# Patient Record
Sex: MALE | Race: WHITE | Employment: OTHER | URBAN - METROPOLITAN AREA
[De-identification: names, ages, dates, MRNs, and addresses within clinical notes are randomized per-mention and may not be internally consistent; named-entity substitution may affect disease eponyms.]

---

## 2022-01-24 ENCOUNTER — APPOINTMENT (OUTPATIENT)
Dept: CT IMAGING | Age: 72
DRG: 445 | End: 2022-01-24
Payer: MEDICARE

## 2022-01-24 ENCOUNTER — HOSPITAL ENCOUNTER (INPATIENT)
Age: 72
LOS: 3 days | Discharge: HOME | DRG: 445 | End: 2022-01-27
Attending: EMERGENCY MEDICINE | Admitting: INTERNAL MEDICINE
Payer: MEDICARE

## 2022-01-24 DIAGNOSIS — K72.00 ACUTE LIVER FAILURE WITHOUT HEPATIC COMA: Primary | ICD-10-CM

## 2022-01-24 DIAGNOSIS — E80.6 HYPERBILIRUBINEMIA: ICD-10-CM

## 2022-01-24 PROBLEM — E87.6 HYPOKALEMIA: Status: ACTIVE | Noted: 2022-01-24

## 2022-01-24 PROBLEM — K83.1 BILIARY OBSTRUCTION: Status: ACTIVE | Noted: 2022-01-24

## 2022-01-24 PROBLEM — K50.90 CROHN'S DISEASE (HCC): Status: ACTIVE | Noted: 2022-01-24

## 2022-01-24 PROBLEM — R74.01 TRANSAMINITIS: Status: ACTIVE | Noted: 2022-01-24

## 2022-01-24 PROBLEM — E87.1 HYPONATREMIA: Status: ACTIVE | Noted: 2022-01-24

## 2022-01-24 PROBLEM — R17 PAINLESS JAUNDICE: Status: ACTIVE | Noted: 2022-01-24

## 2022-01-24 LAB
A/G RATIO: 1.3 (ref 1.1–2.2)
ACETAMINOPHEN LEVEL: <5 UG/ML (ref 10–30)
ALBUMIN SERPL-MCNC: 4 G/DL (ref 3.4–5)
ALP BLD-CCNC: 501 U/L (ref 40–129)
ALT SERPL-CCNC: 586 U/L (ref 10–40)
AMMONIA: 48 UMOL/L (ref 16–60)
ANION GAP SERPL CALCULATED.3IONS-SCNC: 13 MMOL/L (ref 3–16)
AST SERPL-CCNC: 286 U/L (ref 15–37)
BASOPHILS ABSOLUTE: 0.1 K/UL (ref 0–0.2)
BASOPHILS RELATIVE PERCENT: 1.1 %
BILIRUB SERPL-MCNC: 8.9 MG/DL (ref 0–1)
BUN BLDV-MCNC: 11 MG/DL (ref 7–20)
CALCIUM SERPL-MCNC: 9.1 MG/DL (ref 8.3–10.6)
CHLORIDE BLD-SCNC: 86 MMOL/L (ref 99–110)
CO2: 25 MMOL/L (ref 21–32)
CREAT SERPL-MCNC: <0.5 MG/DL (ref 0.8–1.3)
EOSINOPHILS ABSOLUTE: 0.1 K/UL (ref 0–0.6)
EOSINOPHILS RELATIVE PERCENT: 2 %
GFR AFRICAN AMERICAN: >60
GFR NON-AFRICAN AMERICAN: >60
GLUCOSE BLD-MCNC: 116 MG/DL (ref 70–99)
GLUCOSE BLD-MCNC: 132 MG/DL (ref 70–99)
HCT VFR BLD CALC: 40.5 % (ref 40.5–52.5)
HEMOGLOBIN: 13.7 G/DL (ref 13.5–17.5)
INR BLD: 1.08 (ref 0.88–1.12)
LYMPHOCYTES ABSOLUTE: 1 K/UL (ref 1–5.1)
LYMPHOCYTES RELATIVE PERCENT: 13.8 %
MAGNESIUM: 2.1 MG/DL (ref 1.8–2.4)
MCH RBC QN AUTO: 34 PG (ref 26–34)
MCHC RBC AUTO-ENTMCNC: 33.8 G/DL (ref 31–36)
MCV RBC AUTO: 100.7 FL (ref 80–100)
MONOCYTES ABSOLUTE: 0.5 K/UL (ref 0–1.3)
MONOCYTES RELATIVE PERCENT: 6.5 %
NEUTROPHILS ABSOLUTE: 5.5 K/UL (ref 1.7–7.7)
NEUTROPHILS RELATIVE PERCENT: 76.6 %
PDW BLD-RTO: 13.8 % (ref 12.4–15.4)
PERFORMED ON: ABNORMAL
PLATELET # BLD: 354 K/UL (ref 135–450)
PMV BLD AUTO: 7.4 FL (ref 5–10.5)
POTASSIUM REFLEX MAGNESIUM: 3.3 MMOL/L (ref 3.5–5.1)
PROTHROMBIN TIME: 12.2 SEC (ref 9.9–12.7)
RBC # BLD: 4.02 M/UL (ref 4.2–5.9)
SARS-COV-2, NAAT: NOT DETECTED
SODIUM BLD-SCNC: 124 MMOL/L (ref 136–145)
TOTAL PROTEIN: 7.2 G/DL (ref 6.4–8.2)
WBC # BLD: 7.2 K/UL (ref 4–11)

## 2022-01-24 PROCEDURE — 74177 CT ABD & PELVIS W/CONTRAST: CPT

## 2022-01-24 PROCEDURE — 2580000003 HC RX 258: Performed by: INTERNAL MEDICINE

## 2022-01-24 PROCEDURE — 83735 ASSAY OF MAGNESIUM: CPT

## 2022-01-24 PROCEDURE — 80074 ACUTE HEPATITIS PANEL: CPT

## 2022-01-24 PROCEDURE — 99283 EMERGENCY DEPT VISIT LOW MDM: CPT

## 2022-01-24 PROCEDURE — 6360000004 HC RX CONTRAST MEDICATION: Performed by: EMERGENCY MEDICINE

## 2022-01-24 PROCEDURE — 99222 1ST HOSP IP/OBS MODERATE 55: CPT | Performed by: PHYSICIAN ASSISTANT

## 2022-01-24 PROCEDURE — 6370000000 HC RX 637 (ALT 250 FOR IP): Performed by: INTERNAL MEDICINE

## 2022-01-24 PROCEDURE — 1200000000 HC SEMI PRIVATE

## 2022-01-24 PROCEDURE — 80143 DRUG ASSAY ACETAMINOPHEN: CPT

## 2022-01-24 PROCEDURE — 82140 ASSAY OF AMMONIA: CPT

## 2022-01-24 PROCEDURE — 80053 COMPREHEN METABOLIC PANEL: CPT

## 2022-01-24 PROCEDURE — 85025 COMPLETE CBC W/AUTO DIFF WBC: CPT

## 2022-01-24 PROCEDURE — 83036 HEMOGLOBIN GLYCOSYLATED A1C: CPT

## 2022-01-24 PROCEDURE — 6370000000 HC RX 637 (ALT 250 FOR IP): Performed by: PHYSICIAN ASSISTANT

## 2022-01-24 PROCEDURE — 2580000003 HC RX 258: Performed by: EMERGENCY MEDICINE

## 2022-01-24 PROCEDURE — 6360000002 HC RX W HCPCS: Performed by: INTERNAL MEDICINE

## 2022-01-24 PROCEDURE — 85610 PROTHROMBIN TIME: CPT

## 2022-01-24 PROCEDURE — 87635 SARS-COV-2 COVID-19 AMP PRB: CPT

## 2022-01-24 RX ORDER — SODIUM CHLORIDE 9 MG/ML
INJECTION, SOLUTION INTRAVENOUS CONTINUOUS
Status: DISCONTINUED | OUTPATIENT
Start: 2022-01-24 | End: 2022-01-27 | Stop reason: HOSPADM

## 2022-01-24 RX ORDER — ALBUTEROL SULFATE 90 UG/1
2 AEROSOL, METERED RESPIRATORY (INHALATION) EVERY 6 HOURS PRN
Status: DISCONTINUED | OUTPATIENT
Start: 2022-01-24 | End: 2022-01-27 | Stop reason: HOSPADM

## 2022-01-24 RX ORDER — 0.9 % SODIUM CHLORIDE 0.9 %
1000 INTRAVENOUS SOLUTION INTRAVENOUS ONCE
Status: COMPLETED | OUTPATIENT
Start: 2022-01-24 | End: 2022-01-24

## 2022-01-24 RX ORDER — DEXTROSE MONOHYDRATE 25 G/50ML
12.5 INJECTION, SOLUTION INTRAVENOUS PRN
Status: DISCONTINUED | OUTPATIENT
Start: 2022-01-24 | End: 2022-01-24 | Stop reason: CLARIF

## 2022-01-24 RX ORDER — SODIUM CHLORIDE 0.9 % (FLUSH) 0.9 %
5-40 SYRINGE (ML) INJECTION EVERY 12 HOURS SCHEDULED
Status: DISCONTINUED | OUTPATIENT
Start: 2022-01-24 | End: 2022-01-27 | Stop reason: HOSPADM

## 2022-01-24 RX ORDER — POLYETHYLENE GLYCOL 3350 17 G/17G
17 POWDER, FOR SOLUTION ORAL DAILY PRN
Status: DISCONTINUED | OUTPATIENT
Start: 2022-01-24 | End: 2022-01-27 | Stop reason: HOSPADM

## 2022-01-24 RX ORDER — ONDANSETRON 2 MG/ML
4 INJECTION INTRAMUSCULAR; INTRAVENOUS EVERY 6 HOURS PRN
Status: DISCONTINUED | OUTPATIENT
Start: 2022-01-24 | End: 2022-01-27 | Stop reason: HOSPADM

## 2022-01-24 RX ORDER — SODIUM CHLORIDE 9 MG/ML
25 INJECTION, SOLUTION INTRAVENOUS PRN
Status: DISCONTINUED | OUTPATIENT
Start: 2022-01-24 | End: 2022-01-27 | Stop reason: HOSPADM

## 2022-01-24 RX ORDER — NICOTINE POLACRILEX 4 MG
15 LOZENGE BUCCAL PRN
Status: DISCONTINUED | OUTPATIENT
Start: 2022-01-24 | End: 2022-01-27 | Stop reason: HOSPADM

## 2022-01-24 RX ORDER — ATENOLOL 50 MG/1
50 TABLET ORAL DAILY
Status: DISCONTINUED | OUTPATIENT
Start: 2022-01-24 | End: 2022-01-24

## 2022-01-24 RX ORDER — TRIAMTERENE AND HYDROCHLOROTHIAZIDE 37.5; 25 MG/1; MG/1
1 TABLET ORAL 2 TIMES DAILY
Status: ON HOLD | COMMUNITY
End: 2022-01-26 | Stop reason: HOSPADM

## 2022-01-24 RX ORDER — POTASSIUM CHLORIDE 20 MEQ/1
40 TABLET, EXTENDED RELEASE ORAL ONCE
Status: COMPLETED | OUTPATIENT
Start: 2022-01-24 | End: 2022-01-24

## 2022-01-24 RX ORDER — DEXTROSE MONOHYDRATE 50 MG/ML
100 INJECTION, SOLUTION INTRAVENOUS PRN
Status: DISCONTINUED | OUTPATIENT
Start: 2022-01-24 | End: 2022-01-27 | Stop reason: HOSPADM

## 2022-01-24 RX ORDER — SODIUM CHLORIDE 0.9 % (FLUSH) 0.9 %
5-40 SYRINGE (ML) INJECTION PRN
Status: DISCONTINUED | OUTPATIENT
Start: 2022-01-24 | End: 2022-01-27 | Stop reason: HOSPADM

## 2022-01-24 RX ORDER — METFORMIN HYDROCHLORIDE 500 MG/1
500 TABLET, EXTENDED RELEASE ORAL 2 TIMES DAILY
COMMUNITY
End: 2022-07-14

## 2022-01-24 RX ORDER — FAMOTIDINE 20 MG/1
20 TABLET, FILM COATED ORAL 2 TIMES DAILY
Status: DISCONTINUED | OUTPATIENT
Start: 2022-01-24 | End: 2022-01-26

## 2022-01-24 RX ORDER — ALLOPURINOL 100 MG/1
300 TABLET ORAL DAILY
Status: DISCONTINUED | OUTPATIENT
Start: 2022-01-24 | End: 2022-01-27 | Stop reason: HOSPADM

## 2022-01-24 RX ORDER — ONDANSETRON 4 MG/1
4 TABLET, ORALLY DISINTEGRATING ORAL EVERY 8 HOURS PRN
Status: DISCONTINUED | OUTPATIENT
Start: 2022-01-24 | End: 2022-01-27 | Stop reason: HOSPADM

## 2022-01-24 RX ADMIN — IOPAMIDOL 75 ML: 755 INJECTION, SOLUTION INTRAVENOUS at 15:44

## 2022-01-24 RX ADMIN — POTASSIUM CHLORIDE 40 MEQ: 1500 TABLET, EXTENDED RELEASE ORAL at 22:34

## 2022-01-24 RX ADMIN — Medication 10 ML: at 22:37

## 2022-01-24 RX ADMIN — SODIUM CHLORIDE 1000 ML: 9 INJECTION, SOLUTION INTRAVENOUS at 15:57

## 2022-01-24 RX ADMIN — ENOXAPARIN SODIUM 40 MG: 100 INJECTION SUBCUTANEOUS at 22:34

## 2022-01-24 RX ADMIN — FAMOTIDINE 20 MG: 20 TABLET ORAL at 22:34

## 2022-01-24 RX ADMIN — SODIUM CHLORIDE: 9 INJECTION, SOLUTION INTRAVENOUS at 22:47

## 2022-01-24 RX ADMIN — ALLOPURINOL 300 MG: 100 TABLET ORAL at 22:34

## 2022-01-24 ASSESSMENT — ENCOUNTER SYMPTOMS
PHOTOPHOBIA: 0
VOMITING: 0
TROUBLE SWALLOWING: 0
NAUSEA: 0
ABDOMINAL PAIN: 1
BACK PAIN: 0
BLOOD IN STOOL: 0
COLOR CHANGE: 1
FACIAL SWELLING: 0
WHEEZING: 0
VOICE CHANGE: 0
STRIDOR: 0
SHORTNESS OF BREATH: 0

## 2022-01-24 ASSESSMENT — PAIN DESCRIPTION - DESCRIPTORS: DESCRIPTORS: CRAMPING

## 2022-01-24 ASSESSMENT — PAIN DESCRIPTION - LOCATION: LOCATION: ABDOMEN

## 2022-01-24 ASSESSMENT — PAIN DESCRIPTION - PAIN TYPE: TYPE: ACUTE PAIN

## 2022-01-24 ASSESSMENT — PAIN SCALES - GENERAL: PAINLEVEL_OUTOF10: 3

## 2022-01-24 NOTE — ED NOTES
Patient resting in chair with eyes closed, respirations easy and even. Will continue to monitor.      Sharon Tadeo RN  01/24/22 6392

## 2022-01-24 NOTE — ED NOTES
Perfect serve message to Dr. Esther Merida @ [facility]  01/24/22 1703    Orders placed, no call back      [address] [facility]  01/24/22 6422

## 2022-01-24 NOTE — H&P
Hospital Medicine History & Physical      PCP: Deepti Cain MD    Date of Admission: 1/24/2022    Date of Service: Pt seen/examined on 1/24/2022      Chief Complaint:    Chief Complaint   Patient presents with    Fatigue     pt c/o generalized weakness and jaundice, dark urine, severe itching, was seen by provider last monday and started on some medications but does not seem to have improved, hx of crohns    Jaundice     History Of Present Illness: The patient is a 70 y.o. male with Crohn's disease, HTN, GERD, gout, DM2, HLD  who presented to [facility] ED with complaint of yellow skin and itching. Malaise, fatigue, poor appetite for about 1 month. About 2 weeks ago he noticed that his urine looked dark in color. About a week and a half ago he noticed some slight itching of the skin. He has had increased indigestion but denies any abdominal pain. Poor oral intake secondary to lack of appetite, but denies postprandial pain. Saw PCP and was started on ciprofloxacin for urinary tract infection and Medrol Dosepak for itching. Wife noticed that his eyes appeared yellow about 2 or 3 days ago and therefore patient came to the ER today for evaluation. He does not drink alcohol. He does not take Tylenol. He rarely takes NSAIDs. Denies a history of hepatitis. He was found to have significantly elevated liver enzymes , alk phos 501, , and a bilirubin of 8.9. CT scan of the abdomen and pelvis revealed moderate dilatation of the biliary tree with a transition point in the common duct, etiology uncertain, distended gallbladder without cholelithiasis appreciated. He will be admitted for further care and a GI consultation.     Past Medical History:        Diagnosis Date    Blood circulation, collateral     Carpal tunnel syndrome     emboli     pulmonary emboli in 1980    GERD (gastroesophageal reflux disease)     Chrons disease    Gout     Hypertension     Pneumonia     pneumonia,asthma    prostate     infection    Seasonal allergies        Past Surgical History:        Procedure Laterality Date    TONSILLECTOMY         Medications Prior to Admission:    Prior to Admission medications    Medication Sig Start Date End Date Taking? Authorizing Provider   gemfibrozil (LOPID) 600 MG tablet Take 600 mg by mouth 2 times daily. 2/15/11   Historical Provider, MD   dicyclomine (BENTYL) 10 MG capsule Take 10 mg by mouth nightly. 2/15/11   Historical Provider, MD   atenolol (TENORMIN) 50 MG tablet Take 50 mg by mouth daily. Historical Provider, MD   allopurinol (ZYLOPRIM) 300 MG tablet Take 300 mg by mouth daily. Historical Provider, MD   famotidine (PEPCID) 20 MG tablet Take 20 mg by mouth 2 times daily. Historical Provider, MD   albuterol (PROVENTIL HFA;VENTOLIN HFA) 108 (90 BASE) MCG/ACT inhaler Inhale 2 puffs into the lungs every 6 hours as needed. Historical Provider, MD   ibuprofen (ADVIL;MOTRIN) 200 MG tablet Take 200 mg by mouth every 6 hours as needed. Historical Provider, MD       Allergies:  Patient has no known allergies. Social History:  The patient currently lives at home with his wife. TOBACCO:   reports that he has quit smoking. He has never used smokeless tobacco.  ETOH:   reports no history of alcohol use. Family History:   Positive as follows:    History reviewed. No pertinent family history.     REVIEW OF SYSTEMS:       Constitutional: Negative for fever   + fatigue, poor appetite   HENT: Negative for sore throat   Eyes: Negative for redness   Respiratory: Negative  for dyspnea, cough   Cardiovascular: Negative for chest pain   Gastrointestinal: Negative for vomiting, diarrhea   Genitourinary: Negative for hematuria   Musculoskeletal: Negative for arthralgias   Skin: Negative for rash   + pruritis   + color change   Neurological: Negative for syncope   Hematological: Negative for adenopathy   Psychiatric/Behavorial: Negative for anxiety    PHYSICAL EXAM:    BP (!) 147/83   Pulse 78   Temp 98.3 °F (36.8 °C) (Oral)   Resp 18   Ht 5' 11\" (1.803 m)   Wt 171 lb (77.6 kg)   SpO2 97%   BMI 23.85 kg/m²     Gen: No distress. Alert. Eyes: PERRL. + sclera icterus. No conjunctival injection. ENT: No discharge. Pharynx clear. Neck: No JVD. Trachea midline. Resp: No accessory muscle use. No crackles. No wheezes. No rhonchi. CV: Regular rate. Regular rhythm. No murmur. No rub. No edema. GI: Non-tender. Non-distended. No masses. No organomegaly. Normal bowel sounds. No hernia. Skin: Warm and dry. No nodule on exposed extremities. No rash on exposed extremities. Jaundice   M/S: No cyanosis. No joint deformity. No clubbing. Neuro: Awake. Grossly nonfocal    Psych: Oriented x 3. No anxiety or agitation. CBC:   Recent Labs     01/24/22  1210   WBC 7.2   HGB 13.7   HCT 40.5   .7*        BMP:   Recent Labs     01/24/22  1210   *   K 3.3*   CL 86*   CO2 25   BUN 11   CREATININE <0.5*     LIVER PROFILE:   Recent Labs     01/24/22  1210   *   *   BILITOT 8.9*   ALKPHOS 501*     PT/INR:   Recent Labs     01/24/22  1620   PROTIME 12.2   INR 1.08       CULTURES  COVID 19, naat; not detected      EKG:  I have reviewed the EKG with the following interpretation:   None     RADIOLOGY  CT ABDOMEN PELVIS W IV CONTRAST Additional Contrast? None   Final Result   1. Moderate dilation of the biliary tree with transition at the mid to lower   common duct, uncertain etiology, benign versus malignant stricture. No stone   or mass is appreciated. Follow-up ERCP/MRCP is considered. 2. Distended gallbladder. No cholelithiasis is appreciated. 3. No findings of acute pancreatitis or dilation of the pancreatic duct. 4. Mild dilation of proximal and mid small bowel, with gradual transition. Ileus or enteritis is favored over obstruction.            ASSESSMENT/PLAN:    #Transaminitis  #Biliary obstruction   - patient presents with 1 month of fatigue and poor appetite followed by pruritis and jaundice  - LFT elevation as above with bili of 8.9  - CT as above with dilatation of the biliary tree with transition at the mid to lower common duct of uncertain etiology- no stones or masses noted on CT.   - C/s [facility] (previously f/b Dr. Mando Miller and has upcoming appt with Dr. Darya Coronado) pending- ERCP vs MRCP pending GI recs  - acute hep panel ordered  - mitochondrial ab's ordered  - IVF  - NPO after midnight    #Hyponatremia   - suspect 2/2 poor PO intake and continued use of thiazide diuretic  - no recent labs to review  - Na 124 on admit. Gentle IV NS. Hold Thiazide. Monitor labs    #Hypokalemia   - replacement ordered    #Crohn's disease  - denies any acute issues. Previously f/b Dr. Mando Miller, has an upcoming appt with Dr. Darya Coronado. Takes Bentyl QHS only at home    #HTN  - BP mildly elevated, hold Maxzide with hyponatremia     #GERD  - continue Pepcid    #Gout  - cont allopurinol    #DM2  - hold metformin, use SSI    DVT Prophylaxis: Lovenox   Diet: Diet NPO  ADULT DIET;  Regular; Low Fat (less than or equal to 50 gm/day)   Code Status: Full Code     Sanchez Calix PA-C  1/24/2022 6:49 PM

## 2022-01-24 NOTE — PLAN OF CARE
77yo man with Hx of Crohn's, presents with painless jaundice and pruritis. No prior Hx of liver disease. Initially treated with cipro and Medrol dose pack per PCP. Plan:    - NPO after midnight.    - gentle IVF with hyponatremia - renal q8   - LFT with total direct indirect bilirubin   - INR normal.    - GI consult. - check viral hep panel and Mitochondrial Ab.     - admit to med surg.    - MRCP vs ERCP pending GI recs.        Marialuisa Cano MD  1/24/2022  5:29 PM

## 2022-01-24 NOTE — ED TRIAGE NOTES
Chief Complaint   Patient presents with    Fatigue     pt c/o generalized weakness and jaundice, dark urine, severe itching, was seen by provider last monday and started on some medications but does not seem to have improved, hx of crohns    Jaundice

## 2022-01-24 NOTE — ED PROVIDER NOTES
[address] ED  eMERGENCY dEPARTMENT eNCOUnter      Pt Name: Susana Lemon  MRN: [id number]  [place] 1950  Date of evaluation: 1/24/2022  Provider: Jelani Gray MD    CHIEF COMPLAINT       Chief Complaint   Patient presents with    Fatigue     pt c/o generalized weakness and jaundice, dark urine, severe itching, was seen by provider last monday and started on some medications but does not seem to have improved, hx of crohns    Jaundice         HISTORY OF PRESENT ILLNESS   (Location/Symptom, Timing/Onset, Context/Setting, Quality, Duration, Modifying Factors, Severity)  Note limiting factors. Susana Lemon is a 70 y.o. male reports a history of Crohn's disease but denies any history of liver problems who reports 2 to 3 weeks of yellow discoloration of his skin, dark urine, and severe diffuse itching. Patient saw his primary care doctor 1 week ago and was placed on ciprofloxacin and a Medrol Dosepak which she reports mildly helped his symptoms at first however now they have worsened. The patient denies any history of liver problems. The patient denies taking any Tylenol. The patient denies drinking alcohol. The patient denies any known history of hepatitis. He reports his symptoms are moderate constant and worsening. He also reports diffuse fatigue. He denies any focal neurologic deficits, fever, confusion, altered mental status. He does report decreased appetite. HPI    Nursing Notes were reviewed. REVIEW OFSYSTEMS    (2-9 systems for level 4, 10 or more for level 5)     Review of Systems   Constitutional: Positive for appetite change and fatigue. Negative for fever and unexpected weight change. HENT: Negative for facial swelling, trouble swallowing and voice change. Eyes: Negative for photophobia and visual disturbance. Respiratory: Negative for shortness of breath, wheezing and stridor. Cardiovascular: Negative for chest pain and palpitations.    Gastrointestinal: Positive for abdominal pain. Negative for blood in stool, nausea and vomiting. Genitourinary: Negative for difficulty urinating and dysuria. Musculoskeletal: Negative for back pain, gait problem and neck pain. Skin: Positive for color change. Negative for wound. Neurological: Positive for weakness (diffuse). Negative for seizures, syncope and speech difficulty. Psychiatric/Behavioral: Negative for self-injury and suicidal ideas. Except as noted above the remainder of the review of systems was reviewed and negative. PAST MEDICAL HISTORY     Past Medical History:   Diagnosis Date    Blood circulation, collateral     Carpal tunnel syndrome     emboli     pulmonary emboli in 1980    GERD (gastroesophageal reflux disease)     Chrons disease    Gout     Hypertension     Pneumonia     pneumonia,asthma    prostate     infection    Seasonal allergies          SURGICAL HISTORY       Past Surgical History:   Procedure Laterality Date    TONSILLECTOMY           CURRENT MEDICATIONS       Previous Medications    ALBUTEROL (PROVENTIL HFA;VENTOLIN HFA) 108 (90 BASE) MCG/ACT INHALER    Inhale 2 puffs into the lungs every 6 hours as needed. ALLOPURINOL (ZYLOPRIM) 300 MG TABLET    Take 300 mg by mouth daily. ATENOLOL (TENORMIN) 50 MG TABLET    Take 50 mg by mouth daily. DICYCLOMINE (BENTYL) 10 MG CAPSULE    Take 10 mg by mouth nightly. FAMOTIDINE (PEPCID) 20 MG TABLET    Take 20 mg by mouth 2 times daily. GEMFIBROZIL (LOPID) 600 MG TABLET    Take 600 mg by mouth 2 times daily. IBUPROFEN (ADVIL;MOTRIN) 200 MG TABLET    Take 200 mg by mouth every 6 hours as needed. ALLERGIES     Patient has no known allergies. FAMILY HISTORY     History reviewed. No pertinent family history.        SOCIAL HISTORY       Social History     Socioeconomic History    Marital status:      Spouse name: None    Number of children: None    Years of education: None    Highest education level: None   Occupational History    None   Tobacco Use    Smoking status: Former Smoker    Smokeless tobacco: Never Used    Tobacco comment: pt quit smoking over 20 years ago   Substance and Sexual Activity    Alcohol use: No    Drug use: No    Sexual activity: Yes     Partners: Female   Other Topics Concern    None   Social History Narrative    None     Social Determinants of Health     Financial Resource Strain:     Difficulty of Paying Living Expenses: Not on file   Food Insecurity:     Worried About Running Out of Food in the Last Year: Not on file    Elroy of Food in the Last Year: Not on file   Transportation Needs:     Lack of Transportation (Medical): Not on file    Lack of Transportation (Non-Medical): Not on file   Physical Activity:     Days of Exercise per Week: Not on file    Minutes of Exercise per Session: Not on file   Stress:     Feeling of Stress : Not on file   Social Connections:     Frequency of Communication with Friends and Family: Not on file    Frequency of Social Gatherings with Friends and Family: Not on file    Attends Quaker Services: Not on file    Active Member of [address] [place] [address] or Organizations: Not on file    Attends Club or Organization Meetings: Not on file    Marital Status: Not on file   Intimate Partner Violence:     Fear of Current or Ex-Partner: Not on file    Emotionally Abused: Not on file    Physically Abused: Not on file    Sexually Abused: Not on file   Housing Stability:     Unable to Pay for Housing in the Last Year: Not on file    Number of [place] in the Last Year: Not on file    Unstable Housing in the Last Year: Not on file         PHYSICAL EXAM    (up to 7 for level 4, 8 or more for level 5)     ED Triage Vitals [01/24/22 1201]   BP Temp Temp Source Pulse Resp SpO2 Height Weight   (!) 147/83 98.3 °F (36.8 °C) Oral 78 18 97 % 5' 11\" (1.803 m) 171 lb (77.6 kg)       Physical Exam  Vitals and nursing note reviewed.    Constitutional: General: He is not in acute distress. Appearance: He is well-developed. HENT:      Head: Normocephalic and atraumatic. Right Ear: External ear normal.      Left Ear: External ear normal.   Eyes:      General: Scleral icterus present. Neck:      Vascular: No JVD. Trachea: No tracheal deviation. Cardiovascular:      Rate and Rhythm: Normal rate. Pulmonary:      Effort: Pulmonary effort is normal. No respiratory distress. Breath sounds: Normal breath sounds. No wheezing. Abdominal:      General: There is no distension. Palpations: Abdomen is soft. Tenderness: There is no abdominal tenderness. There is no guarding or rebound. Musculoskeletal:         General: No tenderness. Normal range of motion. Cervical back: Neck supple. Skin:     General: Skin is warm and dry. Coloration: Skin is jaundiced. Neurological:      Mental Status: He is alert. Cranial Nerves: No cranial nerve deficit. Comments: Patient alert and oriented to person place time and situation. Equal strength in all 4 extremities. Ambulatory in own power with normal gait. DIAGNOSTIC RESULTS       RADIOLOGY:     Interpretation per the Radiologist below, if available at the time of this note:    CT ABDOMEN PELVIS W IV CONTRAST Additional Contrast? None   Final Result   1. Moderate dilation of the biliary tree with transition at the mid to lower   common duct, uncertain etiology, benign versus malignant stricture. No stone   or mass is appreciated. Follow-up ERCP/MRCP is considered. 2. Distended gallbladder. No cholelithiasis is appreciated. 3. No findings of acute pancreatitis or dilation of the pancreatic duct. 4. Mild dilation of proximal and mid small bowel, with gradual transition. Ileus or enteritis is favored over obstruction.                ED BEDSIDE ULTRASOUND:   Performed by ED Physician - none    LABS:  Labs Reviewed   CBC WITH AUTO DIFFERENTIAL - Abnormal; Notable for the following components:       Result Value    RBC 4.02 (*)     .7 (*)     All other components within normal limits    Narrative:     Performed at:  [facility],  [employer]   Phone [phone]   COMPREHENSIVE METABOLIC PANEL W/ REFLEX TO MG FOR LOW K - Abnormal; Notable for the following components:    Sodium 124 (*)     Potassium reflex Magnesium 3.3 (*)     Chloride 86 (*)     Glucose 116 (*)     CREATININE <0.5 (*)     Total Bilirubin 8.9 (*)     Alkaline Phosphatase 501 (*)      (*)      (*)     All other components within normal limits    Narrative:     Performed at:  [place] [address],  [employer]   Phone [phone]   ACETAMINOPHEN LEVEL - Abnormal; Notable for the following components:    Acetaminophen Level <5 (*)     All other components within normal limits    Narrative:     Performed at:  [facility],  [employer]   Phone [phone], RAPID   AMMONIA    Narrative:     Performed at:  [place] [address],  [place] [employer]   Phone [phone]   MAGNESIUM    Narrative:     Performed at:  [place] [address],  [employer]   Phone [phone]   PROTIME-INR    Narrative:     Performed at:  [facility],  [employer]   Phone [phone]   CBC WITH AUTO DIFFERENTIAL   RENAL FUNCTION PANEL   HEPATITIS PANEL, ACUTE   MITOCHONDRIAL ANTIBODIES, M2   HEPATIC FUNCTION PANEL       All otherlabs were within normal range or not returned as of this dictation.     EMERGENCY DEPARTMENT COURSE and DIFFERENTIAL DIAGNOSIS/MDM:   Vitals:    Vitals:    01/24/22 1201   BP: (!) 147/83   Pulse: 78   Resp: 18   Temp: 98.3 °F (36.8 °C)   TempSrc: Oral   SpO2: 97%   Weight: 171 lb (77.6 kg)   Height: 5' 11\" (1.803 m)         MDM  Patient has jaundice and scleral icterus on exam and labs show acute liver failure with significantly elevated LFTs and hyperbilirubinemia. CT abdomen pelvis shows moderate dilation of the biliary tree with stricture too lower common duct of unknown nature. Given patient's acute endorgan damage I feel he is appropriate for admission for ERCP/MRCP and GI consultation. The patient expresses understanding and agreement with this plan is admitted for further care. CONSULTS:  IP CONSULT TO HOSPITALIST  IP CONSULT TO GI    PROCEDURES:  Unless otherwise noted below, none     Critical Care  Performed by: Berenice Andrews MD  Authorized by: Berenice Andrews MD     Critical care provider statement:     Critical care time (minutes):  32    Critical care time was exclusive of:  Separately billable procedures and treating other patients and teaching time    Critical care was necessary to treat or prevent imminent or life-threatening deterioration of the following conditions:  Hepatic failure and shock    Critical care was time spent personally by me on the following activities:  Ordering and performing treatments and interventions, development of treatment plan with patient or surrogate, ordering and review of laboratory studies, discussions with consultants, ordering and review of radiographic studies, re-evaluation of patient's condition, examination of patient and obtaining history from patient or surrogate        FINAL IMPRESSION      1. Acute liver failure without hepatic coma    2. Hyperbilirubinemia          DISPOSITION/PLAN   DISPOSITION Admitted 01/24/2022 05:26:36 PM      (Please note that portions of this note were completed with a voice recognition program.  Efforts were made to edit the dictations but occasionally words aremis-transcribed. )    Berenice Andrews MD (electronically signed)  Attending Emergency Physician           Nyla Rodriguez Diogenes Lima MD  01/24/22 5554

## 2022-01-24 NOTE — ED NOTES
Call placed to [address] @ [address]  01/24/22 Raj[address]inder 84    Call placed to [facility] @ [address]  01/24/22 1846

## 2022-01-24 NOTE — PROGRESS NOTES
GI Brief Note:     GI consult received. Full consult note tomorrow morning. Chart review indicate obstructive jaundice with abnormal LFTs and CT evidence of mid to distal CBD stricture with proximal dilation. Normal WBC and afebrile without evidence of cholangitis. Keep NPO with IV fluids.    Plan for ERCP with biliary stent placement and biopsy tomorrow 1/25  Continue supportive care

## 2022-01-25 ENCOUNTER — ANESTHESIA (OUTPATIENT)
Dept: MEDSURG UNIT | Age: 72
DRG: 445 | End: 2022-01-25
Payer: MEDICARE

## 2022-01-25 ENCOUNTER — APPOINTMENT (OUTPATIENT)
Dept: MRI IMAGING | Age: 72
DRG: 445 | End: 2022-01-25
Payer: MEDICARE

## 2022-01-25 ENCOUNTER — ANESTHESIA EVENT (OUTPATIENT)
Dept: MEDSURG UNIT | Age: 72
DRG: 445 | End: 2022-01-25
Payer: MEDICARE

## 2022-01-25 LAB
ALBUMIN SERPL-MCNC: 3.3 G/DL (ref 3.4–5)
ALBUMIN SERPL-MCNC: 3.5 G/DL (ref 3.4–5)
ALBUMIN SERPL-MCNC: 3.6 G/DL (ref 3.4–5)
ALP BLD-CCNC: 434 U/L (ref 40–129)
ALT SERPL-CCNC: 416 U/L (ref 10–40)
ANION GAP SERPL CALCULATED.3IONS-SCNC: 10 MMOL/L (ref 3–16)
ANION GAP SERPL CALCULATED.3IONS-SCNC: 5 MMOL/L (ref 3–16)
AST SERPL-CCNC: 177 U/L (ref 15–37)
BASOPHILS ABSOLUTE: 0 K/UL (ref 0–0.2)
BASOPHILS RELATIVE PERCENT: 0.7 %
BILIRUB SERPL-MCNC: 10.8 MG/DL (ref 0–1)
BILIRUBIN DIRECT: 7.5 MG/DL (ref 0–0.3)
BILIRUBIN, INDIRECT: 3.3 MG/DL (ref 0–1)
BUN BLDV-MCNC: 11 MG/DL (ref 7–20)
BUN BLDV-MCNC: 12 MG/DL (ref 7–20)
CALCIUM SERPL-MCNC: 8.9 MG/DL (ref 8.3–10.6)
CALCIUM SERPL-MCNC: 8.9 MG/DL (ref 8.3–10.6)
CHLORIDE BLD-SCNC: 94 MMOL/L (ref 99–110)
CHLORIDE BLD-SCNC: 96 MMOL/L (ref 99–110)
CO2: 25 MMOL/L (ref 21–32)
CO2: 30 MMOL/L (ref 21–32)
CREAT SERPL-MCNC: <0.5 MG/DL (ref 0.8–1.3)
CREAT SERPL-MCNC: <0.5 MG/DL (ref 0.8–1.3)
EOSINOPHILS ABSOLUTE: 0.1 K/UL (ref 0–0.6)
EOSINOPHILS RELATIVE PERCENT: 2.6 %
ESTIMATED AVERAGE GLUCOSE: 111.2 MG/DL
GFR AFRICAN AMERICAN: >60
GFR AFRICAN AMERICAN: >60
GFR NON-AFRICAN AMERICAN: >60
GFR NON-AFRICAN AMERICAN: >60
GLUCOSE BLD-MCNC: 113 MG/DL (ref 70–99)
GLUCOSE BLD-MCNC: 115 MG/DL (ref 70–99)
GLUCOSE BLD-MCNC: 119 MG/DL (ref 70–99)
GLUCOSE BLD-MCNC: 151 MG/DL (ref 70–99)
GLUCOSE BLD-MCNC: 194 MG/DL (ref 70–99)
GLUCOSE BLD-MCNC: 266 MG/DL (ref 70–99)
HAV IGM SER IA-ACNC: NORMAL
HBA1C MFR BLD: 5.5 %
HCT VFR BLD CALC: 36.1 % (ref 40.5–52.5)
HEMOGLOBIN: 12.4 G/DL (ref 13.5–17.5)
HEPATITIS B CORE IGM ANTIBODY: NORMAL
HEPATITIS B SURFACE ANTIGEN INTERPRETATION: NORMAL
HEPATITIS C ANTIBODY INTERPRETATION: NORMAL
LYMPHOCYTES ABSOLUTE: 0.9 K/UL (ref 1–5.1)
LYMPHOCYTES RELATIVE PERCENT: 19.7 %
MAGNESIUM: 2.3 MG/DL (ref 1.8–2.4)
MCH RBC QN AUTO: 34.3 PG (ref 26–34)
MCHC RBC AUTO-ENTMCNC: 34.5 G/DL (ref 31–36)
MCV RBC AUTO: 99.5 FL (ref 80–100)
MONOCYTES ABSOLUTE: 0.4 K/UL (ref 0–1.3)
MONOCYTES RELATIVE PERCENT: 8.3 %
NEUTROPHILS ABSOLUTE: 3.1 K/UL (ref 1.7–7.7)
NEUTROPHILS RELATIVE PERCENT: 68.7 %
PDW BLD-RTO: 13.8 % (ref 12.4–15.4)
PERFORMED ON: ABNORMAL
PHOSPHORUS: 2.5 MG/DL (ref 2.5–4.9)
PHOSPHORUS: 3.1 MG/DL (ref 2.5–4.9)
PLATELET # BLD: 364 K/UL (ref 135–450)
PMV BLD AUTO: 7 FL (ref 5–10.5)
POTASSIUM SERPL-SCNC: 3.3 MMOL/L (ref 3.5–5.1)
POTASSIUM SERPL-SCNC: 4.4 MMOL/L (ref 3.5–5.1)
RBC # BLD: 3.63 M/UL (ref 4.2–5.9)
SODIUM BLD-SCNC: 129 MMOL/L (ref 136–145)
SODIUM BLD-SCNC: 131 MMOL/L (ref 136–145)
TOTAL PROTEIN: 6.2 G/DL (ref 6.4–8.2)
WBC # BLD: 4.5 K/UL (ref 4–11)

## 2022-01-25 PROCEDURE — 74183 MRI ABD W/O CNTR FLWD CNTR: CPT

## 2022-01-25 PROCEDURE — 80069 RENAL FUNCTION PANEL: CPT

## 2022-01-25 PROCEDURE — 6370000000 HC RX 637 (ALT 250 FOR IP): Performed by: INTERNAL MEDICINE

## 2022-01-25 PROCEDURE — 2580000003 HC RX 258: Performed by: INTERNAL MEDICINE

## 2022-01-25 PROCEDURE — 1200000000 HC SEMI PRIVATE

## 2022-01-25 PROCEDURE — A9579 GAD-BASE MR CONTRAST NOS,1ML: HCPCS | Performed by: INTERNAL MEDICINE

## 2022-01-25 PROCEDURE — 85025 COMPLETE CBC W/AUTO DIFF WBC: CPT

## 2022-01-25 PROCEDURE — 99233 SBSQ HOSP IP/OBS HIGH 50: CPT | Performed by: INTERNAL MEDICINE

## 2022-01-25 PROCEDURE — 94640 AIRWAY INHALATION TREATMENT: CPT

## 2022-01-25 PROCEDURE — 83735 ASSAY OF MAGNESIUM: CPT

## 2022-01-25 PROCEDURE — 6360000004 HC RX CONTRAST MEDICATION: Performed by: INTERNAL MEDICINE

## 2022-01-25 PROCEDURE — 6370000000 HC RX 637 (ALT 250 FOR IP): Performed by: PHYSICIAN ASSISTANT

## 2022-01-25 PROCEDURE — 94761 N-INVAS EAR/PLS OXIMETRY MLT: CPT

## 2022-01-25 PROCEDURE — 99222 1ST HOSP IP/OBS MODERATE 55: CPT | Performed by: INTERNAL MEDICINE

## 2022-01-25 PROCEDURE — 80076 HEPATIC FUNCTION PANEL: CPT

## 2022-01-25 PROCEDURE — 36415 COLL VENOUS BLD VENIPUNCTURE: CPT

## 2022-01-25 RX ORDER — POTASSIUM CHLORIDE 20 MEQ/1
40 TABLET, EXTENDED RELEASE ORAL ONCE
Status: COMPLETED | OUTPATIENT
Start: 2022-01-25 | End: 2022-01-25

## 2022-01-25 RX ADMIN — SODIUM CHLORIDE: 9 INJECTION, SOLUTION INTRAVENOUS at 12:30

## 2022-01-25 RX ADMIN — FAMOTIDINE 20 MG: 20 TABLET ORAL at 22:03

## 2022-01-25 RX ADMIN — SODIUM CHLORIDE: 9 INJECTION, SOLUTION INTRAVENOUS at 15:15

## 2022-01-25 RX ADMIN — INSULIN LISPRO 1 UNITS: 100 INJECTION, SOLUTION INTRAVENOUS; SUBCUTANEOUS at 22:05

## 2022-01-25 RX ADMIN — GADOTERIDOL 15 ML: 279.3 INJECTION, SOLUTION INTRAVENOUS at 10:08

## 2022-01-25 RX ADMIN — POTASSIUM CHLORIDE 40 MEQ: 1500 TABLET, EXTENDED RELEASE ORAL at 09:25

## 2022-01-25 RX ADMIN — Medication 2 PUFF: at 09:33

## 2022-01-25 RX ADMIN — ALLOPURINOL 300 MG: 100 TABLET ORAL at 09:25

## 2022-01-25 RX ADMIN — Medication 10 ML: at 22:04

## 2022-01-25 RX ADMIN — Medication 2 PUFF: at 19:42

## 2022-01-25 RX ADMIN — FAMOTIDINE 20 MG: 20 TABLET ORAL at 09:25

## 2022-01-25 NOTE — CONSULTS
[address] ,  [address] [facility], [facility]  Phone: [phone],  [place] [address], [address] [facility]  Phone: [phone]    Gastroenterology H&P/Consult Note    Chief Complaint   Patient presents with    Fatigue     pt c/o generalized weakness and jaundice, dark urine, severe itching, was seen by provider last monday and started on some medications but does not seem to have improved, hx of crohns    Jaundice       HPI     Thank you No ref. provider found and Jared Stanton MD for asking me to see Mortimer Hand in consultation. He is a 70y.o. year old malewith medical history of Crohn's disease, hypertension, GERD, diabetes mellitus type 2, hyperlipidemia presents with complaints of jaundice of 1 week duration. Associated with orange-colored urine and pruritus of 2 weeks duration. Patient also reports decreased appetite and chronic solid food dysphagia with occasional regurgitation. Denies abdominal pain, nausea, vomiting, fever, chills, unintentional weight loss, constipation, diarrhea, hematochezia or melenic stools. Date of Admission:  1/24/2022  Date of Consultation:  1/25/2022    Work-up in the ED noted abnormal liver chemistries with , alkaline phosphatase 501, AST 86, total bilirubin 8.9. Abnormal BMP with low sodium 124, potassium 3.3. Unremarkable CBC. CT abdomen and pelvis on 1/24/2022 noted dilated intrahepatic biliary ducts and dilated common bile duct to 15 mm at the agustin hepatis with transition at the mid to lower common bile duct. Normal-appearing pancreas without an appreciable pancreatic mass. Normal pancreatic duct. Mild dilation of proximal and mid small bowel with gradual transition. GI has been consulted for obstructive jaundice. Last Encounter Reviewed: None  Pertinent PMH, FH, SH is reviewed below.   Last EGD: 1996  Last Colonoscopy: remote     Health Maintenance   Topic Date Due    Hepatitis C screen  Never done    COVID-19 Vaccine (1) Never done    Depression Screen  Never done    DTaP/Tdap/Td vaccine (1 - Tdap) Never done    Lipid screen  Never done    Colon cancer screen colonoscopy  Never done    Shingles Vaccine (1 of 2) Never done    Pneumococcal 65+ years Vaccine (1 of 1 - PPSV23) Never done    Flu vaccine (1) Never done   [employer] Visit (AWV)  Never done    Potassium monitoring  01/25/2023    Creatinine monitoring  01/25/2023    AAA screen  Completed    Hepatitis A vaccine  Aged Out    Hepatitis B vaccine  Aged Out    Hib vaccine  Aged Out    Meningococcal (ACWY) vaccine  Aged Out     PAST MEDICAL HISTORY     Past Medical History:   Diagnosis Date    Asthma     Blood circulation, collateral     Carpal tunnel syndrome     Crohn's colitis ([address]     Diabetes mellitus [facility]     emboli     pulmonary emboli in 1980    GERD (gastroesophageal reflux disease)     Chrons disease    Gout     Hx of blood clots     Hypertension     Pneumonia     pneumonia,asthma    prostate     infection    Seasonal allergies      FAMILY HISTORY   History reviewed. No pertinent family history.   SOCIAL HISTORY     Social History     Tobacco Use    Smoking status: Former Smoker    Smokeless tobacco: Never Used    Tobacco comment: pt quit smoking over 20 years ago   Substance Use Topics    Alcohol use: No    Drug use: No     SURGICAL HISTORY     Past Surgical History:   Procedure Laterality Date    TONSILLECTOMY       ALLERGIES   No Known Allergies  CURRENT MEDICATIONS      potassium chloride  40 mEq Oral Once    allopurinol  300 mg Oral Daily    famotidine  20 mg Oral BID    sodium chloride flush  5-40 mL IntraVENous 2 times per day    enoxaparin  40 mg SubCUTAneous Nightly    insulin lispro  0-6 Units SubCUTAneous TID WC    insulin lispro  0-3 Units SubCUTAneous Nightly      sodium chloride      sodium chloride 75 mL/hr at 01/24/22 2247    dextrose albuterol sulfate HFA, sodium chloride flush, sodium chloride, ondansetron **OR** ondansetron, polyethylene glycol, glucose, glucagon (rDNA), dextrose, dextrose bolus (hypoglycemia) **OR** dextrose bolus (hypoglycemia)  HOME MEDICATIONS  [unfilled]  IMMUNIZATIONS     There is no immunization history on file for this patient. REVIEW OF SYSTEMS   See HPI for further details and pertinent postiives. Negative for the following:  Constitutional: Negative for weight change. Negative for appetite change and fatigue. HENT: Negative for nosebleeds, sore throat, mouth sores, trouble swallowing and voice change. Respiratory: Negative for cough, choking and chest tightness. Cardiovascular: Negative for chest pain   Gastrointestinal: See HPI  Musculoskeletal: Negative for arthralgias. Skin: Negative for pallor. Neurological: Negative for weakness and light-headedness. Hematological: Negative for adenopathy. Does not bruise/bleed easily. Psychiatric/Behavioral: Negative for suicidal ideas. PHYSICAL EXAM   VITAL SIGNS: /75   Pulse 64   Temp 97.1 °F (36.2 °C) (Oral)   Resp 18   Ht 5' 11\" (1.803 m)   Wt 171 lb (77.6 kg)   SpO2 97%   BMI 23.85 kg/m²   With regards to weight, he reports stable / unchanged. Review of available records reveals: Wt Readings from Last 50 Encounters:   01/24/22 171 lb (77.6 kg)     Constitutional: Jaundiced, Well developed, Well nourished, No acute distress, Non-toxic appearance. HENT: Normocephalic, Atraumatic, Bilateral external ears normal, Oropharynx moist, No oral exudates, Nose normal.   Eyes: Conjunctiva normal, No discharge, Scleral icterus. Neck: Normal range of motion, No tenderness, Supple, No stridor. Cardiovascular: Normal heart rate, Normal rhythm, No murmurs, No rubs, No gallops. Thorax & Lungs: Normal breath sounds, No respiratory distress, No wheezing, No chest tenderness.    Abdomen: normal bowel sounds, soft, non tender, non distended, no hernias  Rectal:  Deferred. Skin: Warm, Dry, No erythema, No rash. No bruising. Lower Extremities: Intact distal pulses, No edema, No tenderness  Neurologic: Alert & oriented x 3, Normal motor function, Normal sensory function, No focal deficits noted. RADIOLOGY/PROCEDURES     Last CT abd pelvis w contrast: Results for orders placed during the hospital encounter of 01/24/22    CT ABDOMEN PELVIS W IV CONTRAST Additional Contrast? None    Narrative  EXAMINATION:  CT OF THE ABDOMEN AND PELVIS WITH CONTRAST 1/24/2022 3:44 pm    TECHNIQUE:  CT of the abdomen and pelvis was performed with the administration of  intravenous contrast. Multiplanar reformatted images are provided for review. Dose modulation, iterative reconstruction, and/or weight based adjustment of  the mA/kV was utilized to reduce the radiation dose to as low as reasonably  achievable. COMPARISON:  None. HISTORY:  ORDERING SYSTEM PROVIDED HISTORY: vincent hyperbilirubinemia  TECHNOLOGIST PROVIDED HISTORY:  Reason for exam:->juandice, hyperbilirubinemia  Additional Contrast?->None  Decision Support Exception - unselect if not a suspected or confirmed  emergency medical condition->Emergency Medical Condition (MA)  Reason for Exam: juandice, hyperbilirubinemia, patient has orange urine,  history of Crohns    FINDINGS:  Lower Chest: There is mild atelectasis in the right lower lobe. Organs: The intrahepatic biliary ducts are moderate to severely dilated. The  gallbladder is moderately distended. No stones are identified. The common  duct is moderately distended. It measures 15 mm at the agustin hepatis and  tapers at the intrapancreatic portion. Transition area is not well-defined. The pancreas enhances homogenously. No pancreatic mass is appreciated. There is no dilation of the pancreatic duct. The spleen, adrenal glands and kidneys are unremarkable. GI/Bowel: There is a small sliding hiatal hernia.   There are fluid-filled  loops of mildly distended small bowel with gradual transition by the level of  the ileum. The appendix is normal.  There is no focal mural thickening of  the colon. There is normal enhancement of the mesenteric vasculature. Pelvis: Urinary bladder and prostate gland are unremarkable. Peritoneum/Retroperitoneum: There is moderate aortoiliac calcification,  without aneurysm. No adenopathy. Bones/Soft Tissues: No acute osseous abnormalities. Multilevel spondylosis  of the lumbar spine. Impression  1. Moderate dilation of the biliary tree with transition at the mid to lower  common duct, uncertain etiology, benign versus malignant stricture. No stone  or mass is appreciated. Follow-up ERCP/MRCP is considered. 2. Distended gallbladder. No cholelithiasis is appreciated. 3. No findings of acute pancreatitis or dilation of the pancreatic duct. 4. Mild dilation of proximal and mid small bowel, with gradual transition. Ileus or enteritis is favored over obstruction.       COURSE & MEDICAL DECISION MAKING   (See epic chart for additional details including stool tests, total bilirubin, viral loads, procedures, and pathology)  Lab Results   Component Value Date    WBC 4.5 01/25/2022    HGB 12.4 (L) 01/25/2022    HCT 36.1 (L) 01/25/2022     01/25/2022     (H) 01/25/2022     (H) 01/25/2022     (L) 01/25/2022    K 3.3 (L) 01/25/2022    CL 94 (L) 01/25/2022    CREATININE <0.5 (L) 01/25/2022    BUN 12 01/25/2022    CO2 25 01/25/2022    INR 1.08 01/24/2022     No results found for: BILIDIR  Lab Results   Component Value Date    PHOS 3.1 01/25/2022     Lab Results   Component Value Date    LABALBU 3.6 01/25/2022    ALKPHOS 434 01/25/2022     01/25/2022     01/25/2022    BILITOT 10.8 01/25/2022     No results found for: LIPASE  No results found for: AMYLASE  Lab Results   Component Value Date    INR 1.08 01/24/2022    INR 1.00 02/15/2011    PROTIME 12.2 01/24/2022    PROTIME 10.9 02/15/2011     Lab Results   Component Value Date    AMMONIA 48 01/24/2022     No results found for: AFP, AFPTM  No results found for: CEA    . FINAL IMPRESSION/ASSESSMENT/PLAN       1. Obstructive jaundice -no evidence of cholangitis    Plan:  Keep n.p.o. with IV fluids  Monitor LFTs  Ordered for MRI abdomen with MRCP to further evaluate biliary tree. Plan for ERCP with biliary stent placement and brushing today. Endoscopic Retrograde Cholangio Pancreatography (ERCP) with alternatives, rationale, benefits and risks, not limited to bleeding, infection, perforation, pancreatitis, need of surgery, prolong hospital stay and anesthesia side effects were explained. Patient verbalized understanding and agrees to proceed with ERCP. 1.  The patient indicates understanding of these issues and agrees with the plan. 2.  I reviewed the patient's medical information and medical history. 3.  I have reviewed the past medical, family, and social history sections including the medications and allergies listed in the above medical record.         Kortney Méndez MD 1/25/22 8:09 AM EST    [facility]   Phone [phone]   Fax [phone]

## 2022-01-25 NOTE — FLOWSHEET NOTE
01/25/22 0735   Vital Signs   Temp 97.1 °F (36.2 °C)   Temp Source Oral   Pulse 64   Heart Rate Source Monitor   Resp 18   /75   BP Location Right upper arm   Patient Position Semi fowlers   Level of Consciousness Alert (0)   MEWS Score 1   Patient Currently in Pain Denies   Oxygen Therapy   SpO2 97 %   O2 Device None (Room air)     AM assessment completed and vital signs completed, see flowsheet. Vital signs are WNL. Patient is alert & oriented. No complaints voiced. Patient denies further needs. Side rails up X 2. Bed in the lowest position. Call light within reach.

## 2022-01-25 NOTE — ED NOTES
Report given to 2 [facility]. Transport scheduled at this time.       Monica Stanley, RN  01/24/22 1942

## 2022-01-25 NOTE — PROGRESS NOTES
Handoff report and transfer of care given at bedside to hospitals. Patient in stable condition, denies needs/concerns at this time. Call light within reach.

## 2022-01-25 NOTE — ACP (ADVANCE CARE PLANNING)
Advance Care Planning   Healthcare Decision Maker:    Primary Decision Maker: Minesh Lewis [facility] - [phone]    Click here to complete Healthcare Decision Makers including selection of the Healthcare Decision Maker Relationship (ie \"Primary\").

## 2022-01-25 NOTE — FLOWSHEET NOTE
Patient admitted to room 236 from ER. Patient oriented to room, call light, bed rails, phone, lights and bathroom. Patient instructed about the schedule of the day including: vital sign frequency, lab draws, possible tests, frequency of MD and staff rounds, daily weights, I &O's and prescribed diet. Bed alarm deferred patient low fall risk. Bed locked, in lowest position, side rails up 2/4, call light within reach. Recliner Assessment:     Patient is able to demonstrate the ability to move from a reclining position to an upright position within the recliner. 4 Eyes Skin Assessment     The patient is being assess for   Admission    I agree that 2 RN's have performed a thorough Head to Toe Skin Assessment on the patient. ALL assessment sites listed below have been assessed. Areas assessed for pressure by both nurses:   [x]   Head, Face, and Ears   [x]   Shoulders, Back, and Chest, Abdomen  [x]   Arms, Elbows, and Hands   [x]   Coccyx, Sacrum, and Ischium  [x]   Legs, Feet, and Heels      Scattered bruising and callous to right second toe. Skin Assessed Under all Medical Devices by both nurses:  N/A     All Mepilex Borders were peeled back and area peeked at by both nurses:  N/A  Please list where Mepilex Borders are located:  N/A             **SHARE this note so that the co-signing nurse is able to place an eSignature**    Co-signer eSignature: Electronically signed by Jose E Seaman RN on 1/25/22 at 3:02 AM EST    Does the Patient have Skin Breakdown related to pressure?   No          Mickey Prevention initiated:  No   Wound Care Orders initiated:  No      [facility] nurse consulted for Pressure Injury (Stage 3,4, Unstageable, DTI, NWPT, Complex wounds)and New or Established Ostomies:  NA      Primary Nurse eSignature: Electronically signed by Ganga Lim RN on 1/24/22 at 11:43 PM EST

## 2022-01-25 NOTE — PLAN OF CARE
Problem: Skin Integrity:  Goal: Will show no infection signs and symptoms  Description: Will show no infection signs and symptoms  Outcome: Ongoing  Goal: Absence of new skin breakdown  Description: Absence of new skin breakdown  Outcome: Ongoing     Problem: Infection:  Goal: Will remain free from infection  Description: Will remain free from infection  Outcome: Ongoing     Problem: Safety:  Goal: Free from accidental physical injury  Description: Free from accidental physical injury  Outcome: Ongoing  Goal: Free from intentional harm  Description: Free from intentional harm  Outcome: Ongoing     Problem: Daily Care:  Goal: Daily care needs are met  Description: Daily care needs are met  Outcome: Ongoing     Problem: Pain:  Goal: Patient's pain/discomfort is manageable  Description: Patient's pain/discomfort is manageable  Outcome: Ongoing     Problem: Skin Integrity:  Goal: Skin integrity will stabilize  Description: Skin integrity will stabilize  Outcome: Ongoing     Problem: Discharge Planning:  Goal: Patients continuum of care needs are met  Description: Patients continuum of care needs are met  Outcome: Ongoing O-Z Plasty Text: The defect edges were debeveled with a #15 scalpel blade.  Given the location of the defect, shape of the defect and the proximity to free margins an O-Z plasty (double transposition flap) was deemed most appropriate.  Using a sterile surgical marker, the appropriate transposition flaps were drawn incorporating the defect and placing the expected incisions within the relaxed skin tension lines where possible.    The area thus outlined was incised deep to adipose tissue with a #15 scalpel blade.  The skin margins were undermined to an appropriate distance in all directions utilizing iris scissors.  Hemostasis was achieved with electrocautery.  The flaps were then transposed into place, one clockwise and the other counterclockwise, and anchored with interrupted buried subcutaneous sutures.

## 2022-01-25 NOTE — PROGRESS NOTES
Discussed with CGI Dr. Sandoval Grace who indicates patient is planned to establish care with him next week and as result will take over patient's care from here. Patient is apparently agreeable to follow with him. I will sign off.

## 2022-01-25 NOTE — PROGRESS NOTES
Hospitalist Progress Note      PCP: Ellen Moralez MD    Date of Admission: 1/24/2022    Chief Complaint: jaundice, pruritis. Subjective:     Ongoing jaundice and pruritis. Reports tenderness to palpation in RUQ. No emesis. No diarrhea.   + dark urine. Medications:  Reviewed    Infusion Medications    sodium chloride      sodium chloride 75 mL/hr at 01/25/22 1230    dextrose       Scheduled Medications    allopurinol  300 mg Oral Daily    famotidine  20 mg Oral BID    sodium chloride flush  5-40 mL IntraVENous 2 times per day    enoxaparin  40 mg SubCUTAneous Nightly    insulin lispro  0-6 Units SubCUTAneous TID WC    insulin lispro  0-3 Units SubCUTAneous Nightly     PRN Meds: albuterol sulfate HFA, sodium chloride flush, sodium chloride, ondansetron **OR** ondansetron, polyethylene glycol, glucose, glucagon (rDNA), dextrose, dextrose bolus (hypoglycemia) **OR** dextrose bolus (hypoglycemia)      Intake/Output Summary (Last 24 hours) at 1/25/2022 1515  Last data filed at 1/24/2022 1731  Gross per 24 hour   Intake 1000 ml   Output --   Net 1000 ml       Physical Exam Performed:    /75   Pulse 64   Temp 97.1 °F (36.2 °C) (Oral)   Resp 16   Ht 5' 11\" (1.803 m)   Wt 171 lb (77.6 kg)   SpO2 97%   BMI 23.85 kg/m²     General appearance: No apparent distress, appears stated age and cooperative. HEENT: Pupils equal, round, and reactive to light. Conjunctivae/corneas clear. Neck: Supple, with full range of motion. No jugular venous distention. Trachea midline. Respiratory:  Normal respiratory effort. Clear to auscultation, bilaterally without Rales/Wheezes/Rhonchi. Cardiovascular: Regular rate and rhythm with normal S1/S2 without murmurs, rubs or gallops. Abdomen: Soft, non-tender, non-distended with normal bowel sounds. Musculoskeletal: No clubbing, cyanosis or edema bilaterally. Full range of motion without deformity.   Skin: Skin color, texture, turgor normal.  No rashes or lesions. Neurologic:  Neurovascularly intact without any focal sensory/motor deficits. Cranial nerves: II-XII intact, grossly non-focal.  Psychiatric: Alert and oriented, thought content appropriate, normal insight  Capillary Refill: Brisk,3 seconds, normal   Peripheral Pulses: +2 palpable, equal bilaterally       Labs:   Recent Labs     01/24/22  1210 01/25/22  0518   WBC 7.2 4.5   HGB 13.7 12.4*   HCT 40.5 36.1*    364     Recent Labs     01/24/22  1210 01/25/22  0131 01/25/22  0916   * 129* 131*   K 3.3* 3.3* 4.4   CL 86* 94* 96*   CO2 25 25 30   BUN 11 12 11   CREATININE <0.5* <0.5* <0.5*   CALCIUM 9.1 8.9 8.9   PHOS  --  3.1 2.5     Recent Labs     01/24/22  1210 01/25/22  0518   * 177*   * 416*   BILIDIR  --  7.5*   BILITOT 8.9* 10.8*   ALKPHOS 501* 434*     Recent Labs     01/24/22  1620   INR 1.08     No results for input(s): CKTOTAL, TROPONINI in the last 72 hours. Urinalysis:    No results found for: Lesvia Beltran, [address], BACTERIA, RBCUA, BLOODU, [address], [place] [address]    Radiology:  MRI ABDOMEN W WO CONTRAST MRCP   Final Result   Abnormal intra and extrahepatic biliary ductal dilatation secondary to   narrowing at the level the mid to distal common duct, suspicious for duct   stricture as no focal filling defect is seen in the common duct. Distended gallbladder with de pendant filling defects either stones or sludge      No pancreatic ductal dilatation. RECOMMENDATIONS:   Unavailable         CT ABDOMEN PELVIS W IV CONTRAST Additional Contrast? None   Final Result   1. Moderate dilation of the biliary tree with transition at the mid to lower   common duct, uncertain etiology, benign versus malignant stricture. No stone   or mass is appreciated. Follow-up ERCP/MRCP is considered. 2. Distended gallbladder. No cholelithiasis is appreciated. 3. No findings of acute pancreatitis or dilation of the pancreatic duct.    4. Mild dilation of proximal and mid small bowel, with gradual transition. Ileus or enteritis is favored over obstruction. Assessment/Plan:    Active Hospital Problems    Diagnosis     Painless jaundice [R17]     Transaminitis [R74.01]     Biliary obstruction [K83.1]     Hyponatremia [E87.1]     Hypokalemia [E87.6]     Crohn's disease (HCC) [K50.90]     Hypertension [I10]        Obstructive Jaundice. CBD obstruction - ?object - no signs of stone or mass on MRCP. ERCP pending - GI involved. Hyponatremia hypovolemic. Cont IVF while NPO. Stop q8 renal check - Na better at 131 - ok for daily labs. DVT Prophylaxis: lovenox  Diet: Diet NPO  ADULT DIET;  Regular; Low Fat (less than or equal to 50 gm/day)  Code Status: Full Code        Dispo - cc    Sydney Cano MD

## 2022-01-26 ENCOUNTER — ANESTHESIA EVENT (OUTPATIENT)
Dept: ENDOSCOPY | Age: 72
DRG: 445 | End: 2022-01-26
Payer: MEDICARE

## 2022-01-26 ENCOUNTER — APPOINTMENT (OUTPATIENT)
Dept: GENERAL RADIOLOGY | Age: 72
DRG: 445 | End: 2022-01-26
Payer: MEDICARE

## 2022-01-26 ENCOUNTER — ANESTHESIA (OUTPATIENT)
Dept: ENDOSCOPY | Age: 72
DRG: 445 | End: 2022-01-26
Payer: MEDICARE

## 2022-01-26 VITALS — OXYGEN SATURATION: 99 % | SYSTOLIC BLOOD PRESSURE: 114 MMHG | DIASTOLIC BLOOD PRESSURE: 69 MMHG

## 2022-01-26 LAB
ALBUMIN SERPL-MCNC: 3.3 G/DL (ref 3.4–5)
ALP BLD-CCNC: 382 U/L (ref 40–129)
ALT SERPL-CCNC: 324 U/L (ref 10–40)
ANION GAP SERPL CALCULATED.3IONS-SCNC: 7 MMOL/L (ref 3–16)
AST SERPL-CCNC: 122 U/L (ref 15–37)
BANDED NEUTROPHILS RELATIVE PERCENT: 1 % (ref 0–7)
BASOPHILS ABSOLUTE: 0 K/UL (ref 0–0.2)
BASOPHILS RELATIVE PERCENT: 1 %
BILIRUB SERPL-MCNC: 10.7 MG/DL (ref 0–1)
BILIRUBIN DIRECT: 7.7 MG/DL (ref 0–0.3)
BILIRUBIN, INDIRECT: 3 MG/DL (ref 0–1)
BUN BLDV-MCNC: 12 MG/DL (ref 7–20)
CALCIUM SERPL-MCNC: 8.6 MG/DL (ref 8.3–10.6)
CHLORIDE BLD-SCNC: 101 MMOL/L (ref 99–110)
CO2: 25 MMOL/L (ref 21–32)
CREAT SERPL-MCNC: <0.5 MG/DL (ref 0.8–1.3)
EOSINOPHILS ABSOLUTE: 0.2 K/UL (ref 0–0.6)
EOSINOPHILS RELATIVE PERCENT: 4 %
GFR AFRICAN AMERICAN: >60
GFR NON-AFRICAN AMERICAN: >60
GLUCOSE BLD-MCNC: 133 MG/DL (ref 70–99)
GLUCOSE BLD-MCNC: 161 MG/DL (ref 70–99)
GLUCOSE BLD-MCNC: 297 MG/DL (ref 70–99)
GLUCOSE BLD-MCNC: 358 MG/DL (ref 70–99)
HCT VFR BLD CALC: 33.3 % (ref 40.5–52.5)
HEMOGLOBIN: 11.5 G/DL (ref 13.5–17.5)
HYPOCHROMIA: ABNORMAL
LYMPHOCYTES ABSOLUTE: 0.5 K/UL (ref 1–5.1)
LYMPHOCYTES RELATIVE PERCENT: 11 %
MCH RBC QN AUTO: 34.6 PG (ref 26–34)
MCHC RBC AUTO-ENTMCNC: 34.5 G/DL (ref 31–36)
MCV RBC AUTO: 100.1 FL (ref 80–100)
MONOCYTES ABSOLUTE: 0.4 K/UL (ref 0–1.3)
MONOCYTES RELATIVE PERCENT: 9 %
NEUTROPHILS ABSOLUTE: 3.5 K/UL (ref 1.7–7.7)
NEUTROPHILS RELATIVE PERCENT: 74 %
PDW BLD-RTO: 14.2 % (ref 12.4–15.4)
PERFORMED ON: ABNORMAL
PHOSPHORUS: 2.4 MG/DL (ref 2.5–4.9)
PLATELET # BLD: 360 K/UL (ref 135–450)
PLATELET SLIDE REVIEW: ADEQUATE
PMV BLD AUTO: 7.2 FL (ref 5–10.5)
POLYCHROMASIA: ABNORMAL
POTASSIUM SERPL-SCNC: 3.9 MMOL/L (ref 3.5–5.1)
RBC # BLD: 3.32 M/UL (ref 4.2–5.9)
SLIDE REVIEW: ABNORMAL
SODIUM BLD-SCNC: 133 MMOL/L (ref 136–145)
TARGET CELLS: ABNORMAL
TOTAL PROTEIN: 5.8 G/DL (ref 6.4–8.2)
WBC # BLD: 4.7 K/UL (ref 4–11)

## 2022-01-26 PROCEDURE — 3609015100 HC ERCP STENT PLACEMENT BILIARY/PANCREATIC DUCT: Performed by: INTERNAL MEDICINE

## 2022-01-26 PROCEDURE — 88112 CYTOPATH CELL ENHANCE TECH: CPT

## 2022-01-26 PROCEDURE — 2709999900 HC NON-CHARGEABLE SUPPLY: Performed by: INTERNAL MEDICINE

## 2022-01-26 PROCEDURE — 6370000000 HC RX 637 (ALT 250 FOR IP): Performed by: INTERNAL MEDICINE

## 2022-01-26 PROCEDURE — 94640 AIRWAY INHALATION TREATMENT: CPT

## 2022-01-26 PROCEDURE — 74330 X-RAY BILE/PANC ENDOSCOPY: CPT

## 2022-01-26 PROCEDURE — 2720000010 HC SURG SUPPLY STERILE: Performed by: INTERNAL MEDICINE

## 2022-01-26 PROCEDURE — 2500000003 HC RX 250 WO HCPCS: Performed by: NURSE ANESTHETIST, CERTIFIED REGISTERED

## 2022-01-26 PROCEDURE — 99232 SBSQ HOSP IP/OBS MODERATE 35: CPT | Performed by: INTERNAL MEDICINE

## 2022-01-26 PROCEDURE — 88305 TISSUE EXAM BY PATHOLOGIST: CPT

## 2022-01-26 PROCEDURE — 85025 COMPLETE CBC W/AUTO DIFF WBC: CPT

## 2022-01-26 PROCEDURE — 3700000000 HC ANESTHESIA ATTENDED CARE: Performed by: INTERNAL MEDICINE

## 2022-01-26 PROCEDURE — 7100000000 HC PACU RECOVERY - FIRST 15 MIN: Performed by: INTERNAL MEDICINE

## 2022-01-26 PROCEDURE — 0F9C8ZZ DRAINAGE OF AMPULLA OF VATER, VIA NATURAL OR ARTIFICIAL OPENING ENDOSCOPIC: ICD-10-PCS | Performed by: INTERNAL MEDICINE

## 2022-01-26 PROCEDURE — 0F7C8DZ DILATION OF AMPULLA OF VATER WITH INTRALUMINAL DEVICE, VIA NATURAL OR ARTIFICIAL OPENING ENDOSCOPIC: ICD-10-PCS | Performed by: INTERNAL MEDICINE

## 2022-01-26 PROCEDURE — 80069 RENAL FUNCTION PANEL: CPT

## 2022-01-26 PROCEDURE — 6360000002 HC RX W HCPCS: Performed by: INTERNAL MEDICINE

## 2022-01-26 PROCEDURE — 3609014900 HC ERCP W/SPHINCTEROTOMY &/OR PAPILLOTOMY: Performed by: INTERNAL MEDICINE

## 2022-01-26 PROCEDURE — C2625 STENT, NON-COR, TEM W/DEL SY: HCPCS | Performed by: INTERNAL MEDICINE

## 2022-01-26 PROCEDURE — 2580000003 HC RX 258: Performed by: NURSE ANESTHETIST, CERTIFIED REGISTERED

## 2022-01-26 PROCEDURE — 7100000001 HC PACU RECOVERY - ADDTL 15 MIN: Performed by: INTERNAL MEDICINE

## 2022-01-26 PROCEDURE — 80076 HEPATIC FUNCTION PANEL: CPT

## 2022-01-26 PROCEDURE — 36415 COLL VENOUS BLD VENIPUNCTURE: CPT

## 2022-01-26 PROCEDURE — 1200000000 HC SEMI PRIVATE

## 2022-01-26 PROCEDURE — 3700000001 HC ADD 15 MINUTES (ANESTHESIA): Performed by: INTERNAL MEDICINE

## 2022-01-26 PROCEDURE — 6360000002 HC RX W HCPCS: Performed by: NURSE ANESTHETIST, CERTIFIED REGISTERED

## 2022-01-26 PROCEDURE — 94761 N-INVAS EAR/PLS OXIMETRY MLT: CPT

## 2022-01-26 PROCEDURE — 2580000003 HC RX 258: Performed by: INTERNAL MEDICINE

## 2022-01-26 PROCEDURE — 3609014200 HC ERCP W/BIOPSY SINGLE/MULTIPLE: Performed by: INTERNAL MEDICINE

## 2022-01-26 DEVICE — BILIARY STENT WITH NAVIFLEXTM RX DELIVERY SYSTEM
Type: IMPLANTABLE DEVICE | Site: BILE DUCT | Status: FUNCTIONAL
Brand: ADVANIX™ BILIARY

## 2022-01-26 RX ORDER — OXYCODONE HYDROCHLORIDE AND ACETAMINOPHEN 5; 325 MG/1; MG/1
2 TABLET ORAL PRN
Status: ACTIVE | OUTPATIENT
Start: 2022-01-26 | End: 2022-01-26

## 2022-01-26 RX ORDER — ONDANSETRON 2 MG/ML
4 INJECTION INTRAMUSCULAR; INTRAVENOUS
Status: ACTIVE | OUTPATIENT
Start: 2022-01-26 | End: 2022-01-26

## 2022-01-26 RX ORDER — ONDANSETRON 2 MG/ML
INJECTION INTRAMUSCULAR; INTRAVENOUS PRN
Status: DISCONTINUED | OUTPATIENT
Start: 2022-01-26 | End: 2022-01-26 | Stop reason: SDUPTHER

## 2022-01-26 RX ORDER — HYDRALAZINE HYDROCHLORIDE 20 MG/ML
5 INJECTION INTRAMUSCULAR; INTRAVENOUS EVERY 10 MIN PRN
Status: DISCONTINUED | OUTPATIENT
Start: 2022-01-26 | End: 2022-01-27 | Stop reason: HOSPADM

## 2022-01-26 RX ORDER — MORPHINE SULFATE 2 MG/ML
1 INJECTION, SOLUTION INTRAMUSCULAR; INTRAVENOUS EVERY 5 MIN PRN
Status: DISCONTINUED | OUTPATIENT
Start: 2022-01-26 | End: 2022-01-27 | Stop reason: HOSPADM

## 2022-01-26 RX ORDER — MEPERIDINE HYDROCHLORIDE 25 MG/ML
12.5 INJECTION INTRAMUSCULAR; INTRAVENOUS; SUBCUTANEOUS EVERY 5 MIN PRN
Status: DISCONTINUED | OUTPATIENT
Start: 2022-01-26 | End: 2022-01-27 | Stop reason: HOSPADM

## 2022-01-26 RX ORDER — DEXAMETHASONE SODIUM PHOSPHATE 4 MG/ML
INJECTION, SOLUTION INTRA-ARTICULAR; INTRALESIONAL; INTRAMUSCULAR; INTRAVENOUS; SOFT TISSUE PRN
Status: DISCONTINUED | OUTPATIENT
Start: 2022-01-26 | End: 2022-01-26 | Stop reason: SDUPTHER

## 2022-01-26 RX ORDER — ROCURONIUM BROMIDE 10 MG/ML
INJECTION, SOLUTION INTRAVENOUS PRN
Status: DISCONTINUED | OUTPATIENT
Start: 2022-01-26 | End: 2022-01-26 | Stop reason: SDUPTHER

## 2022-01-26 RX ORDER — PANTOPRAZOLE SODIUM 40 MG/1
40 TABLET, DELAYED RELEASE ORAL
Status: DISCONTINUED | OUTPATIENT
Start: 2022-01-26 | End: 2022-01-27 | Stop reason: HOSPADM

## 2022-01-26 RX ORDER — MORPHINE SULFATE 2 MG/ML
2 INJECTION, SOLUTION INTRAMUSCULAR; INTRAVENOUS EVERY 5 MIN PRN
Status: DISCONTINUED | OUTPATIENT
Start: 2022-01-26 | End: 2022-01-27 | Stop reason: HOSPADM

## 2022-01-26 RX ORDER — DIPHENHYDRAMINE HYDROCHLORIDE 50 MG/ML
12.5 INJECTION INTRAMUSCULAR; INTRAVENOUS
Status: ACTIVE | OUTPATIENT
Start: 2022-01-26 | End: 2022-01-26

## 2022-01-26 RX ORDER — PROMETHAZINE HYDROCHLORIDE 25 MG/ML
6.25 INJECTION, SOLUTION INTRAMUSCULAR; INTRAVENOUS
Status: ACTIVE | OUTPATIENT
Start: 2022-01-26 | End: 2022-01-26

## 2022-01-26 RX ORDER — PHENYLEPHRINE HCL IN 0.9% NACL 1 MG/10 ML
SYRINGE (ML) INTRAVENOUS PRN
Status: DISCONTINUED | OUTPATIENT
Start: 2022-01-26 | End: 2022-01-26 | Stop reason: SDUPTHER

## 2022-01-26 RX ORDER — PROPOFOL 10 MG/ML
INJECTION, EMULSION INTRAVENOUS PRN
Status: DISCONTINUED | OUTPATIENT
Start: 2022-01-26 | End: 2022-01-26 | Stop reason: SDUPTHER

## 2022-01-26 RX ORDER — LABETALOL HYDROCHLORIDE 5 MG/ML
5 INJECTION, SOLUTION INTRAVENOUS EVERY 10 MIN PRN
Status: DISCONTINUED | OUTPATIENT
Start: 2022-01-26 | End: 2022-01-27 | Stop reason: HOSPADM

## 2022-01-26 RX ORDER — OXYCODONE HYDROCHLORIDE AND ACETAMINOPHEN 5; 325 MG/1; MG/1
1 TABLET ORAL PRN
Status: ACTIVE | OUTPATIENT
Start: 2022-01-26 | End: 2022-01-26

## 2022-01-26 RX ORDER — LIDOCAINE HYDROCHLORIDE 20 MG/ML
INJECTION, SOLUTION INFILTRATION; PERINEURAL PRN
Status: DISCONTINUED | OUTPATIENT
Start: 2022-01-26 | End: 2022-01-26 | Stop reason: SDUPTHER

## 2022-01-26 RX ORDER — PANTOPRAZOLE SODIUM 40 MG/1
40 TABLET, DELAYED RELEASE ORAL
Qty: 28 TABLET | Refills: 0 | Status: SHIPPED | OUTPATIENT
Start: 2022-01-26 | End: 2022-02-09

## 2022-01-26 RX ORDER — SODIUM CHLORIDE, SODIUM LACTATE, POTASSIUM CHLORIDE, CALCIUM CHLORIDE 600; 310; 30; 20 MG/100ML; MG/100ML; MG/100ML; MG/100ML
INJECTION, SOLUTION INTRAVENOUS CONTINUOUS PRN
Status: DISCONTINUED | OUTPATIENT
Start: 2022-01-26 | End: 2022-01-26 | Stop reason: SDUPTHER

## 2022-01-26 RX ADMIN — ENOXAPARIN SODIUM 40 MG: 100 INJECTION SUBCUTANEOUS at 20:59

## 2022-01-26 RX ADMIN — LIDOCAINE HYDROCHLORIDE 60 MG: 20 INJECTION, SOLUTION INFILTRATION; PERINEURAL at 08:36

## 2022-01-26 RX ADMIN — Medication 50 MCG: at 08:36

## 2022-01-26 RX ADMIN — Medication 2 PUFF: at 12:37

## 2022-01-26 RX ADMIN — DEXAMETHASONE SODIUM PHOSPHATE 4 MG: 4 INJECTION, SOLUTION INTRAMUSCULAR; INTRAVENOUS at 09:05

## 2022-01-26 RX ADMIN — PANTOPRAZOLE SODIUM 40 MG: 40 TABLET, DELAYED RELEASE ORAL at 17:12

## 2022-01-26 RX ADMIN — SODIUM CHLORIDE: 9 INJECTION, SOLUTION INTRAVENOUS at 12:26

## 2022-01-26 RX ADMIN — ONDANSETRON HYDROCHLORIDE 4 MG: 2 INJECTION, SOLUTION INTRAMUSCULAR; INTRAVENOUS at 09:05

## 2022-01-26 RX ADMIN — INSULIN LISPRO 2 UNITS: 100 INJECTION, SOLUTION INTRAVENOUS; SUBCUTANEOUS at 21:00

## 2022-01-26 RX ADMIN — SODIUM CHLORIDE, POTASSIUM CHLORIDE, SODIUM LACTATE AND CALCIUM CHLORIDE: 600; 310; 30; 20 INJECTION, SOLUTION INTRAVENOUS at 08:32

## 2022-01-26 RX ADMIN — ROCURONIUM BROMIDE 50 MG: 10 INJECTION, SOLUTION INTRAVENOUS at 08:36

## 2022-01-26 RX ADMIN — ALLOPURINOL 300 MG: 100 TABLET ORAL at 12:07

## 2022-01-26 RX ADMIN — Medication 100 MCG: at 09:15

## 2022-01-26 RX ADMIN — Medication 2 PUFF: at 20:01

## 2022-01-26 RX ADMIN — PROPOFOL 150 MG: 10 INJECTION, EMULSION INTRAVENOUS at 08:36

## 2022-01-26 ASSESSMENT — PULMONARY FUNCTION TESTS
PIF_VALUE: 18
PIF_VALUE: 19
PIF_VALUE: 21
PIF_VALUE: 18
PIF_VALUE: 1
PIF_VALUE: 18
PIF_VALUE: 3
PIF_VALUE: 1
PIF_VALUE: 28
PIF_VALUE: 17
PIF_VALUE: 19
PIF_VALUE: 1
PIF_VALUE: 19
PIF_VALUE: 19
PIF_VALUE: 1
PIF_VALUE: 16
PIF_VALUE: 18
PIF_VALUE: 17
PIF_VALUE: 2
PIF_VALUE: 17
PIF_VALUE: 17
PIF_VALUE: 3
PIF_VALUE: 18
PIF_VALUE: 17
PIF_VALUE: 17
PIF_VALUE: 18
PIF_VALUE: 19
PIF_VALUE: 21
PIF_VALUE: 17
PIF_VALUE: 2
PIF_VALUE: 17
PIF_VALUE: 18
PIF_VALUE: 5
PIF_VALUE: 18
PIF_VALUE: 19
PIF_VALUE: 28
PIF_VALUE: 18
PIF_VALUE: 19
PIF_VALUE: 19
PIF_VALUE: 18
PIF_VALUE: 2
PIF_VALUE: 0
PIF_VALUE: 18
PIF_VALUE: 18
PIF_VALUE: 21
PIF_VALUE: 1
PIF_VALUE: 17
PIF_VALUE: 0
PIF_VALUE: 20
PIF_VALUE: 19
PIF_VALUE: 18
PIF_VALUE: 16

## 2022-01-26 ASSESSMENT — PAIN SCALES - GENERAL: PAINLEVEL_OUTOF10: 0

## 2022-01-26 NOTE — DISCHARGE SUMMARY
Hospital Medicine Discharge Summary    Patient ID: Laura Reyes      Patient's PCP: Laura Barbosa MD    Admit Date: 1/24/2022     Discharge Date:   1/26/2022  Admitting Provider: Anne Prieto MD     Discharge Provider: Anne Prieto MD     Discharge Diagnoses: Active Hospital Problems    Diagnosis     Acute liver failure without hepatic coma [K72.00]     Hyperbilirubinemia [E80.6]     Painless jaundice [R17]     Transaminitis [R74.01]     Biliary obstruction [K83.1]     Hyponatremia [E87.1]     Hypokalemia [E87.6]     Crohn's disease ([address] [K50.90]     Hypertension [I10]        The patient was seen and examined on day of discharge and this discharge summary is in conjunction with any daily progress note from day of discharge. Hospital Course: 77yo man presented with painless jaundice and pruritis. Obstructive Jaundice. CBD obstruction - ?object - no signs of stone or mass on MRCP. ERCP with distal CBD stricture - no clearly visible mass. Stent placed and scrape biopsies sent per GI.    - will need short term follow up for results of biopsy and biliary stent management with GI.         Hyponatremia hypovolemic. Resolved with IVF.                   Physical Exam Performed:     /69   Pulse 67   Temp 97.9 °F (36.6 °C) (Oral)   Resp 18   Ht 5' 11\" (1.803 m)   Wt 171 lb (77.6 kg)   SpO2 96%   BMI 23.85 kg/m²       General appearance:  No apparent distress, appears stated age and cooperative. HEENT:  Normal cephalic, atraumatic without obvious deformity. Pupils equal, round, and reactive to light. Extra ocular muscles intact. Conjunctivae/corneas clear. Neck: Supple, with full range of motion. No jugular venous distention. Trachea midline. Respiratory:  Normal respiratory effort. Clear to auscultation, bilaterally without Rales/Wheezes/Rhonchi. Cardiovascular:  Regular rate and rhythm with normal S1/S2 without murmurs, rubs or gallops.   Abdomen: Soft, non-tender, non-distended with normal bowel sounds. Musculoskeletal:  No clubbing, cyanosis or edema bilaterally. Full range of motion without deformity. Skin: Skin color, texture, turgor normal.  No rashes or lesions. Neurologic:  Neurovascularly intact without any focal sensory/motor deficits. Cranial nerves: II-XII intact, grossly non-focal.  Psychiatric:  Alert and oriented, thought content appropriate, normal insight  Capillary Refill: Brisk,< 3 seconds   Peripheral Pulses: +2 palpable, equal bilaterally       Labs: For convenience and continuity at follow-up the following most recent labs are provided:      CBC:    Lab Results   Component Value Date    WBC 4.7 01/26/2022    HGB 11.5 01/26/2022    HCT 33.3 01/26/2022     01/26/2022       Renal:    Lab Results   Component Value Date     01/26/2022    K 3.9 01/26/2022    K 3.3 01/24/2022     01/26/2022    CO2 25 01/26/2022    BUN 12 01/26/2022    CREATININE <0.5 01/26/2022    CALCIUM 8.6 01/26/2022    PHOS 2.4 01/26/2022         Significant Diagnostic Studies    Radiology:   FL ERCP BILIARY AND PANCREATIC S&I   Final Result   ERCP images as above. Please refer to the procedure report for further   details. MRI ABDOMEN W WO CONTRAST MRCP   Final Result   Abnormal intra and extrahepatic biliary ductal dilatation secondary to   narrowing at the level the mid to distal common duct, suspicious for duct   stricture as no focal filling defect is seen in the common duct. Distended gallbladder with de pendant filling defects either stones or sludge      No pancreatic ductal dilatation. RECOMMENDATIONS:   Unavailable         CT ABDOMEN PELVIS W IV CONTRAST Additional Contrast? None   Final Result   1. Moderate dilation of the biliary tree with transition at the mid to lower   common duct, uncertain etiology, benign versus malignant stricture. No stone   or mass is appreciated. Follow-up ERCP/MRCP is considered.    2. Distended gallbladder. No cholelithiasis is appreciated. 3. No findings of acute pancreatitis or dilation of the pancreatic duct. 4. Mild dilation of proximal and mid small bowel, with gradual transition. Ileus or enteritis is favored over obstruction. Consults:     IP CONSULT TO HOSPITALIST  IP CONSULT TO GI    Disposition:  Home if able to tolerate PO. Condition at Discharge: Stable    Discharge Instructions/Follow-up:  GI 1-2 weeks. Code Status:  Full Code     Activity: activity as tolerated    Diet: low fat, low cholesterol diet      Discharge Medications:     Current Discharge Medication List           Details   pantoprazole (PROTONIX) 40 MG tablet Take 1 tablet by mouth 2 times daily (before meals) for 14 days  Qty: 28 tablet, Refills: 0              Details   metFORMIN (GLUCOPHAGE-XR) 500 MG extended release tablet Take 500 mg by mouth 2 times daily      dicyclomine (BENTYL) 10 MG capsule Take 10 mg by mouth nightly. allopurinol (ZYLOPRIM) 300 MG tablet Take 300 mg by mouth daily. albuterol (PROVENTIL HFA;VENTOLIN HFA) 108 (90 BASE) MCG/ACT inhaler Inhale 2 puffs into the lungs every 6 hours as needed. Time Spent on discharge is more than 30 minutes in the examination, evaluation, counseling and review of medications and discharge plan. Signed:    Jamie Osuna MD   1/26/2022      Thank you Julio Blanco MD for the opportunity to be involved in this patient's care. If you have any questions or concerns please feel free to contact me at [phone].

## 2022-01-26 NOTE — H&P
Historical Provider, MD        Allergies: No Known Allergies    Nurses notes reviewed and agreed. Physical Exam:  Vital Signs: /77   Pulse 75   Temp 97.1 °F (36.2 °C) (Temporal)   Resp 16   Ht 5' 11\" (1.803 m)   Wt 171 lb (77.6 kg)   SpO2 99%   BMI 23.85 kg/m²    Airway: Mallampati: II (soft palate, uvula, fauces visible)  Pulmonary:Normal  Cardiac:Normal  Abdomen:Normal    Pre-Procedure Assessment / Plan:  ASA: Class 2 - A normal healthy patient with mild systemic disease  Level of Sedation Plan: Moderate sedation  Post Procedure plan: Return to same level of care    I assessed the patient and find that the patient is in satisfactory condition to proceed with the planned procedure and sedation plan. I have explained the risk, benefits, and alternatives to the procedure; the patient understands and agrees to proceed.        Gurwinder Irving MD  1/26/2022

## 2022-01-26 NOTE — PROGRESS NOTES
Patient awake and alert. Abdomen soft and good bowel sounds auscultated. No c/o pain at this time.   Waiting for transport to take patient to room

## 2022-01-26 NOTE — PLAN OF CARE
Problem: Skin Integrity:  Goal: Will show no infection signs and symptoms  Description: Will show no infection signs and symptoms  Outcome: Ongoing  Goal: Absence of new skin breakdown  Description: Absence of new skin breakdown  Outcome: Ongoing     Problem: Infection:  Goal: Will remain free from infection  Description: Will remain free from infection  Outcome: Ongoing     Problem: Safety:  Goal: Free from accidental physical injury  Description: Free from accidental physical injury  Outcome: Ongoing  Goal: Free from intentional harm  Description: Free from intentional harm  Outcome: Ongoing     Problem: Daily Care:  Goal: Daily care needs are met  Description: Daily care needs are met  Outcome: Ongoing     Problem: Pain:  Goal: Patient's pain/discomfort is manageable  Description: Patient's pain/discomfort is manageable  Outcome: Ongoing     Problem: Skin Integrity:  Goal: Skin integrity will stabilize  Description: Skin integrity will stabilize  Outcome: Ongoing     Problem: Discharge Planning:  Goal: Patients continuum of care needs are met  Description: Patients continuum of care needs are met  Outcome: Ongoing

## 2022-01-26 NOTE — OP NOTE
[address]                 [address] [address]                                OPERATIVE REPORT    PATIENT NAME: Jeremías Mendez                 :        1950  MED REC NO:   [id number]                          ROOM:       0236  ACCOUNT NO:   [de-identified]                           ADMIT DATE: 2022  PROVIDER:     Marlee Rosario MD    DATE OF PROCEDURE:  2022    PREPROCEDURE DIAGNOSES:  1.  Obstructive jaundice. 2.  History of Crohn's disease. POSTPROCEDURE DIAGNOSES:  1.  A 2 cm distal bile duct stricture. 2.  Moderate-to-severe intra-and extrahepatic ductal dilation. 3.  Successful sphincterotomy and brushing of the stricture. 4.  Successful placement of 10-Bahamian x 7 cm stent across the stricture. PROCEDURE:  ERCP with sphincterotomy stent placement and brushing. SURGEON:  Marlee Rosario MD    MEDICATIONS:  MAC per Anesthesia. PROCEDURE INDICATIONS:  A 70-year-old male with history of diabetes,  hypertension, hyperlipidemia, asthma, and remote diagnosis of Crohn's  disease not on any biologics or immunomodulators, admitted with  obstructive jaundice. He developed worsening nausea, loss of appetite,  and abdominal discomfort over the past few weeks. He was in fact  scheduled for on outpatient visit in my office. However, given the  progressive nature of the symptoms, he presented to the emergency room  yesterday. He underwent further workup with CT scan and labs, which  showed obstructive jaundice. MRCP confirmed possible biliary stricture  without evidence of choledocholithiasis or mass lesions. ERCP is being  performed for therapeutic purposes. He has remote history remote  diagnosis of Crohn's disease in . He denies any previous history of  liver disease, does not recall having a diagnosis of [facility]. He has not  been on any medications for Crohn's disease except for Bentyl.     DETAILS OF THE PROCEDURE:  Informed consent obtained after discussing  risks, benefits, and alternatives. Full history and physical was  performed. The patient was classified as ASA class III. Medications  were given by Anesthesia. Cardiopulmonary status was continuously  monitored throughout the procedure. The patient underwent endotracheal  intubation for airway protection. The patient was then placed in prone  position. Once adequately sedated, a standard therapeutic duodenoscope  was inserted in the mouth and advanced under direct visualization to  second portion of the duodenum. The entire mucosa of the esophagus,  stomach, and duodenum were examined carefully. The patient tolerated  the procedure well without any difficulties. FINDINGS:    ESOPHAGUS:  Examined esophagus appeared normal on limited views. STOMACH:  Limited view of the stomach appeared normal.    DUODENUM:  There was a single 1 cm clean-based ulcer in the duodenal  bulb. No active bleeding identified. Ampulla was identified. Deep  wire-guided cannulation was successfully achieved using TRUEtome  sphincterotome RX 44 loaded with 0.025 Revolution wire. Bile was  aspirated to confirm biliary cannulation. Contrast was injected. Images were interpreted by me. There was moderate to severe intra-and  extrahepatic biliary ductal dilation, maximum diameter of the  extrahepatic duct measured 11-12 mm. There was a 2 cm distal bile duct  stricture proximal to the ampulla. At this time, decision was made to  proceed with the sphincterotomy. A 1 cm sphincterotomy was performed  successfully. The sphincterotome was then exchanged in favor of a brush  catheter. Several brushings were performed of the 2 cm biliary  stricture. Subsequently this stent deployment system was used to place  a 10-[place]awk x 7 cm stent across the stricture successfully. Moderate  dark bile was noted to be draining out of the stent.   The patient  tolerated the procedure well without any difficulties. SUMMARY:  1.  A 2 cm distal CBD stricture. 2.  Moderate-to-severe intra-and extrahepatic biliary ductal dilation  proximal to the stricture. 3.  Successful sphincterotomy and placement of 10-Divehi x 7 cm. 4.  A 1 cm clean-based duodenal bulb ulcer. RECOMMENDATIONS:  1. Return the patient to floor for continuous medical care. 2.  PPI twice daily for two months. 3.  Monitor LFTs. 4.  Wait cytology results. 5.  Consider sending the cytology for FISH analysis. 6.  Advance diet. 7.  The patient may be ready for discharge if diet is tolerated. 8.  He may also need an outpatient EUS pending the cytology results.     EBL: <5mL    Leo Hurley MD    D: 01/26/2022 10:24:46       T: 01/26/2022 10:27:49     [id number]  Job#: [id number]     Doc#: [id number]    CC:  MD Connor Owens MD

## 2022-01-26 NOTE — ANESTHESIA PRE PROCEDURE
Department of Anesthesiology  Preprocedure Note       Name:  Claudy Lees   Age:  70 y.o.  :  1950                                          MRN:  [id number]         Date:  2022      Surgeon: Kristie Phillips):  Natalie Beverly MD    Procedure: Procedure(s):  ERCP WF W/ANES. (8:30)    Medications prior to admission:   Prior to Admission medications    Medication Sig Start Date End Date Taking? Authorizing Provider   triamterene-hydroCHLOROthiazide (MAXZIDE-25) 37.5-25 MG per tablet Take 1 tablet by mouth 2 times daily   Yes Historical Provider, MD   loratadine-pseudoephedrine (CLARITIN-D 12HR) 5-120 MG per extended release tablet Take 1 tablet by mouth 2 times daily   Yes Historical Provider, MD   metFORMIN (GLUCOPHAGE-XR) 500 MG extended release tablet Take 500 mg by mouth 2 times daily   Yes Historical Provider, MD   gemfibrozil (LOPID) 600 MG tablet Take 600 mg by mouth 2 times daily. 2/15/11  Yes Historical Provider, MD   dicyclomine (BENTYL) 10 MG capsule Take 10 mg by mouth nightly. 2/15/11  Yes Historical Provider, MD   allopurinol (ZYLOPRIM) 300 MG tablet Take 300 mg by mouth daily. Yes Historical Provider, MD   famotidine (PEPCID) 20 MG tablet Take 20 mg by mouth 2 times daily. Yes Historical Provider, MD   albuterol (PROVENTIL HFA;VENTOLIN HFA) 108 (90 BASE) MCG/ACT inhaler Inhale 2 puffs into the lungs every 6 hours as needed. Yes Historical Provider, MD   ibuprofen (ADVIL;MOTRIN) 200 MG tablet Take 200 mg by mouth every 6 hours as needed.     Historical Provider, MD       Current medications:    Current Facility-Administered Medications   Medication Dose Route Frequency Provider Last Rate Last Admin    albuterol sulfate  (90 Base) MCG/ACT inhaler 2 puff  2 puff Inhalation Q6H PRN Bayron Vallejo MD   2 puff at 22 194    allopurinol (ZYLOPRIM) tablet 300 mg  300 mg Oral Daily Bayron Vallejo MD   300 mg at 22 0925    famotidine (PEPCID) tablet 20 mg  20 mg Oral BID Millie Toussaint MD   20 mg at 01/25/22 2203    sodium chloride flush 0.9 % injection 5-40 mL  5-40 mL IntraVENous 2 times per day Millie Toussaint MD   10 mL at 01/25/22 2204    sodium chloride flush 0.9 % injection 5-40 mL  5-40 mL IntraVENous PRN Millie Toussaint MD        0.9 % sodium chloride infusion  25 mL IntraVENous PRN Millie Toussaint MD        enoxaparin (LOVENOX) injection 40 mg  40 mg SubCUTAneous Nightly Millie Toussaint MD   40 mg at 01/24/22 2234    ondansetron (ZOFRAN-ODT) disintegrating tablet 4 mg  4 mg Oral Q8H PRN Millie Toussaint MD        Or    ondansetron (ZOFRAN) injection 4 mg  4 mg IntraVENous Q6H PRN Millie Toussaint MD        polyethylene glycol (GLYCOLAX) packet 17 g  17 g Oral Daily PRN Millie Toussaint MD        0.9 % sodium chloride infusion   IntraVENous Continuous Millie Toussaint MD 75 mL/hr at 01/25/22 1515 New Bag at 01/25/22 1515    glucose (GLUTOSE) 40 % oral gel 15 g  15 g Oral PRN Kristen Fiddler, PA-C        glucagon (rDNA) injection 1 mg  1 mg IntraMUSCular PRN Kristen Fiddler, PA-C        dextrose 5 % solution  100 mL/hr IntraVENous PRN Kristen Fiddler, PA-C        insulin lispro (HUMALOG) injection vial 0-6 Units  0-6 Units SubCUTAneous TID WC Kristen Fiddler, PA-C   3 Units at 01/25/22 1724    insulin lispro (HUMALOG) injection vial 0-3 Units  0-3 Units SubCUTAneous Nightly Kristen Fiddler, PA-C   1 Units at 01/25/22 2205    dextrose bolus (hypoglycemia) 10% 125 mL  125 mL IntraVENous PRN Kristen Fiddler, PA-C        Or    dextrose bolus (hypoglycemia) 10% 250 mL  250 mL IntraVENous PRN Kristen Fiddler, PA-C           Allergies:  No Known Allergies    Problem List:    Patient Active Problem List   Diagnosis Code    GERD (gastroesophageal reflux disease) K21.9    Palpitations R00.2    Painless jaundice R17    Transaminitis R74.01    Biliary obstruction K83.1    Hyponatremia E87.1    Hypokalemia E87.6    Crohn's disease ([facility] K50.90    Hypertension I10    Acute liver failure without hepatic coma K72.00    Hyperbilirubinemia E80.6       Past Medical History:        Diagnosis Date    Asthma     Blood circulation, collateral     Carpal tunnel syndrome     Crohn's colitis ([facility]     [facility]     emboli     pulmonary emboli in 1980    GERD (gastroesophageal reflux disease)     Chrons disease    Gout     Hx of blood clots     Hypertension     Pneumonia     pneumonia,asthma    prostate     infection    Seasonal allergies        Past Surgical History:        Procedure Laterality Date    TONSILLECTOMY         Social History:    Social History     Tobacco Use    Smoking status: Former Smoker    Smokeless tobacco: Never Used    Tobacco comment: quit 40-50 years ago   Substance Use Topics    Alcohol use: No                                Counseling given: Not Answered  Comment: quit 40-50 years ago      Vital Signs (Current):   Vitals:    01/25/22 1942 01/25/22 2034 01/26/22 0300 01/26/22 0747   BP:  112/70 111/70 130/77   Pulse:  79 70 75   Resp: 16 16 16 16   Temp:  97.1 °F (36.2 °C) 97.6 °F (36.4 °C) 97.1 °F (36.2 °C)   TempSrc:  Oral Oral Temporal   SpO2: 98% 97% 97% 99%   Weight:       Height:                                                  BP Readings from Last 3 Encounters:   01/26/22 130/77       NPO Status: Time of last liquid consumption: 2359                        Time of last solid consumption: 2300                        Date of last liquid consumption: 01/25/22                        Date of last solid food consumption: 01/25/22    BMI:   Wt Readings from Last 3 Encounters:   01/24/22 171 lb (77.6 kg)     Body mass index is 23.85 kg/m².     CBC:   Lab Results   Component Value Date    WBC 4.7 01/26/2022    RBC 3.32 01/26/2022    HGB 11.5 01/26/2022    HCT 33.3 01/26/2022    .1 01/26/2022    RDW 14.2 01/26/2022     01/26/2022       CMP:   Lab Results   Component Value Date     01/26/2022    K 3.9 01/26/2022    K 3.3 01/24/2022     01/26/2022    CO2 25 01/26/2022    BUN 12 01/26/2022    CREATININE <0.5 01/26/2022    GFRAA >60 01/26/2022    GFRAA >60 02/15/2011    AGRATIO 1.3 01/24/2022    LABGLOM >60 01/26/2022    GLUCOSE 133 01/26/2022    PROT 5.8 01/26/2022    PROT 7.1 02/15/2011    CALCIUM 8.6 01/26/2022    BILITOT 10.7 01/26/2022    ALKPHOS 382 01/26/2022     01/26/2022     01/26/2022       POC Tests:   Recent Labs     01/25/22 2032   POCGLU 151*       Coags:   Lab Results   Component Value Date    PROTIME 12.2 01/24/2022    INR 1.08 01/24/2022    APTT 43.6 02/15/2011       HCG (If Applicable): No results found for: PREGTESTUR, PREGSERUM, HCG, HCGQUANT     ABGs: No results found for: PHART, PO2ART, JBH5ZAC, LJC1TQX, BEART, M0JNDGRH     Type & Screen (If Applicable):  No results found for: LABABO, LABRH    Drug/Infectious Status (If Applicable):  No results found for: HIV, HEPCAB    COVID-19 Screening (If Applicable):   Lab Results   Component Value Date    COVID19 Not Detected 01/24/2022           Anesthesia Evaluation   no history of anesthetic complications:   Airway: Mallampati: II  TM distance: >3 FB   Neck ROM: full  Mouth opening: > = 3 FB Dental: normal exam         Pulmonary:   (+) pneumonia: resolved,  asthma:                            Cardiovascular:    (+) hypertension:,                   Neuro/Psych:   (+) neuromuscular disease:,             GI/Hepatic/Renal:   (+) GERD:, liver disease:,          ROS comment: Biliary obstruction. Endo/Other:    (+) DiabetesType II DM, , .                 Abdominal:             Vascular:   + DVT, . Other Findings:             Anesthesia Plan      general     ASA 3     (Pt agrees to risks, benefits and alternatives of GETA. Questions answered. Willing to proceed with plan.)  Induction: intravenous.       Anesthetic plan and risks discussed with patient.                       Olga Marinelli MD   1/26/2022

## 2022-01-26 NOTE — FLOWSHEET NOTE
01/26/22 1147   Vital Signs   Temp 97.9 °F (36.6 °C)   Temp Source Oral   Pulse 67   Heart Rate Source Monitor   Resp 18   /69   BP Location Right upper arm   Patient Position Semi fowlers   Level of Consciousness Alert (0)   MEWS Score 1   Patient Currently in Pain Denies   Oxygen Therapy   SpO2 96 %   O2 Device None (Room air)     Assessment completed and vital signs completed, see flowsheet. Vital signs are WNL. Patient is alert & oriented. No complaints voiced. Patient denies further needs. Side rails up X 2. Bed in the lowest position. Call light within reach.

## 2022-01-26 NOTE — BRIEF OP NOTE
Brief Postoperative Note      Patient: Aga Briones  YOB: 1950  MRN: [id number]    Date of Procedure: 1/26/2022    Pre-Op Diagnosis: obstructive jaundice    Post-Op Diagnosis: 2cm biliary stricture s/p brushing and 10Fx 7cm stent, duodenal ulcer       Procedure(s):  ERCP WF W/ANES. (8:30)  ERCP SPHINCTER/PAPILLOTOMY  ERCP STENT INSERTION    Surgeon(s):  Iva Marcano MD    Assistant:  * No surgical staff found *    Anesthesia: Monitor Anesthesia Care    Estimated Blood Loss (mL): Minimal    Complications: None    Specimens:   ID Type Source Tests Collected by Time Destination   A :  Tissue Biopsy CYTOLOGY, NON-GYN Iva Marcano MD 1/26/2022 0565        Implants:  Implant Name Type Inv. Item Serial No.  Lot No. LRB No. Used Action   STENT BILI 10FR L7CM PLAS DUODENAL BEND RAP EXCHG PRELD  STENT BILI 10FR L7CM PLAS DUODENAL BEND RAP EXCHG PRELD  [employer]  N/A 1 Implanted         Drains: * No LDAs found *    Findings: 2cm biliary stricture s/p brushing and 10Fx 7cm stent, duodenal ulcer    Recommendation  1. Continue supportive care  2. PPI BID x 2m  3. Monitor LFTs  4. Await cytology  5. Advance diet  6.  May need outpatient EUS    Electronically signed by Iva Marcano MD on 1/26/2022 at 10:04 AM

## 2022-01-26 NOTE — ANESTHESIA POSTPROCEDURE EVALUATION
Department of Anesthesiology  Postprocedure Note    Patient: Eryn Cartagena  MRN: [id number]  YOB: 1950  Date of evaluation: 1/26/2022  Time:  9:57 AM     Procedure Summary     Date: 01/26/22 Room / Location: [place] [address] / [facility]    Anesthesia Start: 1692 Anesthesia Stop: 1367    Procedures:       ERCP WF W/ANES. (8:30) (N/A )      ERCP SPHINCTER/PAPILLOTOMY      ERCP STENT INSERTION Diagnosis: (?)    Surgeons: Genaro Carrion MD Responsible Provider: Courtney Horner MD    Anesthesia Type: general ASA Status: 3          Anesthesia Type: general    Elenita Phase I: Elenita Score: 8    Elenita Phase II:      Last vitals: Reviewed and per EMR flowsheets. Anesthesia Post Evaluation    Patient location during evaluation: PACU  Patient participation: complete - patient participated  Level of consciousness: awake and alert  Airway patency: patent  Nausea & Vomiting: no nausea and no vomiting  Complications: no  Cardiovascular status: blood pressure returned to baseline  Respiratory status: acceptable  Hydration status: euvolemic  Comments: VSS on transfer to phase 2 recovery. No anesthetic complications.

## 2022-01-26 NOTE — PROGRESS NOTES
Arrived from [facility] awakens easily. No discomfort noted. Abdomen soft and good bowel sounds auscultated. Color jaundiced. Report received from RAY Urrutia RN

## 2022-01-26 NOTE — PROGRESS NOTES
Handoff report and transfer of care given at bedside to [facility]. Patient in stable condition, denies needs/concerns at this time. Call light within reach.

## 2022-01-26 NOTE — PROGRESS NOTES
Updated MD. Pt does not feel strong enough to discharge today. He would like to stay for another day. MD aware and discharge cancelled. Pt already has home medications that were sent up from [facility] to [facility].

## 2022-01-27 VITALS
RESPIRATION RATE: 14 BRPM | WEIGHT: 171 LBS | SYSTOLIC BLOOD PRESSURE: 115 MMHG | DIASTOLIC BLOOD PRESSURE: 71 MMHG | HEIGHT: 71 IN | TEMPERATURE: 97 F | BODY MASS INDEX: 23.94 KG/M2 | HEART RATE: 60 BPM | OXYGEN SATURATION: 97 %

## 2022-01-27 LAB
ALBUMIN SERPL-MCNC: 3.1 G/DL (ref 3.4–5)
ALP BLD-CCNC: 319 U/L (ref 40–129)
ALT SERPL-CCNC: 248 U/L (ref 10–40)
ANION GAP SERPL CALCULATED.3IONS-SCNC: 6 MMOL/L (ref 3–16)
AST SERPL-CCNC: 101 U/L (ref 15–37)
BILIRUB SERPL-MCNC: 5.5 MG/DL (ref 0–1)
BILIRUBIN DIRECT: 3.4 MG/DL (ref 0–0.3)
BILIRUBIN, INDIRECT: 2.1 MG/DL (ref 0–1)
BUN BLDV-MCNC: 12 MG/DL (ref 7–20)
CALCIUM SERPL-MCNC: 8.3 MG/DL (ref 8.3–10.6)
CHLORIDE BLD-SCNC: 100 MMOL/L (ref 99–110)
CO2: 27 MMOL/L (ref 21–32)
CREAT SERPL-MCNC: <0.5 MG/DL (ref 0.8–1.3)
GFR AFRICAN AMERICAN: >60
GFR NON-AFRICAN AMERICAN: >60
GLUCOSE BLD-MCNC: 130 MG/DL (ref 70–99)
GLUCOSE BLD-MCNC: 154 MG/DL (ref 70–99)
GLUCOSE BLD-MCNC: 290 MG/DL (ref 70–99)
PERFORMED ON: ABNORMAL
PERFORMED ON: ABNORMAL
PHOSPHORUS: 2.1 MG/DL (ref 2.5–4.9)
POTASSIUM SERPL-SCNC: 3.6 MMOL/L (ref 3.5–5.1)
SODIUM BLD-SCNC: 133 MMOL/L (ref 136–145)
TOTAL PROTEIN: 5.4 G/DL (ref 6.4–8.2)

## 2022-01-27 PROCEDURE — 6370000000 HC RX 637 (ALT 250 FOR IP): Performed by: INTERNAL MEDICINE

## 2022-01-27 PROCEDURE — 94761 N-INVAS EAR/PLS OXIMETRY MLT: CPT

## 2022-01-27 PROCEDURE — 80076 HEPATIC FUNCTION PANEL: CPT

## 2022-01-27 PROCEDURE — 2580000003 HC RX 258: Performed by: INTERNAL MEDICINE

## 2022-01-27 PROCEDURE — 80069 RENAL FUNCTION PANEL: CPT

## 2022-01-27 PROCEDURE — 94640 AIRWAY INHALATION TREATMENT: CPT

## 2022-01-27 PROCEDURE — 99239 HOSP IP/OBS DSCHRG MGMT >30: CPT | Performed by: INTERNAL MEDICINE

## 2022-01-27 PROCEDURE — 36415 COLL VENOUS BLD VENIPUNCTURE: CPT

## 2022-01-27 RX ADMIN — Medication 10 ML: at 07:58

## 2022-01-27 RX ADMIN — Medication 2 PUFF: at 11:45

## 2022-01-27 RX ADMIN — ALLOPURINOL 300 MG: 100 TABLET ORAL at 07:57

## 2022-01-27 RX ADMIN — PANTOPRAZOLE SODIUM 40 MG: 40 TABLET, DELAYED RELEASE ORAL at 05:49

## 2022-01-27 RX ADMIN — SODIUM CHLORIDE: 9 INJECTION, SOLUTION INTRAVENOUS at 02:02

## 2022-01-27 ASSESSMENT — PAIN SCALES - GENERAL: PAINLEVEL_OUTOF10: 0

## 2022-01-27 NOTE — PROGRESS NOTES
D/c instructions given to pt with prescription with pt. Explanation of new medications and instructions. Verbalized understanding. D/c per wheelchair to private car in stable condition.

## 2022-01-27 NOTE — PROGRESS NOTES
Pt being d/c home today. Pt waiting for GI (Dr. Carolyn Lucero) to see pt before leaving. IV removed per policy. Pt tolerated well. Call light in reach. Wife at bedside.

## 2022-01-27 NOTE — CARE COORDINATION
DISCHARGE ORDER  Date/Time 2022 9:55 AM  Completed by: Miguelangel Balbuena RN, Case Management    Patient Name: Brenda Martines      : 1950  Admitting Diagnosis: Hyperbilirubinemia [E80.6]  Acute liver failure without hepatic coma [K72.00]  Painless jaundice [R17]      Admit order Date and Status: 22 inpt  (verify MD's last order for status of admission)      Noted discharge order. If applicable PT/OT recommendation at Discharge:N/A  DME recommendation by PT/OT:N/A  Confirmed discharge plan  : Yes  with whom patient  If pt confirmed DC plan does family need to be contacted by CM No   Discharge Plan: Order for dc noted. Spoke with pt at bedside and plan remains for home with wife. Discussed HHC and pt cont to decline. Chart reviewed and no other dc needs identified. Reviewed chart. Role of discharge planner explained and patient verbalized understanding. Discharge order is noted. Has Home O2 in place on admit:  No  Informed of need to bring portable home O2 tank on day of discharge for nursing to connect prior to leaving:   Not Indicated  Verbalized agreement/Understanding:   Not Indicated  Pt is being d/c'd to home today. Pt's O2 sats are 97% on RA    Discharge timeout done with nsg, CM and pt. All discharge needs and concerns addressed.
INTERDISCIPLINARY PLAN OF CARE CONFERENCE    Date/Time: 1/26/2022 4:53 PM  Completed by: Rafael Salazar RN, Case Management      Patient Name:  Sonny Morrow  YOB: 1950  Admitting Diagnosis: Hyperbilirubinemia [E80.6]  Acute liver failure without hepatic coma [K72.00]  Painless jaundice [R17]     Admit Date/Time:  1/24/2022  3:00 PM    Chart reviewed. Interdisciplinary team contacted or reviewed plan related to patient progress and discharge plans. Disciplines included Case Management, Nursing, and Dietitian. Current Status: Stable  PT/OT recommendation for discharge plan of care: N/A    Expected D/C Disposition:  Home  Confirmed plan with patient and/or family Yes confirmed with: (name) wife  Met with:pt at bedside  Discharge Plan Comments:  Reviewed chart and spoke with pt and wife at bedside. Plan remains for home with wife. Cont to deny needs. Will follow.     Home O2 in place on admit: No  Pt informed of need to bring portable home O2 tank on day of discharge for nursing to connect prior to leaving:  Not Indicated  Verbalized agreement/Understanding:  Not Indicated
No    · Agency:  · Location:  · Dialysis Schedule:  · Phone:   · Fax: Other Community Services: (ex:PT/OT,Mental Health,Wound Clinic, Cardio/Pul [address])  none    DISCHARGE PLAN: Explained Case Management role/services. Reviewed chart and spoke with pt at bedside. Role of CM explained. States lives home with wife and is IPT with all care. Anticipate no needs but will follow.

## 2022-01-27 NOTE — DISCHARGE SUMMARY
Hospital Medicine Discharge Summary    Patient ID: Charley Remak      Patient's PCP: Bruce Torres MD    Admit Date: 1/24/2022     Discharge Date: 1/27/2022 1/27/2022  Admitting Provider: Payal Caldera MD     Discharge Provider: Payal Caldera MD     Discharge Diagnoses: Active Hospital Problems    Diagnosis     Acute liver failure without hepatic coma [K72.00]     Hyperbilirubinemia [E80.6]     Painless jaundice [R17]     Transaminitis [R74.01]     Biliary obstruction [K83.1]     Hyponatremia [E87.1]     Hypokalemia [E87.6]     Crohn's disease ([address] [K50.90]     Hypertension [I10]        The patient was seen and examined on day of discharge and this discharge summary is in conjunction with any daily progress note from day of discharge. Hospital Course: 79yo man presented with painless jaundice and pruritis. Obstructive Jaundice. CBD obstruction - ?object - no signs of stone or mass on MRCP. ERCP with distal CBD stricture - no clearly visible mass. Stent placed and scrape biopsies sent per GI.    - will need short term follow up for results of biopsy and biliary stent management with GI.         Hyponatremia hypovolemic. Resolved with IVF. Discharge held yesterday per patients request due to ongoing generalized weakness. Feels better today. Bilirubin downtrending consistently. Pruritis much better. Remains stable for discharge.                   Physical Exam Performed:     /71   Pulse 60   Temp 97 °F (36.1 °C) (Oral)   Resp 14   Ht 5' 11\" (1.803 m)   Wt 171 lb (77.6 kg)   SpO2 97%   BMI 23.85 kg/m²       General appearance:  No apparent distress, appears stated age and cooperative. HEENT:  Normal cephalic, atraumatic without obvious deformity. Pupils equal, round, and reactive to light. Extra ocular muscles intact. Conjunctivae/corneas clear. Neck: Supple, with full range of motion. No jugular venous distention.  Trachea midline. Respiratory:  Normal respiratory effort. Clear to auscultation, bilaterally without Rales/Wheezes/Rhonchi. Cardiovascular:  Regular rate and rhythm with normal S1/S2 without murmurs, rubs or gallops. Abdomen: Soft, non-tender, non-distended with normal bowel sounds. Musculoskeletal:  No clubbing, cyanosis or edema bilaterally. Full range of motion without deformity. Skin: Skin color, texture, turgor normal.  No rashes or lesions. Neurologic:  Neurovascularly intact without any focal sensory/motor deficits. Cranial nerves: II-XII intact, grossly non-focal.  Psychiatric:  Alert and oriented, thought content appropriate, normal insight  Capillary Refill: Brisk,< 3 seconds   Peripheral Pulses: +2 palpable, equal bilaterally       Labs: For convenience and continuity at follow-up the following most recent labs are provided:      CBC:    Lab Results   Component Value Date    WBC 4.7 01/26/2022    HGB 11.5 01/26/2022    HCT 33.3 01/26/2022     01/26/2022       Renal:    Lab Results   Component Value Date     01/27/2022    K 3.6 01/27/2022    K 3.3 01/24/2022     01/27/2022    CO2 27 01/27/2022    BUN 12 01/27/2022    CREATININE <0.5 01/27/2022    CALCIUM 8.3 01/27/2022    PHOS 2.1 01/27/2022         Significant Diagnostic Studies    Radiology:   FL ERCP BILIARY AND PANCREATIC S&I   Final Result   ERCP images as above. Please refer to the procedure report for further   details. MRI ABDOMEN W WO CONTRAST MRCP   Final Result   Abnormal intra and extrahepatic biliary ductal dilatation secondary to   narrowing at the level the mid to distal common duct, suspicious for duct   stricture as no focal filling defect is seen in the common duct. Distended gallbladder with de pendant filling defects either stones or sludge      No pancreatic ductal dilatation. RECOMMENDATIONS:   Unavailable         CT ABDOMEN PELVIS W IV CONTRAST Additional Contrast? None   Final Result   1. Moderate dilation of the biliary tree with transition at the mid to lower   common duct, uncertain etiology, benign versus malignant stricture. No stone   or mass is appreciated. Follow-up ERCP/MRCP is considered. 2. Distended gallbladder. No cholelithiasis is appreciated. 3. No findings of acute pancreatitis or dilation of the pancreatic duct. 4. Mild dilation of proximal and mid small bowel, with gradual transition. Ileus or enteritis is favored over obstruction. Consults:     IP CONSULT TO HOSPITALIST  IP CONSULT TO GI    Disposition:  Home if able to tolerate PO. Condition at Discharge: Stable    Discharge Instructions/Follow-up:  GI 1-2 weeks. Code Status:  Full Code     Activity: activity as tolerated    Diet: low fat, low cholesterol diet      Discharge Medications:     Discharge Medication List as of 1/27/2022  8:41 AM           Details   pantoprazole (PROTONIX) 40 MG tablet Take 1 tablet by mouth 2 times daily (before meals) for 14 days, Disp-28 tablet, R-0Normal              Details   metFORMIN (GLUCOPHAGE-XR) 500 MG extended release tablet Take 500 mg by mouth 2 times dailyHistorical Med      dicyclomine (BENTYL) 10 MG capsule Take 10 mg by mouth nightly. Historical Med      allopurinol (ZYLOPRIM) 300 MG tablet Take 300 mg by mouth daily. Historical Med      albuterol (PROVENTIL HFA;VENTOLIN HFA) 108 (90 BASE) MCG/ACT inhaler Inhale 2 puffs into the lungs every 6 hours as needed. Historical Med             Time Spent on discharge is more than 30 minutes in the examination, evaluation, counseling and review of medications and discharge plan. Signed:    Jamie Osuna MD   1/27/2022      Thank you Julio Blanco MD for the opportunity to be involved in this patient's care. If you have any questions or concerns please feel free to contact me at [phone].

## 2022-01-27 NOTE — PROGRESS NOTES
Shift assessment complete. See doc flow. Nightly medications given see MAR. Patient A/Ox4. Patient has bowel sounds in all four quadrants. Patient skin is jaundice. IV fluids infusing at this time. Patient with no complaints at this time. Bed in lowest position and locked. Call light and bedside table within easy reach.

## 2022-01-27 NOTE — PROGRESS NOTES
Patient resting, eyes closed, respirations witnessed as e/e. No signs of distress. Bed in lowest position and locked. Gripper socks on. Call light and bedside table is easy reach.

## 2022-01-27 NOTE — PROGRESS NOTES
PROGRESS NOTE  S:71 yrs Patient  admitted on 1/24/2022 with Hyperbilirubinemia [E80.6]  Acute liver failure without hepatic coma [K72.00]  Painless jaundice [R17] . Today he complains of RLQ abdominal pain. He passed 1x BM this AM, and is tolerating diet. Exam:   Vitals:    01/27/22 1146   BP:    Pulse:    Resp:    Temp:    SpO2: 97%      General appearance: alert, appears stated age, cooperative, icteric, no distress and syndromic appearance - ill-appearing  HEENT: Neck supple with midline trachea, sclera icteric   Neck: no adenopathy and supple, symmetrical, trachea midline  Lungs: clear to auscultation bilaterally  Heart: regular rate and rhythm, S1, S2 normal, no murmur, click, rub or gallop  Abdomen: soft, non-tender; bowel sounds normal; no masses,  no organomegaly  Extremities: extremities normal, atraumatic, no cyanosis or edema     Medications: Reviewed    Labs:  CBC:   Recent Labs     01/25/22  0518 01/26/22  0535   WBC 4.5 4.7   HGB 12.4* 11.5*   HCT 36.1* 33.3*   MCV 99.5 100.1*    360     BMP:   Recent Labs     01/25/22  0916 01/26/22  0535 01/27/22  0514   * 133* 133*   K 4.4 3.9 3.6   CL 96* 101 100   CO2 30 25 27   PHOS 2.5 2.4* 2.1*   BUN 11 12 12   CREATININE <0.5* <0.5* <0.5*     LIVER PROFILE:   Recent Labs     01/25/22  0518 01/26/22  0535 01/27/22  0514   * 122* 101*   * 324* 248*   PROT 6.2* 5.8* 5.4*   BILIDIR 7.5* 7.7* 3.4*   BILITOT 10.8* 10.7* 5.5*   ALKPHOS 434* 382* 319*     PT/INR:   Recent Labs     01/24/22  1620   INR 1.08       DATE OF PROCEDURE:  01/26/2022  PRE-PROCEDURE DIAGNOSES:  1.  Obstructive jaundice. 2.  History of Crohn's disease. POST-PROCEDURE DIAGNOSES:  1.  A 2 cm distal bile duct stricture. 2.  Moderate-to-severe intra-and extrahepatic ductal dilation. 3.  Successful sphincterotomy and brushing of the stricture.   4.  Successful placement of 10-[place] x 7 cm stent across the stricture. PROCEDURE:  ERCP with sphincterotomy stent placement and brushing. Attending Supervising [de-identified] Attestation Statement  The patient is a 70 y.o. male. I have performed a history and physical examination of the patient. I discussed the case with my physician assistant Viola Munson PA-C    I reviewed the patient's Past Medical History, Past Surgical History, Medications, and Allergies. Physical Exam:  Vitals:    01/26/22 2005 01/27/22 0154 01/27/22 0715 01/27/22 1146   BP: 114/62 116/62 115/71    Pulse: 72 64 60    Resp: 18 18 14    Temp: 97.1 °F (36.2 °C) 98 °F (36.7 °C) 97 °F (36.1 °C)    TempSrc: Oral Oral Oral    SpO2: 97% 97% 98% 97%   Weight:       Height:           Physical Examination: General appearance - alert, well appearing, and in no distress  Mental status - alert, oriented to person, place, and time  Eyes - pupils equal and reactive, extraocular eye movements intact  Neck - supple, no significant adenopathy  Chest - clear to auscultation, no wheezes, rales or rhonchi, symmetric air entry  Heart - normal rate, regular rhythm, normal S1, S2, no murmurs, rubs, clicks or gallops  Abdomen - soft, nontender, nondistended, no masses or organomegaly  Extremities - no pedal edema noted          Impression: 70year old male with history of DM, HTN, HLD, asthma, remote Crohn's disease admitted with obstructive jaundice s/p ERCP with sphincterotomy, stent placement, and brushing. EGD showed distal bile duct stricture and duodenal ulcer. Recommendation:  1. Continue supportive care  2. Monitor LFTs - improving   3. Continue Pantoprazole 40 mg BID x 8 weeks  4. Await cytology results  5. Continue low fat diet as tolerated  6. Ok to d/c from GI standpoint  7. Repeat CMP in 2 weeks upon d/c   8.  May need OP EUS pending cytology results      Erna Bridges PA-C  12:15 PM 1/27/2022                      70year old male with history of DM, HTN, HLD, asthma, remote Crohn's disease admitted with obstructive jaundice secondary to biliary stricture s/p ERCP with stent    Continue supportive care. Monitor LFTs. Encourage nutrition. Repeat CMP in 2wks. Await cytology.  Will consider outpatient EUS    Dayna Case MD          (O) [phone]  Giuliana Pitt

## 2022-01-28 ENCOUNTER — CARE COORDINATION (OUTPATIENT)
Dept: CASE MANAGEMENT | Age: 72
End: 2022-01-28

## 2022-01-28 NOTE — CARE COORDINATION
Transitions of Care Call  Call within 2 business days of discharge: Yes    Patient: Vicente Moncada Patient : 1950   MRN: [id number]  Reason for Admission: obstructive jaundice, CBD obstruction, s/p ERCP with distal CBD stricture, sphincterotomy and stent placement, hyponatremia, weakness, prurutis. Discharge Date: 22 RARS: Readmission Risk Score: 10.5 ( )      Last Discharge [facility]       Complaint Diagnosis Description Type Department Provider    22 Fatigue; Jaundice Acute liver failure without hepatic coma . .. ED to Hosp-Admission (Discharged) (ADMITTED) [facility] 2 DOYLE Rm MD; Dillon Salazar. .. Was this an external facility discharge? No Discharge Facility: NA    Challenges to be reviewed by the provider   Additional needs identified to be addressed with provider: Yes  medications-needs rec for worsening pruritis since home - he called GI office already this morning. Encounter was not routed to provider for escalation because patient has already contacted the GI office prior to [place]. Method of communication with provider: none    Current Symptoms: itching    Reviewed New or Changed Meds: yes    Do you have what you need at home?  Durable Medical Equipment ordered at discharge: None   Home Health/Outpatient orders at discharge: none   Was patient discharged with a pulse oximeter? No     Patient education provided: Reviewed appropriate site of care based on symptoms and resources available to patient including: PCP and Specialist.     Follow up appointment scheduled within 7 days of discharge: no. If no appointment scheduled, scheduling offered: GI TBD  No future appointments. Interventions: Obtained and reviewed discharge summary and/or continuity of care documents  Reviewed discharge instructions, medical action plan and red flags with patient who verbalized understanding.     Plan for follow-up call in 3-5 days based on severity of symptoms and risk factors. Plan for next call: symptom management-pruritis    States he just contacted the GI office to report worsening pruritis overnight last night started around 130am and requesting relief/recommendation. Only thing that helped was shower. Had two BMs this morning - first normal, second diarrhea. States the itching is miserable and he is waiting for Dr Ventura Lipscomb office to call back with recommendation. He was told not to add any OTC medications and CTN reviewed STOPPED meds he voices understanding. He will wait for GI office call back. If he doesn't hear from them before early afternoon he was encouraged to call back to GI office and/or CTN for assistance. He voices understanding. Denies needs other than relief from pruritis. Provided contact information for future needs.     Gio Tucker RN

## 2022-02-01 ENCOUNTER — CARE COORDINATION (OUTPATIENT)
Dept: CASE MANAGEMENT | Age: 72
End: 2022-02-01

## 2022-02-01 NOTE — CARE COORDINATION
[facility] Transitions Follow Up Call    2022    Patient: Andrew Chamorro  Patient : 1950   MRN: [id number]  Reason for Admission: obstructive jaundice, CBD obstruction, s/p ERCP with distal CBD stricture, sphincterotomy and stent placement, hyponatremia, weakness, prurutis. Discharge Date: 22 RARS: Readmission Risk Score: 10.5 ( )      Challenges to be reviewed by the provider   Additional needs identified to be addressed with provider: Yes  unsure of plan for when bx results and lab order            Encounter was routed to provider for escalation. Method of communication with provider:  Spoke with GI office. Contacted the patient by telephone to follow up after hospital visit. Status: significantly improved  Interventions to address identified needs: [facility] follow up appointment(s): No future appointments. Non-[facility] follow up appointment(s): PCP Dr Sundeep Osorio Monday, 2022   Follow up appointment completed? No.    He did get a new prescription from GI doctor that helps with the pruritis. It is still worse overnight but tolerable. He is waiting for biopsy results to be called to him. States they are also mailing him lab orders to be done at [facility]. Hasn't checked his mail today. Plans to contact GI office tomorrow afternoon if no news yet. Provided him with GI office number ([phone]). He was unsure of plan. Outreach to GI office of Dr Talita Beltran. Biopsy resulted yesterday and they plan to contact him today. Lab order for repeat CMP mailed via [employer]PS on 2022. Has appt with Talita Beltran on 2022 for colonoscopy consultation. He will look for lab order in mail. CTN provided contact info for future needs. No further follow-up call indicated based on severity of symptoms and risk factors.       Trish Martinez RN

## 2022-02-02 ENCOUNTER — HOSPITAL ENCOUNTER (OUTPATIENT)
Age: 72
Discharge: HOME | End: 2022-02-02
Payer: MEDICARE

## 2022-02-02 LAB
A/G RATIO: 1.3 (ref 1.1–2.2)
ALBUMIN SERPL-MCNC: 3.8 G/DL (ref 3.4–5)
ALP BLD-CCNC: 234 U/L (ref 40–129)
ALT SERPL-CCNC: 98 U/L (ref 10–40)
ANION GAP SERPL CALCULATED.3IONS-SCNC: 13 MMOL/L (ref 3–16)
AST SERPL-CCNC: 41 U/L (ref 15–37)
BASOPHILS ABSOLUTE: 0.1 K/UL (ref 0–0.2)
BASOPHILS RELATIVE PERCENT: 1 %
BILIRUB SERPL-MCNC: 3.1 MG/DL (ref 0–1)
BUN BLDV-MCNC: 10 MG/DL (ref 7–20)
CALCIUM SERPL-MCNC: 9.2 MG/DL (ref 8.3–10.6)
CHLORIDE BLD-SCNC: 99 MMOL/L (ref 99–110)
CO2: 25 MMOL/L (ref 21–32)
CREAT SERPL-MCNC: <0.5 MG/DL (ref 0.8–1.3)
EOSINOPHILS ABSOLUTE: 0.1 K/UL (ref 0–0.6)
EOSINOPHILS RELATIVE PERCENT: 2.3 %
GFR AFRICAN AMERICAN: >60
GFR NON-AFRICAN AMERICAN: >60
GLUCOSE BLD-MCNC: 178 MG/DL (ref 70–99)
HCT VFR BLD CALC: 31.8 % (ref 40.5–52.5)
HEMOGLOBIN: 11 G/DL (ref 13.5–17.5)
INR BLD: 0.96 (ref 0.88–1.12)
LIPASE: 53 U/L (ref 13–60)
LYMPHOCYTES ABSOLUTE: 0.8 K/UL (ref 1–5.1)
LYMPHOCYTES RELATIVE PERCENT: 15.1 %
MCH RBC QN AUTO: 34.9 PG (ref 26–34)
MCHC RBC AUTO-ENTMCNC: 34.6 G/DL (ref 31–36)
MCV RBC AUTO: 101.1 FL (ref 80–100)
MONOCYTES ABSOLUTE: 0.4 K/UL (ref 0–1.3)
MONOCYTES RELATIVE PERCENT: 7.5 %
NEUTROPHILS ABSOLUTE: 4.1 K/UL (ref 1.7–7.7)
NEUTROPHILS RELATIVE PERCENT: 74.1 %
PDW BLD-RTO: 15.5 % (ref 12.4–15.4)
PLATELET # BLD: 289 K/UL (ref 135–450)
PMV BLD AUTO: 7.9 FL (ref 5–10.5)
POTASSIUM SERPL-SCNC: 3.8 MMOL/L (ref 3.5–5.1)
PROTHROMBIN TIME: 10.8 SEC (ref 9.9–12.7)
RBC # BLD: 3.14 M/UL (ref 4.2–5.9)
SODIUM BLD-SCNC: 137 MMOL/L (ref 136–145)
TOTAL PROTEIN: 6.7 G/DL (ref 6.4–8.2)
WBC # BLD: 5.5 K/UL (ref 4–11)

## 2022-02-02 PROCEDURE — 85025 COMPLETE CBC W/AUTO DIFF WBC: CPT

## 2022-02-02 PROCEDURE — 85610 PROTHROMBIN TIME: CPT

## 2022-02-02 PROCEDURE — 83690 ASSAY OF LIPASE: CPT

## 2022-02-02 PROCEDURE — 36415 COLL VENOUS BLD VENIPUNCTURE: CPT

## 2022-02-02 PROCEDURE — 80053 COMPREHEN METABOLIC PANEL: CPT

## 2022-02-17 ENCOUNTER — HOSPITAL ENCOUNTER (OUTPATIENT)
Age: 72
Discharge: HOME | End: 2022-02-17
Payer: MEDICARE

## 2022-02-17 LAB
A/G RATIO: 1.5 (ref 1.1–2.2)
ALBUMIN SERPL-MCNC: 4.3 G/DL (ref 3.4–5)
ALP BLD-CCNC: 190 U/L (ref 40–129)
ALT SERPL-CCNC: 57 U/L (ref 10–40)
ANION GAP SERPL CALCULATED.3IONS-SCNC: 9 MMOL/L (ref 3–16)
AST SERPL-CCNC: 42 U/L (ref 15–37)
BILIRUB SERPL-MCNC: 1.7 MG/DL (ref 0–1)
BUN BLDV-MCNC: 10 MG/DL (ref 7–20)
CALCIUM SERPL-MCNC: 9.8 MG/DL (ref 8.3–10.6)
CHLORIDE BLD-SCNC: 96 MMOL/L (ref 99–110)
CO2: 30 MMOL/L (ref 21–32)
CREAT SERPL-MCNC: <0.5 MG/DL (ref 0.8–1.3)
GFR AFRICAN AMERICAN: >60
GFR NON-AFRICAN AMERICAN: >60
GLUCOSE BLD-MCNC: 125 MG/DL (ref 70–99)
POTASSIUM SERPL-SCNC: 4 MMOL/L (ref 3.5–5.1)
SODIUM BLD-SCNC: 135 MMOL/L (ref 136–145)
TOTAL PROTEIN: 7.2 G/DL (ref 6.4–8.2)

## 2022-02-17 PROCEDURE — 36415 COLL VENOUS BLD VENIPUNCTURE: CPT

## 2022-02-17 PROCEDURE — 80053 COMPREHEN METABOLIC PANEL: CPT

## 2022-02-19 ENCOUNTER — HOSPITAL ENCOUNTER (EMERGENCY)
Age: 72
Discharge: HOME | End: 2022-02-19
Payer: MEDICARE

## 2022-02-19 ENCOUNTER — APPOINTMENT (OUTPATIENT)
Dept: GENERAL RADIOLOGY | Age: 72
End: 2022-02-19
Payer: MEDICARE

## 2022-02-19 VITALS
SYSTOLIC BLOOD PRESSURE: 136 MMHG | TEMPERATURE: 98.4 F | RESPIRATION RATE: 18 BRPM | HEART RATE: 86 BPM | DIASTOLIC BLOOD PRESSURE: 87 MMHG | WEIGHT: 171 LBS | OXYGEN SATURATION: 98 % | BODY MASS INDEX: 23.85 KG/M2

## 2022-02-19 DIAGNOSIS — N30.01 ACUTE CYSTITIS WITH HEMATURIA: Primary | ICD-10-CM

## 2022-02-19 LAB
A/G RATIO: 1.3 (ref 1.1–2.2)
ALBUMIN SERPL-MCNC: 4.3 G/DL (ref 3.4–5)
ALP BLD-CCNC: 257 U/L (ref 40–129)
ALT SERPL-CCNC: 124 U/L (ref 10–40)
ANION GAP SERPL CALCULATED.3IONS-SCNC: 12 MMOL/L (ref 3–16)
AST SERPL-CCNC: 82 U/L (ref 15–37)
BACTERIA: ABNORMAL /HPF
BASOPHILS ABSOLUTE: 0 K/UL (ref 0–0.2)
BASOPHILS RELATIVE PERCENT: 0.4 %
BILIRUB SERPL-MCNC: 1.8 MG/DL (ref 0–1)
BILIRUBIN URINE: NEGATIVE
BLOOD, URINE: ABNORMAL
BUN BLDV-MCNC: 9 MG/DL (ref 7–20)
CALCIUM SERPL-MCNC: 9.6 MG/DL (ref 8.3–10.6)
CHLORIDE BLD-SCNC: 96 MMOL/L (ref 99–110)
CLARITY: CLEAR
CO2: 24 MMOL/L (ref 21–32)
COLOR: YELLOW
CREAT SERPL-MCNC: <0.5 MG/DL (ref 0.8–1.3)
EOSINOPHILS ABSOLUTE: 0.1 K/UL (ref 0–0.6)
EOSINOPHILS RELATIVE PERCENT: 1.8 %
EPITHELIAL CELLS, UA: ABNORMAL /HPF (ref 0–5)
GFR AFRICAN AMERICAN: >60
GFR NON-AFRICAN AMERICAN: >60
GLUCOSE BLD-MCNC: 171 MG/DL (ref 70–99)
GLUCOSE URINE: NEGATIVE MG/DL
HCT VFR BLD CALC: 36.1 % (ref 40.5–52.5)
HEMOGLOBIN: 12.8 G/DL (ref 13.5–17.5)
INFLUENZA A: NOT DETECTED
INFLUENZA B: NOT DETECTED
KETONES, URINE: NEGATIVE MG/DL
LACTIC ACID: 1.9 MMOL/L (ref 0.4–2)
LEUKOCYTE ESTERASE, URINE: NEGATIVE
LYMPHOCYTES ABSOLUTE: 0.6 K/UL (ref 1–5.1)
LYMPHOCYTES RELATIVE PERCENT: 7.7 %
MCH RBC QN AUTO: 36.2 PG (ref 26–34)
MCHC RBC AUTO-ENTMCNC: 35.6 G/DL (ref 31–36)
MCV RBC AUTO: 101.7 FL (ref 80–100)
MICROSCOPIC EXAMINATION: YES
MONOCYTES ABSOLUTE: 0.4 K/UL (ref 0–1.3)
MONOCYTES RELATIVE PERCENT: 6 %
NEUTROPHILS ABSOLUTE: 6.2 K/UL (ref 1.7–7.7)
NEUTROPHILS RELATIVE PERCENT: 84.1 %
NITRITE, URINE: NEGATIVE
PDW BLD-RTO: 14.9 % (ref 12.4–15.4)
PH UA: 6 (ref 5–8)
PLATELET # BLD: 217 K/UL (ref 135–450)
PMV BLD AUTO: 6.4 FL (ref 5–10.5)
POTASSIUM REFLEX MAGNESIUM: 4 MMOL/L (ref 3.5–5.1)
PROTEIN UA: NEGATIVE MG/DL
RBC # BLD: 3.55 M/UL (ref 4.2–5.9)
RBC UA: ABNORMAL /HPF (ref 0–4)
SARS-COV-2 RNA, RT PCR: NOT DETECTED
SODIUM BLD-SCNC: 132 MMOL/L (ref 136–145)
SPECIFIC GRAVITY UA: 1.01 (ref 1–1.03)
TOTAL PROTEIN: 7.5 G/DL (ref 6.4–8.2)
URINE TYPE: ABNORMAL
UROBILINOGEN, URINE: 0.2 E.U./DL
WBC # BLD: 7.3 K/UL (ref 4–11)
WBC UA: ABNORMAL /HPF (ref 0–5)

## 2022-02-19 PROCEDURE — 6370000000 HC RX 637 (ALT 250 FOR IP)

## 2022-02-19 PROCEDURE — 85025 COMPLETE CBC W/AUTO DIFF WBC: CPT

## 2022-02-19 PROCEDURE — 81001 URINALYSIS AUTO W/SCOPE: CPT

## 2022-02-19 PROCEDURE — 36415 COLL VENOUS BLD VENIPUNCTURE: CPT

## 2022-02-19 PROCEDURE — 80053 COMPREHEN METABOLIC PANEL: CPT

## 2022-02-19 PROCEDURE — 87086 URINE CULTURE/COLONY COUNT: CPT

## 2022-02-19 PROCEDURE — 99283 EMERGENCY DEPT VISIT LOW MDM: CPT

## 2022-02-19 PROCEDURE — 87636 SARSCOV2 & INF A&B AMP PRB: CPT

## 2022-02-19 PROCEDURE — 71045 X-RAY EXAM CHEST 1 VIEW: CPT

## 2022-02-19 PROCEDURE — 83605 ASSAY OF LACTIC ACID: CPT

## 2022-02-19 RX ORDER — CEFUROXIME AXETIL 250 MG/1
TABLET ORAL
Status: COMPLETED
Start: 2022-02-19 | End: 2022-02-19

## 2022-02-19 RX ORDER — CEFUROXIME AXETIL 250 MG/1
250 TABLET ORAL 2 TIMES DAILY
Qty: 20 TABLET | Refills: 0 | Status: SHIPPED | OUTPATIENT
Start: 2022-02-19 | End: 2022-03-01

## 2022-02-19 RX ORDER — CEFUROXIME AXETIL 250 MG/1
500 TABLET ORAL EVERY 12 HOURS SCHEDULED
Status: DISCONTINUED | OUTPATIENT
Start: 2022-02-19 | End: 2022-02-19

## 2022-02-19 RX ORDER — CEFUROXIME AXETIL 250 MG/1
500 TABLET ORAL EVERY 12 HOURS SCHEDULED
Status: DISCONTINUED | OUTPATIENT
Start: 2022-02-19 | End: 2022-02-19 | Stop reason: HOSPADM

## 2022-02-19 RX ADMIN — CEFUROXIME AXETIL 500 MG: 250 TABLET ORAL at 18:53

## 2022-02-19 ASSESSMENT — ENCOUNTER SYMPTOMS
RHINORRHEA: 0
NAUSEA: 0
SORE THROAT: 0
SHORTNESS OF BREATH: 0
COUGH: 0
DIARRHEA: 0
ABDOMINAL PAIN: 0
BLOOD IN STOOL: 0
VOMITING: 0
EYE PAIN: 0
BACK PAIN: 0

## 2022-02-19 NOTE — ED PROVIDER NOTES
[address] ED  EMERGENCY DEPARTMENT ENCOUNTER        Pt Name: Brenda Martines  MRN: [id number]  [place] 1950  Date of evaluation: 2/19/2022  Provider: ADILENE Novak - LENORE  PCP: Melani Hawkins MD  Note Started: 5:56 PM EST      HUDSON. I have evaluated this patient. My supervising physician was available for consultation. Triage CHIEF COMPLAINT       Chief Complaint   Patient presents with    Dysuria     symp for a day. denies abd/flank pain     Fever         HISTORY OF PRESENT ILLNESS   (Location/Symptom, Timing/Onset, Context/Setting, Quality, Duration, Modifying Factors, Severity)  Note limiting factors. Chief Complaint: Burning with urination    Brenda Martines is a 70 y.o. male who presents to the emergency department with symptoms of burning with urination. Patient reported that last 24 hours he has had symptoms of burning with urination. He states that he has burning initially when starting to urinate. Reported that the last 4 hours his symptoms are tender to worsen. He also had some general fatigue today. He states that he really did not have any energy today. Denies chest pain or shortness of breath. No abdominal pain. States that he had a fever of 100.4 earlier today. Denies taking any Tylenol or Motrin. Upon arrival here in the emergency department temperature is 98.4. States that he has no symptoms of vomiting. No abdominal pain. No chest pain. Denies any back pain or flank pain. States his only complaints today were the general fatigue, fever, and dysuria. Nursing Notes were all reviewed and agreed with or any disagreements were addressed in the HPI. REVIEW OF SYSTEMS    (2-9 systems for level 4, 10 or more for level 5)     Review of Systems   Constitutional: Positive for fatigue and fever. Negative for chills and diaphoresis. HENT: Negative for congestion, ear pain, rhinorrhea and sore throat.     Eyes: Negative for pain and visual disturbance. Respiratory: Negative for cough and shortness of breath. Cardiovascular: Negative for chest pain and leg swelling. Gastrointestinal: Negative for abdominal pain, blood in stool, diarrhea, nausea and vomiting. Genitourinary: Positive for dysuria. Negative for difficulty urinating, flank pain and frequency. Musculoskeletal: Negative for back pain and neck pain. Skin: Negative for rash and wound. Neurological: Negative for dizziness and light-headedness. PAST MEDICAL HISTORY     Past Medical History:   Diagnosis Date    Asthma     Blood circulation, collateral     Carpal tunnel syndrome     [facility]     Diabetes mellitus ([facility]     emboli     pulmonary emboli in 1980    GERD (gastroesophageal reflux disease)     Chrons disease    Gout     Hx of blood clots     Hypertension     Pneumonia     pneumonia,asthma    prostate     infection    Seasonal allergies        SURGICAL HISTORY     Past Surgical History:   Procedure Laterality Date    ERCP N/A 1/26/2022    ERCP BIOPSY performed by Marla Humphrey MD at [address] [place] [address] ERCP  1/26/2022    ERCP SPHINCTER/PAPILLOTOMY performed by Marla Humphrey MD at [address] [place] [address] ERCP  1/26/2022    ERCP STENT INSERTION performed by Marla Humphrey MD at [place] [address]  01/26/2022    ERCP WF W/VENKAT. (8:30) (N/A )     TONSILLECTOMY         CURRENTMEDICATIONS       Discharge Medication List as of 2/19/2022  6:43 PM      CONTINUE these medications which have NOT CHANGED    Details   pantoprazole (PROTONIX) 40 MG tablet Take 1 tablet by mouth 2 times daily (before meals) for 14 days, Disp-28 tablet, R-0Normal      metFORMIN (GLUCOPHAGE-XR) 500 MG extended release tablet Take 500 mg by mouth 2 times dailyHistorical Med      dicyclomine (BENTYL) 10 MG capsule Take 10 mg by mouth nightly. Historical Med      allopurinol (ZYLOPRIM) 300 MG tablet Take 300 mg by mouth daily. Historical Med      albuterol (PROVENTIL HFA;VENTOLIN HFA) 108 (90 BASE) MCG/ACT inhaler Inhale 2 puffs into the lungs every 6 hours as needed. Historical Med             ALLERGIES     Codeine    FAMILYHISTORY     History reviewed. No pertinent family history. SOCIAL HISTORY       Social History     Socioeconomic History    Marital status:      Spouse name: None    Number of children: None    Years of education: None    Highest education level: None   Occupational History    None   Tobacco Use    Smoking status: Former Smoker    Smokeless tobacco: Never Used    Tobacco comment: quit 40-50 years ago   Substance and Sexual Activity    Alcohol use: No    Drug use: No    Sexual activity: Yes     Partners: Female   Other Topics Concern    None   Social History Narrative    None     Social Determinants of Health     Financial Resource Strain:     Difficulty of Paying Living Expenses: Not on file   Food Insecurity:     Worried About Running Out of Food in the Last Year: Not on file    Elroy of Food in the Last Year: Not on file   Transportation Needs:     Lack of Transportation (Medical): Not on file    Lack of Transportation (Non-Medical):  Not on file   Physical Activity:     Days of Exercise per Week: Not on file    Minutes of Exercise per Session: Not on file   Stress:     Feeling of Stress : Not on file   Social Connections:     Frequency of Communication with Friends and Family: Not on file    Frequency of Social Gatherings with Friends and Family: Not on file    Attends Holiness Services: Not on file    Active Member of Clubs or Organizations: Not on file    Attends Club or Organization Meetings: Not on file    Marital Status: Not on file   Intimate Partner Violence:     Fear of Current or Ex-Partner: Not on file    Emotionally Abused: Not on file    Physically Abused: Not on file    Sexually Abused: Not on file   Housing Stability:     Unable to Pay for Housing in the Last Year: Not on file    Number of Places Lived in the Last Year: Not on file    Unstable Housing in the Last Year: Not on file       SCREENINGS    [place] Coma Scale  Eye Opening: Spontaneous  Best Verbal Response: Oriented  Best Motor Response: Obeys commands  [place] Coma Scale Score: 15        PHYSICAL EXAM    (up to 7 for level 4, 8 or more for level 5)     ED Triage Vitals   BP Temp Temp Source Pulse Resp SpO2 Height Weight   02/19/22 1721 02/19/22 1724 02/19/22 1724 02/19/22 1721 02/19/22 1721 02/19/22 1721 -- 02/19/22 1721   130/81 98.4 °F (36.9 °C) Oral 91 18 98 %  171 lb (77.6 kg)       Physical Exam  Vitals and nursing note reviewed. Constitutional:       Appearance: Normal appearance. He is not toxic-appearing or diaphoretic. HENT:      Head: Normocephalic and atraumatic. Nose: Nose normal.   Eyes:      General:         Right eye: No discharge. Left eye: No discharge. Cardiovascular:      Rate and Rhythm: Normal rate and regular rhythm. Pulses: Normal pulses. Heart sounds: No murmur heard. Pulmonary:      Effort: Pulmonary effort is normal. No respiratory distress. Abdominal:      Palpations: Abdomen is soft. Tenderness: There is no abdominal tenderness. There is no guarding. Musculoskeletal:         General: Normal range of motion. Cervical back: Normal range of motion and neck supple. Skin:     General: Skin is warm and dry. Capillary Refill: Capillary refill takes less than 2 seconds. Neurological:      General: No focal deficit present. Mental Status: He is alert and oriented to person, place, and time.    Psychiatric:         Mood and Affect: Mood normal.         Behavior: Behavior normal.         DIAGNOSTIC RESULTS   LABS:    Labs Reviewed   URINALYSIS WITH MICROSCOPIC - Abnormal; Notable for the following components:       Result Value    Blood, Urine TRACE-INTACT (*)     RBC, UA 5-10 (*)     Bacteria, UA 2+ (*)     All other components within normal limits    Narrative:     Performed at:  [place] [address],  [place], [facility]   Phone [phone]   CBC WITH AUTO DIFFERENTIAL - Abnormal; Notable for the following components:    RBC 3.55 (*)     Hemoglobin 12.8 (*)     Hematocrit 36.1 (*)     .7 (*)     MCH 36.2 (*)     Lymphocytes Absolute 0.6 (*)     All other components within normal limits    Narrative:     Performed at:  [place] [address],  [place], [facility]   Phone [phone]   COMPREHENSIVE METABOLIC PANEL W/ REFLEX TO MG FOR LOW K - Abnormal; Notable for the following components:    Sodium 132 (*)     Chloride 96 (*)     Glucose 171 (*)     CREATININE <0.5 (*)     Total Bilirubin 1.8 (*)     Alkaline Phosphatase 257 (*)      (*)     AST 82 (*)     All other components within normal limits    Narrative:     Performed at:  [place] [address],  [place], [facility]   Phone [phone]    Narrative:     Performed at:  [place] [address],  [place], [facility]   Phone [phone]   CULTURE, URINE    Narrative:     ORDER#: [id number]                          ORDERED BY: Romaine Lama  SOURCE: Urine Clean Catch                  COLLECTED:  02/19/22 17:25  ANTIBIOTICS AT SONYA.:                      RECEIVED :  02/20/22 17:00  Performed at:  [place] [address]   Phone [phone]   LACTIC ACID    Narrative:     Performed at:  [facility] - [place] [address],  [place], [facility]   Phone [phone]       When ordered, only abnormal lab results are displayed. All other labs were within normal range or not returned as of this dictation. EKG:  When ordered, EKG's are interpreted by the Emergency Department Physician in the absence of a cardiologist.  Please see their note for interpretation of EKG. RADIOLOGY:   Non-plain film images such as CT, Ultrasound and MRI are read by the radiologist. Марина Valladares radiographic images are visualized andpreliminarily interpreted by the  ED Provider with the below findings:        Interpretation sherly Radiologist below, if available at the time of this note:    XR CHEST PORTABLE   Final Result   Stable chronic changes with no acute abnormality seen. No results found. PROCEDURES   Unless otherwise noted below, none     Procedures    CRITICAL CARE TIME   N/A    CONSULTS:  None      EMERGENCY DEPARTMENT COURSE and DIFFERENTIAL DIAGNOSIS/MDM:   Vitals:    Vitals:    02/19/22 1721 02/19/22 1724 02/19/22 1857   BP: 130/81  136/87   Pulse: 91  86   Resp: 18  18   Temp:  98.4 °F (36.9 °C)    TempSrc:  Oral    SpO2: 98%  98%   Weight: 171 lb (77.6 kg)         Patient was given thefollowing medications:  Medications - No data to display        Diagnosed with acute cystitis. We went ahead and treated him with cefuroxime. Patient's vital signs are unremarkable. Patient's laboratory work displays no acute findings. At this time I believe it is safe the patient be treated as an outpatient. Patient verbalized understanding. Patient advised to follow-up with family doctor next few days. Also advised return back to the emerge department for any acute concerns. FINAL IMPRESSION      1.  Acute cystitis with hematuria          DISPOSITION/PLAN   DISPOSITION Decision To Discharge 02/19/2022 06:41:21 PM      PATIENT [facility]FERREDTO:  [facility]  [phone]    Schedule an appointment as soon as possible for a visit         DISCHARGE MEDICATIONS:  Discharge Medication List as of 2/19/2022  6:43 PM      START taking these medications    Details   cefUROXime (CEFTIN) 250 MG tablet Take 1 tablet by mouth 2 times daily for 10 days, Disp-20 tablet, R-0Print             DISCONTINUED MEDICATIONS:  Discharge Medication List as of 2/19/2022  6:43 PM                 (Please note that portions ofthis note were completed with a voice recognition program.  Efforts were made to edit the dictations but occasionally words are mis-transcribed.)    ADILENE Marc CNP (electronically signed)            ADILENE Marc CNP  02/24/22 1051

## 2022-02-19 NOTE — ED NOTES
--Patient provided with discharge instructions and any prescriptions. --Instructions, dosing, and follow-up appointments reviewed with patient/family. No further questions or needs at this time. --Vital signs and patient stable upon discharge. --Patient ambulatory to [facility].        Liban Purcell RN  02/19/22 2122

## 2022-02-20 LAB — URINE CULTURE, ROUTINE: NORMAL

## 2022-03-10 ENCOUNTER — APPOINTMENT (OUTPATIENT)
Dept: CT IMAGING | Age: 72
DRG: 919 | End: 2022-03-10
Payer: MEDICARE

## 2022-03-10 ENCOUNTER — HOSPITAL ENCOUNTER (INPATIENT)
Age: 72
LOS: 2 days | Discharge: HOME | DRG: 919 | End: 2022-03-12
Attending: EMERGENCY MEDICINE | Admitting: INTERNAL MEDICINE
Payer: MEDICARE

## 2022-03-10 DIAGNOSIS — R74.01 TRANSAMINITIS: ICD-10-CM

## 2022-03-10 DIAGNOSIS — E87.1 HYPONATREMIA: ICD-10-CM

## 2022-03-10 DIAGNOSIS — K83.1 COMMON BILE DUCT (CBD) OBSTRUCTION: Primary | ICD-10-CM

## 2022-03-10 PROBLEM — K83.09 CHOLANGITIS: Status: ACTIVE | Noted: 2022-03-10

## 2022-03-10 LAB
A/G RATIO: 1.3 (ref 1.1–2.2)
ALBUMIN SERPL-MCNC: 4.6 G/DL (ref 3.4–5)
ALP BLD-CCNC: 673 U/L (ref 40–129)
ALT SERPL-CCNC: 493 U/L (ref 10–40)
ANION GAP SERPL CALCULATED.3IONS-SCNC: 14 MMOL/L (ref 3–16)
AST SERPL-CCNC: 184 U/L (ref 15–37)
BASOPHILS ABSOLUTE: 0.2 K/UL (ref 0–0.2)
BASOPHILS RELATIVE PERCENT: 3.5 %
BILIRUB SERPL-MCNC: 4.4 MG/DL (ref 0–1)
BILIRUBIN URINE: ABNORMAL
BLOOD, URINE: NEGATIVE
BUN BLDV-MCNC: 9 MG/DL (ref 7–20)
CALCIUM SERPL-MCNC: 10.1 MG/DL (ref 8.3–10.6)
CHLORIDE BLD-SCNC: 87 MMOL/L (ref 99–110)
CLARITY: CLEAR
CO2: 25 MMOL/L (ref 21–32)
COLOR: YELLOW
CREAT SERPL-MCNC: <0.5 MG/DL (ref 0.8–1.3)
EKG ATRIAL RATE: 66 BPM
EKG DIAGNOSIS: NORMAL
EKG P AXIS: 69 DEGREES
EKG P-R INTERVAL: 208 MS
EKG Q-T INTERVAL: 416 MS
EKG QRS DURATION: 90 MS
EKG QTC CALCULATION (BAZETT): 436 MS
EKG R AXIS: 66 DEGREES
EKG T AXIS: 66 DEGREES
EKG VENTRICULAR RATE: 66 BPM
EOSINOPHILS ABSOLUTE: 0.2 K/UL (ref 0–0.6)
EOSINOPHILS RELATIVE PERCENT: 4.4 %
GFR AFRICAN AMERICAN: >60
GFR NON-AFRICAN AMERICAN: >60
GLUCOSE BLD-MCNC: 139 MG/DL (ref 70–99)
GLUCOSE URINE: NEGATIVE MG/DL
HCT VFR BLD CALC: 41.8 % (ref 40.5–52.5)
HEMOGLOBIN: 14.9 G/DL (ref 13.5–17.5)
KETONES, URINE: NEGATIVE MG/DL
LEUKOCYTE ESTERASE, URINE: NEGATIVE
LIPASE: 48 U/L (ref 13–60)
LYMPHOCYTES ABSOLUTE: 0.6 K/UL (ref 1–5.1)
LYMPHOCYTES RELATIVE PERCENT: 11.7 %
MCH RBC QN AUTO: 36.1 PG (ref 26–34)
MCHC RBC AUTO-ENTMCNC: 35.7 G/DL (ref 31–36)
MCV RBC AUTO: 101.2 FL (ref 80–100)
MICROSCOPIC EXAMINATION: ABNORMAL
MONOCYTES ABSOLUTE: 0.3 K/UL (ref 0–1.3)
MONOCYTES RELATIVE PERCENT: 6 %
NEUTROPHILS ABSOLUTE: 3.5 K/UL (ref 1.7–7.7)
NEUTROPHILS RELATIVE PERCENT: 74.4 %
NITRITE, URINE: NEGATIVE
PDW BLD-RTO: 13.2 % (ref 12.4–15.4)
PH UA: 6.5 (ref 5–8)
PLATELET # BLD: 245 K/UL (ref 135–450)
PMV BLD AUTO: 7.1 FL (ref 5–10.5)
POTASSIUM SERPL-SCNC: 3.6 MMOL/L (ref 3.5–5.1)
PROTEIN UA: NEGATIVE MG/DL
RBC # BLD: 4.13 M/UL (ref 4.2–5.9)
SARS-COV-2, NAAT: NOT DETECTED
SODIUM BLD-SCNC: 126 MMOL/L (ref 136–145)
SPECIFIC GRAVITY UA: 1.02 (ref 1–1.03)
TOTAL PROTEIN: 8.1 G/DL (ref 6.4–8.2)
URINE REFLEX TO CULTURE: ABNORMAL
URINE TYPE: ABNORMAL
UROBILINOGEN, URINE: 2 E.U./DL
WBC # BLD: 4.7 K/UL (ref 4–11)

## 2022-03-10 PROCEDURE — 81003 URINALYSIS AUTO W/O SCOPE: CPT

## 2022-03-10 PROCEDURE — 99285 EMERGENCY DEPT VISIT HI MDM: CPT

## 2022-03-10 PROCEDURE — 6360000002 HC RX W HCPCS: Performed by: INTERNAL MEDICINE

## 2022-03-10 PROCEDURE — 36415 COLL VENOUS BLD VENIPUNCTURE: CPT

## 2022-03-10 PROCEDURE — 6370000000 HC RX 637 (ALT 250 FOR IP): Performed by: INTERNAL MEDICINE

## 2022-03-10 PROCEDURE — 87635 SARS-COV-2 COVID-19 AMP PRB: CPT

## 2022-03-10 PROCEDURE — 6360000004 HC RX CONTRAST MEDICATION: Performed by: EMERGENCY MEDICINE

## 2022-03-10 PROCEDURE — 74177 CT ABD & PELVIS W/CONTRAST: CPT

## 2022-03-10 PROCEDURE — 93005 ELECTROCARDIOGRAM TRACING: CPT | Performed by: EMERGENCY MEDICINE

## 2022-03-10 PROCEDURE — 83690 ASSAY OF LIPASE: CPT

## 2022-03-10 PROCEDURE — 2580000003 HC RX 258: Performed by: INTERNAL MEDICINE

## 2022-03-10 PROCEDURE — 96374 THER/PROPH/DIAG INJ IV PUSH: CPT

## 2022-03-10 PROCEDURE — 1200000000 HC SEMI PRIVATE

## 2022-03-10 PROCEDURE — 80053 COMPREHEN METABOLIC PANEL: CPT

## 2022-03-10 PROCEDURE — 6360000002 HC RX W HCPCS: Performed by: EMERGENCY MEDICINE

## 2022-03-10 PROCEDURE — 93010 ELECTROCARDIOGRAM REPORT: CPT | Performed by: INTERNAL MEDICINE

## 2022-03-10 PROCEDURE — 2580000003 HC RX 258: Performed by: EMERGENCY MEDICINE

## 2022-03-10 PROCEDURE — 85025 COMPLETE CBC W/AUTO DIFF WBC: CPT

## 2022-03-10 RX ORDER — ACETAMINOPHEN 650 MG/1
650 SUPPOSITORY RECTAL EVERY 6 HOURS PRN
Status: DISCONTINUED | OUTPATIENT
Start: 2022-03-10 | End: 2022-03-12 | Stop reason: HOSPADM

## 2022-03-10 RX ORDER — ACETAMINOPHEN 325 MG/1
650 TABLET ORAL EVERY 6 HOURS PRN
Status: DISCONTINUED | OUTPATIENT
Start: 2022-03-10 | End: 2022-03-12 | Stop reason: HOSPADM

## 2022-03-10 RX ORDER — SODIUM CHLORIDE 0.9 % (FLUSH) 0.9 %
5-40 SYRINGE (ML) INJECTION EVERY 12 HOURS SCHEDULED
Status: DISCONTINUED | OUTPATIENT
Start: 2022-03-10 | End: 2022-03-12 | Stop reason: HOSPADM

## 2022-03-10 RX ORDER — SODIUM CHLORIDE 9 MG/ML
25 INJECTION, SOLUTION INTRAVENOUS PRN
Status: DISCONTINUED | OUTPATIENT
Start: 2022-03-10 | End: 2022-03-12 | Stop reason: HOSPADM

## 2022-03-10 RX ORDER — ALLOPURINOL 300 MG/1
300 TABLET ORAL DAILY
Status: DISCONTINUED | OUTPATIENT
Start: 2022-03-10 | End: 2022-03-12 | Stop reason: HOSPADM

## 2022-03-10 RX ORDER — SODIUM CHLORIDE 0.9 % (FLUSH) 0.9 %
5-40 SYRINGE (ML) INJECTION PRN
Status: DISCONTINUED | OUTPATIENT
Start: 2022-03-10 | End: 2022-03-12 | Stop reason: HOSPADM

## 2022-03-10 RX ORDER — MORPHINE SULFATE 4 MG/ML
4 INJECTION, SOLUTION INTRAMUSCULAR; INTRAVENOUS EVERY 4 HOURS PRN
Status: DISCONTINUED | OUTPATIENT
Start: 2022-03-10 | End: 2022-03-12 | Stop reason: HOSPADM

## 2022-03-10 RX ORDER — ONDANSETRON 2 MG/ML
4 INJECTION INTRAMUSCULAR; INTRAVENOUS EVERY 6 HOURS PRN
Status: DISCONTINUED | OUTPATIENT
Start: 2022-03-10 | End: 2022-03-12 | Stop reason: HOSPADM

## 2022-03-10 RX ORDER — ONDANSETRON 2 MG/ML
4 INJECTION INTRAMUSCULAR; INTRAVENOUS ONCE
Status: COMPLETED | OUTPATIENT
Start: 2022-03-10 | End: 2022-03-10

## 2022-03-10 RX ORDER — 0.9 % SODIUM CHLORIDE 0.9 %
1000 INTRAVENOUS SOLUTION INTRAVENOUS ONCE
Status: COMPLETED | OUTPATIENT
Start: 2022-03-10 | End: 2022-03-10

## 2022-03-10 RX ORDER — ALBUTEROL SULFATE 90 UG/1
2 AEROSOL, METERED RESPIRATORY (INHALATION) EVERY 6 HOURS PRN
Status: DISCONTINUED | OUTPATIENT
Start: 2022-03-10 | End: 2022-03-12 | Stop reason: HOSPADM

## 2022-03-10 RX ORDER — POLYETHYLENE GLYCOL 3350 17 G/17G
17 POWDER, FOR SOLUTION ORAL DAILY PRN
Status: DISCONTINUED | OUTPATIENT
Start: 2022-03-10 | End: 2022-03-12 | Stop reason: HOSPADM

## 2022-03-10 RX ORDER — MORPHINE SULFATE 2 MG/ML
2 INJECTION, SOLUTION INTRAMUSCULAR; INTRAVENOUS EVERY 4 HOURS PRN
Status: DISCONTINUED | OUTPATIENT
Start: 2022-03-10 | End: 2022-03-12 | Stop reason: HOSPADM

## 2022-03-10 RX ORDER — PANTOPRAZOLE SODIUM 40 MG/1
40 TABLET, DELAYED RELEASE ORAL
Status: DISCONTINUED | OUTPATIENT
Start: 2022-03-11 | End: 2022-03-12 | Stop reason: HOSPADM

## 2022-03-10 RX ORDER — SODIUM CHLORIDE 9 MG/ML
INJECTION, SOLUTION INTRAVENOUS CONTINUOUS
Status: DISCONTINUED | OUTPATIENT
Start: 2022-03-10 | End: 2022-03-12 | Stop reason: HOSPADM

## 2022-03-10 RX ORDER — ONDANSETRON 4 MG/1
4 TABLET, ORALLY DISINTEGRATING ORAL EVERY 8 HOURS PRN
Status: DISCONTINUED | OUTPATIENT
Start: 2022-03-10 | End: 2022-03-12 | Stop reason: HOSPADM

## 2022-03-10 RX ADMIN — SODIUM CHLORIDE: 9 INJECTION, SOLUTION INTRAVENOUS at 21:03

## 2022-03-10 RX ADMIN — Medication 10 ML: at 21:02

## 2022-03-10 RX ADMIN — PIPERACILLIN AND TAZOBACTAM 3375 MG: 3; .375 INJECTION, POWDER, FOR SOLUTION INTRAVENOUS at 18:22

## 2022-03-10 RX ADMIN — ONDANSETRON HYDROCHLORIDE 4 MG: 2 INJECTION, SOLUTION INTRAMUSCULAR; INTRAVENOUS at 17:27

## 2022-03-10 RX ADMIN — PIPERACILLIN AND TAZOBACTAM 3375 MG: 3; .375 INJECTION, POWDER, FOR SOLUTION INTRAVENOUS at 18:11

## 2022-03-10 RX ADMIN — IOPAMIDOL 85 ML: 755 INJECTION, SOLUTION INTRAVENOUS at 16:41

## 2022-03-10 RX ADMIN — SODIUM CHLORIDE 1000 ML: 9 INJECTION, SOLUTION INTRAVENOUS at 17:26

## 2022-03-10 RX ADMIN — SODIUM CHLORIDE: 9 INJECTION, SOLUTION INTRAVENOUS at 18:32

## 2022-03-10 ASSESSMENT — PAIN SCALES - GENERAL: PAINLEVEL_OUTOF10: 2

## 2022-03-10 NOTE — ED NOTES
PerfectServe sent to Dr. Ndaira Aguilar at [facility] 1  03/10/22 1736    PerfectServe completed with orders being placed by Dr. Nadira Aguilar at [facility] 1  03/10/22 [phone]

## 2022-03-11 ENCOUNTER — APPOINTMENT (OUTPATIENT)
Dept: GENERAL RADIOLOGY | Age: 72
DRG: 919 | End: 2022-03-11
Payer: MEDICARE

## 2022-03-11 ENCOUNTER — ANESTHESIA (OUTPATIENT)
Dept: ENDOSCOPY | Age: 72
DRG: 919 | End: 2022-03-11
Payer: MEDICARE

## 2022-03-11 ENCOUNTER — ANESTHESIA EVENT (OUTPATIENT)
Dept: ENDOSCOPY | Age: 72
DRG: 919 | End: 2022-03-11
Payer: MEDICARE

## 2022-03-11 VITALS — OXYGEN SATURATION: 100 % | DIASTOLIC BLOOD PRESSURE: 117 MMHG | SYSTOLIC BLOOD PRESSURE: 204 MMHG

## 2022-03-11 PROBLEM — E44.0 MODERATE PROTEIN-CALORIE MALNUTRITION (HCC): Status: ACTIVE | Noted: 2022-03-11

## 2022-03-11 LAB
ALBUMIN SERPL-MCNC: 4.1 G/DL (ref 3.4–5)
ALP BLD-CCNC: 550 U/L (ref 40–129)
ALT SERPL-CCNC: 352 U/L (ref 10–40)
ANION GAP SERPL CALCULATED.3IONS-SCNC: 11 MMOL/L (ref 3–16)
AST SERPL-CCNC: 117 U/L (ref 15–37)
BASOPHILS ABSOLUTE: 0 K/UL (ref 0–0.2)
BASOPHILS RELATIVE PERCENT: 0.6 %
BILIRUB SERPL-MCNC: 3.3 MG/DL (ref 0–1)
BILIRUBIN DIRECT: 2 MG/DL (ref 0–0.3)
BILIRUBIN, INDIRECT: 1.3 MG/DL (ref 0–1)
BUN BLDV-MCNC: 10 MG/DL (ref 7–20)
CALCIUM SERPL-MCNC: 9.4 MG/DL (ref 8.3–10.6)
CHLORIDE BLD-SCNC: 95 MMOL/L (ref 99–110)
CO2: 27 MMOL/L (ref 21–32)
CREAT SERPL-MCNC: 0.7 MG/DL (ref 0.8–1.3)
EOSINOPHILS ABSOLUTE: 0.2 K/UL (ref 0–0.6)
EOSINOPHILS RELATIVE PERCENT: 5.6 %
GFR AFRICAN AMERICAN: >60
GFR NON-AFRICAN AMERICAN: >60
GLUCOSE BLD-MCNC: 114 MG/DL (ref 70–99)
GLUCOSE BLD-MCNC: 119 MG/DL (ref 70–99)
GLUCOSE BLD-MCNC: 121 MG/DL (ref 70–99)
GLUCOSE BLD-MCNC: 208 MG/DL (ref 70–99)
HCT VFR BLD CALC: 37.8 % (ref 40.5–52.5)
HEMOGLOBIN: 13.4 G/DL (ref 13.5–17.5)
LYMPHOCYTES ABSOLUTE: 0.7 K/UL (ref 1–5.1)
LYMPHOCYTES RELATIVE PERCENT: 18.7 %
MCH RBC QN AUTO: 36.3 PG (ref 26–34)
MCHC RBC AUTO-ENTMCNC: 35.5 G/DL (ref 31–36)
MCV RBC AUTO: 102.3 FL (ref 80–100)
MONOCYTES ABSOLUTE: 0.3 K/UL (ref 0–1.3)
MONOCYTES RELATIVE PERCENT: 8.6 %
NEUTROPHILS ABSOLUTE: 2.6 K/UL (ref 1.7–7.7)
NEUTROPHILS RELATIVE PERCENT: 66.5 %
PDW BLD-RTO: 13 % (ref 12.4–15.4)
PERFORMED ON: ABNORMAL
PHOSPHORUS: 4.1 MG/DL (ref 2.5–4.9)
PLATELET # BLD: 195 K/UL (ref 135–450)
PMV BLD AUTO: 7.2 FL (ref 5–10.5)
POTASSIUM SERPL-SCNC: 4.2 MMOL/L (ref 3.5–5.1)
RBC # BLD: 3.7 M/UL (ref 4.2–5.9)
SODIUM BLD-SCNC: 133 MMOL/L (ref 136–145)
TOTAL PROTEIN: 6.5 G/DL (ref 6.4–8.2)
WBC # BLD: 3.9 K/UL (ref 4–11)

## 2022-03-11 PROCEDURE — 6370000000 HC RX 637 (ALT 250 FOR IP): Performed by: HOSPITALIST

## 2022-03-11 PROCEDURE — 0FPB8DZ REMOVAL OF INTRALUMINAL DEVICE FROM HEPATOBILIARY DUCT, VIA NATURAL OR ARTIFICIAL OPENING ENDOSCOPIC: ICD-10-PCS | Performed by: INTERNAL MEDICINE

## 2022-03-11 PROCEDURE — 88305 TISSUE EXAM BY PATHOLOGIST: CPT

## 2022-03-11 PROCEDURE — 7100000000 HC PACU RECOVERY - FIRST 15 MIN: Performed by: INTERNAL MEDICINE

## 2022-03-11 PROCEDURE — 2720000010 HC SURG SUPPLY STERILE: Performed by: INTERNAL MEDICINE

## 2022-03-11 PROCEDURE — 80076 HEPATIC FUNCTION PANEL: CPT

## 2022-03-11 PROCEDURE — 6370000000 HC RX 637 (ALT 250 FOR IP): Performed by: INTERNAL MEDICINE

## 2022-03-11 PROCEDURE — 80069 RENAL FUNCTION PANEL: CPT

## 2022-03-11 PROCEDURE — 3609015000 HC ERCP REMOVE FOREIGN BODY/STENT BILIARY/PANC DUCT: Performed by: INTERNAL MEDICINE

## 2022-03-11 PROCEDURE — 85025 COMPLETE CBC W/AUTO DIFF WBC: CPT

## 2022-03-11 PROCEDURE — 74330 X-RAY BILE/PANC ENDOSCOPY: CPT

## 2022-03-11 PROCEDURE — 0F798DZ DILATION OF COMMON BILE DUCT WITH INTRALUMINAL DEVICE, VIA NATURAL OR ARTIFICIAL OPENING ENDOSCOPIC: ICD-10-PCS | Performed by: INTERNAL MEDICINE

## 2022-03-11 PROCEDURE — 2709999900 HC NON-CHARGEABLE SUPPLY: Performed by: INTERNAL MEDICINE

## 2022-03-11 PROCEDURE — 83036 HEMOGLOBIN GLYCOSYLATED A1C: CPT

## 2022-03-11 PROCEDURE — 94640 AIRWAY INHALATION TREATMENT: CPT

## 2022-03-11 PROCEDURE — 1200000000 HC SEMI PRIVATE

## 2022-03-11 PROCEDURE — 3609015100 HC ERCP STENT PLACEMENT BILIARY/PANCREATIC DUCT: Performed by: INTERNAL MEDICINE

## 2022-03-11 PROCEDURE — 2500000003 HC RX 250 WO HCPCS

## 2022-03-11 PROCEDURE — 3609018500 HC EGD US SCOPE W/ADJACENT STRUCTURES: Performed by: INTERNAL MEDICINE

## 2022-03-11 PROCEDURE — 6360000002 HC RX W HCPCS

## 2022-03-11 PROCEDURE — 94761 N-INVAS EAR/PLS OXIMETRY MLT: CPT

## 2022-03-11 PROCEDURE — 3700000000 HC ANESTHESIA ATTENDED CARE: Performed by: INTERNAL MEDICINE

## 2022-03-11 PROCEDURE — 2580000003 HC RX 258: Performed by: INTERNAL MEDICINE

## 2022-03-11 PROCEDURE — 99233 SBSQ HOSP IP/OBS HIGH 50: CPT | Performed by: INTERNAL MEDICINE

## 2022-03-11 PROCEDURE — 88112 CYTOPATH CELL ENHANCE TECH: CPT

## 2022-03-11 PROCEDURE — 6360000002 HC RX W HCPCS: Performed by: INTERNAL MEDICINE

## 2022-03-11 PROCEDURE — C1874 STENT, COATED/COV W/DEL SYS: HCPCS | Performed by: INTERNAL MEDICINE

## 2022-03-11 PROCEDURE — 7100000001 HC PACU RECOVERY - ADDTL 15 MIN: Performed by: INTERNAL MEDICINE

## 2022-03-11 PROCEDURE — 3700000001 HC ADD 15 MINUTES (ANESTHESIA): Performed by: INTERNAL MEDICINE

## 2022-03-11 PROCEDURE — 36415 COLL VENOUS BLD VENIPUNCTURE: CPT

## 2022-03-11 PROCEDURE — 3609014200 HC ERCP W/BIOPSY SINGLE/MULTIPLE: Performed by: INTERNAL MEDICINE

## 2022-03-11 DEVICE — STENT SYSTEM RMV
Type: IMPLANTABLE DEVICE | Status: FUNCTIONAL
Brand: WALLFLEX BILIARY

## 2022-03-11 RX ORDER — SODIUM CHLORIDE, SODIUM LACTATE, POTASSIUM CHLORIDE, CALCIUM CHLORIDE 600; 310; 30; 20 MG/100ML; MG/100ML; MG/100ML; MG/100ML
INJECTION, SOLUTION INTRAVENOUS CONTINUOUS
Status: CANCELLED | OUTPATIENT
Start: 2022-03-11

## 2022-03-11 RX ORDER — SODIUM CHLORIDE 0.9 % (FLUSH) 0.9 %
10 SYRINGE (ML) INJECTION EVERY 12 HOURS SCHEDULED
Status: CANCELLED | OUTPATIENT
Start: 2022-03-11

## 2022-03-11 RX ORDER — LIDOCAINE HYDROCHLORIDE 20 MG/ML
INJECTION, SOLUTION INFILTRATION; PERINEURAL PRN
Status: DISCONTINUED | OUTPATIENT
Start: 2022-03-11 | End: 2022-03-11 | Stop reason: SDUPTHER

## 2022-03-11 RX ORDER — PROPOFOL 10 MG/ML
INJECTION, EMULSION INTRAVENOUS PRN
Status: DISCONTINUED | OUTPATIENT
Start: 2022-03-11 | End: 2022-03-11 | Stop reason: SDUPTHER

## 2022-03-11 RX ORDER — NICOTINE POLACRILEX 4 MG
15 LOZENGE BUCCAL PRN
Status: DISCONTINUED | OUTPATIENT
Start: 2022-03-11 | End: 2022-03-12 | Stop reason: HOSPADM

## 2022-03-11 RX ORDER — DEXAMETHASONE SODIUM PHOSPHATE 4 MG/ML
INJECTION, SOLUTION INTRA-ARTICULAR; INTRALESIONAL; INTRAMUSCULAR; INTRAVENOUS; SOFT TISSUE PRN
Status: DISCONTINUED | OUTPATIENT
Start: 2022-03-11 | End: 2022-03-11 | Stop reason: SDUPTHER

## 2022-03-11 RX ORDER — SODIUM CHLORIDE 9 MG/ML
25 INJECTION, SOLUTION INTRAVENOUS PRN
Status: CANCELLED | OUTPATIENT
Start: 2022-03-11

## 2022-03-11 RX ORDER — ROCURONIUM BROMIDE 10 MG/ML
INJECTION, SOLUTION INTRAVENOUS PRN
Status: DISCONTINUED | OUTPATIENT
Start: 2022-03-11 | End: 2022-03-11 | Stop reason: SDUPTHER

## 2022-03-11 RX ORDER — ONDANSETRON 2 MG/ML
INJECTION INTRAMUSCULAR; INTRAVENOUS PRN
Status: DISCONTINUED | OUTPATIENT
Start: 2022-03-11 | End: 2022-03-11 | Stop reason: SDUPTHER

## 2022-03-11 RX ORDER — ONDANSETRON 2 MG/ML
4 INJECTION INTRAMUSCULAR; INTRAVENOUS
Status: ACTIVE | OUTPATIENT
Start: 2022-03-11 | End: 2022-03-11

## 2022-03-11 RX ORDER — SODIUM CHLORIDE 0.9 % (FLUSH) 0.9 %
5-40 SYRINGE (ML) INJECTION EVERY 12 HOURS SCHEDULED
Status: DISCONTINUED | OUTPATIENT
Start: 2022-03-11 | End: 2022-03-12 | Stop reason: HOSPADM

## 2022-03-11 RX ORDER — SODIUM CHLORIDE 9 MG/ML
25 INJECTION, SOLUTION INTRAVENOUS PRN
Status: DISCONTINUED | OUTPATIENT
Start: 2022-03-11 | End: 2022-03-12 | Stop reason: HOSPADM

## 2022-03-11 RX ORDER — SODIUM CHLORIDE 0.9 % (FLUSH) 0.9 %
5-40 SYRINGE (ML) INJECTION PRN
Status: DISCONTINUED | OUTPATIENT
Start: 2022-03-11 | End: 2022-03-12 | Stop reason: HOSPADM

## 2022-03-11 RX ORDER — SODIUM CHLORIDE 0.9 % (FLUSH) 0.9 %
10 SYRINGE (ML) INJECTION PRN
Status: CANCELLED | OUTPATIENT
Start: 2022-03-11

## 2022-03-11 RX ORDER — DEXTROSE MONOHYDRATE 25 G/50ML
12.5 INJECTION, SOLUTION INTRAVENOUS PRN
Status: DISCONTINUED | OUTPATIENT
Start: 2022-03-11 | End: 2022-03-11 | Stop reason: SDUPTHER

## 2022-03-11 RX ORDER — MEPERIDINE HYDROCHLORIDE 25 MG/ML
12.5 INJECTION INTRAMUSCULAR; INTRAVENOUS; SUBCUTANEOUS EVERY 5 MIN PRN
Status: DISCONTINUED | OUTPATIENT
Start: 2022-03-11 | End: 2022-03-12 | Stop reason: HOSPADM

## 2022-03-11 RX ORDER — DEXTROSE MONOHYDRATE 50 MG/ML
100 INJECTION, SOLUTION INTRAVENOUS PRN
Status: DISCONTINUED | OUTPATIENT
Start: 2022-03-11 | End: 2022-03-12 | Stop reason: HOSPADM

## 2022-03-11 RX ADMIN — ONDANSETRON HYDROCHLORIDE 4 MG: 2 INJECTION, SOLUTION INTRAMUSCULAR; INTRAVENOUS at 15:20

## 2022-03-11 RX ADMIN — PROPOFOL 150 MG: 10 INJECTION, EMULSION INTRAVENOUS at 15:20

## 2022-03-11 RX ADMIN — PIPERACILLIN AND TAZOBACTAM 3375 MG: 3; .375 INJECTION, POWDER, FOR SOLUTION INTRAVENOUS at 02:51

## 2022-03-11 RX ADMIN — PROPOFOL 50 MG: 10 INJECTION, EMULSION INTRAVENOUS at 15:23

## 2022-03-11 RX ADMIN — PIPERACILLIN AND TAZOBACTAM 3375 MG: 3; .375 INJECTION, POWDER, FOR SOLUTION INTRAVENOUS at 18:08

## 2022-03-11 RX ADMIN — DEXAMETHASONE SODIUM PHOSPHATE 8 MG: 4 INJECTION, SOLUTION INTRAMUSCULAR; INTRAVENOUS at 15:42

## 2022-03-11 RX ADMIN — MORPHINE SULFATE 2 MG: 2 INJECTION, SOLUTION INTRAMUSCULAR; INTRAVENOUS at 18:05

## 2022-03-11 RX ADMIN — SUGAMMADEX 200 MG: 100 INJECTION, SOLUTION INTRAVENOUS at 16:12

## 2022-03-11 RX ADMIN — PIPERACILLIN AND TAZOBACTAM 3375 MG: 3; .375 INJECTION, POWDER, FOR SOLUTION INTRAVENOUS at 10:21

## 2022-03-11 RX ADMIN — ALLOPURINOL 300 MG: 300 TABLET ORAL at 10:20

## 2022-03-11 RX ADMIN — ROCURONIUM BROMIDE 50 MG: 10 INJECTION, SOLUTION INTRAVENOUS at 15:20

## 2022-03-11 RX ADMIN — Medication 2 PUFF: at 03:14

## 2022-03-11 RX ADMIN — Medication 2 PUFF: at 22:59

## 2022-03-11 RX ADMIN — ENOXAPARIN SODIUM 40 MG: 100 INJECTION SUBCUTANEOUS at 10:20

## 2022-03-11 RX ADMIN — SODIUM CHLORIDE: 9 INJECTION, SOLUTION INTRAVENOUS at 21:45

## 2022-03-11 RX ADMIN — LIDOCAINE HYDROCHLORIDE 80 MG: 20 INJECTION, SOLUTION INFILTRATION; PERINEURAL at 15:20

## 2022-03-11 RX ADMIN — INSULIN LISPRO 1 UNITS: 100 INJECTION, SOLUTION INTRAVENOUS; SUBCUTANEOUS at 21:46

## 2022-03-11 ASSESSMENT — PULMONARY FUNCTION TESTS
PIF_VALUE: 19
PIF_VALUE: 20
PIF_VALUE: 19
PIF_VALUE: 21
PIF_VALUE: 31
PIF_VALUE: 6
PIF_VALUE: 19
PIF_VALUE: 19
PIF_VALUE: 21
PIF_VALUE: 19
PIF_VALUE: 21
PIF_VALUE: 1
PIF_VALUE: 13
PIF_VALUE: 2
PIF_VALUE: 19
PIF_VALUE: 17
PIF_VALUE: 20
PIF_VALUE: 19
PIF_VALUE: 19
PIF_VALUE: 24
PIF_VALUE: 20
PIF_VALUE: 1
PIF_VALUE: 2
PIF_VALUE: 18
PIF_VALUE: 3
PIF_VALUE: 18
PIF_VALUE: 1
PIF_VALUE: 20
PIF_VALUE: 1
PIF_VALUE: 18
PIF_VALUE: 4
PIF_VALUE: 18
PIF_VALUE: 1
PIF_VALUE: 25
PIF_VALUE: 3
PIF_VALUE: 3
PIF_VALUE: 2
PIF_VALUE: 1
PIF_VALUE: 20
PIF_VALUE: 18
PIF_VALUE: 5
PIF_VALUE: 17
PIF_VALUE: 20
PIF_VALUE: 19
PIF_VALUE: 17
PIF_VALUE: 21
PIF_VALUE: 18
PIF_VALUE: 20
PIF_VALUE: 21
PIF_VALUE: 19
PIF_VALUE: 1
PIF_VALUE: 19
PIF_VALUE: 19
PIF_VALUE: 20
PIF_VALUE: 19
PIF_VALUE: 23
PIF_VALUE: 19
PIF_VALUE: 19
PIF_VALUE: 6
PIF_VALUE: 21
PIF_VALUE: 1
PIF_VALUE: 2
PIF_VALUE: 6
PIF_VALUE: 21
PIF_VALUE: 19
PIF_VALUE: 18
PIF_VALUE: 20
PIF_VALUE: 3
PIF_VALUE: 1

## 2022-03-11 ASSESSMENT — PAIN SCALES - GENERAL
PAINLEVEL_OUTOF10: 3
PAINLEVEL_OUTOF10: 5
PAINLEVEL_OUTOF10: 0

## 2022-03-11 ASSESSMENT — ENCOUNTER SYMPTOMS: SHORTNESS OF BREATH: 0

## 2022-03-11 ASSESSMENT — PAIN - FUNCTIONAL ASSESSMENT: PAIN_FUNCTIONAL_ASSESSMENT: 0-10

## 2022-03-11 ASSESSMENT — LIFESTYLE VARIABLES: SMOKING_STATUS: 0

## 2022-03-11 NOTE — BRIEF OP NOTE
Brief Postoperative Note      Patient: Janeen Noble  YOB: 1950  MRN: [id number]    Date of Procedure: 3/11/2022    Pre-Op Diagnosis: BILIARY OBSTRUCTION    Post-Op Diagnosis: biliary stricture s/p FNA, occluded stent retrived and replaced with [id number] covered metal stent       Procedure(s):  UPPER EUS W/ANES. ERCP STENT REMOVAL    Surgeon(s):  Hannah Shetty MD    Assistant:  * No surgical staff found *    Anesthesia: Monitor Anesthesia Care    Estimated Blood Loss (mL): Minimal    Complications: None    Specimens:   ID Type Source Tests Collected by Time Destination   A :  Tissue Biopsy CYTOLOGY, NON-GYN Hannah Shetty MD 3/11/2022 1618        Implants:  * No implants in log *      Drains: * No LDAs found *    Findings: biliary stricture s/p FNA, occluded stent retrived and replaced with [id number] covered metal stent    Recommendation  1. Continue supportive care  2. Monitor LFTs  3. Check ca 19-9  4. Repeat ERCP in 4wks  5. Consider MRI abd with and without contrast  6.  Resume low fat diet    Electronically signed by Hannah Shetty MD on 3/11/2022 at 4:24 PM

## 2022-03-11 NOTE — PROGRESS NOTES
RT Inhaler-Nebulizer Bronchodilator Protocol Note    There is a bronchodilator order in the chart from a provider indicating to follow the RT Bronchodilator Protocol and there is an Initiate RT Inhaler-Nebulizer Bronchodilator Protocol order as well (see protocol at bottom of note). CXR Findings:  No results found. The findings from the last RT Protocol Assessment were as follows:   History Pulmonary Disease: Chronic pulmonary disease  Respiratory Pattern: Regular pattern and RR 12-20 bpm  Breath Sounds: Clear breath sounds  Cough: Strong, spontaneous, non-productive  Indication for Bronchodilator Therapy:    Bronchodilator Assessment Score: 2    Aerosolized bronchodilator medication orders have been revised according to the RT Inhaler-Nebulizer Bronchodilator Protocol below. Respiratory Therapist to perform RT Therapy Protocol Assessment initially then follow the protocol. Repeat RT Therapy Protocol Assessment PRN for score 0-3 or on second treatment, BID, and PRN for scores above 3. No Indications - adjust the frequency to every 6 hours PRN wheezing or bronchospasm, if no treatments needed after 48 hours then discontinue using Per Protocol order mode. If indication present, adjust the RT bronchodilator orders based on the Bronchodilator Assessment Score as indicated below. Use Inhaler orders unless patient has one or more of the following: on home nebulizer, not able to hold breath for 10 seconds, is not alert and oriented, cannot activate and use MDI correctly, or respiratory rate 25 breaths per minute or more, then use the equivalent nebulizer order(s) with same Frequency and PRN reasons based on the score. If a patient is on this medication at home then do not decrease Frequency below that used at home.     0-3 - enter or revise RT bronchodilator order(s) to equivalent RT Bronchodilator order with Frequency of every 4 hours PRN for wheezing or increased work of breathing using Per Protocol

## 2022-03-11 NOTE — PLAN OF CARE
Problem: SAFETY  Goal: Free from accidental physical injury  Outcome: Ongoing  Goal: Free from intentional harm  Outcome: Ongoing     Problem: SKIN INTEGRITY  Goal: Skin integrity is maintained or improved  Outcome: Ongoing     Problem: PAIN  Goal: Patient's pain/discomfort is manageable  Outcome: Ongoing

## 2022-03-11 NOTE — PROGRESS NOTES
Pt transported from Ed to 205 in w/c. Pt alert and oriented, all personal belonings including cell phone with pt. Ugo, RN already received report from ED RN. Call light  use reviewed with verbal understanding received and call light in reach.   Abida Desouza Clinical

## 2022-03-11 NOTE — ED NOTES
Report given to Kate Dunn RN. Pt transported to  room 205 via wheelchair in stable condition.       Sherri Durant RN  03/10/22 2041

## 2022-03-11 NOTE — PROGRESS NOTES
Comprehensive Nutrition Assessment    Type and Reason for Visit:  Initial,Positive Nutrition Screen (MST2)    Nutrition Recommendations:  Continue NPO  Add nutritional supps when advanced to PO diet   Nutrition Assessment:    Pt. moderately malnourished AEB admitted with significant weight loss x 30 days, inadequate PO intakes for > 7 days and moderate loss of muscle and body fat d/t cholangitis. At risk for further nutrition compromise r/t currently NPO awaiting EGD and possibke replaement of stent . Will continue NPO. Malnutrition Assessment:  Malnutrition Status:   Moderate malnutrition    Context:  Acute Illness     Findings of the 6 clinical characteristics of malnutrition:  Energy Intake:  7 - 50% or less of estimated energy requirements for 5 or more days  Weight Loss:  1 - 5% over 1 month     Body Fat Loss:  7 - Moderate body fat loss Orbital,Triceps   Muscle Mass Loss:  7 - Moderate muscle mass loss Temples (temporalis),Clavicles (pectoralis & deltoids),Hand (interosseous),Scapula (trapezius)  Fluid Accumulation:  No significant fluid accumulation     Strength:  Not Performed    Estimated Daily Nutrient Needs:  Energy (kcal):  6774-5763 based ~ 25-30 kcal/kg cbw; Weight Used for Energy Requirements:  Current     Protein (g):   based ~ 1.2-1.4 gr/kg cbw; Weight Used for Protein Requirements:  Current        Fluid (ml/day):  8100-3612; Method Used for Fluid Requirements:  1 ml/kcal      Nutrition Related Findings:  Pt sitting up in A & O with wife at bedside; reports minimal intakes since 3/4 d/t N/V;      Wounds:  None       Current Nutrition Therapies:    Diet NPO    Anthropometric Measures:  · Height: 5' 11\" (180.3 cm)  · Current Body Weight: 158 lb 7 oz (71.9 kg)   · Admission Body Weight: 171 lb (77.6 kg) (stated)    · Usual Body Weight: 173 lb (78.5 kg) (stated)     · Ideal Body Weight: 172 lbs; % Ideal Body Weight 92.1 %   · BMI: 22.1  · BMI Categories: Normal Weight (BMI 22.0 to 24.9) age over 72       Nutrition Diagnosis:   · Moderate malnutrition related to altered GI function,altered GI structure,inadequate protein-energy intake as evidenced by NPO or clear liquid status due to medical condition,poor intake prior to admission,weight loss greater than or equal to 5% in 1 month,moderate muscle loss,moderate loss of subcutaneous fat,GI abnormality,nausea,vomiting      Nutrition Interventions:   Food and/or Nutrient Delivery:  Continue NPO  Nutrition Education/Counseling:  No recommendation at this time   Coordination of Nutrition Care:  Continue to monitor while inpatient    Goals:  pt will adhere to NPO and advance to PO diet when medically stable and consume > 50% of meals       Nutrition Monitoring and Evaluation:   Behavioral-Environmental Outcomes:  None Identified   Food/Nutrient Intake Outcomes:  Diet Advancement/Tolerance,Food and Nutrient Intake  Physical Signs/Symptoms Outcomes:  Biochemical Data,Weight,Nutrition Focused Physical Findings,GI Status,Nausea or Vomiting     Discharge Planning:     Too soon to determine     Electronically signed by Baldemar Lombardi RD, LD on 3/11/22 at 2:37 PM EST    Contact: [phone]

## 2022-03-11 NOTE — PLAN OF CARE
Nutrition Problem #1: Moderate malnutrition  Intervention: Food and/or Nutrient Delivery: Continue NPO  Nutritional Goals: pt will adhere to NPO and advance to PO diet when medically stable and consume > 50% of meals

## 2022-03-11 NOTE — H&P
History and Physical / Pre-Sedation Assessment    Patient:  Kingston Ort   :   1950     Intended Procedure:  EUS+ERCP    HPI: 70year old male with history of DM, HTN, HLD, asthma, gout, remote Crohn's disease, biliary stricture s/p ERCP with stent admitted with recurrent obstructive jaundice due to stent occlusion    Past Medical History:   Diagnosis Date    Asthma     Blood circulation, collateral     Carpal tunnel syndrome     [facility]     Diabetes mellitus ([facility]     emboli     pulmonary emboli in     GERD (gastroesophageal reflux disease)     Chrons disease    Gout     Hx of blood clots     Hypertension     Pneumonia     pneumonia,asthma    prostate     infection    Seasonal allergies      Past Surgical History:   Procedure Laterality Date    ERCP N/A 2022    ERCP [facility]SY [facility] by Peter Ross MD [facility] ERCP  2022    ERCP SPHINCTER/PAPILLOTOMY performed by Peter [facility] ERCP  2022    ERCP STENT INSERTION performed by Peter Ross MD at [place] [address]  2022    ERCP WF W/ANES. (8:30) (N/A )     TONSILLECTOMY         Medications reviewed  Prior to Admission medications    Medication Sig Start Date End Date Taking? Authorizing Provider   metFORMIN (GLUCOPHAGE-XR) 500 MG extended release tablet Take 500 mg by mouth 2 times daily   Yes Historical Provider, MD   dicyclomine (BENTYL) 10 MG capsule Take 10 mg by mouth nightly. 2/15/11  Yes Historical Provider, MD   allopurinol (ZYLOPRIM) 300 MG tablet Take 300 mg by mouth daily. Yes Historical Provider, MD   albuterol (PROVENTIL HFA;VENTOLIN HFA) 108 (90 BASE) MCG/ACT inhaler Inhale 2 puffs into the lungs every 6 hours as needed.    Yes Historical Provider, MD   pantoprazole (PROTONIX) 40 MG tablet Take 1 tablet by mouth 2 times daily (before meals) for 14 days 22 Edda Zheng MD        Allergies: Allergies   Allergen Reactions    Codeine      Pt states he took medication without food and had a reaction       Nurses notes reviewed and agreed. Physical Exam:  Vital Signs: /72   Pulse 59   Temp 97 °F (36.1 °C) (Temporal)   Resp 16   Ht 5' 11\" (1.803 m)   Wt 173 lb (78.5 kg)   SpO2 94%   BMI 24.13 kg/m²    Airway: Mallampati: II (soft palate, uvula, fauces visible)  Pulmonary:Normal  Cardiac:Normal  Abdomen:Normal    Pre-Procedure Assessment / Plan:  ASA: Class 3 - A patient with severe systemic disease that limits activity but is not incapacitating  Level of Sedation Plan: Moderate sedation  Post Procedure plan: Return to same level of care    I assessed the patient and find that the patient is in satisfactory condition to proceed with the planned procedure and sedation plan. I have explained the risk, benefits, and alternatives to the procedure; the patient understands and agrees to proceed.        Marisol Bran MD  3/11/2022

## 2022-03-11 NOTE — ANESTHESIA POSTPROCEDURE EVALUATION
Department of Anesthesiology  Postprocedure Note    Patient: Sourav Gaxiola  MRN: [id number]  YOB: 1950  Date of evaluation: 3/11/2022  Time:  5:02 PM     Procedure Summary     Date: 03/11/22 Room / Location: [place] [address] / [facility]    Anesthesia Start: 1198 Anesthesia Stop: 1630    Procedures:       UPPER EUS W/ANES. (N/A )      ERCP STENT REMOVAL (N/A )      ERCP STENT INSERTION Diagnosis: (BILIARY OBSTRUCTION)    Surgeons: Estrellita Garcia MD Responsible Provider: Ronel Herrera MD    Anesthesia Type: general ASA Status: 3          Anesthesia Type: general    Elenita Phase I: Elenita Score: 10    Elenita Phase II:      Last vitals: Reviewed and per EMR flowsheets.      Vitals:    03/11/22 1635 03/11/22 1645 03/11/22 1650 03/11/22 1655   BP: (!) 161/88 (!) 162/89 (!) 142/92 (!) 151/93   Pulse: 60 59 56 60   Resp: (!) 7 8 15 16   Temp:       TempSrc:       SpO2: 98% 96% 96% 97%   Weight:       Height:         Anesthesia Post Evaluation    Patient location during evaluation: bedside  Patient participation: complete - patient participated  Level of consciousness: awake and alert  Airway patency: patent  Nausea & Vomiting: no nausea  Complications: no  Cardiovascular status: hemodynamically stable  Respiratory status: acceptable  Hydration status: euvolemic

## 2022-03-11 NOTE — ACP (ADVANCE CARE PLANNING)
Advance Care Planning     General Advance Care Planning (ACP) Conversation    Date of Conversation: 3/10/2022  Conducted with: Patient with Decision Making Capacity    Healthcare Decision Maker:    Primary Decision Maker: Danielle Pastrana - [facility] - [phone]  Click here to complete Healthcare Decision Makers including selection of the Healthcare Decision Maker Relationship (ie \"Primary\"). Today we documented Decision Maker(s) consistent with Legal Next of Kin hierarchy.     Content/Action Overview:    Reviewed DNR/DNI and patient elects Full Code (Attempt Resuscitation)      Length of Voluntary ACP Conversation in minutes:  <16 minutes (Non-Billable)    Gus Babb RN

## 2022-03-11 NOTE — ED PROVIDER NOTES
[address] MEDICAL-SURGICAL      CHIEF COMPLAINT  Abdominal Pain (nausea/vomiting)       HISTORY OF PRESENT ILLNESS  Magy Armstrong is a 70 y.o. male  who presents to the ED for evaluation of jaundice. The patient was at his gastroenterologist's office, Dr. Amanda Martinez, and was noted to be jaundiced and very weak and dehydrated and was sent here. I received a call from Dr. Amanda Martinez and he stated that the patient was recently admitted for ERCP due to a common bile duct obstruction, brushings were performed (non malignant), and a stent was placed resolving the obstruction. The patient had recurrence of symptoms of vomiting and some right upper quadrant abdominal pain over the past 2 days, so he was being seen at Dr. Beryl Reyes office and was noted to be weak, dehydrated and jaundiced and was sent here for further evaluation. The patient does state that his symptoms started 2 days ago. He states he is only been able to eat Jell-O today, and barely able to drink any liquids. He describes nausea and episodes of nonbloody emesis. He did have a normal bowel movement this morning. He denies fever or chills. Also denies any chest pain or shortness of breath. No other complaints, modifying factors or associated symptoms. I have reviewed the following from the nursing documentation.     Past Medical History:   Diagnosis Date    Asthma     Blood circulation, collateral     Carpal tunnel syndrome     Crohn's colitis ([address]     Diabetes mellitus ([facility] [facility]     emboli     pulmonary emboli in 1980    GERD (gastroesophageal reflux disease)     Chrons disease    Gout     Hx of blood clots     Hypertension     Pneumonia     pneumonia,asthma    prostate     infection    Seasonal allergies      Past Surgical History:   Procedure Laterality Date    ERCP N/A 1/26/2022    ERCP BIOPSY performed by Marisol Bran MD at [facility] ERCP  1/26/2022    ERCP SPHINCTER/PAPILLOTOMY performed by Don Vinson MD at [address] ERCP  1/26/2022    ERCP STENT INSERTION performed by Don Vinson MD at [facility]  01/26/2022    ERCP WF W/VENKAT. (8:30) (N/A )     TONSILLECTOMY       History reviewed. No pertinent family history. Social History     Socioeconomic History    Marital status:      Spouse name: Not on file    Number of children: Not on file    Years of education: Not on file    Highest education level: Not on file   Occupational History    Not on file   Tobacco Use    Smoking status: Former Smoker    Smokeless tobacco: Never Used    Tobacco comment: quit 40-50 years ago   Substance and Sexual Activity    Alcohol use: No    Drug use: No    Sexual activity: Yes     Partners: Female   Other Topics Concern    Not on file   Social History Narrative    Not on file     Social Determinants of Health     Financial Resource Strain:     Difficulty of Paying Living Expenses: Not on file   Food Insecurity:     Worried About [facility] in the Last Year: Not on file    Elroy of Food in the Last Year: Not on file   Transportation Needs:     Lack of Transportation (Medical): Not on file    Lack of Transportation (Non-Medical):  Not on file   Physical Activity:     Days of Exercise per Week: Not on file    Minutes of Exercise per Session: Not on file   Stress:     Feeling of Stress : Not on file   Social Connections:     Frequency of Communication with Friends and Family: Not on file    Frequency of Social Gatherings with Friends and Family: Not on file    Attends Sikhism Services: Not on file    Active Member of Clubs or Organizations: Not on file    Attends Club or Organization Meetings: Not on file    Marital Status: Not on file   Intimate Partner Violence:     Fear of Current or Ex-Partner: Not on file    Emotionally Abused: Not on file    Physically Abused: Not on file   Caroline Pro Sexually Abused: Not on file   Housing Stability:     Unable to Pay for Housing in the Last Year: Not on file    Number of Fish in the Last Year: Not on file    Unstable Housing in the Last Year: Not on file     Current Facility-Administered Medications   Medication Dose Route Frequency Provider Last Rate Last Admin    piperacillin-tazobactam (ZOSYN) 3,375 mg in sodium chloride 0.9 % 100 mL IVPB extended infusion (mini-bag)  3,375 mg IntraVENous Q8H Pam Croft MD 25 mL/hr at 03/11/22 0251 3,375 mg at 03/11/22 0251    albuterol sulfate  (90 Base) MCG/ACT inhaler 2 puff  2 puff Inhalation Q6H PRN Pam Croft MD   2 puff at 03/11/22 0314    allopurinol (ZYLOPRIM) tablet 300 mg  300 mg Oral Daily Pam Croft MD        pantoprazole (PROTONIX) tablet 40 mg  40 mg Oral QAM AC Pam Croft MD        0.9 % sodium chloride infusion   IntraVENous Continuous Pam Croft MD 75 mL/hr at 03/10/22 2103 New Bag at 03/10/22 2103    sodium chloride flush 0.9 % injection 5-40 mL  5-40 mL IntraVENous 2 times per day Pam Croft MD   10 mL at 03/10/22 2102    sodium chloride flush 0.9 % injection 5-40 mL  5-40 mL IntraVENous PRN Pam Croft MD        0.9 % sodium chloride infusion  25 mL IntraVENous PRN Pam Croft MD        enoxaparin (LOVENOX) injection 40 mg  40 mg SubCUTAneous Daily Pam Croft MD        ondansetron (ZOFRAN-ODT) disintegrating tablet 4 mg  4 mg Oral Q8H PRN Pam Croft MD        Or    ondansetron (ZOFRAN) injection 4 mg  4 mg IntraVENous Q6H PRN Pam Croft MD        polyethylene glycol (GLYCOLAX) packet 17 g  17 g Oral Daily PRN Pam Croft MD        acetaminophen (TYLENOL) tablet 650 mg  650 mg Oral Q6H PRN Pam Croft MD        Or    acetaminophen (TYLENOL) suppository 650 mg  650 mg Rectal Q6H PRN Pam Croft MD        morphine (PF) injection 2 mg  2 mg IntraVENous Q4H PRN Angel Quiroga MD        Or    morphine sulfate (PF) injection 4 mg  4 mg IntraVENous Q4H PRN Angel Quiroga MD         Allergies   Allergen Reactions    Codeine      Pt states he took medication without food and had a reaction       REVIEW OF SYSTEMS  10 systems reviewed, pertinent positives per HPI otherwise noted to be negative. PHYSICAL EXAM  /70   Pulse 61   Temp 97.9 °F (36.6 °C) (Oral)   Resp 18   Ht 5' 11\" (1.803 m)   Wt 173 lb (78.5 kg)   SpO2 98%   BMI 24.13 kg/m²    Physical exam:  General appearance: awake and cooperative. no distress. Ill appearing. Skin: jaundiced. Warm and dry. No rashes or lesions. HENT: Normocephalic. Atraumatic. Mucus membranes are dry. Neck: supple  Eyes: scleral icterus. ANNIE. EOM intact. Heart: RRR. No murmurs. Lungs: Respirations unlabored. CTAB. No wheezes, rales, or rhonchi. Good air exchange  Abdomen: No appreciable tenderness to palpation. Soft. Non distended. No peritoneal signs. Musculoskeletal: No extremity edema. Compartments soft. No deformity. No tenderness in the extremities. All extremities neurovascularly intact. Radial, Dp, and PT pulses +2/4 bilaterally  Neurological: Alert and oriented. No focal deficits. No aphasia or dysarthria. Psychiatric: Normal mood and affect. LABS  I have reviewed all labs for this visit.    Results for orders placed or performed during the hospital encounter of 03/10/22   COVID-19, Rapid    Specimen: Nasopharyngeal Swab   Result Value Ref Range    SARS-CoV-2, NAAT Not Detected Not Detected   CBC with Auto Differential   Result Value Ref Range    WBC 4.7 4.0 - 11.0 K/uL    RBC 4.13 (L) 4.20 - 5.90 M/uL    Hemoglobin 14.9 13.5 - 17.5 g/dL    Hematocrit 41.8 40.5 - 52.5 %    .2 (H) 80.0 - 100.0 fL    MCH 36.1 (H) 26.0 - 34.0 pg    MCHC 35.7 31.0 - 36.0 g/dL    RDW 13.2 12.4 - 15.4 %    Platelets 415 873 - 023 K/uL    MPV 7.1 5.0 - 10.5 fL    Neutrophils % 74.4 %    Lymphocytes % 11.7 %    Monocytes % 6.0 %    Eosinophils % 4.4 %    Basophils % 3.5 %    Neutrophils Absolute 3.5 1.7 - 7.7 K/uL    Lymphocytes Absolute 0.6 (L) 1.0 - 5.1 K/uL    Monocytes Absolute 0.3 0.0 - 1.3 K/uL    Eosinophils Absolute 0.2 0.0 - 0.6 K/uL    Basophils Absolute 0.2 0.0 - 0.2 K/uL   Comprehensive Metabolic Panel   Result Value Ref Range    Sodium 126 (L) 136 - 145 mmol/L    Potassium 3.6 3.5 - 5.1 mmol/L    Chloride 87 (L) 99 - 110 mmol/L    CO2 25 21 - 32 mmol/L    Anion Gap 14 3 - 16    Glucose 139 (H) 70 - 99 mg/dL    BUN 9 7 - 20 mg/dL    CREATININE <0.5 (L) 0.8 - 1.3 mg/dL    GFR Non-African American >60 >60    GFR African American >60 >60    Calcium 10.1 8.3 - 10.6 mg/dL    Total Protein 8.1 6.4 - 8.2 g/dL    Albumin 4.6 3.4 - 5.0 g/dL    Albumin/Globulin Ratio 1.3 1.1 - 2.2    Total Bilirubin 4.4 (H) 0.0 - 1.0 mg/dL    Alkaline Phosphatase 673 (H) 40 - 129 U/L     (H) 10 - 40 U/L     (H) 15 - 37 U/L   Lipase   Result Value Ref Range    Lipase 48.0 13.0 - 60.0 U/L   Urinalysis with Reflex to Culture    Specimen: Urine, clean catch   Result Value Ref Range    Color, UA Yellow Straw/Yellow    Clarity, UA Clear Clear    Glucose, Ur Negative Negative mg/dL    Bilirubin Urine MODERATE (A) Negative    Ketones, Urine Negative Negative mg/dL    Specific Gravity, UA 1.020 1.005 - 1.030    Blood, Urine Negative Negative    pH, UA 6.5 5.0 - 8.0    Protein, UA Negative Negative mg/dL    Urobilinogen, Urine 2.0 (A) <2.0 E.U./dL    Nitrite, Urine Negative Negative    Leukocyte Esterase, Urine Negative Negative    Microscopic Examination Not Indicated     Urine Type NotGiven     Urine Reflex to Culture Not Indicated    EKG 12 Lead   Result Value Ref Range    Ventricular Rate 66 BPM    Atrial Rate 66 BPM    P-R Interval 208 ms    QRS Duration 90 ms    Q-T Interval 416 ms    QTc Calculation (Bazett) 436 ms    P Axis 69 degrees    R Axis 66 degrees    T Axis 66 degrees    Diagnosis Normal sinus rhythmPossible Left atrial enlargementLeft ventricular hypertrophyAbnormal ECGWhen compared with ECG of 16-FEB-2011 05:14,T wave amplitude has decreased in Lateral leadsConfirmed by Conectta Larkin MD, [address] (1986) on 3/10/2022 4:57:06 PM       ECG      RADIOLOGY  CT ABDOMEN PELVIS W IV CONTRAST Additional Contrast? None    Result Date: 3/10/2022  EXAMINATION: CT OF THE ABDOMEN AND PELVIS WITH CONTRAST 3/10/2022 4:42 pm TECHNIQUE: CT of the abdomen and pelvis was performed with the administration of intravenous contrast. Multiplanar reformatted images are provided for review. Dose modulation, iterative reconstruction, and/or weight based adjustment of the mA/kV was utilized to reduce the radiation dose to as low as reasonably achievable. COMPARISON: None. HISTORY: ORDERING SYSTEM PROVIDED HISTORY: rt upper abd pain; vomiting recent biliary stent TECHNOLOGIST PROVIDED HISTORY: Additional Contrast?->None Reason for exam:->rt upper abd pain; vomiting recent biliary stent Decision Support Exception - unselect if not a suspected or confirmed emergency medical condition->Emergency Medical Condition (MA) Reason for Exam: right upper abd pian with vomiting, biliary stent plaement put in in January FINDINGS: Lung bases demonstrate 2 mm micronodule in the left lower lobe anteriorly. Osseous structures demonstrate no suspicious areas The soft tissues are within normal limits Small hiatal hernia There is severe dilatation of the intrahepatic biliary tree. The main portal vein is patent. The liver contours are normal.  No liver lesions are noted. The gallbladder is distended. There is a CBD stent identified. The proximal CBD is dilated measuring 1.4 cm with hyperemia Adrenal glands normal Kidneys normal Spleen normal Pancreas unremarkable No retroperitoneal lymphadenopathy No aneurysm is identified Bladder unremarkable The prostate is enlarged No free fluid or free air No colitis or diverticulitis.   Appendix at the upper limits of normal however there is no inflammation around the appendix. Mesentery normal.  No dilated small bowel loops. Dilatation of the biliary tree, the CBD stent appears to be in the appropriate location but may be blocked. There is hyperemia noted of the biliary tree, underlying cholangitis is not excluded. RECOMMENDATIONS: Unavailable     XR CHEST PORTABLE    Result Date: 2/19/2022  EXAMINATION: ONE XRAY VIEW OF THE CHEST 2/19/2022 5:48 pm COMPARISON: 02/15/2011 HISTORY: ORDERING SYSTEM PROVIDED HISTORY: cough TECHNOLOGIST PROVIDED HISTORY: Reason for exam:->cough Reason for Exam: cough FINDINGS: The heart is normal.  The pulmonary vessels are normal.  The lungs are mildly hyperinflated. No consolidation or effusion is seen. The bones are intact. Stable chronic changes with no acute abnormality seen. ED COURSE/MDM  Patient seen and evaluated. Old records reviewed. Labs and imaging reviewed and results discussed with patient. This is a 63-year-old male presenting for evaluation of vomiting and jaundice. His vital signs are within normal limits he is afebrile. On exam he is ill-appearing, no acute distress. His abdominal exam actually does not exhibit any tenderness, he is denying pain to states he has no pain he is given Zofran. He does appear dehydrated, hyponatremic to 126 likely from dehydration and he is given a liter of fluids. He does have elevated bilirubin at 4.4 today as well as a transaminitis, worsened from prior. CT shows evidence of common bile duct obstruction, although the stent is in place and there was questionable cholangitis. I have lower suspicion for cholangitis as the patient is afebrile, no leukocytosis, and no significant tenderness on exam.  However I did cover him with Zosyn. He will be admitted for further treatment and work-up.     During the patient's ED course, the patient was given:  Medications   piperacillin-tazobactam (ZOSYN) 3,375 mg in sodium chloride 0.9 % 100 mL IVPB extended infusion (mini-bag) (3,375 mg IntraVENous New Bag 3/11/22 0251)   albuterol sulfate  (90 Base) MCG/ACT inhaler 2 puff (2 puffs Inhalation Given 3/11/22 0314)   allopurinol (ZYLOPRIM) tablet 300 mg (300 mg Oral Not Given 3/10/22 1825)   pantoprazole (PROTONIX) tablet 40 mg (has no administration in time range)   0.9 % sodium chloride infusion ( IntraVENous New Bag 3/10/22 2103)   sodium chloride flush 0.9 % injection 5-40 mL (10 mLs IntraVENous Given 3/10/22 2102)   sodium chloride flush 0.9 % injection 5-40 mL (has no administration in time range)   0.9 % sodium chloride infusion (has no administration in time range)   enoxaparin (LOVENOX) injection 40 mg (has no administration in time range)   ondansetron (ZOFRAN-ODT) disintegrating tablet 4 mg (has no administration in time range)     Or   ondansetron (ZOFRAN) injection 4 mg (has no administration in time range)   polyethylene glycol (GLYCOLAX) packet 17 g (has no administration in time range)   acetaminophen (TYLENOL) tablet 650 mg (has no administration in time range)     Or   acetaminophen (TYLENOL) suppository 650 mg (has no administration in time range)   morphine (PF) injection 2 mg (has no administration in time range)     Or   morphine sulfate (PF) injection 4 mg (has no administration in time range)   0.9 % sodium chloride bolus (0 mLs IntraVENous Stopped 3/10/22 1816)   ondansetron (ZOFRAN) injection 4 mg (4 mg IntraVENous Given 3/10/22 1727)   iopamidol (ISOVUE-370) 76 % injection 85 mL (85 mLs IntraVENous Given 3/10/22 1641)        CLINICAL IMPRESSION  1. Common bile duct (CBD) obstruction    2. Hyponatremia    3. Transaminitis        Blood pressure 119/70, pulse 61, temperature 97.9 °F (36.6 °C), temperature source Oral, resp. rate 18, height 5' 11\" (1.803 m), weight 173 lb (78.5 kg), SpO2 98 %. Patient was given scripts for the following medications. I counseled patient how to take these medications. Current Discharge Medication List          Follow-up with:  No follow-up provider specified. DISCLAIMER: This chart was created using Dragon dictation software. Efforts were made by me to ensure accuracy, however some errors may be present due to limitations of this technology and occasionally words are not transcribed correctly.        Alicia Kent  03/11/22 5418

## 2022-03-11 NOTE — PROGRESS NOTES
Hospitalist Progress Note      PCP: Saud Rose MD    Date of Admission: 3/10/2022    Chief Complaint: sent from GI office with worsening LFTs. Became Ill Wednesday with nausea and emesis. Had CBD stent placed in Jan for obstructive jaundice. Biopsies negative at the time. Subjective: better today. NPO. Medications:  Reviewed    Infusion Medications    sodium chloride 75 mL/hr at 03/10/22 2103    sodium chloride       Scheduled Medications    piperacillin-tazobactam  3,375 mg IntraVENous Q8H    allopurinol  300 mg Oral Daily    pantoprazole  40 mg Oral QAM AC    sodium chloride flush  5-40 mL IntraVENous 2 times per day    enoxaparin  40 mg SubCUTAneous Daily     PRN Meds: albuterol sulfate HFA, sodium chloride flush, sodium chloride, ondansetron **OR** ondansetron, polyethylene glycol, acetaminophen **OR** acetaminophen, morphine **OR** morphine    No intake or output data in the 24 hours ending 03/11/22 1135    Physical Exam Performed:    /72   Pulse 59   Temp 97.9 °F (36.6 °C) (Oral)   Resp 16   Ht 5' 11\" (1.803 m)   Wt 173 lb (78.5 kg)   SpO2 94%   BMI 24.13 kg/m²     General appearance: No apparent distress, appears stated age and cooperative. HEENT: Pupils equal, round, and reactive to light. Conjunctivae/corneas clear. Neck: Supple, with full range of motion. No jugular venous distention. Trachea midline. Respiratory:  Normal respiratory effort. Clear to auscultation, bilaterally without Rales/Wheezes/Rhonchi. Cardiovascular: Regular rate and rhythm with normal S1/S2 without murmurs, rubs or gallops. Abdomen: Soft, non-tender, non-distended with normal bowel sounds. Musculoskeletal: No clubbing, cyanosis or edema bilaterally. Full range of motion without deformity. Skin: Skin color, texture, turgor normal.  No rashes or lesions. Neurologic:  Neurovascularly intact without any focal sensory/motor deficits.  Cranial nerves: II-XII intact, grossly non-focal.  Psychiatric: Alert and oriented, thought content appropriate, normal insight  Capillary Refill: Brisk,3 seconds, normal   Peripheral Pulses: +2 palpable, equal bilaterally       Labs:   Recent Labs     03/10/22  1555 03/11/22  0617   WBC 4.7 3.9*   HGB 14.9 13.4*   HCT 41.8 37.8*    195     Recent Labs     03/10/22  1555 03/11/22  0617   * 133*   K 3.6 4.2   CL 87* 95*   CO2 25 27   BUN 9 10   CREATININE <0.5* 0.7*   CALCIUM 10.1 9.4   PHOS  --  4.1     Recent Labs     03/10/22  1555 03/11/22  0617   * 117*   * 352*   BILIDIR  --  2.0*   BILITOT 4.4* 3.3*   ALKPHOS 673* 550*     No results for input(s): INR in the last 72 hours. No results for input(s): Roger Killings in the last 72 hours. Urinalysis:      Lab Results   Component Value Date    NITRU Negative 03/10/2022    WBCUA 0-2 02/19/2022    BACTERIA 2+ 02/19/2022    RBCUA 5-10 02/19/2022    BLOODU Negative 03/10/2022    SPECGRAV 1.020 03/10/2022    GLUCOSEU Negative 03/10/2022       Radiology:  CT ABDOMEN PELVIS W IV CONTRAST Additional Contrast? None   Final Result   Dilatation of the biliary tree, the CBD stent appears to be in the   appropriate location but may be blocked. There is hyperemia noted of the   biliary tree, underlying cholangitis is not excluded. RECOMMENDATIONS:   Unavailable         [place] ERCP BILIARY AND PANCREATIC S&I    (Results Pending)           Assessment/Plan:    Active Hospital Problems    Diagnosis     Cholangitis [K83.09]          Possible Cholangitis. CBD Stent Obstruction. Cholestatic Jaundice. Plan:    - zosyn.    - NPO.    - GI consult. - serial LFT. - IVF support. Hypovolemic Hyponatremia - resolved with IVF.        DVT Prophylaxis: lovenox  Diet: Diet NPO  Code Status: Full Code      Dispo - cc    Eda Turner MD

## 2022-03-11 NOTE — H&P
Hospital Medicine History & Physical      PCP: Ellen Moralez MD    Date of Admission: 3/10/2022    Date of Service: Pt seen/examined on 3/10/22 and Admitted to Inpatient with expected LOS greater than two midnights due to medical therapy. Chief Complaint:  n/v      History Of Present Illness:       70 y.o. male with history of obstructive jaundice s/p biliary stent 1/22, presents with on bilious/non bloody emesis x 6 days. He has had some early satiety since stent placement, but overall was improving until 6 days ago when he developed nausea with vomiting. Denies similar sick contacts, unusual dietary intake, abdominal pain, fever chills, cough, sob. Past Medical History:          Diagnosis Date    Asthma     Blood circulation, collateral     Carpal tunnel syndrome     Crohn's colitis ([address]     Diabetes mellitus [facility]     emboli     pulmonary emboli in 1980    GERD (gastroesophageal reflux disease)     Chrons disease    Gout     Hx of blood clots     Hypertension     Pneumonia     pneumonia,asthma    prostate     infection    Seasonal allergies        Past Surgical History:          Procedure Laterality Date    ERCP N/A 1/26/2022    ERCP BIOPSY performed by Juana Sood MD at [address] [place] [address] ERCP  1/26/2022    ERCP SPHINCTER/PAPILLOTOMY performed by Juana Sood MD at [address] [place] [address] ERCP  1/26/2022    ERCP STENT INSERTION performed by Juana Sood MD at [place] [address]  01/26/2022    ERCP WF W/VENKAT. (8:30) (N/A )     TONSILLECTOMY         Medications Prior to Admission:      Prior to Admission medications    Medication Sig Start Date End Date Taking? Authorizing Provider   metFORMIN (GLUCOPHAGE-XR) 500 MG extended release tablet Take 500 mg by mouth 2 times daily   Yes Historical Provider, MD   dicyclomine (BENTYL) 10 MG capsule Take 10 mg by mouth nightly.  2/15/11  Yes Historical Provider, MD allopurinol (ZYLOPRIM) 300 MG tablet Take 300 mg by mouth daily. Yes Historical Provider, MD   albuterol (PROVENTIL HFA;VENTOLIN HFA) 108 (90 BASE) MCG/ACT inhaler Inhale 2 puffs into the lungs every 6 hours as needed. Yes Historical Provider, MD   pantoprazole (PROTONIX) 40 MG tablet Take 1 tablet by mouth 2 times daily (before meals) for 14 days 1/26/22 2/9/22  Toney Moran MD       Allergies:  Codeine    Social History:           TOBACCO:   reports that he has quit smoking. He has never used smokeless tobacco.  ETOH:   reports no history of alcohol use. Family History:      Denies premature CAD       REVIEW OF SYSTEMS:   Pertinent positives as noted in the HPI. All other systems reviewed and negative. PHYSICAL EXAM PERFORMED:    BP (!) 141/89   Pulse 67   Temp 98.4 °F (36.9 °C) (Oral)   Resp 16   Ht 5' 11\" (1.803 m)   Wt 173 lb (78.5 kg)   SpO2 99%   BMI 24.13 kg/m²     General appearance:  No apparent distress, appears stated age and cooperative. HEENT:  Normal cephalic, atraumatic without obvious deformity. Pupils equal, round, and reactive to light. Extra ocular muscles intact. Conjunctivae/corneas clear. Neck: Supple, with full range of motion. No jugular venous distention. Trachea midline. Respiratory:  Normal respiratory effort. No use of accessory muscles, no intercostal retractions   Cardiovascular:  Regular rate and rhythm   Abdomen: Soft, (+) RUQ tenderness, non-distended , no guarding  Musculoskeletal:  No clubbing, cyanosis or edema bilaterally.     Skin: Skin color, texture, turgor normal.    Neurologic:   grossly non-focal.  Psychiatric:  Alert and oriented, thought content appropriate, normal insight         Labs:     Recent Labs     03/10/22  1555   WBC 4.7   HGB 14.9   HCT 41.8        Recent Labs     03/10/22  1555   *   K 3.6   CL 87*   CO2 25   BUN 9   CREATININE <0.5*   CALCIUM 10.1     Recent Labs     03/10/22  1555   *   *   BILITOT 4.4*   ALKPHOS 673*     No results for input(s): INR in the last 72 hours. No results for input(s): Krystian Shady in the last 72 hours. Urinalysis:      Lab Results   Component Value Date    NITRU Negative 03/10/2022    WBCUA 0-2 02/19/2022    BACTERIA 2+ 02/19/2022    RBCUA 5-10 02/19/2022    BLOODU Negative 03/10/2022    SPECGRAV 1.020 03/10/2022    GLUCOSEU Negative 03/10/2022       Radiology:          CT ABDOMEN PELVIS W IV CONTRAST Additional Contrast? None   Final Result   Dilatation of the biliary tree, the CBD stent appears to be in the   appropriate location but may be blocked. There is hyperemia noted of the   biliary tree, underlying cholangitis is not excluded. RECOMMENDATIONS:   Unavailable             ASSESSMENT:    Active Hospital Problems    Diagnosis Date Noted    Cholangitis [K83.09] 03/10/2022         PLAN:    Cholangitis  - Zosyn, IV anti emetics, GI consult    Hyponatremia  - no ams, hypovolemic, IVFs           Dispo - Juan Burrows MD    Thank you Laura Barbosa MD for the opportunity to be involved in this patient's care. If you have any questions or concerns please feel free to contact me at [phone].

## 2022-03-11 NOTE — PROGRESS NOTES
Consent for procedure has been explained/obtained; currently patient denies any questions. Consent has been placed in patients soft chart.

## 2022-03-11 NOTE — ANESTHESIA PRE PROCEDURE
Department of Anesthesiology  Preprocedure Note       Name:  Kamron Harris   Age:  70 y.o.  :  1950                                          MRN:  [id number]         Date:  3/11/2022      Surgeon: Vladimir Burch):  Wing Odalys MD    Procedure: Procedure(s):  UPPER EUS W/ANES. ERCP WF W/ANES. Medications prior to admission:   Prior to Admission medications    Medication Sig Start Date End Date Taking? Authorizing Provider   metFORMIN (GLUCOPHAGE-XR) 500 MG extended release tablet Take 500 mg by mouth 2 times daily   Yes Historical Provider, MD   dicyclomine (BENTYL) 10 MG capsule Take 10 mg by mouth nightly. 2/15/11  Yes Historical Provider, MD   allopurinol (ZYLOPRIM) 300 MG tablet Take 300 mg by mouth daily. Yes Historical Provider, MD   albuterol (PROVENTIL HFA;VENTOLIN HFA) 108 (90 BASE) MCG/ACT inhaler Inhale 2 puffs into the lungs every 6 hours as needed.    Yes Historical Provider, MD   pantoprazole (PROTONIX) 40 MG tablet Take 1 tablet by mouth 2 times daily (before meals) for 14 days 22  Wilian Serrano MD       Current medications:    Current Facility-Administered Medications   Medication Dose Route Frequency Provider Last Rate Last Admin    piperacillin-tazobactam (ZOSYN) 3,375 mg in sodium chloride 0.9 % 100 mL IVPB extended infusion (mini-bag)  3,375 mg IntraVENous Q8H Wilian Serrano MD 25 mL/hr at 22 1021 3,375 mg at 22 1021    albuterol sulfate  (90 Base) MCG/ACT inhaler 2 puff  2 puff Inhalation Q6H PRN Wilian Serrano MD   2 puff at 22 0314    allopurinol (ZYLOPRIM) tablet 300 mg  300 mg Oral Daily Wilian Serrano MD   300 mg at 22 1020    pantoprazole (PROTONIX) tablet 40 mg  40 mg Oral QAM AC Wilian Serrano MD        0.9 % sodium chloride infusion   IntraVENous Continuous Wilian Serrano MD 75 mL/hr at 03/10/22 2103 New Bag at 03/10/22 2103    sodium chloride flush 0.9 % injection 5-40 mL  5-40 mL IntraVENous 2 times per day Sydney Cano MD   10 mL at 03/10/22 2102    sodium chloride flush 0.9 % injection 5-40 mL  5-40 mL IntraVENous PRN Sydney Cano MD        0.9 % sodium chloride infusion  25 mL IntraVENous PRN Sydney Cano MD        enoxaparin (LOVENOX) injection 40 mg  40 mg SubCUTAneous Daily Sydney Cano MD   40 mg at 03/11/22 1020    ondansetron (ZOFRAN-ODT) disintegrating tablet 4 mg  4 mg Oral Q8H PRN Sydney Cano MD        Or    ondansetron (ZOFRAN) injection 4 mg  4 mg IntraVENous Q6H PRN Sydney Cano MD        polyethylene glycol (GLYCOLAX) packet 17 g  17 g Oral Daily PRN Sydney Cano MD        acetaminophen (TYLENOL) tablet 650 mg  650 mg Oral Q6H PRN Sydney Cano MD        Or    acetaminophen (TYLENOL) suppository 650 mg  650 mg Rectal Q6H PRN Sydney Cano MD        morphine (PF) injection 2 mg  2 mg IntraVENous Q4H PRN Grover Pina MD        Or    morphine sulfate (PF) injection 4 mg  4 mg IntraVENous Q4H PRN Sydney Cano MD           Allergies:     Allergies   Allergen Reactions    Codeine      Pt states he took medication without food and had a reaction       Problem List:    Patient Active Problem List   Diagnosis Code    GERD (gastroesophageal reflux disease) K21.9    Palpitations R00.2    Painless jaundice R17    Transaminitis R74.01    Common bile duct (CBD) obstruction K83.1    Hyponatremia E87.1    Hypokalemia E87.6    Crohn's disease ([facility] [address] K50.90    Hypertension I10    Acute liver failure without hepatic coma K72.00    Hyperbilirubinemia E80.6    Cholangitis K83.09       Past Medical History:        Diagnosis Date    Asthma     Blood circulation, collateral     Carpal tunnel syndrome     Cro[facility]'s colitis ([facility]     Diabetes mellitus ([facility] [address]     emboli     pulmonary emboli in 1980    GERD (gastroesophageal reflux disease)     Chrons disease    Gout  Hx of blood clots     Hypertension     Pneumonia     pneumonia,asthma    prostate     infection    Seasonal allergies        Past Surgical History:        Procedure Laterality Date    ERCP N/A 1/26/2022    ERCP BIOPSY performed by Aggie Mireles MD at [address] [facility] ERCP  1/26/2022    ERCP SPHINCTER/PAPILLOTOMY performed by Aggie Mireles MD at [address] [facility] ERCP  1/26/2022    ERCP STENT INSERTION performed by Aggie Mireles MD at [place] [address]  01/26/2022    ERCP WF W/ANES. (8:30) (N/A )     TONSILLECTOMY         Social History:    Social History     Tobacco Use    Smoking status: Former Smoker    Smokeless tobacco: Never Used    Tobacco comment: quit 40-50 years ago   Substance Use Topics    Alcohol use: No                                Counseling given: Not Answered  Comment: quit 40-50 years ago      Vital Signs (Current):   Vitals:    03/11/22 0217 03/11/22 0316 03/11/22 0802 03/11/22 1408   BP: 119/70  115/72    Pulse: 61  59    Resp: 18 16 16 16   Temp: 97.9 °F (36.6 °C)  97.9 °F (36.6 °C) 97 °F (36.1 °C)   TempSrc: Oral  Oral Temporal   SpO2: 98% 98% 94%    Weight:       Height:                                                  BP Readings from Last 3 Encounters:   03/11/22 115/72   02/19/22 136/87   01/27/22 115/71       NPO Status: Time of last liquid consumption: 1020 (sip with meds)                        Time of last solid consumption: 0900                        Date of last liquid consumption: 03/11/22                        Date of last solid food consumption: 03/10/22    BMI:   Wt Readings from Last 3 Encounters:   03/10/22 173 lb (78.5 kg)   02/19/22 171 lb (77.6 kg)   01/24/22 171 lb (77.6 kg)     Body mass index is 24.13 kg/m².     CBC:   Lab Results   Component Value Date    WBC 3.9 03/11/2022    RBC 3.70 03/11/2022    HGB 13.4 03/11/2022    HCT 37.8 03/11/2022    .3 03/11/2022    RDW 13.0 03/11/2022     03/11/2022       CMP:   Lab Results   Component Value Date     03/11/2022    K 4.2 03/11/2022    K 4.0 02/19/2022    CL 95 03/11/2022    CO2 27 03/11/2022    BUN 10 03/11/2022    CREATININE 0.7 03/11/2022    GFRAA >60 03/11/2022    GFRAA >60 02/15/2011    AGRATIO 1.3 03/10/2022    LABGLOM >60 03/11/2022    GLUCOSE 114 03/11/2022    PROT 6.5 03/11/2022    PROT 7.1 02/15/2011    CALCIUM 9.4 03/11/2022    BILITOT 3.3 03/11/2022    ALKPHOS 550 03/11/2022     03/11/2022     03/11/2022       POC Tests:   Recent Labs     03/11/22  1111   POCGLU 121*       Coags:   Lab Results   Component Value Date    PROTIME 10.8 02/02/2022    INR 0.96 02/02/2022    APTT 43.6 02/15/2011       HCG (If Applicable): No results found for: PREGTESTUR, PREGSERUM, HCG, HCGQUANT     ABGs: No results found for: PHART, PO2ART, GZA3SEK, RAD4CQG, BEART, R6MRJWUU     Type & Screen (If Applicable):  No results found for: LABABO, LABRH    Drug/Infectious Status (If Applicable):  No results found for: HIV, HEPCAB    COVID-19 Screening (If Applicable):   Lab Results   Component Value Date    COVID19 Not Detected 03/10/2022    COVID19 NOT DETECTED 02/19/2022           Anesthesia Evaluation  Patient summary reviewed and Nursing notes reviewed no history of anesthetic complications:   Airway: Mallampati: II  TM distance: >3 FB   Neck ROM: full  Mouth opening: > = 3 FB Dental:          Pulmonary: breath sounds clear to auscultation  (+) asthma (NO O2 REQ., PRN INHALERS):     (-) COPD, shortness of breath, recent URI, sleep apnea and not a current smoker          Patient did not smoke on day of surgery.                  Cardiovascular:    (+) hypertension:,     (-) pacemaker, past MI, CAD, CABG/stent, dysrhythmias,  angina and  CHF    ECG reviewed  Rhythm: regular  Rate: normal    Stress test reviewed       Beta Blocker:  Not on Beta Blocker         Neuro/Psych:   (+) neuromuscular disease (CTS):,    (-) seizures, TIA and CVA           GI/Hepatic/Renal:   (+) GERD: well controlled, liver disease (TRANSAM, STENT):,      (-) no renal disease, bowel prep and no morbid obesity      ROS comment: CROHNS. Endo/Other:    (+) DiabetesType II DM, , : arthritis (AND GOUT):., no malignancy/cancer. (-) hypothyroidism, hyperthyroidism, blood dyscrasia, no malignancy/cancer               Abdominal:       Abdomen: soft. Vascular:   + DVT, PE (1980S, NO ISSUSES). Other Findings: JAUNDICE            Anesthesia Plan      general     ASA 3       Induction: intravenous. MIPS: Postoperative opioids intended and Prophylactic antiemetics administered. Anesthetic plan and risks discussed with patient. Plan discussed with CRNA. This pre-anesthesia assessment may be used as a history and physical.    DOS STAFF ADDENDUM:    Pt seen and examined, chart reviewed (including anesthesia, drug and allergy history). No interval changes to history and physical examination. Anesthetic plan, risks, benefits, alternatives, and personnel involved discussed with patient. Patient verbalized an understanding and agrees to proceed.       Ronel Herrera MD  March 11, 2022  2:22 PM      Ronel Herrera MD   3/11/2022

## 2022-03-11 NOTE — CARE COORDINATION
Case Management Assessment  Initial Evaluation      Patient Name: Rui Spangler  YOB: 1950  Diagnosis: Cholangitis [K83.09]  Date / Time: 3/10/2022  3:43 PM    Admission status/Date:  03/10/2022  Chart Reviewed: Yes      Patient Interviewed: Yes   Family Interviewed:  No      Hospitalization in the last 30 days:  No      Health Care Decision Maker :   Primary Decision Maker: Adan Zacarias - Spouse - [phone]    (CM - must 1st enter selection under Navigator - emergency contact- [place] Relationship and pick relationship)   Who do you trust or have selected to make healthcare decisions for you      Current PCP: Fabiola Escobedo MD    Financial  Medicare  Precert required for SNF : NA     3 night stay required - WAIVED    ADLS  Support Systems/Care Needs: Family Members  Transportation: self    Meal Preparation: self    Housing  Living Arrangements: IPTA, Lives w/sp  Steps: few  Intent for return to present living arrangements: Yes  Identified Issues: NA    Home Care Information  Active with [address] : No Agency:(Services)     Passport/Waiver : No  :                      Phone Number:    Passport/Waiver Services: NA          Durable Medical Equiptment   DME Provider: AVELINA  Equipment: NA  Walker___Cane___RTS___ BSC___Shower Chair___Hospital Bed___W/C____Other________  02 at ____Liter(s)---wears(frequency)_______ HHN ___ CPAP___ BiPap___   N/A____      Home O2 Use :  No        Community Service Affiliation  Dialysis:  No    · Agency:  · Location:  · Dialysis Schedule:  · Phone:   · Fax: Other Community Services: (ex:PT/OT,Mental Health,Wound Clinic, Cardio/Pul [address])    DISCHARGE PLAN: Explained Case Management role/services. Chart reviewed. Met with pt at bedside and explained the role of the CM. Plans to return home. Denies any new HHC or DME needs. +CM following.

## 2022-03-12 VITALS
TEMPERATURE: 98.8 F | HEART RATE: 60 BPM | HEIGHT: 71 IN | DIASTOLIC BLOOD PRESSURE: 73 MMHG | OXYGEN SATURATION: 99 % | RESPIRATION RATE: 17 BRPM | WEIGHT: 173 LBS | BODY MASS INDEX: 24.22 KG/M2 | SYSTOLIC BLOOD PRESSURE: 138 MMHG

## 2022-03-12 LAB
ALBUMIN SERPL-MCNC: 4.1 G/DL (ref 3.4–5)
ALP BLD-CCNC: 525 U/L (ref 40–129)
ALT SERPL-CCNC: 287 U/L (ref 10–40)
ANION GAP SERPL CALCULATED.3IONS-SCNC: 10 MMOL/L (ref 3–16)
AST SERPL-CCNC: 81 U/L (ref 15–37)
BASOPHILS ABSOLUTE: 0 K/UL (ref 0–0.2)
BASOPHILS RELATIVE PERCENT: 0.2 %
BILIRUB SERPL-MCNC: 2.4 MG/DL (ref 0–1)
BILIRUBIN DIRECT: 1.2 MG/DL (ref 0–0.3)
BILIRUBIN, INDIRECT: 1.2 MG/DL (ref 0–1)
BUN BLDV-MCNC: 11 MG/DL (ref 7–20)
CALCIUM SERPL-MCNC: 9.2 MG/DL (ref 8.3–10.6)
CHLORIDE BLD-SCNC: 99 MMOL/L (ref 99–110)
CO2: 26 MMOL/L (ref 21–32)
CREAT SERPL-MCNC: 0.6 MG/DL (ref 0.8–1.3)
EOSINOPHILS ABSOLUTE: 0 K/UL (ref 0–0.6)
EOSINOPHILS RELATIVE PERCENT: 0.1 %
ESTIMATED AVERAGE GLUCOSE: 111.2 MG/DL
GFR AFRICAN AMERICAN: >60
GFR NON-AFRICAN AMERICAN: >60
GLUCOSE BLD-MCNC: 118 MG/DL (ref 70–99)
GLUCOSE BLD-MCNC: 129 MG/DL (ref 70–99)
GLUCOSE BLD-MCNC: 170 MG/DL (ref 70–99)
GLUCOSE BLD-MCNC: 288 MG/DL (ref 70–99)
HBA1C MFR BLD: 5.5 %
HCT VFR BLD CALC: 38.4 % (ref 40.5–52.5)
HEMOGLOBIN: 13.6 G/DL (ref 13.5–17.5)
LYMPHOCYTES ABSOLUTE: 0.6 K/UL (ref 1–5.1)
LYMPHOCYTES RELATIVE PERCENT: 9.9 %
MCH RBC QN AUTO: 36.3 PG (ref 26–34)
MCHC RBC AUTO-ENTMCNC: 35.5 G/DL (ref 31–36)
MCV RBC AUTO: 102.2 FL (ref 80–100)
MONOCYTES ABSOLUTE: 0.3 K/UL (ref 0–1.3)
MONOCYTES RELATIVE PERCENT: 4.8 %
NEUTROPHILS ABSOLUTE: 5 K/UL (ref 1.7–7.7)
NEUTROPHILS RELATIVE PERCENT: 85 %
PDW BLD-RTO: 13 % (ref 12.4–15.4)
PERFORMED ON: ABNORMAL
PHOSPHORUS: 3.3 MG/DL (ref 2.5–4.9)
PLATELET # BLD: 229 K/UL (ref 135–450)
PMV BLD AUTO: 7.5 FL (ref 5–10.5)
POTASSIUM SERPL-SCNC: 4.1 MMOL/L (ref 3.5–5.1)
RBC # BLD: 3.76 M/UL (ref 4.2–5.9)
SODIUM BLD-SCNC: 135 MMOL/L (ref 136–145)
TOTAL PROTEIN: 6.5 G/DL (ref 6.4–8.2)
WBC # BLD: 5.9 K/UL (ref 4–11)

## 2022-03-12 PROCEDURE — 6370000000 HC RX 637 (ALT 250 FOR IP): Performed by: INTERNAL MEDICINE

## 2022-03-12 PROCEDURE — 36415 COLL VENOUS BLD VENIPUNCTURE: CPT

## 2022-03-12 PROCEDURE — 85025 COMPLETE CBC W/AUTO DIFF WBC: CPT

## 2022-03-12 PROCEDURE — 99239 HOSP IP/OBS DSCHRG MGMT >30: CPT | Performed by: INTERNAL MEDICINE

## 2022-03-12 PROCEDURE — 80076 HEPATIC FUNCTION PANEL: CPT

## 2022-03-12 PROCEDURE — 6360000002 HC RX W HCPCS: Performed by: INTERNAL MEDICINE

## 2022-03-12 PROCEDURE — 94761 N-INVAS EAR/PLS OXIMETRY MLT: CPT

## 2022-03-12 PROCEDURE — 86301 IMMUNOASSAY TUMOR CA 19-9: CPT

## 2022-03-12 PROCEDURE — 2580000003 HC RX 258: Performed by: INTERNAL MEDICINE

## 2022-03-12 PROCEDURE — 80069 RENAL FUNCTION PANEL: CPT

## 2022-03-12 RX ORDER — URSODIOL 300 MG/1
300 CAPSULE ORAL 2 TIMES DAILY
Status: DISCONTINUED | OUTPATIENT
Start: 2022-03-12 | End: 2022-03-12 | Stop reason: HOSPADM

## 2022-03-12 RX ORDER — URSODIOL 300 MG/1
300 CAPSULE ORAL 2 TIMES DAILY
Qty: 60 CAPSULE | Refills: 0 | Status: SHIPPED | OUTPATIENT
Start: 2022-03-12

## 2022-03-12 RX ADMIN — PIPERACILLIN AND TAZOBACTAM 3375 MG: 3; .375 INJECTION, POWDER, FOR SOLUTION INTRAVENOUS at 10:27

## 2022-03-12 RX ADMIN — ALLOPURINOL 300 MG: 300 TABLET ORAL at 10:15

## 2022-03-12 RX ADMIN — SODIUM CHLORIDE: 9 INJECTION, SOLUTION INTRAVENOUS at 10:24

## 2022-03-12 RX ADMIN — PANTOPRAZOLE SODIUM 40 MG: 40 TABLET, DELAYED RELEASE ORAL at 06:36

## 2022-03-12 RX ADMIN — ENOXAPARIN SODIUM 40 MG: 100 INJECTION SUBCUTANEOUS at 10:15

## 2022-03-12 RX ADMIN — SODIUM CHLORIDE: 9 INJECTION, SOLUTION INTRAVENOUS at 03:08

## 2022-03-12 RX ADMIN — URSODIOL 300 MG: 300 CAPSULE ORAL at 10:15

## 2022-03-12 RX ADMIN — PIPERACILLIN AND TAZOBACTAM 3375 MG: 3; .375 INJECTION, POWDER, FOR SOLUTION INTRAVENOUS at 03:09

## 2022-03-12 ASSESSMENT — PAIN DESCRIPTION - DESCRIPTORS: DESCRIPTORS: DULL;DISCOMFORT

## 2022-03-12 ASSESSMENT — PAIN DESCRIPTION - PROGRESSION: CLINICAL_PROGRESSION: GRADUALLY WORSENING

## 2022-03-12 ASSESSMENT — PAIN DESCRIPTION - LOCATION: LOCATION: ABDOMEN

## 2022-03-12 ASSESSMENT — PAIN DESCRIPTION - PAIN TYPE: TYPE: ACUTE PAIN

## 2022-03-12 ASSESSMENT — PAIN DESCRIPTION - ORIENTATION: ORIENTATION: RIGHT;UPPER

## 2022-03-12 ASSESSMENT — PAIN DESCRIPTION - FREQUENCY: FREQUENCY: CONTINUOUS

## 2022-03-12 ASSESSMENT — PAIN SCALES - GENERAL: PAINLEVEL_OUTOF10: 2

## 2022-03-12 ASSESSMENT — PAIN - FUNCTIONAL ASSESSMENT: PAIN_FUNCTIONAL_ASSESSMENT: ACTIVITIES ARE NOT PREVENTED

## 2022-03-12 ASSESSMENT — PAIN DESCRIPTION - ONSET: ONSET: GRADUAL

## 2022-03-12 NOTE — CARE COORDINATION
DISCHARGE ORDER  Date/Time 3/12/2022 2:45 PM  Completed by: Tomasz Tobin RN, Case Management    Patient Name: Yelena Mills      : 1950  Admitting Diagnosis: Cholangitis [K83.09]      Admit order Date and Status:inpt  (verify MD's last order for status of admission)      Noted discharge order. If applicable PT/OT recommendation at Discharge: na  DME recommendation by PT/OT:na  Confirmed discharge plan  (pt):     Discharge Plan: Reviewed chart. Writer spoke with the pt via TC. Pt declines hhc at this time. No other d/c needs voiced or identified. Reviewed chart. Role of discharge planner explained and patient verbalized understanding. Discharge order is noted. Has Home O2 in place on admit:  No  Informed of need to bring portable home O2 tank on day of discharge for nursing to connect prior to leaving:   Not Indicated  Verbalized agreement/Understanding:   Not Indicated  Pt is being d/c'd to home today. Pt's O2 sats are 99% on RA. Discharge timeout done with PRINCE Hu. All discharge needs and concerns addressed.

## 2022-03-12 NOTE — PLAN OF CARE
Problem: SAFETY  Goal: Free from accidental physical injury  3/12/2022 1246 by Neli Franks RN  Outcome: Ongoing  3/12/2022 0159 by Brigette Ashton RN  Outcome: Ongoing  Goal: Free from intentional harm  3/12/2022 1246 by Neli Franks RN  Outcome: Ongoing  3/12/2022 0159 by Brigette Ashton RN  Outcome: Ongoing     Problem: DAILY CARE  Goal: Daily care needs are met  3/12/2022 1246 by Neli Franks RN  Outcome: Ongoing  3/12/2022 0159 by Brigette Ashton RN  Outcome: Ongoing     Problem: PAIN  Goal: Patient's pain/discomfort is manageable  3/12/2022 1246 by Neli Franks RN  Outcome: Ongoing  3/12/2022 0159 by Brigette Ashton RN  Outcome: Ongoing     Problem: SKIN INTEGRITY  Goal: Skin integrity is maintained or improved  3/12/2022 1246 by Neli Franks RN  Outcome: Ongoing  3/12/2022 0159 by Brigette Ashton RN  Outcome: Ongoing     Problem: KNOWLEDGE DEFICIT  Goal: Patient/S.O. demonstrates understanding of disease process, treatment plan, medications, and discharge instructions.   3/12/2022 1246 by Neli Franks RN  Outcome: Ongoing  3/12/2022 0159 by Brigette Ashton RN  Outcome: Ongoing     Problem: DISCHARGE BARRIERS  Goal: Patient's continuum of care needs are met  3/12/2022 1246 by Neli Franks RN  Outcome: Ongoing  3/12/2022 0159 by Brigette Ashton RN  Outcome: Ongoing     Problem: Nutrition  Goal: Optimal nutrition therapy  3/12/2022 1246 by Neli Franks RN  Outcome: Ongoing  3/12/2022 0159 by Brigette Ashton RN  Outcome: Ongoing     Problem: Pain:  Goal: Pain level will decrease  Description: Pain level will decrease  3/12/2022 1246 by Neli Franks RN  Outcome: Ongoing  3/12/2022 0159 by Brigette Ashton RN  Outcome: Ongoing  Goal: Control of acute pain  Description: Control of acute pain  Outcome: Ongoing  Goal: Control of chronic pain  Description: Control of chronic pain  Outcome: Ongoing

## 2022-03-12 NOTE — PLAN OF CARE
Problem: SAFETY  Goal: Free from accidental physical injury  3/12/2022 0159 by Alf Linares RN  Outcome: Ongoing  0/21/6875 2701 by Romina Peterson RN  Outcome: Ongoing  Goal: Free from intentional harm  3/12/2022 0159 by Alf Linares RN  Outcome: Ongoing  5/33/5930 5263 by Romina Peterson RN  Outcome: Ongoing     Problem: DAILY CARE  Goal: Daily care needs are met  3/12/2022 0159 by Alf Linares RN  Outcome: Ongoing  3/30/1595 7108 by Romina Peterson RN  Outcome: Ongoing     Problem: PAIN  Goal: Patient's pain/discomfort is manageable  3/12/2022 0159 by Alf Linares RN  Outcome: Ongoing  5/81/2141 3480 by Romina Peterson RN  Outcome: Ongoing     Problem: SKIN INTEGRITY  Goal: Skin integrity is maintained or improved  3/12/2022 0159 by Alf Linares RN  Outcome: Ongoing  8/89/4334 5180 by Romina Peterson RN  Outcome: Ongoing     Problem: KNOWLEDGE DEFICIT  Goal: Patient/S.O. demonstrates understanding of disease process, treatment plan, medications, and discharge instructions.   3/12/2022 0159 by Alf Linares RN  Outcome: Ongoing  6/61/1768 5246 by Romina Peterson RN  Outcome: Ongoing     Problem: DISCHARGE BARRIERS  Goal: Patient's continuum of care needs are met  3/12/2022 0159 by Alf Linares RN  Outcome: Ongoing  5/68/3434 9590 by Romina Peterson RN  Outcome: Ongoing     Problem: Nutrition  Goal: Optimal nutrition therapy  3/12/2022 0159 by Alf Linares RN  Outcome: Ongoing  3/11/2022 1441 by Topher Shukla RD, LD  Outcome: Ongoing     Problem: Pain:  Goal: Pain level will decrease  Description: Pain level will decrease  Outcome: Ongoing

## 2022-03-12 NOTE — PROGRESS NOTES
Pt given discharge instructions, and verbalized understanding. Pt IV D/C per protocol. Pt denies any needs at discharge. Pt transported out via wheelchair.  Gautam Lemos RN

## 2022-03-12 NOTE — OP NOTE
[address]                 [address] [address]                                OPERATIVE REPORT    PATIENT NAME: Rosemary Burch                 :        1950  MED REC NO:   [id number]                          ROOM:       0205  ACCOUNT NO:   [de-identified]                           ADMIT DATE: 03/10/2022  PROVIDER:     Natalie Lowry MD    DATE OF PROCEDURE:  2022    PRE-PROCEDURE DIAGNOSIS:  Biliary stricture. POST-PROCEDURE DIAGNOSES:  1. A 1-cm biliary stricture. 2.  Severe dilation in the proximal duct with sludge. 3.  Stent occlusion. 4.  Moderate intrahepatic ductal dilation. 5.  Normal-appearing pancreas. PROCEDURES:  1.  EUS  2. ERCP with stent placement. PROCEDURE INDICATIONS:  A 54-year-old male with history of diabetes,  hypertension, hyperlipidemia, asthma, gout, remote chronic disease,  Crohn's disease, biliary stricture, recently diagnosed biliary stricture  status post ERCP with stent placement a month ago, admitted with  recurrent obstructive jaundice due to stent occlusion. EUS and ERCP are  being performed for diagnostic and therapeutic purposes. MEDICATIONS:  MAC per Anesthesia. ANESTHESIA:  Endotracheal intubation. PROCEDURE IN DETAIL:  An informed consent was obtained after discussing  risks, benefits, and alternatives. Full history and physical was  performed. The patient is classified as ASA class III. Medications  were given by Anesthesia. Cardiopulmonary status was continuously  monitored throughout the procedure. The patient underwent endotracheal  intubation for airway protection. Once adequately sedated, a standard  linear echoendoscope was inserted in the mouth and advanced under direct  visualization to second portion of the duodenum. The entire mucosa of  the esophagus, stomach and duodenum was examined carefully.   Ultrasound  images of mediastinum, AP window, subcarinal space were performed. The  scope was then advanced to the stomach where ultrasound images of the  left lobe of the liver, aorta, celiac axis, pancreas body, tail,  pancreatic duct, splenic artery, splenic vein, left adrenal and left  kidney and spleen were visualized. Scope was then advanced to the  duodenum where ultrasound images of the pancreatic head, uncinate  process, portal vein, and SMV were all visualized. The scope was then  exchanged in favor of therapeutic duodenoscope for ERCP purposes. FINDINGS:  ESOPHAGUS:  Exam of the esophagus appeared normal.  Ultrasound images of  mediastinum in AP window and subcarinal space were negative for  lymphadenopathy. STOMACH:  Examined portion of the stomach appeared normal.  Ultrasound  image of the left lobe of the liver showed moderate intrahepatic ductal  dilation, but no liver lesions. Aorta and celiac axis negative for  lymphadenopathy. Pancreas body and tail were examined carefully. Pancreas duct was nondilated at 1.9 mm as it coursed normally towards  the tail. Pancreas parenchyma appeared normal as well. Splenic artery  and splenic vein appeared normal.  The left adrenal gland, left kidney  and spleen were also briefly visualized to be normal.    DUODENUM:  Examined portion of the duodenum appeared normal.  There is a  stent emanating from the papilla. This is consistent with previous ERCP  with stent placement. Pancreas parenchyma appeared to be normal but  part of the area pancreas was obscured due to stent artifact. Pancreas  duct was measured to be 4.1 mm near the ampulla. Common bile duct was  5.4 mm distally. However proximal to the area of stricture, the common  bile duct was more severely dilated measuring 17 mm. There was a lot of  debris and sludge identified in the proximal duct on ultrasound images.    Uncinate process also appeared normal.  Portal vein and SMV appeared  normal.  The gallbladder visualized to have a large amount of sludge. At this time, this linear echoendoscope was exchanged in favor of  therapeutic duodenoscope. The stent emanating from the papilla was  easily retrieved using a snare pull method. Then deep wire-guided  cannulation was successfully achieved using TRUEtome sphincterotome  loaded with 0.025 Revolution wire. Bile was aspirated to confirm  biliary aspiration. The contrast was injected. Images were interpreted  by me. There was a 1-cm distal duct stricture identified. At this  time, brush catheter was used to sample the stricture again. Samples  were sent for cytology. Subsequently, [employer] 10 x 60 mm  fully covered metal stent was deployed across the stricture under  fluoroscopic guidance. This appeared to be in good position under  fluoroscopic images. At this time, decision was made to terminate the  procedure. Scope was withdrawn after aspiration of air and liquid comes  from the upper GI tract. SUMMARY:  1.  Moderate-to-severe intra and extrahepatic biliary ductal dilation. 2.  A 1-cm biliary stricture. 3.  Occluded stent successfully retrieved. 4.  Brushing of the biliary stricture performed. 5.  Normal-appearing pancreas. 6.  Pancreas parenchyma was not well visualized near the stent due to  the stent artifact. 7.  Successful placement of 10 x 60 mm [employer] metal stent  across the stricture. RECOMMENDATIONS:  1. Return the patient to floor for continuous medical care. 2.  Monitor LFTs. 3.  Await cytology results. 4.  Consider MRI with and without contrast to further evaluate the  pancreas parenchyma. 5.  We will repeat ERCP in 4 weeks for stent removal and possibly  SpyGlass cholangioscopy.   6.  Check  level    EBL: <5mL    Jose Chun MD    D: 03/11/2022 16:24:23       T: 03/11/2022 16:29:31     [email or web address]  Job#: [id number]     Doc#: [id number]    CC:  Tylor Cope MD

## 2022-03-12 NOTE — FLOWSHEET NOTE
03/11/22 0802   Vital Signs   Temp 97.9 °F (36.6 °C)   Temp Source Oral   Pulse 59   Resp 16   /72   BP Location Left upper arm   Patient Position Semi fowlers   Level of Consciousness Alert (0)   MEWS Score 1   Patient Currently in Pain Denies   Oxygen Therapy   SpO2 94 %   O2 Device None (Room air)     Shift assessment complete. See flow sheet. Scheduled meds given. See MAR. Patients head-toe complete, VS are logged, and active bowel sound noted in all four quadrants. Patient awake in bed, currently no CO pain/nausea/vomiting. Okay by MD to give AM medications. No further needs  noted at this time. Call light and bedside table are within reach. The bed is locked and is in the lowest position. Nell Gonzalez RN
03/11/22 2130   Treatment Team Notification   Reason for Communication Patient/Family request;Evaluate  (hx of DM. blood glucose 208. no orders for HUMALOG SSI.)   Team Member Name Neal Langley   Treatment Team Role Attending Provider   Method of Communication Secure Message   Response Waiting for response   Notification Time 2129     See new orders. Waiting for pharmacy to verify Humalog.
03/12/22 0756   Vital Signs   Temp 97.8 °F (36.6 °C)   Temp Source Oral   Pulse 55   Resp 18   BP (!) 140/80   BP Location Left upper arm   Patient Position Semi fowlers   Oxygen Therapy   SpO2 99 %   O2 Device None (Room air)     Pt assessment completed, vss, see flow sheet. Pt given am medications. Pt denies any pain at this time.   Malcom Akhtar RN
03/12/22 [address] [facility] Given To [employer] Re[facility] From [facility] Communication Face to Face; At bedside   Time Handoff Given 3457   End of Shift Check Performed Yes
Patient admitted to room 205 from ER. Patient oriented to room, call light, bed rails, phone, lights and bathroom. Patient instructed about the schedule of the day including: vital sign frequency, lab draws, possible tests, frequency of MD and staff rounds, daily weights, I &O's and prescribed diet. Bed alarm deferred patient low fall risk. Bed locked, in lowest position, side rails up 2/4, call light within reach. Recliner Assessment:      Patient is able to demonstrate the ability to move from a reclining position to an upright position within the recliner. 4 Eyes Skin Assessment     The patient is being assess for   Admission    I agree that 2 RN's have performed a thorough Head to Toe Skin Assessment on the patient. ALL assessment sites listed below have been assessed. Areas assessed for pressure by both nurses:   [x]   Head, Face, and Ears   [x]   Shoulders, Back, and Chest, Abdomen  [x]   Arms, Elbows, and Hands   [x]   Coccyx, Sacrum, and Ischium  [x]   Legs, Feet, and Heels        Skin Assessed Under all Medical Devices by both nurses:  N/A              All Mepilex Borders were peeled back and area peeked at by both nurses:  No: N/A  Please list where Mepilex Borders are located:  N/A             **SHARE this note so that the co-signing nurse is able to place an eSignature**    Co-signer eSignature: Electronically signed by Raman Miles RN on 3/11/22 at 6:06 AM EST    Does the Patient have Skin Breakdown related to pressure?   No          Mickey Prevention initiated:  NA   Wound Care Orders initiated:  NA      [facility] nurse consulted for Pressure Injury (Stage 3,4, Unstageable, DTI, NWPT, Complex wounds)and New or Established Ostomies:  NA      Primary Nurse eSignature: Electronically signed by Ganga Lim RN on 3/10/22 at 11:22 PM EST
Pt A/Ox4. VSS. Denies pain. Pt unlabored; respirations even, regular, effortless. No distress noted. RA. Shift assessment complete. See flowsheet. PM meds given. See MAR. Provided pt with turkey sandwich & water. Denies further needs at this time. Side rails 2/4. Bed in low position.  Call light within reach.        03/11/22 2133   Vital Signs   Temp 97.8 °F (36.6 °C)   Temp Source Oral   Pulse 61   Heart Rate Source Monitor   Resp 18   /77   BP Location Left upper arm   Patient Position High fowlers   Level of Consciousness Alert (0)   MEWS Score 1   Patient Currently in Pain Denies   Pain Assessment   Pain Assessment 0-10   Pain Level 0   Oxygen Therapy   SpO2 96 %   Pulse Oximeter Device Mode Intermittent   Pulse Oximeter Device Location Right;Finger   O2 Device None (Room air)
(4) no impairment

## 2022-03-12 NOTE — DISCHARGE SUMMARY
Hospital Medicine Discharge Summary    Patient ID: Sonny Morrow      Patient's PCP: Burgess Maykel MD    Admit Date: 3/10/2022     Discharge Date:   3/12/2022    Admitting Provider: Negro Reid MD     Discharge Provider: Negro Reid MD     Discharge Diagnoses: Active Hospital Problems    Diagnosis     Moderate protein-calorie malnutrition ([facility] [address] [E44.0]     Cholangitis [K83.09]     Common bile duct (CBD) obstruction [K83.1]        The patient was seen and examined on day of discharge and this discharge summary is in conjunction with any daily progress note from day of discharge. Hospital Course: 79yo man sent from GI office with worsening LFTs. Became Ill Wednesday with nausea and emesis.      Had CBD stent placed in Jan for obstructive jaundice. Biopsies negative at the time. Possible Cholangitis - ruled out  Obstructive Jaundice secondary to CBD stent blockage. Plan:               - zosyn - ok to d/c Abx.               - NPO - to low fat diet. .               - GI consult.     - had repeat ERCP 3/11 with dr Jacob Mcnair    - stent replaced, sludge noted. - will start actigall on discharge to aid with bile flow. - will follow GI OP.     - noted GI recs MRI - he just recently had MRI w contrast on 1/25 - no pancreatic pathology reported              - serial LFT - improved post stent replacement this admission.         Hypovolemic Hyponatremia - resolved with IVF. Physical Exam Performed:     BP (!) 140/80   Pulse 55   Temp 97.8 °F (36.6 °C) (Oral)   Resp 18   Ht 5' 11\" (1.803 m)   Wt 173 lb (78.5 kg)   SpO2 99%   BMI 24.13 kg/m²       General appearance: No apparent distress, appears stated age and cooperative. HEENT: Pupils equal, round, and reactive to light. Conjunctivae/corneas clear. Neck: Supple, with full range of motion. No jugular venous distention. Trachea midline. Respiratory:  Normal respiratory effort.  Clear to auscultation, bilaterally without Rales/Wheezes/Rhonchi. Cardiovascular: Regular rate and rhythm with normal S1/S2 without murmurs, rubs or gallops. Abdomen: Soft, non-tender, non-distended with normal bowel sounds. Musculoskeletal: No clubbing, cyanosis or edema bilaterally. Full range of motion without deformity. Skin: Skin color, texture, turgor normal.  No rashes or lesions. Neurologic:  Neurovascularly intact without any focal sensory/motor deficits. Cranial nerves: II-XII intact, grossly non-focal.  Psychiatric: Alert and oriented, thought content appropriate, normal insight  Capillary Refill: Brisk,3 seconds, normal   Peripheral Pulses: +2 palpable, equal bilaterally       Labs: For convenience and continuity at follow-up the following most recent labs are provided:      CBC:    Lab Results   Component Value Date    WBC 5.9 03/12/2022    HGB 13.6 03/12/2022    HCT 38.4 03/12/2022     03/12/2022       Renal:    Lab Results   Component Value Date     03/12/2022    K 4.1 03/12/2022    K 4.0 02/19/2022    CL 99 03/12/2022    CO2 26 03/12/2022    BUN 11 03/12/2022    CREATININE 0.6 03/12/2022    CALCIUM 9.2 03/12/2022    PHOS 3.3 03/12/2022         Significant Diagnostic Studies    Radiology:   FL ERCP BILIARY AND PANCREATIC S&I   Final Result   Biliary stent placement. Please refer to the procedure report for further   details. CT ABDOMEN PELVIS W IV CONTRAST Additional Contrast? None   Final Result   Dilatation of the biliary tree, the CBD stent appears to be in the   appropriate location but may be blocked. There is hyperemia noted of the   biliary tree, underlying cholangitis is not excluded. RECOMMENDATIONS:   Unavailable                Consults:     IP CONSULT TO HOSPITALIST  IP CONSULT TO GI    Disposition:  home     Condition at Discharge: Stable    Discharge Instructions/Follow-up:  PCP 1 week.      Code Status:  Full Code     Activity: activity as tolerated    Diet: low fat, low cholesterol diet      Discharge Medications:     Current Discharge Medication List           Details   ursodiol (ACTIGALL) 300 MG capsule Take 1 capsule by mouth 2 times daily  Qty: 60 capsule, Refills: 0              Details   metFORMIN (GLUCOPHAGE-XR) 500 MG extended release tablet Take 500 mg by mouth 2 times daily      dicyclomine (BENTYL) 10 MG capsule Take 10 mg by mouth nightly. allopurinol (ZYLOPRIM) 300 MG tablet Take 300 mg by mouth daily. albuterol (PROVENTIL HFA;VENTOLIN HFA) 108 (90 BASE) MCG/ACT inhaler Inhale 2 puffs into the lungs every 6 hours as needed. pantoprazole (PROTONIX) 40 MG tablet Take 1 tablet by mouth 2 times daily (before meals) for 14 days  Qty: 28 tablet, Refills: 0             Time Spent on discharge is more than 30 minutes in the examination, evaluation, counseling and review of medications and discharge plan. Signed:    Mikala Dawson MD   3/12/2022      Thank you Amena Bedoya MD for the opportunity to be involved in this patient's care. If you have any questions or concerns please feel free to contact me at [phone].

## 2022-03-16 LAB — CA 19-9: 103 U/ML (ref 0–35)

## 2022-03-24 ENCOUNTER — HOSPITAL ENCOUNTER (INPATIENT)
Age: 72
LOS: 3 days | Discharge: HOME | DRG: 445 | End: 2022-03-27
Attending: INTERNAL MEDICINE | Admitting: INTERNAL MEDICINE
Payer: MEDICARE

## 2022-03-24 PROBLEM — K81.9 CHOLECYSTITIS: Status: ACTIVE | Noted: 2022-03-24

## 2022-03-24 PROCEDURE — 1200000000 HC SEMI PRIVATE

## 2022-03-25 LAB
A/G RATIO: 2 (ref 1.1–2.2)
ALBUMIN SERPL-MCNC: 3.6 G/DL (ref 3.4–5)
ALP BLD-CCNC: 126 U/L (ref 40–129)
ALT SERPL-CCNC: 38 U/L (ref 10–40)
ANION GAP SERPL CALCULATED.3IONS-SCNC: 8 MMOL/L (ref 3–16)
AST SERPL-CCNC: 26 U/L (ref 15–37)
BASOPHILS ABSOLUTE: 0 K/UL (ref 0–0.2)
BASOPHILS RELATIVE PERCENT: 0.2 %
BILIRUB SERPL-MCNC: 0.8 MG/DL (ref 0–1)
BUN BLDV-MCNC: 10 MG/DL (ref 7–20)
CALCIUM SERPL-MCNC: 8.6 MG/DL (ref 8.3–10.6)
CHLORIDE BLD-SCNC: 101 MMOL/L (ref 99–110)
CO2: 28 MMOL/L (ref 21–32)
CREAT SERPL-MCNC: <0.5 MG/DL (ref 0.8–1.3)
EOSINOPHILS ABSOLUTE: 0.4 K/UL (ref 0–0.6)
EOSINOPHILS RELATIVE PERCENT: 4.7 %
GFR AFRICAN AMERICAN: >60
GFR NON-AFRICAN AMERICAN: >60
GLUCOSE BLD-MCNC: 117 MG/DL (ref 70–99)
GLUCOSE BLD-MCNC: 122 MG/DL (ref 70–99)
GLUCOSE BLD-MCNC: 124 MG/DL (ref 70–99)
GLUCOSE BLD-MCNC: 125 MG/DL (ref 70–99)
GLUCOSE BLD-MCNC: 134 MG/DL (ref 70–99)
GLUCOSE BLD-MCNC: 144 MG/DL (ref 70–99)
GLUCOSE BLD-MCNC: 161 MG/DL (ref 70–99)
HCT VFR BLD CALC: 32.1 % (ref 40.5–52.5)
HEMOGLOBIN: 11.5 G/DL (ref 13.5–17.5)
LYMPHOCYTES ABSOLUTE: 0.6 K/UL (ref 1–5.1)
LYMPHOCYTES RELATIVE PERCENT: 7.4 %
MCH RBC QN AUTO: 35.6 PG (ref 26–34)
MCHC RBC AUTO-ENTMCNC: 35.9 G/DL (ref 31–36)
MCV RBC AUTO: 99.3 FL (ref 80–100)
MONOCYTES ABSOLUTE: 0.3 K/UL (ref 0–1.3)
MONOCYTES RELATIVE PERCENT: 4.1 %
NEUTROPHILS ABSOLUTE: 6.8 K/UL (ref 1.7–7.7)
NEUTROPHILS RELATIVE PERCENT: 83.6 %
PDW BLD-RTO: 12.5 % (ref 12.4–15.4)
PERFORMED ON: ABNORMAL
PLATELET # BLD: 164 K/UL (ref 135–450)
PMV BLD AUTO: 7 FL (ref 5–10.5)
POTASSIUM REFLEX MAGNESIUM: 3.6 MMOL/L (ref 3.5–5.1)
RBC # BLD: 3.24 M/UL (ref 4.2–5.9)
SODIUM BLD-SCNC: 137 MMOL/L (ref 136–145)
TOTAL PROTEIN: 5.4 G/DL (ref 6.4–8.2)
WBC # BLD: 8.2 K/UL (ref 4–11)

## 2022-03-25 PROCEDURE — 94761 N-INVAS EAR/PLS OXIMETRY MLT: CPT

## 2022-03-25 PROCEDURE — 80053 COMPREHEN METABOLIC PANEL: CPT

## 2022-03-25 PROCEDURE — 2580000003 HC RX 258: Performed by: INTERNAL MEDICINE

## 2022-03-25 PROCEDURE — 85025 COMPLETE CBC W/AUTO DIFF WBC: CPT

## 2022-03-25 PROCEDURE — 6370000000 HC RX 637 (ALT 250 FOR IP): Performed by: INTERNAL MEDICINE

## 2022-03-25 PROCEDURE — 1200000000 HC SEMI PRIVATE

## 2022-03-25 PROCEDURE — 94640 AIRWAY INHALATION TREATMENT: CPT

## 2022-03-25 PROCEDURE — 6360000002 HC RX W HCPCS: Performed by: INTERNAL MEDICINE

## 2022-03-25 PROCEDURE — 99232 SBSQ HOSP IP/OBS MODERATE 35: CPT | Performed by: INTERNAL MEDICINE

## 2022-03-25 PROCEDURE — 36415 COLL VENOUS BLD VENIPUNCTURE: CPT

## 2022-03-25 RX ORDER — ALBUTEROL SULFATE 90 UG/1
2 AEROSOL, METERED RESPIRATORY (INHALATION) EVERY 6 HOURS PRN
Status: DISCONTINUED | OUTPATIENT
Start: 2022-03-25 | End: 2022-03-25

## 2022-03-25 RX ORDER — ACETAMINOPHEN 650 MG/1
650 SUPPOSITORY RECTAL EVERY 6 HOURS PRN
Status: DISCONTINUED | OUTPATIENT
Start: 2022-03-25 | End: 2022-03-27 | Stop reason: HOSPADM

## 2022-03-25 RX ORDER — URSODIOL 300 MG/1
300 CAPSULE ORAL 2 TIMES DAILY
Status: DISCONTINUED | OUTPATIENT
Start: 2022-03-25 | End: 2022-03-27 | Stop reason: HOSPADM

## 2022-03-25 RX ORDER — ONDANSETRON 2 MG/ML
4 INJECTION INTRAMUSCULAR; INTRAVENOUS EVERY 6 HOURS PRN
Status: DISCONTINUED | OUTPATIENT
Start: 2022-03-25 | End: 2022-03-27 | Stop reason: HOSPADM

## 2022-03-25 RX ORDER — SODIUM CHLORIDE 9 MG/ML
INJECTION, SOLUTION INTRAVENOUS CONTINUOUS
Status: DISCONTINUED | OUTPATIENT
Start: 2022-03-25 | End: 2022-03-27 | Stop reason: HOSPADM

## 2022-03-25 RX ORDER — GEMFIBROZIL 600 MG/1
600 TABLET, FILM COATED ORAL
COMMUNITY

## 2022-03-25 RX ORDER — MECOBALAMIN 5000 MCG
10 TABLET,DISINTEGRATING ORAL NIGHTLY PRN
Status: DISCONTINUED | OUTPATIENT
Start: 2022-03-25 | End: 2022-03-27 | Stop reason: HOSPADM

## 2022-03-25 RX ORDER — SODIUM CHLORIDE 0.9 % (FLUSH) 0.9 %
5-40 SYRINGE (ML) INJECTION EVERY 12 HOURS SCHEDULED
Status: DISCONTINUED | OUTPATIENT
Start: 2022-03-25 | End: 2022-03-27 | Stop reason: HOSPADM

## 2022-03-25 RX ORDER — DEXTROSE MONOHYDRATE 50 MG/ML
100 INJECTION, SOLUTION INTRAVENOUS PRN
Status: DISCONTINUED | OUTPATIENT
Start: 2022-03-25 | End: 2022-03-27 | Stop reason: HOSPADM

## 2022-03-25 RX ORDER — POTASSIUM CHLORIDE 20 MEQ/1
40 TABLET, EXTENDED RELEASE ORAL PRN
Status: DISCONTINUED | OUTPATIENT
Start: 2022-03-25 | End: 2022-03-27 | Stop reason: HOSPADM

## 2022-03-25 RX ORDER — ALBUTEROL SULFATE 90 UG/1
2 AEROSOL, METERED RESPIRATORY (INHALATION) 2 TIMES DAILY
Status: DISCONTINUED | OUTPATIENT
Start: 2022-03-26 | End: 2022-03-27 | Stop reason: HOSPADM

## 2022-03-25 RX ORDER — ONDANSETRON 4 MG/1
4 TABLET, ORALLY DISINTEGRATING ORAL EVERY 8 HOURS PRN
Status: DISCONTINUED | OUTPATIENT
Start: 2022-03-25 | End: 2022-03-27 | Stop reason: HOSPADM

## 2022-03-25 RX ORDER — ALBUTEROL SULFATE 90 UG/1
2 AEROSOL, METERED RESPIRATORY (INHALATION) EVERY 4 HOURS PRN
Status: DISCONTINUED | OUTPATIENT
Start: 2022-03-25 | End: 2022-03-27 | Stop reason: HOSPADM

## 2022-03-25 RX ORDER — DICYCLOMINE HYDROCHLORIDE 10 MG/1
10 CAPSULE ORAL NIGHTLY
Status: DISCONTINUED | OUTPATIENT
Start: 2022-03-25 | End: 2022-03-27 | Stop reason: HOSPADM

## 2022-03-25 RX ORDER — SODIUM CHLORIDE 0.9 % (FLUSH) 0.9 %
5-40 SYRINGE (ML) INJECTION PRN
Status: DISCONTINUED | OUTPATIENT
Start: 2022-03-25 | End: 2022-03-27 | Stop reason: HOSPADM

## 2022-03-25 RX ORDER — POLYETHYLENE GLYCOL 3350 17 G/17G
17 POWDER, FOR SOLUTION ORAL DAILY PRN
Status: DISCONTINUED | OUTPATIENT
Start: 2022-03-25 | End: 2022-03-27 | Stop reason: HOSPADM

## 2022-03-25 RX ORDER — POTASSIUM CHLORIDE 7.45 MG/ML
10 INJECTION INTRAVENOUS PRN
Status: DISCONTINUED | OUTPATIENT
Start: 2022-03-25 | End: 2022-03-27 | Stop reason: HOSPADM

## 2022-03-25 RX ORDER — ACETAMINOPHEN 325 MG/1
650 TABLET ORAL EVERY 6 HOURS PRN
Status: DISCONTINUED | OUTPATIENT
Start: 2022-03-25 | End: 2022-03-27 | Stop reason: HOSPADM

## 2022-03-25 RX ORDER — SODIUM CHLORIDE 9 MG/ML
25 INJECTION, SOLUTION INTRAVENOUS PRN
Status: DISCONTINUED | OUTPATIENT
Start: 2022-03-25 | End: 2022-03-27 | Stop reason: HOSPADM

## 2022-03-25 RX ORDER — DEXTROSE MONOHYDRATE 25 G/50ML
12.5 INJECTION, SOLUTION INTRAVENOUS PRN
Status: DISCONTINUED | OUTPATIENT
Start: 2022-03-25 | End: 2022-03-25 | Stop reason: CLARIF

## 2022-03-25 RX ORDER — TRIAMTERENE AND HYDROCHLOROTHIAZIDE 37.5; 25 MG/1; MG/1
1 TABLET ORAL EVERY 12 HOURS
COMMUNITY
End: 2022-07-14

## 2022-03-25 RX ORDER — NICOTINE POLACRILEX 4 MG
15 LOZENGE BUCCAL PRN
Status: DISCONTINUED | OUTPATIENT
Start: 2022-03-25 | End: 2022-03-27 | Stop reason: HOSPADM

## 2022-03-25 RX ORDER — MELATONIN 10 MG
10 CAPSULE ORAL NIGHTLY PRN
Status: DISCONTINUED | OUTPATIENT
Start: 2022-03-25 | End: 2022-03-25 | Stop reason: CLARIF

## 2022-03-25 RX ADMIN — ENOXAPARIN SODIUM 40 MG: 100 INJECTION SUBCUTANEOUS at 14:18

## 2022-03-25 RX ADMIN — URSODIOL 300 MG: 300 CAPSULE ORAL at 01:20

## 2022-03-25 RX ADMIN — PIPERACILLIN AND TAZOBACTAM 3375 MG: 3; .375 INJECTION, POWDER, FOR SOLUTION INTRAVENOUS at 01:26

## 2022-03-25 RX ADMIN — SODIUM CHLORIDE: 9 INJECTION, SOLUTION INTRAVENOUS at 12:26

## 2022-03-25 RX ADMIN — PIPERACILLIN AND TAZOBACTAM 3375 MG: 3; .375 INJECTION, POWDER, FOR SOLUTION INTRAVENOUS at 09:20

## 2022-03-25 RX ADMIN — PIPERACILLIN AND TAZOBACTAM 3375 MG: 3; .375 INJECTION, POWDER, FOR SOLUTION INTRAVENOUS at 17:42

## 2022-03-25 RX ADMIN — Medication 2 PUFF: at 19:27

## 2022-03-25 RX ADMIN — Medication 10 MG: at 20:47

## 2022-03-25 RX ADMIN — DICYCLOMINE HYDROCHLORIDE 10 MG: 10 CAPSULE ORAL at 20:46

## 2022-03-25 RX ADMIN — Medication 10 ML: at 20:48

## 2022-03-25 RX ADMIN — INSULIN LISPRO 1 UNITS: 100 INJECTION, SOLUTION INTRAVENOUS; SUBCUTANEOUS at 20:47

## 2022-03-25 RX ADMIN — URSODIOL 300 MG: 300 CAPSULE ORAL at 14:18

## 2022-03-25 RX ADMIN — Medication 2 PUFF: at 23:01

## 2022-03-25 RX ADMIN — URSODIOL 300 MG: 300 CAPSULE ORAL at 21:08

## 2022-03-25 RX ADMIN — Medication 10 MG: at 01:19

## 2022-03-25 RX ADMIN — SODIUM CHLORIDE: 9 INJECTION, SOLUTION INTRAVENOUS at 01:22

## 2022-03-25 RX ADMIN — DICYCLOMINE HYDROCHLORIDE 10 MG: 10 CAPSULE ORAL at 01:19

## 2022-03-25 ASSESSMENT — PAIN SCALES - GENERAL: PAINLEVEL_OUTOF10: 0

## 2022-03-25 NOTE — PROGRESS NOTES
Comprehensive Nutrition Assessment    Type and Reason for Visit:  Initial,Positive Nutrition Screen (+N/V)    Nutrition Recommendations/Plan:   1. Continue ADULT DIET; Clear Liquid; Low Fat diet order and monitor diet advancement  2. Monitor appetite + meal intake  3. Monitor GI function, weight trends, bowel function, nutrition related labs and POC. Nutrition Assessment:  Patient is moderately malnourished AEB mild fat loss/mild muscle wasting present, poor appetite/po intake + reported weight loss starting in January 2022 PTA r/t GI dysfunction d/t recent common bile duct obstruction + stent placement. At risk for further compromise d/t ongoing GI dysfunction d/t admitted with cholecystitis and current Clear Liquid status; Will continue ADULT DIET; CLear Liquid; Low Fat and monitor diet advancement    Malnutrition Assessment:  Malnutrition Status: Moderate malnutrition    Context:  Acute Illness     Findings of the 6 clinical characteristics of malnutrition:  Energy Intake:  Mild decrease in energy intake (Comment) (poor po intake x ~ 1 day PTA + currently Clear Liquid)  Weight Loss:  Unable to assess (limited recent acutal weight hx)     Body Fat Loss:  1 - Mild body fat loss Orbital   Muscle Mass Loss:  1 - Mild muscle mass loss Temples (temporalis),Clavicles (pectoralis & deltoids)  Fluid Accumulation:  No significant fluid accumulation     Strength:  Not Performed    Estimated Daily Nutrient Needs:  Energy (kcal):  1278-8628 kcals based on 25-28 kcals/kg/CBW; Weight Used for Energy Requirements:  Current     Protein (g):  84-99 g protein based on 1.1-1.3 g/kg/CBW;  Weight Used for Protein Requirements:  Current        Fluid (ml/day):  0435-9099 ml; Method Used for Fluid Requirements:  1 ml/kcal      Nutrition Related Findings:  met with pt + wife at bedside; pt reports that he has a recent hx of common bile duct obstruction in Jan 2022 + reported N/V + that he was only tolerating jello prior to having a stent place on Jan 24th; reports he was doing well + eating good s/p stent placement, however he had another obstruction + was eating poorly again; reports another stent placed on march 11th; Pt reports since then he has been eatign 3 meals per day + snack in between up until 1 day PTA when he started feeling unwell again; reports that he consumed 2 scarmbled eggs, 2 saugages + 2 whole wheat pancakes on morning PTA; pt was admitted with cholecystitis; denies recent N/V prior to this admission; reports that he feels hungry now and could eat a hamburger; currently on Clear Liquid diet- declines Ensure Clear d/t diabetes; Pt + wife had questions about Carb controlled/Low fat diet- provided pt with diet education handouts + verbally reviewed handouts with pt; denies difficulty chew/swallowing, teeth intact noted; Abd WDL, Soft, BS are active +BM 3/24; no edema; Skin WDL; pt requesting [place] ice; Cr and H/h are low; BG is elevated; Wounds:  None       Current Nutrition Therapies:    ADULT DIET; Clear Liquid; Low Fat (less than or equal to 50 gm/day)    Anthropometric Measures:  · Height: 5' 11\" (180.3 cm)  · Current Body Weight: 168 lb 4.8 oz (76.3 kg) (obtained 3/25/22; actual weight via standing scale)   · Admission Body Weight: 168 lb 4.8 oz (76.3 kg) (obtained 3/25/22; actual weight via standing scale)    · Usual Body Weight: 171 lb (77.6 kg) (limited recent weight hx; per chart review, stated weight ~ 171# since 1/24/22)     · Ideal Body Weight: 172 lbs; % Ideal Body Weight 97.8 %   · BMI: 23.5  · BMI Categories: Normal Weight (BMI 18.5-24. 9)       Nutrition Diagnosis:   · Moderate malnutrition related to inadequate protein-energy intake,altered GI function as evidenced by NPO or clear liquid status due to medical condition,poor intake prior to admission,weight loss,mild loss of subcutaneous fat,mild muscle loss,GI abnormality,other (comment) (recent common bile duct obstruction, admitted with cholecystitis)    Nutrition Interventions:   Food and/or Nutrient Delivery:  Continue Current Diet  Nutrition Education/Counseling:  No recommendation at this time   Coordination of Nutrition Care:  Continue to monitor while inpatient    Goals:  patient will consume 75% or greater of meals on ADULT DIET; Clear Liquid; Low fat diet order x 3       Nutrition Monitoring and Evaluation:   Behavioral-Environmental Outcomes:  None Identified   Food/Nutrient Intake Outcomes:  Diet Advancement/Tolerance,Food and Nutrient Intake  Physical Signs/Symptoms Outcomes:  Biochemical Data,GI Status,Nausea or Vomiting,Nutrition Focused Physical Findings,Weight     Discharge Planning:     Too soon to determine     Electronically signed by Chacho Cherry RD, LD on 3/25/22 at 6:51 PM EDT    Contact: [phone]

## 2022-03-25 NOTE — PROGRESS NOTES
Pt admitted to 223 as a direct admit form [facility]. Pt alert and oriented x4, pt oriented to room and call light, medications given, admission assessment completed, vitals taken- vss, no c/o pain or nausea at this time, CHG bath given, started NS IV fluids per order, call light within reach told pt to call for any needs.  Esther Tovar RN

## 2022-03-25 NOTE — PLAN OF CARE
Nutrition Problem #1: Moderate malnutrition  Intervention: Food and/or Nutrient Delivery: Continue Current Diet  Nutritional Goals: patient will consume 75% or greater of meals on ADULT DIET; Clear Liquid;  Low fat diet order x 3

## 2022-03-25 NOTE — CARE COORDINATION
Case Management Assessment  Initial Evaluation      Patient Name: Sima Mina  YOB: 1950  Diagnosis: Cholecystitis [K81.9]  Date / Time: 3/24/2022 11:42 PM    Admission status/Date: 3/24/22 inpt  Chart Reviewed: Yes      Patient Interviewed: Yes   Family Interviewed:  No      Hospitalization in the last 30 days:  Yes      Health Care Decision Maker :   Primary Decision Maker: Lottie Urias - Spouse - [phone]    (CM - must 1st enter selection under Navigator - emergency contact- [place] Relationship and pick relationship)   Who do you trust or have selected to make healthcare decisions for you      Met with:  patient  Interview conducted  (bedside/phone):    Current PCP:  [address] [place] [address] required for SNF :  N          3 night stay required - Y    ADLS  Support Systems/Care Needs: Spouse/Significant Other,Children  Transportation: self    Meal Preparation: self/wife    Housing  Living Arrangements:  Lives 1 story  Steps: 4  Intent for return to present living arrangements: Yes  Identified Issues:     [address] with [address] : No Agency:(Services)  Type of Home Care Services: None  Passport/Waiver : No  :                      Phone Number:    Passport/Waiver Services: N/A          Durable Medical Equiptment   DME Provider: N/A  Equipment:   Walker___Cane___RTS___ BSC___Shower Chair___Hospital Bed___W/C____Other________  02 at ____Liter(s)---wears(frequency)_______ HHN ___ CPAP___ BiPap___   N/A__x__      Home O2 Use :  No    If No for home O2---if presently on O2 during hospitalization:  No  if yes CM to follow for potential DC O2 need  Informed of need for care provider to bring portable home O2 tank on day of discharge for nursing to connect prior to leaving:   Not Indicated  Verbalized agreement/Understanding:   Not Indicated    Community Service Affiliation  Dialysis:  No · Agency:  · Location:  · Dialysis Schedule:  · Phone:   · Fax: Other Community Services: (ex:PT/OT,Mental Health,Wound Clinic, Cardio/Pul [address]) none    DISCHARGE PLAN: Explained Case Management role/services. Reviewed chart and met with pt at bedside. Role of CM explained. States lives home with wife and is IPTA. 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 Anticipate no needs but will follow.

## 2022-03-25 NOTE — ACP (ADVANCE CARE PLANNING)
Advance Care Planning     General Advance Care Planning (ACP) Conversation    Date of Conversation: 3/24/2022  Conducted with: Patient with Decision Making Capacity    Healthcare Decision Maker:    Primary Decision Maker: Chau Hobson - Spouse - [phone]  Click here to complete Healthcare Decision Makers including selection of the Healthcare Decision Maker Relationship (ie \"Primary\"). Today we documented Decision Maker(s) consistent with Legal Next of Kin hierarchy.     Content/Action Overview:  DECLINED ACP Conversation - will revisit periodically  Reviewed DNR/DNI and patient elects Full Code (Attempt Resuscitation)        Length of Voluntary ACP Conversation in minutes:  <16 minutes (Non-Billable)    Lupe Musa RN

## 2022-03-25 NOTE — H&P
Hospital Medicine History & Physical      PCP: Bertha Perez MD    Date of Admission: 3/24/2022    Date of Service: Pt seen/examined on 3/24/2022    Chief Complaint: Fevers, abdominal pain and rigors      History Of Present Illness:    70 y.o. male who presented to the hospital with a chief complaint of fevers and chills. Patient was transferred from [facility] for GI evaluation. Patient has a history of biliary stricture status post stent placement and stent replacement by GI for obstructive jaundice and cholangitis. He has been doing well till today when he developed fevers and chills at home. He describes uncontrollable shaking and teeth clenching. He had a temperature of up to 102 °F at [facility] emergency department. His CT scan was unremarkable and his stent appears to be patent and in place. There was however concern for pneumobilia which can be seen with recent stent placement but there is also distention and thickening of the gallbladder that was concerning for age-indeterminate cholecystitis. His LFTs were also within normal limits except for an elevated alk phos. He was transferred for further GI and possibly surgery evaluation.     Past Medical History:          Diagnosis Date    Asthma     Blood circulation, collateral     Carpal tunnel syndrome     CLL (chronic lymphocytic leukemia) (HCC)     [facility]'s colitis ([facility])     Diabetes mellitus ([facility])     emboli     pulmonary emboli in 1980    GERD (gastroesophageal reflux disease)     Chrons disease    Gout     Hx of blood clots     Hypertension     Pneumonia     pneumonia,asthma    prostate     infection    Seasonal allergies        Past Surgical History:          Procedure Laterality Date    ERCP N/A 1/26/2022    ERCP BIOPSY performed by Vijay Treviño MD at [address] [place] [address] ERCP  1/26/2022    ERCP SPHINCTER/PAPILLOTOMY performed by Vijay Treviño MD at [address] [place] [address] ERCP 1/26/2022    ERCP STENT INSERTION performed by Zeyad Amaya MD at [facility] ERCP N/A 3/11/2022    ERCP STENT REMOVAL performed by Zeyad Amaya MD at [facility] ERCP  3/11/2022    ERCP STENT INSERTION performed by Zeyad Amaya MD at [facility] ERCP  3/11/2022    ERCP BIOPSY performed by Zeyad Amaya MD at [place] [address]  01/26/2022    ERCP WF W/ANES. (8:30) (N/A )     TONSILLECTOMY      UPPER GASTROINTESTINAL ENDOSCOPY N/A 3/11/2022    UPPER EUS W/ANES. performed by Zeyad Amaya MD at [address]       Medications Prior to Admission:      Prior to Admission medications    Medication Sig Start Date End Date Taking? Authorizing Provider   ursodiol (ACTIGALL) 300 MG capsule Take 1 capsule by mouth 2 times daily 3/12/22   Sherly Brooks MD   pantoprazole (PROTONIX) 40 MG tablet Take 1 tablet by mouth 2 times daily (before meals) for 14 days 1/26/22 2/9/22  Sherly Brooks MD   metFORMIN (GLUCOPHAGE-XR) 500 MG extended release tablet Take 500 mg by mouth 2 times daily    Historical Provider, MD   dicyclomine (BENTYL) 10 MG capsule Take 10 mg by mouth nightly. 2/15/11   Historical Provider, MD   allopurinol (ZYLOPRIM) 300 MG tablet Take 300 mg by mouth daily. Historical Provider, MD   albuterol (PROVENTIL HFA;VENTOLIN HFA) 108 (90 BASE) MCG/ACT inhaler Inhale 2 puffs into the lungs every 6 hours as needed. Historical Provider, MD       Allergies:  Codeine    Social History:      TOBACCO:   reports that he has quit smoking. He has never used smokeless tobacco.  ETOH:   reports no history of alcohol use. Family History:    No family history on file. REVIEW OF SYSTEMS:   Constitutional:   Positive for fevers and chills  ENT:  Negative for rhinorrhea, epistaxis, hoarseness, sore throat.   Respiratory: Negative for SOB or wheezing   Cardiovascular: 03/10/2022       Radiology:   Reviewed CT scan from [address]:  Fevers, possible cholangitis versus cholecystitis  Biliary stricture status post stent placement  Diabetes mellitus type 2  Hypertension  Hyperlipidemia    PLAN:  Empiric antibiotic with Zosyn  GI consultation  N.p.o. sliding scale insulin  Pain medications if necessary  IV fluids, follow cultures from [facility]      DVT Prophylaxis: Lovenox  Diet: Diet NPO Exceptions are: Sips of Water with Meds  Code Status: Full Code    Dispo -admit to [facility]      Thank you for the opportunity to be involved in this patient's care.       (Please note that portions of this note were completed with a voice recognition program. Efforts were made to edit the dictations but occasionally words are mis-transcribed.)

## 2022-03-25 NOTE — FLOWSHEET NOTE
03/25/22 0915   Vital Signs   Temp 98.1 °F (36.7 °C)   Temp Source Oral   Pulse 65   Heart Rate Source Monitor   Resp 16   BP (!) 90/53   BP Location Left upper arm   Level of Consciousness Alert (0)   MEWS Score 2   Patient Currently in Pain Denies   Oxygen Therapy   SpO2 97 %   O2 Device None (Room air)     Pt a/o. Am assessment completed. See flowsheet. Pt denies pain./ needs at this time. Call light within reach. Rose Stark RN\

## 2022-03-25 NOTE — PROGRESS NOTES
Progress Note    Admit Date:  3/24/2022    69 y/o male with pmhx of hx of biliary stent placement 3/11/22, CLL, Crohns, DM, VTE hx, HTN, and GERD presented to [facility] on 3/24/22 with complaint of fever and chills. Tmax of 102. CT showed patent stent in place, concern for pneumobilia and distension and thickening of gallbladder. Afebrile, no leukocytosis. Elevated alk phos. Started on IV zosyn, NPO, GI following. Subjective:  Mr. Breonna Murillo feels better today. No abdominal pain. No fevers. Would like to have something to drink. GI consult pending. .      Objective:        Vitals:    03/25/22 0147 03/25/22 0256 03/25/22 0915 03/25/22 1328   BP:  (!) 100/59 (!) 90/53 100/61   Pulse:  66 65 55   Resp:  16 16 16   Temp:  98.9 °F (37.2 °C) 98.1 °F (36.7 °C) 97.3 °F (36.3 °C)   TempSrc:  Oral Oral Oral   SpO2:  95% 97% 97%   Height: 5' 11\" (1.803 m)               Intake/Output Summary (Last 24 hours) at 3/25/2022 1328  Last data filed at 3/25/2022 0746  Gross per 24 hour   Intake --   Output 675 ml   Net -675 ml       Physical Exam:  Gen: No distress. Alert. Eyes: PERRL. No sclera icterus. No conjunctival injection. ENT: No discharge. Pharynx clear. Neck: No JVD. Trachea midline. Resp: No accessory muscle use. No crackles. No wheezes. No rhonchi. CV: Regular rate. Regular rhythm. No murmur. No rub. No edema. Capillary Refill: Brisk,< 3 seconds   Peripheral Pulses: +2 palpable, equal bilaterally   GI: Non-tender. Non-distended. Normal bowel sounds. Skin: Warm and dry. No nodule on exposed extremities. No rash on exposed extremities. M/S: No cyanosis. No joint deformity. No clubbing. Neuro: Awake. Grossly nonfocal    Psych: Oriented x 3. No anxiety or agitation.          Current Facility-Administered Medications:     dicyclomine (BENTYL) capsule 10 mg, 10 mg, Oral, Nightly, Jennifer Oleary MD, 10 mg at 03/25/22 0119    ursodiol (ACTIGALL) capsule 300 mg, 300 mg, Oral, BID, Obiora Roque Mart MD, 300 mg at 03/25/22 0120    glucose (GLUTOSE) 40 % oral gel 15 g, 15 g, Oral, PRN, Letty Guzmán MD    glucagon (rDNA) injection 1 mg, 1 mg, IntraMUSCular, PRN, Letty Guzmán MD    dextrose 5 % solution, 100 mL/hr, IntraVENous, PRN, Letty Guzmán MD    sodium chloride flush 0.9 % injection 5-40 mL, 5-40 mL, IntraVENous, 2 times per day, Letty Guzmán MD    sodium chloride flush 0.9 % injection 5-40 mL, 5-40 mL, IntraVENous, PRN, Letty Guzmán MD    0.9 % sodium chloride infusion, 25 mL, IntraVENous, PRN, Letty Guzmán MD    enoxaparin (LOVENOX) injection 40 mg, 40 mg, SubCUTAneous, Daily, Letty Guzmán MD    ondansetron (ZOFRAN-ODT) disintegrating tablet 4 mg, 4 mg, Oral, Q8H PRN **OR** ondansetron (ZOFRAN) injection 4 mg, 4 mg, IntraVENous, Q6H PRN, Letty Guzmán MD    polyethylene glycol (GLYCOLAX) packet 17 g, 17 g, Oral, Daily PRN, Letty Guzmán MD    acetaminophen (TYLENOL) tablet 650 mg, 650 mg, Oral, Q6H PRN **OR** acetaminophen (TYLENOL) suppository 650 mg, 650 mg, Rectal, Q6H PRN, Letty Guzmán MD    0.9 % sodium chloride infusion, , IntraVENous, Continuous, Letty Guzmán MD, Last Rate: 125 mL/hr at 03/25/22 1226, New Bag at 03/25/22 1226    potassium chloride (KLOR-CON M) extended release tablet 40 mEq, 40 mEq, Oral, PRN **OR** potassium bicarb-citric acid (EFFER-K) effervescent tablet 40 mEq, 40 mEq, Oral, PRN **OR** potassium chloride 10 mEq/100 mL IVPB (Peripheral Line), 10 mEq, IntraVENous, PRN, Letty Guzmán MD    insulin lispro (HUMALOG) injection vial 0-6 Units, 0-6 Units, SubCUTAneous, Q4H, Letty Guzmán MD    piperacillin-tazobactam (ZOSYN) 3,375 mg in sodium chloride 0.9 % 100 mL IVPB extended infusion (mini-bag), 3,375 mg, IntraVENous, Q8H, Letty Guzmán MD, Stopped at 03/25/22 0950    HYDROmorphone (DILAUDID) injection 0.25 mg, 0.25 mg, IntraVENous, Q4H PRN, Letty Guzmán MD    dextrose bolus (hypoglycemia) 10% 125 mL, 125 mL, IntraVENous, PRN **OR** dextrose bolus (hypoglycemia) 10% 250 mL, 250 mL, IntraVENous, PRN, Maximo Gonzalez MD    melatonin disintegrating tablet 10 mg, 10 mg, Oral, Nightly PRN, Maximo Gonzalez MD, 10 mg at 03/25/22 0119      Data:  CBC:   Recent Labs     03/25/22 0612   WBC 8.2   HGB 11.5*   HCT 32.1*   MCV 99.3        BMP:   Recent Labs     03/25/22 0612      K 3.6      CO2 28   BUN 10   CREATININE <0.5*     LIVER PROFILE:   Recent Labs     03/25/22 0612   AST 26   ALT 38   BILITOT 0.8   ALKPHOS 126       CULTURES  Blood cultures pending from [facility]   COVID/Flu negative      RADIOLOGY    No orders to display         Assessment/Plan:    #Fever  #Possible cholecystitis vs cholangitis   #Biliary stricture s/p stent placement 3/11/22  -Afebrile here, no leukocytosis, LFTs WNL    -NPO  -Continue IV zosyn   -pain meds if necessary   -continue ursodiol   -IVF   -blood cultures pending   -GI consulted, pending recommendations     #DM   -holding home meds for now   -SSI   -continue to monitor BG     #HTN   -hypotensive here   -holding home meds  -will continue to monitor BP     DVT Prophylaxis: lovenox   Diet: Diet NPO Exceptions are: Sips of Water with Meds  Code Status: Full Code    MD Anastasia Yeager MD

## 2022-03-25 NOTE — CONSULTS
Gastroenterology Consult Note    Patient:   Giovani Guaman   :    1950   Facility:   [facility]  Referring/PCP: Ciro Pallas, MD  Date:     3/25/2022  Consultant:   Kristen Rocha PA-C      No chief complaint on file. History of Present illness     70year old male with a history of DM, HTN, HLD, asthma, gout, remote Crohn's disease, biliary stricture s/p ERCP with stent c/b obstructive jaundice due to stent occlusion s/p EUS + ERCP with stent placement presented to the ED with chills. Yesterday morning after eating breakfast he developed chills, shaking, and felt clammy. He admits to persistent RLQ abdominal pain characterized as dull and chest pain. He passed 3x BMs yesterday. Wife reports patient appeared jaundiced. He has lost 20-30 lbs over the last 2 months. He denies nausea, vomiting, dysphagia, heartburn, cramping, bloating, early satiety, rectal bleeding, melena, and change in bowel habits. He started Ursodiol 2 weeks ago. His last ERCP with stent placement was 2022. His last EUS and ERCP with stent placement was 3/2022. GI was consulted for evaluation of fever and biliary stent in place. CT abd/pelvis showed pneumobilia vs cholangitis, cholecystitis, and constipation. He is scheduled for ERCP with stent removal on 2022.        Past Medical History:   Diagnosis Date    Asthma     Blood circulation, collateral     Carpal tunnel syndrome     CLL (chronic lymphocytic leukemia) (HCC)     [facility]     Diabetes mellitus [facility]     emboli     pulmonary emboli in     GERD (gastroesophageal reflux disease)     Chrons disease    Gout     Hx of blood clots     Hypertension     Pneumonia     pneumonia,asthma    prostate     infection    Seasonal allergies      Past Surgical History:   Procedure Laterality Date    ERCP N/A 2022    ERCP BIOPSY performed by Madonna Cruz MD at [address] [facility] ERCP  2022    ERCP SPHINCTER/PAPILLOTOMY performed by Jeannette Palencia MD at [address] [place] [address] ERCP  1/26/2022    ERCP STENT INSERTION performed by Jeannette Palencia MD at [address] [place] [address] ERCP N/A 3/11/2022    ERCP STENT REMOVAL performed by Jeannette Palencia MD at [address] [place] [address] ERCP  3/11/2022    ERCP STENT INSERTION performed by Jeannette Palencia MD at [address] [place] [address] ERCP  3/11/2022    ERCP BIOPSY performed by Jeannette [facility]encia MD at [facility]  01/26/2022    ERCP WF W/ANES. (8:30) (N/A )     TONSILLECTOMY      UPPER GASTROINTESTINAL ENDOSCOPY N/A 3/11/2022    UPPER EUS W/ANES. performed by Jeannette Palencia MD at [facility]. [place] [address]:   Social History     Tobacco Use    Smoking status: Former Smoker    Smokeless tobacco: Never Used    Tobacco comment: quit 40-50 years ago   Substance Use Topics    Alcohol use: No     Family: No family history on file. No current facility-administered medications on file prior to encounter. Current Outpatient Medications on File Prior to Encounter   Medication Sig Dispense Refill    triamterene-hydroCHLOROthiazide (MAXZIDE-25) 37.5-25 MG per tablet Take 1 tablet by mouth daily      gemfibrozil (LOPID) 600 MG tablet Take 600 mg by mouth 2 times daily (before meals)      ursodiol (ACTIGALL) 300 MG capsule Take 1 capsule by mouth 2 times daily 60 capsule 0    pantoprazole (PROTONIX) 40 MG tablet Take 1 tablet by mouth 2 times daily (before meals) for 14 days 28 tablet 0    metFORMIN (GLUCOPHAGE-XR) 500 MG extended release tablet Take 500 mg by mouth 2 times daily      dicyclomine (BENTYL) 10 MG capsule Take 10 mg by mouth nightly.  allopurinol (ZYLOPRIM) 300 MG tablet Take 300 mg by mouth daily.  albuterol (PROVENTIL HFA;VENTOLIN HFA) 108 (90 BASE) MCG/ACT inhaler Inhale 2 puffs into the lungs every 6 hours as needed.         Infusions:    dextrose  sodium chloride      sodium chloride 125 mL/hr at 03/25/22 1416     PRN Medications: glucose, glucagon (rDNA), dextrose, sodium chloride flush, sodium chloride, ondansetron **OR** ondansetron, polyethylene glycol, acetaminophen **OR** acetaminophen, potassium chloride **OR** potassium alternative oral replacement **OR** potassium chloride, HYDROmorphone, dextrose bolus (hypoglycemia) **OR** dextrose bolus (hypoglycemia), melatonin  Allergies: Allergies   Allergen Reactions    Codeine      Pt states he took medication without food and had a reaction       ROS:   Constitutional: Positive for chills, fevers and sweats. Eyes: negative for cataracts, icterus and redness  Ears, nose, mouth, throat, and face: negative for epistaxis, hearing loss and sore throat  Respiratory: negative for cough, hemoptysis and sputum  Cardiovascular: negative for chest pain, dyspnea and lower extremity edema  Gastrointestinal: as per HPI  Genitourinary:negative for dysuria, frequency and hematuria  Neurological: negative for coordination problems, dizziness and gait problems  Behavioral/Psych: negative for anxiety, depression and mood swings    Physical Exam   /61   Pulse 55   Temp 97.3 °F (36.3 °C) (Oral)   Resp 16   Ht 5' 11\" (1.803 m)   SpO2 97%   BMI 24.13 kg/m²       General appearance: alert, appears stated age, cooperative and no distress  Head: Normocephalic, without obvious abnormality, atraumatic  Eyes: conjunctivae/corneas clear. PERRL, EOM's intact. Fundi benign.   Neck: no adenopathy and supple, symmetrical, trachea midline  Lungs: clear to auscultation bilaterally  Heart: regular rate and rhythm, S1, S2 normal, no murmur, click, rub or gallop  Abdomen: soft, non-tender; bowel sounds normal; no masses,  no organomegaly  Extremities: extremities normal, atraumatic, no cyanosis or edema    Lab and Imaging Review   Labs:  CBC:   Recent Labs     03/25/22  0612   WBC 8.2   HGB 11.5*   HCT 32.1*   MCV 99.3      BMP:   Recent Labs     03/25/22  0612      K 3.6      CO2 28   BUN 10   CREATININE <0.5*     LIVER PROFILE:   Recent Labs     03/25/22 0612   AST 26   ALT 38   PROT 5.4*   BILITOT 0.8   ALKPHOS 126     PT/INR: No results for input(s): INR in the last 72 hours. Invalid input(s): PT      IMAGING:  CT ABD/PELVIS W/O CONTRAST - 3/24/2022  Impression:  1. Common bile duct stent in place with extensive pneumobilia noted. There is thickening of the gallbladder wall. The pneumobilia is not in an uncommon finding in the setting of biliary stent placement but can also be seen with cholangitis for which clinical correlation is suggested given the history. Gallbladder thickened could be due to age indeterminate cholecystitis. 2.  Suggestion of fecal stasis. Assessment:     70year old male with a history of DM, HTN, HLD, asthma, gout, remote Crohn's disease, biliary stricture s/p ERCP with stent c/b obstructive jaundice due to stent occlusion s/p EUS + ERCP with stent placement admitted with acute cholangitis. Plan:   1. Continue supportive care  2. Monitor LFTs  3. Monitor and document output  4. Continue broad spectrum antibiotics  5. Continue Ursodiol as prescribed   6. Trial clear liquid diet  7. Slowly advance to low fiber diet as tolerated   8. No plans for endoscopy this admission  9. Will follow  10.  Proceed with outpatient ERCP as scheduled      Kristen Rocha PA-C  3:33 PM 3/25/2022            Day Severino MD       (O) [phone]

## 2022-03-25 NOTE — PLAN OF CARE
Problem: SAFETY  Goal: Free from accidental physical injury  Outcome: Ongoing  Goal: Free from intentional harm  Outcome: Ongoing     Problem: DAILY CARE  Goal: Daily care needs are met  Outcome: Ongoing     Problem: PAIN  Goal: Patient's pain/discomfort is manageable  Outcome: Ongoing     Problem: SKIN INTEGRITY  Goal: Skin integrity is maintained or improved  Outcome: Ongoing     Problem: KNOWLEDGE DEFICIT  Goal: Patient/S.O. demonstrates understanding of disease process, treatment plan, medications, and discharge instructions.   Outcome: Ongoing     Problem: DISCHARGE BARRIERS  Goal: Patient's continuum of care needs are met  Outcome: Ongoing     Problem: Nutrition  Goal: Optimal nutrition therapy  3/25/2022 1902 by Shruthi Rivera RN  Outcome: Ongoing  3/25/2022 1850 by Tomasz Madrid RD, LD  Outcome: Ongoing  Goal: Understanding of nutritional guidelines  3/25/2022 1902 by Shruthi Rivera RN  Outcome: Ongoing  3/25/2022 1850 by Tomasz Madrid RD, LD  Outcome: Ongoing

## 2022-03-26 LAB
A/G RATIO: 2.1 (ref 1.1–2.2)
ALBUMIN SERPL-MCNC: 3.5 G/DL (ref 3.4–5)
ALP BLD-CCNC: 115 U/L (ref 40–129)
ALT SERPL-CCNC: 31 U/L (ref 10–40)
ANION GAP SERPL CALCULATED.3IONS-SCNC: 7 MMOL/L (ref 3–16)
AST SERPL-CCNC: 19 U/L (ref 15–37)
BASOPHILS ABSOLUTE: 0 K/UL (ref 0–0.2)
BASOPHILS RELATIVE PERCENT: 0.7 %
BILIRUB SERPL-MCNC: 0.6 MG/DL (ref 0–1)
BILIRUBIN DIRECT: 0.4 MG/DL (ref 0–0.3)
BILIRUBIN, INDIRECT: 0.2 MG/DL (ref 0–1)
BUN BLDV-MCNC: 6 MG/DL (ref 7–20)
CALCIUM SERPL-MCNC: 8.4 MG/DL (ref 8.3–10.6)
CHLORIDE BLD-SCNC: 106 MMOL/L (ref 99–110)
CO2: 28 MMOL/L (ref 21–32)
CREAT SERPL-MCNC: <0.5 MG/DL (ref 0.8–1.3)
EOSINOPHILS ABSOLUTE: 0.4 K/UL (ref 0–0.6)
EOSINOPHILS RELATIVE PERCENT: 7.8 %
GFR AFRICAN AMERICAN: >60
GFR NON-AFRICAN AMERICAN: >60
GLUCOSE BLD-MCNC: 116 MG/DL (ref 70–99)
GLUCOSE BLD-MCNC: 130 MG/DL (ref 70–99)
GLUCOSE BLD-MCNC: 135 MG/DL (ref 70–99)
GLUCOSE BLD-MCNC: 137 MG/DL (ref 70–99)
GLUCOSE BLD-MCNC: 160 MG/DL (ref 70–99)
GLUCOSE BLD-MCNC: 92 MG/DL (ref 70–99)
HCT VFR BLD CALC: 31.6 % (ref 40.5–52.5)
HEMOGLOBIN: 11.4 G/DL (ref 13.5–17.5)
LYMPHOCYTES ABSOLUTE: 0.7 K/UL (ref 1–5.1)
LYMPHOCYTES RELATIVE PERCENT: 14.2 %
MCH RBC QN AUTO: 35.9 PG (ref 26–34)
MCHC RBC AUTO-ENTMCNC: 36 G/DL (ref 31–36)
MCV RBC AUTO: 99.7 FL (ref 80–100)
MONOCYTES ABSOLUTE: 0.2 K/UL (ref 0–1.3)
MONOCYTES RELATIVE PERCENT: 5 %
NEUTROPHILS ABSOLUTE: 3.3 K/UL (ref 1.7–7.7)
NEUTROPHILS RELATIVE PERCENT: 72.3 %
PDW BLD-RTO: 12.5 % (ref 12.4–15.4)
PERFORMED ON: ABNORMAL
PERFORMED ON: NORMAL
PLATELET # BLD: 165 K/UL (ref 135–450)
PMV BLD AUTO: 7.2 FL (ref 5–10.5)
POTASSIUM REFLEX MAGNESIUM: 3.9 MMOL/L (ref 3.5–5.1)
RBC # BLD: 3.17 M/UL (ref 4.2–5.9)
SODIUM BLD-SCNC: 141 MMOL/L (ref 136–145)
TOTAL PROTEIN: 5.2 G/DL (ref 6.4–8.2)
WBC # BLD: 4.6 K/UL (ref 4–11)

## 2022-03-26 PROCEDURE — 36415 COLL VENOUS BLD VENIPUNCTURE: CPT

## 2022-03-26 PROCEDURE — 85025 COMPLETE CBC W/AUTO DIFF WBC: CPT

## 2022-03-26 PROCEDURE — 99232 SBSQ HOSP IP/OBS MODERATE 35: CPT | Performed by: INTERNAL MEDICINE

## 2022-03-26 PROCEDURE — 6370000000 HC RX 637 (ALT 250 FOR IP): Performed by: INTERNAL MEDICINE

## 2022-03-26 PROCEDURE — 2580000003 HC RX 258: Performed by: INTERNAL MEDICINE

## 2022-03-26 PROCEDURE — 6360000002 HC RX W HCPCS: Performed by: INTERNAL MEDICINE

## 2022-03-26 PROCEDURE — 80053 COMPREHEN METABOLIC PANEL: CPT

## 2022-03-26 PROCEDURE — 94640 AIRWAY INHALATION TREATMENT: CPT

## 2022-03-26 PROCEDURE — 1200000000 HC SEMI PRIVATE

## 2022-03-26 PROCEDURE — 94761 N-INVAS EAR/PLS OXIMETRY MLT: CPT

## 2022-03-26 RX ADMIN — ENOXAPARIN SODIUM 40 MG: 100 INJECTION SUBCUTANEOUS at 09:30

## 2022-03-26 RX ADMIN — PIPERACILLIN AND TAZOBACTAM 3375 MG: 3; .375 INJECTION, POWDER, FOR SOLUTION INTRAVENOUS at 09:33

## 2022-03-26 RX ADMIN — PIPERACILLIN AND TAZOBACTAM 3375 MG: 3; .375 INJECTION, POWDER, FOR SOLUTION INTRAVENOUS at 16:43

## 2022-03-26 RX ADMIN — Medication 2 PUFF: at 07:39

## 2022-03-26 RX ADMIN — INSULIN LISPRO 1 UNITS: 100 INJECTION, SOLUTION INTRAVENOUS; SUBCUTANEOUS at 16:40

## 2022-03-26 RX ADMIN — SODIUM CHLORIDE: 9 INJECTION, SOLUTION INTRAVENOUS at 01:03

## 2022-03-26 RX ADMIN — URSODIOL 300 MG: 300 CAPSULE ORAL at 09:30

## 2022-03-26 RX ADMIN — PIPERACILLIN AND TAZOBACTAM 3375 MG: 3; .375 INJECTION, POWDER, FOR SOLUTION INTRAVENOUS at 01:06

## 2022-03-26 RX ADMIN — Medication 2 PUFF: at 23:10

## 2022-03-26 RX ADMIN — DICYCLOMINE HYDROCHLORIDE 10 MG: 10 CAPSULE ORAL at 20:55

## 2022-03-26 RX ADMIN — Medication 2 PUFF: at 19:43

## 2022-03-26 RX ADMIN — SODIUM CHLORIDE: 9 INJECTION, SOLUTION INTRAVENOUS at 09:28

## 2022-03-26 RX ADMIN — URSODIOL 300 MG: 300 CAPSULE ORAL at 20:56

## 2022-03-26 ASSESSMENT — PAIN SCALES - GENERAL: PAINLEVEL_OUTOF10: 0

## 2022-03-26 NOTE — FLOWSHEET NOTE
03/26/22 0746   Vital Signs   Temp 96.8 °F (36 °C)   Temp Source Oral   Pulse 58   Heart Rate Source Monitor   Resp 18   /70   BP Location Left upper arm   Level of Consciousness Alert (0)   MEWS Score 1   Patient Currently in Pain Denies   Pain Assessment   Pain Assessment 0-10   Pain Level 0   Oxygen Therapy   SpO2 95 %   O2 Device None (Room air)     Pt a/o. Am assessment completed. See flowsheet. Pt denies needs at this time. Call light within reach. Elke Albarran RN\

## 2022-03-26 NOTE — PROGRESS NOTES
Shift report received from PRINCE Mon. Urine is now light yellow. Ambulating in room, call light in reach.

## 2022-03-26 NOTE — PROGRESS NOTES
Handoff report and transfer care given to Lorraine Fernandez [facility]. Pt resting in bed; denies any further needs at this time. Bed locked and in lowest position for pt safety.

## 2022-03-26 NOTE — PROGRESS NOTES
Writer placed call to [facility] to obtain blood culture results, no growth so far, writer left fax number so results could be faxed.  stated that results wouldn't be considered negative until 3/29. Valeria Mccurdy RN

## 2022-03-26 NOTE — FLOWSHEET NOTE
/72   Pulse 59   Temp 96.5 °F (35.8 °C) (Oral)   Resp 18   Ht 5' 11\" (1.803 m)   Wt 168 lb 4.8 oz (76.3 kg)   SpO2 95%   BMI 23.47 kg/m²     Shift assessment complete; see flowsheets. PM medications administered; see MAR. Pt AOx4 resting in bed. Respirations even and unlabored. Pt denies any further needs or pain at this time. Call light in reach, bed locked in lowest position.

## 2022-03-26 NOTE — PROGRESS NOTES
RT Inhaler-Nebulizer Bronchodilator Protocol Note    There is a bronchodilator order in the chart from a provider indicating to follow the RT Bronchodilator Protocol and there is an Initiate RT Inhaler-Nebulizer Bronchodilator Protocol order as well (see protocol at bottom of note). CXR Findings:  No results found. The findings from the last RT Protocol Assessment were as follows:   History Pulmonary Disease: Chronic pulmonary disease  Respiratory Pattern: Dyspnea on exertion or RR 21-25 bpm  Breath Sounds: Clear breath sounds  Cough: Strong, spontaneous, non-productive  Indication for Bronchodilator Therapy:    Bronchodilator Assessment Score: 4    Aerosolized bronchodilator medication orders have been revised according to the RT Inhaler-Nebulizer Bronchodilator Protocol below. Respiratory Therapist to perform RT Therapy Protocol Assessment initially then follow the protocol. Repeat RT Therapy Protocol Assessment PRN for score 0-3 or on second treatment, BID, and PRN for scores above 3. No Indications - adjust the frequency to every 6 hours PRN wheezing or bronchospasm, if no treatments needed after 48 hours then discontinue using Per Protocol order mode. If indication present, adjust the RT bronchodilator orders based on the Bronchodilator Assessment Score as indicated below. Use Inhaler orders unless patient has one or more of the following: on home nebulizer, not able to hold breath for 10 seconds, is not alert and oriented, cannot activate and use MDI correctly, or respiratory rate 25 breaths per minute or more, then use the equivalent nebulizer order(s) with same Frequency and PRN reasons based on the score. If a patient is on this medication at home then do not decrease Frequency below that used at home.     0-3 - enter or revise RT bronchodilator order(s) to equivalent RT Bronchodilator order with Frequency of every 4 hours PRN for wheezing or increased work of breathing using Per Protocol order mode. 4-6 - enter or revise RT Bronchodilator order(s) to two equivalent RT bronchodilator orders with one order with BID Frequency and one order with Frequency of every 4 hours PRN wheezing or increased work of breathing using Per Protocol order mode. 7-10 - enter or revise RT Bronchodilator order(s) to two equivalent RT bronchodilator orders with one order with TID Frequency and one order with Frequency of every 4 hours PRN wheezing or increased work of breathing using Per Protocol order mode. 11-13 - enter or revise RT Bronchodilator order(s) to one equivalent RT bronchodilator order with QID Frequency and an Albuterol order with Frequency of every 4 hours PRN wheezing or increased work of breathing using Per Protocol order mode. Greater than 13 - enter or revise RT Bronchodilator order(s) to one equivalent RT bronchodilator order with every 4 hours Frequency and an Albuterol order with Frequency of every 2 hours PRN wheezing or increased work of breathing using Per Protocol order mode.          Electronically signed by Yolanda Varela RCP on 3/25/2022 at 8:42 PM

## 2022-03-26 NOTE — PROGRESS NOTES

## 2022-03-26 NOTE — PROGRESS NOTES
Shift report received from Waldemar Morales RN. Watching TV; denies problems/concerns at this time. Call light in reach.

## 2022-03-26 NOTE — PLAN OF CARE
Problem: SAFETY  Goal: Free from accidental physical injury  3/26/2022 0742 by Dena Cheatham RN  Outcome: Ongoing  3/25/2022 2341 by Elisha Tobias RN  Outcome: Ongoing  3/25/2022 1902 by Dena Cheatham RN  Outcome: Ongoing  Goal: Free from intentional harm  3/26/2022 0742 by Dena Cheatham RN  Outcome: Ongoing  3/25/2022 1902 by Dena Cheatham RN  Outcome: Ongoing     Problem: DAILY CARE  Goal: Daily care needs are met  3/26/2022 0742 by Dena Cheatham RN  Outcome: Ongoing  3/25/2022 2342 by Elisha Tobias RN  Outcome: Ongoing  3/25/2022 1902 by Dena Cheatham RN  Outcome: Ongoing     Problem: PAIN  Goal: Patient's pain/discomfort is manageable  3/26/2022 0742 by Dena Cheatham RN  Outcome: Ongoing  3/25/2022 1902 by Dena Cheatham RN  Outcome: Ongoing     Problem: SKIN INTEGRITY  Goal: Skin integrity is maintained or improved  3/26/2022 0742 by Dena Cheatham RN  Outcome: Ongoing  3/25/2022 1902 by Dena Cheatham RN  Outcome: Ongoing     Problem: KNOWLEDGE DEFICIT  Goal: Patient/S.O. demonstrates understanding of disease process, treatment plan, medications, and discharge instructions.   3/26/2022 0742 by Dena Cheatham RN  Outcome: Ongoing  3/25/2022 1902 by Dena Cheatham RN  Outcome: Ongoing     Problem: DISCHARGE BARRIERS  Goal: Patient's continuum of care needs are met  3/26/2022 0742 by Dena Cheatham RN  Outcome: Ongoing  3/25/2022 1902 by Dena Cheatham RN  Outcome: Ongoing     Problem: Nutrition  Goal: Optimal nutrition therapy  3/26/2022 0742 by Dena Cheatham RN  Outcome: Ongoing  3/25/2022 1902 by Dena Cheatham RN  Outcome: Ongoing  3/25/2022 1850 by Carlitos Ramirez RD, DANTE  Outcome: Ongoing  Goal: Understanding of nutritional guidelines  3/26/2022 0742 by Dena Cheatham RN  Outcome: Ongoing  3/25/2022 1902 by Dena Cheatham RN  Outcome: Ongoing  3/25/2022 1850 by Carlitos Ramirez RD, DANTE  Outcome: Ongoing

## 2022-03-26 NOTE — PROGRESS NOTES
RT Inhaler-Nebulizer Bronchodilator Protocol Note    There is a bronchodilator order in the chart from a provider indicating to follow the RT Bronchodilator Protocol and there is an Initiate RT Inhaler-Nebulizer Bronchodilator Protocol order as well (see protocol at bottom of note). CXR Findings:  No results found. The findings from the last RT Protocol Assessment were as follows:   History Pulmonary Disease: (P) Chronic pulmonary disease  Respiratory Pattern: (P) Dyspnea on exertion or RR 21-25 bpm  Breath Sounds: (P) Slightly diminished and/or crackles  Cough: (P) Strong, spontaneous, non-productive  Indication for Bronchodilator Therapy:    Bronchodilator Assessment Score: (P) 6    Aerosolized bronchodilator medication orders have been revised according to the RT Inhaler-Nebulizer Bronchodilator Protocol below. Respiratory Therapist to perform RT Therapy Protocol Assessment initially then follow the protocol. Repeat RT Therapy Protocol Assessment PRN for score 0-3 or on second treatment, BID, and PRN for scores above 3. No Indications - adjust the frequency to every 6 hours PRN wheezing or bronchospasm, if no treatments needed after 48 hours then discontinue using Per Protocol order mode. If indication present, adjust the RT bronchodilator orders based on the Bronchodilator Assessment Score as indicated below. Use Inhaler orders unless patient has one or more of the following: on home nebulizer, not able to hold breath for 10 seconds, is not alert and oriented, cannot activate and use MDI correctly, or respiratory rate 25 breaths per minute or more, then use the equivalent nebulizer order(s) with same Frequency and PRN reasons based on the score. If a patient is on this medication at home then do not decrease Frequency below that used at home.     0-3 - enter or revise RT bronchodilator order(s) to equivalent RT Bronchodilator order with Frequency of every 4 hours PRN for wheezing or increased work of breathing using Per Protocol order mode. 4-6 - enter or revise RT Bronchodilator order(s) to two equivalent RT bronchodilator orders with one order with BID Frequency and one order with Frequency of every 4 hours PRN wheezing or increased work of breathing using Per Protocol order mode. 7-10 - enter or revise RT Bronchodilator order(s) to two equivalent RT bronchodilator orders with one order with TID Frequency and one order with Frequency of every 4 hours PRN wheezing or increased work of breathing using Per Protocol order mode. 11-13 - enter or revise RT Bronchodilator order(s) to one equivalent RT bronchodilator order with QID Frequency and an Albuterol order with Frequency of every 4 hours PRN wheezing or increased work of breathing using Per Protocol order mode. Greater than 13 - enter or revise RT Bronchodilator order(s) to one equivalent RT bronchodilator order with every 4 hours Frequency and an Albuterol order with Frequency of every 2 hours PRN wheezing or increased work of breathing using Per Protocol order mode.          Electronically signed by Azalea Salazar RCP on 3/26/2022 at 8:15 AM

## 2022-03-26 NOTE — PROGRESS NOTES
Progress Note  Date:3/26/2022       Room:[facility]022-  Patient Name:Sharath Ceja     YOB: 1950     Age:71 y.o. Subjective    Subjective:  Symptoms:  Stable. Pain:  He reports no pain. Review of Systems  Objective         Vitals Last 24 Hours:  TEMPERATURE:  Temp  Av.9 °F (36.1 °C)  Min: 96.5 °F (35.8 °C)  Max: 97.3 °F (36.3 °C)  RESPIRATIONS RANGE: Resp  Av.7  Min: 16  Max: 18  PULSE OXIMETRY RANGE: SpO2  Av.7 %  Min: 95 %  Max: 98 %  PULSE RANGE: Pulse  Av  Min: 55  Max: 59  BLOOD PRESSURE RANGE: Systolic (95CQY), PNR:467 , Min:100 , VDA:231   ; Diastolic (47JBH), EB, Min:56, Max:72    I/O (24Hr): Intake/Output Summary (Last 24 hours) at 3/26/2022 0922  Last data filed at 3/26/2022 0752  Gross per 24 hour   Intake 3325.74 ml   Output 1700 ml   Net 1625.74 ml     Objective:  General Appearance: In no acute distress and not in pain. Vital signs: (most recent): Blood pressure 115/70, pulse 58, temperature 96.8 °F (36 °C), temperature source Oral, resp. rate 18, height 5' 11\" (1.803 m), weight 168 lb 4.8 oz (76.3 kg), SpO2 95 %. Heart: Normal rate. Regular rhythm. S1 normal and S2 normal.    Abdomen: Abdomen is soft. Bowel sounds are normal.   There is no abdominal tenderness. Labs/Imaging/Diagnostics    Labs:  CBC:  Recent Labs     22  0612 22  0611   WBC 8.2 4.6   RBC 3.24* 3.17*   HGB 11.5* 11.4*   HCT 32.1* 31.6*   MCV 99.3 99.7   RDW 12.5 12.5    165     CHEMISTRIES:  Recent Labs     22  0612 22  0611    141   K 3.6 3.9    106   CO2 28 28   BUN 10 6*   CREATININE <0.5* <0.5*   GLUCOSE 122* 116*     PT/INR:No results for input(s): PROTIME, INR in the last 72 hours. APTT:No results for input(s): APTT in the last 72 hours.   LIVER PROFILE:  Recent Labs     22  0612 22  0611   AST 26 19   ALT 38 31   BILIDIR  --  0.4*   BILITOT 0.8 0.6   ALKPHOS 126 115       Imaging Last 24 Hours:  No results found. Assessment//Plan           Hospital Problems           Last Modified POA    * (Principal) Cholecystitis 3/24/2022 Yes    Type 2 diabetes mellitus without complication, without long-term current use of insulin ([facility] [address] 3/26/2022 Yes        Assessment & Plan  70year old male with a history of DM, HTN, HLD, asthma, gout, remote Crohn's disease, biliary stricture s/p ERCP with stent c/b obstructive jaundice due to stent occlusion s/p EUS + ERCP with stent placement on 3/11/22 admitted with acute cholangitis. Plan:   1. Continue supportive care  2. Monitor LFTs  3. Continue broad spectrum antibiotics  4. Continue Ursodiol as prescribed   5. Trial clear liquid diet  6. Slowly advance to low fiber diet as tolerated   7. No plans for endoscopy this admission  8.  Outpatient ERCP in 3-4 weeks for stent removal               Vicente Lima MD       (O) [phone]    Electronically signed by Vicente Lima MD on 3/26/22 at 9:22 AM EDT

## 2022-03-26 NOTE — PROGRESS NOTES
Progress Note    Admit Date:  3/24/2022    71 y/o male with pmhx of hx of biliary stent placement 3/11/22, CLL, Crohns, DM, VTE hx, HTN, and GERD presented to [facility] on 3/24/22 with complaint of fever and chills. Tmax of 102. CT showed patent stent in place, concern for pneumobilia and distension and thickening of gallbladder. Afebrile, no leukocytosis. Elevated alk phos. Started on IV zosyn, NPO, GI following. Subjective:  Mr. Suzan Bradford feels better today. No abdominal pain. No fevers. Tolerating diet well   No emesis     GI consulted and no plans for repeat ERCP      Objective:        Vitals:    03/25/22 2303 03/26/22 0403 03/26/22 0743 03/26/22 0746   BP:  (!) 104/56  115/70   Pulse:  56  58   Resp: 18 18  18   Temp:  96.8 °F (36 °C)  96.8 °F (36 °C)   TempSrc:  Oral  Oral   SpO2: 98% 98% 98% 95%   Weight:       Height:                Intake/Output Summary (Last 24 hours) at 3/26/2022 1225  Last data filed at 3/26/2022 1115  Gross per 24 hour   Intake 3325.74 ml   Output 2100 ml   Net 1225.74 ml         Exam    General: elderly male  Awake, alert and oriented. Appears to be not in any distress  Mucous Membranes:  Pink , anicteric  Neck: No JVD, no carotid bruit, no thyromegaly  Chest:  Clear to auscultation bilaterally, no added sounds  Cardiovascular:  RRR S1S2 heard, no murmurs or gallops  Abdomen:  Soft, undistended, non tender, no organomegaly, BS present  Extremities: No edema or cyanosis.  Distal pulses well felt  Neurological : grossly normal          Current Facility-Administered Medications:     dicyclomine (BENTYL) capsule 10 mg, 10 mg, Oral, Nightly, Siddharth Sneed MD, 10 mg at 03/25/22 2046    ursodiol (ACTIGALL) capsule 300 mg, 300 mg, Oral, BID, Siddharth Sneed MD, 300 mg at 03/26/22 0930    glucose (GLUTOSE) 40 % oral gel 15 g, 15 g, Oral, PRN, Siddharth Sneed MD    glucagon (rDNA) injection 1 mg, 1 mg, IntraMUSCular, PRSiddharth SILVA MD    dextrose 5 % solution, 100 mL/hr, IntraVENous, PRN, Siddharth Sneed MD    sodium chloride flush 0.9 % injection 5-40 mL, 5-40 mL, IntraVENous, 2 times per day, Siddharth Sneed MD, 10 mL at 03/25/22 2048    sodium chloride flush 0.9 % injection 5-40 mL, 5-40 mL, IntraVENous, PRN, Siddharth Sneed MD    0.9 % sodium chloride infusion, 25 mL, IntraVENous, PRN, Siddharth Sneed MD    enoxaparin (LOVENOX) injection 40 mg, 40 mg, SubCUTAneous, Daily, Siddharth Sneed MD, 40 mg at 03/26/22 0930    ondansetron (ZOFRAN-ODT) disintegrating tablet 4 mg, 4 mg, Oral, Q8H PRN **OR** ondansetron (ZOFRAN) injection 4 mg, 4 mg, IntraVENous, Q6H PRN, Siddharth Sneed MD    polyethylene glycol (GLYCOLAX) packet 17 g, 17 g, Oral, Daily PRN, Siddharth Sneed MD    acetaminophen (TYLENOL) tablet 650 mg, 650 mg, Oral, Q6H PRN **OR** acetaminophen (TYLENOL) suppository 650 mg, 650 mg, Rectal, Q6H PRN, Siddharth Sneed MD    0.9 % sodium chloride infusion, , IntraVENous, Continuous, Siddharth Sneed MD, Last Rate: 125 mL/hr at 03/26/22 0928, New Bag at 03/26/22 0928    potassium chloride (KLOR-CON M) extended release tablet 40 mEq, 40 mEq, Oral, PRN **OR** potassium bicarb-citric acid (EFFER-K) effervescent tablet 40 mEq, 40 mEq, Oral, PRN **OR** potassium chloride 10 mEq/100 mL IVPB (Peripheral Line), 10 mEq, IntraVENous, PRN, Siddharth Sneed MD    piperacillin-tazobactam (ZOSYN) 3,375 mg in sodium chloride 0.9 % 100 mL IVPB extended infusion (mini-bag), 3,375 mg, IntraVENous, Q8H, Siddharth Sneed MD, Stopped at 03/26/22 1004    HYDROmorphone (DILAUDID) injection 0.25 mg, 0.25 mg, IntraVENous, Q4H PRN, Siddharth Sneed MD    dextrose bolus (hypoglycemia) 10% 125 mL, 125 mL, IntraVENous, PRN **OR** dextrose bolus (hypoglycemia) 10% 250 mL, 250 mL, IntraVENous, PRNSiddharth MD    melatonin disintegrating tablet 10 mg, 10 mg, Oral, Nightly PRN, Siddharth Sneed MD, 10 mg at 03/25/22 2047    insulin lispro (HUMALOG) injection vial 0-6 Units, 0-6 Units, SubCUTAneous, 4x Daily WC, Howie Briones MD, 1 Units at 03/25/22 2047    albuterol sulfate  (90 Base) MCG/ACT inhaler 2 puff, 2 puff, Inhalation, Q4H PRN, Vu Rawls MD, 2 puff at 03/25/22 2301    albuterol sulfate  (90 Base) MCG/ACT inhaler 2 puff, 2 puff, Inhalation, BID, Vu Rawls MD, 2 puff at 03/26/22 0739      Data:  CBC:   Recent Labs     03/25/22  0612 03/26/22  0611   WBC 8.2 4.6   HGB 11.5* 11.4*   HCT 32.1* 31.6*   MCV 99.3 99.7    165     BMP:   Recent Labs     03/25/22  0612 03/26/22  0611    141   K 3.6 3.9    106   CO2 28 28   BUN 10 6*   CREATININE <0.5* <0.5*     LIVER PROFILE:   Recent Labs     03/25/22  0612 03/26/22  0611   AST 26 19   ALT 38 31   BILIDIR  --  0.4*   BILITOT 0.8 0.6   ALKPHOS 126 115       CULTURES  Blood cultures pending from [facility]   COVID/Flu negative      RADIOLOGY    No orders to display         Assessment/Plan:      #Possible cholecystitis vs cholangitis     Recent Biliary stricture s/p stent placement 3/11/22- fevers noted at outside hospital  -Afebrile here, no leukocytosis, LFTs WNL    -treated with  IV zosyn , wbc wnl  -continue ursodiol   -IVF   -blood cultures pending   -GI consulted, no plans for ERCP now    #DM 2  -holding home meds for now   -SSI   -continue to monitor BG     #HTN   -mildly hypotensive here   -holding home meds  -will continue to monitor BP     DVT Prophylaxis: lovenox   Diet: ADULT DIET; Full Liquid;  Low Fat (less than or equal to 50 gm/day)  Code Status: Full Code    Dc planning if cx from [facility] karthikeyan Longoria MD, 3/26/2022 12:27 PM

## 2022-03-27 VITALS
RESPIRATION RATE: 18 BRPM | WEIGHT: 168.3 LBS | BODY MASS INDEX: 23.56 KG/M2 | HEART RATE: 62 BPM | HEIGHT: 71 IN | SYSTOLIC BLOOD PRESSURE: 131 MMHG | DIASTOLIC BLOOD PRESSURE: 80 MMHG | OXYGEN SATURATION: 97 % | TEMPERATURE: 97.6 F

## 2022-03-27 LAB
A/G RATIO: 2.1 (ref 1.1–2.2)
ALBUMIN SERPL-MCNC: 3.7 G/DL (ref 3.4–5)
ALP BLD-CCNC: 109 U/L (ref 40–129)
ALT SERPL-CCNC: 25 U/L (ref 10–40)
ANION GAP SERPL CALCULATED.3IONS-SCNC: 10 MMOL/L (ref 3–16)
AST SERPL-CCNC: 15 U/L (ref 15–37)
BILIRUB SERPL-MCNC: 0.6 MG/DL (ref 0–1)
BILIRUBIN DIRECT: 0.3 MG/DL (ref 0–0.3)
BILIRUBIN, INDIRECT: 0.3 MG/DL (ref 0–1)
BUN BLDV-MCNC: 6 MG/DL (ref 7–20)
CALCIUM SERPL-MCNC: 8.8 MG/DL (ref 8.3–10.6)
CHLORIDE BLD-SCNC: 104 MMOL/L (ref 99–110)
CO2: 26 MMOL/L (ref 21–32)
CREAT SERPL-MCNC: <0.5 MG/DL (ref 0.8–1.3)
GFR AFRICAN AMERICAN: >60
GFR NON-AFRICAN AMERICAN: >60
GLUCOSE BLD-MCNC: 108 MG/DL (ref 70–99)
GLUCOSE BLD-MCNC: 109 MG/DL (ref 70–99)
PERFORMED ON: ABNORMAL
POTASSIUM REFLEX MAGNESIUM: 3.8 MMOL/L (ref 3.5–5.1)
SODIUM BLD-SCNC: 140 MMOL/L (ref 136–145)
TOTAL PROTEIN: 5.5 G/DL (ref 6.4–8.2)

## 2022-03-27 PROCEDURE — 6360000002 HC RX W HCPCS: Performed by: INTERNAL MEDICINE

## 2022-03-27 PROCEDURE — 94640 AIRWAY INHALATION TREATMENT: CPT

## 2022-03-27 PROCEDURE — 2580000003 HC RX 258: Performed by: INTERNAL MEDICINE

## 2022-03-27 PROCEDURE — 80053 COMPREHEN METABOLIC PANEL: CPT

## 2022-03-27 PROCEDURE — 94761 N-INVAS EAR/PLS OXIMETRY MLT: CPT

## 2022-03-27 PROCEDURE — 6370000000 HC RX 637 (ALT 250 FOR IP): Performed by: INTERNAL MEDICINE

## 2022-03-27 PROCEDURE — 99238 HOSP IP/OBS DSCHRG MGMT 30/<: CPT | Performed by: INTERNAL MEDICINE

## 2022-03-27 PROCEDURE — 36415 COLL VENOUS BLD VENIPUNCTURE: CPT

## 2022-03-27 RX ORDER — LEVOFLOXACIN 500 MG/1
500 TABLET, FILM COATED ORAL DAILY
Qty: 5 TABLET | Refills: 0 | Status: SHIPPED | OUTPATIENT
Start: 2022-03-27 | End: 2022-04-01

## 2022-03-27 RX ADMIN — SODIUM CHLORIDE: 9 INJECTION, SOLUTION INTRAVENOUS at 00:32

## 2022-03-27 RX ADMIN — PIPERACILLIN AND TAZOBACTAM 3375 MG: 3; .375 INJECTION, POWDER, FOR SOLUTION INTRAVENOUS at 00:33

## 2022-03-27 RX ADMIN — PIPERACILLIN AND TAZOBACTAM 3375 MG: 3; .375 INJECTION, POWDER, FOR SOLUTION INTRAVENOUS at 09:24

## 2022-03-27 RX ADMIN — Medication 2 PUFF: at 07:48

## 2022-03-27 RX ADMIN — URSODIOL 300 MG: 300 CAPSULE ORAL at 09:25

## 2022-03-27 RX ADMIN — ENOXAPARIN SODIUM 40 MG: 100 INJECTION SUBCUTANEOUS at 09:26

## 2022-03-27 ASSESSMENT — PAIN SCALES - GENERAL: PAINLEVEL_OUTOF10: 0

## 2022-03-27 NOTE — PROGRESS NOTES
Progress Note    Admit Date:  3/24/2022    69 y/o male with pmhx of hx of biliary stent placement 3/11/22, CLL, Crohns, DM, VTE hx, HTN, and GERD presented to [facility] on 3/24/22 with complaint of fever and chills. Tmax of 102. CT showed patent stent in place, concern for pneumobilia and distension and thickening of gallbladder. Afebrile, no leukocytosis. Elevated alk phos. Started on IV zosyn, NPO, GI following. Subjective:  Mr. Lydia Rosas feels fine today  . Had 2 episodes of loose BM this am  No fevers    No abdominal pain. Tolerating diet well   No emesis     GI consulted and no plans for repeat ERCP      Objective:        Vitals:    03/26/22 1946 03/26/22 2014 03/26/22 2313 03/27/22 0307   BP:  123/76  132/77   Pulse:  63  58   Resp: 18 18 18 18   Temp:  98.1 °F (36.7 °C)  97 °F (36.1 °C)   TempSrc:  Oral  Oral   SpO2: 97% 98% 98% 98%   Weight:       Height:                Intake/Output Summary (Last 24 hours) at 3/27/2022 0731  Last data filed at 3/26/2022 2100  Gross per 24 hour   Intake 3906.53 ml   Output 2875 ml   Net 1031.53 ml         Exam    General: elderly male  Awake, alert and oriented. Appears to be not in any distress  Mucous Membranes:  Pink , anicteric  Neck: No JVD, no carotid bruit, no thyromegaly  Chest:  Clear to auscultation bilaterally, no added sounds  Cardiovascular:  RRR S1S2 heard, no murmurs or gallops  Abdomen:  Soft, undistended, non tender, no organomegaly, BS present  Extremities: No edema or cyanosis.  Distal pulses well felt  Neurological : grossly normal          Current Facility-Administered Medications:     dicyclomine (BENTYL) capsule 10 mg, 10 mg, Oral, Nightly, Belkys Damian MD, 10 mg at 03/26/22 2055    ursodiol (ACTIGALL) capsule 300 mg, 300 mg, Oral, BID, Belkys Damian MD, 300 mg at 03/26/22 2056    glucose (GLUTOSE) 40 % oral gel 15 g, 15 g, Oral, PRN, Belkys Damian MD    glucagon (rDNA) injection 1 mg, 1 mg, IntraMUSCular, PRN, Obiorkevin E MD Lisa    dextrose 5 % solution, 100 mL/hr, IntraVENous, PRN, Sukhdev Zepeda MD    sodium chloride flush 0.9 % injection 5-40 mL, 5-40 mL, IntraVENous, 2 times per day, Sukhdev Zepeda MD, 10 mL at 03/25/22 2048    sodium chloride flush 0.9 % injection 5-40 mL, 5-40 mL, IntraVENous, PRN, Sukhdev Zepeda MD    0.9 % sodium chloride infusion, 25 mL, IntraVENous, PRN, Sukhdev Zepeda MD    enoxaparin (LOVENOX) injection 40 mg, 40 mg, SubCUTAneous, Daily, Sukhdev Zepeda MD, 40 mg at 03/26/22 0930    ondansetron (ZOFRAN-ODT) disintegrating tablet 4 mg, 4 mg, Oral, Q8H PRN **OR** ondansetron (ZOFRAN) injection 4 mg, 4 mg, IntraVENous, Q6H PRN, Sukhdev Zepeda MD    polyethylene glycol (GLYCOLAX) packet 17 g, 17 g, Oral, Daily PRN, Sukhdev Zepeda MD    acetaminophen (TYLENOL) tablet 650 mg, 650 mg, Oral, Q6H PRN **OR** acetaminophen (TYLENOL) suppository 650 mg, 650 mg, Rectal, Q6H PRN, Sukhdev Zepeda MD    0.9 % sodium chloride infusion, , IntraVENous, Continuous, Peter Coelho MD, Last Rate: 75 mL/hr at 03/27/22 0032, New Bag at 03/27/22 0032    potassium chloride (KLOR-CON M) extended release tablet 40 mEq, 40 mEq, Oral, PRN **OR** potassium bicarb-citric acid (EFFER-K) effervescent tablet 40 mEq, 40 mEq, Oral, PRN **OR** potassium chloride 10 mEq/100 mL IVPB (Peripheral Line), 10 mEq, IntraVENous, PRN, Sukhdev Zepeda MD    piperacillin-tazobactam (ZOSYN) 3,375 mg in sodium chloride 0.9 % 100 mL IVPB extended infusion (mini-bag), 3,375 mg, IntraVENous, Q8H, Sukhdev Zepeda MD, Stopped at 03/27/22 0645    HYDROmorphone (DILAUDID) injection 0.25 mg, 0.25 mg, IntraVENous, Q4H PRN, Sukhdev Zepeda MD    dextrose bolus (hypoglycemia) 10% 125 mL, 125 mL, IntraVENous, PRN **OR** dextrose bolus (hypoglycemia) 10% 250 mL, 250 mL, IntraVENous, PRN, Sukhdev Zepeda MD    melatonin disintegrating tablet 10 mg, 10 mg, Oral, Nightly PRN, Sukhdev Zepeda MD, 10 mg at 03/25/22 2047    insulin lispro (HUMALOG) injection vial 0-6 Units, 0-6 Units, SubCUTAneous, 4x Daily WC, Jordan Melchor MD, 1 Units at 03/26/22 1640    albuterol sulfate  (90 Base) MCG/ACT inhaler 2 puff, 2 puff, Inhalation, Q4H PRN, Jia Guillermo MD, 2 puff at 03/26/22 2310    albuterol sulfate  (90 Base) MCG/ACT inhaler 2 puff, 2 puff, Inhalation, BID, Jia Guillermo MD, 2 puff at 03/26/22 1943      Data:  CBC:   Recent Labs     03/25/22  0612 03/26/22  0611   WBC 8.2 4.6   HGB 11.5* 11.4*   HCT 32.1* 31.6*   MCV 99.3 99.7    165     BMP:   Recent Labs     03/25/22  0612 03/26/22  0611    141   K 3.6 3.9    106   CO2 28 28   BUN 10 6*   CREATININE <0.5* <0.5*     LIVER PROFILE:   Recent Labs     03/25/22  0612 03/26/22  0611   AST 26 19   ALT 38 31   BILIDIR  --  0.4*   BILITOT 0.8 0.6   ALKPHOS 126 115       CULTURES  Blood cultures pending from [facility]   COVID/Flu negative       Assessment/Plan:      #Possible cholecystitis vs cholangitis     Recent Biliary stricture s/p stent placement 3/11/22- fevers noted at outside hospital  -Afebrile here, no leukocytosis, LFTs WNL    -treated with  IV zosyn, wbc wnl  -continue ursodiol   -IVF   -blood cultures remain neg at [facility]   -GI consulted, no plans for ERCP now    #DM 2  -holding home meds for now   -SSI   -continue to monitor BG     #HTN   -mildly hypotensive here   -holding home meds  - can resume today     DVT Prophylaxis: lovenox   Diet: ADULT DIET; Regular; 4 carb choices (60 gm/meal);  Low Fat (less than or equal to 50 gm/day)  Code Status: Full Code    Dc home today with oral levaquin   Planned for stent exchange in 2 weeks    Marianne Perez MD, 3/27/2022 7:31 AM

## 2022-03-27 NOTE — PROGRESS NOTES
Progress Note  Date:3/27/2022       Room:[facility]0223-  Patient Name:Sharath Ceja     YOB: 1950     Age:71 y.o. Subjective    Subjective:  Symptoms:  Stable. Pain:  He reports no pain. Review of Systems  Objective         Vitals Last 24 Hours:  TEMPERATURE:  Temp  Av.5 °F (36.4 °C)  Min: 97 °F (36.1 °C)  Max: 98.1 °F (36.7 °C)  RESPIRATIONS RANGE: Resp  Av  Min: 18  Max: 18  PULSE OXIMETRY RANGE: SpO2  Av.6 %  Min: 97 %  Max: 98 %  PULSE RANGE: Pulse  Av.8  Min: 58  Max: 63  BLOOD PRESSURE RANGE: Systolic (52UIW), LGP:580 , Min:123 , ADRIÁN:445   ; Diastolic (22WEN), DDS:94, Min:73, Max:80    I/O (24Hr): Intake/Output Summary (Last 24 hours) at 3/27/2022 0933  Last data filed at 3/26/2022 2100  Gross per 24 hour   Intake 2036.84 ml   Output 1875 ml   Net 161.84 ml     Objective:  General Appearance: In no acute distress and not in pain. Vital signs: (most recent): Blood pressure 131/80, pulse 62, temperature 97.6 °F (36.4 °C), temperature source Oral, resp. rate 18, height 5' 11\" (1.803 m), weight 168 lb 4.8 oz (76.3 kg), SpO2 97 %. Heart: Normal rate. Regular rhythm. S1 normal and S2 normal.    Abdomen: Abdomen is soft. Bowel sounds are normal.   There is no abdominal tenderness. Labs/Imaging/Diagnostics    Labs:  CBC:  Recent Labs     22  0612 22  0611   WBC 8.2 4.6   RBC 3.24* 3.17*   HGB 11.5* 11.4*   HCT 32.1* 31.6*   MCV 99.3 99.7   RDW 12.5 12.5    165     CHEMISTRIES:  Recent Labs     22  0612 22  0611 22  0629    141 140   K 3.6 3.9 3.8    106 104   CO2 28 28 26   BUN 10 6* 6*   CREATININE <0.5* <0.5* <0.5*   GLUCOSE 122* 116* 109*     PT/INR:No results for input(s): PROTIME, INR in the last 72 hours. APTT:No results for input(s): APTT in the last 72 hours.   LIVER PROFILE:  Recent Labs     22  0612 22  0611 22  0629   AST 26 19 15   ALT 38 31 25   BILIDIR  --  0.4* 0.3 BILITOT 0.8 0.6 0.6   ALKPHOS 126 115 109       Imaging Last 24 Hours:  No results found. Assessment//Plan           Hospital Problems           Last Modified POA    * (Principal) Cholecystitis 3/24/2022 Yes    Common bile duct (CBD) stricture 3/26/2022 Yes    Type 2 diabetes mellitus without complication, without long-term current use of insulin ([facility] [address] 3/26/2022 Yes        Assessment & Plan  70year old male with a history of DM, HTN, HLD, asthma, gout, remote Crohn's disease, biliary stricture s/p ERCP with stent c/b obstructive jaundice due to stent occlusion s/p EUS + ERCP with stent placement on 3/11/22 admitted with acute cholangitis, clinically resolving. Plan:   1. Continue supportive care  2. Monitor LFTs  3. Continue broad spectrum antibiotics  4. Continue Ursodiol as prescribed   5. OK to D/C on another week of PO Abx's  6. No plans for endoscopy this admission  7.  Outpatient ERCP in 3-4 weeks for stent removal and to clear the bile duct                Hiro Rodriguez MD       (O) [phone]    Electronically signed by Hiro Rodriguez MD on 3/26/22 at 9:22 AM EDT

## 2022-03-27 NOTE — PLAN OF CARE
B/s 84 gave orange juice ,pt is alert drinking juice no c/o voiced Problem: SAFETY  Goal: Free from accidental physical injury  3/27/2022 1039 by Mick Brenner RN  Outcome: Completed  3/27/2022 0736 by Mick Brenner RN  Outcome: Ongoing  Goal: Free from intentional harm  3/27/2022 1039 by Mick Brenner RN  Outcome: Completed  3/27/2022 0736 by Mick Brenner RN  Outcome: Ongoing     Problem: DAILY CARE  Goal: Daily care needs are met  3/27/2022 1039 by Mick Brenner RN  Outcome: Completed  3/27/2022 0736 by Mick Brenner RN  Outcome: Ongoing     Problem: PAIN  Goal: Patient's pain/discomfort is manageable  3/27/2022 1039 by Mick Brenner RN  Outcome: Completed  3/27/2022 0736 by Mick Brenner RN  Outcome: Ongoing     Problem: SKIN INTEGRITY  Goal: Skin integrity is maintained or improved  3/27/2022 1039 by Mick Brenner RN  Outcome: Completed  3/27/2022 0736 by Mick Brenner RN  Outcome: Ongoing     Problem: KNOWLEDGE DEFICIT  Goal: Patient/S.O. demonstrates understanding of disease process, treatment plan, medications, and discharge instructions.   3/27/2022 1039 by Mick Brenner RN  Outcome: Completed  3/27/2022 0736 by Mick Brenner RN  Outcome: Ongoing     Problem: DISCHARGE BARRIERS  Goal: Patient's continuum of care needs are met  3/27/2022 1039 by Mick Brenner RN  Outcome: Completed  3/27/2022 0736 by Mick Brenner RN  Outcome: Ongoing     Problem: Nutrition  Goal: Optimal nutrition therapy  3/27/2022 1039 by Mick Brenner RN  Outcome: Completed  3/27/2022 0736 by Mick Brenner RN  Outcome: Ongoing  Goal: Understanding of nutritional guidelines  3/27/2022 1039 by Mick Brenner RN  Outcome: Completed  3/27/2022 0736 by Mick Brenner RN  Outcome: Ongoing

## 2022-03-27 NOTE — PROGRESS NOTES
RT Inhaler-Nebulizer Bronchodilator Protocol Note    There is a bronchodilator order in the chart from a provider indicating to follow the RT Bronchodilator Protocol and there is an Initiate RT Inhaler-Nebulizer Bronchodilator Protocol order as well (see protocol at bottom of note). CXR Findings:  No results found. The findings from the last RT Protocol Assessment were as follows:   History Pulmonary Disease: Chronic pulmonary disease  Respiratory Pattern: Dyspnea on exertion or RR 21-25 bpm  Breath Sounds: Slightly diminished and/or crackles  Cough: Strong, spontaneous, non-productive  Indication for Bronchodilator Therapy: Decreased or absent breath sounds  Bronchodilator Assessment Score: 6    Aerosolized bronchodilator medication orders have been revised according to the RT Inhaler-Nebulizer Bronchodilator Protocol below. Respiratory Therapist to perform RT Therapy Protocol Assessment initially then follow the protocol. Repeat RT Therapy Protocol Assessment PRN for score 0-3 or on second treatment, BID, and PRN for scores above 3. No Indications - adjust the frequency to every 6 hours PRN wheezing or bronchospasm, if no treatments needed after 48 hours then discontinue using Per Protocol order mode. If indication present, adjust the RT bronchodilator orders based on the Bronchodilator Assessment Score as indicated below. Use Inhaler orders unless patient has one or more of the following: on home nebulizer, not able to hold breath for 10 seconds, is not alert and oriented, cannot activate and use MDI correctly, or respiratory rate 25 breaths per minute or more, then use the equivalent nebulizer order(s) with same Frequency and PRN reasons based on the score. If a patient is on this medication at home then do not decrease Frequency below that used at home.     0-3 - enter or revise RT bronchodilator order(s) to equivalent RT Bronchodilator order with Frequency of every 4 hours PRN for wheezing or increased work of breathing using Per Protocol order mode. 4-6 - enter or revise RT Bronchodilator order(s) to two equivalent RT bronchodilator orders with one order with BID Frequency and one order with Frequency of every 4 hours PRN wheezing or increased work of breathing using Per Protocol order mode. 7-10 - enter or revise RT Bronchodilator order(s) to two equivalent RT bronchodilator orders with one order with TID Frequency and one order with Frequency of every 4 hours PRN wheezing or increased work of breathing using Per Protocol order mode. 11-13 - enter or revise RT Bronchodilator order(s) to one equivalent RT bronchodilator order with QID Frequency and an Albuterol order with Frequency of every 4 hours PRN wheezing or increased work of breathing using Per Protocol order mode. Greater than 13 - enter or revise RT Bronchodilator order(s) to one equivalent RT bronchodilator order with every 4 hours Frequency and an Albuterol order with Frequency of every 2 hours PRN wheezing or increased work of breathing using Per Protocol order mode.        Electronically signed by Hyun Galvan RCP on 3/27/2022 at 7:55 AM

## 2022-03-27 NOTE — PLAN OF CARE
Problem: SAFETY  Goal: Free from accidental physical injury  Outcome: Ongoing  Goal: Free from intentional harm  Outcome: Ongoing     Problem: DAILY CARE  Goal: Daily care needs are met  Outcome: Ongoing     Problem: PAIN  Goal: Patient's pain/discomfort is manageable  Outcome: Ongoing     Problem: SKIN INTEGRITY  Goal: Skin integrity is maintained or improved  Outcome: Ongoing     Problem: KNOWLEDGE DEFICIT  Goal: Patient/S.O. demonstrates understanding of disease process, treatment plan, medications, and discharge instructions.   Outcome: Ongoing     Problem: DISCHARGE BARRIERS  Goal: Patient's continuum of care needs are met  Outcome: Ongoing     Problem: Nutrition  Goal: Optimal nutrition therapy  Outcome: Ongoing  Goal: Understanding of nutritional guidelines  Outcome: Ongoing

## 2022-03-27 NOTE — PROGRESS NOTES
See PM shift assessment. Talkative and pleasant. Sitting in chair; IV infusing w/o diff, call light in reach.

## 2022-03-27 NOTE — FLOWSHEET NOTE
03/27/22 0750   Vital Signs   Temp 97.6 °F (36.4 °C)   Temp Source Oral   Pulse 62   Heart Rate Source Monitor   Resp 18   /80   BP Location Left upper arm   Patient Position Sitting   Oxygen Therapy   SpO2 97 %   O2 Device None (Room air)     Pt a/o. Am assessment completed. See flowsheet. Pt denies any pain/ nausea at this time. Call light within reach. Roxana Mac RN  \

## 2022-03-28 NOTE — DISCHARGE SUMMARY
Name:  Ashley Lott  Room:  2139/1972-91  MRN:    [id number]    Discharge Summary      This discharge summary is in conjunction with a complete physical exam done on the day of discharge. Discharging Physician: Dr. Vj Hong: 3/24/2022  Discharge:  3/27/2022    HPI:    70 y.o. male who presented to the hospital with a chief complaint of fevers and chills. Patient was transferred from [facility] for GI evaluation. Patient has a history of biliary stricture status post stent placement and stent replacement by GI for obstructive jaundice and cholangitis. He has been doing well till today when he developed fevers and chills at home. He describes uncontrollable shaking and teeth clenching. He had a temperature of up to 102 °F at [facility] emergency department. His CT scan was unremarkable and his stent appears to be patent and in place. There was however concern for pneumobilia which can be seen with recent stent placement but there is also distention and thickening of the gallbladder that was concerning for age-indeterminate cholecystitis. His LFTs were also within normal limits except for an elevated alk phos. He was transferred for further GI and possibly surgery evaluation.     Diagnoses this Admission and Hospital Course   #Possible cholecystitis vs cholangitis      Recent Biliary stricture s/p stent placement 3/11/22- fevers noted at outside hospital  -Afebrile here, no leukocytosis, LFTs WNL    -treated with  IV zosyn, wbc wnl  -continued ursodiol   -IVF   -blood cultures remain neg at [facility]   -GI consulted, no plans for ERCP now     #DM 2  -held home meds for now   -SSI   -continued to monitor BG      #HTN   -mildly hypotensive here   -held home meds  - can resume today      DVT Prophylaxis: lovenox      Dc home today with oral levaquin   Planned for stent exchange in 2 weeks    Procedures (Please Review Full Report for Details)  N/A    Consults    GI      Physical Exam at Discharge:    /80   Pulse 62   Temp 97.6 °F (36.4 °C) (Oral)   Resp 18   Ht 5' 11\" (1.803 m)   Wt 168 lb 4.8 oz (76.3 kg)   SpO2 97%   BMI 23.47 kg/m²     See today's progress note    CBC: Recent Labs     03/26/22 0611   WBC 4.6   HGB 11.4*   HCT 31.6*   MCV 99.7        BMP:   Recent Labs     03/26/22  0611 03/27/22  0629    140   K 3.9 3.8    104   CO2 28 26   BUN 6* 6*   CREATININE <0.5* <0.5*     LIVER PROFILE:   Recent Labs     03/26/22  0611 03/27/22  0629   AST 19 15   ALT 31 25   BILIDIR 0.4* 0.3   BILITOT 0.6 0.6   ALKPHOS 115 109       U/A:    Lab Results   Component Value Date    COLORU Yellow 03/10/2022    WBCUA 0-2 02/19/2022    RBCUA 5-10 02/19/2022    BACTERIA 2+ 02/19/2022    CLARITYU Clear 03/10/2022    SPECGRAV 1.020 03/10/2022    LEUKOCYTESUR Negative 03/10/2022    BLOODU Negative 03/10/2022    GLUCOSEU Negative 03/10/2022         CULTURES  Blood cultures [facility]D from [facility]   COVID/Flu negative     RADIOLOGY  No orders to display         Discharge Medications     Medication List      START taking these medications    levoFLOXacin 500 MG tablet  Commonly known as: Levaquin  Take 1 tablet by mouth daily for 5 days  Notes to patient: Levaquin  Use: Antibiotic  Side effects: Nausea/Vomiting, Headache,   Dizziness, Tendoniitis, Tendon rupture        CONTINUE taking these medications    albuterol sulfate  (90 Base) MCG/ACT inhaler     allopurinol 300 MG tablet  Commonly known as: ZYLOPRIM     dicyclomine 10 MG capsule  Commonly known as: BENTYL     gemfibrozil 600 MG tablet  Commonly known as: LOPID     metFORMIN 500 MG extended release tablet  Commonly known as: GLUCOPHAGE-XR     pantoprazole 40 MG tablet  Commonly known as: PROTONIX  Take 1 tablet by mouth 2 times daily (before meals) for 14 days     triamterene-hydroCHLOROthiazide 37.5-25 MG per tablet  Commonly known as: MAXZIDE-25     ursodiol 300 MG capsule  Commonly known as: ACTIGALL  Take 1 capsule by mouth 2 times daily           Where to Get Your Medications      You can get these medications from any pharmacy    Bring a paper prescription for each of these medications  · levoFLOXacin 500 MG tablet           Discharged in stable condition to home. Follow Up: Follow up with PCP.     Deon March MD, 5/4/2022 4:58 PM

## 2022-04-07 NOTE — PROGRESS NOTES

## 2022-04-12 ENCOUNTER — ANESTHESIA EVENT (OUTPATIENT)
Dept: ENDOSCOPY | Age: 72
End: 2022-04-12
Payer: MEDICARE

## 2022-04-12 NOTE — ANESTHESIA PRE PROCEDURE
Department of Anesthesiology  Preprocedure Note       Name:  Vida Galicia   Age:  70 y.o.  :  1950                                          MRN:  [id number]         Date:  2022      Surgeon: Kylie Orozco):  Júnior Santos MD    Procedure: Procedure(s):  ERCP WF W/ANES. (11:00) BILIARY STENT REMOVAL    Medications prior to admission:   Prior to Admission medications    Medication Sig Start Date End Date Taking? Authorizing Provider   triamterene-hydroCHLOROthiazide (MAXZIDE-25) 37.5-25 MG per tablet Take 1 tablet by mouth daily    Historical Provider, MD   gemfibrozil (LOPID) 600 MG tablet Take 600 mg by mouth 2 times daily (before meals)    Historical Provider, MD   ursodiol (ACTIGALL) 300 MG capsule Take 1 capsule by mouth 2 times daily 3/12/22   Junior Garcia MD   pantoprazole (PROTONIX) 40 MG tablet Take 1 tablet by mouth 2 times daily (before meals) for 14 days 22  Junior Garcia MD   metFORMIN (GLUCOPHAGE-XR) 500 MG extended release tablet Take 500 mg by mouth 2 times daily    Historical Provider, MD   dicyclomine (BENTYL) 10 MG capsule Take 10 mg by mouth nightly. 2/15/11   Historical Provider, MD   allopurinol (ZYLOPRIM) 300 MG tablet Take 300 mg by mouth daily. Historical Provider, MD   albuterol (PROVENTIL HFA;VENTOLIN HFA) 108 (90 BASE) MCG/ACT inhaler Inhale 2 puffs into the lungs every 6 hours as needed. Historical Provider, MD       Current medications:    No current facility-administered medications for this encounter.      Current Outpatient Medications   Medication Sig Dispense Refill    triamterene-hydroCHLOROthiazide (MAXZIDE-25) 37.5-25 MG per tablet Take 1 tablet by mouth daily      gemfibrozil (LOPID) 600 MG tablet Take 600 mg by mouth 2 times daily (before meals)      ursodiol (ACTIGALL) 300 MG capsule Take 1 capsule by mouth 2 times daily 60 capsule 0    pantoprazole (PROTONIX) 40 MG tablet Take 1 tablet by mouth 2 times daily (before meals) for 14 days 28 tablet 0    metFORMIN (GLUCOPHAGE-XR) 500 MG extended release tablet Take 500 mg by mouth 2 times daily      dicyclomine (BENTYL) 10 MG capsule Take 10 mg by mouth nightly.  allopurinol (ZYLOPRIM) 300 MG tablet Take 300 mg by mouth daily.  albuterol (PROVENTIL HFA;VENTOLIN HFA) 108 (90 BASE) MCG/ACT inhaler Inhale 2 puffs into the lungs every 6 hours as needed. Allergies:     Allergies   Allergen Reactions    Codeine      Pt states he took medication without food and had a reaction       Problem List:    Patient Active Problem List   Diagnosis Code    GERD (gastroesophageal reflux disease) K21.9    Palpitations R00.2    Painless jaundice R17    Transaminitis R74.01    Common bile duct (CBD) stricture K83.1    Hyponatremia E87.1    Hypokalemia E87.6    Crohn's disease ([facility] K50.90    Hypertension I10    Acute liver failure without hepatic coma K72.00    Hyperbilirubinemia E80.6    Cholangitis K83.09    Moderate protein-calorie malnutrition (HCC) E44.0    Cholecystitis K81.9    Type 2 diabetes mellitus without complication, without long-term current use of insulin (HCC) E11.9       Past Medical History:        Diagnosis Date    Asthma     Blood circulation, collateral     Carpal tunnel syndrome     CLL (chronic lymphocytic leukemia) ([facility]     Crohn's colitis ([facility]     Diabetes mellitus ([facility]     emboli     pulmonary emboli in 1980    GERD (gastroesophageal reflux disease)     Chrons disease    Gout     Hx of blood clots     Hypertension     Pneumonia     pneumonia,asthma    prostate     infection    Seasonal allergies        Past Surgical History:        Procedure Laterality Date    ERCP N/A 1/26/2022    ERCP BIOPSY performed by Leanne Avelar MD at [address] [place] [address] ERCP  1/26/2022    ERCP SPHINCTER/PAPILLOTOMY performed by Leanne Avelar MD at [address] [place] [address] ERCP  1/26/2022    ERCP STENT INSERTION performed by Jeet White MD at [facility] ERCP N/A 3/11/2022    ERCP STENT REMOVAL performed by Jeet White MD at [address] [place] [address] ERCP  3/11/2022    ERCP STENT INSERTION performed by Jeet White MD at [address] [place] [address] ERCP  3/11/2022    ERCP BIOPSY performed by Jeet White MD at [facility]  01/26/2022    ERCP WF W/ANES. (8:30) (N/A )     TONSILLECTOMY      UPPER GASTROINTESTINAL ENDOSCOPY N/A 3/11/2022    UPPER EUS W/ANES. performed by Jeet White MD at [facility] [place] [address] History:    Social History     Tobacco Use    Smoking status: Former Smoker    Smokeless tobacco: Never Used    Tobacco comment: quit 40-50 years ago   Substance Use Topics    Alcohol use: No                                Counseling given: Not Answered  Comment: quit 40-50 years ago      Vital Signs (Current):   Vitals:    04/07/22 0928   Weight: 160 lb (72.6 kg)   Height: 5' 11\" (1.803 m)                                              BP Readings from Last 3 Encounters:   03/27/22 131/80   03/12/22 138/73   03/11/22 (!) 204/117       NPO Status:                                                                                 BMI:   Wt Readings from Last 3 Encounters:   03/25/22 168 lb 4.8 oz (76.3 kg)   03/10/22 173 lb (78.5 kg)   02/19/22 171 lb (77.6 kg)     Body mass index is 22.32 kg/m².     CBC:   Lab Results   Component Value Date    WBC 4.6 03/26/2022    RBC 3.17 03/26/2022    HGB 11.4 03/26/2022    HCT 31.6 03/26/2022    MCV 99.7 03/26/2022    RDW 12.5 03/26/2022     03/26/2022       CMP:   Lab Results   Component Value Date     03/27/2022    K 3.8 03/27/2022     03/27/2022    CO2 26 03/27/2022    BUN 6 03/27/2022    CREATININE <0.5 03/27/2022    GFRAA >60 03/27/2022    GFRAA >60 02/15/2011    AGRATIO 2.1 03/27/2022    LABGLOM >60 03/27/2022    GLUCOSE 109 03/27/2022 PROT 5.5 03/27/2022    PROT 7.1 02/15/2011    CALCIUM 8.8 03/27/2022    BILITOT 0.6 03/27/2022    ALKPHOS 109 03/27/2022    AST 15 03/27/2022    ALT 25 03/27/2022       POC Tests: No results for input(s): POCGLU, POCNA, POCK, POCCL, POCBUN, POCHEMO, POCHCT in the last 72 hours. Coags:   Lab Results   Component Value Date    PROTIME 10.8 02/02/2022    INR 0.96 02/02/2022    APTT 43.6 02/15/2011       HCG (If Applicable): No results found for: PREGTESTUR, PREGSERUM, HCG, HCGQUANT     ABGs: No results found for: PHART, PO2ART, JVZ8MTN, MZW9IZW, BEART, J8HRNBXN     Type & Screen (If Applicable):  No results found for: LABABO, LABRH    Drug/Infectious Status (If Applicable):  No results found for: HIV, HEPCAB    COVID-19 Screening (If Applicable):   Lab Results   Component Value Date    COVID19 Not Detected 03/10/2022    COVID19 NOT DETECTED 02/19/2022           Anesthesia Evaluation  Patient summary reviewed and Nursing notes reviewed no history of anesthetic complications:   Airway: Mallampati: II  TM distance: >3 FB   Neck ROM: full  Mouth opening: > = 3 FB Dental: normal exam         Pulmonary:   (+) pneumonia:  asthma:                            Cardiovascular:    (+) hypertension:,                   Neuro/Psych:   (+) neuromuscular disease:,             GI/Hepatic/Renal:   (+) GERD: well controlled, liver disease:,      (-) no renal disease       Endo/Other:    (+) DiabetesType II DM, , malignancy/cancer (CLL). Abdominal:             Vascular:   + PE. Other Findings:           Anesthesia Plan      general     ASA 3     (I discussed with the patient the risks and benefits of PIV, general anesthesia, IV Narcotics, PACU. All questions were answered the patient agrees with the plan)  Induction: intravenous. MIPS: Prophylactic antiemetics administered. Anesthetic plan and risks discussed with patient and spouse. Plan discussed with CRNA.                 Jason Roberts MD 4/12/2022

## 2022-04-13 ENCOUNTER — ANESTHESIA (OUTPATIENT)
Dept: ENDOSCOPY | Age: 72
End: 2022-04-13
Payer: MEDICARE

## 2022-04-13 ENCOUNTER — HOSPITAL ENCOUNTER (OUTPATIENT)
Age: 72
Setting detail: OUTPATIENT SURGERY
Discharge: HOME | End: 2022-04-13
Attending: INTERNAL MEDICINE | Admitting: INTERNAL MEDICINE
Payer: MEDICARE

## 2022-04-13 ENCOUNTER — APPOINTMENT (OUTPATIENT)
Dept: GENERAL RADIOLOGY | Age: 72
End: 2022-04-13
Attending: INTERNAL MEDICINE
Payer: MEDICARE

## 2022-04-13 VITALS
OXYGEN SATURATION: 100 % | RESPIRATION RATE: 16 BRPM | DIASTOLIC BLOOD PRESSURE: 85 MMHG | SYSTOLIC BLOOD PRESSURE: 151 MMHG

## 2022-04-13 VITALS
HEART RATE: 77 BPM | TEMPERATURE: 97.3 F | BODY MASS INDEX: 22.4 KG/M2 | WEIGHT: 160 LBS | HEIGHT: 71 IN | SYSTOLIC BLOOD PRESSURE: 131 MMHG | RESPIRATION RATE: 19 BRPM | DIASTOLIC BLOOD PRESSURE: 71 MMHG | OXYGEN SATURATION: 94 %

## 2022-04-13 LAB
GLUCOSE BLD-MCNC: 98 MG/DL (ref 70–99)
PERFORMED ON: NORMAL

## 2022-04-13 PROCEDURE — 74330 X-RAY BILE/PANC ENDOSCOPY: CPT

## 2022-04-13 PROCEDURE — 7100000011 HC PHASE II RECOVERY - ADDTL 15 MIN: Performed by: INTERNAL MEDICINE

## 2022-04-13 PROCEDURE — 3700000000 HC ANESTHESIA ATTENDED CARE: Performed by: INTERNAL MEDICINE

## 2022-04-13 PROCEDURE — 7100000000 HC PACU RECOVERY - FIRST 15 MIN: Performed by: INTERNAL MEDICINE

## 2022-04-13 PROCEDURE — 2709999900 HC NON-CHARGEABLE SUPPLY: Performed by: INTERNAL MEDICINE

## 2022-04-13 PROCEDURE — 3609018900 HC ERCP: Performed by: INTERNAL MEDICINE

## 2022-04-13 PROCEDURE — 3609015100 HC ERCP STENT PLACEMENT BILIARY/PANCREATIC DUCT: Performed by: INTERNAL MEDICINE

## 2022-04-13 PROCEDURE — 6360000002 HC RX W HCPCS: Performed by: NURSE ANESTHETIST, CERTIFIED REGISTERED

## 2022-04-13 PROCEDURE — 6360000002 HC RX W HCPCS: Performed by: ANESTHESIOLOGY

## 2022-04-13 PROCEDURE — 2720000010 HC SURG SUPPLY STERILE: Performed by: INTERNAL MEDICINE

## 2022-04-13 PROCEDURE — 3700000001 HC ADD 15 MINUTES (ANESTHESIA): Performed by: INTERNAL MEDICINE

## 2022-04-13 PROCEDURE — 2580000003 HC RX 258: Performed by: NURSE ANESTHETIST, CERTIFIED REGISTERED

## 2022-04-13 PROCEDURE — 7100000010 HC PHASE II RECOVERY - FIRST 15 MIN: Performed by: INTERNAL MEDICINE

## 2022-04-13 PROCEDURE — 88305 TISSUE EXAM BY PATHOLOGIST: CPT

## 2022-04-13 PROCEDURE — 7100000001 HC PACU RECOVERY - ADDTL 15 MIN: Performed by: INTERNAL MEDICINE

## 2022-04-13 PROCEDURE — 2500000003 HC RX 250 WO HCPCS: Performed by: NURSE ANESTHETIST, CERTIFIED REGISTERED

## 2022-04-13 RX ORDER — IPRATROPIUM BROMIDE AND ALBUTEROL SULFATE 2.5; .5 MG/3ML; MG/3ML
1 SOLUTION RESPIRATORY (INHALATION)
Status: DISCONTINUED | OUTPATIENT
Start: 2022-04-13 | End: 2022-04-13 | Stop reason: HOSPADM

## 2022-04-13 RX ORDER — SODIUM CHLORIDE 0.9 % (FLUSH) 0.9 %
5-40 SYRINGE (ML) INJECTION EVERY 12 HOURS SCHEDULED
Status: DISCONTINUED | OUTPATIENT
Start: 2022-04-13 | End: 2022-04-13 | Stop reason: HOSPADM

## 2022-04-13 RX ORDER — MIDAZOLAM HYDROCHLORIDE 1 MG/ML
1 INJECTION INTRAMUSCULAR; INTRAVENOUS EVERY 5 MIN PRN
Status: DISCONTINUED | OUTPATIENT
Start: 2022-04-13 | End: 2022-04-13 | Stop reason: HOSPADM

## 2022-04-13 RX ORDER — ONDANSETRON 2 MG/ML
INJECTION INTRAMUSCULAR; INTRAVENOUS PRN
Status: DISCONTINUED | OUTPATIENT
Start: 2022-04-13 | End: 2022-04-13 | Stop reason: SDUPTHER

## 2022-04-13 RX ORDER — SODIUM CHLORIDE, SODIUM LACTATE, POTASSIUM CHLORIDE, CALCIUM CHLORIDE 600; 310; 30; 20 MG/100ML; MG/100ML; MG/100ML; MG/100ML
INJECTION, SOLUTION INTRAVENOUS CONTINUOUS PRN
Status: DISCONTINUED | OUTPATIENT
Start: 2022-04-13 | End: 2022-04-13 | Stop reason: SDUPTHER

## 2022-04-13 RX ORDER — LIDOCAINE HYDROCHLORIDE 20 MG/ML
INJECTION, SOLUTION INFILTRATION; PERINEURAL PRN
Status: DISCONTINUED | OUTPATIENT
Start: 2022-04-13 | End: 2022-04-13 | Stop reason: SDUPTHER

## 2022-04-13 RX ORDER — ONDANSETRON 2 MG/ML
4 INJECTION INTRAMUSCULAR; INTRAVENOUS EVERY 10 MIN PRN
Status: DISCONTINUED | OUTPATIENT
Start: 2022-04-13 | End: 2022-04-13 | Stop reason: HOSPADM

## 2022-04-13 RX ORDER — SODIUM CHLORIDE 0.9 % (FLUSH) 0.9 %
5-40 SYRINGE (ML) INJECTION PRN
Status: DISCONTINUED | OUTPATIENT
Start: 2022-04-13 | End: 2022-04-13 | Stop reason: HOSPADM

## 2022-04-13 RX ORDER — MEPERIDINE HYDROCHLORIDE 25 MG/ML
12.5 INJECTION INTRAMUSCULAR; INTRAVENOUS; SUBCUTANEOUS EVERY 5 MIN PRN
Status: DISCONTINUED | OUTPATIENT
Start: 2022-04-13 | End: 2022-04-13 | Stop reason: HOSPADM

## 2022-04-13 RX ORDER — IPRATROPIUM BROMIDE AND ALBUTEROL SULFATE 2.5; .5 MG/3ML; MG/3ML
SOLUTION RESPIRATORY (INHALATION)
Status: DISPENSED
Start: 2022-04-13 | End: 2022-04-14

## 2022-04-13 RX ORDER — SODIUM CHLORIDE, SODIUM LACTATE, POTASSIUM CHLORIDE, CALCIUM CHLORIDE 600; 310; 30; 20 MG/100ML; MG/100ML; MG/100ML; MG/100ML
INJECTION, SOLUTION INTRAVENOUS CONTINUOUS
Status: DISCONTINUED | OUTPATIENT
Start: 2022-04-13 | End: 2022-04-13 | Stop reason: HOSPADM

## 2022-04-13 RX ORDER — ROCURONIUM BROMIDE 10 MG/ML
INJECTION, SOLUTION INTRAVENOUS PRN
Status: DISCONTINUED | OUTPATIENT
Start: 2022-04-13 | End: 2022-04-13 | Stop reason: SDUPTHER

## 2022-04-13 RX ORDER — OXYCODONE HYDROCHLORIDE 5 MG/1
10 TABLET ORAL PRN
Status: DISCONTINUED | OUTPATIENT
Start: 2022-04-13 | End: 2022-04-13 | Stop reason: HOSPADM

## 2022-04-13 RX ORDER — HYDRALAZINE HYDROCHLORIDE 20 MG/ML
5 INJECTION INTRAMUSCULAR; INTRAVENOUS
Status: DISCONTINUED | OUTPATIENT
Start: 2022-04-13 | End: 2022-04-13 | Stop reason: HOSPADM

## 2022-04-13 RX ORDER — OXYCODONE HYDROCHLORIDE 5 MG/1
5 TABLET ORAL PRN
Status: DISCONTINUED | OUTPATIENT
Start: 2022-04-13 | End: 2022-04-13 | Stop reason: HOSPADM

## 2022-04-13 RX ORDER — SODIUM CHLORIDE 9 MG/ML
25 INJECTION, SOLUTION INTRAVENOUS PRN
Status: DISCONTINUED | OUTPATIENT
Start: 2022-04-13 | End: 2022-04-13 | Stop reason: HOSPADM

## 2022-04-13 RX ORDER — PROPOFOL 10 MG/ML
INJECTION, EMULSION INTRAVENOUS PRN
Status: DISCONTINUED | OUTPATIENT
Start: 2022-04-13 | End: 2022-04-13 | Stop reason: SDUPTHER

## 2022-04-13 RX ORDER — DIPHENHYDRAMINE HYDROCHLORIDE 50 MG/ML
12.5 INJECTION INTRAMUSCULAR; INTRAVENOUS
Status: DISCONTINUED | OUTPATIENT
Start: 2022-04-13 | End: 2022-04-13 | Stop reason: HOSPADM

## 2022-04-13 RX ADMIN — ONDANSETRON HYDROCHLORIDE 4 MG: 2 INJECTION, SOLUTION INTRAMUSCULAR; INTRAVENOUS at 14:30

## 2022-04-13 RX ADMIN — SUGAMMADEX 200 MG: 100 INJECTION, SOLUTION INTRAVENOUS at 12:56

## 2022-04-13 RX ADMIN — SODIUM CHLORIDE, POTASSIUM CHLORIDE, SODIUM LACTATE AND CALCIUM CHLORIDE: 600; 310; 30; 20 INJECTION, SOLUTION INTRAVENOUS at 11:51

## 2022-04-13 RX ADMIN — LIDOCAINE HYDROCHLORIDE 100 MG: 20 INJECTION, SOLUTION INFILTRATION; PERINEURAL at 11:57

## 2022-04-13 RX ADMIN — PROPOFOL 200 MG: 10 INJECTION, EMULSION INTRAVENOUS at 11:57

## 2022-04-13 RX ADMIN — ONDANSETRON HYDROCHLORIDE 4 MG: 2 INJECTION, SOLUTION INTRAMUSCULAR; INTRAVENOUS at 11:57

## 2022-04-13 RX ADMIN — ROCURONIUM BROMIDE 50 MG: 10 INJECTION, SOLUTION INTRAVENOUS at 11:57

## 2022-04-13 ASSESSMENT — PULMONARY FUNCTION TESTS
PIF_VALUE: 19
PIF_VALUE: 20
PIF_VALUE: 21
PIF_VALUE: 18
PIF_VALUE: 4
PIF_VALUE: 18
PIF_VALUE: 14
PIF_VALUE: 20
PIF_VALUE: 19
PIF_VALUE: 0
PIF_VALUE: 20
PIF_VALUE: 3
PIF_VALUE: 21
PIF_VALUE: 22
PIF_VALUE: 21
PIF_VALUE: 5
PIF_VALUE: 8
PIF_VALUE: 20
PIF_VALUE: 20
PIF_VALUE: 0
PIF_VALUE: 20
PIF_VALUE: 21
PIF_VALUE: 3
PIF_VALUE: 20
PIF_VALUE: 0
PIF_VALUE: 28
PIF_VALUE: 20
PIF_VALUE: 20
PIF_VALUE: 0
PIF_VALUE: 20
PIF_VALUE: 19
PIF_VALUE: 21
PIF_VALUE: 21
PIF_VALUE: 19
PIF_VALUE: 20
PIF_VALUE: 20
PIF_VALUE: 28
PIF_VALUE: 14
PIF_VALUE: 15
PIF_VALUE: 16
PIF_VALUE: 20
PIF_VALUE: 5
PIF_VALUE: 20
PIF_VALUE: 19
PIF_VALUE: 21
PIF_VALUE: 19
PIF_VALUE: 20
PIF_VALUE: 18
PIF_VALUE: 0
PIF_VALUE: 0
PIF_VALUE: 19
PIF_VALUE: 20
PIF_VALUE: 19
PIF_VALUE: 20
PIF_VALUE: 19
PIF_VALUE: 20
PIF_VALUE: 0
PIF_VALUE: 19
PIF_VALUE: 16
PIF_VALUE: 21
PIF_VALUE: 20
PIF_VALUE: 1
PIF_VALUE: 20
PIF_VALUE: 19

## 2022-04-13 ASSESSMENT — PAIN SCALES - GENERAL
PAINLEVEL_OUTOF10: 0
PAINLEVEL_OUTOF10: 0

## 2022-04-13 ASSESSMENT — PAIN - FUNCTIONAL ASSESSMENT: PAIN_FUNCTIONAL_ASSESSMENT: 0-10

## 2022-04-13 NOTE — PROGRESS NOTES
Patient admitted from OR to PACU. Bedside report received. Patient immediately hooked up to heart monitor. Omar Resendez RN

## 2022-04-13 NOTE — PROGRESS NOTES
Discharge instructions given to patient and patients wife. Both deny any questions at this time. Mert Rodriguez RN

## 2022-04-13 NOTE — PROGRESS NOTES
Patient waiting to speak with doctor. Patient in stable condition. Ready for discharge. Omar Resendez RN

## 2022-04-13 NOTE — BRIEF OP NOTE
Brief Postoperative Note      Patient: Karrie Torres  YOB: 1950  MRN: [id number]    Date of Procedure: 4/13/2022    Pre-Op Diagnosis: REMOVAL BILIARY STENT    Post-Op Diagnosis: biliary stricture s/p stent removal, cholangioscopy and biopsy       Procedure(s):  ERCP WF W/ANES.  (11:00) BILIARY STENT REMOVAL  ERCP ENDOSCOPIC RETROGRADE CHOLANGIOPANCREATOGRAPHY DIRECT VISUALIZATION    Surgeon(s):  Henrietta Kyle MD    Assistant:  * No surgical staff found *    Anesthesia: Monitor Anesthesia Care    Estimated Blood Loss (mL): Minimal    Complications: None    Specimens:   ID Type Source Tests Collected by Time Destination   A :  Tissue Biopsy SURGICAL PATHOLOGY Henrietta Kyle MD 4/13/2022 1247        Implants:  * No implants in log *      Drains: * No LDAs found *    Findings: biliary stricture s/p stent removal, cholangioscopy and biopsy        Electronically signed by Henrietta Kyle MD on 4/13/2022 at 1:25 PM

## 2022-04-13 NOTE — PROGRESS NOTES
Patient discharge via wheelchair in stable condition with all belongings to private car. Ibeth Lara RN

## 2022-04-13 NOTE — H&P
History and Physical / Pre-Sedation Assessment    Patient:  [place]   :   1950     Intended Procedure:  ERCP    HPI: 80-year-old male with history of diabetes, hypertension, hyperlipidemia, asthma, and remote diagnosis of Crohn's disease, biliary stricture status post stent placement presents for stent removal    Past Medical History:   Diagnosis Date    Asthma     Blood circulation, collateral     Carpal tunnel syndrome     [facility]) ([facility]     Crohn's colitis ([facility]     Diabetes mellitus ([facility]     emboli     pulmonary emboli in     GERD (gastroesophageal reflux disease)     Chrons disease    Gout     Hx of blood clots     Hypertension     Pneumonia     pneumonia,asthma    prostate     infection    Seasonal allergies      Past Surgical History:   Procedure Laterality Date    CYST REMOVAL      ERCP N/A 2022    ERCP BIOPSY performed by Gerald Garcia MD at [address] [place] [address] ERCP  2022    ERCP SPHINCTER/PAPILLOTOMY performed by Gerald Garcia MD at [address] [place] [address] ERCP  2022    ERCP STENT INSERTION performed by Gerald Garcia MD at [address] [place] [address] ERCP N/A 3/11/2022    ERCP STENT REMOVAL performed by Gerald Garcia MD at [address] [place] [address] ERCP  3/11/2022    ERCP STENT INSERTION performed by Gerald Garcia MD at [address] [place] [address] ERCP  3/11/2022    ERCP BIOPSY performed by Gerald Gracia MD at [place] [address]  2022    ERCP WF W/ANES. (8:30) (N/A )     TONSILLECTOMY      UPPER GASTROINTESTINAL ENDOSCOPY N/A 3/11/2022    UPPER EUS W/ANES. performed by Gerald Garcia MD at [address] [facility]       Medications reviewed  Prior to Admission medications    Medication Sig Start Date End Date Taking?  Authorizing Provider   triamterene-hydroCHLOROthiazide (MAXZIDE-25) 37.5-25 MG per tablet Take 1 tablet by mouth daily    Historical Provider, MD   gemfibrozil (LOPID) 600 MG tablet Take 600 mg by mouth 2 times daily (before meals)    Historical Provider, MD   ursodiol (ACTIGALL) 300 MG capsule Take 1 capsule by mouth 2 times daily 3/12/22   Destiny Rodriguez MD   pantoprazole (PROTONIX) 40 MG tablet Take 1 tablet by mouth 2 times daily (before meals) for 14 days 1/26/22 2/9/22  Destiny Rodriguez MD   metFORMIN (GLUCOPHAGE-XR) 500 MG extended release tablet Take 500 mg by mouth 2 times daily    Historical Provider, MD   dicyclomine (BENTYL) 10 MG capsule Take 10 mg by mouth nightly. 2/15/11   Historical Provider, MD   allopurinol (ZYLOPRIM) 300 MG tablet Take 300 mg by mouth daily. Historical Provider, MD   albuterol (PROVENTIL HFA;VENTOLIN HFA) 108 (90 BASE) MCG/ACT inhaler Inhale 2 puffs into the lungs every 6 hours as needed. Historical Provider, MD        Allergies: Allergies   Allergen Reactions    Codeine      Pt states he took medication without food and had a reaction       Nurses notes reviewed and agreed. Physical Exam:  Vital Signs: /87   Pulse 75   Temp 97.7 °F (36.5 °C) (Temporal)   Resp 16   Ht 5' 11\" (1.803 m)   Wt 160 lb (72.6 kg)   SpO2 98%   BMI 22.32 kg/m²    Airway: Mallampati: II (soft palate, uvula, fauces visible)  Pulmonary:Normal  Cardiac:Normal  Abdomen:Normal    Pre-Procedure Assessment / Plan:  ASA: Class 3 - A patient with severe systemic disease that limits activity but is not incapacitating  Level of Sedation Plan: Moderate sedation  Post Procedure plan: Return to same level of care    I assessed the patient and find that the patient is in satisfactory condition to proceed with the planned procedure and sedation plan. I have explained the risk, benefits, and alternatives to the procedure; the patient understands and agrees to proceed.        Ridge Lima MD  4/13/2022

## 2022-04-14 NOTE — OP NOTE
[address]                 [address] [address]                                OPERATIVE REPORT    PATIENT NAME: Last Moore                 :        1950  MED REC NO:   [id number]                          ROOM:  ACCOUNT NO:   [de-identified]                           ADMIT DATE: 2022  PROVIDER:     Cielo Price MD    DATE OF PROCEDURE:  2022    PREOPERATIVE DIAGNOSES:  1. Biliary stricture. 2.  Biliary stent in place. POSTPROCEDURE DIAGNOSES:  1. Normal caliber common bile duct. 2.  Minimal debris in the distal common duct. 3.  Mild regularity of the distal common duct. 4.  Successful Spyglass cholangioscope with biopsy of the distal biliary  stricture. OPERATION PERFORMED:  1. ERCP with stent removal.  2.  Spyglass cholangioscopy with SpyBite biopsy. SURGEON:  Cielo Price MD    MEDICATIONS:  MAC per anesthesia. PROCEDURE INDICATIONS:  A 77-year-old male with history of diabetes,  hypertension, hyperlipidemia, asthma, and remote diagnosis of Crohn's  disease, biliary stricture status post stent placement, presents for a  stent removal, where he was originally hospitalized with obstructive  jaundice in January. He underwent ERCP, which showed 2-cm distal bile  duct stricture. A 10-Urdu x 7 cm stent was placed with successful  decompression. He subsequently was readmitted in 2022, with  recurrent biliary obstructive jaundice thought to be stent occlusion. At this time, he underwent EUS and ERCP, which sowed a 1 cm biliary  stricture with normal appearing pancreas. The brushings of the biliary  stricture were negative x2. A 10 x 60 metal stent fully covered  self-expanding, the metal stent was deployed across the biliary  stricture. He presents today after four weeks for reevaluation, stent  removal, and possible cholangioscopy.     PROCEDURE IN DETAIL:  Informed consent obtained after discussing risks,  benefits, and alternatives. Full history and physical was performed. The patient was classified as ASA class III. Medications were given by  anesthesia. Cardiopulmonary status was continuously monitored  throughout the procedure. The patient underwent endotracheal intubation  for airway protection, and the patient was then placed in prone  position. Once adequately positioned, a standard therapeutic  duodenoscope was inserted in the mouth and advanced under direct  visualization to the second portion of the duodenum. The entire mucosa  of the esophagus, stomach, and duodenum were examined carefully. The  patient tolerated the procedure well without any difficulties. FINDINGS:  ESOPHAGUS:  Examined esophagus appeared normal on limited views. STOMACH:  Limited view of the stomach appeared normal.    DUODENUM:  A fully covered metal stent appeared emanating from the  papilla. This was successfully retrieved using oral forceps removal  method. Then, deep wire-guided cannulation was successfully achieved  using TRUEtome sphincterotome loaded with 0.025 Revolution wire. The  bile was aspirated to confirm biliary cannulation. Contrast was  injected and images were interpreted by me. The entire common duct  appeared to be nondilated measuring 8 to 9 mm where numerous filling  defects were thought to be air bubbles were visualized. There was no  evidence of intrahepatic ductal dilation; however, at the distal end of  the duct, there was irregularity presumably resolving benign biliary  stricture. Several balloon sweeps were performed using 9 to 12 mm  balloon catheter. Then, balloon catheter was exchanged in favor of  Spyglass cholangioscopy. The entire duct was visualized to be normal.   There was evidence of abnormal mucosa of the biliary duct intraluminally  with congestion and edema. Biopsies were taken to rule malignancy using  SpyBite forceps.   Procedure was terminated for aspiration of air and  liquid contrast the upper GI tract. SUMMARY:  1. Successful extraction of the self-expanding metal stent. 2.  Improved biliary stricture. 3.  Normal caliber common duct. 4.  Abnormal edematous biliary stricture visualized by the Spyglass  cholangioscopy. 5.  Successful biopsy taken with SpyBite forceps. RECOMMENDATIONS:  1. Return the patient to home when standard parameters are met. 2.  Await final pathology results. 3.  Repeat CMP in one to two weeks. 4.  Low-fat diet advance as tolerated. 5.  We will follow the patient in clinic.     EBL: <5mL    Donya Blanco MD    D: 04/13/2022 19:01:29       T: 04/14/2022 0:28:09     [id number]  Job#: [id number]     Doc#: [id number]    CC:  MD Bridger Benavidez MD

## 2022-04-28 ENCOUNTER — HOSPITAL ENCOUNTER (OUTPATIENT)
Age: 72
Discharge: HOME | End: 2022-04-28
Payer: MEDICARE

## 2022-04-28 LAB
A/G RATIO: 2.5 (ref 1.1–2.2)
ALBUMIN SERPL-MCNC: 5 G/DL (ref 3.4–5)
ALP BLD-CCNC: 61 U/L (ref 40–129)
ALT SERPL-CCNC: 16 U/L (ref 10–40)
ANION GAP SERPL CALCULATED.3IONS-SCNC: 14 MMOL/L (ref 3–16)
AST SERPL-CCNC: 20 U/L (ref 15–37)
BILIRUB SERPL-MCNC: 0.6 MG/DL (ref 0–1)
BUN BLDV-MCNC: 10 MG/DL (ref 7–20)
CALCIUM SERPL-MCNC: 9.8 MG/DL (ref 8.3–10.6)
CHLORIDE BLD-SCNC: 98 MMOL/L (ref 99–110)
CO2: 24 MMOL/L (ref 21–32)
CREAT SERPL-MCNC: <0.5 MG/DL (ref 0.8–1.3)
GFR AFRICAN AMERICAN: >60
GFR NON-AFRICAN AMERICAN: >60
GLUCOSE BLD-MCNC: 103 MG/DL (ref 70–99)
POTASSIUM SERPL-SCNC: 3.9 MMOL/L (ref 3.5–5.1)
SODIUM BLD-SCNC: 136 MMOL/L (ref 136–145)
TOTAL PROTEIN: 7 G/DL (ref 6.4–8.2)

## 2022-04-28 PROCEDURE — 80053 COMPREHEN METABOLIC PANEL: CPT

## 2022-05-26 LAB
ALBUMIN SERPL-MCNC: 4.6 G/DL (ref 3.5–5)
ALP BLD-CCNC: 58 U/L (ref 38–126)
ALT SERPL-CCNC: 24 U/L (ref 0–49)
AST SERPL-CCNC: 32 U/L (ref 17–59)
BILIRUB SERPL-MCNC: 0.6 MG/DL (ref 0.2–1.3)
BILIRUBIN DIRECT: 0 MG/DL (ref 0–0.3)
BILIRUBIN, INDIRECT: 0.5 MG/DL (ref 0–1.1)
INTERNAL QC: NORMAL
SARS-COV-2, NAA: NEGATIVE
TOTAL PROTEIN: 7.6 G/DL (ref 6.3–8.2)

## 2022-05-30 ENCOUNTER — HOSPITAL ENCOUNTER (INPATIENT)
Age: 72
LOS: 4 days | Discharge: HOME | DRG: 863 | End: 2022-06-03
Attending: SURGERY | Admitting: SURGERY
Payer: MEDICARE

## 2022-05-30 DIAGNOSIS — R10.11 RIGHT UPPER QUADRANT PAIN: Primary | ICD-10-CM

## 2022-05-30 LAB
ALBUMIN SERPL-MCNC: 3.7 G/DL (ref 3.4–5)
ALP BLD-CCNC: 107 U/L (ref 40–129)
ALT SERPL-CCNC: 34 U/L (ref 10–40)
ANION GAP SERPL CALCULATED.3IONS-SCNC: 11 MMOL/L (ref 3–16)
AST SERPL-CCNC: 13 U/L (ref 15–37)
BASOPHILS ABSOLUTE: 0 K/UL (ref 0–0.2)
BASOPHILS RELATIVE PERCENT: 0.1 %
BILIRUB SERPL-MCNC: 0.9 MG/DL (ref 0–1)
BILIRUBIN DIRECT: 0.3 MG/DL (ref 0–0.3)
BILIRUBIN, INDIRECT: 0.6 MG/DL (ref 0–1)
BUN BLDV-MCNC: 8 MG/DL (ref 7–20)
CALCIUM SERPL-MCNC: 8.7 MG/DL (ref 8.3–10.6)
CHLORIDE BLD-SCNC: 96 MMOL/L (ref 99–110)
CO2: 28 MMOL/L (ref 21–32)
CREAT SERPL-MCNC: 0.6 MG/DL (ref 0.8–1.3)
EOSINOPHILS ABSOLUTE: 0 K/UL (ref 0–0.6)
EOSINOPHILS RELATIVE PERCENT: 0.1 %
GFR AFRICAN AMERICAN: >60
GFR NON-AFRICAN AMERICAN: >60
GLUCOSE BLD-MCNC: 126 MG/DL (ref 70–99)
GLUCOSE BLD-MCNC: 147 MG/DL (ref 70–99)
HCT VFR BLD CALC: 34 % (ref 40.5–52.5)
HEMOGLOBIN: 11.8 G/DL (ref 13.5–17.5)
LIPASE: 17 U/L (ref 13–60)
LYMPHOCYTES ABSOLUTE: 0.4 K/UL (ref 1–5.1)
LYMPHOCYTES RELATIVE PERCENT: 6 %
MAGNESIUM: 1.9 MG/DL (ref 1.8–2.4)
MCH RBC QN AUTO: 33.7 PG (ref 26–34)
MCHC RBC AUTO-ENTMCNC: 34.7 G/DL (ref 31–36)
MCV RBC AUTO: 97.3 FL (ref 80–100)
MONOCYTES ABSOLUTE: 0.3 K/UL (ref 0–1.3)
MONOCYTES RELATIVE PERCENT: 5.1 %
NEUTROPHILS ABSOLUTE: 5.9 K/UL (ref 1.7–7.7)
NEUTROPHILS RELATIVE PERCENT: 88.7 %
PDW BLD-RTO: 13 % (ref 12.4–15.4)
PERFORMED ON: ABNORMAL
PHOSPHORUS: 3 MG/DL (ref 2.5–4.9)
PLATELET # BLD: 169 K/UL (ref 135–450)
PMV BLD AUTO: 6.9 FL (ref 5–10.5)
POTASSIUM SERPL-SCNC: 3.6 MMOL/L (ref 3.5–5.1)
RBC # BLD: 3.5 M/UL (ref 4.2–5.9)
SODIUM BLD-SCNC: 135 MMOL/L (ref 136–145)
TOTAL PROTEIN: 7.1 G/DL (ref 6.4–8.2)
WBC # BLD: 6.7 K/UL (ref 4–11)

## 2022-05-30 PROCEDURE — A4216 STERILE WATER/SALINE, 10 ML: HCPCS | Performed by: SURGERY

## 2022-05-30 PROCEDURE — 36415 COLL VENOUS BLD VENIPUNCTURE: CPT

## 2022-05-30 PROCEDURE — 84100 ASSAY OF PHOSPHORUS: CPT

## 2022-05-30 PROCEDURE — 6360000002 HC RX W HCPCS: Performed by: SURGERY

## 2022-05-30 PROCEDURE — 99222 1ST HOSP IP/OBS MODERATE 55: CPT | Performed by: SURGERY

## 2022-05-30 PROCEDURE — 83735 ASSAY OF MAGNESIUM: CPT

## 2022-05-30 PROCEDURE — 83690 ASSAY OF LIPASE: CPT

## 2022-05-30 PROCEDURE — 2580000003 HC RX 258: Performed by: SURGERY

## 2022-05-30 PROCEDURE — 80076 HEPATIC FUNCTION PANEL: CPT

## 2022-05-30 PROCEDURE — 80048 BASIC METABOLIC PNL TOTAL CA: CPT

## 2022-05-30 PROCEDURE — 85025 COMPLETE CBC W/AUTO DIFF WBC: CPT

## 2022-05-30 PROCEDURE — 6370000000 HC RX 637 (ALT 250 FOR IP): Performed by: SURGERY

## 2022-05-30 PROCEDURE — 2500000003 HC RX 250 WO HCPCS: Performed by: SURGERY

## 2022-05-30 PROCEDURE — 1200000000 HC SEMI PRIVATE

## 2022-05-30 RX ORDER — ONDANSETRON 2 MG/ML
4 INJECTION INTRAMUSCULAR; INTRAVENOUS EVERY 6 HOURS PRN
Status: DISCONTINUED | OUTPATIENT
Start: 2022-05-30 | End: 2022-06-03 | Stop reason: HOSPADM

## 2022-05-30 RX ORDER — ALBUTEROL SULFATE 90 UG/1
2 AEROSOL, METERED RESPIRATORY (INHALATION) EVERY 6 HOURS PRN
Status: DISCONTINUED | OUTPATIENT
Start: 2022-05-30 | End: 2022-06-03 | Stop reason: HOSPADM

## 2022-05-30 RX ORDER — TRIAMTERENE AND HYDROCHLOROTHIAZIDE 37.5; 25 MG/1; MG/1
1 TABLET ORAL DAILY
Status: DISCONTINUED | OUTPATIENT
Start: 2022-05-30 | End: 2022-06-03 | Stop reason: HOSPADM

## 2022-05-30 RX ORDER — ACETAMINOPHEN 325 MG/1
650 TABLET ORAL EVERY 6 HOURS PRN
Status: DISCONTINUED | OUTPATIENT
Start: 2022-05-30 | End: 2022-06-03 | Stop reason: HOSPADM

## 2022-05-30 RX ORDER — SODIUM CHLORIDE 9 MG/ML
INJECTION, SOLUTION INTRAVENOUS CONTINUOUS
Status: DISCONTINUED | OUTPATIENT
Start: 2022-05-30 | End: 2022-06-03 | Stop reason: HOSPADM

## 2022-05-30 RX ORDER — ENOXAPARIN SODIUM 100 MG/ML
40 INJECTION SUBCUTANEOUS EVERY 24 HOURS
Status: DISCONTINUED | OUTPATIENT
Start: 2022-05-30 | End: 2022-06-03 | Stop reason: HOSPADM

## 2022-05-30 RX ADMIN — MEROPENEM 1000 MG: 1 INJECTION, POWDER, FOR SOLUTION INTRAVENOUS at 20:55

## 2022-05-30 RX ADMIN — ENOXAPARIN SODIUM 40 MG: 100 INJECTION SUBCUTANEOUS at 11:38

## 2022-05-30 RX ADMIN — HYDROMORPHONE HYDROCHLORIDE 0.5 MG: 1 INJECTION, SOLUTION INTRAMUSCULAR; INTRAVENOUS; SUBCUTANEOUS at 12:48

## 2022-05-30 RX ADMIN — SODIUM CHLORIDE: 9 INJECTION, SOLUTION INTRAVENOUS at 11:33

## 2022-05-30 RX ADMIN — MEROPENEM 1000 MG: 1 INJECTION, POWDER, FOR SOLUTION INTRAVENOUS at 12:52

## 2022-05-30 RX ADMIN — ACETAMINOPHEN 650 MG: 325 TABLET ORAL at 20:53

## 2022-05-30 RX ADMIN — FAMOTIDINE 20 MG: 10 INJECTION INTRAVENOUS at 11:38

## 2022-05-30 RX ADMIN — FAMOTIDINE 20 MG: 10 INJECTION INTRAVENOUS at 20:52

## 2022-05-30 RX ADMIN — HYDROMORPHONE HYDROCHLORIDE 0.5 MG: 1 INJECTION, SOLUTION INTRAMUSCULAR; INTRAVENOUS; SUBCUTANEOUS at 15:50

## 2022-05-30 ASSESSMENT — PAIN SCALES - GENERAL
PAINLEVEL_OUTOF10: 0
PAINLEVEL_OUTOF10: 5

## 2022-05-30 ASSESSMENT — PAIN DESCRIPTION - LOCATION: LOCATION: ABDOMEN

## 2022-05-30 NOTE — PLAN OF CARE
Problem: Discharge Planning  Goal: Discharge to home or other facility with appropriate resources  Outcome: Progressing  Flowsheets (Taken 5/30/2022 1228)  Discharge to home or other facility with appropriate resources: Identify barriers to discharge with patient and caregiver     Problem: Skin/Tissue Integrity  Goal: Absence of new skin breakdown  Description: 1. Monitor for areas of redness and/or skin breakdown  2. Assess vascular access sites hourly  3. Every 4-6 hours minimum:  Change oxygen saturation probe site  4. Every 4-6 hours:  If on nasal continuous positive airway pressure, respiratory therapy assess nares and determine need for appliance change or resting period.   Outcome: Progressing     Problem: Safety - Adult  Goal: Free from fall injury  Outcome: Progressing     Problem: Pain  Goal: Verbalizes/displays adequate comfort level or baseline comfort level  Outcome: Progressing

## 2022-05-30 NOTE — PROGRESS NOTES
Patient admitted to room 209 as a direct admit. Patient oriented to room, call light, bed rails, phone, lights and bathroom. Patient instructed about the schedule of the day including: vital sign frequency, lab draws, possible tests, frequency of MD and staff rounds, daily weights, I &O's and prescribed diet. Bed alarm deferred patient low fall risk and refuses alarm. Bed locked, in lowest position, side rails up 2/4, call light within reach. Recliner Assessment:     Patient is not able to demonstrated the ability to move from a reclining position to an upright position within the recliner. however patient is alert, oriented and able to provide informed consent       4 Eyes Skin Assessment     The patient is being assess for   Admission    I agree that 2 RN's have performed a thorough Head to Toe Skin Assessment on the patient. ALL assessment sites listed below have been assessed. Areas assessed for pressure by both nurses:   [x]   Head, Face, and Ears   [x]   Shoulders, Back, and Chest, Abdomen  [x]   Arms, Elbows, and Hands   [x]   Coccyx, Sacrum, and Ischium  [x]   Legs, Feet, and Heels        Skin Assessed Under all Medical Devices by both nurses:  none              All Mepilex Borders were peeled back and area peeked at by both nurses:  No: na  Please list where Mepilex Borders are located:  none             **SHARE this note so that the co-signing nurse is able to place an eSignature**    Co-signer eSignature: {Esignature:[id number]}    Does the Patient have Skin Breakdown related to pressure?   No     (Insert Photo here: NA)         Mickey Prevention initiated:  No   Wound Care Orders initiated:  No      [facility] nurse consulted for Pressure Injury (Stage 3,4, Unstageable, DTI, NWPT, Complex wounds)and New or Established Ostomies:  No      Primary Nurse eSignature: Electronically signed by Ramon Burch RN on 5/30/22 at 12:22 PM EDT

## 2022-05-30 NOTE — PROGRESS NOTES
RT Inhaler-Nebulizer Bronchodilator Protocol Note    There is a bronchodilator order in the chart from a provider indicating to follow the RT Bronchodilator Protocol and there is an Initiate RT Inhaler-Nebulizer Bronchodilator Protocol order as well (see protocol at bottom of note). CXR Findings:  No results found. The findings from the last RT Protocol Assessment were as follows:   History Pulmonary Disease: (P) Smoker 15 pack years or more  Respiratory Pattern: (P) Regular pattern and RR 12-20 bpm  Breath Sounds: (P) Clear breath sounds  Cough: (P) Strong, spontaneous, non-productive  Indication for Bronchodilator Therapy: (P) None  Bronchodilator Assessment Score: (P) 1    Aerosolized bronchodilator medication orders have been revised according to the RT Inhaler-Nebulizer Bronchodilator Protocol below. Respiratory Therapist to perform RT Therapy Protocol Assessment initially then follow the protocol. Repeat RT Therapy Protocol Assessment PRN for score 0-3 or on second treatment, BID, and PRN for scores above 3. No Indications - adjust the frequency to every 6 hours PRN wheezing or bronchospasm, if no treatments needed after 48 hours then discontinue using Per Protocol order mode. If indication present, adjust the RT bronchodilator orders based on the Bronchodilator Assessment Score as indicated below. Use Inhaler orders unless patient has one or more of the following: on home nebulizer, not able to hold breath for 10 seconds, is not alert and oriented, cannot activate and use MDI correctly, or respiratory rate 25 breaths per minute or more, then use the equivalent nebulizer order(s) with same Frequency and PRN reasons based on the score. If a patient is on this medication at home then do not decrease Frequency below that used at home.     0-3 - enter or revise RT bronchodilator order(s) to equivalent RT Bronchodilator order with Frequency of every 4 hours PRN for wheezing or increased work of breathing using Per Protocol order mode. 4-6 - enter or revise RT Bronchodilator order(s) to two equivalent RT bronchodilator orders with one order with BID Frequency and one order with Frequency of every 4 hours PRN wheezing or increased work of breathing using Per Protocol order mode. 7-10 - enter or revise RT Bronchodilator order(s) to two equivalent RT bronchodilator orders with one order with TID Frequency and one order with Frequency of every 4 hours PRN wheezing or increased work of breathing using Per Protocol order mode. 11-13 - enter or revise RT Bronchodilator order(s) to one equivalent RT bronchodilator order with QID Frequency and an Albuterol order with Frequency of every 4 hours PRN wheezing or increased work of breathing using Per Protocol order mode. Greater than 13 - enter or revise RT Bronchodilator order(s) to one equivalent RT bronchodilator order with every 4 hours Frequency and an Albuterol order with Frequency of every 2 hours PRN wheezing or increased work of breathing using Per Protocol order mode. RT to enter RT Home Evaluation for COPD & MDI Assessment order using Per Protocol order mode.     Electronically signed by Elenita Suarez RCP on 5/30/2022 at 6:28 PM

## 2022-05-30 NOTE — H&P
Department of General Surgery - Adult   History and Physical      PATIENT NAME: Aggie Garrido OF BIRTH: 1950    ADMISSION DATE: 5/30/2022 11:09 AM      TODAY'S DATE: 5/30/2022    CHIEF COMPLAINT: Abdominal pain      HISTORY OF PRESENT ILLNESS:  The patient is a 70 y.o. male  who presents with abdominal pain status post laparoscopic cholecystectomy  4 days ago. He states he did well the first couple days and had severe abdominal pain. This is in the right upper quadrant. He has some nausea but no vomiting. He denies fever or chills. The pain hurts worse when he coughs.   [facility] emergency room and was transferred here    Past Medical History:        Diagnosis Date    Asthma     Blood circulation, collateral     Carpal tunnel syndrome     [facility]     [facility]'s colitis [facility]     Diabetes mellitus ([facility]     emboli     pulmonary emboli in 1980    GERD (gastroesophageal reflux disease)     Chrons disease    Gout     Hx of blood clots     Hypertension     Pneumonia     pneumonia,asthma    prostate     infection    Seasonal allergies        Past Surgical History:        Procedure Laterality Date    CYST REMOVAL      ERCP N/A 1/26/2022    ERCP BIOPSY performed by Tony Shaw MD at [address] [place] [address] ERCP  1/26/2022    ERCP SPHINCTER/PAPILLOTOMY performed by Tony Shaw MD at [address] [place] [address] ERCP  1/26/2022    ERCP STENT INSERTION performed by Tony Shaw MD at [address] [place] [address] ERCP N/A 3/11/2022    ERCP STENT REMOVAL performed by Tony Shaw MD at [address] [place] [address] ERCP  3/11/2022    ERCP STENT INSERTION performed by Tony Shaw MD at [address] [place] [address] ERCP  3/11/2022    ERCP BIOPSY performed by Tony Shaw MD at [address] [place] [address] ERCP N/A 4/13/2022    ERCP STENT INSERTION performed by Tony Shaw MD at [facility] ERCP  4/13/2022    ERCP ENDOSCOPIC RETROGRADE CHOLANGIOPANCREATOGRAPHY DIRECT VISUALIZATION performed by Vikki Christopher MD at [facility]  01/26/2022    ERCP WF W/ANES. (8:30) (N/A )     TONSILLECTOMY      UPPER GASTROINTESTINAL ENDOSCOPY N/A 3/11/2022    UPPER EUS W/ANES. performed by Vikki Christopher MD at [address] [facility]       Medications Prior to Admission:   Prior to Admission medications    Medication Sig Start Date End Date Taking? Authorizing Provider   triamterene-hydroCHLOROthiazide (MAXZIDE-25) 37.5-25 MG per tablet Take 1 tablet by mouth every 12 hours     Historical Provider, MD   gemfibrozil (LOPID) 600 MG tablet Take 600 mg by mouth 2 times daily (before meals)    Historical Provider, MD   ursodiol (ACTIGALL) 300 MG capsule Take 1 capsule by mouth 2 times daily 3/12/22   Marisol Chong MD   pantoprazole (PROTONIX) 40 MG tablet Take 1 tablet by mouth 2 times daily (before meals) for 14 days 1/26/22 2/9/22  Marisol Chong MD   metFORMIN (GLUCOPHAGE-XR) 500 MG extended release tablet Take 500 mg by mouth 2 times daily    Historical Provider, MD   dicyclomine (BENTYL) 10 MG capsule Take 10 mg by mouth nightly. 2/15/11   Historical Provider, MD   allopurinol (ZYLOPRIM) 300 MG tablet Take 300 mg by mouth daily. Historical Provider, MD   albuterol (PROVENTIL HFA;VENTOLIN HFA) 108 (90 BASE) MCG/ACT inhaler Inhale 2 puffs into the lungs every 6 hours as needed. Historical Provider, MD       Allergies:  Codeine    Social History:   TOBACCO:   reports that he has quit smoking. He has never used smokeless tobacco.  ETOH:   reports no history of alcohol use. DRUGS:   reports no history of drug use. Family History:   No family history on file.     REVIEW OF SYSTEMS:    CONSTITUTIONAL:  negative  HEENT:  Negative  RESPIRATORY:  negative  CARDIOVASCULAR:  negative  GASTROINTESTINAL:  positive for abdominal pain  GENITOURINARY:  negative  HEMATOLOGIC/LYMPHATIC:  negative  ENDOCRINE:  Negative  NEUROLOGICAL:  Negative  * All other ROS reviewed and negative. PHYSICAL EXAM:    VITALS:  /70   Pulse 81   Temp 99.9 °F (37.7 °C) (Oral)   Resp 16   Ht 5' 11\" (1.803 m)   Wt 173 lb (78.5 kg)   SpO2 96%   BMI 24.13 kg/m²   INTAKE/OUTPUT:   No intake/output data recorded. No intake/output data recorded. CONSTITUTIONAL:  alert, no apparent distress and normal weight  EYES:  PERRL, sclera clear  ENT:  Normocephalic,atraumatic, without obvious abnormality  NECK:  supple, symmetrical, trachea midline  LUNGS: Resp effort easy and unlabored, clear to auscultation  CARDIOVASCULAR:  NO JVD, regular rate and rhythm and no murmur noted  ABDOMEN: Incisions are healing well. normal bowel sounds, soft, non-distended, tenderness noted in the right upper quadrant, voluntary guarding absent, no masses palpated and hernia absent  MUSCULOSKELETAL: No clubbing or cyanosis, 1+ pitting edema lower extremities  NEUROLOGIC:  Mental Status Exam:  Level of Alertness:   awake  PSYCHIATRIC:   person, place, time  SKIN: Warm and dry      DATA:  CBC:   Recent Labs     05/30/22  1132   WBC 6.7   HGB 11.8*   HCT 34.0*        BMP:    Recent Labs     05/30/22  1132   *   K 3.6   CL 96*   CO2 28   BUN 8   CREATININE 0.6*   GLUCOSE 147*     Hepatic:   Recent Labs     05/30/22  1132   AST 13*   ALT 34   BILITOT 0.9   ALKPHOS 107     Mag:      Recent Labs     05/30/22  1132   MG 1.90      Phos:     Recent Labs     05/30/22  1132   PHOS 3.0      INR: No results for input(s): INR in the last 72 hours. Radiology Review: Images personally reviewed by me. CT images reviewed and show a gas and fluid collection in the gallbladder fossa consistent with abscess      ASSESSMENT AND PLAN:  Postoperative abscess status post laparoscopic cholecystectomy. Admit for IV fluid hydration and antibiotics and supportive care.   Plan for percutaneous drainage by IR tomorrow.             Electronically signed by Willow Gupta MD     [address] [facility]  [id number]

## 2022-05-30 NOTE — PROGRESS NOTES
Bedside report given and pt care transferred to [facility]. Pt denies needs at this time. Call light within reach.

## 2022-05-31 ENCOUNTER — HOSPITAL ENCOUNTER (OUTPATIENT)
Dept: CT IMAGING | Age: 72
Discharge: HOME | DRG: 863 | End: 2022-05-31
Attending: SURGERY
Payer: MEDICARE

## 2022-05-31 VITALS
OXYGEN SATURATION: 97 % | SYSTOLIC BLOOD PRESSURE: 109 MMHG | HEART RATE: 73 BPM | DIASTOLIC BLOOD PRESSURE: 60 MMHG | RESPIRATION RATE: 14 BRPM

## 2022-05-31 LAB
GLUCOSE BLD-MCNC: 188 MG/DL (ref 70–99)
INR BLD: 1.14 (ref 0.87–1.14)
PERFORMED ON: ABNORMAL
PROTHROMBIN TIME: 14.4 SEC (ref 11.7–14.5)

## 2022-05-31 PROCEDURE — 87186 SC STD MICRODIL/AGAR DIL: CPT

## 2022-05-31 PROCEDURE — 87076 CULTURE ANAEROBE IDENT EACH: CPT

## 2022-05-31 PROCEDURE — 0W9G30Z DRAINAGE OF PERITONEAL CAVITY WITH DRAINAGE DEVICE, PERCUTANEOUS APPROACH: ICD-10-PCS | Performed by: RADIOLOGY

## 2022-05-31 PROCEDURE — 6360000002 HC RX W HCPCS: Performed by: SURGERY

## 2022-05-31 PROCEDURE — 6370000000 HC RX 637 (ALT 250 FOR IP): Performed by: SURGERY

## 2022-05-31 PROCEDURE — A4216 STERILE WATER/SALINE, 10 ML: HCPCS | Performed by: SURGERY

## 2022-05-31 PROCEDURE — 2580000003 HC RX 258: Performed by: SURGERY

## 2022-05-31 PROCEDURE — 87070 CULTURE OTHR SPECIMN AEROBIC: CPT

## 2022-05-31 PROCEDURE — 87077 CULTURE AEROBIC IDENTIFY: CPT

## 2022-05-31 PROCEDURE — 36415 COLL VENOUS BLD VENIPUNCTURE: CPT

## 2022-05-31 PROCEDURE — 2500000003 HC RX 250 WO HCPCS: Performed by: SURGERY

## 2022-05-31 PROCEDURE — 1200000000 HC SEMI PRIVATE

## 2022-05-31 PROCEDURE — 87075 CULTR BACTERIA EXCEPT BLOOD: CPT

## 2022-05-31 PROCEDURE — 77012 CT SCAN FOR NEEDLE BIOPSY: CPT

## 2022-05-31 PROCEDURE — C1729 CATH, DRAINAGE: HCPCS

## 2022-05-31 PROCEDURE — 85610 PROTHROMBIN TIME: CPT

## 2022-05-31 PROCEDURE — 99024 POSTOP FOLLOW-UP VISIT: CPT | Performed by: SURGERY

## 2022-05-31 PROCEDURE — 6360000002 HC RX W HCPCS: Performed by: RADIOLOGY

## 2022-05-31 PROCEDURE — 87205 SMEAR GRAM STAIN: CPT

## 2022-05-31 RX ORDER — OXYCODONE HYDROCHLORIDE 5 MG/1
5 TABLET ORAL EVERY 4 HOURS PRN
Status: DISCONTINUED | OUTPATIENT
Start: 2022-05-31 | End: 2022-06-03 | Stop reason: HOSPADM

## 2022-05-31 RX ORDER — MIDAZOLAM HYDROCHLORIDE 5 MG/ML
INJECTION, SOLUTION INTRAMUSCULAR; INTRAVENOUS
Status: COMPLETED | OUTPATIENT
Start: 2022-05-31 | End: 2022-05-31

## 2022-05-31 RX ORDER — FENTANYL CITRATE 50 UG/ML
INJECTION, SOLUTION INTRAMUSCULAR; INTRAVENOUS
Status: COMPLETED | OUTPATIENT
Start: 2022-05-31 | End: 2022-05-31

## 2022-05-31 RX ORDER — OXYCODONE HYDROCHLORIDE 5 MG/1
10 TABLET ORAL EVERY 4 HOURS PRN
Status: DISCONTINUED | OUTPATIENT
Start: 2022-05-31 | End: 2022-06-03 | Stop reason: HOSPADM

## 2022-05-31 RX ADMIN — OXYCODONE 10 MG: 5 TABLET ORAL at 21:04

## 2022-05-31 RX ADMIN — FAMOTIDINE 20 MG: 10 INJECTION INTRAVENOUS at 15:04

## 2022-05-31 RX ADMIN — SODIUM CHLORIDE: 9 INJECTION, SOLUTION INTRAVENOUS at 02:26

## 2022-05-31 RX ADMIN — HYDROMORPHONE HYDROCHLORIDE 0.5 MG: 1 INJECTION, SOLUTION INTRAMUSCULAR; INTRAVENOUS; SUBCUTANEOUS at 08:50

## 2022-05-31 RX ADMIN — MEROPENEM 1000 MG: 1 INJECTION, POWDER, FOR SOLUTION INTRAVENOUS at 20:49

## 2022-05-31 RX ADMIN — TRIAMTERENE AND HYDROCHLOROTHIAZIDE 1 TABLET: 37.5; 25 TABLET ORAL at 15:05

## 2022-05-31 RX ADMIN — FENTANYL CITRATE 50 MCG: 50 INJECTION INTRAMUSCULAR; INTRAVENOUS at 13:24

## 2022-05-31 RX ADMIN — SODIUM CHLORIDE: 9 INJECTION, SOLUTION INTRAVENOUS at 20:49

## 2022-05-31 RX ADMIN — MIDAZOLAM HYDROCHLORIDE 1 MG: 5 INJECTION, SOLUTION INTRAMUSCULAR; INTRAVENOUS at 13:24

## 2022-05-31 RX ADMIN — MEROPENEM 1000 MG: 1 INJECTION, POWDER, FOR SOLUTION INTRAVENOUS at 12:23

## 2022-05-31 RX ADMIN — FAMOTIDINE 20 MG: 10 INJECTION INTRAVENOUS at 20:47

## 2022-05-31 RX ADMIN — MEROPENEM 1000 MG: 1 INJECTION, POWDER, FOR SOLUTION INTRAVENOUS at 05:47

## 2022-05-31 RX ADMIN — ENOXAPARIN SODIUM 40 MG: 100 INJECTION SUBCUTANEOUS at 15:04

## 2022-05-31 ASSESSMENT — PAIN SCALES - GENERAL
PAINLEVEL_OUTOF10: 0
PAINLEVEL_OUTOF10: 5
PAINLEVEL_OUTOF10: 8
PAINLEVEL_OUTOF10: 6
PAINLEVEL_OUTOF10: 3

## 2022-05-31 ASSESSMENT — PAIN DESCRIPTION - LOCATION
LOCATION: ABDOMEN

## 2022-05-31 ASSESSMENT — PAIN DESCRIPTION - ORIENTATION: ORIENTATION: RIGHT;UPPER

## 2022-05-31 NOTE — PROGRESS NOTES
Image guided abscess drain insertion right completed. Dr. Cj Cruz placed 8 Syriac 25 cm Argon Skater drain LOT # [id number] EXP 2/2/2027 in the right. Drain/tube secured at the 13 cm line . 20 milliliters of purulent, bloody colored withdrawn immediately. Specimen sent to lab for culture. Drain/tube dressing clean, dry, and intact. Pt tolerated procedure without any signs or symptoms of distress. Vital signs stable. Report called to Ya Segura RN on 2W. Pt transported back to  in stable condition via bed by transport.      Vital Signs  Vitals:    05/31/22 1336   BP: 109/60   Pulse: 73   Resp: 14   SpO2: 97%    (vital signs in table format)    Post Elenita  2 - Able to move 4 extremities voluntarily on command  2 - BP+/- 20mmHg of normal  2 - Fully awake  2 - Able to maintain oxygen saturation >92% on room air  2 - Able to breathe deeply and cough freely

## 2022-05-31 NOTE — BRIEF OP NOTE
Brief Postoperative Note      Patient: Evita Salmeron  YOB: 1950  MRN: [id number]    Date of Procedure: 5/31/2022    Pre-Op Diagnosis: fluid collection GB fossa    Post-Op Diagnosis: Same       CT guided drainage    Assistant:      Anesthesia: moderate sedation    Estimated Blood Loss (mL): less than 50     Complications: None    Specimens:   Fluid sent  Implants:  * No implants in log *      Drains: 8F locking pigtail drain    Findings: purulent fluid     Electronically signed by Violeta Collazo MD on 5/31/2022 at 1:36 PM

## 2022-05-31 NOTE — PRE SEDATION
Sedation Pre-Procedure Note    Patient Name: Giovani Guaman   YOB: 1950  Room/Bed: 0208/0208-01  Medical Record Number: [id number]  Date: 5/31/2022   Time: 1:04 PM       Indication:  Fluid aspiration/ drainage    Consent: I have discussed with the patient and/or the patient representative the indication, alternatives, and the possible risks and/or complications of the planned procedure and the anesthesia methods. The patient and/or patient representative appear to understand and agree to proceed. Vital Signs:   Vitals:    05/31/22 0915   BP: (!) 145/82   Pulse: 65   Resp: 17   Temp: 99.1 °F (37.3 °C)   SpO2: 98%       Past Medical History:   has a past medical history of Asthma, Blood circulation, collateral, Carpal tunnel syndrome, CLL (chronic lymphocytic [facility]) ([facility] [address] [address] Crohn's colitis ([facility] [address] [facility] Diabetes mellitus ([facility] [address] [facility] emboli, GERD (gastroesophageal reflux disease), Gout, Hx of blood clots, Hypertension, Pneumonia, prostate, and Seasonal allergies. Past Surgical History:   has a past surgical history that includes Tonsillectomy; other surgical history (01/26/2022); ERCP (N/A, 1/26/2022); ERCP (1/26/2022); ERCP (1/26/2022); Upper gastrointestinal endoscopy (N/A, 3/11/2022); ERCP (N/A, 3/11/2022); ERCP (3/11/2022); ERCP (3/11/2022); cyst removal; ERCP (N/A, 4/13/2022); and ERCP (4/13/2022). Medications:   Scheduled Meds:    triamterene-hydroCHLOROthiazide  1 tablet Oral Daily    famotidine (PEPCID) injection  20 mg IntraVENous BID    enoxaparin  40 mg SubCUTAneous Q24H    meropenem  1,000 mg IntraVENous Q8H     Continuous Infusions:    sodium chloride 75 mL/hr at 05/31/22 0226     PRN Meds: albuterol sulfate HFA, ondansetron, acetaminophen, HYDROmorphone  Home Meds:   Prior to Admission medications    Medication Sig Start Date End Date Taking?  Authorizing Provider   triamterene-hydroCHLOROthiazide (MAXZIDE-25) 37.5-25 MG per tablet Take 1 tablet by mouth every 12 hours     Historical Provider, MD   gemfibrozil (LOPID) 600 MG tablet Take 600 mg by mouth 2 times daily (before meals)    Historical Provider, MD   ursodiol (ACTIGALL) 300 MG capsule Take 1 capsule by mouth 2 times daily 3/12/22   Renetta Ellis MD   pantoprazole (PROTONIX) 40 MG tablet Take 1 tablet by mouth 2 times daily (before meals) for 14 days 1/26/22 2/9/22  Renetta Ellis MD   metFORMIN (GLUCOPHAGE-XR) 500 MG extended release tablet Take 500 mg by mouth 2 times daily    Historical Provider, MD   dicyclomine (BENTYL) 10 MG capsule Take 10 mg by mouth nightly. 2/15/11   Historical Provider, MD   allopurinol (ZYLOPRIM) 300 MG tablet Take 300 mg by mouth daily. Historical Provider, MD   albuterol (PROVENTIL HFA;VENTOLIN HFA) 108 (90 BASE) MCG/ACT inhaler Inhale 2 puffs into the lungs every 6 hours as needed. Historical Provider, MD     Coumadin Use Last 7 Days:  no  Antiplatelet drug therapy use last 7 days: no  Other anticoagulant use last 7 days: no  Additional Medication Information:        Pre-Sedation Documentation and Exam:   I have reviewed the patient's history and review of systems.     Mallampati Airway Assessment:  normal neck range of motion    Prior History of Anesthesia Complications:   none    ASA Classification:  Class 2 - A normal healthy patient with mild systemic disease    Sedation/ Anesthesia Plan:   intravenous sedation    Medications Planned:   midazolam (Versed) intravenously and fentanyl intravenously    Patient is an appropriate candidate for plan of sedation: yes    Electronically signed by Chanel Ibarra MD on 5/31/2022 at 1:04 PM

## 2022-05-31 NOTE — CARE COORDINATION
Case Management Assessment  Initial Evaluation      Patient Name: Gricelda Flores  YOB: 1950  Diagnosis: RUQ abdominal pain [R10.11]  Right upper quadrant pain [R10.11]  Date / Time: 5/30/2022 11:09 AM    Admission status/Date: 05/30/2022 Inpatient   Chart Reviewed: Yes      Patient Interviewed: Yes   Family Interviewed:  Yes - pt's wife Carmelina Mcclain      Hospitalization in the last 30 days:  No    Health Care Decision Maker :   Primary Decision Maker: Jose Sanchez - Spouse - [phone]    (CM - must 1st enter selection under Navigator - emergency contact- Health Care Decision Maker Relationship and pick relationship)   Who do you trust or have selected to make healthcare decisions for you    Met with: pt and his wife at bedside    Current PCP: Oralia Wei MD    Financial  Medicare and [address] required for SNF : N          3 night stay required -  N    ADLS  Support Systems/Care Needs: None  Transportation: self    Meal Preparation: self    Housing  Living Arrangements: pt lives at home with his wife and 16year old grandson  Steps: 3 to the patio, 3 to the front and 1-2 in the back   Intent for return to present living arrangements: Yes  Identified Issues: [id number] [address] [facility] with [address] : No Agency:(Services)  Type of Home Care Services: None  Passport/Waiver : No  :                      Phone Number:    Passport/Waiver Services: n/a          Durable Medical Equiptment   DME Provider: n/a  Equipment:   Walker___Cane___RTS___ BSC___Shower Chair___Hospital Bed___W/C____Other__inhaler _lift chair_____  02 at ____Liter(s)---wears(frequency)_______ HHN ___ CPAP___ BiPap___   N/A____    Home O2 Use :  No    If No for home O2---if presently on O2 during hospitalization:  No  if yes CM to follow for potential DC O2 need    Community Service Affiliation  Dialysis:  No    · Agency:  · Location:  · Dialysis Schedule:  · Phone:   · Fax:     Other Community Services: n/a    DISCHARGE PLAN: Explained Case Management role/services. Chart review completed. Met with pt and his wife at bedside. Pt states he is independent at home and plans on returning home when discharged. He states that he is unsure if he will need skilled [address] on discharge. He states that he may need a raised toilet on discharge as his one bathroom is being renovated. Explained PT/OT may work with pt when appropriate to assist with DME recommendations and he states understanding. He denied needs for CM at this time. PT/OT may be beneficial when appropriate to assist with DME recommendations. CM will follow. Please notify CM if needs or concerns arise.      Guanakito Camarena MSW, JOSEPH

## 2022-05-31 NOTE — PLAN OF CARE
Problem: Discharge Planning  Goal: Discharge to home or other facility with appropriate resources  5/31/2022 0028 by Claudeen Bonds, RN  Outcome: Progressing  5/30/2022 1231 by Mary Rucker RN  Outcome: Progressing  Flowsheets (Taken 5/30/2022 1228)  Discharge to home or other facility with appropriate resources: Identify barriers to discharge with patient and caregiver     Problem: Skin/Tissue Integrity  Goal: Absence of new skin breakdown  Description: 1. Monitor for areas of redness and/or skin breakdown  2. Assess vascular access sites hourly  3. Every 4-6 hours minimum:  Change oxygen saturation probe site  4. Every 4-6 hours:  If on nasal continuous positive airway pressure, respiratory therapy assess nares and determine need for appliance change or resting period.   5/31/2022 0028 by Claudeen Bonds, RN  Outcome: Progressing  5/30/2022 1231 by Mary Rucker RN  Outcome: Progressing     Problem: Safety - Adult  Goal: Free from fall injury  5/31/2022 0028 by Claudeen Bonds, RN  Outcome: Progressing  5/30/2022 1231 by Mary Rucker RN  Outcome: Progressing     Problem: Pain  Goal: Verbalizes/displays adequate comfort level or baseline comfort level  5/31/2022 0028 by Claudeen Bonds, RN  Outcome: Progressing  5/30/2022 1231 by Mary Rucker RN  Outcome: Progressing

## 2022-05-31 NOTE — FLOWSHEET NOTE
05/30/22 1910   Vital Signs   Temp (!) 100.7 °F (38.2 °C)   Temp Source Oral   Heart Rate 75   Heart Rate Source Monitor   Resp 18   /69   BP Location Left lower arm   BP Method Automatic   MAP (Calculated) 85.67   Patient Position Semi fowlers   Level of Consciousness Alert (0)   MEWS Score 1   Pain Assessment   Pain Assessment None - Denies Pain   Pain Level 0   Oxygen Therapy   SpO2 95 %   O2 Device None (Room air)   HS assessment completed, see flow sheet. Pt is alert and oriented. Slight elevated temp noted, will recheck and will give prn tylenol . Respirations are even & easy. No complaints voiced Pt denies needs at this time. SR up x 2, and bed in low position. Call light is within reach. Bedside Mobility Assessment Tool (BMAT):     Assessment Level 1- Sit and Shake    1. From a semi-reclined position, ask patient to sit up and rotate to a seated position at the side of the bed. Can use the bedrail. 2. Ask patient to reach out and grab your hand and shake making sure patient reaches across his/her midline. Pass- Patient is able to come to a seated position, maintain core strength. Maintains seated balance while reaching across midline. Move on to Assessment Level 2. Assessment Level 2- Stretch and Point   1. With patient in seated position at the side of the bed, have patient place both feet on the floor (or stool) with knees no higher than hips. 2. Ask patient to stretch one leg and straighten the knee, then bend the ankle/flex and point the toes. If appropriate, repeat with the other leg. Pass- Patient is able to demonstrate appropriate quad strength on intended weight bearing limb(s). Move onto Assessment Level 3. Assessment Level 3- Stand   1. Ask patient to elevate off the bed or chair (seated to standing) using an assistive device (cane, bedrail). 2. Patient should be able to raise buttocks off be and hold for a count of five. May repeat once.    Pass- Patient maintains standing stability for at least 5 seconds, proceed to assessment level 4. Assessment Level 4- Walk   1. Ask patient to march in place at bedside. 2. Then ask patient to advance step and return each foot. Some medical conditions may render a patient from stepping backwards, use your best clinical judgement. Pass- Patient demonstrates balance while shifting weight and ability to step, takes independent steps, does not use assistive device patient is MOBILITY LEVEL 4. Mobility Level- 1    Patient is able to demonstrate the ability to move from a reclining position to an upright position within the recliner.

## 2022-05-31 NOTE — PROGRESS NOTES
Pt arrived for image guided abscess drain insertion right . Procedure explained including the risk and benefits of the procedure. All questions answered. Pt verbalizes understanding of the procedure and states no more questions. Consent confirmed. Vital signs stable. Labs, allergies, medications, and code status reviewed. No contraindications noted.      Vital Signs  Vitals:    05/31/22 1336   BP: 109/60   Pulse: 73   Resp: 14   SpO2: 97%    (vital signs in table format)    Pre Elenita Score  2 - Able to move 4 extremities voluntarily on command  2 - BP+/- 20mmHg of normal  2 - Fully awake  2 - Able to maintain oxygen saturation >92% on room air  2 - Able to breathe deeply and cough freely    Allergies  Codeine (allergies)    Labs  Lab Results   Component Value Date    INR 1.14 05/31/2022    PROTIME 14.4 05/31/2022     Lab Results   Component Value Date    CREATININE 0.6 (L) 05/30/2022    BUN 8 05/30/2022     (L) 05/30/2022    GFR >60 02/15/2011    K 3.6 05/30/2022    CL 96 (L) 05/30/2022    CO2 28 05/30/2022     Lab Results   Component Value Date    WBC 6.7 05/30/2022    HGB 11.8 (L) 05/30/2022    HCT 34.0 (L) 05/30/2022    MCV 97.3 05/30/2022     05/30/2022

## 2022-05-31 NOTE — PROGRESS NOTES
[facility] GENERAL SURGERY DAILY PROGRESS NOTE    SUBJECTIVE: Awake, alert. Complains of RUQ pain. OBJECTIVE: CURRENT VITALS:  /76   Pulse 66   Temp 97.5 °F (36.4 °C) (Oral)   Resp 18   Ht 5' 11\" (1.803 m)   Wt 173 lb (78.5 kg)   SpO2 95%   BMI 24.13 kg/m²          ABD: Soft. Mild distention. Tender RUQ. LABS:    CBC: Recent Labs     05/30/22  1132   WBC 6.7   RBC 3.50*   HGB 11.8*   HCT 34.0*   MCV 97.3   RDW 13.0        BMP:   Recent Labs     05/30/22  1132   *   K 3.6   CL 96*   CO2 28   PHOS 3.0   BUN 8   CREATININE 0.6*     Recent Labs     05/30/22  1132   MG 1.90       XRAYS: CT at [facility] with RUQ air/fluid collection gallbladder fossa      ASSESSMENT:   POD 5 lap esther      PLAN:   Antibiotics  IVF  IR to place drain in RUQ collection today.          Olimpia Lucero MD

## 2022-06-01 LAB
ANION GAP SERPL CALCULATED.3IONS-SCNC: 9 MMOL/L (ref 3–16)
BASOPHILS ABSOLUTE: 0 K/UL (ref 0–0.2)
BASOPHILS RELATIVE PERCENT: 0.2 %
BUN BLDV-MCNC: 13 MG/DL (ref 7–20)
CALCIUM SERPL-MCNC: 8.4 MG/DL (ref 8.3–10.6)
CHLORIDE BLD-SCNC: 100 MMOL/L (ref 99–110)
CO2: 26 MMOL/L (ref 21–32)
CREAT SERPL-MCNC: <0.5 MG/DL (ref 0.8–1.3)
EOSINOPHILS ABSOLUTE: 0 K/UL (ref 0–0.6)
EOSINOPHILS RELATIVE PERCENT: 0.5 %
GFR AFRICAN AMERICAN: >60
GFR NON-AFRICAN AMERICAN: >60
GLUCOSE BLD-MCNC: 152 MG/DL (ref 70–99)
GLUCOSE BLD-MCNC: 172 MG/DL (ref 70–99)
HCT VFR BLD CALC: 30.6 % (ref 40.5–52.5)
HEMOGLOBIN: 10.5 G/DL (ref 13.5–17.5)
LYMPHOCYTES ABSOLUTE: 0.6 K/UL (ref 1–5.1)
LYMPHOCYTES RELATIVE PERCENT: 11 %
MAGNESIUM: 2.3 MG/DL (ref 1.8–2.4)
MCH RBC QN AUTO: 33.3 PG (ref 26–34)
MCHC RBC AUTO-ENTMCNC: 34.2 G/DL (ref 31–36)
MCV RBC AUTO: 97.4 FL (ref 80–100)
MONOCYTES ABSOLUTE: 0.3 K/UL (ref 0–1.3)
MONOCYTES RELATIVE PERCENT: 6 %
NEUTROPHILS ABSOLUTE: 4.8 K/UL (ref 1.7–7.7)
NEUTROPHILS RELATIVE PERCENT: 82.3 %
PDW BLD-RTO: 12.8 % (ref 12.4–15.4)
PERFORMED ON: ABNORMAL
PHOSPHORUS: 3 MG/DL (ref 2.5–4.9)
PLATELET # BLD: 172 K/UL (ref 135–450)
PMV BLD AUTO: 7.3 FL (ref 5–10.5)
POTASSIUM SERPL-SCNC: 3.6 MMOL/L (ref 3.5–5.1)
RBC # BLD: 3.14 M/UL (ref 4.2–5.9)
SODIUM BLD-SCNC: 135 MMOL/L (ref 136–145)
WBC # BLD: 5.8 K/UL (ref 4–11)

## 2022-06-01 PROCEDURE — 6360000002 HC RX W HCPCS: Performed by: SURGERY

## 2022-06-01 PROCEDURE — 99024 POSTOP FOLLOW-UP VISIT: CPT | Performed by: NURSE PRACTITIONER

## 2022-06-01 PROCEDURE — A4216 STERILE WATER/SALINE, 10 ML: HCPCS | Performed by: SURGERY

## 2022-06-01 PROCEDURE — 6370000000 HC RX 637 (ALT 250 FOR IP): Performed by: SURGERY

## 2022-06-01 PROCEDURE — 83735 ASSAY OF MAGNESIUM: CPT

## 2022-06-01 PROCEDURE — 2580000003 HC RX 258: Performed by: SURGERY

## 2022-06-01 PROCEDURE — 80048 BASIC METABOLIC PNL TOTAL CA: CPT

## 2022-06-01 PROCEDURE — 85025 COMPLETE CBC W/AUTO DIFF WBC: CPT

## 2022-06-01 PROCEDURE — 84100 ASSAY OF PHOSPHORUS: CPT

## 2022-06-01 PROCEDURE — 1200000000 HC SEMI PRIVATE

## 2022-06-01 PROCEDURE — 36415 COLL VENOUS BLD VENIPUNCTURE: CPT

## 2022-06-01 PROCEDURE — 2500000003 HC RX 250 WO HCPCS: Performed by: SURGERY

## 2022-06-01 RX ADMIN — FAMOTIDINE 20 MG: 10 INJECTION INTRAVENOUS at 09:16

## 2022-06-01 RX ADMIN — MEROPENEM 1000 MG: 1 INJECTION, POWDER, FOR SOLUTION INTRAVENOUS at 05:33

## 2022-06-01 RX ADMIN — TRIAMTERENE AND HYDROCHLOROTHIAZIDE 1 TABLET: 37.5; 25 TABLET ORAL at 09:15

## 2022-06-01 RX ADMIN — ENOXAPARIN SODIUM 40 MG: 100 INJECTION SUBCUTANEOUS at 11:41

## 2022-06-01 RX ADMIN — SODIUM CHLORIDE: 9 INJECTION, SOLUTION INTRAVENOUS at 19:03

## 2022-06-01 RX ADMIN — OXYCODONE 5 MG: 5 TABLET ORAL at 20:53

## 2022-06-01 RX ADMIN — OXYCODONE 5 MG: 5 TABLET ORAL at 16:55

## 2022-06-01 RX ADMIN — FAMOTIDINE 20 MG: 10 INJECTION INTRAVENOUS at 20:46

## 2022-06-01 RX ADMIN — MEROPENEM 1000 MG: 1 INJECTION, POWDER, FOR SOLUTION INTRAVENOUS at 12:53

## 2022-06-01 RX ADMIN — MEROPENEM 1000 MG: 1 INJECTION, POWDER, FOR SOLUTION INTRAVENOUS at 20:53

## 2022-06-01 ASSESSMENT — PAIN DESCRIPTION - ORIENTATION
ORIENTATION: RIGHT;UPPER
ORIENTATION: RIGHT

## 2022-06-01 ASSESSMENT — PAIN DESCRIPTION - FREQUENCY
FREQUENCY: INTERMITTENT
FREQUENCY: INTERMITTENT

## 2022-06-01 ASSESSMENT — PAIN DESCRIPTION - PAIN TYPE
TYPE: ACUTE PAIN
TYPE: ACUTE PAIN

## 2022-06-01 ASSESSMENT — PAIN DESCRIPTION - DESCRIPTORS
DESCRIPTORS: DULL;STABBING
DESCRIPTORS: ACHING;DISCOMFORT

## 2022-06-01 ASSESSMENT — PAIN - FUNCTIONAL ASSESSMENT
PAIN_FUNCTIONAL_ASSESSMENT: ACTIVITIES ARE NOT PREVENTED
PAIN_FUNCTIONAL_ASSESSMENT: ACTIVITIES ARE NOT PREVENTED

## 2022-06-01 ASSESSMENT — PAIN DESCRIPTION - LOCATION
LOCATION: ABDOMEN
LOCATION: ABDOMEN

## 2022-06-01 ASSESSMENT — PAIN SCALES - GENERAL
PAINLEVEL_OUTOF10: 6
PAINLEVEL_OUTOF10: 4
PAINLEVEL_OUTOF10: 5

## 2022-06-01 ASSESSMENT — PAIN DESCRIPTION - ONSET
ONSET: GRADUAL
ONSET: GRADUAL

## 2022-06-01 NOTE — PROGRESS NOTES
General Surgery - Luisa Navarro, APRN - CNP, CNP  Daily Progress Note    Pt Name: Vida Galicia  Medical Record Number: [id number]  Date of Birth 1950   Today's Date: 6/1/2022    ASSESSMENT  1. S/P perc drain in RUQ fluid collection in IR on 5/31/22  2. POD #6 lap esther with Dr. Tone Acuna  3. ABD: + distention, + tympany, + few flatus, + 1 small BM, perc drain in place, no N/V  4. SAMI 130: old bloody drainage with micropurulence  5. Pt states \"I feel a lot better but, I am a little bloated. \"      PLAN  1. Back down to full liquids  2. Ambulate the halls (discussed with RN)  3. Merrem  4. IVF   5. Pain and nausea control   6. PT looks improved s/p perc drain placement in the RUQ: Awaiting culture and better bowel function. Homefully home in 1-2 days with his perc drain in place with plans to f/u with Dr. Tone Acuna in 1 week in the office. Ventura Cortes has slightly improved from yesterday. Pain is well controlled. He has no nausea and no vomiting. He has passed flatus and has had a bowel movement. He is somewhat tolerating regular diet. Current activity is up with assistance    OBJECTIVE  VITALS:  height is 5' 11\" (1.803 m) and weight is 173 lb (78.5 kg). His oral temperature is 97.7 °F (36.5 °C). His blood pressure is 111/66 and his pulse is 59. His respiration is 16 and oxygen saturation is 96%. VITALS:  /66   Pulse 59   Temp 97.7 °F (36.5 °C) (Oral)   Resp 16   Ht 5' 11\" (1.803 m)   Wt 173 lb (78.5 kg)   SpO2 96%   BMI 24.13 kg/m²   INTAKE/OUTPUT:    Intake/Output Summary (Last 24 hours) at 6/1/2022 1414  Last data filed at 6/1/2022 1114  Gross per 24 hour   Intake 2830.87 ml   Output 130 ml   Net 2700.87 ml     GENERAL: alert, cooperative, no distress    I/O last 3 completed shifts: In: 3130.9 [P.O.:300;  I.V.:2469.5; IV Piggyback:361.4]  Out: 132 [Urine:2; Drains:130]  I/O this shift:  In: -   Out: 20 [Drains:20]    LABS  Recent Labs     05/30/22  1132 05/30/22  1132 05/31/22  0555 06/01/22 0602   WBC 6.7   < >  --  5.8   HGB 11.8*   < >  --  10.5*   HCT 34.0*   < >  --  30.6*      < >  --  172   *   < >  --  135*   K 3.6   < >  --  3.6   CL 96*   < >  --  100   CO2 28   < >  --  26   BUN 8   < >  --  13   CREATININE 0.6*   < >  --  <0.5*   MG 1.90   < >  --  2.30   PHOS 3.0   < >  --  3.0   CALCIUM 8.7   < >  --  8.4   INR  --   --  1.14  --    AST 13*  --   --   --    ALT 34  --   --   --    BILITOT 0.9  --   --   --    BILIDIR 0.3  --   --   --     < > = values in this interval not displayed.      CBC with Differential:    Lab Results   Component Value Date    WBC 5.8 06/01/2022    RBC 3.14 06/01/2022    HGB 10.5 06/01/2022    HCT 30.6 06/01/2022     06/01/2022    MCV 97.4 06/01/2022    MCH 33.3 06/01/2022    MCHC 34.2 06/01/2022    RDW 12.8 06/01/2022    SEGSPCT 58.0 02/16/2011    BANDSPCT 1 01/26/2022    LYMPHOPCT 11.0 06/01/2022    MONOPCT 6.0 06/01/2022    EOSPCT 5.8 02/16/2011    BASOPCT 0.2 06/01/2022    MONOSABS 0.3 06/01/2022    LYMPHSABS 0.6 06/01/2022    EOSABS 0.0 06/01/2022    BASOSABS 0.0 06/01/2022    DIFFTYPE Auto 02/16/2011     CMP:    Lab Results   Component Value Date     06/01/2022    K 3.6 06/01/2022    K 3.8 03/27/2022     06/01/2022    CO2 26 06/01/2022    BUN 13 06/01/2022    CREATININE <0.5 06/01/2022    GFRAA >60 06/01/2022    GFRAA >60 02/15/2011    AGRATIO 2.5 04/28/2022    LABGLOM >60 06/01/2022    GLUCOSE 152 06/01/2022    PROT 7.1 05/30/2022    PROT 7.1 02/15/2011    LABALBU 3.7 05/30/2022    CALCIUM 8.4 06/01/2022    BILITOT 0.9 05/30/2022    ALKPHOS 107 05/30/2022    AST 13 05/30/2022    ALT 34 05/30/2022         ADILENE Jarvis - CNP  Electronically signed 6/1/2022 at 2:08 PM

## 2022-06-01 NOTE — PROGRESS NOTES
Patient alert and oriented. Needs are met at this time. Discomfort noted; no PRN needed at this time. Will continue to monitor. In bed, lowest position, call light within reach.

## 2022-06-01 NOTE — PROGRESS NOTES
PM Shift report given to [facility] RN at Bedside. Patient is stable. All end of shift needs have been met. No further assistance needed at this time.

## 2022-06-01 NOTE — FLOWSHEET NOTE
05/31/22 2045   Vital Signs   Temp (!) 100.5 °F (38.1 °C)   Temp Source Oral   Heart Rate 73   Heart Rate Source Monitor   Resp 16   BP (!) 112/57   BP Location Left upper arm   MAP (Calculated) 75.33   Patient Position Semi fowlers   Level of Consciousness Alert (0)   MEWS Score 1   Pain Assessment   Pain Assessment 0-10   Pain Level 5   Pain Location Abdomen   Oxygen Therapy   SpO2 97 %   O2 Device None (Room air)   HS assessment completed, see flow sheet. Pt is alert and oriented. PT has a slight elevation in temp. Respirations are even & easy. compplaints of pain voiced ands prn pain medication given. Pt denies needs at this time. SR up x 2, and bed in low position. Call light is within reach. Bedside Mobility Assessment Tool (BMAT):     Assessment Level 1- Sit and Shake    1. From a semi-reclined position, ask patient to sit up and rotate to a seated position at the side of the bed. Can use the bedrail. 2. Ask patient to reach out and grab your hand and shake making sure patient reaches across his/her midline. Pass- Patient is able to come to a seated position, maintain core strength. Maintains seated balance while reaching across midline. Move on to Assessment Level 2. Assessment Level 2- Stretch and Point   1. With patient in seated position at the side of the bed, have patient place both feet on the floor (or stool) with knees no higher than hips. 2. Ask patient to stretch one leg and straighten the knee, then bend the ankle/flex and point the toes. If appropriate, repeat with the other leg. Pass- Patient is able to demonstrate appropriate quad strength on intended weight bearing limb(s). Move onto Assessment Level 3. Assessment Level 3- Stand   1. Ask patient to elevate off the bed or chair (seated to standing) using an assistive device (cane, bedrail). 2. Patient should be able to raise buttocks off be and hold for a count of five. May repeat once.    Pass- Patient maintains Patient: Yadira Hoang    Procedure(s):  Right heel reconstruction with regional flap, biologic dressing and SPY    Diagnosis: Melanoma of skin (H) [C43.9]  Diagnosis Additional Information: No value filed.    Anesthesia Type:   General     Note:  Airway :Nasal Cannula  Patient transferred to:PACU  Comments: VSS. Report to RN. Patient arousable. Denies pain.Handoff Report: Identifed the Patient, Identified the Reponsible Provider, Reviewed the pertinent medical history, Discussed the surgical course, Reviewed Intra-OP anesthesia mangement and issues during anesthesia, Set expectations for post-procedure period and Allowed opportunity for questions and acknowledgement of understanding      Vitals: (Last set prior to Anesthesia Care Transfer)    CRNA VITALS  3/4/2020 1519 - 3/4/2020 1555      3/4/2020             NIBP:  136/82    Pulse:  100    SpO2:  99 %                Electronically Signed By: YVONNE Romeo CRNA  March 4, 2020  3:55 PM   standing stability for at least 5 seconds, proceed to assessment level 4. Assessment Level 4- Walk   1. Ask patient to march in place at bedside. 2. Then ask patient to advance step and return each foot. Some medical conditions may render a patient from stepping backwards, use your best clinical judgement. Pass- Patient demonstrates balance while shifting weight and ability to step, takes independent steps, does not use assistive device patient is MOBILITY LEVEL 4. Mobility Level- 4    Patient is able to demonstrate the ability to move from a reclining position to an upright position within the recliner.

## 2022-06-01 NOTE — CARE COORDINATION
INTERDISCIPLINARY PLAN OF CARE CONFERENCE    Date/Time: 6/1/2022 3:39 PM  Completed by: Corrine Hernadez RN, Case Management      Patient Name:  Domonique Breaux  YOB: 1950  Admitting Diagnosis: RUQ abdominal pain [R10.11]  Right upper quadrant pain [R10.11]     Admit Date/Time:  5/30/2022 11:09 AM    Chart reviewed. Interdisciplinary team contacted or reviewed plan related to patient progress and discharge plans. Disciplines included Case Management, Nursing, and Dietitian. Current Status: Stable  PT/OT recommendation for discharge plan of care: N/A    Expected D/C Disposition:  Home  Confirmed plan with patient  Yes   Met with: patient  Discharge Plan Comments:  Reviewed chart and met with pt at bedside. Rol eo f CM explained. Russ lives home with wife and plan return. Noted has new drain placed. Discussed HHC and pt declines. Will follow for dc needs.     Home O2 in place on admit: No  Pt informed of need to bring portable home O2 tank on day of discharge for nursing to connect prior to leaving:  Not Indicated  Verbalized agreement/Understanding:  Not Indicated

## 2022-06-01 NOTE — PROGRESS NOTES
Patient alert and oriented. Patient states hes having discomfort in Right upper abdominal area; PRN oxycodone 5 mg given. Will continue to monitor. Up with wife at this time.

## 2022-06-01 NOTE — PLAN OF CARE
Problem: Discharge Planning  Goal: Discharge to home or other facility with appropriate resources  6/1/2022 1001 by Pam Frank RN  Outcome: Progressing  6/1/2022 0621 by Ricky Rincon RN  Outcome: Progressing     Problem: Skin/Tissue Integrity  Goal: Absence of new skin breakdown  Description: 1. Monitor for areas of redness and/or skin breakdown  2. Assess vascular access sites hourly  3. Every 4-6 hours minimum:  Change oxygen saturation probe site  4. Every 4-6 hours:  If on nasal continuous positive airway pressure, respiratory therapy assess nares and determine need for appliance change or resting period.   6/1/2022 1001 by Pam Frank RN  Outcome: Progressing  6/1/2022 0621 by Ricky Rincon RN  Outcome: Progressing     Problem: Safety - Adult  Goal: Free from fall injury  6/1/2022 1001 by Pam Frank RN  Outcome: Progressing  6/1/2022 0621 by Ricky Rincon RN  Outcome: Progressing  Flowsheets (Taken 6/1/2022 0621)  Free From Fall Injury: Instruct family/caregiver on patient safety     Problem: Pain  Goal: Verbalizes/displays adequate comfort level or baseline comfort level  6/1/2022 1001 by Pam Frank RN  Outcome: Progressing  6/1/2022 0621 by iRcky Rincon RN  Outcome: Progressing

## 2022-06-02 LAB
GLUCOSE BLD-MCNC: 110 MG/DL (ref 70–99)
GLUCOSE BLD-MCNC: 111 MG/DL (ref 70–99)
GLUCOSE BLD-MCNC: 128 MG/DL (ref 70–99)
GLUCOSE BLD-MCNC: 129 MG/DL (ref 70–99)
GLUCOSE BLD-MCNC: 139 MG/DL (ref 70–99)
PERFORMED ON: ABNORMAL

## 2022-06-02 PROCEDURE — 6370000000 HC RX 637 (ALT 250 FOR IP): Performed by: SURGERY

## 2022-06-02 PROCEDURE — 1200000000 HC SEMI PRIVATE

## 2022-06-02 PROCEDURE — 6360000002 HC RX W HCPCS: Performed by: SURGERY

## 2022-06-02 PROCEDURE — 2580000003 HC RX 258: Performed by: SURGERY

## 2022-06-02 PROCEDURE — 99232 SBSQ HOSP IP/OBS MODERATE 35: CPT | Performed by: SURGERY

## 2022-06-02 RX ORDER — FAMOTIDINE 20 MG/1
20 TABLET, FILM COATED ORAL 2 TIMES DAILY
Status: DISCONTINUED | OUTPATIENT
Start: 2022-06-02 | End: 2022-06-03 | Stop reason: HOSPADM

## 2022-06-02 RX ADMIN — OXYCODONE 5 MG: 5 TABLET ORAL at 22:13

## 2022-06-02 RX ADMIN — MEROPENEM 1000 MG: 1 INJECTION, POWDER, FOR SOLUTION INTRAVENOUS at 22:16

## 2022-06-02 RX ADMIN — OXYCODONE 5 MG: 5 TABLET ORAL at 14:24

## 2022-06-02 RX ADMIN — OXYCODONE 5 MG: 5 TABLET ORAL at 04:18

## 2022-06-02 RX ADMIN — TRIAMTERENE AND HYDROCHLOROTHIAZIDE 1 TABLET: 37.5; 25 TABLET ORAL at 08:59

## 2022-06-02 RX ADMIN — OXYCODONE 5 MG: 5 TABLET ORAL at 09:07

## 2022-06-02 RX ADMIN — MEROPENEM 1000 MG: 1 INJECTION, POWDER, FOR SOLUTION INTRAVENOUS at 04:12

## 2022-06-02 RX ADMIN — ENOXAPARIN SODIUM 40 MG: 100 INJECTION SUBCUTANEOUS at 11:21

## 2022-06-02 RX ADMIN — SODIUM CHLORIDE: 9 INJECTION, SOLUTION INTRAVENOUS at 04:13

## 2022-06-02 RX ADMIN — SODIUM CHLORIDE: 9 INJECTION, SOLUTION INTRAVENOUS at 16:56

## 2022-06-02 RX ADMIN — ONDANSETRON HYDROCHLORIDE 4 MG: 2 INJECTION, SOLUTION INTRAMUSCULAR; INTRAVENOUS at 22:14

## 2022-06-02 RX ADMIN — MEROPENEM 1000 MG: 1 INJECTION, POWDER, FOR SOLUTION INTRAVENOUS at 12:24

## 2022-06-02 RX ADMIN — FAMOTIDINE 20 MG: 20 TABLET ORAL at 08:59

## 2022-06-02 RX ADMIN — FAMOTIDINE 20 MG: 20 TABLET ORAL at 22:13

## 2022-06-02 ASSESSMENT — PAIN DESCRIPTION - FREQUENCY: FREQUENCY: INTERMITTENT

## 2022-06-02 ASSESSMENT — PAIN DESCRIPTION - LOCATION
LOCATION: ABDOMEN

## 2022-06-02 ASSESSMENT — PAIN DESCRIPTION - DESCRIPTORS
DESCRIPTORS: ACHING;DISCOMFORT
DESCRIPTORS: ACHING;DISCOMFORT
DESCRIPTORS: DULL

## 2022-06-02 ASSESSMENT — PAIN SCALES - GENERAL
PAINLEVEL_OUTOF10: 0
PAINLEVEL_OUTOF10: 6
PAINLEVEL_OUTOF10: 5
PAINLEVEL_OUTOF10: 4
PAINLEVEL_OUTOF10: 6
PAINLEVEL_OUTOF10: 6

## 2022-06-02 ASSESSMENT — PAIN DESCRIPTION - ONSET: ONSET: GRADUAL

## 2022-06-02 ASSESSMENT — PAIN - FUNCTIONAL ASSESSMENT: PAIN_FUNCTIONAL_ASSESSMENT: ACTIVITIES ARE NOT PREVENTED

## 2022-06-02 ASSESSMENT — PAIN DESCRIPTION - PAIN TYPE: TYPE: ACUTE PAIN

## 2022-06-02 ASSESSMENT — PAIN DESCRIPTION - ORIENTATION: ORIENTATION: RIGHT

## 2022-06-02 NOTE — PROGRESS NOTES
[facility]    PATIENT NAME: Domonique Breaux     TODAY'S DATE: 6/2/2022    CHIEF COMPLAINT: Bloating    INTERVAL HISTORY/HPI:    Pt complains of bloating and discomfort. His bowels are working. He is not taking much orally though. REVIEW OF SYSTEMS:  Pertinent positives and negatives as per interval history section    OBJECTIVE:  VITALS:  /70   Pulse 64   Temp 97.3 °F (36.3 °C) (Oral)   Resp 14   Ht 5' 11\" (1.803 m)   Wt 173 lb (78.5 kg)   SpO2 96%   BMI 24.13 kg/m²     INTAKE/OUTPUT:    I/O last 3 completed shifts: In: 2268.3 [P.O.:240; I.V.:1538.6; IV Piggyback:489.7]  Out: 130 [Drains:130]  No intake/output data recorded. CONSTITUTIONAL:  awake and alert  LUNGS:  Respirations easy and unlabored, clear to auscultation  CARD:  regular rate and rhythm  ABDOMEN:  hypoactive bowel sounds, soft, distended     Data:  CBC: Recent Labs     05/30/22 1132 06/01/22  0602   WBC 6.7 5.8   HGB 11.8* 10.5*   HCT 34.0* 30.6*    172     BMP:    Recent Labs     05/30/22 1132 06/01/22  0602   * 135*   K 3.6 3.6   CL 96* 100   CO2 28 26   BUN 8 13   CREATININE 0.6* <0.5*   GLUCOSE 147* 152*     Hepatic:   Recent Labs     05/30/22  1132   AST 13*   ALT 34   BILITOT 0.9   ALKPHOS 107     Mag:      Recent Labs     05/30/22  1132 06/01/22  0602   MG 1.90 2.30      Phos:     Recent Labs     05/30/22  1132 06/01/22  0602   PHOS 3.0 3.0      INR:   Recent Labs     05/31/22  0555   INR 1.14       Radiology Review:  *Imaging personally reviewed by me. N/A      ASSESSMENT AND PLAN:  Patient with gallbladder fossa abscess status post cholecystectomy. Percutaneous drain in place. Continue antibiotics and supportive care.   Await return of better GI function and resolution of distention for discharge     Electronically signed by Carlitos Ag MD     [id number]

## 2022-06-02 NOTE — PROGRESS NOTES
Resting in bed awake. NO complaints or needs at present time. Am assessment complete. Alert and oriented. Call light in reach. Patient is able to demonstrate the ability to move from a reclining position to an upright position within the recliner. Bedside Mobility Assessment Tool (BMAT):     Assessment Level 1- Sit and Shake    1. From a semi-reclined position, ask patient to sit up and rotate to a seated position at the side of the bed. Can use the bedrail. 2. Ask patient to reach out and grab your hand and shake making sure patient reaches across his/her midline. Pass- Patient is able to come to a seated position, maintain core strength. Maintains seated balance while reaching across midline. Move on to Assessment Level 2. Assessment Level 2- Stretch and Point   1. With patient in seated position at the side of the bed, have patient place both feet on the floor (or stool) with knees no higher than hips. 2. Ask patient to stretch one leg and straighten the knee, then bend the ankle/flex and point the toes. If appropriate, repeat with the other leg. Pass- Patient is able to demonstrate appropriate quad strength on intended weight bearing limb(s). Move onto Assessment Level 3. Assessment Level 3- Stand   1. Ask patient to elevate off the bed or chair (seated to standing) using an assistive device (cane, bedrail). 2. Patient should be able to raise buttocks off be and hold for a count of five. May repeat once. Pass- Patient maintains standing stability for at least 5 seconds, proceed to assessment level 4. Assessment Level 4- Walk   1. Ask patient to march in place at bedside. 2. Then ask patient to advance step and return each foot. Some medical conditions may render a patient from stepping backwards, use your best clinical judgement.    Pass- Patient demonstrates balance while shifting weight and ability to step, takes independent steps, does not use assistive device patient is MOBILITY LEVEL 4.       Mobility Level- 4

## 2022-06-02 NOTE — PROGRESS NOTES
C/o abdominal pain rate as 6/10. Requesting pain medication. Oxycodone 1 tablet po given See mar. Call light in reach.

## 2022-06-02 NOTE — PROGRESS NOTES
C/o abdominal pain rate as 6/10. Requesting pain medication. Oxycodone 1 tablet po given. See MAR. Call light in reach.

## 2022-06-02 NOTE — PROGRESS NOTES
Pt resting. Resp e/e. Shift assessment completed and charted. No needs. Will monitor.  Peri Calderon RN

## 2022-06-02 NOTE — FLOWSHEET NOTE
Pt wants to talk about the song he shared with another spiritual care personnel yesterday. Song spiritually uplifts pt.  played song from Let for pt, \"I ride for the brand of man with the nail. Marcellus Zamarripa \" by [employer]. Pt talked about his kimo in relation to song.

## 2022-06-03 VITALS
WEIGHT: 173 LBS | HEART RATE: 58 BPM | OXYGEN SATURATION: 96 % | TEMPERATURE: 97.1 F | BODY MASS INDEX: 24.22 KG/M2 | DIASTOLIC BLOOD PRESSURE: 64 MMHG | HEIGHT: 71 IN | RESPIRATION RATE: 16 BRPM | SYSTOLIC BLOOD PRESSURE: 131 MMHG

## 2022-06-03 LAB
ANAEROBIC CULTURE: ABNORMAL
ANAEROBIC CULTURE: ABNORMAL
GLUCOSE BLD-MCNC: 114 MG/DL (ref 70–99)
GLUCOSE BLD-MCNC: 143 MG/DL (ref 70–99)
GRAM STAIN RESULT: ABNORMAL
ORGANISM: ABNORMAL
PERFORMED ON: ABNORMAL
PERFORMED ON: ABNORMAL
WOUND/ABSCESS: ABNORMAL
WOUND/ABSCESS: ABNORMAL

## 2022-06-03 PROCEDURE — 6360000002 HC RX W HCPCS: Performed by: SURGERY

## 2022-06-03 PROCEDURE — 99238 HOSP IP/OBS DSCHRG MGMT 30/<: CPT | Performed by: SURGERY

## 2022-06-03 PROCEDURE — 6370000000 HC RX 637 (ALT 250 FOR IP): Performed by: SURGERY

## 2022-06-03 PROCEDURE — 2580000003 HC RX 258: Performed by: SURGERY

## 2022-06-03 RX ORDER — OXYCODONE HYDROCHLORIDE AND ACETAMINOPHEN 5; 325 MG/1; MG/1
1 TABLET ORAL EVERY 6 HOURS PRN
Qty: 28 TABLET | Refills: 0 | Status: SHIPPED | OUTPATIENT
Start: 2022-06-03 | End: 2022-06-10

## 2022-06-03 RX ORDER — AMOXICILLIN AND CLAVULANATE POTASSIUM 875; 125 MG/1; MG/1
1 TABLET, FILM COATED ORAL 2 TIMES DAILY
Qty: 20 TABLET | Refills: 0 | Status: SHIPPED | OUTPATIENT
Start: 2022-06-03 | End: 2022-06-13

## 2022-06-03 RX ORDER — ONDANSETRON 4 MG/1
4 TABLET, FILM COATED ORAL 3 TIMES DAILY PRN
Qty: 15 TABLET | Refills: 0 | Status: SHIPPED | OUTPATIENT
Start: 2022-06-03

## 2022-06-03 RX ADMIN — FAMOTIDINE 20 MG: 20 TABLET ORAL at 09:04

## 2022-06-03 RX ADMIN — SODIUM CHLORIDE: 9 INJECTION, SOLUTION INTRAVENOUS at 06:05

## 2022-06-03 RX ADMIN — MEROPENEM 1000 MG: 1 INJECTION, POWDER, FOR SOLUTION INTRAVENOUS at 06:04

## 2022-06-03 RX ADMIN — OXYCODONE 5 MG: 5 TABLET ORAL at 06:03

## 2022-06-03 RX ADMIN — TRIAMTERENE AND HYDROCHLOROTHIAZIDE 1 TABLET: 37.5; 25 TABLET ORAL at 09:04

## 2022-06-03 NOTE — PROGRESS NOTES
Dressing changed at this time around J-p drain. Pt and wife educated on how to empty and recap the drain. Pt and wife stated understanding.

## 2022-06-03 NOTE — DISCHARGE SUMMARY
Surgery Discharge Summary    Patient Identification  Gricelda Flores is a 70 y.o. male. :  1950  Admit Date:  2022    Discharge date:   No discharge date for patient encounter. Disposition: home    Discharge Diagnoses:   Principal Problem:    Right upper quadrant pain  Active Problems:    RUQ abdominal pain  Resolved Problems:    * No resolved hospital problems. *      Discharge condition: good    Discharge Medications:     Current Discharge Medication List      CONTINUE these medications which have NOT CHANGED    Details   triamterene-hydroCHLOROthiazide (MAXZIDE-25) 37.5-25 MG per tablet Take 1 tablet by mouth every 12 hours       gemfibrozil (LOPID) 600 MG tablet Take 600 mg by mouth 2 times daily (before meals)      ursodiol (ACTIGALL) 300 MG capsule Take 1 capsule by mouth 2 times daily  Qty: 60 capsule, Refills: 0      pantoprazole (PROTONIX) 40 MG tablet Take 1 tablet by mouth 2 times daily (before meals) for 14 days  Qty: 28 tablet, Refills: 0      metFORMIN (GLUCOPHAGE-XR) 500 MG extended release tablet Take 500 mg by mouth 2 times daily      dicyclomine (BENTYL) 10 MG capsule Take 10 mg by mouth nightly. allopurinol (ZYLOPRIM) 300 MG tablet Take 300 mg by mouth daily. albuterol (PROVENTIL HFA;VENTOLIN HFA) 108 (90 BASE) MCG/ACT inhaler Inhale 2 puffs into the lungs every 6 hours as needed. Current Discharge Medication List      START taking these medications    Details   oxyCODONE-acetaminophen (ENDOCET) 5-325 MG per tablet Take 1 tablet by mouth every 6 hours as needed for Pain for up to 7 days. Intended supply: 7 days.  Take lowest dose possible to manage pain  Qty: 28 tablet, Refills: 0    Comments: Reduce doses taken as pain becomes manageable  Associated Diagnoses: Right upper quadrant pain      amoxicillin-clavulanate (AUGMENTIN) 875-125 MG per tablet Take 1 tablet by mouth 2 times daily for 10 days  Qty: 20 tablet, Refills: 0      ondansetron (ZOFRAN) 4 MG tablet Take 1 tablet by mouth 3 times daily as needed for Nausea or Vomiting  Qty: 15 tablet, Refills: 0               Most Recent Labs:    CBC:   Recent Labs     06/01/22  0602   WBC 5.8   HGB 10.5*   HCT 30.6*        BMP:    Recent Labs     06/01/22  0602   *   K 3.6      CO2 26   BUN 13   CREATININE <0.5*   GLUCOSE 152*     Hepatic: No results for input(s): AST, ALT, ALB, BILITOT, ALKPHOS in the last 72 hours. PT/INR:  No results for input(s): INR in the last 72 hours. Consults: IR    Surgery: Perc drain    Patient Instructions: Activity: no heavy lifting, pushing, pulling for 2 weeks, no driving for 1 weeks or while on analgesics  Diet: As tolerated  Follow-up with Dr. Al Krause in 1 week. The patient and/or family/patient representatives, were provided education regarding discharge instructions, ongoing treatment and follow-up. Details of information given to the patient may be found in the discharge instructions located in the EMR. HPI and Hospital Course:   Admitted for GB fossa abscess after lap esther. Had perc drain.  Home today with drain and antibiotics      ADRIANNA Benavides MD    [facility]

## 2022-06-03 NOTE — PROGRESS NOTES
Shift assessment complete; see flow sheet. Scheduled medications administered; See MAR. IV infusing without difficulty. Pt c/o abdominal pain 6/10 PRN Oxycodone given at this time. Pt denies any needs at this time.  Pt educated on use of call light and to call out with needs, verbalized understanding, bed in low locked position for pt safety

## 2022-06-03 NOTE — PROGRESS NOTES
Shift assessment complete - See flow sheet. Medications given - See [facility]          Surgical site/ Dressing - steri strips c,d,i. SAMI drain dressing has old drainage with some new drainage noted. New drainage marked. Patient with no complaints of pain at this time. Patient denies any further needs.    Bed alarm is set for patient safety/Deferred for low/med risk, A/O with steady gait   Call light in reach

## 2022-06-03 NOTE — PROGRESS NOTES
Pt A/O in bed and quiet. Denies any needs at this time. SR up x2, bed in lowest position,   wheels locked,  bed alarm deferred pt low/med risk with steady gait   Call light and bedside table in easy reach.    Carl Oro RN

## 2022-06-03 NOTE — PLAN OF CARE
Problem: Discharge Planning  Goal: Discharge to home or other facility with appropriate resources  6/3/2022 0959 by Faisal Reese RN  Outcome: Progressing  6/3/2022 0341 by Julius Nolasco RN  Outcome: Progressing     Problem: Skin/Tissue Integrity  Goal: Absence of new skin breakdown  Description: 1. Monitor for areas of redness and/or skin breakdown  2. Assess vascular access sites hourly  3. Every 4-6 hours minimum:  Change oxygen saturation probe site  4. Every 4-6 hours:  If on nasal continuous positive airway pressure, respiratory therapy assess nares and determine need for appliance change or resting period.   6/3/2022 0959 by Faisal Reese RN  Outcome: Progressing  6/3/2022 0341 by Julius Nolasco RN  Outcome: Progressing     Problem: Safety - Adult  Goal: Free from fall injury  6/3/2022 0959 by Faisal Reese RN  Outcome: Progressing  6/3/2022 0341 by Julius Nolasco RN  Outcome: Progressing     Problem: Pain  Goal: Verbalizes/displays adequate comfort level or baseline comfort level  6/3/2022 0959 by Faisal Reese RN  Outcome: Progressing  6/3/2022 0341 by Julius Nolasco RN  Outcome: Progressing

## 2022-06-03 NOTE — CARE COORDINATION
DISCHARGE ORDER  Date/Time 6/3/2022 12:20 PM  Completed by: Rebecca Perez RN, Case Management    Patient Name: Zulema Shaver      : 1950  Admitting Diagnosis: RUQ abdominal pain [R10.11]  Right upper quadrant pain [R10.11]      Admit order Date and Status: 22 inpt  (verify MD's last order for status of admission)      Noted discharge order. If applicable PT/OT recommendation at Discharge: N/A  DME recommendation by PT/OT: N/A  Confirmed discharge plan  : Yes  with whom patient and wife  If pt confirmed DC plan does family need to be contacted by CM Yes if yes who wife  Discharge Plan:  Order for dc noted. Spoke with pt who cont plan to return home with wife. Discussed HHC as pt going home with SAMI drain and pt and wife decline. Noted pt is IPTA with all ADL's. Chart reviewed and no other dc needs identified. Reviewed chart. Role of discharge planner explained and patient verbalized understanding. Discharge order is noted. Has Home O2 in place on admit:  No  Informed of need to bring portable home O2 tank on day of discharge for nursing to connect prior to leaving:   Not Indicated  Verbalized agreement/Understanding:   Not Indicated  Pt is being d/c'd to  home today. Pt's O2 sats are 96% on AM    Discharge timeout done with  Nsg, CM and pt. All discharge needs and concerns addressed.

## 2022-06-10 NOTE — PROGRESS NOTES
.1.  Do not eat or drink anything after 12 midnight prior to surgery. This includes no water, chewing gum or mints, except for bowel prep complete per MD.  2.  Take the following pills with a small sip of water on the morning of surgery protonix. 3.  Aspirin, Ibuprofen, Advil, Naproxen, Vitamin E and other Anti-inflammatory products should be stopped for 5 days before surgery or as directed by your physician. 4.  Check with your doctor regarding stopping Plavix, Coumadin, Lovenox, Fragmin or other blood thinners. 5.  Do not smoke and do not drink alcoholic beverages 24 hours prior to surgery. This includes NA Beer. 6.  You may brush your teeth and gargle the morning of surgery. DO NOT SWALLOW WATER.  7.  You MUST make arrangements for a responsible adult to take you home after your surgery. You will not be allowed to leave alone or drive yourself home. It is strongly suggested someone stay with you the first 24 hours. Your surgery will be cancelled if you do not have a ride home. 8.  A parent/legal guardian must accompany a child scheduled for surgery and plan to stay at the hospital until the child is discharged. Please do not bring other children with you. 9.  Please wear simple, loose fitting clothing to the hospital.  Margaret Tate not bring valuables ( money, credit cards, checkbooks, etc.)  Do not wear any makeup (including no eye makeup) or nail polish on your fingers or toes. 10.  Do not wear any jewelry or piercing on the day of surgery. All body piercing jewelry must be removed. 11.  If you have dentures, they will be removed before going to the OR; we will provide you a container. If you wear contact lenses or glasses, they will be removed; please bring a case for them.   15.  Notify your Surgeon if you develop any illness between now and surgery time; cough, cold, fever, sore throat, nausea, vomiting, etc.  Please notify your surgeon if you experience dizziness, shortness of breath or blurred vision between now and the time of your surgery. To provide excellent care, visitors will be limited to two in a room at any given time. Please no children under the age of 15 in the surgical department.

## 2022-06-13 ENCOUNTER — ANESTHESIA EVENT (OUTPATIENT)
Dept: ENDOSCOPY | Age: 72
End: 2022-06-13
Payer: MEDICARE

## 2022-06-13 ENCOUNTER — HOSPITAL ENCOUNTER (OUTPATIENT)
Age: 72
Setting detail: OUTPATIENT SURGERY
Discharge: HOME | End: 2022-06-13
Attending: INTERNAL MEDICINE | Admitting: INTERNAL MEDICINE
Payer: MEDICARE

## 2022-06-13 ENCOUNTER — APPOINTMENT (OUTPATIENT)
Dept: GENERAL RADIOLOGY | Age: 72
End: 2022-06-13
Attending: INTERNAL MEDICINE
Payer: MEDICARE

## 2022-06-13 ENCOUNTER — ANESTHESIA (OUTPATIENT)
Dept: ENDOSCOPY | Age: 72
End: 2022-06-13
Payer: MEDICARE

## 2022-06-13 VITALS
WEIGHT: 177 LBS | BODY MASS INDEX: 24.78 KG/M2 | DIASTOLIC BLOOD PRESSURE: 75 MMHG | TEMPERATURE: 98 F | OXYGEN SATURATION: 97 % | HEIGHT: 71 IN | RESPIRATION RATE: 13 BRPM | SYSTOLIC BLOOD PRESSURE: 147 MMHG | HEART RATE: 72 BPM

## 2022-06-13 LAB
GLUCOSE BLD-MCNC: 121 MG/DL (ref 70–99)
PERFORMED ON: ABNORMAL

## 2022-06-13 PROCEDURE — 88112 CYTOPATH CELL ENHANCE TECH: CPT

## 2022-06-13 PROCEDURE — 6360000002 HC RX W HCPCS: Performed by: NURSE ANESTHETIST, CERTIFIED REGISTERED

## 2022-06-13 PROCEDURE — 74330 X-RAY BILE/PANC ENDOSCOPY: CPT

## 2022-06-13 PROCEDURE — 88305 TISSUE EXAM BY PATHOLOGIST: CPT

## 2022-06-13 PROCEDURE — C1874 STENT, COATED/COV W/DEL SYS: HCPCS | Performed by: INTERNAL MEDICINE

## 2022-06-13 PROCEDURE — 3609015100 HC ERCP STENT PLACEMENT BILIARY/PANCREATIC DUCT: Performed by: INTERNAL MEDICINE

## 2022-06-13 PROCEDURE — 6370000000 HC RX 637 (ALT 250 FOR IP): Performed by: ANESTHESIOLOGY

## 2022-06-13 PROCEDURE — 3700000001 HC ADD 15 MINUTES (ANESTHESIA): Performed by: INTERNAL MEDICINE

## 2022-06-13 PROCEDURE — 7100000000 HC PACU RECOVERY - FIRST 15 MIN: Performed by: INTERNAL MEDICINE

## 2022-06-13 PROCEDURE — 7100000011 HC PHASE II RECOVERY - ADDTL 15 MIN: Performed by: INTERNAL MEDICINE

## 2022-06-13 PROCEDURE — 2580000003 HC RX 258: Performed by: NURSE ANESTHETIST, CERTIFIED REGISTERED

## 2022-06-13 PROCEDURE — 2500000003 HC RX 250 WO HCPCS: Performed by: NURSE ANESTHETIST, CERTIFIED REGISTERED

## 2022-06-13 PROCEDURE — 7100000010 HC PHASE II RECOVERY - FIRST 15 MIN: Performed by: INTERNAL MEDICINE

## 2022-06-13 PROCEDURE — 7100000001 HC PACU RECOVERY - ADDTL 15 MIN: Performed by: INTERNAL MEDICINE

## 2022-06-13 PROCEDURE — 2709999900 HC NON-CHARGEABLE SUPPLY: Performed by: INTERNAL MEDICINE

## 2022-06-13 PROCEDURE — 3609018800 HC ERCP DX COLLECTION SPECIMEN BRUSHING/WASHING: Performed by: INTERNAL MEDICINE

## 2022-06-13 PROCEDURE — 2720000010 HC SURG SUPPLY STERILE: Performed by: INTERNAL MEDICINE

## 2022-06-13 PROCEDURE — 3700000000 HC ANESTHESIA ATTENDED CARE: Performed by: INTERNAL MEDICINE

## 2022-06-13 DEVICE — STENT SYSTEM RMV
Type: IMPLANTABLE DEVICE | Status: FUNCTIONAL
Brand: WALLFLEX BILIARY

## 2022-06-13 RX ORDER — ROCURONIUM BROMIDE 10 MG/ML
INJECTION, SOLUTION INTRAVENOUS PRN
Status: DISCONTINUED | OUTPATIENT
Start: 2022-06-13 | End: 2022-06-13 | Stop reason: SDUPTHER

## 2022-06-13 RX ORDER — SODIUM CHLORIDE 9 MG/ML
25 INJECTION, SOLUTION INTRAVENOUS PRN
Status: DISCONTINUED | OUTPATIENT
Start: 2022-06-13 | End: 2022-06-13 | Stop reason: HOSPADM

## 2022-06-13 RX ORDER — SODIUM CHLORIDE 0.9 % (FLUSH) 0.9 %
5-40 SYRINGE (ML) INJECTION PRN
Status: DISCONTINUED | OUTPATIENT
Start: 2022-06-13 | End: 2022-06-13 | Stop reason: HOSPADM

## 2022-06-13 RX ORDER — OXYCODONE HYDROCHLORIDE 5 MG/1
5 TABLET ORAL PRN
Status: COMPLETED | OUTPATIENT
Start: 2022-06-13 | End: 2022-06-13

## 2022-06-13 RX ORDER — SODIUM CHLORIDE 0.9 % (FLUSH) 0.9 %
5-40 SYRINGE (ML) INJECTION EVERY 12 HOURS SCHEDULED
Status: DISCONTINUED | OUTPATIENT
Start: 2022-06-13 | End: 2022-06-13 | Stop reason: HOSPADM

## 2022-06-13 RX ORDER — SODIUM CHLORIDE, SODIUM LACTATE, POTASSIUM CHLORIDE, CALCIUM CHLORIDE 600; 310; 30; 20 MG/100ML; MG/100ML; MG/100ML; MG/100ML
INJECTION, SOLUTION INTRAVENOUS CONTINUOUS
Status: DISCONTINUED | OUTPATIENT
Start: 2022-06-13 | End: 2022-06-13 | Stop reason: HOSPADM

## 2022-06-13 RX ORDER — ONDANSETRON 2 MG/ML
4 INJECTION INTRAMUSCULAR; INTRAVENOUS
Status: DISCONTINUED | OUTPATIENT
Start: 2022-06-13 | End: 2022-06-13 | Stop reason: HOSPADM

## 2022-06-13 RX ORDER — ONDANSETRON 2 MG/ML
INJECTION INTRAMUSCULAR; INTRAVENOUS PRN
Status: DISCONTINUED | OUTPATIENT
Start: 2022-06-13 | End: 2022-06-13 | Stop reason: SDUPTHER

## 2022-06-13 RX ORDER — MEPERIDINE HYDROCHLORIDE 25 MG/ML
12.5 INJECTION INTRAMUSCULAR; INTRAVENOUS; SUBCUTANEOUS EVERY 5 MIN PRN
Status: DISCONTINUED | OUTPATIENT
Start: 2022-06-13 | End: 2022-06-13 | Stop reason: HOSPADM

## 2022-06-13 RX ORDER — PROPOFOL 10 MG/ML
INJECTION, EMULSION INTRAVENOUS PRN
Status: DISCONTINUED | OUTPATIENT
Start: 2022-06-13 | End: 2022-06-13 | Stop reason: SDUPTHER

## 2022-06-13 RX ORDER — SODIUM CHLORIDE, SODIUM LACTATE, POTASSIUM CHLORIDE, CALCIUM CHLORIDE 600; 310; 30; 20 MG/100ML; MG/100ML; MG/100ML; MG/100ML
INJECTION, SOLUTION INTRAVENOUS CONTINUOUS PRN
Status: DISCONTINUED | OUTPATIENT
Start: 2022-06-13 | End: 2022-06-13 | Stop reason: SDUPTHER

## 2022-06-13 RX ORDER — LIDOCAINE HYDROCHLORIDE 20 MG/ML
INJECTION, SOLUTION INFILTRATION; PERINEURAL PRN
Status: DISCONTINUED | OUTPATIENT
Start: 2022-06-13 | End: 2022-06-13 | Stop reason: SDUPTHER

## 2022-06-13 RX ORDER — OXYCODONE HYDROCHLORIDE 5 MG/1
10 TABLET ORAL PRN
Status: COMPLETED | OUTPATIENT
Start: 2022-06-13 | End: 2022-06-13

## 2022-06-13 RX ADMIN — ONDANSETRON HYDROCHLORIDE 4 MG: 2 INJECTION, SOLUTION INTRAMUSCULAR; INTRAVENOUS at 15:16

## 2022-06-13 RX ADMIN — PROPOFOL 120 MG: 10 INJECTION, EMULSION INTRAVENOUS at 15:04

## 2022-06-13 RX ADMIN — LIDOCAINE HYDROCHLORIDE 80 MG: 20 INJECTION, SOLUTION INFILTRATION; PERINEURAL at 15:04

## 2022-06-13 RX ADMIN — SODIUM CHLORIDE, POTASSIUM CHLORIDE, SODIUM LACTATE AND CALCIUM CHLORIDE: 600; 310; 30; 20 INJECTION, SOLUTION INTRAVENOUS at 15:01

## 2022-06-13 RX ADMIN — SUGAMMADEX 200 MG: 100 INJECTION, SOLUTION INTRAVENOUS at 15:32

## 2022-06-13 RX ADMIN — ROCURONIUM BROMIDE 45 MG: 10 INJECTION, SOLUTION INTRAVENOUS at 15:04

## 2022-06-13 RX ADMIN — OXYCODONE 5 MG: 5 TABLET ORAL at 16:38

## 2022-06-13 ASSESSMENT — PAIN - FUNCTIONAL ASSESSMENT: PAIN_FUNCTIONAL_ASSESSMENT: 0-10

## 2022-06-13 ASSESSMENT — PAIN DESCRIPTION - ORIENTATION: ORIENTATION: RIGHT;UPPER

## 2022-06-13 ASSESSMENT — PAIN DESCRIPTION - LOCATION: LOCATION: ABDOMEN

## 2022-06-13 ASSESSMENT — PAIN SCALES - GENERAL
PAINLEVEL_OUTOF10: 0
PAINLEVEL_OUTOF10: 4

## 2022-06-13 ASSESSMENT — LIFESTYLE VARIABLES: SMOKING_STATUS: 0

## 2022-06-13 ASSESSMENT — ENCOUNTER SYMPTOMS: SHORTNESS OF BREATH: 1

## 2022-06-13 ASSESSMENT — PAIN DESCRIPTION - DESCRIPTORS: DESCRIPTORS: DULL

## 2022-06-13 NOTE — BRIEF OP NOTE
Brief Postoperative Note      Patient: Kranthi Pate  YOB: 1950  MRN: [id number]    Date of Procedure: 6/13/2022    Pre-Op Diagnosis: BILE LEAK    Post-Op Diagnosis: biliary stricture s/p brushing and 10x40 fully covered metal stent       Procedure(s):  ERCP WF W/ANES.  (15:00)    Surgeon(s):  Alice Stone MD    Assistant:  * No surgical staff found *    Anesthesia: Monitor Anesthesia Care    Estimated Blood Loss (mL): Minimal    Complications: None    Specimens:   * No specimens in log *    Implants:  * No implants in log *      Drains:   [REMOVED] Closed/Suction Drain Right Abdomen Bulb (Removed)   Site Description Unable to view 06/03/22 0856   Dressing Status New drainage noted 06/03/22 0856   Drainage Appearance Bile;Brown 06/03/22 0856   Drain Status To bulb suction 06/03/22 0856   Output (ml) 20 ml 06/03/22 0359       Findings: biliary stricture s/p brushing and 10x40 fully covered metal stent    Electronically signed by Alice Stone MD on 6/13/2022 at 3:57 PM

## 2022-06-13 NOTE — ANESTHESIA PRE PROCEDURE
Department of Anesthesiology  Preprocedure Note       Name:  R[place] Dunne   Age:  70 y.o.  :  1950                                          MRN:  [id number]         Date:  2022      Surgeon: Annette Sarmiento):  Gerald Garcia MD    Procedure: Procedure(s):  ERCP WF W/ANES. (15:00)    Medications prior to admission:   Prior to Admission medications    Medication Sig Start Date End Date Taking? Authorizing Provider   amoxicillin-clavulanate (AUGMENTIN) 875-125 MG per tablet Take 1 tablet by mouth 2 times daily for 10 days 6/3/22 6/13/22  Hermila Mcdaniel MD   ondansetron (ZOFRAN) 4 MG tablet Take 1 tablet by mouth 3 times daily as needed for Nausea or Vomiting 6/3/22   Hermila Mcdaniel MD   triamterene-hydroCHLOROthiazide (MAXZIDE-25) 37.5-25 MG per tablet Take 1 tablet by mouth every 12 hours     Historical Provider, MD   gemfibrozil (LOPID) 600 MG tablet Take 600 mg by mouth 2 times daily (before meals)    Historical Provider, MD   ursodiol (ACTIGALL) 300 MG capsule Take 1 capsule by mouth 2 times daily 3/12/22   Cristela Stephenson MD   pantoprazole (PROTONIX) 40 MG tablet Take 1 tablet by mouth 2 times daily (before meals) for 14 days 22  Cristela Stephenson MD   metFORMIN (GLUCOPHAGE-XR) 500 MG extended release tablet Take 500 mg by mouth 2 times daily    Historical Provider, MD   dicyclomine (BENTYL) 10 MG capsule Take 10 mg by mouth nightly. 2/15/11   Historical Provider, MD   allopurinol (ZYLOPRIM) 300 MG tablet Take 300 mg by mouth daily. Historical Provider, MD   albuterol (PROVENTIL HFA;VENTOLIN HFA) 108 (90 BASE) MCG/ACT inhaler Inhale 2 puffs into the lungs every 6 hours as needed. Historical Provider, MD       Current medications:    No current facility-administered medications for this encounter. Allergies:     Allergies   Allergen Reactions    Codeine      Pt states he took medication without food and had a reaction, states this gave him chest pain       Problem List:    Patient Active Problem List   Diagnosis Code    GERD (gastroesophageal reflux disease) K21.9    Palpitations R00.2    Painless jaundice R17    Transaminitis R74.01    Common bile duct (CBD) stricture K83.1    Hyponatremia E87.1    Hypokalemia E87.6    Crohn's disease ([facility] K50.90    Hypertension I10    Acute liver failure without hepatic coma K72.00    Hyperbilirubinemia E80.6    Cholangitis K83.09    Moderate protein-calorie malnutrition (HCC) E44.0    Cholecystitis K81.9    Type 2 diabetes mellitus without complication, without long-term current use of insulin (HCC) E11.9    RUQ abdominal pain R10.11    Right upper quadrant pain R10.11       Past Medical History:        Diagnosis Date    Asthma     Blood circulation, collateral     Carpal tunnel syndrome     CLL (chronic lymphocytic leukemia) ([facility]     Crohn's colitis ([facility]     Diabetes mellitus ([facility] [facility]     emboli     pulmonary emboli in 1980    GERD (gastroesophageal reflux disease)     Chrons disease    Gout     Hx of blood clots     Hypertension     Pneumonia     pneumonia,asthma    prostate     infection    Seasonal allergies        Past Surgical History:        Procedure Laterality Date    CYST REMOVAL      ERCP N/A 1/26/2022    ERCP BIOPSY performed by Noni Cloud MD at [address] [place] [address] ERCP  1/26/2022    ERCP SPHINCTER/PAPILLOTOMY performed by Noni Cloud MD at [address] [place] [address] ERCP  1/26/2022    ERCP STENT INSERTION performed by Noni Cloud MD at [address] [place] [address] ERCP N/A 3/11/2022    ERCP STENT REMOVAL performed by Noni Cloud MD at [address] [place] [address] ERCP  3/11/2022    ERCP STENT INSERTION performed by Noni Cloud MD at [address] [place] [address] ERCP  3/11/2022    ERCP BIOPSY performed by Noni Cloud MD at [address] [place] [address] ERCP N/A 4/13/2022    ERCP STENT INSERTION performed by Jeannette Palencia MD at [address] [facility] ERCP  4/13/2022    ERCP ENDOSCOPIC RETROGRADE CHOLANGIOPANCREATOGRAPHY DIRECT VISUALIZATION performed by Jeannette Palencia MD at [facility]  01/26/2022    ERCP WF W/ANES. (8:30) (N/A )     TONSILLECTOMY      UPPER GASTROINTESTINAL ENDOSCOPY N/A 3/11/2022    UPPER EUS W/ANES. performed by Jeannette Palencia MD at [address] [place] [address] History:    Social History     Tobacco Use    Smoking status: Former Smoker    Smokeless tobacco: Never Used    Tobacco comment: quit 40-50 years ago   Substance Use Topics    Alcohol use: No                                Counseling given: Not Answered  Comment: quit 40-50 years ago      Vital Signs (Current):   Vitals:    06/10/22 1225 06/13/22 1354   BP:  127/72   Pulse:  85   Resp:  16   Temp:  97.6 °F (36.4 °C)   TempSrc:  Oral   Weight: 177 lb (80.3 kg)    Height: 5' 11\" (1.803 m)                                               BP Readings from Last 3 Encounters:   06/13/22 127/72   06/03/22 131/64   05/31/22 109/60       NPO Status: Time of last liquid consumption: 0700                        Time of last solid consumption: 2100                        Date of last liquid consumption: 06/13/22                        Date of last solid food consumption: 06/12/22    BMI:   Wt Readings from Last 3 Encounters:   06/10/22 177 lb (80.3 kg)   05/30/22 173 lb (78.5 kg)   04/07/22 160 lb (72.6 kg)     Body mass index is 24.69 kg/m².     CBC:   Lab Results   Component Value Date    WBC 5.8 06/01/2022    RBC 3.14 06/01/2022    HGB 10.5 06/01/2022    HCT 30.6 06/01/2022    MCV 97.4 06/01/2022    RDW 12.8 06/01/2022     06/01/2022       CMP:   Lab Results   Component Value Date     06/01/2022    K 3.6 06/01/2022    K 3.8 03/27/2022     06/01/2022    CO2 26 06/01/2022    BUN 13 06/01/2022    CREATININE <0.5 06/01/2022    GFRAA >60 06/01/2022    GFRAA >60 02/15/2011    AGRATIO 2.5 04/28/2022    LABGLOM >60 06/01/2022    GLUCOSE 152 06/01/2022    PROT 7.1 05/30/2022    PROT 7.1 02/15/2011    CALCIUM 8.4 06/01/2022    BILITOT 0.9 05/30/2022    ALKPHOS 107 05/30/2022    AST 13 05/30/2022    ALT 34 05/30/2022       POC Tests: No results for input(s): POCGLU, POCNA, POCK, POCCL, POCBUN, POCHEMO, POCHCT in the last 72 hours. Coags:   Lab Results   Component Value Date    PROTIME 14.4 05/31/2022    INR 1.14 05/31/2022    APTT 43.6 02/15/2011       HCG (If Applicable): No results found for: PREGTESTUR, PREGSERUM, HCG, HCGQUANT     ABGs: No results found for: PHART, PO2ART, MRG6EJM, ELV5BYS, BEART, I8LANCNT     Type & Screen (If Applicable):  No results found for: LABABO, LABRH    Drug/Infectious Status (If Applicable):  No results found for: HIV, HEPCAB    COVID-19 Screening (If Applicable):   Lab Results   Component Value Date    COVID19 NEGATIVE 05/26/2022           Anesthesia Evaluation  Patient summary reviewed no history of anesthetic complications:   Airway: Mallampati: II  TM distance: >3 FB   Neck ROM: full  Mouth opening: > = 3 FB   Dental:          Pulmonary: breath sounds clear to auscultation  (+) shortness of breath (EXERT):  sleep apnea (SNORES):  asthma (NO O2 REQ., PRN INHALERS):     (-) COPD and not a current smoker          Patient did not smoke on day of surgery. Cardiovascular:    (+) hypertension:, CARDONA:,     (-) pacemaker, valvular problems/murmurs, past MI, CAD, CABG/stent, dysrhythmias and  angina    ECG reviewed  Rhythm: regular  Rate: normal           Beta Blocker:  Not on Beta Blocker         Neuro/Psych:   (+) neuromuscular disease (CTS):,    (-) seizures, TIA and CVA           GI/Hepatic/Renal:   (+) GERD: well controlled, liver disease (HX ELEV TRANS, S/P ERCP(S), RICARDO, BILE LEAK):,      (-) no renal disease and bowel prep      ROS comment: CROHNS.    Endo/Other:    (+) DiabetesType II DM, , : arthritis (AND GOUT):., no malignancy/cancer. (-) hypothyroidism, hyperthyroidism, blood dyscrasia, no malignancy/cancer               Abdominal:       Abdomen: soft. Vascular:   + DVT (DISTANT, 1980S, NO THINNERS CURRENT), . Other Findings: R ABD BILE DRAIN          Anesthesia Plan      general     ASA 3       Induction: intravenous. MIPS: Postoperative opioids intended and Prophylactic antiemetics administered. Anesthetic plan and risks discussed with patient. Plan discussed with CRNA. This pre-anesthesia assessment may be used as a history and physical.    DOS STAFF ADDENDUM:    Pt seen and examined, chart reviewed (including anesthesia, drug and allergy history). No interval changes to history and physical examination. Anesthetic plan, risks, benefits, alternatives, and personnel involved discussed with patient. Patient verbalized an understanding and agrees to proceed.       Antoni Millan MD  June 13, 2022  2:22 PM    Antoni Millan MD   6/13/2022

## 2022-06-13 NOTE — PROGRESS NOTES
Arrived from [facility] awake and alert. No c/o pain at this time. Abdomen soft and good bowel sounds auscultated SAMI drain noted to abdomen. Dressing changed per RAY Urrutia RN. Report received from RAY Urrutia RN

## 2022-06-13 NOTE — PROGRESS NOTES
Verbal and written discharge instructions given to patient and wife. Verbalized understanding. States pain is easing.

## 2022-06-13 NOTE — H&P
History and Physical / Pre-Sedation Assessment    Patient:  Hiren Lopez   :   1950     Intended Procedure:  ERCP    HPI: 45-year-old male with history of diabetes, hypertension, hyperlipidemia, asthma, and remote diagnosis of Crohn's disease, biliary stricture status post stent placement and removal followed by cholecystectomy c/b bile leak presents for therapeutic ERCP    Past Medical History:   Diagnosis Date    Asthma     Blood circulation, collateral     Carpal tunnel syndrome     CLL (chronic lymphocytic leukemia) ([address]     Cro[facility]'s colitis ([facility]     Diabetes mellitus ([facility] [facility]     emboli     pulmonary emboli in     GERD (gastroesophageal reflux disease)     Chrons disease    Gout     Hx of blood clots     Hypertension     Pneumonia     pneumonia,asthma    prostate     infection    Seasonal allergies      Past Surgical History:   Procedure Laterality Date    CYST REMOVAL      ERCP N/A 2022    ERCP BIOPSY performed by Jeannette Palencia MD at [facility] ERCP  2022    ERCP SPHINCTER/PAPILLOTOMY performed by Jeannette Palencia MD at [facility] ERCP  2022    ERCP STENT INSERTION performed by Jeannette Palencia MD at [facility] ERCP N/A 3/11/2022    ERCP STENT REMOVAL performed by Jeannette Palencia MD at [facility] ERCP  3/11/2022    ERCP STENT INSERTION performed by Jeannette Palencia MD at [facility] ERCP  3/11/2022    ERCP BIOPSY performed by Jeannette Palencia MD at [facility] ERCP N/A 2022    ERCP STENT INSERTION performed by Jeannette Palencia MD at [facility] ERCP  2022    ERCP ENDOSCOPIC RETROGRADE CHOLANGIOPANCREATOGRAPHY DIRECT VISUALIZATION performed by Jeannette Palencia MD at [place] [address]  2022    ERCP WF W/ANES.  (8:30) (N/A )     TONSILLECTOMY      UPPER GASTROINTESTINAL ENDOSCOPY N/A 3/11/2022    UPPER EUS W/ANES. performed by Jacob Crawford MD at [address] [place] [address]       Medications reviewed  Prior to Admission medications    Medication Sig Start Date End Date Taking? Authorizing Provider   amoxicillin-clavulanate (AUGMENTIN) 875-125 MG per tablet Take 1 tablet by mouth 2 times daily for 10 days 6/3/22 6/13/22  Ophelia Rodriguez MD   ondansetron (ZOFRAN) 4 MG tablet Take 1 tablet by mouth 3 times daily as needed for Nausea or Vomiting 6/3/22   Ophelia Rodriguez MD   triamterene-hydroCHLOROthiazide (MAXZIDE-25) 37.5-25 MG per tablet Take 1 tablet by mouth every 12 hours     Historical Provider, MD   gemfibrozil (LOPID) 600 MG tablet Take 600 mg by mouth 2 times daily (before meals)    Historical Provider, MD   ursodiol (ACTIGALL) 300 MG capsule Take 1 capsule by mouth 2 times daily 3/12/22   Michaela Fuentes MD   pantoprazole (PROTONIX) 40 MG tablet Take 1 tablet by mouth 2 times daily (before meals) for 14 days 1/26/22 2/9/22  Michaela Fuentes MD   metFORMIN (GLUCOPHAGE-XR) 500 MG extended release tablet Take 500 mg by mouth 2 times daily    Historical Provider, MD   dicyclomine (BENTYL) 10 MG capsule Take 10 mg by mouth nightly. 2/15/11   Historical Provider, MD   allopurinol (ZYLOPRIM) 300 MG tablet Take 300 mg by mouth daily. Historical Provider, MD   albuterol (PROVENTIL HFA;VENTOLIN HFA) 108 (90 BASE) MCG/ACT inhaler Inhale 2 puffs into the lungs every 6 hours as needed. Historical Provider, MD        Allergies: Allergies   Allergen Reactions    Codeine      Pt states he took medication without food and had a reaction, states this gave him chest pain       Nurses notes reviewed and agreed.     Physical Exam:  Vital Signs: /72   Pulse 85   Temp 97.6 °F (36.4 °C) (Oral)   Resp 16   Ht 5' 11\" (1.803 m)   Wt 177 lb (80.3 kg)   SpO2 98%   BMI 24.69 kg/m²    Airway: Mallampati: II (soft palate, uvula, fauces visible)  Pulmonary:Normal  Cardiac:Normal  Abdomen:Normal    Pre-Procedure Assessment / Plan:  ASA: Class 3 - A patient with severe systemic disease that limits activity but is not incapacitating  Level of Sedation Plan: Moderate sedation  Post Procedure plan: Return to same level of care    I assessed the patient and find that the patient is in satisfactory condition to proceed with the planned procedure and sedation plan. I have explained the risk, benefits, and alternatives to the procedure; the patient understands and agrees to proceed.        Noni Cloud MD  6/13/2022

## 2022-06-13 NOTE — ANESTHESIA POSTPROCEDURE EVALUATION
Department of Anesthesiology  Postprocedure Note    Patient: Vivian Wren  MRN: [id number]  YOB: 1950  Date of evaluation: 6/13/2022  Time:  4:16 PM     Procedure Summary     Date: 06/13/22 Room / Location: [place] [address] / [facility]    Anesthesia Start: 1501 Anesthesia Stop: 1550    Procedures:       ERCP BILIARY BRUSHING (N/A )      ERCP STENT INSERTION Diagnosis:       Bile leak      (BILE LEAK)    Surgeons: Amie Simons MD Responsible Provider: Jesi Rodríguez MD    Anesthesia Type: general ASA Status: 3          Anesthesia Type: No value filed. Elenita Phase I: Elenita Score: 9    Elenita Phase II:      Last vitals: Reviewed and per EMR flowsheets.    Vitals:    06/13/22 1549 06/13/22 1554 06/13/22 1559 06/13/22 1604   BP: (!) 150/90 (!) 146/89 (!) 143/80 (!) 146/77   Pulse: 75 74 73 72   Resp: 16 15 16 12   Temp: 98 °F (36.7 °C)      TempSrc: Temporal      SpO2: 100% 97% 97% 97%   Weight:       Height:           Anesthesia Post Evaluation    Patient location during evaluation: bedside  Patient participation: complete - patient participated  Level of consciousness: awake and alert  Airway patency: patent  Nausea & Vomiting: no nausea  Complications: no  Cardiovascular status: hemodynamically stable  Respiratory status: acceptable  Hydration status: euvolemic

## 2022-06-14 NOTE — OP NOTE
and duodenum  were examined carefully. The patient tolerated the procedure well  without any difficulties. FINDINGS:  ESOPHAGUS - Examined esophagus appeared normal on limited view. STOMACH -  Limited view of the stomach appeared normal.    DUODENUM - The ampulla appeared normal except for appearance of the  pancreas suggestive of IPMN. Deep wire-guided cannulation was  successfully achieved into the common duct. Using TRUEtome  sphincterotome loaded with 0.025 revolution wire, bile was aspirated to  confirm biliary cannulation. Contrast was injected. Images were  interpreted by me. Entire common duct as well as primary, secondary,  tertiary intrahepatic branches opacified with contrast.  There was  evidence of mild-to-moderate intrahepatic ductal dilation and mild  biliary dilation measuring 10 mm diameter. There was a 2-cm distal duct  stricture. The sphincterotome was exchanged in favor of a brush  catheter. The stricture was brushed for sampling and sent for cytology. Then a stent deployment system was used to deploy a 10 x 40 mm [facility] WallFlex fully covered stent across the stricture  successfully. This was confirmed under fluoroscopy. The patient  tolerated the procedure well without difficulties. SUMMARY:  1.  A 2-cm distal common duct stricture. 2.  Upstream biliary ductal dilation secondary to 2-cm distal common  duct stricture. 3.  Successful brushing of the stricture. 4.  Successful placement of fully covered [facility] WallFlex 40  x 10 self-expanding metal stent. RECOMMENDATIONS:  1. Discharge the patient to home. 2.  Await final cytology results. 3.  Monitor LFTs. 4.  Resume low-fat diet. 5.  The patient may need a referral to surgery for chronic biliary  stricture, refractory to endoscopic management.     EBL: <5mL    Steve Giraldo MD    D: 06/13/2022 15:57:41       T: 06/13/2022 16:01:18     [email or web address]  Job#: [id number]     Doc#: [id number]    CC:  Canelo Garcia MD

## 2022-06-16 LAB
GDT REPLACEMENT: NORMAL
Lab: NORMAL
PATHOLOGY DIAGNOSIS: NORMAL
PATHOLOGY REPORT: NORMAL
SIGNATURE: NORMAL
SPECIMEN: NORMAL
SUPPLEMENTAL REPORT: NORMAL
SUPPLEMENTAL REPORT: NORMAL

## 2022-06-20 ENCOUNTER — OFFICE VISIT (OUTPATIENT)
Dept: SURGERY | Age: 72
End: 2022-06-20

## 2022-06-20 VITALS
SYSTOLIC BLOOD PRESSURE: 122 MMHG | WEIGHT: 164 LBS | HEIGHT: 71 IN | DIASTOLIC BLOOD PRESSURE: 70 MMHG | BODY MASS INDEX: 22.96 KG/M2

## 2022-06-20 DIAGNOSIS — Z09 SURGICAL FOLLOW-UP CARE: Primary | ICD-10-CM

## 2022-06-20 PROCEDURE — 99024 POSTOP FOLLOW-UP VISIT: CPT | Performed by: SURGERY

## 2022-07-13 NOTE — PROGRESS NOTES
[facility] SURGICAL ONCOLOGY  [address] [facility]  Dept: [phone]  Dept Fax: [phone]    Visit Date: 7/15/2022    HPI:     Paresh Maldonado is a 70 y.o. male who is here today as a referral from Dr. Maine Gonzalez for a chronic biliary stricture. He is accompanied by his wife and daughter. Patient was originally hospitalized with obstructive jaundice in January. He underwent ERCP,  which showed a 2-cm distal bile duct stricture. A stent was placed with successful decompression. He subsequently was readmitted in 03/2022, with recurrent biliary obstructive jaundice thought to be a stent occlusion. At this time, he underwent EUS and ERCP, which sowed a 1 cm biliary stricture with normal appearing pancreas. The brushings of the biliary stricture were negative x's 2. A 10 x 60 metal stent fully covered self-expanding,  the metal stent was deployed across the biliary stricture. Patient was admitted to the hospital at the end of May for complaint's of severe RUQ abdominal pain. Patient developed a postoperative abscess and had a Percutaneous drain placed. More recent patient underwent an ERCP with brushing and stent placement on 6/13/22 by Dr. Maine Gonzalez. Family reports he began to feel ill in November with nausea, diarrhea, and intermittent abdominal pain. This persisted from Thanksgiving into the new year when he developed Jaundice. Reports a significant weight loss of at least 20 lbs in the last year. States he is feeling better after stent placement. Patient's problem list, medications, past medical, surgical, family, and social histories were reviewed and updated in the chart as indicated today.     Past Medical History:   Diagnosis Date    Asthma     Blood circulation, collateral     Carpal tunnel syndrome     CLL (chronic lymphocytic leukemia) (HCC)     Crohn's colitis ([facility]     Diabetes mellitus ([facility]     emboli     pulmonary emboli in 1980    GERD (gastroesophageal reflux disease)     Chrons disease    Gout     Hx of blood clots     Hypertension     Pneumonia     pneumonia,asthma    prostate     infection    Seasonal allergies        Past Surgical History:   Procedure Laterality Date    CYST REMOVAL      ERCP N/A 1/26/2022    ERCP BIOPSY performed by Richardson Alexandre MD at [address]    ERCP  1/26/2022    ERCP SPHINCTER/PAPILLOTOMY performed by Richardson Alexandre MD at [address]    ERCP  1/26/2022    ERCP STENT INSERTION performed by Richardson Alexandre MD at [id number] [place], [place] [address] [place]    ERCP N/A 3/11/2022    ERCP STENT REMOVAL performed by Richardson Alexandre MD at [id number] [place], [place] [address]    ERCP  3/11/2022    ERCP STENT INSERTION performed by Richardson Alexandre MD at [id number] [place], [place] [address]    ERCP  3/11/2022    ERCP BIOPSY performed by Richardson Alexandre MD at [address]    ERCP N/A 4/13/2022    ERCP STENT INSERTION performed by Richardson Alexandre MD at [id number] [place], [place] [address]    ERCP  4/13/2022    ERCP ENDOSCOPIC RETROGRADE CHOLANGIOPANCREATOGRAPHY DIRECT VISUALIZATION performed by Richardson Alexandre MD at [address] [place], [place] [address]    ERCP N/A 6/13/2022    ERCP BILIARY BRUSHING performed by Richardson Alexandre MD at [address] [place], [place] [address]    ERCP  6/13/2022    ERCP STENT INSERTION performed by Richardson Alexandre MD at [address] [place] [address]  01/26/2022    ERCP WF W/ANES. (8:30) (N/A )     OTHER SURGICAL HISTORY  06/13/2022     ERCP WF W/ANES. (15:00) (N/A )    TONSILLECTOMY      UPPER GASTROINTESTINAL ENDOSCOPY N/A 3/11/2022    UPPER EUS W/ANES. performed by Richardson Alexandre MD at [address] [facility] [address]       Cancer-related family history is not on file.     Social History:   Social History     Tobacco Use    Smoking status: Former    Smokeless tobacco: Never    Tobacco comments:     quit 40-50 years ago   Substance Use Topics    Alcohol use: No      Tobacco cessation counseling provided as appropriate. REVIEW OF SYSTEMS:    Pertinent positives and negatives are mentioned in the HPI above. Otherwise, all other systems were reviewed and negative. Objective: Wt Readings from Last 3 Encounters:   07/15/22 170 lb 3.2 oz (77.2 kg)   06/20/22 164 lb (74.4 kg)   06/10/22 177 lb (80.3 kg)      Physical Exam   /78 (Site: Left Upper Arm)   Pulse 68   Temp 98.6 °F (37 °C)   Ht 5' 11\" (1.803 m)   Wt 170 lb 3.2 oz (77.2 kg)   BMI 23.74 kg/m²     General:  Alert, oriented x 3, cooperative. Skin: Skin color, texture, turgor normal.    Lymph nodes: Cervical, supraclavicular, and axillary nodes normal.   HENT:  Normocephalic, without obvious abnormality. Moist mucus membranes. No icterus. Lungs: No respiratory distress. Heart:  Regular rate and rhythm. No murmur. Abdomen: Soft, non-tender. No masses,  No organomegaly. Extremities: Extremities normal, atraumatic, no cyanosis or edema. Neurologic: Grossly intact sensory and motor exam.   Psychiatric: Appropriate mood and thought process     ERCP 6/13/22 SUMMARY:  1. A 2-cm distal common duct stricture. 2. Upstream biliary ductal dilation secondary to 2-cm distal common  duct stricture. 3. Successful brushing of the stricture. 4. Successful placement of fully covered [facility] WallFlex 40  x 10 self-expanding metal stent. Cytology 6/13/22 FINAL DIAGNOSIS:   Common bile duct, brushings and brush tip:   - No malignant cells identified. Assessment/Plan:      Diagnosis Orders   1. Biliary stricture        Reports history Crohn's disease but has not been on meds for this/no recent steroids. Discussed possible diagnoses including biliary stricture related to Crohn's, biliary cancer, or benign stricture. Will check labs including CA 19-9 and CEA today. Recommend repeating CT with pancreatic protocol. Discussed that the only way to rule out cancer is surgery.  Also discussed the option of observation. Reviewed Whipple procedure with pt and family. Discussed length of surgery and expected course of recovery both in-patient and at home. Discussed possible complications including infection, bleeding, stroke, MI, pneumonia, and blood clots. Reviewed pancreatic leak and management of that complication. Also reviewed utility of stenting rather than going for surgery. Discussed that if they would like to continue with conservative management that they would need to have the stent changed frequently (every 3-6 years). Recommend optimize physical activity and nutrition. Will order CT. RTC in 2 weeks.      Audi Adams MD  Surgery Attending

## 2022-07-14 RX ORDER — CEPHALEXIN 500 MG/1
CAPSULE ORAL
COMMUNITY
Start: 2022-04-28

## 2022-07-14 RX ORDER — TRIAMTERENE AND HYDROCHLOROTHIAZIDE 37.5; 25 MG/1; MG/1
1 CAPSULE ORAL EVERY MORNING
COMMUNITY
End: 2022-07-14

## 2022-07-14 RX ORDER — OXYCODONE HYDROCHLORIDE 5 MG/1
TABLET ORAL
COMMUNITY
Start: 2022-05-26

## 2022-07-14 RX ORDER — TRIAMTERENE AND HYDROCHLOROTHIAZIDE 37.5; 25 MG/1; MG/1
CAPSULE ORAL
COMMUNITY
Start: 2022-04-14

## 2022-07-14 RX ORDER — DICYCLOMINE HCL 20 MG
TABLET ORAL
COMMUNITY
Start: 2022-05-19

## 2022-07-15 ENCOUNTER — OFFICE VISIT (OUTPATIENT)
Dept: SURGERY | Age: 72
End: 2022-07-15
Payer: MEDICARE

## 2022-07-15 ENCOUNTER — HOSPITAL ENCOUNTER (OUTPATIENT)
Dept: CT IMAGING | Age: 72
Discharge: HOME | End: 2022-07-15
Payer: MEDICARE

## 2022-07-15 VITALS
BODY MASS INDEX: 23.83 KG/M2 | DIASTOLIC BLOOD PRESSURE: 78 MMHG | TEMPERATURE: 98.6 F | HEIGHT: 71 IN | SYSTOLIC BLOOD PRESSURE: 134 MMHG | HEART RATE: 68 BPM | WEIGHT: 170.2 LBS

## 2022-07-15 DIAGNOSIS — K83.1 BILIARY STRICTURE: Primary | ICD-10-CM

## 2022-07-15 DIAGNOSIS — K83.1 BILIARY STRICTURE: ICD-10-CM

## 2022-07-15 LAB
ALBUMIN SERPL-MCNC: 4.6 G/DL (ref 3.4–5)
ALP BLD-CCNC: 54 U/L (ref 40–129)
ALT SERPL-CCNC: 11 U/L (ref 10–40)
ANION GAP SERPL CALCULATED.3IONS-SCNC: 16 MMOL/L (ref 3–16)
AST SERPL-CCNC: 16 U/L (ref 15–37)
BASOPHILS ABSOLUTE: 0 K/UL (ref 0–0.2)
BASOPHILS RELATIVE PERCENT: 0.5 %
BILIRUB SERPL-MCNC: 0.3 MG/DL (ref 0–1)
BILIRUBIN DIRECT: <0.2 MG/DL (ref 0–0.3)
BILIRUBIN, INDIRECT: NORMAL MG/DL (ref 0–1)
BUN BLDV-MCNC: 14 MG/DL (ref 7–20)
CALCIUM SERPL-MCNC: 9.6 MG/DL (ref 8.3–10.6)
CEA: 1.3 NG/ML (ref 0–5)
CHLORIDE BLD-SCNC: 97 MMOL/L (ref 99–110)
CO2: 24 MMOL/L (ref 21–32)
CREAT SERPL-MCNC: 0.6 MG/DL (ref 0.8–1.3)
EOSINOPHILS ABSOLUTE: 0.1 K/UL (ref 0–0.6)
EOSINOPHILS RELATIVE PERCENT: 3.6 %
GFR AFRICAN AMERICAN: >60
GFR AFRICAN AMERICAN: >60
GFR NON-AFRICAN AMERICAN: >60
GFR NON-AFRICAN AMERICAN: >60
GLUCOSE BLD-MCNC: 113 MG/DL (ref 70–99)
HCT VFR BLD CALC: 38.7 % (ref 40.5–52.5)
HEMOGLOBIN: 13.3 G/DL (ref 13.5–17.5)
LYMPHOCYTES ABSOLUTE: 0.7 K/UL (ref 1–5.1)
LYMPHOCYTES RELATIVE PERCENT: 19.5 %
MCH RBC QN AUTO: 33.8 PG (ref 26–34)
MCHC RBC AUTO-ENTMCNC: 34.3 G/DL (ref 31–36)
MCV RBC AUTO: 98.7 FL (ref 80–100)
MONOCYTES ABSOLUTE: 0.3 K/UL (ref 0–1.3)
MONOCYTES RELATIVE PERCENT: 7.1 %
NEUTROPHILS ABSOLUTE: 2.6 K/UL (ref 1.7–7.7)
NEUTROPHILS RELATIVE PERCENT: 69.3 %
PDW BLD-RTO: 14.1 % (ref 12.4–15.4)
PERFORMED ON: ABNORMAL
PLATELET # BLD: 202 K/UL (ref 135–450)
PMV BLD AUTO: 7.4 FL (ref 5–10.5)
POC CREATININE: 0.6 MG/DL (ref 0.8–1.3)
POC SAMPLE TYPE: ABNORMAL
POTASSIUM SERPL-SCNC: 3.7 MMOL/L (ref 3.5–5.1)
RBC # BLD: 3.92 M/UL (ref 4.2–5.9)
SODIUM BLD-SCNC: 137 MMOL/L (ref 136–145)
TOTAL PROTEIN: 6.9 G/DL (ref 6.4–8.2)
WBC # BLD: 3.8 K/UL (ref 4–11)

## 2022-07-15 PROCEDURE — 74178 CT ABD&PLV WO CNTR FLWD CNTR: CPT

## 2022-07-15 PROCEDURE — 99205 OFFICE O/P NEW HI 60 MIN: CPT | Performed by: SURGERY

## 2022-07-15 PROCEDURE — 6360000004 HC RX CONTRAST MEDICATION: Performed by: SURGERY

## 2022-07-15 PROCEDURE — 82565 ASSAY OF CREATININE: CPT

## 2022-07-15 PROCEDURE — 1123F ACP DISCUSS/DSCN MKR DOCD: CPT | Performed by: SURGERY

## 2022-07-15 RX ADMIN — IOPAMIDOL 80 ML: 755 INJECTION, SOLUTION INTRAVENOUS at 14:04

## 2022-07-19 LAB — CA 19-9: 14 U/ML (ref 0–35)

## 2022-07-21 NOTE — PROGRESS NOTES
He feels well. Minimal drainage in SAMI bulb. It is non bilious. Drain removed. He will follow up with me as needed. He is going to get an opinion from Dr. Cirilo Cabral about options for distal biliary stricture.

## 2022-07-25 NOTE — PROGRESS NOTES
[facility] SURGICAL ONCOLOGY  [address] [facility]  Dept: [phone]  Dept Fax: [phone]    Visit Date: 8/2/2022    HPI:   Renetta Benito is a 70 y.o. male who is here today with his wife and family to discuss further management of a chronic Biliary Stricture and to review recent imaging studies and tumor markers. Patient had bile leak and gall bladder surgery. He had biliary stent and drain placed. Percutaneous drain removed by Dr. Shelby Anderson on 7/25/22. He had biliary stent exchange done. He slowly getting better. He also has Chron's disease. No fever, abdominal pain or jaundice currently.     Past Medical History:   Diagnosis Date    Asthma     Blood circulation, collateral     Carpal tunnel syndrome     CLL (chronic lymphocytic leukemia) (HCC)     Crohn's colitis ([address]     Diabetes mellitus ([address]     emboli     pulmonary emboli in 1980    GERD (gastroesophageal reflux disease)     Chrons disease    Gout     Hx of blood clots     Hypertension     Pneumonia     pneumonia,asthma    prostate     infection    Seasonal allergies      Past Surgical History:   Procedure Laterality Date    CYST REMOVAL      ERCP N/A 1/26/2022    ERCP BIOPSY performed by Cathi Mckenzie MD at [address]    ERCP  1/26/2022    ERCP SPHINCTER/PAPILLOTOMY performed by Cathi Mckenzie MD at [address]    ERCP  1/26/2022    ERCP STENT INSERTION performed by Cathi Mckenzie MD at [address]    ERCP N/A 3/11/2022    ERCP STENT REMOVAL performed by Cathi Mckenzie MD at [address]    ERCP  3/11/2022    ERCP STENT INSERTION performed by Cathi Mckenzie MD at [address]    ERCP  3/11/2022    ERCP BIOPSY performed by Cathi Mckenzie MD at [address]    ERCP N/A 4/13/2022    ERCP STENT INSERTION performed by Cathi Mckenzie MD at [address]    ERCP  4/13/2022    ERCP ENDOSCOPIC RETROGRADE CHOLANGIOPANCREATOGRAPHY DIRECT VISUALIZATION [facility] [address] [facility] [address]    ERCP N/A 6/13/2022    ERCP BILIARY BRUSHING performed by Juan Pablo Allen MD at [address] [facility] [address]    ERCP  6/13/2022    ERCP STENT INSERTION performed by Juan Pablo Allen MD at [address] [place] [address]  01/26/2022    ERCP WF W/ANES. (8:30) (N/A )     OTHER SURGICAL HISTORY  06/13/2022     ERCP WF W/ANES. (15:00) (N/A )    TONSILLECTOMY      UPPER GASTROINTESTINAL ENDOSCOPY N/A 3/11/2022    UPPER EUS W/ANES. performed by Juan Pablo Allen MD at [facility]       Current Outpatient Medications:     loratadine-pseudoephedrine (CLARITIN-D 12HR) 5-120 MG per extended release tablet, Take by mouth, Disp: , Rfl:     cephALEXin (KEFLEX) 500 MG capsule, TAKE 1 CAPSULE BY MOUTH TWICE DAILY, Disp: , Rfl:     dicyclomine (BENTYL) 20 MG tablet, TAKE 1 TABLET BY MOUTH ONCE DAILY, Disp: , Rfl:     oxyCODONE (ROXICODONE) 5 MG immediate release tablet, , Disp: , Rfl:     metFORMIN (GLUCOPHAGE) 500 MG tablet, Take 500 mg by mouth 2 times daily, Disp: , Rfl:     triamterene-hydroCHLOROthiazide (DYAZIDE) 37.5-25 MG per capsule, , Disp: , Rfl:     ondansetron (ZOFRAN) 4 MG tablet, Take 1 tablet by mouth 3 times daily as needed for Nausea or Vomiting, Disp: 15 tablet, Rfl: 0    gemfibrozil (LOPID) 600 MG tablet, Take 600 mg by mouth 2 times daily (before meals), Disp: , Rfl:     ursodiol (ACTIGALL) 300 MG capsule, Take 1 capsule by mouth 2 times daily, Disp: 60 capsule, Rfl: 0    allopurinol (ZYLOPRIM) 300 MG tablet, Take 300 mg by mouth daily. , Disp: , Rfl:     albuterol (PROVENTIL HFA;VENTOLIN HFA) 108 (90 BASE) MCG/ACT inhaler, Inhale 2 puffs into the lungs every 6 hours as needed. , Disp: , Rfl:     pantoprazole (PROTONIX) 40 MG tablet, Take 1 tablet by mouth 2 times daily (before meals) for 14 days, Disp: 28 tablet, Rfl: 0  Allergies   Allergen Reactions Codeine      Pt states he took medication without food and had a reaction, states this gave him chest pain     Past Surgical History:   Procedure Laterality Date    CYST REMOVAL      ERCP N/A 1/26/2022    ERCP BIOPSY performed by Honey Saunders MD at [address] [facility] [address]    ERCP  1/26/2022    ERCP SPHINCTER/PAPILLOTOMY performed by Honey Saunders MD at [address] [place] [address] [address]    ERCP  1/26/2022    ERCP STENT INSERTION performed by Honey Saunders MD at [address] [place] [address] [address]    ERCP N/A 3/11/2022    ERCP STENT REMOVAL performed by Honey Saunders MD at [address] [place] [address] [address]    ERCP  3/11/2022    ERCP STENT INSERTION performed by Honey Saunders MD at [address] [place] [address] [address]    ERCP  3/11/2022    ERCP BIOPSY performed by Honey Saunders MD at [address] [place] [address] [address]    ERCP N/A 4/13/2022    ERCP STENT INSERTION performed by Honey Saunders MD at [address] [place] [address] [address]    ERCP  4/13/2022    ERCP ENDOSCOPIC RETROGRADE CHOLANGIOPANCREATOGRAPHY DIRECT VISUALIZATION performed by Honey Saunders MD at [address] [place] [address] [address]    ERCP N/A 6/13/2022    ERCP BILIARY BRUSHING performed by Honey Saunders MD at [address] [place] [address] [address]    ERCP  6/13/2022    ERCP STENT INSERTION performed by Honey Saunders MD at [address] [place] [address]  01/26/2022    ERCP WF W/ANES. (8:30) (N/A )     OTHER SURGICAL HISTORY  06/13/2022     ERCP WF W/ANES. (15:00) (N/A )    TONSILLECTOMY      UPPER GASTROINTESTINAL ENDOSCOPY N/A 3/11/2022    UPPER EUS W/ANES. performed by Honey Saunders MD at [id number] [place] [address]     No family history on file. Social History:   Social History     Tobacco Use    Smoking status: Former    Smokeless tobacco: Never    Tobacco comments:     quit 40-50 years ago   Substance Use Topics    Alcohol use: No      Tobacco cessation counseling provided as appropriate.     Review of Systems: See HPI, all other systems reviewed and are negative. Objective:     Physical Exam   /80 (Site: Right Upper Arm)   Pulse 66   Temp 98.2 °F (36.8 °C) (Temporal)   Ht 5' 11\" (1.803 m)   Wt 173 lb 3.2 oz (78.6 kg)   BMI 24.16 kg/m²   General:  Alert, oriented x 3, cooperative, no distress, appears stated age. Skin: Skin color, texture, turgor normal.    Lymph nodes: Cervical, supraclavicular, and axillary nodes normal.   HENT:  Normocephalic, without obvious abnormality. Moist mucus membranes. No icterus. Lungs: No respiratory distress. Heart:  Regular rate and rhythm. No murmur. Abdomen:  Soft, non-tender. No masses,  No organomegaly. Extremities: Extremities normal, atraumatic, no cyanosis or edema. Neurologic: Grossly intact sensory and motor exam.   Psychiatric: Appropriate mood and thought process       CEA 7/15/22: 1.3  CA 19-9: 103    CT C/A/P 7/15/22: CHEST:     1. Indeterminate pulmonary nodules the largest in the right lower lobe. Follow-up chest CT in 6 months recommended. 2. No evidence of thoracic metastatic disease or acute abnormality otherwise. 3. Coronary artery calcification. ABDOMEN/PELVIS:     1. Improved biliary ductal dilation status post stent placement with extensive pneumobilia. 2. No definite pancreatic mass identified. No pancreatic ductal dilation. Correlate with endoscopic findings. 3. Mildly distended appendix without secondary inflammatory changes. Findings are presumably normal variation. Appendicitis cannot be entirely excluded. Clinical correlation recommended. 4. Mild prostate enlargement. 5. Resolution of the abscess collection in the gallbladder fossa region. Assessment/Plan:      Diagnosis Orders   1. Biliary stricture          I discussed with the Phuong Dia about the diagnosis and management options. Based on the available information patient appears to have a chronic biliary stricture.  I explained him about robotic / laparoscopic and open Pancreatic surgery (Whipple). Printed information was given at last visit and reviewed today. All the complications including infection, bleeding, stroke, leak, MI, pneumonia, and blood clots. were explained. Risks, benefits and alternatives of surgery were reviewed with the patient. All the questions of the patient were answered to his apparent satisfaction. Patient verbalized understanding of the management plan. Imaging studies and tumor markers reviewed with the patient. Reviewed the possible diagnoses including biliary stricture related to Crohn's, biliary cancer, or benign stricture. Reviewed the possibility of a pancreatic leak and management of that complication. He is at very high risk of complications due to medical issues and biliary leak / collection after gall bladder surgery. Reviewed optimizing physical activity and nutrition. Reviewed the option of observation vs going for surgery  Importance of establishing care goals discussed.     Alise Villatoro MD  Surgery Attending

## 2022-08-02 ENCOUNTER — OFFICE VISIT (OUTPATIENT)
Dept: SURGERY | Age: 72
End: 2022-08-02
Payer: MEDICARE

## 2022-08-02 VITALS
WEIGHT: 173.2 LBS | DIASTOLIC BLOOD PRESSURE: 80 MMHG | HEIGHT: 71 IN | BODY MASS INDEX: 24.25 KG/M2 | HEART RATE: 66 BPM | TEMPERATURE: 98.2 F | SYSTOLIC BLOOD PRESSURE: 133 MMHG

## 2022-08-02 DIAGNOSIS — K83.1 BILIARY STRICTURE: Primary | ICD-10-CM

## 2022-08-02 PROCEDURE — G8420 CALC BMI NORM PARAMETERS: HCPCS | Performed by: SURGERY

## 2022-08-02 PROCEDURE — 99215 OFFICE O/P EST HI 40 MIN: CPT | Performed by: SURGERY

## 2022-08-02 PROCEDURE — 3017F COLORECTAL CA SCREEN DOC REV: CPT | Performed by: SURGERY

## 2022-08-02 PROCEDURE — 1123F ACP DISCUSS/DSCN MKR DOCD: CPT | Performed by: SURGERY

## 2022-08-02 PROCEDURE — 1036F TOBACCO NON-USER: CPT | Performed by: SURGERY

## 2022-08-02 PROCEDURE — G8427 DOCREV CUR MEDS BY ELIG CLIN: HCPCS | Performed by: SURGERY

## 2022-10-20 RX ORDER — METFORMIN HYDROCHLORIDE 500 MG/1
TABLET, EXTENDED RELEASE ORAL
COMMUNITY
Start: 2022-07-26

## 2022-10-20 RX ORDER — LANCETS 30 GAUGE
EACH MISCELLANEOUS
COMMUNITY
Start: 2022-07-17

## 2022-10-20 RX ORDER — AMOXICILLIN 500 MG/1
CAPSULE ORAL
COMMUNITY
Start: 2022-07-27

## 2022-11-02 ENCOUNTER — TELEPHONE (OUTPATIENT)
Dept: SURGERY | Age: 72
End: 2022-11-02

## 2022-11-02 DIAGNOSIS — R97.0 ELEVATED CARCINOEMBRYONIC ANTIGEN (CEA): ICD-10-CM

## 2022-11-02 DIAGNOSIS — K83.1 BILIARY STRICTURE: Primary | ICD-10-CM

## 2022-11-02 DIAGNOSIS — R97.8 OTHER ABNORMAL TUMOR MARKERS: ICD-10-CM

## 2022-11-02 RX ORDER — BLOOD SUGAR DIAGNOSTIC
STRIP MISCELLANEOUS
COMMUNITY
Start: 2022-08-22

## 2022-11-02 NOTE — TELEPHONE ENCOUNTER
Patient called me and said someone called him from [facility] and said they had to cancel his appointment and now he is going to [address] [place] [address] Friday 11/4/22 with the same time as before Arrival is 1:00 PM with a scan at 2:00 Pm

## 2022-11-02 NOTE — TELEPHONE ENCOUNTER
MA called patient about his CT Scan and he actually forgot about is, was very pleasant and ask if he could do it today, so MA called [facility] and actually was able to get his himm today. Arrival is 12:45 Pm for a 2:00 Pm CT Scan     Patient also said he would get his labs drawn while he is there and MA said that would be perfect. MA told patient nothing to eat or drink the rest of the day until his scan is over with and he was fine with that.

## 2022-11-02 NOTE — PROGRESS NOTES
[facility] SURGICAL ONCOLOGY  [address] [facility]  Dept: [phone]  Dept Fax: [phone]    Visit Date: 12/7/2022    HPI:   Ame Hernandez is a 67 y.o. male is here today to discuss further management of his biliary stricture and to review recent labs and Imaging studies. Originally patient had bile leak and gall bladder surgery. He had biliary stent and drain placed. Percutaneous drain removed by Dr. Irlanda Tolentino on 7/25/22. He had biliary stent exchange done. Patient also has Chron's disease. No fever, abdominal pain or jaundice currently. Overall continues to get better and no new issues. Had colonoscopy by Dr. Gerald Blandon.     Past Medical History:   Diagnosis Date    Asthma     Blood circulation, collateral     Carpal tunnel syndrome     CLL (chronic lymphocytic leukemia) (HCC)     Crohn's colitis ([address]     Diabetes mellitus ([facility] [address]     emboli     pulmonary emboli in 1980    GERD (gastroesophageal reflux disease)     Chrons disease    Gout     Hx of blood clots     Hypertension     Pneumonia     pneumonia,asthma    prostate     infection    Seasonal allergies      Past Surgical History:   Procedure Laterality Date    CYST REMOVAL      ERCP N/A 1/26/2022    ERCP BIOPSY performed by Eduin Rhodes MD at [address]    ERCP  1/26/2022    ERCP SPHINCTER/PAPILLOTOMY performed by Eduin Rhodes MD at [address]    ERCP  1/26/2022    ERCP STENT INSERTION performed by Eduin Rhodes MD at [address]    ERCP N/A 3/11/2022    ERCP STENT REMOVAL performed by Eduin Rhodes MD at [address]    ERCP  3/11/2022    ERCP STENT INSERTION performed by Eduin Rhodes MD at [address]    ERCP  3/11/2022    ERCP BIOPSY performed by Eduin Rhodes MD at [address]    ERCP N/A 4/13/2022    ERCP STENT INSERTION performed by Eduin Rhodes MD at [address]    ERCP 4/13/2022    ERCP ENDOSCOPIC RETROGRADE CHOLANGIOPANCREATOGRAPHY DIRECT VISUALIZATION performed by Rory Garcia MD at [address]    ERCP N/A 6/13/2022    ERCP BILIARY BRUSHING performed by Rory Garcia MD at [address]    ERCP  6/13/2022    ERCP STENT INSERTION performed by Rory Garcia MD at [address] [place] [address]  01/26/2022    ERCP WF W/ANES. (8:30) (N/A )     OTHER SURGICAL HISTORY  06/13/2022     ERCP WF W/ANES. (15:00) (N/A )    TONSILLECTOMY      UPPER GASTROINTESTINAL ENDOSCOPY N/A 3/11/2022    UPPER EUS W/ANES.  performed by Rory Garcia MD at [facility]       Current Outpatient Medications:     ONETOUCH ULTRA strip, USE 1 STRIP TO CHECK GLUCOSE ONCE DAILY, Disp: , Rfl:     amoxicillin (AMOXIL) 500 MG capsule, TAKE 1 CAPSULE BY MOUTH EVERY 8 HOURS UNTIL GONE, Disp: , Rfl:     Lancets (ONETOUCH DELICA PLUS [id number]) MIS, USE 1 ONCE DAILY, Disp: , Rfl:     metFORMIN (GLUCOPHAGE-XR) 500 MG extended release tablet, TAKE 1 TABLET BY MOUTH TWICE DAILY, Disp: , Rfl:     loratadine-pseudoephedrine (CLARITIN-D 12HR) 5-120 MG per extended release tablet, Take by mouth, Disp: , Rfl:     cephALEXin (KEFLEX) 500 MG capsule, TAKE 1 CAPSULE BY MOUTH TWICE DAILY, Disp: , Rfl:     dicyclomine (BENTYL) 20 MG tablet, TAKE 1 TABLET BY MOUTH ONCE DAILY, Disp: , Rfl:     oxyCODONE (ROXICODONE) 5 MG immediate release tablet, , Disp: , Rfl:     metFORMIN (GLUCOPHAGE) 500 MG tablet, Take 500 mg by mouth 2 times daily, Disp: , Rfl:     triamterene-hydroCHLOROthiazide (DYAZIDE) 37.5-25 MG per capsule, , Disp: , Rfl:     ondansetron (ZOFRAN) 4 MG tablet, Take 1 tablet by mouth 3 times daily as needed for Nausea or Vomiting, Disp: 15 tablet, Rfl: 0    gemfibrozil (LOPID) 600 MG tablet, Take 600 mg by mouth 2 times daily (before meals), Disp: , Rfl:     ursodiol (ACTIGALL) 300 MG capsule, Take 1 capsule by mouth 2 times daily, Disp: 60 capsule, Rfl: 0    allopurinol (ZYLOPRIM) 300 MG tablet, Take 300 mg by mouth daily. , Disp: , Rfl:     albuterol (PROVENTIL HFA;VENTOLIN HFA) 108 (90 BASE) MCG/ACT inhaler, Inhale 2 puffs into the lungs every 6 hours as needed. , Disp: , Rfl:     pantoprazole (PROTONIX) 40 MG tablet, Take 1 tablet by mouth 2 times daily (before meals) for 14 days, Disp: 28 tablet, Rfl: 0  Allergies   Allergen Reactions    Codeine      Pt states he took medication without food and had a reaction, states this gave him chest pain     Past Surgical History:   Procedure Laterality Date    CYST REMOVAL      ERCP N/A 1/26/2022    ERCP BIOPSY performed by Shiloh Jones MD at [address] [facility] [address]    ERCP  1/26/2022    ERCP SPHINCTER/PAPILLOTOMY performed by Shiloh Jones MD at [id number] [place], [place] [address]    ERCP  1/26/2022    ERCP STENT INSERTION performed by Shiloh Jones MD at [id number] [place] [address] [address]    ERCP N/A 3/11/2022    ERCP STENT REMOVAL performed by Shiloh Jones MD at [address] [facility] [address]    ERCP  3/11/2022    ERCP STENT INSERTION performed by Shiloh Jones MD at [id number] [place] [address] [address]    ERCP  3/11/2022    ERCP BIOPSY performed by Shiloh Jones MD at [id number] [place], [place] [address]    ERCP N/A 4/13/2022    ERCP STENT INSERTION performed by Shiloh Jones MD at [id number] [place], [place] [address]    ERCP  4/13/2022    ERCP ENDOSCOPIC RETROGRADE CHOLANGIOPANCREATOGRAPHY DIRECT VISUALIZATION performed by Shiloh Jones MD at [id number] [place], [place] [address]    ERCP N/A 6/13/2022    ERCP BILIARY BRUSHING performed by Shiloh Jones MD at [address] [place], [place] [address]    ERCP  6/13/2022    ERCP STENT INSERTION performed by Shiloh Jones MD at [address] [place] [address]  01/26/2022    ERCP WF W/ANES. (8:30) (N/A )     OTHER SURGICAL HISTORY  06/13/2022     ERCP WF W/ANES.  (15:00) (N/A )    TONSILLECTOMY      UPPER GASTROINTESTINAL ENDOSCOPY N/A 3/11/2022    UPPER EUS W/ANES. performed by Rory Garcia MD at [address]     No family history on file. Social History:   Social History     Tobacco Use    Smoking status: Former    Smokeless tobacco: Never    Tobacco comments:     quit 40-50 years ago   Substance Use Topics    Alcohol use: No      Tobacco cessation counseling provided as appropriate. Review of Systems: See HPI, all other systems reviewed and are negative. Objective:     Physical Exam   BP (!) 145/82 (Site: Left Upper Arm)   Pulse 63   Temp 98.7 °F (37.1 °C) (Temporal)   Ht 5' 11\" (1.803 m)   Wt 179 lb 12.8 oz (81.6 kg)   BMI 25.08 kg/m²     Constitutional: Patient is oriented to person, place, and time. No distress. HENT:  Mucus membranes - moist. No scleral icterus. Neck:  Normal range of motion. No visible lymphadenopathy present. Pulmonary/Chest:  Effort normal. No respiratory distress. Bilateral symmetrical chest rise. No audible additional breath sounds. Abdomen:  Soft, non-tender. No masses, no organomegaly. Neurological:  Grossly intact motor and sensory systems on limited exam.   Skin: Skin is warm and dry. No rash noted. He is not diaphoretic. Psychiatric: Normal mood and affect. His behavior is normal. Judgment and thought content normal.        CEA 7/15/22: 1.3  CA 19-9: 103    CT C/A/P 11/4/22:  Pneumobilia with mild intrahepatic biliary ductal dilation is visualized. Common biliary duct stent is visualized and appears patent. No pancreatic mass is visualized. Ascending aorta ectasia, measuring 4.0 cm         Assessment/Plan:      Diagnosis Orders   1. Biliary stricture          I reviewed with Jarett Harris  about the diagnosis and management options. Based on the available information patient appears to have a chronic biliary stricture. CT scan is independently interpreted by me - no new worsening findings. I went over all the information that we discussed last time.  I reviewed with him about robotic / laparoscopic and open Pancreatic surgery (Whipple). Printed information was provided and explained at his last visit and reviewed today. All the complications including infection, bleeding, stroke, leak, MI, pneumonia, and blood clots. were reviewed today. Risks, benefits and alternatives of surgery were reviewed with the patient. All the questions of the patient were answered to his apparent satisfaction. Patient verbalized understanding of the management plan. Imaging studies and tumor markers reviewed with the patient. We reviewed the possible diagnoses including biliary stricture related to Crohn's, biliary cancer, or benign stricture. Reviewed the possibility of a pancreatic leak and management of that complication. He is at very high risk of complications due to medical issues and biliary leak / collection after gall bladder surgery. Reviewed optimizing physical activity and nutrition. Reviewed the importance of establishing care goals discussed. Plan is to continue with observation at the end of office visit.      Lyssa Diallo MD  Surgery Attending

## 2022-11-04 ENCOUNTER — HOSPITAL ENCOUNTER (OUTPATIENT)
Age: 72
Discharge: HOME | End: 2022-11-04
Payer: MEDICARE

## 2022-11-04 ENCOUNTER — HOSPITAL ENCOUNTER (OUTPATIENT)
Dept: CT IMAGING | Age: 72
Discharge: HOME | End: 2022-11-04
Payer: MEDICARE

## 2022-11-04 DIAGNOSIS — R97.8 OTHER ABNORMAL TUMOR MARKERS: ICD-10-CM

## 2022-11-04 DIAGNOSIS — K83.1 BILIARY STRICTURE: ICD-10-CM

## 2022-11-04 DIAGNOSIS — R97.0 ELEVATED CARCINOEMBRYONIC ANTIGEN (CEA): ICD-10-CM

## 2022-11-04 LAB
ALBUMIN SERPL-MCNC: 4.8 G/DL (ref 3.4–5)
ALP BLD-CCNC: 63 U/L (ref 40–129)
ALT SERPL-CCNC: 13 U/L (ref 10–40)
ANION GAP SERPL CALCULATED.3IONS-SCNC: 12 MMOL/L (ref 3–16)
AST SERPL-CCNC: 20 U/L (ref 15–37)
BASOPHILS ABSOLUTE: 0 K/UL (ref 0–0.2)
BASOPHILS RELATIVE PERCENT: 0.5 %
BILIRUB SERPL-MCNC: 0.7 MG/DL (ref 0–1)
BILIRUBIN DIRECT: <0.2 MG/DL (ref 0–0.3)
BILIRUBIN, INDIRECT: NORMAL MG/DL (ref 0–1)
BUN BLDV-MCNC: 14 MG/DL (ref 7–20)
CALCIUM SERPL-MCNC: 9.9 MG/DL (ref 8.3–10.6)
CHLORIDE BLD-SCNC: 97 MMOL/L (ref 99–110)
CO2: 27 MMOL/L (ref 21–32)
CREAT SERPL-MCNC: <0.5 MG/DL (ref 0.8–1.3)
EOSINOPHILS ABSOLUTE: 0.1 K/UL (ref 0–0.6)
EOSINOPHILS RELATIVE PERCENT: 2.2 %
GFR SERPL CREATININE-BSD FRML MDRD: >60 ML/MIN/{1.73_M2}
GLUCOSE BLD-MCNC: 102 MG/DL (ref 70–99)
HCT VFR BLD CALC: 39.2 % (ref 40.5–52.5)
HEMOGLOBIN: 13.9 G/DL (ref 13.5–17.5)
LYMPHOCYTES ABSOLUTE: 1 K/UL (ref 1–5.1)
LYMPHOCYTES RELATIVE PERCENT: 17.3 %
MCH RBC QN AUTO: 34.1 PG (ref 26–34)
MCHC RBC AUTO-ENTMCNC: 35.5 G/DL (ref 31–36)
MCV RBC AUTO: 96 FL (ref 80–100)
MONOCYTES ABSOLUTE: 0.4 K/UL (ref 0–1.3)
MONOCYTES RELATIVE PERCENT: 7.2 %
NEUTROPHILS ABSOLUTE: 4.1 K/UL (ref 1.7–7.7)
NEUTROPHILS RELATIVE PERCENT: 72.8 %
PDW BLD-RTO: 12.9 % (ref 12.4–15.4)
PLATELET # BLD: 222 K/UL (ref 135–450)
PMV BLD AUTO: 6.8 FL (ref 5–10.5)
POTASSIUM SERPL-SCNC: 3.8 MMOL/L (ref 3.5–5.1)
RBC # BLD: 4.09 M/UL (ref 4.2–5.9)
SODIUM BLD-SCNC: 136 MMOL/L (ref 136–145)
TOTAL PROTEIN: 7.5 G/DL (ref 6.4–8.2)
WBC # BLD: 5.6 K/UL (ref 4–11)

## 2022-11-04 PROCEDURE — 80076 HEPATIC FUNCTION PANEL: CPT

## 2022-11-04 PROCEDURE — 36415 COLL VENOUS BLD VENIPUNCTURE: CPT

## 2022-11-04 PROCEDURE — 86301 IMMUNOASSAY TUMOR CA 19-9: CPT

## 2022-11-04 PROCEDURE — 71260 CT THORAX DX C+: CPT

## 2022-11-04 PROCEDURE — 85025 COMPLETE CBC W/AUTO DIFF WBC: CPT

## 2022-11-04 PROCEDURE — 6360000004 HC RX CONTRAST MEDICATION: Performed by: NURSE PRACTITIONER

## 2022-11-04 PROCEDURE — 82378 CARCINOEMBRYONIC ANTIGEN: CPT

## 2022-11-04 PROCEDURE — 80048 BASIC METABOLIC PNL TOTAL CA: CPT

## 2022-11-04 RX ADMIN — IOPAMIDOL 75 ML: 755 INJECTION, SOLUTION INTRAVENOUS at 14:53

## 2022-11-04 RX ADMIN — DIATRIZOATE MEGLUMINE AND DIATRIZOATE SODIUM 12 ML: 660; 100 LIQUID ORAL; RECTAL at 14:53

## 2022-11-05 LAB — CEA: 0.9 NG/ML (ref 0–5)

## 2022-11-08 ENCOUNTER — OFFICE VISIT (OUTPATIENT)
Dept: SURGERY | Age: 72
End: 2022-11-08

## 2022-11-08 VITALS
DIASTOLIC BLOOD PRESSURE: 82 MMHG | BODY MASS INDEX: 25.17 KG/M2 | HEART RATE: 63 BPM | HEIGHT: 71 IN | WEIGHT: 179.8 LBS | SYSTOLIC BLOOD PRESSURE: 145 MMHG | TEMPERATURE: 98.7 F

## 2022-11-08 DIAGNOSIS — K83.1 BILIARY STRICTURE: Primary | ICD-10-CM

## 2022-11-09 LAB — CA 19-9: 15 U/ML (ref 0–35)

## 2022-11-22 ENCOUNTER — TELEPHONE (OUTPATIENT)
Dept: SURGERY | Age: 72
End: 2022-11-22

## 2022-11-22 NOTE — TELEPHONE ENCOUNTER
Patient called to request that someone review his lab results with him. Clinical staff was notified and they will call the patient.     Please contact at [phone]

## 2022-11-22 NOTE — TELEPHONE ENCOUNTER
After discussing with NP RN reviewed recent labs with patient who verbalized an understanding. RN will discuss next steps with Dr. Latanya Louise and NP Herb Smiling and inform pt.

## 2023-01-24 NOTE — PROGRESS NOTES
[facility] SURGICAL ONCOLOGY  [facility] [facility] [address]  Dept: [phone]  Dept Fax: [phone]    Visit Date: 2/21/2023    HPI:   Katharine Garrison is a 67 y.o. male who is here to follow up on his biliary stricture. Patient originally had had bile leak and gall bladder surgery. ERCP with brushing and stent placement by Dr. Brandon Grier on 6/13/22. He had biliary stent and drain placed. Drain removed and stent exchanged on 7/25/22. When last seen in November patient chose to go with observation vs Whipple surgery. Presented to the ER 2/9/23 with a one week history of fatigue, RUQ pain, generalized body aches. Tested positive for influenza B. CT done at that time was normal. Was discharged home with conservative management. Here today for follow up and to review imaging. States he is feeling much better today. Has intermittent gas pain. Here with wife today.     Past Medical History:   Diagnosis Date    Asthma     Blood circulation, collateral     Carpal tunnel syndrome     CLL (chronic lymphocytic leukemia) (HCC)     Crohn's colitis ([facility] [address]     Diabetes mellitus ([facility] [address]     emboli     pulmonary emboli in 1980    GERD (gastroesophageal reflux disease)     Chrons disease    Gout     Hx of blood clots     Hypertension     Pneumonia     pneumonia,asthma    prostate     infection    Seasonal allergies      Past Surgical History:   Procedure Laterality Date    CYST REMOVAL      ERCP N/A 1/26/2022    ERCP BIOPSY performed by Kaitlin Messina MD at [address]    ERCP  1/26/2022    ERCP SPHINCTER/PAPILLOTOMY performed by Kaitlin Messina MD at [address]    ERCP  1/26/2022    ERCP STENT INSERTION performed by Kaitlin Messina MD at [address]    ERCP N/A 3/11/2022    ERCP STENT REMOVAL performed by Kaitlin Messina MD at [address]    ERCP  3/11/2022    ERCP STENT INSERTION performed by Kaitlin Messina MD

## 2023-01-30 ENCOUNTER — HOSPITAL ENCOUNTER (OUTPATIENT)
Age: 73
Discharge: HOME | End: 2023-01-30
Payer: MEDICARE

## 2023-01-30 LAB
BILIRUBIN URINE: NEGATIVE
BLOOD, URINE: ABNORMAL
CLARITY: CLEAR
COLOR: YELLOW
GLUCOSE URINE: NEGATIVE MG/DL
KETONES, URINE: NEGATIVE MG/DL
LEUKOCYTE ESTERASE, URINE: NEGATIVE
MICROSCOPIC EXAMINATION: YES
NITRITE, URINE: NEGATIVE
PH UA: 7 (ref 5–8)
PROTEIN UA: NEGATIVE MG/DL
RBC UA: NORMAL /HPF (ref 0–4)
SPECIFIC GRAVITY UA: 1.01 (ref 1–1.03)
URINE TYPE: ABNORMAL
UROBILINOGEN, URINE: 0.2 E.U./DL
WBC UA: NORMAL /HPF (ref 0–5)

## 2023-01-30 PROCEDURE — 87086 URINE CULTURE/COLONY COUNT: CPT

## 2023-01-30 PROCEDURE — 81001 URINALYSIS AUTO W/SCOPE: CPT

## 2023-02-01 LAB — URINE CULTURE, ROUTINE: NORMAL

## 2023-02-02 ENCOUNTER — TELEPHONE (OUTPATIENT)
Dept: SURGERY | Age: 73
End: 2023-02-02

## 2023-02-02 DIAGNOSIS — R97.8 OTHER ABNORMAL TUMOR MARKERS: ICD-10-CM

## 2023-02-02 DIAGNOSIS — K83.1 BILIARY STRICTURE: Primary | ICD-10-CM

## 2023-02-02 NOTE — TELEPHONE ENCOUNTER
Patient called in wanting to know if he needs to have a CT scan done before his upcoming appointment     Please contact the patient at [phone]

## 2023-02-02 NOTE — TELEPHONE ENCOUNTER
MA called patient and also set up his CT Scan he said the week of the 2/13/23 worked for his schedule so MA scheduled his CT scan for 2/16/23 arrival time is   10:15 Am with an 11:00 Am scan.    MA also ask patient to have his lab work done while he is there and he said he would.    MA made his appointment with us for 2/21/23 at 11:15 Am patient was very satisfied with MA setting his scan up for him and said that 2/21/23 works great for his appointment.

## 2023-02-02 NOTE — TELEPHONE ENCOUNTER
Orders placed for pancreatic protocol CT Chest/Abd/Pelvis and for lab work (CBC, CMP, CA 19-9). Ok to push appointment to after CT and labs. Will notify patient.

## 2023-02-09 ENCOUNTER — APPOINTMENT (OUTPATIENT)
Dept: CT IMAGING | Age: 73
End: 2023-02-09
Payer: MEDICARE

## 2023-02-09 ENCOUNTER — HOSPITAL ENCOUNTER (EMERGENCY)
Age: 73
Discharge: HOME | End: 2023-02-09
Attending: EMERGENCY MEDICINE
Payer: MEDICARE

## 2023-02-09 ENCOUNTER — APPOINTMENT (OUTPATIENT)
Dept: GENERAL RADIOLOGY | Age: 73
End: 2023-02-09
Payer: MEDICARE

## 2023-02-09 VITALS
WEIGHT: 183 LBS | BODY MASS INDEX: 25.62 KG/M2 | SYSTOLIC BLOOD PRESSURE: 117 MMHG | TEMPERATURE: 98.9 F | RESPIRATION RATE: 17 BRPM | HEIGHT: 71 IN | DIASTOLIC BLOOD PRESSURE: 71 MMHG | HEART RATE: 75 BPM | OXYGEN SATURATION: 94 %

## 2023-02-09 DIAGNOSIS — K04.7 DENTAL INFECTION: ICD-10-CM

## 2023-02-09 DIAGNOSIS — J10.1 INFLUENZA B: Primary | ICD-10-CM

## 2023-02-09 DIAGNOSIS — M79.10 MYALGIA: ICD-10-CM

## 2023-02-09 DIAGNOSIS — R79.89 LFT ELEVATION: ICD-10-CM

## 2023-02-09 LAB
A/G RATIO: 1.6 (ref 1.1–2.2)
ALBUMIN SERPL-MCNC: 4.6 G/DL (ref 3.4–5)
ALP BLD-CCNC: 182 U/L (ref 40–129)
ALT SERPL-CCNC: 366 U/L (ref 10–40)
ANION GAP SERPL CALCULATED.3IONS-SCNC: 12 MMOL/L (ref 3–16)
AST SERPL-CCNC: 203 U/L (ref 15–37)
BASOPHILS ABSOLUTE: 0 K/UL (ref 0–0.2)
BASOPHILS RELATIVE PERCENT: 0.1 %
BILIRUB SERPL-MCNC: 0.8 MG/DL (ref 0–1)
BILIRUBIN URINE: NEGATIVE
BLOOD, URINE: NEGATIVE
BUN BLDV-MCNC: 11 MG/DL (ref 7–20)
CALCIUM SERPL-MCNC: 8.9 MG/DL (ref 8.3–10.6)
CHLORIDE BLD-SCNC: 94 MMOL/L (ref 99–110)
CLARITY: CLEAR
CO2: 25 MMOL/L (ref 21–32)
COLOR: YELLOW
CREAT SERPL-MCNC: <0.5 MG/DL (ref 0.8–1.3)
EKG ATRIAL RATE: 73 BPM
EKG DIAGNOSIS: NORMAL
EKG P AXIS: 45 DEGREES
EKG P-R INTERVAL: 188 MS
EKG Q-T INTERVAL: 378 MS
EKG QRS DURATION: 88 MS
EKG QTC CALCULATION (BAZETT): 416 MS
EKG R AXIS: 50 DEGREES
EKG T AXIS: 16 DEGREES
EKG VENTRICULAR RATE: 73 BPM
EOSINOPHILS ABSOLUTE: 0.1 K/UL (ref 0–0.6)
EOSINOPHILS RELATIVE PERCENT: 0.7 %
GFR SERPL CREATININE-BSD FRML MDRD: >60 ML/MIN/{1.73_M2}
GLUCOSE BLD-MCNC: 133 MG/DL (ref 70–99)
GLUCOSE URINE: NEGATIVE MG/DL
HCT VFR BLD CALC: 38.9 % (ref 40.5–52.5)
HEMOGLOBIN: 13.8 G/DL (ref 13.5–17.5)
INFLUENZA A: NOT DETECTED
INFLUENZA B: DETECTED
KETONES, URINE: NEGATIVE MG/DL
LEUKOCYTE ESTERASE, URINE: NEGATIVE
LYMPHOCYTES ABSOLUTE: 0.3 K/UL (ref 1–5.1)
LYMPHOCYTES RELATIVE PERCENT: 3.2 %
MCH RBC QN AUTO: 34 PG (ref 26–34)
MCHC RBC AUTO-ENTMCNC: 35.5 G/DL (ref 31–36)
MCV RBC AUTO: 95.7 FL (ref 80–100)
MICROSCOPIC EXAMINATION: NORMAL
MONOCYTES ABSOLUTE: 0.5 K/UL (ref 0–1.3)
MONOCYTES RELATIVE PERCENT: 5 %
NEUTROPHILS ABSOLUTE: 8.6 K/UL (ref 1.7–7.7)
NEUTROPHILS RELATIVE PERCENT: 91 %
NITRITE, URINE: NEGATIVE
PDW BLD-RTO: 13.2 % (ref 12.4–15.4)
PH UA: 7 (ref 5–8)
PLATELET # BLD: 170 K/UL (ref 135–450)
PMV BLD AUTO: 7 FL (ref 5–10.5)
POTASSIUM REFLEX MAGNESIUM: 4.2 MMOL/L (ref 3.5–5.1)
PROTEIN UA: NEGATIVE MG/DL
RBC # BLD: 4.06 M/UL (ref 4.2–5.9)
SARS-COV-2 RNA, RT PCR: NOT DETECTED
SODIUM BLD-SCNC: 131 MMOL/L (ref 136–145)
SPECIFIC GRAVITY UA: <=1.005 (ref 1–1.03)
TOTAL PROTEIN: 7.5 G/DL (ref 6.4–8.2)
TROPONIN: <0.01 NG/ML
URINE REFLEX TO CULTURE: NORMAL
URINE TYPE: NORMAL
UROBILINOGEN, URINE: 0.2 E.U./DL
WBC # BLD: 9.4 K/UL (ref 4–11)

## 2023-02-09 PROCEDURE — 96374 THER/PROPH/DIAG INJ IV PUSH: CPT

## 2023-02-09 PROCEDURE — 71046 X-RAY EXAM CHEST 2 VIEWS: CPT

## 2023-02-09 PROCEDURE — 74178 CT ABD&PLV WO CNTR FLWD CNTR: CPT

## 2023-02-09 PROCEDURE — 99285 EMERGENCY DEPT VISIT HI MDM: CPT

## 2023-02-09 PROCEDURE — 6360000004 HC RX CONTRAST MEDICATION: Performed by: EMERGENCY MEDICINE

## 2023-02-09 PROCEDURE — 81003 URINALYSIS AUTO W/O SCOPE: CPT

## 2023-02-09 PROCEDURE — 87636 SARSCOV2 & INF A&B AMP PRB: CPT

## 2023-02-09 PROCEDURE — 80053 COMPREHEN METABOLIC PANEL: CPT

## 2023-02-09 PROCEDURE — 2580000003 HC RX 258: Performed by: EMERGENCY MEDICINE

## 2023-02-09 PROCEDURE — 36415 COLL VENOUS BLD VENIPUNCTURE: CPT

## 2023-02-09 PROCEDURE — 84484 ASSAY OF TROPONIN QUANT: CPT

## 2023-02-09 PROCEDURE — 6360000002 HC RX W HCPCS: Performed by: EMERGENCY MEDICINE

## 2023-02-09 PROCEDURE — 93010 ELECTROCARDIOGRAM REPORT: CPT | Performed by: INTERNAL MEDICINE

## 2023-02-09 PROCEDURE — 96375 TX/PRO/DX INJ NEW DRUG ADDON: CPT

## 2023-02-09 PROCEDURE — 85025 COMPLETE CBC W/AUTO DIFF WBC: CPT

## 2023-02-09 PROCEDURE — 93005 ELECTROCARDIOGRAM TRACING: CPT | Performed by: EMERGENCY MEDICINE

## 2023-02-09 RX ORDER — 0.9 % SODIUM CHLORIDE 0.9 %
1000 INTRAVENOUS SOLUTION INTRAVENOUS ONCE
Status: COMPLETED | OUTPATIENT
Start: 2023-02-09 | End: 2023-02-09

## 2023-02-09 RX ORDER — ONDANSETRON 2 MG/ML
4 INJECTION INTRAMUSCULAR; INTRAVENOUS EVERY 6 HOURS PRN
Status: DISCONTINUED | OUTPATIENT
Start: 2023-02-09 | End: 2023-02-09 | Stop reason: HOSPADM

## 2023-02-09 RX ORDER — AMOXICILLIN AND CLAVULANATE POTASSIUM 875; 125 MG/1; MG/1
1 TABLET, FILM COATED ORAL 2 TIMES DAILY
Qty: 14 TABLET | Refills: 0 | Status: SHIPPED | OUTPATIENT
Start: 2023-02-09 | End: 2023-02-16

## 2023-02-09 RX ORDER — MORPHINE SULFATE 4 MG/ML
4 INJECTION, SOLUTION INTRAMUSCULAR; INTRAVENOUS ONCE
Status: COMPLETED | OUTPATIENT
Start: 2023-02-09 | End: 2023-02-09

## 2023-02-09 RX ADMIN — IOPAMIDOL 100 ML: 755 INJECTION, SOLUTION INTRAVENOUS at 11:38

## 2023-02-09 RX ADMIN — SODIUM CHLORIDE 1000 ML: 9 INJECTION, SOLUTION INTRAVENOUS at 11:10

## 2023-02-09 RX ADMIN — MORPHINE SULFATE 4 MG: 4 INJECTION, SOLUTION INTRAMUSCULAR; INTRAVENOUS at 11:14

## 2023-02-09 RX ADMIN — ONDANSETRON 4 MG: 2 INJECTION INTRAMUSCULAR; INTRAVENOUS at 11:11

## 2023-02-09 ASSESSMENT — ENCOUNTER SYMPTOMS
BACK PAIN: 0
CHEST TIGHTNESS: 0
VOMITING: 0
NAUSEA: 0
SHORTNESS OF BREATH: 0
COUGH: 0
ABDOMINAL PAIN: 1

## 2023-02-09 ASSESSMENT — PAIN SCALES - GENERAL: PAINLEVEL_OUTOF10: 7

## 2023-02-09 ASSESSMENT — PAIN - FUNCTIONAL ASSESSMENT: PAIN_FUNCTIONAL_ASSESSMENT: NONE - DENIES PAIN

## 2023-02-09 ASSESSMENT — PAIN DESCRIPTION - ORIENTATION: ORIENTATION: MID

## 2023-02-09 ASSESSMENT — LIFESTYLE VARIABLES: HOW OFTEN DO YOU HAVE A DRINK CONTAINING ALCOHOL: NEVER

## 2023-02-09 ASSESSMENT — PAIN DESCRIPTION - LOCATION: LOCATION: ABDOMEN

## 2023-02-09 ASSESSMENT — PAIN DESCRIPTION - DESCRIPTORS: DESCRIPTORS: CRAMPING;DISCOMFORT

## 2023-02-09 NOTE — ED NOTES
1325 call placed to Bernadene Nageotte Dr. Ardeth Citizen  02/09/23 132    1400 Consult completed with call back from Dr. Heydi Dominguez speaking with Dr. Noman Edmondson  02/09/23 [id number]

## 2023-02-09 NOTE — ED NOTES
.Reviewed discharge instructions with patient. Patient verbalized understanding. No distress noted. Ambulatory from department.      Reba Sosa RN  02/09/23 [id number]

## 2023-02-09 NOTE — ED NOTES
1113  12 lead ECG completed, given to Dr. Theodore Nelson for review     Kelly Wesley  02/09/23 1111

## 2023-02-09 NOTE — ED PROVIDER NOTES
Emergency Department Attending Physician Note  Location: [place] [address] ED  2/9/2023       Pt Name: Zeenat Rocha  MRN: [id number]  [place] 1950    Date of evaluation: 2/9/2023  Provider: Osiel Alvarez DO  PCP: Jennifer Dias MD    Note Started: 11:58 AM EST 2/9/23    CHIEF COMPLAINT  Chief Complaint   Patient presents with    Fatigue     Pt very fatigued, weak,aching all over X 1 week. HISTORY OF PRESENT ILLNESS:  History obtained by patient and spouse/SO. Limitations to history : None. Patient is a 67 y.o. male with a significant PMHx of stricture with multiple stents, presenting emergency department today with concerns of right upper quadrant abdominal pain, fatigue, and overall malaise, starting about a week ago, but worsening today, prompting him to come to the ER. No actual nausea or vomiting. Patient says about 1 week ago, he had a severe episode of right upper quadrant abdominal pain, but it subsided on its own, and he thought everything was okay. Now, he is just \"going downhill,\" ever since then. Mostly describes generalized symptomatology, abdominal pain is there, but not significant to him. Has some shoulder aches, hip ache, and feels achy all over. Sees Dr. Krystal Landa, and Dr. Yang Shoulder, surgery. On chart review, has upcoming imaging and labs pending from [facility], and recent labs, within the last month, showed reassuring bilirubins, LFTs. Otherwise, he denies any trauma, headaches, vision changes. No significant stool color changes, or stool consistency changes. No significant distention, though does note he was slightly distended yesterday. No other concerns today in the emergency department including fevers, chills. On complete ROS, patient does state that he feels like his tooth is still infected after he took some antibiotics about 2 weeks ago, because he still feels a pressure under his right upper molar.   No obvious drainage, but he says it still feels swollen, and asks me to evaluate it. Nursing Notes were all reviewed and agreed with or any disagreements were addressed in the HPI. MEDICAL HISTORY  Past Medical History:   Diagnosis Date    Asthma     Blood circulation, collateral     Carpal tunnel syndrome     CLL (chronic lymphocytic leukemia) (HCC)     Crohn's colitis ([facility] [address]     Diabetes mellitus ([facility] [address]     emboli     pulmonary emboli in 1980    GERD (gastroesophageal reflux disease)     Chrons disease    Gout     Hx of blood clots     Hypertension     Pneumonia     pneumonia,asthma    prostate     infection    Seasonal allergies         SURGICAL HISTORY  Past Surgical History:   Procedure Laterality Date    CYST REMOVAL      ERCP N/A 1/26/2022    ERCP BIOPSY performed by Anastasiya Blanchard MD at [address]    ERCP  1/26/2022    ERCP SPHINCTER/PAPILLOTOMY performed by Anastasiya Blanchard MD at [address]    ERCP  1/26/2022    ERCP STENT INSERTION performed by Anastasiya Blanchard MD at [address]    ERCP N/A 3/11/2022    ERCP STENT REMOVAL performed by Anastasiya Blanchard MD at [address]    ERCP  3/11/2022    ERCP STENT INSERTION performed by Anastasiya Blanchard MD at [address]    ERCP  3/11/2022    ERCP BIOPSY performed by Anastasiya Blanchard MD at [address]    ERCP N/A 4/13/2022    ERCP STENT INSERTION performed by Anastasiya Blanchard MD at [address]    ERCP  4/13/2022    ERCP ENDOSCOPIC RETROGRADE CHOLANGIOPANCREATOGRAPHY DIRECT VISUALIZATION performed by Anastasiya Blanchard MD at [address]    ERCP N/A 6/13/2022    ERCP BILIARY BRUSHING performed by Anastasiya Blanchard MD at [address]    ERCP  6/13/2022    ERCP STENT INSERTION performed by Anastasiya Blanchard MD at [address] [place] [address]  01/26/2022    ERCP WF W/ANES.  (8:30) (N/A )     OTHER SURGICAL HISTORY 06/13/2022     ERCP WF W/ANES. (15:00) (N/A )    TONSILLECTOMY      UPPER GASTROINTESTINAL ENDOSCOPY N/A 3/11/2022    UPPER EUS W/ANES. performed by Kaitlin Messina MD at [facility]  Discharge Medication List as of 2/9/2023  4:39 PM        CONTINUE these medications which have NOT CHANGED    Details   ONETOUCH ULTRA strip USE 1 STRIP TO CHECK GLUCOSE ONCE DAILY, DAWHistorical Med      Lancets (ONETOUCH DELICA PLUS [id number]) MISC USE 1 ONCE DAILYHistorical Med      metFORMIN (GLUCOPHAGE-XR) 500 MG extended release tablet TAKE 1 TABLET BY MOUTH TWICE DAILYHistorical Med      loratadine-pseudoephedrine (CLARITIN-D 12HR) 5-120 MG per extended release tablet Take by mouthHistorical Med      dicyclomine (BENTYL) 20 MG tablet TAKE 1 TABLET BY MOUTH ONCE DAILYHistorical Med      oxyCODONE (ROXICODONE) 5 MG immediate release tablet Historical Med      metFORMIN (GLUCOPHAGE) 500 MG tablet Take 500 mg by mouth 2 times dailyHistorical Med      triamterene-hydroCHLOROthiazide (DYAZIDE) 37.5-25 MG per capsule Historical Med      ondansetron (ZOFRAN) 4 MG tablet Take 1 tablet by mouth 3 times daily as needed for Nausea or Vomiting, Disp-15 tablet, R-0Normal      gemfibrozil (LOPID) 600 MG tablet Take 600 mg by mouth 2 times daily (before meals)Historical Med      ursodiol (ACTIGALL) 300 MG capsule Take 1 capsule by mouth 2 times daily, Disp-60 capsule, R-0Normal      pantoprazole (PROTONIX) 40 MG tablet Take 1 tablet by mouth 2 times daily (before meals) for 14 days, Disp-28 tablet, R-0Normal      allopurinol (ZYLOPRIM) 300 MG tablet Take 300 mg by mouth daily. Historical Med      albuterol (PROVENTIL HFA;VENTOLIN HFA) 108 (90 BASE) MCG/ACT inhaler Inhale 2 puffs into the lungs every 6 hours as needed. Historical Med             ALLERGIES  Codeine    FAMILYHISTORY  No family history on file.     SOCIAL HISTORY  Social History     Tobacco Use    Smoking status: Former    Smokeless tobacco: Never    Tobacco comments:     quit 40-50 years ago   Vaping Use    Vaping Use: Never used   Substance Use Topics    Alcohol use: No    Drug use: No       SCREENINGS    Alpine Coma Scale  Eye Opening: Spontaneous  Best Verbal Response: Oriented  Best Motor Response: Obeys commands  Alpine Coma Scale Score: 15       CIWA Assessment  BP: 117/71  Heart Rate: 75             REVIEW OF SYSTEMS:    Review of Systems   Constitutional:  Positive for fatigue. Negative for activity change, appetite change and fever. HENT:  Negative for congestion and postnasal drip. Respiratory:  Negative for cough, chest tightness and shortness of breath. Cardiovascular:  Negative for chest pain and leg swelling. Gastrointestinal:  Positive for abdominal pain. Negative for nausea and vomiting. Genitourinary:  Negative for flank pain and hematuria. Musculoskeletal:  Negative for back pain and neck pain. Skin:  Negative for rash and wound. Neurological:  Negative for dizziness, light-headedness and headaches. Positives and Pertinent negatives as per HPI. PHYSICAL EXAM:     Vitals:    02/09/23 1502 02/09/23 1533 02/09/23 1602 02/09/23 1631   BP: 122/70 116/69 116/71 117/71   Pulse: 72 71 75    Resp: 16 14 17    Temp:       TempSrc:       SpO2: 95% 94% 96% 94%   Weight:       Height:           Physical Exam  Constitutional:       Appearance: Normal appearance. He is normal weight. He is not ill-appearing or diaphoretic. Comments: Appears uncomfortable, however interactive, conversational, pleasant, and in no acute clinical cardiopulmonary distress. HENT:      Head: Normocephalic and atraumatic. Right Ear: External ear normal.      Left Ear: External ear normal.      Nose: Nose normal.      Mouth/Throat:      Mouth: Mucous membranes are moist.      Pharynx: Oropharynx is clear. Comments: There is some redness and erythema along the upper right molars, cap in place.   No signs of effusion, no obvious significant dental decay. It is somewhat tender to palpation to the medial aspect of the gingiva. Eyes:      General:         Right eye: No discharge. Left eye: No discharge. Conjunctiva/sclera: Conjunctivae normal.   Cardiovascular:      Rate and Rhythm: Normal rate and regular rhythm. Pulmonary:      Effort: Pulmonary effort is normal. No respiratory distress. Breath sounds: Normal breath sounds. No wheezing. Abdominal:      General: Abdomen is flat. There is distension. Palpations: Abdomen is soft. Tenderness: There is abdominal tenderness (RUQ, moderate). Musculoskeletal:         General: No swelling or tenderness. Cervical back: Normal range of motion and neck supple. Right lower leg: No edema. Left lower leg: No edema. Skin:     General: Skin is warm and dry. Findings: No rash. Neurological:      General: No focal deficit present. Mental Status: He is alert and oriented to person, place, and time. Psychiatric:         Mood and Affect: Mood normal.         Thought Content: Thought content normal.         DIAGNOSTIC RESULTS:  LABS:    Labs Reviewed   COVID-19 & INFLUENZA COMBO - Abnormal; Notable for the following components:       Result Value    INFLUENZA B DETECTED (*)     All other components within normal limits   CBC WITH AUTO DIFFERENTIAL - Abnormal; Notable for the following components:    RBC 4.06 (*)     Hematocrit 38.9 (*)     Neutrophils Absolute 8.6 (*)     Lymphocytes Absolute 0.3 (*)     All other components within normal limits   COMPREHENSIVE METABOLIC PANEL W/ REFLEX TO MG FOR LOW K - Abnormal; Notable for the following components:    Sodium 131 (*)     Chloride 94 (*)     Glucose 133 (*)     Creatinine <0.5 (*)     Alkaline Phosphatase 182 (*)      (*)      (*)     All other components within normal limits   TROPONIN   URINALYSIS WITH REFLEX TO CULTURE       When ordered, only abnormal lab results are displayed. All other labs were within normal range or not returned as of this dictation. Independent EKG Interpretation: EKG obtained today in the emergency department shows a normal sinus rhythm with a ventricular rate of 73 bpm.  QTc 416. No ST segment elevation, cystic depression, hyperacute T waves. Reassuring EKG      RADIOLOGY:      Non-plain film images such as CT, Ultrasound and MRI are read by the radiologist. Interpretation per the Radiologist below, if available at the time of this note:  CT ABDOMEN PELVIS W WO CONTRAST Additional Contrast? None   Final Result   1. Hepatomegaly with diffuse steatosis but no focal lesion. 2. Splenomegaly is also demonstrated, stable. 3. Stable biliary stent and pneumobilia. 4. Prostatomegaly. XR CHEST (2 VW)   Final Result   No acute cardiopulmonary findings               PROCEDURES:  Unless otherwise noted below, none. Procedures      MEDICATIONS:   Medications   0.9 % sodium chloride bolus (0 mLs IntraVENous Stopped 2/9/23 1227)   morphine sulfate (PF) injection 4 mg (4 mg IntraVENous Given 2/9/23 1114)   iopamidol (ISOVUE-370) 76 % injection 100 mL (100 mLs IntraVENous Given 2/9/23 1138)     _____________________________________    MEDICAL DECISION MAKING:     Patient is a 67 y.o. male with a significant PMHx of known CBD obstruction with biliary stents, sees general surgery and GI, presenting emergency department today with right upper quadrant abdominal pain that happened about a week ago, fatigue, and other constitutional symptoms. Arrives emergency department today somewhat uncomfortable appearing, however no acute distress, vital signs stable. Initial laboratory evaluation includes hepatobiliary investigation, and infectious work-up, and COVID-19 and influenza in the setting of just fatigue. Initial management includes morphine, fluids, and Zofran. Laboratory evaluation today reveals elevated alk phos to 182, ALT to 366, AST to 203.   This is new, and is just on chart review, just last week, was all normal.  Further, influenza a B is positive here in the emergency department. Imaging obtained in the emergency department, looks like he had ordered a CT chest abdomen pelvis with liver protocol, and rad tech does call me to change it. CT imaging with patent bile duct, pneumobilia, and no significant acute findings. .     I discussed the elevated liver enzymes, CT findings, with his GI physician, and they feel this is likely elevated in the setting of influenza, and patient is in no significant abdominal pain here, which he is not, and with the CT findings, they do not feel they have any indication for intervention at this time. Further, CBC without anemia, though there is a lymphopenia. CMP without significant electrolyte dyscrasia, reassuring renal function. Discussed admission versus discharge home for outpatient management of influenza B, and patient very much wants to go home today. He has not had any significant abdominal pain here in the emergency department, but does state that he is still fatigued. I discussed expected course of influenza, but because he has multiple days out from symptomatology, will not likely benefit from antiviral therapy. Supportive care discussed. Discharged home in stable condition follow-up with his GI doctor and surgeon as scheduled. Further, patient is asking for an antibiotic for his known dental infection that does not feel that it got much better with outpatient management on amoxicillin, will give Augmentin. Follow-up with dentist.  As well as see PCP for further management. Strict return precautions provided at length given his current conditions, but discharged home in stable condition    CONSULTS:   IP CONSULT TO GI      Is this patient to be included in the SEP-1 Core Measure due to severe sepsis or septic shock?    No   Exclusion criteria - the patient is NOT to be included for SEP-1 Core Measure due to:  Infection is not suspected      CLINICAL IMPRESSION:     ICD-10-CM    1. Influenza B  J10.1       2. LFT elevation  R79.89       3. Myalgia  M79.10       4. Dental infection  K04.7             DISPOSITION:  I discussed the results, including any incidental findings, with patient and through shared decision making. Disposition today from the ED will be: Discharge to home in fair condition. Questions answered. They are agreeable to plan and express understanding of plan. Social Determinants : None      DISCHARGE MEDICATIONS (if applicable):  Discharge Medication List as of 2/9/2023  4:39 PM        START taking these medications    Details   amoxicillin-clavulanate (AUGMENTIN) 875-125 MG per tablet Take 1 tablet by mouth 2 times daily for 7 days, Disp-14 tablet, R-0Normal             DISCONTINUED MEDICATIONS:  Discharge Medication List as of 2/9/2023  4:39 PM        STOP taking these medications       amoxicillin (AMOXIL) 500 MG capsule Comments:   Reason for Stopping:         cephALEXin (KEFLEX) 500 MG capsule Comments:   Reason for Stopping:                    DISPOSITION REFERRAL (if applicable):  [place] [facility]  [address] [facility]  [phone]    If symptoms worsen, As needed    [address] Patrick Trejo MD  [place] [place] [address] [address] [facility]  [phone]    Schedule an appointment as soon as possible for a visit       Gene [address]    Schedule an appointment as soon as possible for a visit   for repeat labs in 1 week    _____________________________________      Mandy RESTREPO DO, ([facility]) am the primary attending of record and contributed the majority of evaluation and treatment of emergent care for this encounter.      This chart was generated in part by using Dragon Dictation system and may contain errors related to that system including errors in grammar, punctuation, and spelling, as well as words and phrases that may be inappropriate. If there are any questions or concerns please feel free to contact the dictating provider for clarification.      EVELIN MADRID DO (electronically signed)   [address] [facility] [address], DO  02/10/23 6625

## 2023-02-16 ENCOUNTER — HOSPITAL ENCOUNTER (OUTPATIENT)
Age: 73
Discharge: HOME | End: 2023-02-16
Payer: MEDICARE

## 2023-02-16 DIAGNOSIS — R97.8 OTHER ABNORMAL TUMOR MARKERS: ICD-10-CM

## 2023-02-16 DIAGNOSIS — K83.1 BILIARY STRICTURE: ICD-10-CM

## 2023-02-16 LAB
A/G RATIO: 1.5 (ref 1.1–2.2)
ALBUMIN SERPL-MCNC: 4.7 G/DL (ref 3.4–5)
ALP BLD-CCNC: 96 U/L (ref 40–129)
ALT SERPL-CCNC: 59 U/L (ref 10–40)
ANION GAP SERPL CALCULATED.3IONS-SCNC: 17 MMOL/L (ref 3–16)
AST SERPL-CCNC: 31 U/L (ref 15–37)
BASOPHILS ABSOLUTE: 0 K/UL (ref 0–0.2)
BASOPHILS RELATIVE PERCENT: 0.3 %
BILIRUB SERPL-MCNC: 0.6 MG/DL (ref 0–1)
BUN BLDV-MCNC: 12 MG/DL (ref 7–20)
CALCIUM SERPL-MCNC: 10.3 MG/DL (ref 8.3–10.6)
CHLORIDE BLD-SCNC: 99 MMOL/L (ref 99–110)
CO2: 23 MMOL/L (ref 21–32)
CREAT SERPL-MCNC: <0.5 MG/DL (ref 0.8–1.3)
EOSINOPHILS ABSOLUTE: 0.2 K/UL (ref 0–0.6)
EOSINOPHILS RELATIVE PERCENT: 3.1 %
GFR SERPL CREATININE-BSD FRML MDRD: >60 ML/MIN/{1.73_M2}
GLUCOSE BLD-MCNC: 109 MG/DL (ref 70–99)
HCT VFR BLD CALC: 41.4 % (ref 40.5–52.5)
HEMOGLOBIN: 14.4 G/DL (ref 13.5–17.5)
LIPASE: 39 U/L (ref 13–60)
LYMPHOCYTES ABSOLUTE: 1 K/UL (ref 1–5.1)
LYMPHOCYTES RELATIVE PERCENT: 16.2 %
MCH RBC QN AUTO: 33 PG (ref 26–34)
MCHC RBC AUTO-ENTMCNC: 34.8 G/DL (ref 31–36)
MCV RBC AUTO: 95 FL (ref 80–100)
MONOCYTES ABSOLUTE: 0.4 K/UL (ref 0–1.3)
MONOCYTES RELATIVE PERCENT: 5.8 %
NEUTROPHILS ABSOLUTE: 4.6 K/UL (ref 1.7–7.7)
NEUTROPHILS RELATIVE PERCENT: 74.6 %
PDW BLD-RTO: 13.1 % (ref 12.4–15.4)
PLATELET # BLD: 232 K/UL (ref 135–450)
PMV BLD AUTO: 6.7 FL (ref 5–10.5)
POTASSIUM SERPL-SCNC: 4.1 MMOL/L (ref 3.5–5.1)
RBC # BLD: 4.36 M/UL (ref 4.2–5.9)
SODIUM BLD-SCNC: 139 MMOL/L (ref 136–145)
TOTAL PROTEIN: 7.8 G/DL (ref 6.4–8.2)
WBC # BLD: 6.2 K/UL (ref 4–11)

## 2023-02-16 PROCEDURE — 80053 COMPREHEN METABOLIC PANEL: CPT

## 2023-02-16 PROCEDURE — 36415 COLL VENOUS BLD VENIPUNCTURE: CPT

## 2023-02-16 PROCEDURE — 85025 COMPLETE CBC W/AUTO DIFF WBC: CPT

## 2023-02-16 PROCEDURE — 86301 IMMUNOASSAY TUMOR CA 19-9: CPT

## 2023-02-16 PROCEDURE — 83690 ASSAY OF LIPASE: CPT

## 2023-02-16 RX ORDER — COVID-19 MOLECULAR TEST ASSAY
KIT MISCELLANEOUS
Status: ON HOLD | COMMUNITY
Start: 2022-11-13 | End: 2023-03-26

## 2023-02-16 RX ORDER — CIPROFLOXACIN 500 MG/1
TABLET, FILM COATED ORAL
Status: ON HOLD | COMMUNITY
Start: 2023-01-09 | End: 2023-03-26

## 2023-02-21 ENCOUNTER — OFFICE VISIT (OUTPATIENT)
Dept: SURGERY | Age: 73
End: 2023-02-21
Payer: MEDICARE

## 2023-02-21 VITALS
HEIGHT: 71 IN | SYSTOLIC BLOOD PRESSURE: 130 MMHG | WEIGHT: 183 LBS | BODY MASS INDEX: 25.62 KG/M2 | TEMPERATURE: 98.7 F | DIASTOLIC BLOOD PRESSURE: 78 MMHG

## 2023-02-21 DIAGNOSIS — K83.1 BILIARY STRICTURE: Primary | ICD-10-CM

## 2023-02-21 PROCEDURE — 1123F ACP DISCUSS/DSCN MKR DOCD: CPT | Performed by: SURGERY

## 2023-02-21 PROCEDURE — G8427 DOCREV CUR MEDS BY ELIG CLIN: HCPCS | Performed by: SURGERY

## 2023-02-21 PROCEDURE — 1036F TOBACCO NON-USER: CPT | Performed by: SURGERY

## 2023-02-21 PROCEDURE — G8484 FLU IMMUNIZE NO ADMIN: HCPCS | Performed by: SURGERY

## 2023-02-21 PROCEDURE — 3078F DIAST BP <80 MM HG: CPT | Performed by: SURGERY

## 2023-02-21 PROCEDURE — G8417 CALC BMI ABV UP PARAM F/U: HCPCS | Performed by: SURGERY

## 2023-02-21 PROCEDURE — 3074F SYST BP LT 130 MM HG: CPT | Performed by: SURGERY

## 2023-02-21 PROCEDURE — 3017F COLORECTAL CA SCREEN DOC REV: CPT | Performed by: SURGERY

## 2023-02-21 PROCEDURE — 99213 OFFICE O/P EST LOW 20 MIN: CPT | Performed by: SURGERY

## 2023-03-26 ENCOUNTER — HOSPITAL ENCOUNTER (INPATIENT)
Age: 73
LOS: 3 days | Discharge: HOME | DRG: 921 | End: 2023-03-29
Attending: INTERNAL MEDICINE | Admitting: INTERNAL MEDICINE
Payer: MEDICARE

## 2023-03-26 PROBLEM — K75.89 CHOLESTATIC HEPATITIS: Status: ACTIVE | Noted: 2023-03-26

## 2023-03-26 PROBLEM — T85.590A BILIARY STENT OBSTRUCTION: Status: ACTIVE | Noted: 2023-03-26

## 2023-03-26 LAB
ALBUMIN SERPL-MCNC: 3.5 G/DL (ref 3.4–5)
ALBUMIN/GLOB SERPL: 1.3 {RATIO} (ref 1.1–2.2)
ALP SERPL-CCNC: 254 U/L (ref 40–129)
ALT SERPL-CCNC: 322 U/L (ref 10–40)
ANION GAP SERPL CALCULATED.3IONS-SCNC: 13 MMOL/L (ref 3–16)
APTT BLD: 38.2 SEC (ref 23–34.3)
AST SERPL-CCNC: 143 U/L (ref 15–37)
BASOPHILS # BLD: 0 K/UL (ref 0–0.2)
BASOPHILS NFR BLD: 0.3 %
BILIRUB SERPL-MCNC: 5.2 MG/DL (ref 0–1)
BUN SERPL-MCNC: 9 MG/DL (ref 7–20)
CALCIUM SERPL-MCNC: 8.4 MG/DL (ref 8.3–10.6)
CHLORIDE SERPL-SCNC: 100 MMOL/L (ref 99–110)
CO2 SERPL-SCNC: 22 MMOL/L (ref 21–32)
CREAT SERPL-MCNC: <0.5 MG/DL (ref 0.8–1.3)
DEPRECATED RDW RBC AUTO: 13.4 % (ref 12.4–15.4)
EOSINOPHIL # BLD: 0.1 K/UL (ref 0–0.6)
EOSINOPHIL NFR BLD: 1.1 %
GFR SERPLBLD CREATININE-BSD FMLA CKD-EPI: >60 ML/MIN/{1.73_M2}
GLUCOSE BLD-MCNC: 121 MG/DL (ref 70–99)
GLUCOSE BLD-MCNC: 126 MG/DL (ref 70–99)
GLUCOSE BLD-MCNC: 142 MG/DL (ref 70–99)
GLUCOSE BLD-MCNC: 169 MG/DL (ref 70–99)
GLUCOSE SERPL-MCNC: 124 MG/DL (ref 70–99)
HCT VFR BLD AUTO: 34.4 % (ref 40.5–52.5)
HGB BLD-MCNC: 12 G/DL (ref 13.5–17.5)
INR PPP: 1.19 (ref 0.87–1.14)
LIPASE SERPL-CCNC: 33 U/L (ref 13–60)
LYMPHOCYTES # BLD: 0.4 K/UL (ref 1–5.1)
LYMPHOCYTES NFR BLD: 6.6 %
MAGNESIUM SERPL-MCNC: 2.1 MG/DL (ref 1.8–2.4)
MCH RBC QN AUTO: 34.3 PG (ref 26–34)
MCHC RBC AUTO-ENTMCNC: 34.9 G/DL (ref 31–36)
MCV RBC AUTO: 98.2 FL (ref 80–100)
MONOCYTES # BLD: 0.4 K/UL (ref 0–1.3)
MONOCYTES NFR BLD: 7.1 %
NEUTROPHILS # BLD: 5.1 K/UL (ref 1.7–7.7)
NEUTROPHILS NFR BLD: 84.9 %
PERFORMED ON: ABNORMAL
PLATELET # BLD AUTO: 149 K/UL (ref 135–450)
PMV BLD AUTO: 7.1 FL (ref 5–10.5)
POTASSIUM SERPL-SCNC: 3.2 MMOL/L (ref 3.5–5.1)
PROT SERPL-MCNC: 6.2 G/DL (ref 6.4–8.2)
PROTHROMBIN TIME: 15 SEC (ref 11.7–14.5)
RBC # BLD AUTO: 3.51 M/UL (ref 4.2–5.9)
SODIUM SERPL-SCNC: 135 MMOL/L (ref 136–145)
WBC # BLD AUTO: 6 K/UL (ref 4–11)

## 2023-03-26 PROCEDURE — 2580000003 HC RX 258: Performed by: NURSE PRACTITIONER

## 2023-03-26 PROCEDURE — 85025 COMPLETE CBC W/AUTO DIFF WBC: CPT

## 2023-03-26 PROCEDURE — 94640 AIRWAY INHALATION TREATMENT: CPT

## 2023-03-26 PROCEDURE — 36415 COLL VENOUS BLD VENIPUNCTURE: CPT

## 2023-03-26 PROCEDURE — 6370000000 HC RX 637 (ALT 250 FOR IP): Performed by: NURSE PRACTITIONER

## 2023-03-26 PROCEDURE — 83690 ASSAY OF LIPASE: CPT

## 2023-03-26 PROCEDURE — 80053 COMPREHEN METABOLIC PANEL: CPT

## 2023-03-26 PROCEDURE — 85730 THROMBOPLASTIN TIME PARTIAL: CPT

## 2023-03-26 PROCEDURE — 1200000000 HC SEMI PRIVATE

## 2023-03-26 PROCEDURE — 83735 ASSAY OF MAGNESIUM: CPT

## 2023-03-26 PROCEDURE — 6370000000 HC RX 637 (ALT 250 FOR IP): Performed by: INTERNAL MEDICINE

## 2023-03-26 PROCEDURE — 6360000002 HC RX W HCPCS: Performed by: NURSE PRACTITIONER

## 2023-03-26 PROCEDURE — 85610 PROTHROMBIN TIME: CPT

## 2023-03-26 PROCEDURE — 83036 HEMOGLOBIN GLYCOSYLATED A1C: CPT

## 2023-03-26 RX ORDER — ENOXAPARIN SODIUM 100 MG/ML
40 INJECTION SUBCUTANEOUS DAILY
Status: DISCONTINUED | OUTPATIENT
Start: 2023-03-26 | End: 2023-03-29 | Stop reason: HOSPADM

## 2023-03-26 RX ORDER — BISACODYL 10 MG
10 SUPPOSITORY, RECTAL RECTAL DAILY PRN
Status: DISCONTINUED | OUTPATIENT
Start: 2023-03-26 | End: 2023-03-29 | Stop reason: HOSPADM

## 2023-03-26 RX ORDER — POTASSIUM CHLORIDE 20 MEQ/1
40 TABLET, EXTENDED RELEASE ORAL PRN
Status: DISCONTINUED | OUTPATIENT
Start: 2023-03-26 | End: 2023-03-27

## 2023-03-26 RX ORDER — ALBUTEROL SULFATE 90 UG/1
2 AEROSOL, METERED RESPIRATORY (INHALATION) EVERY 6 HOURS PRN
Status: DISCONTINUED | OUTPATIENT
Start: 2023-03-26 | End: 2023-03-29 | Stop reason: HOSPADM

## 2023-03-26 RX ORDER — INSULIN LISPRO 100 [IU]/ML
0-4 INJECTION, SOLUTION INTRAVENOUS; SUBCUTANEOUS NIGHTLY
Status: DISCONTINUED | OUTPATIENT
Start: 2023-03-26 | End: 2023-03-29 | Stop reason: HOSPADM

## 2023-03-26 RX ORDER — POLYETHYLENE GLYCOL 3350 17 G/17G
17 POWDER, FOR SOLUTION ORAL DAILY PRN
Status: DISCONTINUED | OUTPATIENT
Start: 2023-03-26 | End: 2023-03-29 | Stop reason: HOSPADM

## 2023-03-26 RX ORDER — SODIUM CHLORIDE 0.9 % (FLUSH) 0.9 %
5-40 SYRINGE (ML) INJECTION EVERY 12 HOURS SCHEDULED
Status: DISCONTINUED | OUTPATIENT
Start: 2023-03-26 | End: 2023-03-29 | Stop reason: HOSPADM

## 2023-03-26 RX ORDER — ACETAMINOPHEN 650 MG/1
650 SUPPOSITORY RECTAL EVERY 6 HOURS PRN
Status: DISCONTINUED | OUTPATIENT
Start: 2023-03-26 | End: 2023-03-29 | Stop reason: HOSPADM

## 2023-03-26 RX ORDER — PROCHLORPERAZINE EDISYLATE 5 MG/ML
10 INJECTION INTRAMUSCULAR; INTRAVENOUS EVERY 6 HOURS PRN
Status: DISCONTINUED | OUTPATIENT
Start: 2023-03-26 | End: 2023-03-29 | Stop reason: HOSPADM

## 2023-03-26 RX ORDER — ACETAMINOPHEN 325 MG/1
650 TABLET ORAL EVERY 6 HOURS PRN
Status: DISCONTINUED | OUTPATIENT
Start: 2023-03-26 | End: 2023-03-29 | Stop reason: HOSPADM

## 2023-03-26 RX ORDER — INSULIN LISPRO 100 [IU]/ML
0-4 INJECTION, SOLUTION INTRAVENOUS; SUBCUTANEOUS EVERY 4 HOURS
Status: DISCONTINUED | OUTPATIENT
Start: 2023-03-26 | End: 2023-03-26

## 2023-03-26 RX ORDER — MAGNESIUM SULFATE IN WATER 40 MG/ML
2000 INJECTION, SOLUTION INTRAVENOUS PRN
Status: DISCONTINUED | OUTPATIENT
Start: 2023-03-26 | End: 2023-03-27

## 2023-03-26 RX ORDER — SODIUM CHLORIDE 9 MG/ML
INJECTION, SOLUTION INTRAVENOUS PRN
Status: DISCONTINUED | OUTPATIENT
Start: 2023-03-26 | End: 2023-03-29 | Stop reason: HOSPADM

## 2023-03-26 RX ORDER — INSULIN LISPRO 100 [IU]/ML
0-4 INJECTION, SOLUTION INTRAVENOUS; SUBCUTANEOUS
Status: DISCONTINUED | OUTPATIENT
Start: 2023-03-26 | End: 2023-03-29 | Stop reason: HOSPADM

## 2023-03-26 RX ORDER — SODIUM CHLORIDE 9 MG/ML
INJECTION, SOLUTION INTRAVENOUS CONTINUOUS
Status: DISCONTINUED | OUTPATIENT
Start: 2023-03-26 | End: 2023-03-29 | Stop reason: HOSPADM

## 2023-03-26 RX ORDER — DEXTROSE MONOHYDRATE 100 MG/ML
INJECTION, SOLUTION INTRAVENOUS CONTINUOUS PRN
Status: DISCONTINUED | OUTPATIENT
Start: 2023-03-26 | End: 2023-03-29 | Stop reason: HOSPADM

## 2023-03-26 RX ORDER — SODIUM CHLORIDE 0.9 % (FLUSH) 0.9 %
5-40 SYRINGE (ML) INJECTION PRN
Status: DISCONTINUED | OUTPATIENT
Start: 2023-03-26 | End: 2023-03-29 | Stop reason: HOSPADM

## 2023-03-26 RX ORDER — SENNA AND DOCUSATE SODIUM 50; 8.6 MG/1; MG/1
2 TABLET, FILM COATED ORAL 2 TIMES DAILY PRN
Status: DISCONTINUED | OUTPATIENT
Start: 2023-03-26 | End: 2023-03-29 | Stop reason: HOSPADM

## 2023-03-26 RX ORDER — POTASSIUM CHLORIDE 7.45 MG/ML
10 INJECTION INTRAVENOUS PRN
Status: DISCONTINUED | OUTPATIENT
Start: 2023-03-26 | End: 2023-03-27

## 2023-03-26 RX ADMIN — Medication 2 PUFF: at 20:52

## 2023-03-26 RX ADMIN — ACETAMINOPHEN 650 MG: 325 TABLET ORAL at 21:16

## 2023-03-26 RX ADMIN — PIPERACILLIN AND TAZOBACTAM 3375 MG: 3; .375 INJECTION, POWDER, LYOPHILIZED, FOR SOLUTION INTRAVENOUS at 20:58

## 2023-03-26 RX ADMIN — ENOXAPARIN SODIUM 40 MG: 100 INJECTION SUBCUTANEOUS at 08:15

## 2023-03-26 RX ADMIN — PIPERACILLIN AND TAZOBACTAM 3375 MG: 3; .375 INJECTION, POWDER, LYOPHILIZED, FOR SOLUTION INTRAVENOUS at 05:08

## 2023-03-26 RX ADMIN — SODIUM CHLORIDE: 9 INJECTION, SOLUTION INTRAVENOUS at 05:05

## 2023-03-26 RX ADMIN — DOCUSATE SODIUM 50 MG AND SENNOSIDES 8.6 MG 2 TABLET: 8.6; 5 TABLET, FILM COATED ORAL at 10:26

## 2023-03-26 RX ADMIN — POTASSIUM CHLORIDE 40 MEQ: 1500 TABLET, EXTENDED RELEASE ORAL at 10:26

## 2023-03-26 RX ADMIN — PIPERACILLIN AND TAZOBACTAM 3375 MG: 3; .375 INJECTION, POWDER, LYOPHILIZED, FOR SOLUTION INTRAVENOUS at 12:40

## 2023-03-26 NOTE — RT PROTOCOL NOTE
breathing using Per Protocol order mode. 4-6 - enter or revise RT Bronchodilator order(s) to two equivalent RT bronchodilator orders with one order with BID Frequency and one order with Frequency of every 4 hours PRN wheezing or increased work of breathing using Per Protocol order mode. 7-10 - enter or revise RT Bronchodilator order(s) to two equivalent RT bronchodilator orders with one order with TID Frequency and one order with Frequency of every 4 hours PRN wheezing or increased work of breathing using Per Protocol order mode. 11-13 - enter or revise RT Bronchodilator order(s) to one equivalent RT bronchodilator order with QID Frequency and an Albuterol order with Frequency of every 4 hours PRN wheezing or increased work of breathing using Per Protocol order mode. Greater than 13 - enter or revise RT Bronchodilator order(s) to one equivalent RT bronchodilator order with every 4 hours Frequency and an Albuterol order with Frequency of every 2 hours PRN wheezing or increased work of breathing using Per Protocol order mode. RT to enter RT Home Evaluation for COPD & MDI Assessment order using Per Protocol order mode.     Electronically signed by Christiano Elaine RCP on 3/26/2023 at 4:25 AM

## 2023-03-27 LAB
ALBUMIN SERPL-MCNC: 3.2 G/DL (ref 3.4–5)
ALBUMIN/GLOB SERPL: 1.1 {RATIO} (ref 1.1–2.2)
ALP SERPL-CCNC: 215 U/L (ref 40–129)
ALT SERPL-CCNC: 207 U/L (ref 10–40)
ANION GAP SERPL CALCULATED.3IONS-SCNC: 10 MMOL/L (ref 3–16)
AST SERPL-CCNC: 54 U/L (ref 15–37)
BASOPHILS # BLD: 0 K/UL (ref 0–0.2)
BASOPHILS NFR BLD: 0.5 %
BILIRUB SERPL-MCNC: 1.9 MG/DL (ref 0–1)
BUN SERPL-MCNC: 9 MG/DL (ref 7–20)
CALCIUM SERPL-MCNC: 8.7 MG/DL (ref 8.3–10.6)
CHLORIDE SERPL-SCNC: 104 MMOL/L (ref 99–110)
CO2 SERPL-SCNC: 25 MMOL/L (ref 21–32)
CREAT SERPL-MCNC: <0.5 MG/DL (ref 0.8–1.3)
DEPRECATED RDW RBC AUTO: 13.3 % (ref 12.4–15.4)
EOSINOPHIL # BLD: 0.1 K/UL (ref 0–0.6)
EOSINOPHIL NFR BLD: 1.8 %
EST. AVERAGE GLUCOSE BLD GHB EST-MCNC: 134.1 MG/DL
GFR SERPLBLD CREATININE-BSD FMLA CKD-EPI: >60 ML/MIN/{1.73_M2}
GLUCOSE BLD-MCNC: 100 MG/DL (ref 70–99)
GLUCOSE BLD-MCNC: 146 MG/DL (ref 70–99)
GLUCOSE BLD-MCNC: 183 MG/DL (ref 70–99)
GLUCOSE BLD-MCNC: 207 MG/DL (ref 70–99)
GLUCOSE SERPL-MCNC: 129 MG/DL (ref 70–99)
HBA1C MFR BLD: 6.3 %
HCT VFR BLD AUTO: 33.3 % (ref 40.5–52.5)
HGB BLD-MCNC: 11.5 G/DL (ref 13.5–17.5)
INR PPP: 0.98 (ref 0.87–1.14)
LIPASE SERPL-CCNC: 33 U/L (ref 13–60)
LYMPHOCYTES # BLD: 0.4 K/UL (ref 1–5.1)
LYMPHOCYTES NFR BLD: 12.9 %
MCH RBC QN AUTO: 34.3 PG (ref 26–34)
MCHC RBC AUTO-ENTMCNC: 34.7 G/DL (ref 31–36)
MCV RBC AUTO: 99 FL (ref 80–100)
MONOCYTES # BLD: 0.2 K/UL (ref 0–1.3)
MONOCYTES NFR BLD: 5.7 %
NEUTROPHILS # BLD: 2.6 K/UL (ref 1.7–7.7)
NEUTROPHILS NFR BLD: 79.1 %
PERFORMED ON: ABNORMAL
PLATELET # BLD AUTO: 153 K/UL (ref 135–450)
PMV BLD AUTO: 7.3 FL (ref 5–10.5)
POTASSIUM SERPL-SCNC: 3.4 MMOL/L (ref 3.5–5.1)
PROT SERPL-MCNC: 6.1 G/DL (ref 6.4–8.2)
PROTHROMBIN TIME: 12.9 SEC (ref 11.7–14.5)
RBC # BLD AUTO: 3.36 M/UL (ref 4.2–5.9)
SODIUM SERPL-SCNC: 139 MMOL/L (ref 136–145)
WBC # BLD AUTO: 3.3 K/UL (ref 4–11)

## 2023-03-27 PROCEDURE — 83690 ASSAY OF LIPASE: CPT

## 2023-03-27 PROCEDURE — 6360000002 HC RX W HCPCS: Performed by: NURSE PRACTITIONER

## 2023-03-27 PROCEDURE — 36415 COLL VENOUS BLD VENIPUNCTURE: CPT

## 2023-03-27 PROCEDURE — 85610 PROTHROMBIN TIME: CPT

## 2023-03-27 PROCEDURE — 94640 AIRWAY INHALATION TREATMENT: CPT

## 2023-03-27 PROCEDURE — 2580000003 HC RX 258: Performed by: NURSE PRACTITIONER

## 2023-03-27 PROCEDURE — 80053 COMPREHEN METABOLIC PANEL: CPT

## 2023-03-27 PROCEDURE — 6370000000 HC RX 637 (ALT 250 FOR IP): Performed by: PHYSICIAN ASSISTANT

## 2023-03-27 PROCEDURE — 1200000000 HC SEMI PRIVATE

## 2023-03-27 PROCEDURE — 85025 COMPLETE CBC W/AUTO DIFF WBC: CPT

## 2023-03-27 PROCEDURE — 6370000000 HC RX 637 (ALT 250 FOR IP): Performed by: NURSE PRACTITIONER

## 2023-03-27 RX ORDER — TRIAMTERENE AND HYDROCHLOROTHIAZIDE 37.5; 25 MG/1; MG/1
1 TABLET ORAL
Status: DISCONTINUED | OUTPATIENT
Start: 2023-03-27 | End: 2023-03-27

## 2023-03-27 RX ORDER — ALLOPURINOL 300 MG/1
300 TABLET ORAL DAILY
Status: DISCONTINUED | OUTPATIENT
Start: 2023-03-27 | End: 2023-03-29 | Stop reason: HOSPADM

## 2023-03-27 RX ORDER — GEMFIBROZIL 600 MG/1
600 TABLET, FILM COATED ORAL
Status: DISCONTINUED | OUTPATIENT
Start: 2023-03-27 | End: 2023-03-27

## 2023-03-27 RX ORDER — GEMFIBROZIL 600 MG/1
600 TABLET, FILM COATED ORAL
Status: DISCONTINUED | OUTPATIENT
Start: 2023-03-27 | End: 2023-03-27 | Stop reason: SDUPTHER

## 2023-03-27 RX ORDER — URSODIOL 300 MG/1
600 CAPSULE ORAL 2 TIMES DAILY
Status: DISCONTINUED | OUTPATIENT
Start: 2023-03-27 | End: 2023-03-29 | Stop reason: HOSPADM

## 2023-03-27 RX ORDER — TRIAMTERENE AND HYDROCHLOROTHIAZIDE 37.5; 25 MG/1; MG/1
1 TABLET ORAL
Status: DISCONTINUED | OUTPATIENT
Start: 2023-03-27 | End: 2023-03-27 | Stop reason: SDUPTHER

## 2023-03-27 RX ORDER — POTASSIUM CHLORIDE 20 MEQ/1
40 TABLET, EXTENDED RELEASE ORAL ONCE
Status: COMPLETED | OUTPATIENT
Start: 2023-03-27 | End: 2023-03-27

## 2023-03-27 RX ORDER — DICYCLOMINE HCL 20 MG
20 TABLET ORAL NIGHTLY
Status: DISCONTINUED | OUTPATIENT
Start: 2023-03-27 | End: 2023-03-29 | Stop reason: HOSPADM

## 2023-03-27 RX ORDER — PANTOPRAZOLE SODIUM 40 MG/1
40 TABLET, DELAYED RELEASE ORAL
Status: DISCONTINUED | OUTPATIENT
Start: 2023-03-27 | End: 2023-03-29 | Stop reason: HOSPADM

## 2023-03-27 RX ADMIN — POTASSIUM CHLORIDE 40 MEQ: 1500 TABLET, EXTENDED RELEASE ORAL at 09:39

## 2023-03-27 RX ADMIN — PIPERACILLIN AND TAZOBACTAM 3375 MG: 3; .375 INJECTION, POWDER, LYOPHILIZED, FOR SOLUTION INTRAVENOUS at 12:01

## 2023-03-27 RX ADMIN — ENOXAPARIN SODIUM 40 MG: 100 INJECTION SUBCUTANEOUS at 09:39

## 2023-03-27 RX ADMIN — PIPERACILLIN AND TAZOBACTAM 3375 MG: 3; .375 INJECTION, POWDER, LYOPHILIZED, FOR SOLUTION INTRAVENOUS at 03:50

## 2023-03-27 RX ADMIN — URSODIOL 600 MG: 300 CAPSULE ORAL at 11:27

## 2023-03-27 RX ADMIN — Medication 2 PUFF: at 04:07

## 2023-03-27 RX ADMIN — ALLOPURINOL 300 MG: 300 TABLET ORAL at 09:44

## 2023-03-27 RX ADMIN — PIPERACILLIN AND TAZOBACTAM 3375 MG: 3; .375 INJECTION, POWDER, LYOPHILIZED, FOR SOLUTION INTRAVENOUS at 23:15

## 2023-03-27 RX ADMIN — PANTOPRAZOLE SODIUM 40 MG: 40 TABLET, DELAYED RELEASE ORAL at 15:56

## 2023-03-27 RX ADMIN — Medication 2 PUFF: at 11:38

## 2023-03-27 RX ADMIN — Medication 2 PUFF: at 19:18

## 2023-03-27 RX ADMIN — Medication 10 ML: at 09:39

## 2023-03-27 RX ADMIN — DICYCLOMINE HYDROCHLORIDE 20 MG: 20 TABLET ORAL at 23:10

## 2023-03-27 RX ADMIN — URSODIOL 600 MG: 300 CAPSULE ORAL at 23:10

## 2023-03-27 ASSESSMENT — PAIN DESCRIPTION - ORIENTATION
ORIENTATION: LOWER;RIGHT
ORIENTATION: LOWER;RIGHT

## 2023-03-27 ASSESSMENT — PAIN DESCRIPTION - DESCRIPTORS
DESCRIPTORS: ACHING;PRESSURE
DESCRIPTORS: DULL
DESCRIPTORS: ACHING;PRESSURE

## 2023-03-27 ASSESSMENT — PAIN DESCRIPTION - LOCATION
LOCATION: ABDOMEN
LOCATION: ABDOMEN;CHEST
LOCATION: ABDOMEN

## 2023-03-27 ASSESSMENT — PAIN SCALES - GENERAL
PAINLEVEL_OUTOF10: 0
PAINLEVEL_OUTOF10: 3
PAINLEVEL_OUTOF10: 2

## 2023-03-27 NOTE — CONSULTS
ERCP N/A 4/13/2022    ERCP STENT INSERTION performed by Jose E Alfonso MD at [address]    ERCP  4/13/2022    ERCP ENDOSCOPIC RETROGRADE CHOLANGIOPANCREATOGRAPHY DIRECT VISUALIZATION performed by Jose E Alfonso MD at [address]    ERCP N/A 6/13/2022    ERCP BILIARY BRUSHING performed by Jose E Alfonso MD at [address]    ERCP  6/13/2022    ERCP STENT INSERTION performed by Jose E Alfonso MD at [address] [place] [address]  01/26/2022    ERCP WF W/ANES. (8:30) (N/A )     OTHER SURGICAL HISTORY  06/13/2022     ERCP WF W/ANES. (15:00) (N/A )    TONSILLECTOMY      UPPER GASTROINTESTINAL ENDOSCOPY N/A 3/11/2022    UPPER EUS W/ANES. performed by Jose E Alfonso MD at [address]       Family  History:  History reviewed. No pertinent family history. Social History:  Social History     Tobacco Use    Smoking status: Former    Smokeless tobacco: Never    Tobacco comments:     quit 40-50 years ago   Vaping Use    Vaping Use: Never used   Substance Use Topics    Alcohol use: No    Drug use: No       ROS  Constitutional:  + fever,sweats, denies chills or weight loss  Eyes:  Denies change in visual acuity   HENT:  Denies hearing loss or dizziness  Respiratory:  Denies cough or shortness of breath   Cardiovascular:  Denies edema or chest pain  :  Denies dysuria, hematuria, urgency or frequency  Musculoskeletal:  Denies back pain or joint pain   Integument:  Denies rash   Neurologic:  Denies headache, previous stroke, TIA, confusion   Endocrine:  Denies polyuria or polydipsia   Lymphatic:  Denies swollen glands   Psychiatric:  Denies depression or anxiety   Hematologic: Denies previous anemia or easy bruising    All other review of systems negative, except for those noted.       PHYSICAL EXAM:   VITAL SIGNS: /76   Pulse 59   Temp 97.9 °F (36.6 °C) (Oral)   Resp 16   Ht 5' 11\" (1.803 m)   Wt 175 lb 14.8 oz (79.8

## 2023-03-28 ENCOUNTER — APPOINTMENT (OUTPATIENT)
Dept: MRI IMAGING | Age: 73
DRG: 921 | End: 2023-03-28
Attending: INTERNAL MEDICINE
Payer: MEDICARE

## 2023-03-28 ENCOUNTER — ANESTHESIA (OUTPATIENT)
Dept: ENDOSCOPY | Age: 73
DRG: 921 | End: 2023-03-28
Payer: MEDICARE

## 2023-03-28 ENCOUNTER — ANESTHESIA EVENT (OUTPATIENT)
Dept: ENDOSCOPY | Age: 73
DRG: 921 | End: 2023-03-28
Payer: MEDICARE

## 2023-03-28 ENCOUNTER — APPOINTMENT (OUTPATIENT)
Dept: GENERAL RADIOLOGY | Age: 73
DRG: 921 | End: 2023-03-28
Attending: INTERNAL MEDICINE
Payer: MEDICARE

## 2023-03-28 LAB
ALBUMIN SERPL-MCNC: 3.6 G/DL (ref 3.4–5)
ALBUMIN/GLOB SERPL: 1.3 {RATIO} (ref 1.1–2.2)
ALP SERPL-CCNC: 202 U/L (ref 40–129)
ALT SERPL-CCNC: 154 U/L (ref 10–40)
ANION GAP SERPL CALCULATED.3IONS-SCNC: 6 MMOL/L (ref 3–16)
AST SERPL-CCNC: 32 U/L (ref 15–37)
BASOPHILS # BLD: 0 K/UL (ref 0–0.2)
BASOPHILS NFR BLD: 0.1 %
BILIRUB SERPL-MCNC: 1.5 MG/DL (ref 0–1)
BUN SERPL-MCNC: 10 MG/DL (ref 7–20)
CALCIUM SERPL-MCNC: 9 MG/DL (ref 8.3–10.6)
CHLORIDE SERPL-SCNC: 105 MMOL/L (ref 99–110)
CO2 SERPL-SCNC: 28 MMOL/L (ref 21–32)
CREAT SERPL-MCNC: <0.5 MG/DL (ref 0.8–1.3)
DEPRECATED RDW RBC AUTO: 13.2 % (ref 12.4–15.4)
EOSINOPHIL # BLD: 0.1 K/UL (ref 0–0.6)
EOSINOPHIL NFR BLD: 2.3 %
GFR SERPLBLD CREATININE-BSD FMLA CKD-EPI: >60 ML/MIN/{1.73_M2}
GLUCOSE BLD-MCNC: 123 MG/DL (ref 70–99)
GLUCOSE BLD-MCNC: 147 MG/DL (ref 70–99)
GLUCOSE BLD-MCNC: 190 MG/DL (ref 70–99)
GLUCOSE BLD-MCNC: 271 MG/DL (ref 70–99)
GLUCOSE SERPL-MCNC: 124 MG/DL (ref 70–99)
HCT VFR BLD AUTO: 35.2 % (ref 40.5–52.5)
HGB BLD-MCNC: 12.3 G/DL (ref 13.5–17.5)
INR PPP: 0.95 (ref 0.87–1.14)
LIPASE SERPL-CCNC: 39 U/L (ref 13–60)
LYMPHOCYTES # BLD: 0.5 K/UL (ref 1–5.1)
LYMPHOCYTES NFR BLD: 15 %
MCH RBC QN AUTO: 34.7 PG (ref 26–34)
MCHC RBC AUTO-ENTMCNC: 34.8 G/DL (ref 31–36)
MCV RBC AUTO: 99.6 FL (ref 80–100)
MONOCYTES # BLD: 0.2 K/UL (ref 0–1.3)
MONOCYTES NFR BLD: 6.5 %
NEUTROPHILS # BLD: 2.7 K/UL (ref 1.7–7.7)
NEUTROPHILS NFR BLD: 76.1 %
PERFORMED ON: ABNORMAL
PLATELET # BLD AUTO: 160 K/UL (ref 135–450)
PMV BLD AUTO: 7.1 FL (ref 5–10.5)
POTASSIUM SERPL-SCNC: 4.2 MMOL/L (ref 3.5–5.1)
PROT SERPL-MCNC: 6.3 G/DL (ref 6.4–8.2)
PROTHROMBIN TIME: 12.6 SEC (ref 11.7–14.5)
RBC # BLD AUTO: 3.54 M/UL (ref 4.2–5.9)
SODIUM SERPL-SCNC: 139 MMOL/L (ref 136–145)
WBC # BLD AUTO: 3.6 K/UL (ref 4–11)

## 2023-03-28 PROCEDURE — 36415 COLL VENOUS BLD VENIPUNCTURE: CPT

## 2023-03-28 PROCEDURE — 3609018800 HC ERCP DX COLLECTION SPECIMEN BRUSHING/WASHING: Performed by: INTERNAL MEDICINE

## 2023-03-28 PROCEDURE — 7100000000 HC PACU RECOVERY - FIRST 15 MIN: Performed by: INTERNAL MEDICINE

## 2023-03-28 PROCEDURE — 6370000000 HC RX 637 (ALT 250 FOR IP): Performed by: PHYSICIAN ASSISTANT

## 2023-03-28 PROCEDURE — 80053 COMPREHEN METABOLIC PANEL: CPT

## 2023-03-28 PROCEDURE — 2580000003 HC RX 258: Performed by: NURSE PRACTITIONER

## 2023-03-28 PROCEDURE — 83690 ASSAY OF LIPASE: CPT

## 2023-03-28 PROCEDURE — 7100000001 HC PACU RECOVERY - ADDTL 15 MIN: Performed by: INTERNAL MEDICINE

## 2023-03-28 PROCEDURE — 94640 AIRWAY INHALATION TREATMENT: CPT

## 2023-03-28 PROCEDURE — 6360000002 HC RX W HCPCS: Performed by: NURSE ANESTHETIST, CERTIFIED REGISTERED

## 2023-03-28 PROCEDURE — 74329 X-RAY FOR PANCREAS ENDOSCOPY: CPT

## 2023-03-28 PROCEDURE — 3700000000 HC ANESTHESIA ATTENDED CARE: Performed by: INTERNAL MEDICINE

## 2023-03-28 PROCEDURE — 3700000001 HC ADD 15 MINUTES (ANESTHESIA): Performed by: INTERNAL MEDICINE

## 2023-03-28 PROCEDURE — 85610 PROTHROMBIN TIME: CPT

## 2023-03-28 PROCEDURE — 74183 MRI ABD W/O CNTR FLWD CNTR: CPT

## 2023-03-28 PROCEDURE — 1200000000 HC SEMI PRIVATE

## 2023-03-28 PROCEDURE — 2500000003 HC RX 250 WO HCPCS: Performed by: NURSE ANESTHETIST, CERTIFIED REGISTERED

## 2023-03-28 PROCEDURE — 85025 COMPLETE CBC W/AUTO DIFF WBC: CPT

## 2023-03-28 PROCEDURE — C1769 GUIDE WIRE: HCPCS | Performed by: INTERNAL MEDICINE

## 2023-03-28 PROCEDURE — 6360000002 HC RX W HCPCS: Performed by: NURSE PRACTITIONER

## 2023-03-28 PROCEDURE — A9579 GAD-BASE MR CONTRAST NOS,1ML: HCPCS | Performed by: NURSE PRACTITIONER

## 2023-03-28 PROCEDURE — 6360000004 HC RX CONTRAST MEDICATION: Performed by: NURSE PRACTITIONER

## 2023-03-28 PROCEDURE — 3209999900 FLUORO FOR SURGICAL PROCEDURES

## 2023-03-28 PROCEDURE — 88305 TISSUE EXAM BY PATHOLOGIST: CPT

## 2023-03-28 PROCEDURE — 3609015100 HC ERCP STENT PLACEMENT BILIARY/PANCREATIC DUCT: Performed by: INTERNAL MEDICINE

## 2023-03-28 PROCEDURE — 0F798DZ DILATION OF COMMON BILE DUCT WITH INTRALUMINAL DEVICE, VIA NATURAL OR ARTIFICIAL OPENING ENDOSCOPIC: ICD-10-PCS | Performed by: INTERNAL MEDICINE

## 2023-03-28 PROCEDURE — 2720000010 HC SURG SUPPLY STERILE: Performed by: INTERNAL MEDICINE

## 2023-03-28 PROCEDURE — 88112 CYTOPATH CELL ENHANCE TECH: CPT

## 2023-03-28 PROCEDURE — 2709999900 HC NON-CHARGEABLE SUPPLY: Performed by: INTERNAL MEDICINE

## 2023-03-28 PROCEDURE — 6370000000 HC RX 637 (ALT 250 FOR IP): Performed by: NURSE PRACTITIONER

## 2023-03-28 RX ORDER — LIDOCAINE HYDROCHLORIDE 20 MG/ML
INJECTION, SOLUTION EPIDURAL; INFILTRATION; INTRACAUDAL; PERINEURAL PRN
Status: DISCONTINUED | OUTPATIENT
Start: 2023-03-28 | End: 2023-03-28 | Stop reason: SDUPTHER

## 2023-03-28 RX ORDER — SODIUM CHLORIDE 0.9 % (FLUSH) 0.9 %
5-40 SYRINGE (ML) INJECTION EVERY 12 HOURS SCHEDULED
Status: DISCONTINUED | OUTPATIENT
Start: 2023-03-28 | End: 2023-03-29 | Stop reason: HOSPADM

## 2023-03-28 RX ORDER — ONDANSETRON 2 MG/ML
INJECTION INTRAMUSCULAR; INTRAVENOUS PRN
Status: DISCONTINUED | OUTPATIENT
Start: 2023-03-28 | End: 2023-03-28 | Stop reason: SDUPTHER

## 2023-03-28 RX ORDER — DEXAMETHASONE SODIUM PHOSPHATE 4 MG/ML
INJECTION, SOLUTION INTRA-ARTICULAR; INTRALESIONAL; INTRAMUSCULAR; INTRAVENOUS; SOFT TISSUE PRN
Status: DISCONTINUED | OUTPATIENT
Start: 2023-03-28 | End: 2023-03-28 | Stop reason: SDUPTHER

## 2023-03-28 RX ORDER — FENTANYL CITRATE 50 UG/ML
INJECTION, SOLUTION INTRAMUSCULAR; INTRAVENOUS PRN
Status: DISCONTINUED | OUTPATIENT
Start: 2023-03-28 | End: 2023-03-28 | Stop reason: SDUPTHER

## 2023-03-28 RX ORDER — MEPERIDINE HYDROCHLORIDE 50 MG/ML
12.5 INJECTION INTRAMUSCULAR; INTRAVENOUS; SUBCUTANEOUS EVERY 5 MIN PRN
Status: DISCONTINUED | OUTPATIENT
Start: 2023-03-28 | End: 2023-03-29 | Stop reason: HOSPADM

## 2023-03-28 RX ORDER — ONDANSETRON 2 MG/ML
4 INJECTION INTRAMUSCULAR; INTRAVENOUS EVERY 30 MIN PRN
Status: DISCONTINUED | OUTPATIENT
Start: 2023-03-28 | End: 2023-03-29 | Stop reason: HOSPADM

## 2023-03-28 RX ORDER — PROPOFOL 10 MG/ML
INJECTION, EMULSION INTRAVENOUS PRN
Status: DISCONTINUED | OUTPATIENT
Start: 2023-03-28 | End: 2023-03-28 | Stop reason: SDUPTHER

## 2023-03-28 RX ORDER — SODIUM CHLORIDE 9 MG/ML
INJECTION, SOLUTION INTRAVENOUS PRN
Status: DISCONTINUED | OUTPATIENT
Start: 2023-03-28 | End: 2023-03-29 | Stop reason: HOSPADM

## 2023-03-28 RX ORDER — ROCURONIUM BROMIDE 10 MG/ML
INJECTION, SOLUTION INTRAVENOUS PRN
Status: DISCONTINUED | OUTPATIENT
Start: 2023-03-28 | End: 2023-03-28 | Stop reason: SDUPTHER

## 2023-03-28 RX ORDER — SODIUM CHLORIDE 0.9 % (FLUSH) 0.9 %
5-40 SYRINGE (ML) INJECTION PRN
Status: DISCONTINUED | OUTPATIENT
Start: 2023-03-28 | End: 2023-03-29 | Stop reason: HOSPADM

## 2023-03-28 RX ADMIN — PROCHLORPERAZINE EDISYLATE 10 MG: 5 INJECTION INTRAMUSCULAR; INTRAVENOUS at 18:31

## 2023-03-28 RX ADMIN — PIPERACILLIN AND TAZOBACTAM 3375 MG: 3; .375 INJECTION, POWDER, LYOPHILIZED, FOR SOLUTION INTRAVENOUS at 15:15

## 2023-03-28 RX ADMIN — PROPOFOL 200 MG: 10 INJECTION, EMULSION INTRAVENOUS at 11:22

## 2023-03-28 RX ADMIN — DICYCLOMINE HYDROCHLORIDE 20 MG: 20 TABLET ORAL at 20:41

## 2023-03-28 RX ADMIN — SODIUM CHLORIDE: 9 INJECTION, SOLUTION INTRAVENOUS at 13:27

## 2023-03-28 RX ADMIN — PIPERACILLIN AND TAZOBACTAM 3375 MG: 3; .375 INJECTION, POWDER, LYOPHILIZED, FOR SOLUTION INTRAVENOUS at 08:32

## 2023-03-28 RX ADMIN — Medication 50 MCG: at 11:22

## 2023-03-28 RX ADMIN — PANTOPRAZOLE SODIUM 40 MG: 40 TABLET, DELAYED RELEASE ORAL at 15:15

## 2023-03-28 RX ADMIN — LIDOCAINE HYDROCHLORIDE 100 MG: 20 INJECTION, SOLUTION EPIDURAL; INFILTRATION; INTRACAUDAL at 11:22

## 2023-03-28 RX ADMIN — PIPERACILLIN AND TAZOBACTAM 3375 MG: 3; .375 INJECTION, POWDER, LYOPHILIZED, FOR SOLUTION INTRAVENOUS at 23:05

## 2023-03-28 RX ADMIN — DEXAMETHASONE SODIUM PHOSPHATE 4 MG: 4 INJECTION, SOLUTION INTRAMUSCULAR; INTRAVENOUS at 11:36

## 2023-03-28 RX ADMIN — SODIUM CHLORIDE: 9 INJECTION, SOLUTION INTRAVENOUS at 08:31

## 2023-03-28 RX ADMIN — GADOTERIDOL 15 ML: 279.3 INJECTION, SOLUTION INTRAVENOUS at 08:12

## 2023-03-28 RX ADMIN — ROCURONIUM BROMIDE 45 MG: 10 SOLUTION INTRAVENOUS at 11:23

## 2023-03-28 RX ADMIN — ONDANSETRON 4 MG: 2 INJECTION INTRAMUSCULAR; INTRAVENOUS at 11:48

## 2023-03-28 RX ADMIN — SODIUM CHLORIDE: 9 INJECTION, SOLUTION INTRAVENOUS at 23:04

## 2023-03-28 RX ADMIN — ROCURONIUM BROMIDE 5 MG: 10 SOLUTION INTRAVENOUS at 11:22

## 2023-03-28 RX ADMIN — URSODIOL 600 MG: 300 CAPSULE ORAL at 20:41

## 2023-03-28 RX ADMIN — Medication 10 ML: at 20:42

## 2023-03-28 RX ADMIN — Medication 2 PUFF: at 07:35

## 2023-03-28 RX ADMIN — SUGAMMADEX 200 MG: 100 INJECTION, SOLUTION INTRAVENOUS at 11:49

## 2023-03-28 ASSESSMENT — PAIN SCALES - GENERAL
PAINLEVEL_OUTOF10: 0

## 2023-03-28 NOTE — OP NOTE
the metal stent    Recommendations: Needs to stay on [place] MD T[place]       (O) [phone]        Darshan Morgan MD, MD

## 2023-03-28 NOTE — H&P
Oral Q6H PRN Lynann Hodgkin, APRN - CNP   650 mg at 03/26/23 2116    Or    acetaminophen (TYLENOL) suppository 650 mg  650 mg Rectal Q6H PRN Lynann Hodgkin, APRN - CNP        0.9 % sodium chloride infusion   IntraVENous Continuous Lynann Hodgkin, APRN - CNP 75 mL/hr at 03/28/23 0831 New Bag at 03/28/23 0831    prochlorperazine (COMPAZINE) injection 10 mg  10 mg IntraVENous Q6H PRN Lynann Hodgkin, APRN - CNP        piperacillin-tazobactam (ZOSYN) 3,375 mg in sodium chloride 0.9 % 50 mL IVPB (mini-bag)  3,375 mg IntraVENous Q8H Lynann Hodgkin, APRN - CNP 12.5 mL/hr at 03/28/23 0832 3,375 mg at 03/28/23 7082    HYDROmorphone (DILAUDID) injection 0.25 mg  0.25 mg IntraVENous Q3H PRN Lynann Hodgkin, APRN - CNP        Or    HYDROmorphone (DILAUDID) injection 0.5 mg  0.5 mg IntraVENous Q3H PRN Lynann Hodgkin, APRN - CNP        sennosides-docusate sodium (SENOKOT-S) 8.6-50 MG tablet 2 tablet  2 tablet Oral BID PRN Masha Hammond DO   2 tablet at 03/26/23 1026    bisacodyl (DULCOLAX) suppository 10 mg  10 mg Rectal Daily PRN Norman Garcia,         insulin lispro (HUMALOG) injection vial 0-4 Units  0-4 Units SubCUTAneous TID ADILENE Campos CNP        insulin lispro (HUMALOG) injection vial 0-4 Units  0-4 Units SubCUTAneous Nightly Lynann Hodgkin, APRN - CNP         Past Medical History:   Diagnosis Date    Asthma     Blood circulation, collateral     Carpal tunnel syndrome     CLL (chronic lymphocytic leukemia) ([facility] [address] [address]     Crohn's colitis ([facility]     Diabetes mellitus ([facility] [address] [facility]     emboli     pulmonary emboli in 1980    GERD (gastroesophageal reflux disease)     Chrons disease    Gout     Hx of blood clots     Hypertension     Pneumonia     pneumonia,asthma    prostate     infection    Seasonal allergies      Past Surgical History:   Procedure Laterality Date    CYST REMOVAL      ERCP N/A 1/26/2022    ERCP BIOPSY performed by Joel Raygoza MD at [address]  [address]    ERCP  1/26/2022    ERCP
[E11.9]      Priority: Medium       PLAN:    Biliary Stent Obstruction/Cholestatic Hepatitis  - Admit to Inpatient  - GI consult placed for eval in AM  - Continue Zosyn IVPB  - Check labs now on admission  - Continue IVF, pain/nausea control    Type 2 DM  - Check A1c  - Low-dose NPO SSI q4h   - Holding metformin    Hypertension  - Good BP control on admission  - Holding oral antihypertensive therapy while NPO    DVT Prophylaxis: SCDs  SRMB Prophylaxis: Protonix IVP  Diet: Diet NPO Exceptions are: Sips of Clear Liquids, Ice Chips  Code Status: Full Code    PT/OT Eval Status: N/A at this time    Dispo - 2-3 days per clinical improvement    Nighat Junior, APRN - CNP    Thank you Abner Shay MD for the opportunity to be involved in this patient's care.  If you have any questions or concerns please feel free to contact me at [phone].---Anticipated co-signer, Dr. Dae Busch    (Please note that portions of this note were completed with a voice recognition program. Efforts were made to edit the dictations but occasionally words are mis-transcribed.)

## 2023-03-28 NOTE — ANESTHESIA POSTPROCEDURE EVALUATION
Department of Anesthesiology  Postprocedure Note    Patient: Kenyetta Stephenson  MRN: [id number]  [place]: 1950  Date of evaluation: 3/28/2023      Procedure Summary     Date: 03/28/23 Room / Location: [address]    Anesthesia Start: 1115 Anesthesia Stop: 1204    Procedures:       ERCP ENDOSCOPIC RETROGRADE CHOLANGIOPANCREATOGRAPHY      ERCP BILIARY BRUSHING      ERCP STENT INSERTION Diagnosis:       Obstruction of biliary stent, initial encounter      (Obstruction of biliary stent, initial encounter [C27.297J])    Surgeons: Inder Nolasco MD Responsible Provider: Parish Escobar MD    Anesthesia Type: general ASA Status: 2          Anesthesia Type: No value filed.     Elenita Phase I: Elenita Score: 10    Elenita Phase II:        Anesthesia Post Evaluation    Patient location during evaluation: PACU  Level of consciousness: awake and alert  Airway patency: patent  Nausea & Vomiting: no vomiting  Complications: no  Cardiovascular status: blood pressure returned to baseline  Respiratory status: acceptable  Hydration status: stable

## 2023-03-28 NOTE — ANESTHESIA PRE PROCEDURE
- CNP        0.9 % sodium chloride infusion   IntraVENous Continuous Ronit Jossy, APRN - CNP 75 mL/hr at 03/28/23 0831 New Bag at 03/28/23 0831    prochlorperazine (COMPAZINE) injection 10 mg  10 mg IntraVENous Q6H PRN Ronit Jossy, APRN - CNP        piperacillin-tazobactam (ZOSYN) 3,375 mg in sodium chloride 0.9 % 50 mL IVPB (mini-bag)  3,375 mg IntraVENous Q8H Ronit Jossy, APRN - CNP 12.5 mL/hr at 03/28/23 0832 3,375 mg at 03/28/23 0832    HYDROmorphone (DILAUDID) injection 0.25 mg  0.25 mg IntraVENous Q3H PRN Ronit Jossy, APRN - CNP        Or    HYDROmorphone (DILAUDID) injection 0.5 mg  0.5 mg IntraVENous Q3H PRN Ronit Jossy, APRN - CNP        sennosides-docusate sodium (SENOKOT-S) 8.6-50 MG tablet 2 tablet  2 tablet Oral BID PRN Norman Garcia, DO   2 tablet at 03/26/23 1026    bisacodyl (DULCOLAX) suppository 10 mg  10 mg Rectal Daily PRN Norman Garcia, DO        insulin lispro (HUMALOG) injection vial 0-4 Units  0-4 Units SubCUTAneous TID WC Ronit Jossy, APRN - CNP        insulin lispro (HUMALOG) injection vial 0-4 Units  0-4 Units SubCUTAneous Nightly Ronit Jossy, APRN - CNP           Allergies:     Allergies   Allergen Reactions    Augmentin [Amoxicillin-Pot Clavulanate] Other (See Comments)    Codeine      Pt states he took medication without food and had a reaction, states this gave him chest pain       Problem List:    Patient Active Problem List   Diagnosis Code    GERD (gastroesophageal reflux disease) K21.9    Palpitations R00.2    Painless jaundice R17    Transaminitis R74.01    Common bile duct (CBD) stricture K83.1    Hyponatremia E87.1    Hypokalemia E87.6    Crohn's disease ([facility] [address] K50.90    Hypertension I10    Acute liver failure without hepatic coma K72.00    Hyperbilirubinemia E80.6    Cholangitis K83.09    Moderate protein-calorie malnutrition (HCC) E44.0    Cholecystitis K81.9    Type 2 diabetes mellitus without complication, without

## 2023-03-28 NOTE — PLAN OF CARE
Problem: Chronic Conditions and Co-morbidities  Goal: Patient's chronic conditions and co-morbidity symptoms are monitored and maintained or improved  Outcome: Progressing  Flowsheets (Taken 3/27/2023 2138)  Care Plan - Patient's Chronic Conditions and Co-Morbidity Symptoms are Monitored and Maintained or Improved: Monitor and assess patient's chronic conditions and comorbid symptoms for stability, deterioration, or improvement     Problem: Discharge Planning  Goal: Discharge to home or other facility with appropriate resources  Outcome: Progressing  Flowsheets (Taken 3/27/2023 2138)  Discharge to home or other facility with appropriate resources: Identify barriers to discharge with patient and caregiver     Problem: Safety - Adult  Goal: Free from fall injury  Outcome: Progressing     Problem: Pain  Goal: Verbalizes/displays adequate comfort level or baseline comfort level  Outcome: Progressing  Flowsheets (Taken 3/27/2023 2320)  Verbalizes/displays adequate comfort level or baseline comfort level: Encourage patient to monitor pain and request assistance       . Elias Monahan Pt scoring pain on 0-10 scale. Pain medications given per MAR. Pt instructed to call out when pain level increasing. Call light within reach. Nurse will continue to reassess and monitor.

## 2023-03-29 VITALS
WEIGHT: 175.93 LBS | TEMPERATURE: 98.1 F | OXYGEN SATURATION: 94 % | HEART RATE: 61 BPM | SYSTOLIC BLOOD PRESSURE: 124 MMHG | HEIGHT: 71 IN | RESPIRATION RATE: 16 BRPM | DIASTOLIC BLOOD PRESSURE: 66 MMHG | BODY MASS INDEX: 24.63 KG/M2

## 2023-03-29 LAB
GLUCOSE BLD-MCNC: 115 MG/DL (ref 70–99)
GLUCOSE BLD-MCNC: 157 MG/DL (ref 70–99)
PERFORMED ON: ABNORMAL
PERFORMED ON: ABNORMAL

## 2023-03-29 PROCEDURE — 6360000002 HC RX W HCPCS: Performed by: NURSE PRACTITIONER

## 2023-03-29 PROCEDURE — 94640 AIRWAY INHALATION TREATMENT: CPT

## 2023-03-29 PROCEDURE — 6370000000 HC RX 637 (ALT 250 FOR IP): Performed by: NURSE PRACTITIONER

## 2023-03-29 PROCEDURE — 2580000003 HC RX 258: Performed by: NURSE PRACTITIONER

## 2023-03-29 PROCEDURE — 6370000000 HC RX 637 (ALT 250 FOR IP): Performed by: PHYSICIAN ASSISTANT

## 2023-03-29 RX ORDER — URSODIOL 300 MG/1
600 CAPSULE ORAL 2 TIMES DAILY
Qty: 60 CAPSULE | Refills: 3 | Status: SHIPPED | OUTPATIENT
Start: 2023-03-29

## 2023-03-29 RX ADMIN — PIPERACILLIN AND TAZOBACTAM 3375 MG: 3; .375 INJECTION, POWDER, LYOPHILIZED, FOR SOLUTION INTRAVENOUS at 06:19

## 2023-03-29 RX ADMIN — ENOXAPARIN SODIUM 40 MG: 100 INJECTION SUBCUTANEOUS at 09:10

## 2023-03-29 RX ADMIN — ALLOPURINOL 300 MG: 300 TABLET ORAL at 09:12

## 2023-03-29 RX ADMIN — PANTOPRAZOLE SODIUM 40 MG: 40 TABLET, DELAYED RELEASE ORAL at 06:17

## 2023-03-29 RX ADMIN — Medication 2 PUFF: at 06:41

## 2023-03-29 RX ADMIN — URSODIOL 600 MG: 300 CAPSULE ORAL at 09:09

## 2023-03-29 RX ADMIN — Medication 2 PUFF: at 14:12

## 2023-03-29 ASSESSMENT — PAIN SCALES - GENERAL: PAINLEVEL_OUTOF10: 0

## 2023-03-29 NOTE — PLAN OF CARE
Pt scoring pain on 0-10 scale. Pain medications given per MAR. Pt instructed to call out when pain level increasing. Call light within reach. Nurse will continue to reassess and monitor.     Problem: Safety - Adult  Goal: Free from fall injury  3/29/2023 0419 by Morgan Murray RN  Outcome: Progressing

## 2023-03-29 NOTE — DISCHARGE SUMMARY
Hospital Medicine Discharge Summary    Patient ID: Sheri Shah      Patient's PCP: Marci Romeo MD    Admit Date: 3/26/2023     Discharge Date: 3/29/2023     Admitting Provider: Vince Dill DO     Discharge Provider: MARCOS Valencia     Discharge Diagnoses: Active Hospital Problems    Diagnosis     Cholestatic hepatitis [K75.89]     Biliary stent obstruction [T85.590A]     Type 2 diabetes mellitus without complication, without long-term current use of insulin ([address] [E11.9]     Hyperbilirubinemia [E80.6]        The patient was seen and examined on day of discharge and this discharge summary is in conjunction with any daily progress note from day of discharge. Hospital Course:   Sheri Shah is a 66-year-old male with a significant past medical history of hypertension, type 2 diabetes, CLL, and GERD who presents to [facility] as a direct admit from [facility] after presenting there on 3/24 with complaint of fatigue, fever at home, and upper abdominal pain. Of note, he had cholecystectomy and biliary stricture repair with stent placement last year. He notes to be under the care of [facility], has been doing well, was recently set to have outpatient labs repeated this week due to a mildly elevated aminotransferases. On arrival to [facility], lab studies showed markedly elevated LFTs including AST, ALT, alkaline phosphatase, and bilirubin. He was also noted to be jaundiced on arrival as well. CT of the abdomen showed obstruction of the biliary stent with/calculus versus soft tissue potentially signifying neoplasm. He was started on antibiotic therapy there. He was transferred here for higher level of care and GI evaluation.   Hospital team consulted to admit     Biliary Stent Obstruction/Cholestatic Hepatitis  - GI consult, ERCP on 3/28/2023 MRI abdomen mild biliary duct dilatation, restarted ursodiol  -No pain at dc  -Outpatient

## 2023-03-29 NOTE — PROGRESS NOTES
4 Eyes Skin Assessment     The patient is being assess for   Admission    I agree that 2 RN's have performed a thorough Head to Toe Skin Assessment on the patient. ALL assessment sites listed below have been assessed. Areas assessed for pressure by both nurses:   [x]   Head, Face, and Ears   [x]   Shoulders, Back, and Chest, Abdomen  [x]   Arms, Elbows, and Hands   [x]   Coccyx, Sacrum, and Ischium  [x]   Legs, Feet, and Heels               **SHARE this note so that the co-signing nurse is able to place an eSignature**    Co-signer eSignature: Electronically signed by José Antonio Granados RN on 3/26/23 at 5:51 AM EDT    Does the Patient have Skin Breakdown related to pressure?   No               Mickey Prevention initiated:  No   Wound Care Orders initiated:  No      [facility] nurse consulted for Pressure Injury (Stage 3,4, Unstageable, DTI, NWPT, Complex wounds)and New or Established Ostomies:  NA      Primary Nurse eSignature: Electronically signed by Kain Pérez RN on 3/26/23 at 5:49 AM EDT
Derrell Sanchez is a 77-year-old male with a significant past medical history of hypertension, type 2 diabetes, CLL, and GERD who presents to [facility] as a direct admit from [facility] after presenting there on 3/24 with complaint of fatigue, fever at home, and upper abdominal pain. Of note, he had cholecystectomy and biliary stricture repair with stent placement last year. He notes to be under the care of [facility], has been doing well, was recently set to have outpatient labs repeated this week due to a mildly elevated aminotransferases. On arrival to [facility], lab studies showed markedly elevated LFTs including AST, ALT, alkaline phosphatase, and bilirubin. He was also noted to be jaundiced on arrival as well. CT of the abdomen showed obstruction of the biliary stent with/calculus versus soft tissue potentially signifying neoplasm. He was started on antibiotic therapy there. He was transferred here for higher level of care and GI evaluation. Biliary Stent Obstruction/Cholestatic Hepatitis  - Admit to Inpatient  - GI consult placed for eval in AM  - Continue Zosyn IVPB  - Continue IVF, pain/nausea control     Type 2 DM  - Check A1c  - Low-dose NPO SSI q4h   - Holding metformin     Hypertension  - Good BP control on admission  - Holding oral antihypertensive therapy while NPO    HypoKalemia - etiology clinically unable to determine. Follow serial labs and replace PRN. Reviewed and documented as above.     Constipation - Miralax, doc/senna or suppository PRN     Hx of CLL    Discussed with nurse
GI Consult placed to [address] [place] [address] on 3/26/2023 at 1200. This RN received call back from Dr. Martha Kendall who stated GI will see patient tomorrow and gave orders to place patient on full liquid diet.
Hospitalist Progress Note      PCP: Eulalia Camp MD    Date of Admission: 3/26/2023    Chief Complaint:   DA from [facility]; elevated LFT, biliary stent obstruction on CT    Hospital Course:   Reagan Mario is a 66-year-old male with a significant past medical history of hypertension, type 2 diabetes, CLL, and GERD who presents to [facility] as a direct admit from [facility] after presenting there on 3/24 with complaint of fatigue, fever at home, and upper abdominal pain. Of note, he had cholecystectomy and biliary stricture repair with stent placement last year. He notes to be under the care of [facility], has been doing well, was recently set to have outpatient labs repeated this week due to a mildly elevated aminotransferases. On arrival to [facility], lab studies showed markedly elevated LFTs including AST, ALT, alkaline phosphatase, and bilirubin. He was also noted to be jaundiced on arrival as well. CT of the abdomen showed obstruction of the biliary stent with/calculus versus soft tissue potentially signifying neoplasm. He was started on antibiotic therapy there. He was transferred here for higher level of care and GI evaluation. Hospital team consulted to admit     Subjective:   Seen resting in bed, denies any abdominal pain, chest pain, shortness of breath, nausea or vomiting. Updated on plan of care.       Medications:  Reviewed    Infusion Medications    dextrose      sodium chloride      sodium chloride 75 mL/hr at 03/26/23 0505     Scheduled Medications    pantoprazole  40 mg Oral BID AC    dicyclomine  20 mg Oral Nightly    allopurinol  300 mg Oral Daily    ursodiol  600 mg Oral BID    sodium chloride flush  5-40 mL IntraVENous 2 times per day    enoxaparin  40 mg SubCUTAneous Daily    piperacillin-tazobactam  3,375 mg IntraVENous Q8H    insulin lispro  0-4 Units SubCUTAneous TID     insulin lispro  0-4 Units SubCUTAneous Nightly
Pt brought to PACU. Report obtained from OR RN and anesthesia.  Pt placed on monitor and RA
Spoke with Estill Ganser and gave report
Transferred to [facility], consents verified, IV patency verified. NPO @ midnight per patient and nurse.  Personal belongings in patient room, glasses to PACU
Crohn's disease, PSC and hepatic steatosis. Has been on ursodiol but stopped taking for unclear reason. S/p metal stent 6/2022. Spyglass with biopsy and brushings negative for malignancy. Fever, biliary obstruction. S/p ERCP yesterday with thick bile within metal stent. Cleared with brushing. Plastic stent placed inside metal stent. LFTs improved. Plan:  Advance to low fat diet. Can discharge from GI standpoint once tolerating. Will need to follow-up in office with Dr. Jennell Schlatter. Instructed him to stay on Ursodiol. New prescription called in. Please do not hesitate to call with questions or concerns.       Electronically signed by: ADILENE Matthews 3/29/2023 11:06 AM
pain/nausea control     Type 2 DM  -Hemoglobin A1c 6.3%  - Low-dose NPO SSI q4h   - Holding metformin     Hypertension  - Good BP control on admission  -Resume home medication regimen    DVT Prophylaxis: SCDs  Diet: ADULT DIET;  Clear Liquid  Code Status: Full Code  PT/OT Eval Status: Not ordered    Dispo - likely tomorrow    Appropriate for A1 Discharge Unit: MARCOS Rivas

## 2023-03-29 NOTE — CARE COORDINATION
Chart reviewed, Armando RODRÍGUEZ updated writer, ERCP done, pt will have no discharge needs. Please notify CM if needs arise.   SHANE Panda-RN
Discharge order noted. No needs from CM team. Discharge pending pt tolerating diet.
discharge: N/A            Potential DME:    Patient expects to discharge to: [facility] for transportation at discharge:      Financial    Payor: Jessica Single / Plan: MEDICARE PART A AND B / Product Type: *No Product type* /     Does insurance require precert for SNF: No    Potential assistance Purchasing Medications: No  Meds-to-Beds request:        [address]  [facility] [address] [place] [address] [address]  Phone: [phone] Fax: [phone]      Notes:    Factors facilitating achievement of predicted outcomes: Family support, Friend support, Motivated, Cooperative, Pleasant, Sense of humor, and Good insight into deficits    Barriers to discharge: none    Additional Case Management Notes: pt intends to discharge to home without needs. The Plan for Transition of Care is related to the following treatment goals of Cholestatic hepatitis [M69.47]    IF APPLICABLE: The Patient and/or patient representative Tee Wilhelm and his family were provided with a choice of provider and agrees with the discharge plan. Freedom of choice list with basic dialogue that supports the patient's individualized plan of care/goals and shares the quality data associated with the providers was provided to:     Patient Representative Name:       The Patient and/or Patient Representative Agree with the Discharge Plan?       Oral PRINCE Carrion  Case Management Department  Ph: [phone] Fax: [phone]

## 2023-04-06 ENCOUNTER — HOSPITAL ENCOUNTER (OUTPATIENT)
Age: 73
Discharge: HOME | End: 2023-04-06
Payer: MEDICARE

## 2023-04-06 LAB
INR PPP: 0.96 (ref 0.84–1.16)
PROTHROMBIN TIME: 12.8 SEC (ref 11.5–14.8)

## 2023-04-06 PROCEDURE — 85610 PROTHROMBIN TIME: CPT

## 2023-04-06 PROCEDURE — 85025 COMPLETE CBC W/AUTO DIFF WBC: CPT

## 2023-04-06 PROCEDURE — 36415 COLL VENOUS BLD VENIPUNCTURE: CPT

## 2023-04-06 PROCEDURE — 83690 ASSAY OF LIPASE: CPT

## 2023-04-06 PROCEDURE — 80053 COMPREHEN METABOLIC PANEL: CPT

## 2023-04-07 LAB
ALBUMIN SERPL-MCNC: 4.1 G/DL (ref 3.4–5)
ALBUMIN/GLOB SERPL: 1.4 {RATIO} (ref 1.1–2.2)
ALP SERPL-CCNC: 111 U/L (ref 40–129)
ALT SERPL-CCNC: 27 U/L (ref 10–40)
ANION GAP SERPL CALCULATED.3IONS-SCNC: 14 MMOL/L (ref 3–16)
AST SERPL-CCNC: 15 U/L (ref 15–37)
BASOPHILS # BLD: 0.1 K/UL (ref 0–0.2)
BASOPHILS NFR BLD: 0.9 %
BILIRUB SERPL-MCNC: 0.6 MG/DL (ref 0–1)
BUN SERPL-MCNC: 15 MG/DL (ref 7–20)
CALCIUM SERPL-MCNC: 9.5 MG/DL (ref 8.3–10.6)
CHLORIDE SERPL-SCNC: 95 MMOL/L (ref 99–110)
CO2 SERPL-SCNC: 26 MMOL/L (ref 21–32)
CREAT SERPL-MCNC: 0.6 MG/DL (ref 0.8–1.3)
DEPRECATED RDW RBC AUTO: 12.9 % (ref 12.4–15.4)
EOSINOPHIL # BLD: 0.2 K/UL (ref 0–0.6)
EOSINOPHIL NFR BLD: 2.7 %
GFR SERPLBLD CREATININE-BSD FMLA CKD-EPI: >60 ML/MIN/{1.73_M2}
GLUCOSE SERPL-MCNC: 127 MG/DL (ref 70–99)
HCT VFR BLD AUTO: 39.8 % (ref 40.5–52.5)
HGB BLD-MCNC: 13.7 G/DL (ref 13.5–17.5)
LIPASE SERPL-CCNC: 56 U/L (ref 13–60)
LYMPHOCYTES # BLD: 1.4 K/UL (ref 1–5.1)
LYMPHOCYTES NFR BLD: 20.3 %
MCH RBC QN AUTO: 33.7 PG (ref 26–34)
MCHC RBC AUTO-ENTMCNC: 34.3 G/DL (ref 31–36)
MCV RBC AUTO: 98.1 FL (ref 80–100)
MONOCYTES # BLD: 0.4 K/UL (ref 0–1.3)
MONOCYTES NFR BLD: 6.3 %
NEUTROPHILS # BLD: 4.9 K/UL (ref 1.7–7.7)
NEUTROPHILS NFR BLD: 69.8 %
PLATELET # BLD AUTO: 288 K/UL (ref 135–450)
PMV BLD AUTO: 8.1 FL (ref 5–10.5)
POTASSIUM SERPL-SCNC: 3.5 MMOL/L (ref 3.5–5.1)
PROT SERPL-MCNC: 7.1 G/DL (ref 6.4–8.2)
RBC # BLD AUTO: 4.05 M/UL (ref 4.2–5.9)
SODIUM SERPL-SCNC: 135 MMOL/L (ref 136–145)
WBC # BLD AUTO: 7 K/UL (ref 4–11)

## 2023-04-16 ENCOUNTER — HOSPITAL ENCOUNTER (OUTPATIENT)
Dept: CT IMAGING | Age: 73
Discharge: HOME | End: 2023-04-16
Payer: MEDICARE

## 2023-04-16 DIAGNOSIS — R10.11 RIGHT UPPER QUADRANT PAIN: ICD-10-CM

## 2023-04-16 DIAGNOSIS — K83.1 BILE DUCT STRICTURE: ICD-10-CM

## 2023-04-16 DIAGNOSIS — K83.09 CHOLANGITIS: ICD-10-CM

## 2023-04-16 PROCEDURE — 6360000004 HC RX CONTRAST MEDICATION: Performed by: INTERNAL MEDICINE

## 2023-04-16 PROCEDURE — 74160 CT ABDOMEN W/CONTRAST: CPT

## 2023-04-16 RX ADMIN — IOPAMIDOL 75 ML: 755 INJECTION, SOLUTION INTRAVENOUS at 11:14

## 2023-05-04 ENCOUNTER — HOSPITAL ENCOUNTER (OUTPATIENT)
Age: 73
Discharge: HOME | End: 2023-05-04
Payer: MEDICARE

## 2023-05-04 LAB
ALBUMIN SERPL-MCNC: 4.1 G/DL (ref 3.4–5)
ALBUMIN/GLOB SERPL: 1.2 {RATIO} (ref 1.1–2.2)
ALP SERPL-CCNC: 98 U/L (ref 40–129)
ALT SERPL-CCNC: 37 U/L (ref 10–40)
ANION GAP SERPL CALCULATED.3IONS-SCNC: 12 MMOL/L (ref 3–16)
AST SERPL-CCNC: 28 U/L (ref 15–37)
BASOPHILS # BLD: 0 K/UL (ref 0–0.2)
BASOPHILS NFR BLD: 0.4 %
BILIRUB SERPL-MCNC: 0.5 MG/DL (ref 0–1)
BUN SERPL-MCNC: 12 MG/DL (ref 7–20)
CALCIUM SERPL-MCNC: 9.1 MG/DL (ref 8.3–10.6)
CHLORIDE SERPL-SCNC: 99 MMOL/L (ref 99–110)
CO2 SERPL-SCNC: 24 MMOL/L (ref 21–32)
CREAT SERPL-MCNC: <0.5 MG/DL (ref 0.8–1.3)
DEPRECATED RDW RBC AUTO: 13.5 % (ref 12.4–15.4)
EOSINOPHIL # BLD: 0.1 K/UL (ref 0–0.6)
EOSINOPHIL NFR BLD: 1.6 %
GFR SERPLBLD CREATININE-BSD FMLA CKD-EPI: >60 ML/MIN/{1.73_M2}
GLUCOSE SERPL-MCNC: 223 MG/DL (ref 70–99)
HCT VFR BLD AUTO: 37.1 % (ref 40.5–52.5)
HGB BLD-MCNC: 12.9 G/DL (ref 13.5–17.5)
INR PPP: 1.02 (ref 0.84–1.16)
LIPASE SERPL-CCNC: 44 U/L (ref 13–60)
LYMPHOCYTES # BLD: 0.7 K/UL (ref 1–5.1)
LYMPHOCYTES NFR BLD: 11.9 %
MCH RBC QN AUTO: 33.3 PG (ref 26–34)
MCHC RBC AUTO-ENTMCNC: 34.8 G/DL (ref 31–36)
MCV RBC AUTO: 95.8 FL (ref 80–100)
MONOCYTES # BLD: 0.4 K/UL (ref 0–1.3)
MONOCYTES NFR BLD: 7.1 %
NEUTROPHILS # BLD: 4.8 K/UL (ref 1.7–7.7)
NEUTROPHILS NFR BLD: 79 %
PLATELET # BLD AUTO: 202 K/UL (ref 135–450)
PMV BLD AUTO: 6.6 FL (ref 5–10.5)
POTASSIUM SERPL-SCNC: 3.6 MMOL/L (ref 3.5–5.1)
PROT SERPL-MCNC: 7.4 G/DL (ref 6.4–8.2)
PROTHROMBIN TIME: 13.4 SEC (ref 11.5–14.8)
RBC # BLD AUTO: 3.87 M/UL (ref 4.2–5.9)
SODIUM SERPL-SCNC: 135 MMOL/L (ref 136–145)
WBC # BLD AUTO: 6.1 K/UL (ref 4–11)

## 2023-05-04 PROCEDURE — 85025 COMPLETE CBC W/AUTO DIFF WBC: CPT

## 2023-05-04 PROCEDURE — 80053 COMPREHEN METABOLIC PANEL: CPT

## 2023-05-04 PROCEDURE — 85610 PROTHROMBIN TIME: CPT

## 2023-05-04 PROCEDURE — 83690 ASSAY OF LIPASE: CPT

## 2023-05-04 PROCEDURE — 36415 COLL VENOUS BLD VENIPUNCTURE: CPT

## 2023-05-16 RX ORDER — FINASTERIDE 5 MG/1
5 TABLET, FILM COATED ORAL EVERY EVENING
COMMUNITY

## 2023-05-24 ENCOUNTER — ANESTHESIA (OUTPATIENT)
Dept: ENDOSCOPY | Age: 73
End: 2023-05-24
Payer: MEDICARE

## 2023-05-24 ENCOUNTER — ANESTHESIA EVENT (OUTPATIENT)
Dept: ENDOSCOPY | Age: 73
End: 2023-05-24
Payer: MEDICARE

## 2023-05-24 ENCOUNTER — HOSPITAL ENCOUNTER (OUTPATIENT)
Age: 73
Setting detail: OUTPATIENT SURGERY
Discharge: HOME | End: 2023-05-24
Attending: INTERNAL MEDICINE | Admitting: INTERNAL MEDICINE
Payer: MEDICARE

## 2023-05-24 ENCOUNTER — APPOINTMENT (OUTPATIENT)
Dept: GENERAL RADIOLOGY | Age: 73
End: 2023-05-24
Attending: INTERNAL MEDICINE
Payer: MEDICARE

## 2023-05-24 VITALS
RESPIRATION RATE: 16 BRPM | DIASTOLIC BLOOD PRESSURE: 78 MMHG | OXYGEN SATURATION: 95 % | BODY MASS INDEX: 23.66 KG/M2 | HEART RATE: 63 BPM | HEIGHT: 71 IN | WEIGHT: 169 LBS | SYSTOLIC BLOOD PRESSURE: 139 MMHG | TEMPERATURE: 97.3 F

## 2023-05-24 DIAGNOSIS — Z46.89 ENCOUNTER FOR REPLACEMENT OF BILIARY STENT: ICD-10-CM

## 2023-05-24 LAB
GLUCOSE BLD-MCNC: 140 MG/DL (ref 70–99)
GLUCOSE BLD-MCNC: 158 MG/DL (ref 70–99)
INR PPP: 1.03 (ref 0.84–1.16)
PERFORMED ON: ABNORMAL
PERFORMED ON: ABNORMAL
PLATELET # BLD AUTO: 217 K/UL (ref 135–450)
PROTHROMBIN TIME: 13.5 SEC (ref 11.5–14.8)

## 2023-05-24 PROCEDURE — 7100000011 HC PHASE II RECOVERY - ADDTL 15 MIN: Performed by: INTERNAL MEDICINE

## 2023-05-24 PROCEDURE — 3609014300 HC ERCP BALLOON DILATE BILIARY/PANC DUCT/AMPULLA EA: Performed by: INTERNAL MEDICINE

## 2023-05-24 PROCEDURE — 3609015200 HC ERCP REMOVE CALCULI/DEBRIS BILIARY/PANCREAS DUCT: Performed by: INTERNAL MEDICINE

## 2023-05-24 PROCEDURE — 85049 AUTOMATED PLATELET COUNT: CPT

## 2023-05-24 PROCEDURE — 6360000002 HC RX W HCPCS: Performed by: NURSE ANESTHETIST, CERTIFIED REGISTERED

## 2023-05-24 PROCEDURE — 7100000010 HC PHASE II RECOVERY - FIRST 15 MIN: Performed by: INTERNAL MEDICINE

## 2023-05-24 PROCEDURE — C1874 STENT, COATED/COV W/DEL SYS: HCPCS | Performed by: INTERNAL MEDICINE

## 2023-05-24 PROCEDURE — 74330 X-RAY BILE/PANC ENDOSCOPY: CPT

## 2023-05-24 PROCEDURE — 88112 CYTOPATH CELL ENHANCE TECH: CPT

## 2023-05-24 PROCEDURE — 2500000003 HC RX 250 WO HCPCS: Performed by: NURSE ANESTHETIST, CERTIFIED REGISTERED

## 2023-05-24 PROCEDURE — 7100000001 HC PACU RECOVERY - ADDTL 15 MIN: Performed by: INTERNAL MEDICINE

## 2023-05-24 PROCEDURE — 85610 PROTHROMBIN TIME: CPT

## 2023-05-24 PROCEDURE — 3609018800 HC ERCP DX COLLECTION SPECIMEN BRUSHING/WASHING: Performed by: INTERNAL MEDICINE

## 2023-05-24 PROCEDURE — 36415 COLL VENOUS BLD VENIPUNCTURE: CPT

## 2023-05-24 PROCEDURE — 3700000000 HC ANESTHESIA ATTENDED CARE: Performed by: INTERNAL MEDICINE

## 2023-05-24 PROCEDURE — 2709999900 HC NON-CHARGEABLE SUPPLY: Performed by: INTERNAL MEDICINE

## 2023-05-24 PROCEDURE — 2580000003 HC RX 258: Performed by: NURSE ANESTHETIST, CERTIFIED REGISTERED

## 2023-05-24 PROCEDURE — 7100000000 HC PACU RECOVERY - FIRST 15 MIN: Performed by: INTERNAL MEDICINE

## 2023-05-24 PROCEDURE — 2720000010 HC SURG SUPPLY STERILE: Performed by: INTERNAL MEDICINE

## 2023-05-24 PROCEDURE — 88305 TISSUE EXAM BY PATHOLOGIST: CPT

## 2023-05-24 PROCEDURE — 3700000001 HC ADD 15 MINUTES (ANESTHESIA): Performed by: INTERNAL MEDICINE

## 2023-05-24 PROCEDURE — 3609014500 HC ERCP BILIARY/PANC DUCT STENT EXCHANGE W/DIL&WIRE: Performed by: INTERNAL MEDICINE

## 2023-05-24 DEVICE — STENT SYSTEM RMV
Type: IMPLANTABLE DEVICE | Status: FUNCTIONAL
Brand: WALLFLEX BILIARY

## 2023-05-24 RX ORDER — MEPERIDINE HYDROCHLORIDE 25 MG/ML
12.5 INJECTION INTRAMUSCULAR; INTRAVENOUS; SUBCUTANEOUS EVERY 5 MIN PRN
Status: CANCELLED | OUTPATIENT
Start: 2023-05-24

## 2023-05-24 RX ORDER — SODIUM CHLORIDE, SODIUM LACTATE, POTASSIUM CHLORIDE, CALCIUM CHLORIDE 600; 310; 30; 20 MG/100ML; MG/100ML; MG/100ML; MG/100ML
INJECTION, SOLUTION INTRAVENOUS CONTINUOUS
Status: DISCONTINUED | OUTPATIENT
Start: 2023-05-24 | End: 2023-05-24 | Stop reason: HOSPADM

## 2023-05-24 RX ORDER — OXYCODONE HYDROCHLORIDE 5 MG/1
10 TABLET ORAL PRN
Status: CANCELLED | OUTPATIENT
Start: 2023-05-24 | End: 2023-05-24

## 2023-05-24 RX ORDER — ROCURONIUM BROMIDE 10 MG/ML
INJECTION, SOLUTION INTRAVENOUS PRN
Status: DISCONTINUED | OUTPATIENT
Start: 2023-05-24 | End: 2023-05-24 | Stop reason: SDUPTHER

## 2023-05-24 RX ORDER — SODIUM CHLORIDE 0.9 % (FLUSH) 0.9 %
5-40 SYRINGE (ML) INJECTION PRN
Status: CANCELLED | OUTPATIENT
Start: 2023-05-24

## 2023-05-24 RX ORDER — SODIUM CHLORIDE, SODIUM LACTATE, POTASSIUM CHLORIDE, CALCIUM CHLORIDE 600; 310; 30; 20 MG/100ML; MG/100ML; MG/100ML; MG/100ML
INJECTION, SOLUTION INTRAVENOUS CONTINUOUS PRN
Status: DISCONTINUED | OUTPATIENT
Start: 2023-05-24 | End: 2023-05-24 | Stop reason: SDUPTHER

## 2023-05-24 RX ORDER — OXYCODONE HYDROCHLORIDE 5 MG/1
5 TABLET ORAL PRN
Status: CANCELLED | OUTPATIENT
Start: 2023-05-24 | End: 2023-05-24

## 2023-05-24 RX ORDER — DEXMEDETOMIDINE HYDROCHLORIDE 100 UG/ML
INJECTION, SOLUTION INTRAVENOUS PRN
Status: DISCONTINUED | OUTPATIENT
Start: 2023-05-24 | End: 2023-05-24 | Stop reason: SDUPTHER

## 2023-05-24 RX ORDER — ONDANSETRON 2 MG/ML
4 INJECTION INTRAMUSCULAR; INTRAVENOUS
Status: CANCELLED | OUTPATIENT
Start: 2023-05-24

## 2023-05-24 RX ORDER — LABETALOL HYDROCHLORIDE 5 MG/ML
5 INJECTION, SOLUTION INTRAVENOUS EVERY 10 MIN PRN
Status: CANCELLED | OUTPATIENT
Start: 2023-05-24

## 2023-05-24 RX ORDER — LIDOCAINE HYDROCHLORIDE 20 MG/ML
INJECTION, SOLUTION EPIDURAL; INFILTRATION; INTRACAUDAL; PERINEURAL PRN
Status: DISCONTINUED | OUTPATIENT
Start: 2023-05-24 | End: 2023-05-24 | Stop reason: SDUPTHER

## 2023-05-24 RX ORDER — SODIUM CHLORIDE 9 MG/ML
INJECTION, SOLUTION INTRAVENOUS PRN
Status: CANCELLED | OUTPATIENT
Start: 2023-05-24

## 2023-05-24 RX ORDER — PROPOFOL 10 MG/ML
INJECTION, EMULSION INTRAVENOUS PRN
Status: DISCONTINUED | OUTPATIENT
Start: 2023-05-24 | End: 2023-05-24 | Stop reason: SDUPTHER

## 2023-05-24 RX ORDER — SODIUM CHLORIDE 0.9 % (FLUSH) 0.9 %
5-40 SYRINGE (ML) INJECTION EVERY 12 HOURS SCHEDULED
Status: CANCELLED | OUTPATIENT
Start: 2023-05-24

## 2023-05-24 RX ORDER — ONDANSETRON 2 MG/ML
INJECTION INTRAMUSCULAR; INTRAVENOUS PRN
Status: DISCONTINUED | OUTPATIENT
Start: 2023-05-24 | End: 2023-05-24 | Stop reason: SDUPTHER

## 2023-05-24 RX ORDER — DIPHENHYDRAMINE HYDROCHLORIDE 50 MG/ML
12.5 INJECTION INTRAMUSCULAR; INTRAVENOUS
Status: CANCELLED | OUTPATIENT
Start: 2023-05-24 | End: 2023-05-25

## 2023-05-24 RX ADMIN — ROCURONIUM BROMIDE 40 MG: 10 INJECTION, SOLUTION INTRAVENOUS at 09:52

## 2023-05-24 RX ADMIN — SODIUM CHLORIDE, POTASSIUM CHLORIDE, SODIUM LACTATE AND CALCIUM CHLORIDE: 600; 310; 30; 20 INJECTION, SOLUTION INTRAVENOUS at 09:51

## 2023-05-24 RX ADMIN — ONDANSETRON HYDROCHLORIDE 4 MG: 2 INJECTION, SOLUTION INTRAMUSCULAR; INTRAVENOUS at 10:02

## 2023-05-24 RX ADMIN — SUGAMMADEX 200 MG: 100 INJECTION, SOLUTION INTRAVENOUS at 10:34

## 2023-05-24 RX ADMIN — LIDOCAINE HYDROCHLORIDE 60 MG: 20 INJECTION, SOLUTION EPIDURAL; INFILTRATION; INTRACAUDAL; PERINEURAL at 09:52

## 2023-05-24 RX ADMIN — PROPOFOL 150 MG: 10 INJECTION, EMULSION INTRAVENOUS at 09:52

## 2023-05-24 RX ADMIN — DEXMEDETOMIDINE HYDROCHLORIDE 10 MCG: 100 INJECTION, SOLUTION INTRAVENOUS at 09:52

## 2023-05-24 ASSESSMENT — PAIN - FUNCTIONAL ASSESSMENT: PAIN_FUNCTIONAL_ASSESSMENT: NONE - DENIES PAIN

## 2023-05-24 NOTE — ANESTHESIA POSTPROCEDURE EVALUATION
Department of Anesthesiology  Postprocedure Note    Patient: Zachary Maharaj  MRN: [id number]  YOB: 1950  Date of evaluation: 5/24/2023      Procedure Summary     Date: 05/24/23 Room / Location: [facility] ENDO 01 / [facility]    Anesthesia Start: 5523 Anesthesia Stop: 1049    Procedures:       ERCP STENT REMOVAL/EXCHANGE      ERCP DILATION BALLOON      ERCP BILIARY BRUSHING      ERCP STONE REMOVAL Diagnosis:       Encounter for replacement of biliary stent      (Encounter for replacement of biliary stent [Z46.89])    Surgeons: Domonique Khan MD Responsible Provider: Brandon Olson MD    Anesthesia Type: general ASA Status: 3          Anesthesia Type: No value filed.     Elenita Phase I: Elenita Score: 10    Elenita Phase II: Elenita Score: 10      Anesthesia Post Evaluation    Comments: Postoperative Anesthesia Note    Name:    Zachary Maharaj  MRN:      [id number]    Patient Vitals in the past 12 hrs:  05/24/23 1130, BP:139/78, Temp:97.3 °F (36.3 °C), Temp src:Temporal, Pulse:63, Resp:16, SpO2:95 %  05/24/23 1125, BP:(!) 140/80, Pulse:63, Resp:14, SpO2:96 %  05/24/23 1117, BP:132/85, Temp:97.3 °F (36.3 °C), Temp src:Temporal, Pulse:58, Resp:13, SpO2:98 %  05/24/23 1115, BP:131/89, Pulse:59, Resp:18, SpO2:98 %  05/24/23 1100, BP:(!) 143/83, Temp:97.3 °F (36.3 °C), Pulse:55, Resp:10, SpO2:96 %  05/24/23 1055, BP:133/81, Pulse:54, Resp:10  05/24/23 1050, BP:(!) 146/92, Pulse:58, Resp:12, SpO2:98 %  05/24/23 1045, BP:(!) 144/82, Temp:97.3 °F (36.3 °C), Temp src:Temporal, Pulse:59, Resp:10, SpO2:98 %  05/24/23 0904, BP:139/84, Temp:98.2 °F (36.8 °C), Temp src:Infrared, Pulse:68, Resp:16, SpO2:96 %     LABS:    CBC  Lab Results       Component                Value               Date/Time                  WBC                      6.1                 05/04/2023 03:34 PM        HGB                      12.9 (L)            05/04/2023 03:34 PM        HCT                      37.1 (L)

## 2023-05-24 NOTE — ANESTHESIA PRE PROCEDURE
Department of Anesthesiology  Preprocedure Note       Name:  Siddharth Martin   Age:  67 y.o.  :  1950                                          MRN:  [id number]         Date:  2023      Surgeon: Hailey Millan):  Ashish Leonard MD    Procedure: Procedure(s):  ERCP WF W/ANES. (10:00) 10CM & 8CM LONG BILIARY METAL STENT    Medications prior to admission:   Prior to Admission medications    Medication Sig Start Date End Date Taking? Authorizing Provider   finasteride (PROSCAR) 5 MG tablet Take 1 tablet by mouth every evening   Yes Historical Provider, MD   ursodiol (ACTIGALL) 300 MG capsule Take 2 capsules by mouth 2 times daily 3/29/23   MARCOS Johnson   metFORMIN (GLUCOPHAGE-XR) 500 MG extended release tablet TAKE 1 TABLET BY MOUTH TWICE DAILY 22   Historical Provider, MD   dicyclomine (BENTYL) 20 MG tablet Take 20 mg by mouth nightly 22   Historical Provider, MD   triamterene-hydroCHLOROthiazide (DYAZIDE) 37.5-25 MG per capsule Take 1 capsule by mouth 2 times daily (before meals) 22   Historical Provider, MD   ondansetron (ZOFRAN) 4 MG tablet Take 1 tablet by mouth 3 times daily as needed for Nausea or Vomiting 6/3/22   Jeffy Perez MD   gemfibrozil (LOPID) 600 MG tablet Take 600 mg by mouth 2 times daily (before meals)    Historical Provider, MD   pantoprazole (PROTONIX) 40 MG tablet Take 1 tablet by mouth 2 times daily (before meals) for 14 days  Patient taking differently: Take 1 tablet by mouth daily 22  Benjiman Dakins, MD   allopurinol (ZYLOPRIM) 300 MG tablet Take 300 mg by mouth daily. Historical Provider, MD   albuterol (PROVENTIL HFA;VENTOLIN HFA) 108 (90 BASE) MCG/ACT inhaler Inhale 2 puffs into the lungs every 6 hours as needed. Historical Provider, MD       Current medications:    No current facility-administered medications for this encounter. Allergies:     Allergies   Allergen Reactions    Augmentin [Amoxicillin-Pot

## 2023-05-24 NOTE — DISCHARGE INSTRUCTIONS
PATIENT INSTRUCTIONS  POST-SEDATION    Zachary Carol Ann          IMMEDIATELY FOLLOWING PROCEDURE:    Do not drive or operate machinery for the first twenty four hours after surgery. Do not make any important decisions for twenty four hours after surgery or while taking narcotic pain medications or sedatives. You should NOT BE LEFT UNATTENDED OR ALONE. A responsible adult should be with you for the rest of the day of your procedure and also during the night for your protection and safety. You may experience some light headedness. Rest at home with activity as tolerated. You may not need to go to bed, but it is important to rest for the next 24 hours. You should not engage in athletic sports such as basketball, volleyball, jogging, skating, or activities requiring refined motor skills for 24 hours. If you develop intractable nausea and vomiting or a severe headache please notify your doctor immediately. You are not expected to have any fever, but if you feel warm, take your temperature. If you have a fever 101 degrees or higher, call your doctor. If you have had an Endoscopy:   *Eat lightly for your first meal and gradually resume your normal / prescribed diet. *If you have a sore throat you may use lozenges, or salt water gargles. ONCE YOU ARE HOME, IF YOU SHOULD HAVE:  Difficulty in breathing, persistent nausea or vomiting, bleeding you feel is excessive, or pain that is unusual, increased abdominal bloating, or any swelling, fever / chills, call your physician. If you cannot contact your physician, but feel that your signs and symptoms need a physician's attention, go to the Emergency Department. FOLLOW-UP:    Please follow up with Dr. Agnes Gabriel as scheduled or needed. Dr. Jessica Bro office will call you with the biopsy findings. Call Dr. Lisa Izquierdo if there are any GI concerns. [phone].

## 2023-05-24 NOTE — H&P
History and Physical / Pre-Sedation Assessment    Patient:  Hebert Dennis   :   1950     Intended Procedure:  ERCP    HPI: 67year old male with bile duct stricture s/p stent presents for stent revision    Past Medical History:   Diagnosis Date    Asthma     Blood circulation, collateral     Carpal tunnel syndrome     CLL (chronic lymphocytic leukemia) ([facility] [address]     Crohn's colitis ([facility] [address]     Diabetes mellitus ([facility] [address]     emboli     pulmonary emboli in     GERD (gastroesophageal reflux disease)     Chrons disease    Gout     Hx of blood clots     Hypertension     Pneumonia     pneumonia,asthma    prostate     infection    Seasonal allergies      Past Surgical History:   Procedure Laterality Date    CYST REMOVAL      ERCP N/A 2022    ERCP BIOPSY performed by Samantha Greenfield MD at [address]    ERCP  2022    ERCP SPHINCTER/PAPILLOTOMY performed by Samantha Greenfield MD at [address]    ERCP  2022    ERCP STENT INSERTION performed by Smaantha Greenfield MD at [address]    ERCP N/A 3/11/2022    ERCP STENT REMOVAL performed by Samantha Greenfield MD at [address]    ERCP  3/11/2022    ERCP STENT INSERTION performed by Samantha Greenfield MD at [address]    ERCP  3/11/2022    ERCP BIOPSY performed by Samantha Greenfield MD at [address]    ERCP N/A 2022    ERCP STENT INSERTION performed by Samantha Greenfield MD at [address]    ERCP  2022    ERCP ENDOSCOPIC RETROGRADE CHOLANGIOPANCREATOGRAPHY DIRECT VISUALIZATION performed by Samantha Greenfield MD at [address]    ERCP N/A 2022    ERCP BILIARY BRUSHING performed by Samantha Greenfield MD at [address]    ERCP  2022    ERCP STENT INSERTION performed by Samantha Greenfield MD at [address]    ERCP N/A 3/28/2023    ERCP ENDOSCOPIC RETROGRADE CHOLANGIOPANCREATOGRAPHY performed

## 2023-05-24 NOTE — PROGRESS NOTES
Discharge instructions given to patient's wife Kayisabel Luibhakti at this time, she states understanding.

## 2023-05-24 NOTE — OP NOTE
[address]                 [address] [address]                                OPERATIVE REPORT    PATIENT NAME: Eder Rascon                 :        1950  MED REC NO:   [id number]                          ROOM:  ACCOUNT NO:   [de-identified]                           ADMIT DATE: 2023  PROVIDER:     Domenica Bamberger, MD    DATE OF PROCEDURE:  2023    PRE-PROCEDURE DIAGNOSES:  1.  Bile duct stricture. 2.  Biliary obstruction. POSTOPERATIVE DIAGNOSES:  1.  Bile duct stricture. 2.  Biliary obstruction. OPERATION PERFORMED:  ERCP with stent removal, brush cytology, and stone  extraction and stent placement. SURGEON:  Domenica Bamberger, MD    PROCEDURE INDICATIONS:  This is a 77-year-old male with a history of  CLL, asthma, diabetes, GERD, Crohn's disease, suspected PSC, and benign  bile duct stricture requiring repeat ERCP and stent placement for  biliary obstruction, presents for a stent revision. MEDICATIONS:  MAC per Anesthesia. PROCEDURE IN DETAIL:  Informed consent was obtained after discussing  risks, benefits, and alternatives. Full history and physical was  performed. The patient was classified as ASA class III. Medications  were given by Anesthesia. Cardiopulmonary status was continuously  monitored throughout the procedure. The patient was placed in the left  lateral decubitus position. The patient underwent endotracheal  intubation for airway protection. He was then placed in prone position. Once adequately sedated and positioned, a standard therapeutic  duodenoscope was inserted in the mouth and advanced under direct  visualization to the second portion of the duodenum. The entire mucosa  of esophagus, stomach, and duodenum were examined carefully. The  patient tolerated the procedure well without any difficulties.     FINDINGS:    ESOPHAGUS:  Examined esophagus appeared normal on limited

## 2023-05-24 NOTE — BRIEF OP NOTE
Brief Postoperative Note      Patient: Shea Vicente  YOB: 1950  MRN: [id number]    Date of Procedure: 5/24/2023    Pre-Op Diagnosis Codes:     * Encounter for replacement of biliary stent [Z46.89]    Post-Op Diagnosis:  bile duct stricture s/p brushing and stent replacement, choledocholithiasis s/p extraction       Procedure(s):  ERCP STENT REMOVAL/EXCHANGE  ERCP DILATION BALLOON  ERCP BILIARY BRUSHING  ERCP STONE REMOVAL    Surgeon(s):  Cristiana Rodrigues MD    Assistant:  * No surgical staff found *    Anesthesia: General    Estimated Blood Loss (mL): Minimal    Complications: None    Specimens:   ID Type Source Tests Collected by Time Destination   A :  Tissue Biopsy CYTOLOGY, NON-GYN Cristiana Rodrigues MD 5/24/2023 1026        Implants:  Implant Name Type Inv.  Item Serial No.  Lot No. LRB No. Used Action   STENT BILI L60MM [id number] CATH 8.5FR L194CM GWIRE 0.035IN - [id number]  STENT BILI L60MM [id number] CATH 8.5FR L194CM GWIRE 0.035IN  [employer]WD [id number] N/A 1 Implanted         Drains: * No LDAs found *    Findings: bile duct stricture s/p brushing and stent replacement, choledocholithiasis s/p extraction      Electronically signed by Cristiana Rodrigues MD on 5/24/2023 at 10:52 AM

## 2023-05-27 ENCOUNTER — HOSPITAL ENCOUNTER (EMERGENCY)
Age: 73
Discharge: HOME | End: 2023-05-27
Attending: EMERGENCY MEDICINE
Payer: MEDICARE

## 2023-05-27 VITALS
DIASTOLIC BLOOD PRESSURE: 73 MMHG | OXYGEN SATURATION: 97 % | SYSTOLIC BLOOD PRESSURE: 121 MMHG | TEMPERATURE: 99.1 F | HEART RATE: 73 BPM | RESPIRATION RATE: 21 BRPM

## 2023-05-27 DIAGNOSIS — R19.7 DIARRHEA, UNSPECIFIED TYPE: Primary | ICD-10-CM

## 2023-05-27 DIAGNOSIS — E87.6 HYPOKALEMIA: ICD-10-CM

## 2023-05-27 DIAGNOSIS — K92.2 UGIB (UPPER GASTROINTESTINAL BLEED): ICD-10-CM

## 2023-05-27 LAB
ALBUMIN SERPL-MCNC: 3.7 G/DL (ref 3.4–5)
ALBUMIN/GLOB SERPL: 1.1 {RATIO} (ref 1.1–2.2)
ALP SERPL-CCNC: 103 U/L (ref 40–129)
ALT SERPL-CCNC: 29 U/L (ref 10–40)
ANION GAP SERPL CALCULATED.3IONS-SCNC: 14 MMOL/L (ref 3–16)
APTT BLD: 30.5 SEC (ref 22.7–35.9)
AST SERPL-CCNC: 38 U/L (ref 15–37)
BASOPHILS # BLD: 0 K/UL (ref 0–0.2)
BASOPHILS NFR BLD: 0.2 %
BILIRUB SERPL-MCNC: 0.5 MG/DL (ref 0–1)
BILIRUB UR QL STRIP.AUTO: NEGATIVE
BUN SERPL-MCNC: 11 MG/DL (ref 7–20)
C DIFF TOX A+B STL QL IA: NORMAL
CALCIUM SERPL-MCNC: 9 MG/DL (ref 8.3–10.6)
CHLORIDE SERPL-SCNC: 98 MMOL/L (ref 99–110)
CLARITY UR: CLEAR
CO2 SERPL-SCNC: 22 MMOL/L (ref 21–32)
COLOR UR: YELLOW
CREAT SERPL-MCNC: <0.5 MG/DL (ref 0.8–1.3)
DEPRECATED RDW RBC AUTO: 13.3 % (ref 12.4–15.4)
EOSINOPHIL # BLD: 0 K/UL (ref 0–0.6)
EOSINOPHIL NFR BLD: 0.6 %
GFR SERPLBLD CREATININE-BSD FMLA CKD-EPI: >60 ML/MIN/{1.73_M2}
GLUCOSE SERPL-MCNC: 132 MG/DL (ref 70–99)
GLUCOSE UR STRIP.AUTO-MCNC: NEGATIVE MG/DL
HCT VFR BLD AUTO: 41 % (ref 40.5–52.5)
HEMOCCULT STL QL: ABNORMAL
HGB BLD-MCNC: 14.5 G/DL (ref 13.5–17.5)
HGB UR QL STRIP.AUTO: NEGATIVE
INR PPP: 0.97 (ref 0.84–1.16)
KETONES UR STRIP.AUTO-MCNC: NEGATIVE MG/DL
LACTATE BLDV-SCNC: 1.2 MMOL/L (ref 0.4–1.9)
LEUKOCYTE ESTERASE UR QL STRIP.AUTO: NEGATIVE
LIPASE SERPL-CCNC: 32 U/L (ref 13–60)
LYMPHOCYTES # BLD: 0.6 K/UL (ref 1–5.1)
LYMPHOCYTES NFR BLD: 9.6 %
MAGNESIUM SERPL-MCNC: 1.9 MG/DL (ref 1.8–2.4)
MCH RBC QN AUTO: 33.6 PG (ref 26–34)
MCHC RBC AUTO-ENTMCNC: 35.4 G/DL (ref 31–36)
MCV RBC AUTO: 94.9 FL (ref 80–100)
MONOCYTES # BLD: 0.5 K/UL (ref 0–1.3)
MONOCYTES NFR BLD: 7.3 %
NEUTROPHILS # BLD: 5.2 K/UL (ref 1.7–7.7)
NEUTROPHILS NFR BLD: 82.3 %
NITRITE UR QL STRIP.AUTO: NEGATIVE
PH UR STRIP.AUTO: 5.5 [PH] (ref 5–8)
PLATELET # BLD AUTO: 246 K/UL (ref 135–450)
PMV BLD AUTO: 6.7 FL (ref 5–10.5)
POTASSIUM SERPL-SCNC: 3.1 MMOL/L (ref 3.5–5.1)
PROT SERPL-MCNC: 7.2 G/DL (ref 6.4–8.2)
PROT UR STRIP.AUTO-MCNC: NEGATIVE MG/DL
PROTHROMBIN TIME: 12.9 SEC (ref 11.5–14.8)
RBC # BLD AUTO: 4.32 M/UL (ref 4.2–5.9)
SODIUM SERPL-SCNC: 134 MMOL/L (ref 136–145)
SP GR UR STRIP.AUTO: 1.01 (ref 1–1.03)
UA COMPLETE W REFLEX CULTURE PNL UR: NORMAL
UA DIPSTICK W REFLEX MICRO PNL UR: NORMAL
URN SPEC COLLECT METH UR: NORMAL
UROBILINOGEN UR STRIP-ACNC: 0.2 E.U./DL
WBC # BLD AUTO: 6.3 K/UL (ref 4–11)

## 2023-05-27 PROCEDURE — 85610 PROTHROMBIN TIME: CPT

## 2023-05-27 PROCEDURE — 87040 BLOOD CULTURE FOR BACTERIA: CPT

## 2023-05-27 PROCEDURE — 96375 TX/PRO/DX INJ NEW DRUG ADDON: CPT

## 2023-05-27 PROCEDURE — C9113 INJ PANTOPRAZOLE SODIUM, VIA: HCPCS | Performed by: EMERGENCY MEDICINE

## 2023-05-27 PROCEDURE — 6370000000 HC RX 637 (ALT 250 FOR IP): Performed by: EMERGENCY MEDICINE

## 2023-05-27 PROCEDURE — 85025 COMPLETE CBC W/AUTO DIFF WBC: CPT

## 2023-05-27 PROCEDURE — 83735 ASSAY OF MAGNESIUM: CPT

## 2023-05-27 PROCEDURE — 87324 CLOSTRIDIUM AG IA: CPT

## 2023-05-27 PROCEDURE — 81003 URINALYSIS AUTO W/O SCOPE: CPT

## 2023-05-27 PROCEDURE — 96365 THER/PROPH/DIAG IV INF INIT: CPT

## 2023-05-27 PROCEDURE — 80053 COMPREHEN METABOLIC PANEL: CPT

## 2023-05-27 PROCEDURE — 36415 COLL VENOUS BLD VENIPUNCTURE: CPT

## 2023-05-27 PROCEDURE — 6360000002 HC RX W HCPCS: Performed by: EMERGENCY MEDICINE

## 2023-05-27 PROCEDURE — 82270 OCCULT BLOOD FECES: CPT

## 2023-05-27 PROCEDURE — 83605 ASSAY OF LACTIC ACID: CPT

## 2023-05-27 PROCEDURE — 99284 EMERGENCY DEPT VISIT MOD MDM: CPT

## 2023-05-27 PROCEDURE — 83690 ASSAY OF LIPASE: CPT

## 2023-05-27 PROCEDURE — 85730 THROMBOPLASTIN TIME PARTIAL: CPT

## 2023-05-27 PROCEDURE — 87449 NOS EACH ORGANISM AG IA: CPT

## 2023-05-27 RX ORDER — POTASSIUM CHLORIDE 750 MG/1
10 TABLET, EXTENDED RELEASE ORAL 2 TIMES DAILY
Qty: 14 TABLET | Refills: 0 | Status: SHIPPED | OUTPATIENT
Start: 2023-05-27 | End: 2023-06-03

## 2023-05-27 RX ORDER — POTASSIUM CHLORIDE 20 MEQ/1
40 TABLET, EXTENDED RELEASE ORAL ONCE
Status: COMPLETED | OUTPATIENT
Start: 2023-05-27 | End: 2023-05-27

## 2023-05-27 RX ORDER — POTASSIUM CHLORIDE 7.45 MG/ML
10 INJECTION INTRAVENOUS ONCE
Status: COMPLETED | OUTPATIENT
Start: 2023-05-27 | End: 2023-05-27

## 2023-05-27 RX ORDER — PANTOPRAZOLE SODIUM 40 MG/10ML
80 INJECTION, POWDER, LYOPHILIZED, FOR SOLUTION INTRAVENOUS ONCE
Status: COMPLETED | OUTPATIENT
Start: 2023-05-27 | End: 2023-05-27

## 2023-05-27 RX ADMIN — POTASSIUM CHLORIDE 40 MEQ: 1500 TABLET, EXTENDED RELEASE ORAL at 20:43

## 2023-05-27 RX ADMIN — POTASSIUM CHLORIDE 10 MEQ: 7.46 INJECTION, SOLUTION INTRAVENOUS at 20:51

## 2023-05-27 RX ADMIN — PANTOPRAZOLE SODIUM 80 MG: 40 INJECTION, POWDER, FOR SOLUTION INTRAVENOUS at 20:35

## 2023-05-27 ASSESSMENT — PAIN - FUNCTIONAL ASSESSMENT: PAIN_FUNCTIONAL_ASSESSMENT: NONE - DENIES PAIN

## 2023-05-27 NOTE — ED NOTES
Call placed to [facility] at [address] [facility].      Melba Grider  05/27/23 1912

## 2023-05-28 LAB — BACTERIA BLD CULT: NORMAL

## 2023-05-30 ENCOUNTER — PROCEDURE VISIT (OUTPATIENT)
Dept: AUDIOLOGY | Age: 73
End: 2023-05-30
Payer: MEDICARE

## 2023-05-30 ENCOUNTER — OFFICE VISIT (OUTPATIENT)
Dept: ENT CLINIC | Age: 73
End: 2023-05-30
Payer: MEDICARE

## 2023-05-30 VITALS — BODY MASS INDEX: 23.66 KG/M2 | OXYGEN SATURATION: 98 % | HEIGHT: 71 IN | WEIGHT: 169 LBS | TEMPERATURE: 98.7 F

## 2023-05-30 DIAGNOSIS — J30.9 ALLERGIC RHINITIS, UNSPECIFIED SEASONALITY, UNSPECIFIED TRIGGER: ICD-10-CM

## 2023-05-30 DIAGNOSIS — H93.13 TINNITUS OF BOTH EARS: ICD-10-CM

## 2023-05-30 DIAGNOSIS — H61.23 BILATERAL IMPACTED CERUMEN: ICD-10-CM

## 2023-05-30 DIAGNOSIS — H90.3 SENSORINEURAL HEARING LOSS (SNHL), BILATERAL: Primary | ICD-10-CM

## 2023-05-30 DIAGNOSIS — H90.3 SENSORINEURAL HEARING LOSS, BILATERAL: Primary | ICD-10-CM

## 2023-05-30 PROCEDURE — G8420 CALC BMI NORM PARAMETERS: HCPCS | Performed by: OTOLARYNGOLOGY

## 2023-05-30 PROCEDURE — G8427 DOCREV CUR MEDS BY ELIG CLIN: HCPCS | Performed by: OTOLARYNGOLOGY

## 2023-05-30 PROCEDURE — 92557 COMPREHENSIVE HEARING TEST: CPT | Performed by: AUDIOLOGIST

## 2023-05-30 PROCEDURE — 92567 TYMPANOMETRY: CPT | Performed by: AUDIOLOGIST

## 2023-05-30 PROCEDURE — 1036F TOBACCO NON-USER: CPT | Performed by: OTOLARYNGOLOGY

## 2023-05-30 PROCEDURE — 1123F ACP DISCUSS/DSCN MKR DOCD: CPT | Performed by: OTOLARYNGOLOGY

## 2023-05-30 PROCEDURE — 69210 REMOVE IMPACTED EAR WAX UNI: CPT | Performed by: OTOLARYNGOLOGY

## 2023-05-30 PROCEDURE — 3017F COLORECTAL CA SCREEN DOC REV: CPT | Performed by: OTOLARYNGOLOGY

## 2023-05-30 PROCEDURE — 99203 OFFICE O/P NEW LOW 30 MIN: CPT | Performed by: OTOLARYNGOLOGY

## 2023-05-30 ASSESSMENT — ENCOUNTER SYMPTOMS
SHORTNESS OF BREATH: 0
ABDOMINAL PAIN: 0
WHEEZING: 0

## 2023-05-30 NOTE — PROGRESS NOTES
by Mary Martinez MD at [address] [place] [address]    ERCP N/A 5/24/2023    ERCP STENT REMOVAL/EXCHANGE performed by Lurdes Horne MD at [address] [place] [address]    ERCP  5/24/2023    ERCP DILATION BALLOON performed by Lurdes Horne MD at [address] [place] [address]    ERCP  5/24/2023    ERCP BILIARY BRUSHING performed by Lurdes Horne MD at [address] [place] [address]    ERCP  5/24/2023    ERCP STONE REMOVAL performed by Lurdes Horne MD at [address] [place] [address]  01/26/2022    ERCP WF W/ANES. (8:30) (N/A )     OTHER SURGICAL HISTORY  06/13/2022     ERCP WF W/ANES. (15:00) (N/A )    OTHER SURGICAL HISTORY  05/24/2023    ERCP STENT REMOVAL/EXCHANGE    TONSILLECTOMY      UPPER GASTROINTESTINAL ENDOSCOPY N/A 03/11/2022    UPPER EUS W/ANES. performed by Lurdes Horne MD at [address] History     No family history on file.     Social History     Social History     Tobacco Use    Smoking status: Former    Smokeless tobacco: Never    Tobacco comments:     quit 40-50 years ago   Vaping Use    Vaping Use: Never used   Substance Use Topics    Alcohol use: No    Drug use: No        Allergies     Allergies   Allergen Reactions    Augmentin [Amoxicillin-Pot Clavulanate] Other (See Comments)    Codeine      Pt states he took medication without food and had a reaction, states this gave him chest pain       Medications     Current Outpatient Medications   Medication Sig Dispense Refill    potassium chloride (KLOR-CON M) 10 MEQ extended release tablet Take 1 tablet by mouth 2 times daily for 7 days 14 tablet 0    finasteride (PROSCAR) 5 MG tablet Take 1 tablet by mouth every evening      ursodiol (ACTIGALL) 300 MG capsule Take 2 capsules by mouth 2 times daily 60 capsule 3    metFORMIN (GLUCOPHAGE-XR) 500 MG extended release tablet TAKE 1 TABLET BY MOUTH TWICE DAILY      dicyclomine (BENTYL) 20 MG tablet Take 1 tablet by mouth nightly

## 2023-05-30 NOTE — PROGRESS NOTES
Isabel Urbano   1950, 67 y.o. male   [id number]       Referring Provider: Nell Valadez MD  Referral Type: In an order in [address] [place] [address]    Reason for Visit: Evaluation of the cause of disorders of hearing, tinnitus, or balance. ADULT AUDIOLOGIC EVALUATION      Isabel Urbano is a 67 y.o. male seen today, 5/30/2023 , for an initial audiologic evaluation. Patient was seen by Nell Valadez MD following today's evaluation. AUDIOLOGIC AND OTHER PERTINENT MEDICAL HISTORY:      Isabel Urbano reports recent change in hearing sensitivity in the left ear. He states this began 2-3  weeks ago. This resolved after cerumen removal by Dr. Sean Sarah. He reports essentially non-intrusive tinnitus that he is aware of at night when in quiet. Patient has a history of occupational and/or recreational noise exposure. No other significant otologic history reported. He denied otalgia, aural fullness, otorrhea, dizziness, history of head trauma, and history of ear surgery. Date: 5/30/2023     IMPRESSIONS:      Today's results revealed a mild high frequency sensorineural hearing loss, bilaterally, that is most likely not impacting daily speech and conversation. Excellent speech understanding when in quiet. Tympanometry indicates normal middle ear function. Discussed test results and implications with patient. Discussed noise exposure and the importance of appropriate hearing protection when in hazardous noise environments. Follow medical recommendations of Nell Valadez MD.     ASSESSMENT AND FINDINGS:     Otoscopy unremarkable. RIGHT EAR:  Hearing Sensitivity: Hearing within normal limits through 4kHz sloping to a mild sensorineural hearing loss. Speech Recognition Threshold: 15 dB HL  Word Recognition: Excellent 100%, based on NU-6 25-word list at 55 dBHL using recorded speech stimuli. Tympanometry: Normal peak pressure and compliance, Type A tympanogram, consistent with normal middle ear function.  Volume

## 2023-05-31 ENCOUNTER — TELEPHONE (OUTPATIENT)
Dept: SURGERY | Age: 73
End: 2023-05-31

## 2023-05-31 PROBLEM — R74.01 ELEVATION OF LEVELS OF LIVER TRANSAMINASE LEVELS: Status: ACTIVE | Noted: 2022-01-24

## 2023-05-31 NOTE — ED PROVIDER NOTES
Culture follow-up: I called the patient and left a voicemail when he called back to the ER and we discussed his positive blood culture. Unfortunately there is only 1 culture is positive for gram-positive rods. Advised him that this could be a genuine infection or also could be a contaminant. He stated he had 2 normal bowel movements today and feels that he is generally improving over the last 4 days since being seen in the ER. He has had a follow-up appointment with GI and has another one scheduled with a surgeon in the near future regarding his cancer. He does still have some night sweats and feels chills in the morning. He denies any vomiting or any other concerns. I advised him to return to the ER immediately if he has any worsening symptoms or any fever or vomiting or pains in that we will call him regarding any change in this result.      Demetra Allred MD  05/31/23 0882

## 2023-06-02 ENCOUNTER — OFFICE VISIT (OUTPATIENT)
Dept: SURGERY | Age: 73
End: 2023-06-02
Payer: MEDICARE

## 2023-06-02 VITALS
HEIGHT: 71 IN | TEMPERATURE: 98.2 F | HEART RATE: 72 BPM | WEIGHT: 168 LBS | DIASTOLIC BLOOD PRESSURE: 81 MMHG | BODY MASS INDEX: 23.52 KG/M2 | SYSTOLIC BLOOD PRESSURE: 140 MMHG

## 2023-06-02 DIAGNOSIS — C22.1 METASTATIC CHOLANGIOCARCINOMA TO BILE DUCT (HCC): ICD-10-CM

## 2023-06-02 DIAGNOSIS — C24.0 BILE DUCT CANCER (HCC): ICD-10-CM

## 2023-06-02 DIAGNOSIS — K83.1 COMMON BILE DUCT (CBD) STRICTURE: Primary | ICD-10-CM

## 2023-06-02 DIAGNOSIS — C78.89 METASTATIC CHOLANGIOCARCINOMA TO BILE DUCT (HCC): ICD-10-CM

## 2023-06-02 PROCEDURE — 1036F TOBACCO NON-USER: CPT | Performed by: SURGERY

## 2023-06-02 PROCEDURE — G8427 DOCREV CUR MEDS BY ELIG CLIN: HCPCS | Performed by: SURGERY

## 2023-06-02 PROCEDURE — 3075F SYST BP GE 130 - 139MM HG: CPT | Performed by: SURGERY

## 2023-06-02 PROCEDURE — 3078F DIAST BP <80 MM HG: CPT | Performed by: SURGERY

## 2023-06-02 PROCEDURE — G8420 CALC BMI NORM PARAMETERS: HCPCS | Performed by: SURGERY

## 2023-06-02 PROCEDURE — 99215 OFFICE O/P EST HI 40 MIN: CPT | Performed by: SURGERY

## 2023-06-02 PROCEDURE — 1124F ACP DISCUSS-NO DSCNMKR DOCD: CPT | Performed by: SURGERY

## 2023-06-02 PROCEDURE — 3017F COLORECTAL CA SCREEN DOC REV: CPT | Performed by: SURGERY

## 2023-06-03 LAB
BACTERIA BLD CULT: ABNORMAL
ORGANISM: ABNORMAL

## 2023-06-05 ENCOUNTER — TELEPHONE (OUTPATIENT)
Dept: CARDIOLOGY CLINIC | Age: 73
End: 2023-06-05

## 2023-06-05 NOTE — TELEPHONE ENCOUNTER
Pt called and stated that Dr. 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 Procedure not scheduled yet but Pt stated that Dr. Lauren Ritchie wants Pt to be seen ASAP. Pt stated that he can be seen by first avail. Not NP appt avail until 7/17/23 with fxw. Please contact Pt and advise if can be worked in sooner.

## 2023-06-05 NOTE — TELEPHONE ENCOUNTER
Called and spoke with patient. Patient added on to office 06/06/23 he VU to time, date, and location of appt.

## 2023-06-06 ENCOUNTER — TELEPHONE (OUTPATIENT)
Dept: SURGERY | Age: 73
End: 2023-06-06

## 2023-06-06 ENCOUNTER — OFFICE VISIT (OUTPATIENT)
Dept: CARDIOLOGY CLINIC | Age: 73
End: 2023-06-06
Payer: MEDICARE

## 2023-06-06 VITALS
WEIGHT: 169 LBS | OXYGEN SATURATION: 97 % | DIASTOLIC BLOOD PRESSURE: 60 MMHG | HEIGHT: 71 IN | BODY MASS INDEX: 23.66 KG/M2 | SYSTOLIC BLOOD PRESSURE: 108 MMHG | HEART RATE: 72 BPM

## 2023-06-06 DIAGNOSIS — C78.89 METASTATIC CHOLANGIOCARCINOMA TO BILE DUCT (HCC): Primary | ICD-10-CM

## 2023-06-06 DIAGNOSIS — Z87.891 FORMER SMOKER: ICD-10-CM

## 2023-06-06 DIAGNOSIS — C22.1 METASTATIC CHOLANGIOCARCINOMA TO BILE DUCT (HCC): Primary | ICD-10-CM

## 2023-06-06 DIAGNOSIS — Z01.810 ENCOUNTER FOR PRE-OPERATIVE CARDIOVASCULAR CLEARANCE: Primary | ICD-10-CM

## 2023-06-06 DIAGNOSIS — I10 PRIMARY HYPERTENSION: ICD-10-CM

## 2023-06-06 DIAGNOSIS — C80.1 ADENOCARCINOMA (HCC): ICD-10-CM

## 2023-06-06 PROCEDURE — 3074F SYST BP LT 130 MM HG: CPT | Performed by: INTERNAL MEDICINE

## 2023-06-06 PROCEDURE — G8420 CALC BMI NORM PARAMETERS: HCPCS | Performed by: INTERNAL MEDICINE

## 2023-06-06 PROCEDURE — 93000 ELECTROCARDIOGRAM COMPLETE: CPT | Performed by: INTERNAL MEDICINE

## 2023-06-06 PROCEDURE — 3078F DIAST BP <80 MM HG: CPT | Performed by: INTERNAL MEDICINE

## 2023-06-06 PROCEDURE — G8427 DOCREV CUR MEDS BY ELIG CLIN: HCPCS | Performed by: INTERNAL MEDICINE

## 2023-06-06 PROCEDURE — 99205 OFFICE O/P NEW HI 60 MIN: CPT | Performed by: INTERNAL MEDICINE

## 2023-06-06 NOTE — PROGRESS NOTES
[address] [facility]   Cardiovascular Evaluation    PATIENT: Conor Perez  DATE: 2023  MRN: [id number]  CSN: [id number]  : 1950    Primary Care Doctor/Referring provider: James Mckeon MD, No admitting provider for patient encounter. 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 patient who presents as a new patient for cardiology evaluation for cardiac clearance for robotic / laparoscopic and open pancreatic surgery ([facility]) with [place]. He was referred by Purvi Alejo. He has a past medical history hypertension, Crohn's disease, CLL, GERD, former smoker, and type II diabetes mellitus. On 23 he had underwent ERCP with stent removal, brush cytology, and stone extraction and stent placement by Dr. Ham Becerra. Cytology was positive for Adenocarcinoma. Today he states he is here for cardiology clearance. He states prior to cancer he was a hard worker. 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 Patient currently denies any weight gain, edema, chest pain, shortness of breath, dizziness, and syncope. He states he has experienced a few extra heart beats in his chest. He states he can climb a flight of stairs. He is a former smoker 40-50 years ago quit. He states he has been battling hypertension since he was in high school.        Patient Active Problem List   Diagnosis    Gastroesophageal reflux disease    Palpitations    Jaundice    Transaminitis    Stricture of bile duct    Hyponatremia    Hypokalemia    Crohn's disease ([facility] [address]    Hypertension    Acute liver failure    Hyperbilirubinemia    Cholangitis    Moderate protein-calorie malnutrition (HCC)    Cholecystitis    Type 2 diabetes mellitus

## 2023-06-06 NOTE — TELEPHONE ENCOUNTER
Patient verbalized an understanding that CT scheduled for 6/9/23 with 1 10:30 arrival and Npo 4 hours prior and office appt. with Dr. Vinh Rios on 6/16/23 at 11:30.

## 2023-06-06 NOTE — PATIENT INSTRUCTIONS
Plan:  1. Paola Hutchinson 1950 is at moderate cardiac risk and based on evaluation may proceed with surgery as planned. 2. Discussed after surgery for cancer would recommend evaluation in the office for further cardiology work-up. 3. EKG reviewed.    4. Follow up in Aug 2023

## 2023-06-07 RX ORDER — BLOOD SUGAR DIAGNOSTIC
STRIP MISCELLANEOUS
Status: ON HOLD | COMMUNITY
Start: 2023-05-22

## 2023-06-09 ENCOUNTER — HOSPITAL ENCOUNTER (OUTPATIENT)
Dept: CT IMAGING | Age: 73
Discharge: HOME | End: 2023-06-09
Attending: SURGERY
Payer: MEDICARE

## 2023-06-09 DIAGNOSIS — K83.1 COMMON BILE DUCT (CBD) STRICTURE: ICD-10-CM

## 2023-06-09 DIAGNOSIS — C24.0 BILE DUCT CANCER (HCC): ICD-10-CM

## 2023-06-09 PROCEDURE — 6360000004 HC RX CONTRAST MEDICATION: Performed by: SURGERY

## 2023-06-09 PROCEDURE — 71260 CT THORAX DX C+: CPT

## 2023-06-09 RX ADMIN — IOMEPROL INJECTION 75 ML: 714 INJECTION, SOLUTION INTRAVASCULAR at 10:29

## 2023-06-14 PROBLEM — R19.7 DIARRHEA: Status: ACTIVE | Noted: 2023-06-14

## 2023-06-14 PROBLEM — K92.2 UPPER GASTROINTESTINAL HEMORRHAGE: Status: ACTIVE | Noted: 2023-06-14

## 2023-06-16 ENCOUNTER — ANESTHESIA EVENT (OUTPATIENT)
Dept: OPERATING ROOM | Age: 73
End: 2023-06-16
Payer: MEDICARE

## 2023-06-16 ENCOUNTER — OFFICE VISIT (OUTPATIENT)
Dept: SURGERY | Age: 73
End: 2023-06-16

## 2023-06-16 VITALS
SYSTOLIC BLOOD PRESSURE: 143 MMHG | TEMPERATURE: 98.4 F | DIASTOLIC BLOOD PRESSURE: 79 MMHG | WEIGHT: 174.6 LBS | BODY MASS INDEX: 24.44 KG/M2 | HEIGHT: 71 IN | HEART RATE: 66 BPM

## 2023-06-16 DIAGNOSIS — C24.0 BILE DUCT CANCER (HCC): Primary | ICD-10-CM

## 2023-06-16 DIAGNOSIS — C22.1 INTRAHEPATIC BILE DUCT CARCINOMA (HCC): ICD-10-CM

## 2023-06-19 ENCOUNTER — APPOINTMENT (OUTPATIENT)
Dept: GENERAL RADIOLOGY | Age: 73
DRG: 405 | End: 2023-06-19
Attending: SURGERY
Payer: MEDICARE

## 2023-06-19 ENCOUNTER — ANESTHESIA (OUTPATIENT)
Dept: OPERATING ROOM | Age: 73
End: 2023-06-19
Payer: MEDICARE

## 2023-06-19 ENCOUNTER — HOSPITAL ENCOUNTER (INPATIENT)
Age: 73
LOS: 9 days | Discharge: NURSING, REHAB OR LONG-TERM CARE | DRG: 405 | End: 2023-06-28
Attending: SURGERY | Admitting: SURGERY
Payer: MEDICARE

## 2023-06-19 DIAGNOSIS — C22.1 CHOLANGIOCARCINOMA (HCC): Primary | ICD-10-CM

## 2023-06-19 DIAGNOSIS — C24.0 BILE DUCT CANCER (HCC): ICD-10-CM

## 2023-06-19 DIAGNOSIS — K83.1 COMMON BILE DUCT STRICTURE: ICD-10-CM

## 2023-06-19 PROBLEM — K83.09 ACUTE CHOLANGITIS: Status: ACTIVE | Noted: 2023-06-19

## 2023-06-19 LAB
ABO + RH BLD: NORMAL
ALBUMIN SERPL-MCNC: 3.5 G/DL (ref 3.4–5)
ALBUMIN SERPL-MCNC: 4.4 G/DL (ref 3.4–5)
ALBUMIN/GLOB SERPL: 1.5 {RATIO} (ref 1.1–2.2)
ALP SERPL-CCNC: 49 U/L (ref 40–129)
ALP SERPL-CCNC: 74 U/L (ref 40–129)
ALT SERPL-CCNC: 100 U/L (ref 10–40)
ALT SERPL-CCNC: 13 U/L (ref 10–40)
ANION GAP SERPL CALCULATED.3IONS-SCNC: 12 MMOL/L (ref 3–16)
ANION GAP SERPL CALCULATED.3IONS-SCNC: 14 MMOL/L (ref 3–16)
APTT BLD: 28.9 SEC (ref 22.7–35.9)
AST SERPL-CCNC: 104 U/L (ref 15–37)
AST SERPL-CCNC: 18 U/L (ref 15–37)
BASOPHILS # BLD: 0 K/UL (ref 0–0.2)
BASOPHILS NFR BLD: 0.1 %
BILIRUB DIRECT SERPL-MCNC: 0.4 MG/DL (ref 0–0.3)
BILIRUB INDIRECT SERPL-MCNC: 0.4 MG/DL (ref 0–1)
BILIRUB SERPL-MCNC: 0.6 MG/DL (ref 0–1)
BILIRUB SERPL-MCNC: 0.8 MG/DL (ref 0–1)
BLD GP AB SCN SERPL QL: NORMAL
BUN SERPL-MCNC: 11 MG/DL (ref 7–20)
BUN SERPL-MCNC: 11 MG/DL (ref 7–20)
CALCIUM SERPL-MCNC: 8 MG/DL (ref 8.3–10.6)
CALCIUM SERPL-MCNC: 9.6 MG/DL (ref 8.3–10.6)
CHLORIDE SERPL-SCNC: 100 MMOL/L (ref 99–110)
CHLORIDE SERPL-SCNC: 99 MMOL/L (ref 99–110)
CO2 SERPL-SCNC: 23 MMOL/L (ref 21–32)
CO2 SERPL-SCNC: 25 MMOL/L (ref 21–32)
CREAT SERPL-MCNC: 0.6 MG/DL (ref 0.8–1.3)
CREAT SERPL-MCNC: <0.5 MG/DL (ref 0.8–1.3)
DEPRECATED RDW RBC AUTO: 13.4 % (ref 12.4–15.4)
DEPRECATED RDW RBC AUTO: 13.7 % (ref 12.4–15.4)
EOSINOPHIL # BLD: 0 K/UL (ref 0–0.6)
EOSINOPHIL NFR BLD: 0.1 %
GFR SERPLBLD CREATININE-BSD FMLA CKD-EPI: >60 ML/MIN/{1.73_M2}
GFR SERPLBLD CREATININE-BSD FMLA CKD-EPI: >60 ML/MIN/{1.73_M2}
GLUCOSE BLD-MCNC: 142 MG/DL (ref 70–99)
GLUCOSE BLD-MCNC: 183 MG/DL (ref 70–99)
GLUCOSE BLD-MCNC: 207 MG/DL (ref 70–99)
GLUCOSE SERPL-MCNC: 134 MG/DL (ref 70–99)
GLUCOSE SERPL-MCNC: 180 MG/DL (ref 70–99)
HCT VFR BLD AUTO: 34 % (ref 40.5–52.5)
HCT VFR BLD AUTO: 38.2 % (ref 40.5–52.5)
HGB BLD-MCNC: 11.8 G/DL (ref 13.5–17.5)
HGB BLD-MCNC: 13.4 G/DL (ref 13.5–17.5)
INR PPP: 0.92 (ref 0.84–1.16)
LACTATE BLDV-SCNC: 1.2 MMOL/L (ref 0.4–2)
LYMPHOCYTES # BLD: 0.3 K/UL (ref 1–5.1)
LYMPHOCYTES NFR BLD: 3.3 %
MAGNESIUM SERPL-MCNC: 1.6 MG/DL (ref 1.8–2.4)
MCH RBC QN AUTO: 33.8 PG (ref 26–34)
MCH RBC QN AUTO: 33.9 PG (ref 26–34)
MCHC RBC AUTO-ENTMCNC: 34.8 G/DL (ref 31–36)
MCHC RBC AUTO-ENTMCNC: 35.1 G/DL (ref 31–36)
MCV RBC AUTO: 96.6 FL (ref 80–100)
MCV RBC AUTO: 97.1 FL (ref 80–100)
MONOCYTES # BLD: 0.2 K/UL (ref 0–1.3)
MONOCYTES NFR BLD: 2.5 %
NEUTROPHILS # BLD: 8.2 K/UL (ref 1.7–7.7)
NEUTROPHILS NFR BLD: 94 %
PERFORMED ON: ABNORMAL
PHOSPHATE SERPL-MCNC: 4.2 MG/DL (ref 2.5–4.9)
PLATELET # BLD AUTO: 181 K/UL (ref 135–450)
PLATELET # BLD AUTO: 203 K/UL (ref 135–450)
PMV BLD AUTO: 7 FL (ref 5–10.5)
PMV BLD AUTO: 7.1 FL (ref 5–10.5)
POTASSIUM SERPL-SCNC: 3.5 MMOL/L (ref 3.5–5.1)
POTASSIUM SERPL-SCNC: 3.7 MMOL/L (ref 3.5–5.1)
PROT SERPL-MCNC: 5.7 G/DL (ref 6.4–8.2)
PROT SERPL-MCNC: 7.3 G/DL (ref 6.4–8.2)
PROTHROMBIN TIME: 12.4 SEC (ref 11.5–14.8)
RBC # BLD AUTO: 3.5 M/UL (ref 4.2–5.9)
RBC # BLD AUTO: 3.95 M/UL (ref 4.2–5.9)
REASON FOR REJECTION: NORMAL
REJECTED TEST: NORMAL
SODIUM SERPL-SCNC: 135 MMOL/L (ref 136–145)
SODIUM SERPL-SCNC: 138 MMOL/L (ref 136–145)
WBC # BLD AUTO: 4.6 K/UL (ref 4–11)
WBC # BLD AUTO: 8.7 K/UL (ref 4–11)

## 2023-06-19 PROCEDURE — 85610 PROTHROMBIN TIME: CPT

## 2023-06-19 PROCEDURE — 3600000009 HC SURGERY ROBOT BASE: Performed by: SURGERY

## 2023-06-19 PROCEDURE — 6360000002 HC RX W HCPCS: Performed by: NURSE ANESTHETIST, CERTIFIED REGISTERED

## 2023-06-19 PROCEDURE — 6370000000 HC RX 637 (ALT 250 FOR IP): Performed by: STUDENT IN AN ORGANIZED HEALTH CARE EDUCATION/TRAINING PROGRAM

## 2023-06-19 PROCEDURE — 2000000000 HC ICU R&B

## 2023-06-19 PROCEDURE — 88305 TISSUE EXAM BY PATHOLOGIST: CPT

## 2023-06-19 PROCEDURE — 7100000000 HC PACU RECOVERY - FIRST 15 MIN: Performed by: SURGERY

## 2023-06-19 PROCEDURE — P9041 ALBUMIN (HUMAN),5%, 50ML: HCPCS | Performed by: NURSE ANESTHETIST, CERTIFIED REGISTERED

## 2023-06-19 PROCEDURE — 2580000003 HC RX 258: Performed by: SURGERY

## 2023-06-19 PROCEDURE — 2500000003 HC RX 250 WO HCPCS: Performed by: ANESTHESIOLOGY

## 2023-06-19 PROCEDURE — 2709999900 HC NON-CHARGEABLE SUPPLY: Performed by: SURGERY

## 2023-06-19 PROCEDURE — 2580000003 HC RX 258: Performed by: STUDENT IN AN ORGANIZED HEALTH CARE EDUCATION/TRAINING PROGRAM

## 2023-06-19 PROCEDURE — 6360000002 HC RX W HCPCS: Performed by: SURGERY

## 2023-06-19 PROCEDURE — 74018 RADEX ABDOMEN 1 VIEW: CPT

## 2023-06-19 PROCEDURE — 83605 ASSAY OF LACTIC ACID: CPT

## 2023-06-19 PROCEDURE — A4217 STERILE WATER/SALINE, 500 ML: HCPCS | Performed by: SURGERY

## 2023-06-19 PROCEDURE — 87077 CULTURE AEROBIC IDENTIFY: CPT

## 2023-06-19 PROCEDURE — 2580000003 HC RX 258: Performed by: ANESTHESIOLOGY

## 2023-06-19 PROCEDURE — 3600000019 HC SURGERY ROBOT ADDTL 15MIN: Performed by: SURGERY

## 2023-06-19 PROCEDURE — 7100000001 HC PACU RECOVERY - ADDTL 15 MIN: Performed by: SURGERY

## 2023-06-19 PROCEDURE — S2900 ROBOTIC SURGICAL SYSTEM: HCPCS | Performed by: SURGERY

## 2023-06-19 PROCEDURE — 62325 NJX INTERLAMINAR CRV/THRC: CPT | Performed by: ANESTHESIOLOGY

## 2023-06-19 PROCEDURE — C2617 STENT, NON-COR, TEM W/O DEL: HCPCS | Performed by: SURGERY

## 2023-06-19 PROCEDURE — 85027 COMPLETE CBC AUTOMATED: CPT

## 2023-06-19 PROCEDURE — 86301 IMMUNOASSAY TUMOR CA 19-9: CPT

## 2023-06-19 PROCEDURE — 3700000000 HC ANESTHESIA ATTENDED CARE: Performed by: SURGERY

## 2023-06-19 PROCEDURE — 2500000003 HC RX 250 WO HCPCS: Performed by: NURSE ANESTHETIST, CERTIFIED REGISTERED

## 2023-06-19 PROCEDURE — 88307 TISSUE EXAM BY PATHOLOGIST: CPT

## 2023-06-19 PROCEDURE — 3700000001 HC ADD 15 MINUTES (ANESTHESIA): Performed by: SURGERY

## 2023-06-19 PROCEDURE — 87075 CULTR BACTERIA EXCEPT BLOOD: CPT

## 2023-06-19 PROCEDURE — 87205 SMEAR GRAM STAIN: CPT

## 2023-06-19 PROCEDURE — 6360000002 HC RX W HCPCS: Performed by: ANESTHESIOLOGY

## 2023-06-19 PROCEDURE — 88309 TISSUE EXAM BY PATHOLOGIST: CPT

## 2023-06-19 PROCEDURE — 87070 CULTURE OTHR SPECIMN AEROBIC: CPT

## 2023-06-19 PROCEDURE — 86901 BLOOD TYPING SEROLOGIC RH(D): CPT

## 2023-06-19 PROCEDURE — 83036 HEMOGLOBIN GLYCOSYLATED A1C: CPT

## 2023-06-19 PROCEDURE — 2500000003 HC RX 250 WO HCPCS: Performed by: SURGERY

## 2023-06-19 PROCEDURE — 07BD0ZZ EXCISION OF AORTIC LYMPHATIC, OPEN APPROACH: ICD-10-PCS | Performed by: SURGERY

## 2023-06-19 PROCEDURE — 88304 TISSUE EXAM BY PATHOLOGIST: CPT

## 2023-06-19 PROCEDURE — 2720000010 HC SURG SUPPLY STERILE: Performed by: SURGERY

## 2023-06-19 PROCEDURE — 86850 RBC ANTIBODY SCREEN: CPT

## 2023-06-19 PROCEDURE — 88331 PATH CONSLTJ SURG 1 BLK 1SPC: CPT

## 2023-06-19 PROCEDURE — 87186 SC STD MICRODIL/AGAR DIL: CPT

## 2023-06-19 PROCEDURE — 83735 ASSAY OF MAGNESIUM: CPT

## 2023-06-19 PROCEDURE — 2580000003 HC RX 258: Performed by: NURSE ANESTHETIST, CERTIFIED REGISTERED

## 2023-06-19 PROCEDURE — 86900 BLOOD TYPING SEROLOGIC ABO: CPT

## 2023-06-19 PROCEDURE — 85025 COMPLETE CBC W/AUTO DIFF WBC: CPT

## 2023-06-19 PROCEDURE — 0FBG0ZZ EXCISION OF PANCREAS, OPEN APPROACH: ICD-10-PCS | Performed by: SURGERY

## 2023-06-19 PROCEDURE — 87102 FUNGUS ISOLATION CULTURE: CPT

## 2023-06-19 PROCEDURE — 80053 COMPREHEN METABOLIC PANEL: CPT

## 2023-06-19 PROCEDURE — 6360000002 HC RX W HCPCS: Performed by: STUDENT IN AN ORGANIZED HEALTH CARE EDUCATION/TRAINING PROGRAM

## 2023-06-19 PROCEDURE — 85730 THROMBOPLASTIN TIME PARTIAL: CPT

## 2023-06-19 PROCEDURE — 0DT90ZZ RESECTION OF DUODENUM, OPEN APPROACH: ICD-10-PCS | Performed by: SURGERY

## 2023-06-19 DEVICE — PANCREATIC STENT
Type: IMPLANTABLE DEVICE | Status: FUNCTIONAL
Brand: ADVANIX™ PANCREATIC STENT

## 2023-06-19 RX ORDER — ONDANSETRON 2 MG/ML
4 INJECTION INTRAMUSCULAR; INTRAVENOUS EVERY 6 HOURS PRN
Status: DISCONTINUED | OUTPATIENT
Start: 2023-06-19 | End: 2023-06-28 | Stop reason: HOSPADM

## 2023-06-19 RX ORDER — GLYCOPYRROLATE 0.2 MG/ML
INJECTION INTRAMUSCULAR; INTRAVENOUS PRN
Status: DISCONTINUED | OUTPATIENT
Start: 2023-06-19 | End: 2023-06-19 | Stop reason: SDUPTHER

## 2023-06-19 RX ORDER — MEPERIDINE HYDROCHLORIDE 25 MG/ML
12.5 INJECTION INTRAMUSCULAR; INTRAVENOUS; SUBCUTANEOUS EVERY 5 MIN PRN
Status: DISCONTINUED | OUTPATIENT
Start: 2023-06-19 | End: 2023-06-19 | Stop reason: HOSPADM

## 2023-06-19 RX ORDER — ENOXAPARIN SODIUM 100 MG/ML
40 INJECTION SUBCUTANEOUS DAILY
Status: DISCONTINUED | OUTPATIENT
Start: 2023-06-20 | End: 2023-06-23

## 2023-06-19 RX ORDER — ALBUMIN, HUMAN INJ 5% 5 %
SOLUTION INTRAVENOUS PRN
Status: DISCONTINUED | OUTPATIENT
Start: 2023-06-19 | End: 2023-06-19

## 2023-06-19 RX ORDER — HYDRALAZINE HYDROCHLORIDE 20 MG/ML
10 INJECTION INTRAMUSCULAR; INTRAVENOUS
Status: DISCONTINUED | OUTPATIENT
Start: 2023-06-19 | End: 2023-06-19 | Stop reason: HOSPADM

## 2023-06-19 RX ORDER — METRONIDAZOLE 500 MG/100ML
500 INJECTION, SOLUTION INTRAVENOUS ONCE
Status: COMPLETED | OUTPATIENT
Start: 2023-06-19 | End: 2023-06-19

## 2023-06-19 RX ORDER — DEXTROSE MONOHYDRATE 100 MG/ML
INJECTION, SOLUTION INTRAVENOUS CONTINUOUS PRN
Status: DISCONTINUED | OUTPATIENT
Start: 2023-06-19 | End: 2023-06-28 | Stop reason: HOSPADM

## 2023-06-19 RX ORDER — METRONIDAZOLE 500 MG/100ML
500 INJECTION, SOLUTION INTRAVENOUS EVERY 8 HOURS
Status: COMPLETED | OUTPATIENT
Start: 2023-06-20 | End: 2023-06-26

## 2023-06-19 RX ORDER — ROCURONIUM BROMIDE 10 MG/ML
INJECTION, SOLUTION INTRAVENOUS PRN
Status: DISCONTINUED | OUTPATIENT
Start: 2023-06-19 | End: 2023-06-19 | Stop reason: SDUPTHER

## 2023-06-19 RX ORDER — LABETALOL HYDROCHLORIDE 5 MG/ML
10 INJECTION, SOLUTION INTRAVENOUS
Status: DISCONTINUED | OUTPATIENT
Start: 2023-06-19 | End: 2023-06-19 | Stop reason: HOSPADM

## 2023-06-19 RX ORDER — MAGNESIUM SULFATE IN WATER 40 MG/ML
4000 INJECTION, SOLUTION INTRAVENOUS ONCE
Status: COMPLETED | OUTPATIENT
Start: 2023-06-19 | End: 2023-06-19

## 2023-06-19 RX ORDER — ONDANSETRON 4 MG/1
4 TABLET, ORALLY DISINTEGRATING ORAL EVERY 8 HOURS PRN
Status: DISCONTINUED | OUTPATIENT
Start: 2023-06-19 | End: 2023-06-28 | Stop reason: HOSPADM

## 2023-06-19 RX ORDER — SODIUM CHLORIDE 9 MG/ML
INJECTION, SOLUTION INTRAVENOUS PRN
Status: DISCONTINUED | OUTPATIENT
Start: 2023-06-19 | End: 2023-06-19 | Stop reason: HOSPADM

## 2023-06-19 RX ORDER — NALOXONE HYDROCHLORIDE 0.4 MG/ML
INJECTION, SOLUTION INTRAMUSCULAR; INTRAVENOUS; SUBCUTANEOUS PRN
Status: DISCONTINUED | OUTPATIENT
Start: 2023-06-19 | End: 2023-06-23

## 2023-06-19 RX ORDER — SODIUM CHLORIDE 0.9 % (FLUSH) 0.9 %
5-40 SYRINGE (ML) INJECTION EVERY 12 HOURS SCHEDULED
Status: DISCONTINUED | OUTPATIENT
Start: 2023-06-19 | End: 2023-06-19 | Stop reason: HOSPADM

## 2023-06-19 RX ORDER — SODIUM CHLORIDE 0.9 % (FLUSH) 0.9 %
5-40 SYRINGE (ML) INJECTION PRN
Status: DISCONTINUED | OUTPATIENT
Start: 2023-06-19 | End: 2023-06-19 | Stop reason: HOSPADM

## 2023-06-19 RX ORDER — SODIUM CHLORIDE, SODIUM LACTATE, POTASSIUM CHLORIDE, CALCIUM CHLORIDE 600; 310; 30; 20 MG/100ML; MG/100ML; MG/100ML; MG/100ML
INJECTION, SOLUTION INTRAVENOUS CONTINUOUS
Status: DISCONTINUED | OUTPATIENT
Start: 2023-06-19 | End: 2023-06-19

## 2023-06-19 RX ORDER — DEXAMETHASONE SODIUM PHOSPHATE 4 MG/ML
INJECTION, SOLUTION INTRA-ARTICULAR; INTRALESIONAL; INTRAMUSCULAR; INTRAVENOUS; SOFT TISSUE PRN
Status: DISCONTINUED | OUTPATIENT
Start: 2023-06-19 | End: 2023-06-19 | Stop reason: SDUPTHER

## 2023-06-19 RX ORDER — PROPOFOL 10 MG/ML
INJECTION, EMULSION INTRAVENOUS PRN
Status: DISCONTINUED | OUTPATIENT
Start: 2023-06-19 | End: 2023-06-19 | Stop reason: SDUPTHER

## 2023-06-19 RX ORDER — ONDANSETRON 2 MG/ML
INJECTION INTRAMUSCULAR; INTRAVENOUS PRN
Status: DISCONTINUED | OUTPATIENT
Start: 2023-06-19 | End: 2023-06-19 | Stop reason: SDUPTHER

## 2023-06-19 RX ORDER — OCTREOTIDE ACETATE 50 UG/ML
100 INJECTION, SOLUTION INTRAVENOUS; SUBCUTANEOUS EVERY 8 HOURS
Status: DISCONTINUED | OUTPATIENT
Start: 2023-06-20 | End: 2023-06-20

## 2023-06-19 RX ORDER — BUPIVACAINE HYDROCHLORIDE 2.5 MG/ML
INJECTION, SOLUTION EPIDURAL; INFILTRATION; INTRACAUDAL PRN
Status: DISCONTINUED | OUTPATIENT
Start: 2023-06-19 | End: 2023-06-19

## 2023-06-19 RX ORDER — GLUCAGON 1 MG/ML
1 KIT INJECTION PRN
Status: DISCONTINUED | OUTPATIENT
Start: 2023-06-19 | End: 2023-06-28 | Stop reason: HOSPADM

## 2023-06-19 RX ORDER — SODIUM CHLORIDE, SODIUM GLUCONATE, SODIUM ACETATE, POTASSIUM CHLORIDE AND MAGNESIUM CHLORIDE 526; 502; 368; 37; 30 MG/100ML; MG/100ML; MG/100ML; MG/100ML; MG/100ML
50 INJECTION, SOLUTION INTRAVENOUS CONTINUOUS
Status: DISCONTINUED | OUTPATIENT
Start: 2023-06-19 | End: 2023-06-24

## 2023-06-19 RX ORDER — SODIUM CHLORIDE, SODIUM LACTATE, POTASSIUM CHLORIDE, CALCIUM CHLORIDE 600; 310; 30; 20 MG/100ML; MG/100ML; MG/100ML; MG/100ML
INJECTION, SOLUTION INTRAVENOUS CONTINUOUS
Status: DISCONTINUED | OUTPATIENT
Start: 2023-06-19 | End: 2023-06-19 | Stop reason: SDUPTHER

## 2023-06-19 RX ORDER — MAGNESIUM HYDROXIDE 1200 MG/15ML
LIQUID ORAL PRN
Status: DISCONTINUED | OUTPATIENT
Start: 2023-06-19 | End: 2023-06-19 | Stop reason: HOSPADM

## 2023-06-19 RX ORDER — SODIUM CHLORIDE, SODIUM LACTATE, POTASSIUM CHLORIDE, CALCIUM CHLORIDE 600; 310; 30; 20 MG/100ML; MG/100ML; MG/100ML; MG/100ML
INJECTION, SOLUTION INTRAVENOUS CONTINUOUS
Status: DISCONTINUED | OUTPATIENT
Start: 2023-06-19 | End: 2023-06-19 | Stop reason: HOSPADM

## 2023-06-19 RX ORDER — SODIUM CHLORIDE 0.9 % (FLUSH) 0.9 %
10 SYRINGE (ML) INJECTION PRN
Status: DISCONTINUED | OUTPATIENT
Start: 2023-06-19 | End: 2023-06-28 | Stop reason: HOSPADM

## 2023-06-19 RX ORDER — MAGNESIUM HYDROXIDE 1200 MG/15ML
LIQUID ORAL CONTINUOUS PRN
Status: DISCONTINUED | OUTPATIENT
Start: 2023-06-19 | End: 2023-06-19 | Stop reason: HOSPADM

## 2023-06-19 RX ORDER — LIDOCAINE HYDROCHLORIDE 20 MG/ML
INJECTION, SOLUTION EPIDURAL; INFILTRATION; INTRACAUDAL; PERINEURAL PRN
Status: DISCONTINUED | OUTPATIENT
Start: 2023-06-19 | End: 2023-06-19 | Stop reason: SDUPTHER

## 2023-06-19 RX ORDER — FENTANYL CITRATE 50 UG/ML
INJECTION, SOLUTION INTRAMUSCULAR; INTRAVENOUS PRN
Status: DISCONTINUED | OUTPATIENT
Start: 2023-06-19 | End: 2023-06-19 | Stop reason: SDUPTHER

## 2023-06-19 RX ORDER — METOCLOPRAMIDE HYDROCHLORIDE 5 MG/ML
10 INJECTION INTRAMUSCULAR; INTRAVENOUS
Status: DISCONTINUED | OUTPATIENT
Start: 2023-06-19 | End: 2023-06-19 | Stop reason: HOSPADM

## 2023-06-19 RX ORDER — HYDROMORPHONE HYDROCHLORIDE 1 MG/ML
0.5 INJECTION, SOLUTION INTRAMUSCULAR; INTRAVENOUS; SUBCUTANEOUS EVERY 10 MIN PRN
Status: DISCONTINUED | OUTPATIENT
Start: 2023-06-19 | End: 2023-06-19 | Stop reason: HOSPADM

## 2023-06-19 RX ORDER — SODIUM CHLORIDE 0.9 % (FLUSH) 0.9 %
10 SYRINGE (ML) INJECTION EVERY 12 HOURS SCHEDULED
Status: DISCONTINUED | OUTPATIENT
Start: 2023-06-19 | End: 2023-06-28 | Stop reason: HOSPADM

## 2023-06-19 RX ORDER — HYDROMORPHONE HYDROCHLORIDE 1 MG/ML
0.5 INJECTION, SOLUTION INTRAMUSCULAR; INTRAVENOUS; SUBCUTANEOUS EVERY 5 MIN PRN
Status: DISCONTINUED | OUTPATIENT
Start: 2023-06-19 | End: 2023-06-19 | Stop reason: HOSPADM

## 2023-06-19 RX ORDER — LIDOCAINE HYDROCHLORIDE 20 MG/ML
INJECTION, SOLUTION INTRAVENOUS PRN
Status: DISCONTINUED | OUTPATIENT
Start: 2023-06-19 | End: 2023-06-19 | Stop reason: SDUPTHER

## 2023-06-19 RX ORDER — OCTREOTIDE ACETATE 50 UG/ML
50 INJECTION, SOLUTION INTRAVENOUS; SUBCUTANEOUS EVERY 8 HOURS
Status: DISCONTINUED | OUTPATIENT
Start: 2023-06-19 | End: 2023-06-19

## 2023-06-19 RX ORDER — INSULIN LISPRO 100 [IU]/ML
0-4 INJECTION, SOLUTION INTRAVENOUS; SUBCUTANEOUS EVERY 4 HOURS
Status: DISCONTINUED | OUTPATIENT
Start: 2023-06-19 | End: 2023-06-20

## 2023-06-19 RX ORDER — ONDANSETRON 2 MG/ML
4 INJECTION INTRAMUSCULAR; INTRAVENOUS EVERY 6 HOURS PRN
Status: DISCONTINUED | OUTPATIENT
Start: 2023-06-19 | End: 2023-06-23

## 2023-06-19 RX ORDER — DIPHENHYDRAMINE HYDROCHLORIDE 50 MG/ML
12.5 INJECTION INTRAMUSCULAR; INTRAVENOUS
Status: DISCONTINUED | OUTPATIENT
Start: 2023-06-19 | End: 2023-06-19 | Stop reason: HOSPADM

## 2023-06-19 RX ORDER — MIDAZOLAM HYDROCHLORIDE 1 MG/ML
INJECTION INTRAMUSCULAR; INTRAVENOUS PRN
Status: DISCONTINUED | OUTPATIENT
Start: 2023-06-19 | End: 2023-06-19 | Stop reason: SDUPTHER

## 2023-06-19 RX ORDER — ALBUTEROL SULFATE 2.5 MG/3ML
2.5 SOLUTION RESPIRATORY (INHALATION) EVERY 6 HOURS PRN
Status: DISCONTINUED | OUTPATIENT
Start: 2023-06-19 | End: 2023-06-28 | Stop reason: HOSPADM

## 2023-06-19 RX ORDER — ALBUMIN, HUMAN INJ 5% 5 %
SOLUTION INTRAVENOUS PRN
Status: DISCONTINUED | OUTPATIENT
Start: 2023-06-19 | End: 2023-06-19 | Stop reason: SDUPTHER

## 2023-06-19 RX ORDER — SODIUM CHLORIDE 9 MG/ML
INJECTION, SOLUTION INTRAVENOUS PRN
Status: DISCONTINUED | OUTPATIENT
Start: 2023-06-19 | End: 2023-06-28 | Stop reason: HOSPADM

## 2023-06-19 RX ORDER — SODIUM CHLORIDE 9 MG/ML
INJECTION, SOLUTION INTRAVENOUS CONTINUOUS PRN
Status: DISCONTINUED | OUTPATIENT
Start: 2023-06-19 | End: 2023-06-19 | Stop reason: SDUPTHER

## 2023-06-19 RX ADMIN — GLYCOPYRROLATE 0.2 MG: 0.2 INJECTION INTRAMUSCULAR; INTRAVENOUS at 08:32

## 2023-06-19 RX ADMIN — PHENYLEPHRINE HYDROCHLORIDE 50 MCG: 10 INJECTION, SOLUTION INTRAMUSCULAR; INTRAVENOUS; SUBCUTANEOUS at 12:22

## 2023-06-19 RX ADMIN — MAGNESIUM SULFATE HEPTAHYDRATE 4000 MG: 40 INJECTION, SOLUTION INTRAVENOUS at 19:52

## 2023-06-19 RX ADMIN — METRONIDAZOLE 500 MG: 500 INJECTION, SOLUTION INTRAVENOUS at 17:10

## 2023-06-19 RX ADMIN — PHENYLEPHRINE HYDROCHLORIDE 50 MCG: 10 INJECTION, SOLUTION INTRAMUSCULAR; INTRAVENOUS; SUBCUTANEOUS at 09:45

## 2023-06-19 RX ADMIN — PROPOFOL 50 MG: 10 INJECTION, EMULSION INTRAVENOUS at 17:03

## 2023-06-19 RX ADMIN — SODIUM CHLORIDE, SODIUM GLUCONATE, SODIUM ACETATE, POTASSIUM CHLORIDE AND MAGNESIUM CHLORIDE 100 ML/HR: 526; 502; 368; 37; 30 INJECTION, SOLUTION INTRAVENOUS at 19:41

## 2023-06-19 RX ADMIN — FENTANYL CITRATE 50 MCG: 50 INJECTION, SOLUTION INTRAMUSCULAR; INTRAVENOUS at 07:42

## 2023-06-19 RX ADMIN — ROCURONIUM BROMIDE 20 MG: 10 INJECTION INTRAVENOUS at 13:21

## 2023-06-19 RX ADMIN — PROPOFOL 110 MG: 10 INJECTION, EMULSION INTRAVENOUS at 07:53

## 2023-06-19 RX ADMIN — PROPOFOL 50 MG: 10 INJECTION, EMULSION INTRAVENOUS at 16:56

## 2023-06-19 RX ADMIN — ROCURONIUM BROMIDE 50 MG: 10 INJECTION INTRAVENOUS at 15:17

## 2023-06-19 RX ADMIN — ROCURONIUM BROMIDE 100 MG: 10 INJECTION INTRAVENOUS at 07:53

## 2023-06-19 RX ADMIN — PHENYLEPHRINE HYDROCHLORIDE 50 MCG: 10 INJECTION, SOLUTION INTRAMUSCULAR; INTRAVENOUS; SUBCUTANEOUS at 09:14

## 2023-06-19 RX ADMIN — ROCURONIUM BROMIDE 30 MG: 10 INJECTION INTRAVENOUS at 12:30

## 2023-06-19 RX ADMIN — METRONIDAZOLE 500 MG: 500 INJECTION, SOLUTION INTRAVENOUS at 07:38

## 2023-06-19 RX ADMIN — BUPIVACAINE HYDROCHLORIDE 6 ML/HR: 5 INJECTION, SOLUTION EPIDURAL; INTRACAUDAL; PERINEURAL at 11:50

## 2023-06-19 RX ADMIN — ALBUMIN (HUMAN) 12.5 G: 12.5 INJECTION, SOLUTION INTRAVENOUS at 14:30

## 2023-06-19 RX ADMIN — PHENYLEPHRINE HYDROCHLORIDE 50 MCG: 10 INJECTION, SOLUTION INTRAMUSCULAR; INTRAVENOUS; SUBCUTANEOUS at 11:46

## 2023-06-19 RX ADMIN — FENTANYL CITRATE 100 MCG: 50 INJECTION, SOLUTION INTRAMUSCULAR; INTRAVENOUS at 16:58

## 2023-06-19 RX ADMIN — PHENYLEPHRINE HYDROCHLORIDE 100 MCG: 10 INJECTION, SOLUTION INTRAMUSCULAR; INTRAVENOUS; SUBCUTANEOUS at 11:09

## 2023-06-19 RX ADMIN — GLYCOPYRROLATE 0.4 MG: 0.2 INJECTION INTRAMUSCULAR; INTRAVENOUS at 11:08

## 2023-06-19 RX ADMIN — SODIUM CHLORIDE 1000 MG: 900 INJECTION INTRAVENOUS at 07:27

## 2023-06-19 RX ADMIN — SODIUM CHLORIDE, POTASSIUM CHLORIDE, SODIUM LACTATE AND CALCIUM CHLORIDE: 600; 310; 30; 20 INJECTION, SOLUTION INTRAVENOUS at 06:56

## 2023-06-19 RX ADMIN — INSULIN LISPRO 1 UNITS: 100 INJECTION, SOLUTION INTRAVENOUS; SUBCUTANEOUS at 22:21

## 2023-06-19 RX ADMIN — SODIUM CHLORIDE, POTASSIUM CHLORIDE, SODIUM LACTATE AND CALCIUM CHLORIDE: 600; 310; 30; 20 INJECTION, SOLUTION INTRAVENOUS at 11:30

## 2023-06-19 RX ADMIN — MIDAZOLAM HYDROCHLORIDE 1 MG: 2 INJECTION, SOLUTION INTRAMUSCULAR; INTRAVENOUS at 07:37

## 2023-06-19 RX ADMIN — PHENYLEPHRINE HYDROCHLORIDE 50 MCG: 10 INJECTION, SOLUTION INTRAMUSCULAR; INTRAVENOUS; SUBCUTANEOUS at 12:43

## 2023-06-19 RX ADMIN — ROCURONIUM BROMIDE 50 MG: 10 INJECTION INTRAVENOUS at 10:15

## 2023-06-19 RX ADMIN — PHENYLEPHRINE HYDROCHLORIDE 50 MCG: 10 INJECTION, SOLUTION INTRAMUSCULAR; INTRAVENOUS; SUBCUTANEOUS at 11:52

## 2023-06-19 RX ADMIN — LIDOCAINE HYDROCHLORIDE 80 MG: 20 INJECTION, SOLUTION INTRAVENOUS at 07:53

## 2023-06-19 RX ADMIN — SODIUM CHLORIDE, POTASSIUM CHLORIDE, SODIUM LACTATE AND CALCIUM CHLORIDE: 600; 310; 30; 20 INJECTION, SOLUTION INTRAVENOUS at 10:32

## 2023-06-19 RX ADMIN — PHENYLEPHRINE HYDROCHLORIDE 50 MCG: 10 INJECTION, SOLUTION INTRAMUSCULAR; INTRAVENOUS; SUBCUTANEOUS at 12:37

## 2023-06-19 RX ADMIN — ONDANSETRON 8 MG: 2 INJECTION INTRAMUSCULAR; INTRAVENOUS at 16:12

## 2023-06-19 RX ADMIN — SUGAMMADEX 300 MG: 100 INJECTION, SOLUTION INTRAVENOUS at 17:34

## 2023-06-19 RX ADMIN — GLYCOPYRROLATE 0.2 MG: 0.2 INJECTION INTRAMUSCULAR; INTRAVENOUS at 12:43

## 2023-06-19 RX ADMIN — LIDOCAINE HYDROCHLORIDE 3 ML: 20 INJECTION, SOLUTION EPIDURAL; INFILTRATION; INTRACAUDAL; PERINEURAL at 18:37

## 2023-06-19 RX ADMIN — ALBUMIN (HUMAN) 12.5 G: 12.5 INJECTION, SOLUTION INTRAVENOUS at 15:14

## 2023-06-19 RX ADMIN — SODIUM CHLORIDE, POTASSIUM CHLORIDE, SODIUM LACTATE AND CALCIUM CHLORIDE: 600; 310; 30; 20 INJECTION, SOLUTION INTRAVENOUS at 15:00

## 2023-06-19 RX ADMIN — PHENYLEPHRINE HYDROCHLORIDE 50 MCG: 10 INJECTION, SOLUTION INTRAMUSCULAR; INTRAVENOUS; SUBCUTANEOUS at 11:41

## 2023-06-19 RX ADMIN — ROCURONIUM BROMIDE 50 MG: 10 INJECTION INTRAVENOUS at 08:48

## 2023-06-19 RX ADMIN — ROCURONIUM BROMIDE 50 MG: 10 INJECTION INTRAVENOUS at 14:16

## 2023-06-19 RX ADMIN — METHOCARBAMOL 500 MG: 100 INJECTION, SOLUTION INTRAMUSCULAR; INTRAVENOUS at 19:15

## 2023-06-19 RX ADMIN — PHENYLEPHRINE HYDROCHLORIDE 50 MCG: 10 INJECTION, SOLUTION INTRAMUSCULAR; INTRAVENOUS; SUBCUTANEOUS at 10:06

## 2023-06-19 RX ADMIN — FENTANYL CITRATE 50 MCG: 50 INJECTION, SOLUTION INTRAMUSCULAR; INTRAVENOUS at 07:41

## 2023-06-19 RX ADMIN — SODIUM CHLORIDE: 9 INJECTION, SOLUTION INTRAVENOUS at 15:17

## 2023-06-19 RX ADMIN — PHENYLEPHRINE HYDROCHLORIDE 20 MCG/MIN: 10 INJECTION INTRAVENOUS at 13:26

## 2023-06-19 RX ADMIN — SODIUM CHLORIDE, POTASSIUM CHLORIDE, SODIUM LACTATE AND CALCIUM CHLORIDE: 600; 310; 30; 20 INJECTION, SOLUTION INTRAVENOUS at 06:55

## 2023-06-19 RX ADMIN — ROCURONIUM BROMIDE 50 MG: 10 INJECTION INTRAVENOUS at 16:20

## 2023-06-19 RX ADMIN — MIDAZOLAM HYDROCHLORIDE 1 MG: 2 INJECTION, SOLUTION INTRAMUSCULAR; INTRAVENOUS at 07:27

## 2023-06-19 RX ADMIN — ROCURONIUM BROMIDE 50 MG: 10 INJECTION INTRAVENOUS at 11:41

## 2023-06-19 RX ADMIN — PHENYLEPHRINE HYDROCHLORIDE 50 MCG: 10 INJECTION, SOLUTION INTRAMUSCULAR; INTRAVENOUS; SUBCUTANEOUS at 10:23

## 2023-06-19 RX ADMIN — SODIUM CHLORIDE, PRESERVATIVE FREE 10 ML: 5 INJECTION INTRAVENOUS at 21:29

## 2023-06-19 RX ADMIN — PHENYLEPHRINE HYDROCHLORIDE 50 MCG: 10 INJECTION, SOLUTION INTRAMUSCULAR; INTRAVENOUS; SUBCUTANEOUS at 12:30

## 2023-06-19 RX ADMIN — DEXAMETHASONE SODIUM PHOSPHATE 10 MG: 4 INJECTION, SOLUTION INTRAMUSCULAR; INTRAVENOUS at 16:12

## 2023-06-19 ASSESSMENT — PAIN - FUNCTIONAL ASSESSMENT: PAIN_FUNCTIONAL_ASSESSMENT: 0-10

## 2023-06-19 NOTE — ANESTHESIA PRE PROCEDURE
Department of Anesthesiology  Preprocedure Note       Name:  Carlos Lanier   Age:  67 y.o.  :  1950                                          MRN:  [id number]         Date:  2023      Surgeon: Hadley Torres):  Mary Lizarraga MD    Procedure: Procedure(s):  ROBOTIC / LAPAROSCOPIC  PANCREATICODUODENECTOMY, POSSIBLE OPEN    Medications prior to admission:   Prior to Admission medications    Medication Sig Start Date End Date Taking?  Authorizing Provider   ONETOUCH ULTRA strip USE 1 STRIP TO CHECK GLUCOSE ONCE DAILY 23   Historical Provider, MD   potassium chloride (KLOR-CON M) 10 MEQ extended release tablet Take 1 tablet by mouth 2 times daily for 7 days 5/27/23 6/3/23  Mariel Colon MD   finasteride (PROSCAR) 5 MG tablet Take 1 tablet by mouth every evening    Historical Provider, MD   ursodiol (ACTIGALL) 300 MG capsule Take 2 capsules by mouth 2 times daily  Patient taking differently: Take 2 capsules by mouth 2 times daily 2023 2 capsule twice daily 3/29/23   MARCOS Doe   metFORMIN (GLUCOPHAGE-XR) 500 MG extended release tablet TAKE 1 TABLET BY MOUTH TWICE DAILY 22   Historical Provider, MD   dicyclomine (BENTYL) 20 MG tablet Take 1 tablet by mouth nightly 22   Historical Provider, MD   triamterene-hydroCHLOROthiazide (DYAZIDE) 37.5-25 MG per capsule Take 1 capsule by mouth 2 times daily (before meals) 22   Historical Provider, MD   ondansetron (ZOFRAN) 4 MG tablet Take 1 tablet by mouth 3 times daily as needed for Nausea or Vomiting 6/3/22   Char Eaton MD   gemfibrozil (LOPID) 600 MG tablet Take 1 tablet by mouth 2 times daily (before meals)    Historical Provider, MD   pantoprazole (PROTONIX) 40 MG tablet Take 1 tablet by mouth 2 times daily (before meals) for 14 days  Patient taking differently: Take 1 tablet by mouth daily 22  Harry Booker MD   allopurinol (ZYLOPRIM) 300 MG tablet Take 1 tablet by mouth daily    Historical

## 2023-06-19 NOTE — ANESTHESIA PROCEDURE NOTES
Arterial Line:    An arterial line was placed using surface landmarks, in the OR for the following indication(s): continuous blood pressure monitoring and blood sampling needed. A 20 gauge (size), 1 and 1/4 inch (length), Arrow (type) catheter was placed, Seldinger technique not used, into the right radial artery, secured by tape and Tegaderm. Anesthesia type: General    Events:  patient tolerated procedure well with no complications and EBL < 5mL. 6/19/2023 8:00 AM6/19/2023 8:05 AM  Anesthesiologist: Alexandria Perez MD  Resident/CRNA: ADILENE Campos CRNA  Performed: Resident/CRNA   Preanesthetic Checklist  Completed: patient identified, IV checked, site marked, risks and benefits discussed, surgical/procedural consents, equipment checked, pre-op evaluation, timeout performed, anesthesia consent given, oxygen available and monitors applied/VS acknowledged

## 2023-06-19 NOTE — ANESTHESIA PROCEDURE NOTES
Epidural Block    Patient location during procedure: pre-op  Start time: 6/19/2023 7:35 AM  End time: 6/19/2023 7:50 AM  Reason for block: post-op pain management and at surgeon's request  Staffing  Performed: anesthesiologist   Anesthesiologist: Emilie Red MD  Epidural  Patient position: sitting  Prep: ChloraPrep  Patient monitoring: cardiac monitor, continuous pulse ox and frequent blood pressure checks  Approach: right paramedian  Location: T9-10  Injection technique: JOVANNA air  Provider prep: mask, sterile gloves and sterile gown  Needle  Needle type: Tuohy   Needle gauge: 17 G  Needle length: 3.5 in  Needle insertion depth: 5 cm  Catheter type: side hole  Catheter size: 19 G  Catheter at skin depth: 16 cm  Test dose: negative  Kit: Perifix FX Continuous Epidural Anesthesia Tray  Lot number: [id number]  Expiration date: 9/30/2023Catheter Secured: tegaderm  Assessment  Hemodynamics: stable  Attempts: 1  Outcomes: uncomplicated and patient tolerated procedure well  Preanesthetic Checklist  Completed: patient identified, IV checked, site marked, risks and benefits discussed, surgical/procedural consents, equipment checked, pre-op evaluation, timeout performed, anesthesia consent given, oxygen available and monitors applied/VS acknowledged

## 2023-06-19 NOTE — ANESTHESIA POSTPROCEDURE EVALUATION
Department of Anesthesiology  Postprocedure Note    Patient: Abelardo Alfaro  MRN: [id number]  YOB: 1950  Date of evaluation: 6/19/2023      Procedure Summary     Date: 06/19/23 Room / Location: [address] [place] [address]    Anesthesia Start: 1854 Anesthesia Stop: 9403    Procedure: ROBOTIC ASSISTED  PANCREATICODUODENECTOMY CONVERTED TOOPEN (Abdomen) Diagnosis:       Common bile duct stricture      Bile duct cancer ([address]      (Common bile duct stricture [K83.1])      (Bile duct cancer ([facility] [address] [C24.0])    Surgeons: Naye Mcclure MD Responsible Provider: Sakshi Rick DO    Anesthesia Type: general ASA Status: 3          Anesthesia Type: No value filed.     Elenita Phase I: Elenita Score: 8    Elenita Phase II:        Anesthesia Post Evaluation    Patient location during evaluation: PACU  Patient participation: complete - patient participated  Level of consciousness: awake and alert  Airway patency: patent  Nausea & Vomiting: no nausea and no vomiting  Cardiovascular status: blood pressure returned to baseline  Respiratory status: acceptable  Hydration status: euvolemic

## 2023-06-20 LAB
ALBUMIN SERPL-MCNC: 3.4 G/DL (ref 3.4–5)
ALP SERPL-CCNC: 47 U/L (ref 40–129)
ALT SERPL-CCNC: 80 U/L (ref 10–40)
ANION GAP SERPL CALCULATED.3IONS-SCNC: 11 MMOL/L (ref 3–16)
AST SERPL-CCNC: 82 U/L (ref 15–37)
BASOPHILS # BLD: 0 K/UL (ref 0–0.2)
BASOPHILS NFR BLD: 0 %
BILIRUB DIRECT SERPL-MCNC: <0.2 MG/DL (ref 0–0.3)
BILIRUB INDIRECT SERPL-MCNC: ABNORMAL MG/DL (ref 0–1)
BILIRUB SERPL-MCNC: 0.5 MG/DL (ref 0–1)
BUN SERPL-MCNC: 9 MG/DL (ref 7–20)
CALCIUM SERPL-MCNC: 7.7 MG/DL (ref 8.3–10.6)
CHLORIDE SERPL-SCNC: 104 MMOL/L (ref 99–110)
CO2 SERPL-SCNC: 24 MMOL/L (ref 21–32)
CREAT SERPL-MCNC: 0.6 MG/DL (ref 0.8–1.3)
DEPRECATED RDW RBC AUTO: 14 % (ref 12.4–15.4)
EOSINOPHIL # BLD: 0 K/UL (ref 0–0.6)
EOSINOPHIL NFR BLD: 0 %
EST. AVERAGE GLUCOSE BLD GHB EST-MCNC: 139.9 MG/DL
GFR SERPLBLD CREATININE-BSD FMLA CKD-EPI: >60 ML/MIN/{1.73_M2}
GLUCOSE BLD-MCNC: 175 MG/DL (ref 70–99)
GLUCOSE BLD-MCNC: 188 MG/DL (ref 70–99)
GLUCOSE BLD-MCNC: 192 MG/DL (ref 70–99)
GLUCOSE BLD-MCNC: 192 MG/DL (ref 70–99)
GLUCOSE BLD-MCNC: 202 MG/DL (ref 70–99)
GLUCOSE BLD-MCNC: 207 MG/DL (ref 70–99)
GLUCOSE BLD-MCNC: 208 MG/DL (ref 70–99)
GLUCOSE SERPL-MCNC: 213 MG/DL (ref 70–99)
HBA1C MFR BLD: 6.5 %
HCT VFR BLD AUTO: 29.3 % (ref 40.5–52.5)
HGB BLD-MCNC: 10.3 G/DL (ref 13.5–17.5)
LACTATE BLDV-SCNC: 0.7 MMOL/L (ref 0.4–2)
LYMPHOCYTES # BLD: 0.3 K/UL (ref 1–5.1)
LYMPHOCYTES NFR BLD: 3.1 %
MAGNESIUM SERPL-MCNC: 2.4 MG/DL (ref 1.8–2.4)
MCH RBC QN AUTO: 34.1 PG (ref 26–34)
MCHC RBC AUTO-ENTMCNC: 35.1 G/DL (ref 31–36)
MCV RBC AUTO: 97.2 FL (ref 80–100)
MONOCYTES # BLD: 0.4 K/UL (ref 0–1.3)
MONOCYTES NFR BLD: 4 %
NEUTROPHILS # BLD: 8.3 K/UL (ref 1.7–7.7)
NEUTROPHILS NFR BLD: 92.9 %
PERFORMED ON: ABNORMAL
PHOSPHATE SERPL-MCNC: 2.8 MG/DL (ref 2.5–4.9)
PLATELET # BLD AUTO: 151 K/UL (ref 135–450)
PMV BLD AUTO: 7.1 FL (ref 5–10.5)
POTASSIUM SERPL-SCNC: 3.8 MMOL/L (ref 3.5–5.1)
PROT SERPL-MCNC: 5.6 G/DL (ref 6.4–8.2)
RBC # BLD AUTO: 3.02 M/UL (ref 4.2–5.9)
SODIUM SERPL-SCNC: 139 MMOL/L (ref 136–145)
WBC # BLD AUTO: 9 K/UL (ref 4–11)

## 2023-06-20 PROCEDURE — 94669 MECHANICAL CHEST WALL OSCILL: CPT

## 2023-06-20 PROCEDURE — 2500000003 HC RX 250 WO HCPCS: Performed by: STUDENT IN AN ORGANIZED HEALTH CARE EDUCATION/TRAINING PROGRAM

## 2023-06-20 PROCEDURE — 2580000003 HC RX 258: Performed by: STUDENT IN AN ORGANIZED HEALTH CARE EDUCATION/TRAINING PROGRAM

## 2023-06-20 PROCEDURE — 85025 COMPLETE CBC W/AUTO DIFF WBC: CPT

## 2023-06-20 PROCEDURE — A4216 STERILE WATER/SALINE, 10 ML: HCPCS | Performed by: STUDENT IN AN ORGANIZED HEALTH CARE EDUCATION/TRAINING PROGRAM

## 2023-06-20 PROCEDURE — 83735 ASSAY OF MAGNESIUM: CPT

## 2023-06-20 PROCEDURE — 83605 ASSAY OF LACTIC ACID: CPT

## 2023-06-20 PROCEDURE — 6360000002 HC RX W HCPCS: Performed by: STUDENT IN AN ORGANIZED HEALTH CARE EDUCATION/TRAINING PROGRAM

## 2023-06-20 PROCEDURE — 2700000000 HC OXYGEN THERAPY PER DAY

## 2023-06-20 PROCEDURE — 94761 N-INVAS EAR/PLS OXIMETRY MLT: CPT

## 2023-06-20 PROCEDURE — C9113 INJ PANTOPRAZOLE SODIUM, VIA: HCPCS | Performed by: STUDENT IN AN ORGANIZED HEALTH CARE EDUCATION/TRAINING PROGRAM

## 2023-06-20 PROCEDURE — 36415 COLL VENOUS BLD VENIPUNCTURE: CPT

## 2023-06-20 PROCEDURE — 6370000000 HC RX 637 (ALT 250 FOR IP): Performed by: STUDENT IN AN ORGANIZED HEALTH CARE EDUCATION/TRAINING PROGRAM

## 2023-06-20 PROCEDURE — 94640 AIRWAY INHALATION TREATMENT: CPT

## 2023-06-20 PROCEDURE — 80069 RENAL FUNCTION PANEL: CPT

## 2023-06-20 PROCEDURE — 94150 VITAL CAPACITY TEST: CPT

## 2023-06-20 PROCEDURE — 2000000000 HC ICU R&B

## 2023-06-20 PROCEDURE — 80076 HEPATIC FUNCTION PANEL: CPT

## 2023-06-20 PROCEDURE — 6360000002 HC RX W HCPCS: Performed by: NURSE PRACTITIONER

## 2023-06-20 RX ORDER — OCTREOTIDE ACETATE 100 UG/ML
100 INJECTION, SOLUTION INTRAVENOUS; SUBCUTANEOUS EVERY 8 HOURS
Status: COMPLETED | OUTPATIENT
Start: 2023-06-20 | End: 2023-06-22

## 2023-06-20 RX ORDER — IPRATROPIUM BROMIDE AND ALBUTEROL SULFATE 2.5; .5 MG/3ML; MG/3ML
1 SOLUTION RESPIRATORY (INHALATION) EVERY 4 HOURS PRN
Status: DISCONTINUED | OUTPATIENT
Start: 2023-06-20 | End: 2023-06-22

## 2023-06-20 RX ORDER — METOCLOPRAMIDE HYDROCHLORIDE 5 MG/ML
5 INJECTION INTRAMUSCULAR; INTRAVENOUS ONCE
Status: COMPLETED | OUTPATIENT
Start: 2023-06-20 | End: 2023-06-20

## 2023-06-20 RX ORDER — METOCLOPRAMIDE HYDROCHLORIDE 5 MG/ML
5 INJECTION INTRAMUSCULAR; INTRAVENOUS EVERY 6 HOURS
Status: DISCONTINUED | OUTPATIENT
Start: 2023-06-20 | End: 2023-06-26

## 2023-06-20 RX ORDER — PROCHLORPERAZINE EDISYLATE 5 MG/ML
5 INJECTION INTRAMUSCULAR; INTRAVENOUS EVERY 6 HOURS PRN
Status: DISCONTINUED | OUTPATIENT
Start: 2023-06-20 | End: 2023-06-23

## 2023-06-20 RX ORDER — INSULIN LISPRO 100 [IU]/ML
0-8 INJECTION, SOLUTION INTRAVENOUS; SUBCUTANEOUS EVERY 4 HOURS
Status: DISCONTINUED | OUTPATIENT
Start: 2023-06-20 | End: 2023-06-24

## 2023-06-20 RX ADMIN — INSULIN LISPRO 2 UNITS: 100 INJECTION, SOLUTION INTRAVENOUS; SUBCUTANEOUS at 20:17

## 2023-06-20 RX ADMIN — IPRATROPIUM BROMIDE AND ALBUTEROL SULFATE 1 DOSE: .5; 3 SOLUTION RESPIRATORY (INHALATION) at 07:23

## 2023-06-20 RX ADMIN — METOCLOPRAMIDE HYDROCHLORIDE 5 MG: 5 INJECTION INTRAMUSCULAR; INTRAVENOUS at 22:02

## 2023-06-20 RX ADMIN — METRONIDAZOLE 500 MG: 500 INJECTION, SOLUTION INTRAVENOUS at 00:45

## 2023-06-20 RX ADMIN — CEFTRIAXONE 1000 MG: 1 INJECTION, POWDER, FOR SOLUTION INTRAMUSCULAR; INTRAVENOUS at 07:24

## 2023-06-20 RX ADMIN — METRONIDAZOLE 500 MG: 500 INJECTION, SOLUTION INTRAVENOUS at 17:01

## 2023-06-20 RX ADMIN — OCTREOTIDE ACETATE 100 MCG: 50 INJECTION, SOLUTION INTRAVENOUS; SUBCUTANEOUS at 00:44

## 2023-06-20 RX ADMIN — METHOCARBAMOL 500 MG: 100 INJECTION, SOLUTION INTRAMUSCULAR; INTRAVENOUS at 03:27

## 2023-06-20 RX ADMIN — METOCLOPRAMIDE HYDROCHLORIDE 5 MG: 5 INJECTION INTRAMUSCULAR; INTRAVENOUS at 16:55

## 2023-06-20 RX ADMIN — PROCHLORPERAZINE EDISYLATE 5 MG: 5 INJECTION, SOLUTION INTRAMUSCULAR; INTRAVENOUS at 02:39

## 2023-06-20 RX ADMIN — IPRATROPIUM BROMIDE AND ALBUTEROL SULFATE 1 DOSE: .5; 3 SOLUTION RESPIRATORY (INHALATION) at 17:27

## 2023-06-20 RX ADMIN — METOCLOPRAMIDE HYDROCHLORIDE 5 MG: 5 INJECTION INTRAMUSCULAR; INTRAVENOUS at 09:54

## 2023-06-20 RX ADMIN — METRONIDAZOLE 500 MG: 500 INJECTION, SOLUTION INTRAVENOUS at 08:48

## 2023-06-20 RX ADMIN — INSULIN LISPRO 1 UNITS: 100 INJECTION, SOLUTION INTRAVENOUS; SUBCUTANEOUS at 08:36

## 2023-06-20 RX ADMIN — ENOXAPARIN SODIUM 40 MG: 100 INJECTION SUBCUTANEOUS at 09:59

## 2023-06-20 RX ADMIN — POTASSIUM PHOSPHATE, MONOBASIC AND POTASSIUM PHOSPHATE, DIBASIC 15 MMOL: 224; 236 INJECTION, SOLUTION, CONCENTRATE INTRAVENOUS at 13:45

## 2023-06-20 RX ADMIN — OCTREOTIDE ACETATE 100 MCG: 100 INJECTION, SOLUTION INTRAVENOUS; SUBCUTANEOUS at 09:58

## 2023-06-20 RX ADMIN — SODIUM CHLORIDE, PRESERVATIVE FREE 10 ML: 5 INJECTION INTRAVENOUS at 20:17

## 2023-06-20 RX ADMIN — SODIUM CHLORIDE, SODIUM GLUCONATE, SODIUM ACETATE, POTASSIUM CHLORIDE AND MAGNESIUM CHLORIDE 100 ML/HR: 526; 502; 368; 37; 30 INJECTION, SOLUTION INTRAVENOUS at 07:51

## 2023-06-20 RX ADMIN — METHOCARBAMOL 500 MG: 100 INJECTION, SOLUTION INTRAMUSCULAR; INTRAVENOUS at 11:01

## 2023-06-20 RX ADMIN — OCTREOTIDE ACETATE 100 MCG: 100 INJECTION, SOLUTION INTRAVENOUS; SUBCUTANEOUS at 16:55

## 2023-06-20 RX ADMIN — METHOCARBAMOL 500 MG: 100 INJECTION, SOLUTION INTRAMUSCULAR; INTRAVENOUS at 19:12

## 2023-06-20 RX ADMIN — ONDANSETRON 4 MG: 2 INJECTION INTRAMUSCULAR; INTRAVENOUS at 01:27

## 2023-06-20 RX ADMIN — INSULIN LISPRO 2 UNITS: 100 INJECTION, SOLUTION INTRAVENOUS; SUBCUTANEOUS at 10:01

## 2023-06-20 RX ADMIN — SODIUM CHLORIDE, PRESERVATIVE FREE 10 ML: 5 INJECTION INTRAVENOUS at 08:46

## 2023-06-20 RX ADMIN — ONDANSETRON 4 MG: 2 INJECTION INTRAMUSCULAR; INTRAVENOUS at 10:28

## 2023-06-20 RX ADMIN — SODIUM CHLORIDE, PRESERVATIVE FREE 40 MG: 5 INJECTION INTRAVENOUS at 08:43

## 2023-06-20 RX ADMIN — SODIUM CHLORIDE, SODIUM GLUCONATE, SODIUM ACETATE, POTASSIUM CHLORIDE AND MAGNESIUM CHLORIDE 100 ML/HR: 526; 502; 368; 37; 30 INJECTION, SOLUTION INTRAVENOUS at 18:34

## 2023-06-20 ASSESSMENT — PAIN SCALES - GENERAL
PAINLEVEL_OUTOF10: 1
PAINLEVEL_OUTOF10: 3
PAINLEVEL_OUTOF10: 3
PAINLEVEL_OUTOF10: 2
PAINLEVEL_OUTOF10: 3
PAINLEVEL_OUTOF10: 2
PAINLEVEL_OUTOF10: 2

## 2023-06-20 ASSESSMENT — PAIN DESCRIPTION - DESCRIPTORS
DESCRIPTORS: SORE
DESCRIPTORS: SORE

## 2023-06-20 ASSESSMENT — PAIN DESCRIPTION - PAIN TYPE
TYPE: SURGICAL PAIN
TYPE: SURGICAL PAIN

## 2023-06-20 ASSESSMENT — PAIN DESCRIPTION - LOCATION
LOCATION: ABDOMEN

## 2023-06-20 ASSESSMENT — PAIN DESCRIPTION - FREQUENCY: FREQUENCY: INTERMITTENT

## 2023-06-20 ASSESSMENT — PAIN - FUNCTIONAL ASSESSMENT
PAIN_FUNCTIONAL_ASSESSMENT: PREVENTS OR INTERFERES SOME ACTIVE ACTIVITIES AND ADLS
PAIN_FUNCTIONAL_ASSESSMENT: PREVENTS OR INTERFERES SOME ACTIVE ACTIVITIES AND ADLS

## 2023-06-20 ASSESSMENT — PAIN DESCRIPTION - ONSET: ONSET: ON-GOING

## 2023-06-20 ASSESSMENT — PAIN DESCRIPTION - ORIENTATION
ORIENTATION: MID
ORIENTATION: MID

## 2023-06-21 LAB
ALBUMIN SERPL-MCNC: 3.2 G/DL (ref 3.4–5)
ALBUMIN SERPL-MCNC: 3.2 G/DL (ref 3.4–5)
ALP SERPL-CCNC: 47 U/L (ref 40–129)
ALT SERPL-CCNC: 51 U/L (ref 10–40)
ANION GAP SERPL CALCULATED.3IONS-SCNC: 6 MMOL/L (ref 3–16)
ANION GAP SERPL CALCULATED.3IONS-SCNC: 9 MMOL/L (ref 3–16)
AST SERPL-CCNC: 33 U/L (ref 15–37)
BASOPHILS # BLD: 0 K/UL (ref 0–0.2)
BASOPHILS NFR BLD: 0 %
BILIRUB DIRECT SERPL-MCNC: <0.2 MG/DL (ref 0–0.3)
BILIRUB INDIRECT SERPL-MCNC: ABNORMAL MG/DL (ref 0–1)
BILIRUB SERPL-MCNC: 0.3 MG/DL (ref 0–1)
BUN SERPL-MCNC: 8 MG/DL (ref 7–20)
BUN SERPL-MCNC: 8 MG/DL (ref 7–20)
CALCIUM SERPL-MCNC: 7.9 MG/DL (ref 8.3–10.6)
CALCIUM SERPL-MCNC: 8.2 MG/DL (ref 8.3–10.6)
CANCER AG19-9 SERPL IA-ACNC: 32 U/ML (ref 0–35)
CHLORIDE SERPL-SCNC: 102 MMOL/L (ref 99–110)
CHLORIDE SERPL-SCNC: 103 MMOL/L (ref 99–110)
CO2 SERPL-SCNC: 30 MMOL/L (ref 21–32)
CO2 SERPL-SCNC: 30 MMOL/L (ref 21–32)
CREAT SERPL-MCNC: <0.5 MG/DL (ref 0.8–1.3)
CREAT SERPL-MCNC: <0.5 MG/DL (ref 0.8–1.3)
DEPRECATED RDW RBC AUTO: 13.8 % (ref 12.4–15.4)
EOSINOPHIL # BLD: 0 K/UL (ref 0–0.6)
EOSINOPHIL NFR BLD: 0 %
GFR SERPLBLD CREATININE-BSD FMLA CKD-EPI: >60 ML/MIN/{1.73_M2}
GFR SERPLBLD CREATININE-BSD FMLA CKD-EPI: >60 ML/MIN/{1.73_M2}
GLUCOSE BLD-MCNC: 151 MG/DL (ref 70–99)
GLUCOSE BLD-MCNC: 157 MG/DL (ref 70–99)
GLUCOSE BLD-MCNC: 157 MG/DL (ref 70–99)
GLUCOSE BLD-MCNC: 187 MG/DL (ref 70–99)
GLUCOSE BLD-MCNC: 192 MG/DL (ref 70–99)
GLUCOSE BLD-MCNC: 207 MG/DL (ref 70–99)
GLUCOSE SERPL-MCNC: 159 MG/DL (ref 70–99)
GLUCOSE SERPL-MCNC: 195 MG/DL (ref 70–99)
HCT VFR BLD AUTO: 30.2 % (ref 40.5–52.5)
HGB BLD-MCNC: 10.3 G/DL (ref 13.5–17.5)
LYMPHOCYTES # BLD: 0.3 K/UL (ref 1–5.1)
LYMPHOCYTES NFR BLD: 3.1 %
MAGNESIUM SERPL-MCNC: 2.6 MG/DL (ref 1.8–2.4)
MCH RBC QN AUTO: 34.2 PG (ref 26–34)
MCHC RBC AUTO-ENTMCNC: 34.2 G/DL (ref 31–36)
MCV RBC AUTO: 99.9 FL (ref 80–100)
MONOCYTES # BLD: 0.3 K/UL (ref 0–1.3)
MONOCYTES NFR BLD: 3.8 %
NEUTROPHILS # BLD: 7.6 K/UL (ref 1.7–7.7)
NEUTROPHILS NFR BLD: 93.1 %
PERFORMED ON: ABNORMAL
PHOSPHATE SERPL-MCNC: 1.2 MG/DL (ref 2.5–4.9)
PHOSPHATE SERPL-MCNC: 2.2 MG/DL (ref 2.5–4.9)
PLATELET # BLD AUTO: 145 K/UL (ref 135–450)
PMV BLD AUTO: 6.8 FL (ref 5–10.5)
POTASSIUM SERPL-SCNC: 3.6 MMOL/L (ref 3.5–5.1)
POTASSIUM SERPL-SCNC: 4.3 MMOL/L (ref 3.5–5.1)
PROT SERPL-MCNC: 5.6 G/DL (ref 6.4–8.2)
RBC # BLD AUTO: 3.03 M/UL (ref 4.2–5.9)
SODIUM SERPL-SCNC: 139 MMOL/L (ref 136–145)
SODIUM SERPL-SCNC: 141 MMOL/L (ref 136–145)
WBC # BLD AUTO: 8.2 K/UL (ref 4–11)

## 2023-06-21 PROCEDURE — 2500000003 HC RX 250 WO HCPCS: Performed by: STUDENT IN AN ORGANIZED HEALTH CARE EDUCATION/TRAINING PROGRAM

## 2023-06-21 PROCEDURE — 6360000002 HC RX W HCPCS: Performed by: STUDENT IN AN ORGANIZED HEALTH CARE EDUCATION/TRAINING PROGRAM

## 2023-06-21 PROCEDURE — 80076 HEPATIC FUNCTION PANEL: CPT

## 2023-06-21 PROCEDURE — 2580000003 HC RX 258: Performed by: STUDENT IN AN ORGANIZED HEALTH CARE EDUCATION/TRAINING PROGRAM

## 2023-06-21 PROCEDURE — 85025 COMPLETE CBC W/AUTO DIFF WBC: CPT

## 2023-06-21 PROCEDURE — 94669 MECHANICAL CHEST WALL OSCILL: CPT

## 2023-06-21 PROCEDURE — 2000000000 HC ICU R&B

## 2023-06-21 PROCEDURE — 2580000003 HC RX 258: Performed by: NURSE PRACTITIONER

## 2023-06-21 PROCEDURE — 6360000002 HC RX W HCPCS: Performed by: NURSE PRACTITIONER

## 2023-06-21 PROCEDURE — 2500000003 HC RX 250 WO HCPCS: Performed by: NURSE PRACTITIONER

## 2023-06-21 PROCEDURE — 94150 VITAL CAPACITY TEST: CPT

## 2023-06-21 PROCEDURE — 2700000000 HC OXYGEN THERAPY PER DAY

## 2023-06-21 PROCEDURE — 6370000000 HC RX 637 (ALT 250 FOR IP): Performed by: SURGERY

## 2023-06-21 PROCEDURE — 94761 N-INVAS EAR/PLS OXIMETRY MLT: CPT

## 2023-06-21 PROCEDURE — C9113 INJ PANTOPRAZOLE SODIUM, VIA: HCPCS | Performed by: STUDENT IN AN ORGANIZED HEALTH CARE EDUCATION/TRAINING PROGRAM

## 2023-06-21 PROCEDURE — 80069 RENAL FUNCTION PANEL: CPT

## 2023-06-21 PROCEDURE — 6370000000 HC RX 637 (ALT 250 FOR IP): Performed by: STUDENT IN AN ORGANIZED HEALTH CARE EDUCATION/TRAINING PROGRAM

## 2023-06-21 PROCEDURE — 36415 COLL VENOUS BLD VENIPUNCTURE: CPT

## 2023-06-21 PROCEDURE — 83735 ASSAY OF MAGNESIUM: CPT

## 2023-06-21 PROCEDURE — 94640 AIRWAY INHALATION TREATMENT: CPT

## 2023-06-21 RX ADMIN — METRONIDAZOLE 500 MG: 500 INJECTION, SOLUTION INTRAVENOUS at 09:26

## 2023-06-21 RX ADMIN — ENOXAPARIN SODIUM 40 MG: 100 INJECTION SUBCUTANEOUS at 09:23

## 2023-06-21 RX ADMIN — SODIUM CHLORIDE, SODIUM GLUCONATE, SODIUM ACETATE, POTASSIUM CHLORIDE AND MAGNESIUM CHLORIDE 50 ML/HR: 526; 502; 368; 37; 30 INJECTION, SOLUTION INTRAVENOUS at 17:52

## 2023-06-21 RX ADMIN — METOCLOPRAMIDE HYDROCHLORIDE 5 MG: 5 INJECTION INTRAMUSCULAR; INTRAVENOUS at 09:23

## 2023-06-21 RX ADMIN — INSULIN LISPRO 2 UNITS: 100 INJECTION, SOLUTION INTRAVENOUS; SUBCUTANEOUS at 09:22

## 2023-06-21 RX ADMIN — SODIUM CHLORIDE, PRESERVATIVE FREE 10 ML: 5 INJECTION INTRAVENOUS at 20:58

## 2023-06-21 RX ADMIN — OCTREOTIDE ACETATE 100 MCG: 100 INJECTION, SOLUTION INTRAVENOUS; SUBCUTANEOUS at 03:01

## 2023-06-21 RX ADMIN — OCTREOTIDE ACETATE 100 MCG: 100 INJECTION, SOLUTION INTRAVENOUS; SUBCUTANEOUS at 17:52

## 2023-06-21 RX ADMIN — SODIUM CHLORIDE, SODIUM GLUCONATE, SODIUM ACETATE, POTASSIUM CHLORIDE AND MAGNESIUM CHLORIDE 100 ML/HR: 526; 502; 368; 37; 30 INJECTION, SOLUTION INTRAVENOUS at 03:53

## 2023-06-21 RX ADMIN — METRONIDAZOLE 500 MG: 500 INJECTION, SOLUTION INTRAVENOUS at 01:09

## 2023-06-21 RX ADMIN — METHOCARBAMOL 500 MG: 100 INJECTION, SOLUTION INTRAMUSCULAR; INTRAVENOUS at 11:40

## 2023-06-21 RX ADMIN — DEXTRAN 70, AND HYPROMELLOSE 2910 1 DROP: 1; 3 SOLUTION/ DROPS OPHTHALMIC at 16:05

## 2023-06-21 RX ADMIN — ALBUTEROL SULFATE 2.5 MG: 2.5 SOLUTION RESPIRATORY (INHALATION) at 03:36

## 2023-06-21 RX ADMIN — METOCLOPRAMIDE HYDROCHLORIDE 5 MG: 5 INJECTION INTRAMUSCULAR; INTRAVENOUS at 03:01

## 2023-06-21 RX ADMIN — CEFTRIAXONE 1000 MG: 1 INJECTION, POWDER, FOR SOLUTION INTRAMUSCULAR; INTRAVENOUS at 06:39

## 2023-06-21 RX ADMIN — POTASSIUM PHOSPHATE, MONOBASIC AND POTASSIUM PHOSPHATE, DIBASIC 30 MMOL: 224; 236 INJECTION, SOLUTION, CONCENTRATE INTRAVENOUS at 12:53

## 2023-06-21 RX ADMIN — METOCLOPRAMIDE HYDROCHLORIDE 5 MG: 5 INJECTION INTRAMUSCULAR; INTRAVENOUS at 21:48

## 2023-06-21 RX ADMIN — SODIUM CHLORIDE, PRESERVATIVE FREE 10 ML: 5 INJECTION INTRAVENOUS at 09:26

## 2023-06-21 RX ADMIN — METHOCARBAMOL 500 MG: 100 INJECTION, SOLUTION INTRAMUSCULAR; INTRAVENOUS at 18:46

## 2023-06-21 RX ADMIN — METOCLOPRAMIDE HYDROCHLORIDE 5 MG: 5 INJECTION INTRAMUSCULAR; INTRAVENOUS at 16:09

## 2023-06-21 RX ADMIN — BUPIVACAINE HYDROCHLORIDE: 5 INJECTION, SOLUTION EPIDURAL; INTRACAUDAL; PERINEURAL at 12:59

## 2023-06-21 RX ADMIN — METHOCARBAMOL 500 MG: 100 INJECTION, SOLUTION INTRAMUSCULAR; INTRAVENOUS at 03:01

## 2023-06-21 RX ADMIN — METRONIDAZOLE 500 MG: 500 INJECTION, SOLUTION INTRAVENOUS at 17:01

## 2023-06-21 RX ADMIN — SODIUM CHLORIDE, PRESERVATIVE FREE 40 MG: 5 INJECTION INTRAVENOUS at 09:23

## 2023-06-21 RX ADMIN — ALBUTEROL SULFATE 2.5 MG: 2.5 SOLUTION RESPIRATORY (INHALATION) at 19:35

## 2023-06-21 RX ADMIN — OCTREOTIDE ACETATE 100 MCG: 100 INJECTION, SOLUTION INTRAVENOUS; SUBCUTANEOUS at 11:38

## 2023-06-21 ASSESSMENT — PAIN DESCRIPTION - LOCATION
LOCATION: ABDOMEN

## 2023-06-21 ASSESSMENT — PAIN DESCRIPTION - DESCRIPTORS
DESCRIPTORS: SORE

## 2023-06-21 ASSESSMENT — PAIN DESCRIPTION - PAIN TYPE
TYPE: SURGICAL PAIN

## 2023-06-21 ASSESSMENT — PAIN DESCRIPTION - FREQUENCY
FREQUENCY: INTERMITTENT
FREQUENCY: INTERMITTENT

## 2023-06-21 ASSESSMENT — PAIN SCALES - GENERAL
PAINLEVEL_OUTOF10: 1
PAINLEVEL_OUTOF10: 2
PAINLEVEL_OUTOF10: 1
PAINLEVEL_OUTOF10: 1
PAINLEVEL_OUTOF10: 2

## 2023-06-21 ASSESSMENT — PAIN DESCRIPTION - ORIENTATION
ORIENTATION: MID

## 2023-06-22 LAB
ALBUMIN SERPL-MCNC: 2.9 G/DL (ref 3.4–5)
ALBUMIN SERPL-MCNC: 2.9 G/DL (ref 3.4–5)
ALP SERPL-CCNC: 50 U/L (ref 40–129)
ALT SERPL-CCNC: 34 U/L (ref 10–40)
ANION GAP SERPL CALCULATED.3IONS-SCNC: 8 MMOL/L (ref 3–16)
AST SERPL-CCNC: 21 U/L (ref 15–37)
BASOPHILS # BLD: 0 K/UL (ref 0–0.2)
BASOPHILS NFR BLD: 0.2 %
BILIRUB DIRECT SERPL-MCNC: <0.2 MG/DL (ref 0–0.3)
BILIRUB INDIRECT SERPL-MCNC: ABNORMAL MG/DL (ref 0–1)
BILIRUB SERPL-MCNC: <0.2 MG/DL (ref 0–1)
BUN SERPL-MCNC: 9 MG/DL (ref 7–20)
CALCIUM SERPL-MCNC: 7.8 MG/DL (ref 8.3–10.6)
CHLORIDE SERPL-SCNC: 104 MMOL/L (ref 99–110)
CO2 SERPL-SCNC: 30 MMOL/L (ref 21–32)
CREAT SERPL-MCNC: <0.5 MG/DL (ref 0.8–1.3)
DEPRECATED RDW RBC AUTO: 14 % (ref 12.4–15.4)
EOSINOPHIL # BLD: 0 K/UL (ref 0–0.6)
EOSINOPHIL NFR BLD: 0.2 %
GFR SERPLBLD CREATININE-BSD FMLA CKD-EPI: >60 ML/MIN/{1.73_M2}
GLUCOSE BLD-MCNC: 156 MG/DL (ref 70–99)
GLUCOSE BLD-MCNC: 164 MG/DL (ref 70–99)
GLUCOSE BLD-MCNC: 164 MG/DL (ref 70–99)
GLUCOSE BLD-MCNC: 167 MG/DL (ref 70–99)
GLUCOSE BLD-MCNC: 173 MG/DL (ref 70–99)
GLUCOSE BLD-MCNC: 174 MG/DL (ref 70–99)
GLUCOSE SERPL-MCNC: 165 MG/DL (ref 70–99)
HCT VFR BLD AUTO: 27.6 % (ref 40.5–52.5)
HGB BLD-MCNC: 9.3 G/DL (ref 13.5–17.5)
LYMPHOCYTES # BLD: 0.4 K/UL (ref 1–5.1)
LYMPHOCYTES NFR BLD: 4.6 %
MAGNESIUM SERPL-MCNC: 2.7 MG/DL (ref 1.8–2.4)
MCH RBC QN AUTO: 33.6 PG (ref 26–34)
MCHC RBC AUTO-ENTMCNC: 33.9 G/DL (ref 31–36)
MCV RBC AUTO: 99.1 FL (ref 80–100)
MONOCYTES # BLD: 0.3 K/UL (ref 0–1.3)
MONOCYTES NFR BLD: 3.6 %
NEUTROPHILS # BLD: 7.5 K/UL (ref 1.7–7.7)
NEUTROPHILS NFR BLD: 91.4 %
PERFORMED ON: ABNORMAL
PHOSPHATE SERPL-MCNC: 1.6 MG/DL (ref 2.5–4.9)
PLATELET # BLD AUTO: 137 K/UL (ref 135–450)
PMV BLD AUTO: 7.2 FL (ref 5–10.5)
POTASSIUM SERPL-SCNC: 3.7 MMOL/L (ref 3.5–5.1)
PROT SERPL-MCNC: 5.3 G/DL (ref 6.4–8.2)
RBC # BLD AUTO: 2.78 M/UL (ref 4.2–5.9)
SODIUM SERPL-SCNC: 142 MMOL/L (ref 136–145)
WBC # BLD AUTO: 8.2 K/UL (ref 4–11)

## 2023-06-22 PROCEDURE — 80069 RENAL FUNCTION PANEL: CPT

## 2023-06-22 PROCEDURE — 2500000003 HC RX 250 WO HCPCS: Performed by: STUDENT IN AN ORGANIZED HEALTH CARE EDUCATION/TRAINING PROGRAM

## 2023-06-22 PROCEDURE — 6360000002 HC RX W HCPCS: Performed by: STUDENT IN AN ORGANIZED HEALTH CARE EDUCATION/TRAINING PROGRAM

## 2023-06-22 PROCEDURE — 6370000000 HC RX 637 (ALT 250 FOR IP): Performed by: STUDENT IN AN ORGANIZED HEALTH CARE EDUCATION/TRAINING PROGRAM

## 2023-06-22 PROCEDURE — 2580000003 HC RX 258: Performed by: STUDENT IN AN ORGANIZED HEALTH CARE EDUCATION/TRAINING PROGRAM

## 2023-06-22 PROCEDURE — 80076 HEPATIC FUNCTION PANEL: CPT

## 2023-06-22 PROCEDURE — 2700000000 HC OXYGEN THERAPY PER DAY

## 2023-06-22 PROCEDURE — 85025 COMPLETE CBC W/AUTO DIFF WBC: CPT

## 2023-06-22 PROCEDURE — 2500000003 HC RX 250 WO HCPCS: Performed by: NURSE PRACTITIONER

## 2023-06-22 PROCEDURE — 94761 N-INVAS EAR/PLS OXIMETRY MLT: CPT

## 2023-06-22 PROCEDURE — 94640 AIRWAY INHALATION TREATMENT: CPT

## 2023-06-22 PROCEDURE — 2580000003 HC RX 258: Performed by: NURSE PRACTITIONER

## 2023-06-22 PROCEDURE — 83735 ASSAY OF MAGNESIUM: CPT

## 2023-06-22 PROCEDURE — 94669 MECHANICAL CHEST WALL OSCILL: CPT

## 2023-06-22 PROCEDURE — 36415 COLL VENOUS BLD VENIPUNCTURE: CPT

## 2023-06-22 PROCEDURE — 6360000002 HC RX W HCPCS: Performed by: NURSE PRACTITIONER

## 2023-06-22 PROCEDURE — 1200000000 HC SEMI PRIVATE

## 2023-06-22 PROCEDURE — C9113 INJ PANTOPRAZOLE SODIUM, VIA: HCPCS | Performed by: STUDENT IN AN ORGANIZED HEALTH CARE EDUCATION/TRAINING PROGRAM

## 2023-06-22 RX ORDER — OXYCODONE HCL 5 MG/5 ML
5 SOLUTION, ORAL ORAL EVERY 4 HOURS PRN
Status: DISCONTINUED | OUTPATIENT
Start: 2023-06-22 | End: 2023-06-22

## 2023-06-22 RX ORDER — IPRATROPIUM BROMIDE AND ALBUTEROL SULFATE 2.5; .5 MG/3ML; MG/3ML
1 SOLUTION RESPIRATORY (INHALATION) 2 TIMES DAILY
Status: DISCONTINUED | OUTPATIENT
Start: 2023-06-22 | End: 2023-06-22

## 2023-06-22 RX ORDER — OXYCODONE HCL 5 MG/5 ML
5 SOLUTION, ORAL ORAL EVERY 4 HOURS PRN
Status: DISCONTINUED | OUTPATIENT
Start: 2023-06-22 | End: 2023-06-23

## 2023-06-22 RX ORDER — IPRATROPIUM BROMIDE AND ALBUTEROL SULFATE 2.5; .5 MG/3ML; MG/3ML
1 SOLUTION RESPIRATORY (INHALATION)
Status: DISCONTINUED | OUTPATIENT
Start: 2023-06-23 | End: 2023-06-28 | Stop reason: HOSPADM

## 2023-06-22 RX ADMIN — POTASSIUM PHOSPHATE, MONOBASIC AND POTASSIUM PHOSPHATE, DIBASIC 30 MMOL: 224; 236 INJECTION, SOLUTION, CONCENTRATE INTRAVENOUS at 09:27

## 2023-06-22 RX ADMIN — METRONIDAZOLE 500 MG: 500 INJECTION, SOLUTION INTRAVENOUS at 17:22

## 2023-06-22 RX ADMIN — METHOCARBAMOL 500 MG: 100 INJECTION, SOLUTION INTRAMUSCULAR; INTRAVENOUS at 11:07

## 2023-06-22 RX ADMIN — OCTREOTIDE ACETATE 100 MCG: 100 INJECTION, SOLUTION INTRAVENOUS; SUBCUTANEOUS at 11:02

## 2023-06-22 RX ADMIN — METOCLOPRAMIDE HYDROCHLORIDE 5 MG: 5 INJECTION INTRAMUSCULAR; INTRAVENOUS at 09:10

## 2023-06-22 RX ADMIN — ENOXAPARIN SODIUM 40 MG: 100 INJECTION SUBCUTANEOUS at 09:11

## 2023-06-22 RX ADMIN — METOCLOPRAMIDE HYDROCHLORIDE 5 MG: 5 INJECTION INTRAMUSCULAR; INTRAVENOUS at 22:03

## 2023-06-22 RX ADMIN — ALBUTEROL SULFATE 2.5 MG: 2.5 SOLUTION RESPIRATORY (INHALATION) at 16:26

## 2023-06-22 RX ADMIN — METHOCARBAMOL 500 MG: 100 INJECTION, SOLUTION INTRAMUSCULAR; INTRAVENOUS at 02:40

## 2023-06-22 RX ADMIN — CEFTRIAXONE 1000 MG: 1 INJECTION, POWDER, FOR SOLUTION INTRAMUSCULAR; INTRAVENOUS at 07:02

## 2023-06-22 RX ADMIN — OCTREOTIDE ACETATE 100 MCG: 100 INJECTION, SOLUTION INTRAVENOUS; SUBCUTANEOUS at 01:27

## 2023-06-22 RX ADMIN — METRONIDAZOLE 500 MG: 500 INJECTION, SOLUTION INTRAVENOUS at 09:00

## 2023-06-22 RX ADMIN — METOCLOPRAMIDE HYDROCHLORIDE 5 MG: 5 INJECTION INTRAMUSCULAR; INTRAVENOUS at 05:13

## 2023-06-22 RX ADMIN — ALBUTEROL SULFATE 2.5 MG: 2.5 SOLUTION RESPIRATORY (INHALATION) at 07:45

## 2023-06-22 RX ADMIN — METRONIDAZOLE 500 MG: 500 INJECTION, SOLUTION INTRAVENOUS at 01:26

## 2023-06-22 RX ADMIN — IPRATROPIUM BROMIDE AND ALBUTEROL SULFATE 1 DOSE: 2.5; .5 SOLUTION RESPIRATORY (INHALATION) at 20:51

## 2023-06-22 RX ADMIN — SODIUM CHLORIDE, SODIUM GLUCONATE, SODIUM ACETATE, POTASSIUM CHLORIDE AND MAGNESIUM CHLORIDE 50 ML/HR: 526; 502; 368; 37; 30 INJECTION, SOLUTION INTRAVENOUS at 14:12

## 2023-06-22 RX ADMIN — METOCLOPRAMIDE HYDROCHLORIDE 5 MG: 5 INJECTION INTRAMUSCULAR; INTRAVENOUS at 15:46

## 2023-06-22 RX ADMIN — OXYCODONE HYDROCHLORIDE 5 MG: 5 SOLUTION ORAL at 23:22

## 2023-06-22 RX ADMIN — SODIUM CHLORIDE, PRESERVATIVE FREE 10 ML: 5 INJECTION INTRAVENOUS at 09:11

## 2023-06-22 RX ADMIN — OCTREOTIDE ACETATE 100 MCG: 100 INJECTION, SOLUTION INTRAVENOUS; SUBCUTANEOUS at 17:23

## 2023-06-22 RX ADMIN — SODIUM CHLORIDE, PRESERVATIVE FREE 40 MG: 5 INJECTION INTRAVENOUS at 09:11

## 2023-06-22 RX ADMIN — DEXTRAN 70, AND HYPROMELLOSE 2910 1 DROP: 1; 3 SOLUTION/ DROPS OPHTHALMIC at 09:12

## 2023-06-22 ASSESSMENT — PAIN DESCRIPTION - FREQUENCY
FREQUENCY: INTERMITTENT
FREQUENCY: INTERMITTENT

## 2023-06-22 ASSESSMENT — PAIN DESCRIPTION - LOCATION
LOCATION: BACK
LOCATION: ABDOMEN

## 2023-06-22 ASSESSMENT — PAIN SCALES - GENERAL
PAINLEVEL_OUTOF10: 0
PAINLEVEL_OUTOF10: 7
PAINLEVEL_OUTOF10: 0
PAINLEVEL_OUTOF10: 1
PAINLEVEL_OUTOF10: 0
PAINLEVEL_OUTOF10: 3
PAINLEVEL_OUTOF10: 0

## 2023-06-22 ASSESSMENT — PAIN DESCRIPTION - ORIENTATION
ORIENTATION: MID
ORIENTATION: MID

## 2023-06-22 ASSESSMENT — PAIN DESCRIPTION - DESCRIPTORS
DESCRIPTORS: SORE
DESCRIPTORS: ACHING

## 2023-06-22 ASSESSMENT — PAIN DESCRIPTION - ONSET
ONSET: ON-GOING
ONSET: ON-GOING

## 2023-06-22 ASSESSMENT — PAIN SCALES - WONG BAKER
WONGBAKER_NUMERICALRESPONSE: 0

## 2023-06-22 ASSESSMENT — PAIN DESCRIPTION - PAIN TYPE
TYPE: SURGICAL PAIN
TYPE: SURGICAL PAIN

## 2023-06-23 LAB
ALBUMIN SERPL-MCNC: 3.1 G/DL (ref 3.4–5)
ALBUMIN SERPL-MCNC: 3.1 G/DL (ref 3.4–5)
ALP SERPL-CCNC: 76 U/L (ref 40–129)
ALT SERPL-CCNC: 27 U/L (ref 10–40)
ANION GAP SERPL CALCULATED.3IONS-SCNC: 7 MMOL/L (ref 3–16)
AST SERPL-CCNC: 20 U/L (ref 15–37)
BASOPHILS # BLD: 0 K/UL (ref 0–0.2)
BASOPHILS NFR BLD: 0.1 %
BILIRUB DIRECT SERPL-MCNC: <0.2 MG/DL (ref 0–0.3)
BILIRUB INDIRECT SERPL-MCNC: ABNORMAL MG/DL (ref 0–1)
BILIRUB SERPL-MCNC: 0.3 MG/DL (ref 0–1)
BUN SERPL-MCNC: 10 MG/DL (ref 7–20)
CALCIUM SERPL-MCNC: 8.2 MG/DL (ref 8.3–10.6)
CHLORIDE SERPL-SCNC: 105 MMOL/L (ref 99–110)
CO2 SERPL-SCNC: 30 MMOL/L (ref 21–32)
CREAT SERPL-MCNC: <0.5 MG/DL (ref 0.8–1.3)
DEPRECATED RDW RBC AUTO: 13.7 % (ref 12.4–15.4)
EOSINOPHIL # BLD: 0.1 K/UL (ref 0–0.6)
EOSINOPHIL NFR BLD: 0.7 %
GFR SERPLBLD CREATININE-BSD FMLA CKD-EPI: >60 ML/MIN/{1.73_M2}
GLUCOSE BLD-MCNC: 162 MG/DL (ref 70–99)
GLUCOSE BLD-MCNC: 177 MG/DL (ref 70–99)
GLUCOSE BLD-MCNC: 178 MG/DL (ref 70–99)
GLUCOSE BLD-MCNC: 184 MG/DL (ref 70–99)
GLUCOSE BLD-MCNC: 199 MG/DL (ref 70–99)
GLUCOSE BLD-MCNC: 217 MG/DL (ref 70–99)
GLUCOSE SERPL-MCNC: 151 MG/DL (ref 70–99)
HCT VFR BLD AUTO: 29.4 % (ref 40.5–52.5)
HGB BLD-MCNC: 10.1 G/DL (ref 13.5–17.5)
LOEFFLER MB STN SPEC: NORMAL
LOEFFLER MB STN SPEC: NORMAL
LYMPHOCYTES # BLD: 0.6 K/UL (ref 1–5.1)
LYMPHOCYTES NFR BLD: 6.4 %
MAGNESIUM SERPL-MCNC: 2.4 MG/DL (ref 1.8–2.4)
MCH RBC QN AUTO: 33.9 PG (ref 26–34)
MCHC RBC AUTO-ENTMCNC: 34.3 G/DL (ref 31–36)
MCV RBC AUTO: 98.7 FL (ref 80–100)
MONOCYTES # BLD: 0.3 K/UL (ref 0–1.3)
MONOCYTES NFR BLD: 3.4 %
NEUTROPHILS # BLD: 7.8 K/UL (ref 1.7–7.7)
NEUTROPHILS NFR BLD: 89.4 %
PERFORMED ON: ABNORMAL
PHOSPHATE SERPL-MCNC: 1.6 MG/DL (ref 2.5–4.9)
PLATELET # BLD AUTO: 167 K/UL (ref 135–450)
PMV BLD AUTO: 7.1 FL (ref 5–10.5)
POTASSIUM SERPL-SCNC: 3.7 MMOL/L (ref 3.5–5.1)
PROT SERPL-MCNC: 5.9 G/DL (ref 6.4–8.2)
RBC # BLD AUTO: 2.98 M/UL (ref 4.2–5.9)
SODIUM SERPL-SCNC: 142 MMOL/L (ref 136–145)
WBC # BLD AUTO: 8.7 K/UL (ref 4–11)

## 2023-06-23 PROCEDURE — 94640 AIRWAY INHALATION TREATMENT: CPT

## 2023-06-23 PROCEDURE — 2500000003 HC RX 250 WO HCPCS

## 2023-06-23 PROCEDURE — 97530 THERAPEUTIC ACTIVITIES: CPT

## 2023-06-23 PROCEDURE — 97166 OT EVAL MOD COMPLEX 45 MIN: CPT

## 2023-06-23 PROCEDURE — 94669 MECHANICAL CHEST WALL OSCILL: CPT

## 2023-06-23 PROCEDURE — 2580000003 HC RX 258: Performed by: STUDENT IN AN ORGANIZED HEALTH CARE EDUCATION/TRAINING PROGRAM

## 2023-06-23 PROCEDURE — 97163 PT EVAL HIGH COMPLEX 45 MIN: CPT

## 2023-06-23 PROCEDURE — 2500000003 HC RX 250 WO HCPCS: Performed by: STUDENT IN AN ORGANIZED HEALTH CARE EDUCATION/TRAINING PROGRAM

## 2023-06-23 PROCEDURE — 6370000000 HC RX 637 (ALT 250 FOR IP): Performed by: SURGERY

## 2023-06-23 PROCEDURE — 80069 RENAL FUNCTION PANEL: CPT

## 2023-06-23 PROCEDURE — 6360000002 HC RX W HCPCS: Performed by: STUDENT IN AN ORGANIZED HEALTH CARE EDUCATION/TRAINING PROGRAM

## 2023-06-23 PROCEDURE — 83735 ASSAY OF MAGNESIUM: CPT

## 2023-06-23 PROCEDURE — 6370000000 HC RX 637 (ALT 250 FOR IP): Performed by: STUDENT IN AN ORGANIZED HEALTH CARE EDUCATION/TRAINING PROGRAM

## 2023-06-23 PROCEDURE — 85025 COMPLETE CBC W/AUTO DIFF WBC: CPT

## 2023-06-23 PROCEDURE — 1200000000 HC SEMI PRIVATE

## 2023-06-23 PROCEDURE — 80076 HEPATIC FUNCTION PANEL: CPT

## 2023-06-23 PROCEDURE — 97116 GAIT TRAINING THERAPY: CPT

## 2023-06-23 PROCEDURE — C9113 INJ PANTOPRAZOLE SODIUM, VIA: HCPCS | Performed by: STUDENT IN AN ORGANIZED HEALTH CARE EDUCATION/TRAINING PROGRAM

## 2023-06-23 PROCEDURE — 6360000002 HC RX W HCPCS: Performed by: NURSE PRACTITIONER

## 2023-06-23 PROCEDURE — 97535 SELF CARE MNGMENT TRAINING: CPT

## 2023-06-23 PROCEDURE — 2580000003 HC RX 258

## 2023-06-23 PROCEDURE — 36415 COLL VENOUS BLD VENIPUNCTURE: CPT

## 2023-06-23 PROCEDURE — A4216 STERILE WATER/SALINE, 10 ML: HCPCS | Performed by: STUDENT IN AN ORGANIZED HEALTH CARE EDUCATION/TRAINING PROGRAM

## 2023-06-23 RX ORDER — ACETAMINOPHEN 160 MG/5ML
650 SOLUTION ORAL EVERY 6 HOURS SCHEDULED
Status: DISCONTINUED | OUTPATIENT
Start: 2023-06-23 | End: 2023-06-28 | Stop reason: HOSPADM

## 2023-06-23 RX ORDER — HYDROMORPHONE HYDROCHLORIDE 1 MG/ML
0.5 INJECTION, SOLUTION INTRAMUSCULAR; INTRAVENOUS; SUBCUTANEOUS
Status: DISCONTINUED | OUTPATIENT
Start: 2023-06-23 | End: 2023-06-26

## 2023-06-23 RX ORDER — OXYCODONE HCL 5 MG/5 ML
10 SOLUTION, ORAL ORAL EVERY 4 HOURS PRN
Status: DISCONTINUED | OUTPATIENT
Start: 2023-06-23 | End: 2023-06-23

## 2023-06-23 RX ORDER — HYDROMORPHONE HYDROCHLORIDE 1 MG/ML
0.25 INJECTION, SOLUTION INTRAMUSCULAR; INTRAVENOUS; SUBCUTANEOUS
Status: DISCONTINUED | OUTPATIENT
Start: 2023-06-23 | End: 2023-06-26

## 2023-06-23 RX ORDER — OXYCODONE HCL 5 MG/5 ML
5 SOLUTION, ORAL ORAL EVERY 4 HOURS PRN
Status: DISCONTINUED | OUTPATIENT
Start: 2023-06-23 | End: 2023-06-28 | Stop reason: HOSPADM

## 2023-06-23 RX ORDER — ENOXAPARIN SODIUM 100 MG/ML
40 INJECTION SUBCUTANEOUS DAILY
Status: DISCONTINUED | OUTPATIENT
Start: 2023-06-23 | End: 2023-06-28 | Stop reason: HOSPADM

## 2023-06-23 RX ORDER — OXYCODONE HCL 5 MG/5 ML
10 SOLUTION, ORAL ORAL EVERY 4 HOURS PRN
Status: DISCONTINUED | OUTPATIENT
Start: 2023-06-23 | End: 2023-06-28 | Stop reason: HOSPADM

## 2023-06-23 RX ORDER — PROCHLORPERAZINE EDISYLATE 5 MG/ML
5 INJECTION INTRAMUSCULAR; INTRAVENOUS EVERY 6 HOURS PRN
Status: DISCONTINUED | OUTPATIENT
Start: 2023-06-23 | End: 2023-06-28 | Stop reason: HOSPADM

## 2023-06-23 RX ORDER — ACETAMINOPHEN 160 MG/5ML
650 SOLUTION ORAL EVERY 6 HOURS SCHEDULED
Status: DISCONTINUED | OUTPATIENT
Start: 2023-06-23 | End: 2023-06-23

## 2023-06-23 RX ORDER — OXYCODONE HCL 5 MG/5 ML
5 SOLUTION, ORAL ORAL EVERY 4 HOURS PRN
Status: DISCONTINUED | OUTPATIENT
Start: 2023-06-23 | End: 2023-06-23

## 2023-06-23 RX ADMIN — POTASSIUM PHOSPHATE, MONOBASIC AND POTASSIUM PHOSPHATE, DIBASIC 30 MMOL: 224; 236 INJECTION, SOLUTION, CONCENTRATE INTRAVENOUS at 13:40

## 2023-06-23 RX ADMIN — METOCLOPRAMIDE HYDROCHLORIDE 5 MG: 5 INJECTION INTRAMUSCULAR; INTRAVENOUS at 21:31

## 2023-06-23 RX ADMIN — PROCHLORPERAZINE EDISYLATE 5 MG: 5 INJECTION, SOLUTION INTRAMUSCULAR; INTRAVENOUS at 21:00

## 2023-06-23 RX ADMIN — SODIUM CHLORIDE, PRESERVATIVE FREE 10 ML: 5 INJECTION INTRAVENOUS at 21:31

## 2023-06-23 RX ADMIN — ACETAMINOPHEN 650 MG: 650 SOLUTION ORAL at 05:52

## 2023-06-23 RX ADMIN — ACETAMINOPHEN 650 MG: 650 SOLUTION ORAL at 17:33

## 2023-06-23 RX ADMIN — SODIUM CHLORIDE, PRESERVATIVE FREE 40 MG: 5 INJECTION INTRAVENOUS at 10:11

## 2023-06-23 RX ADMIN — METOCLOPRAMIDE HYDROCHLORIDE 5 MG: 5 INJECTION INTRAMUSCULAR; INTRAVENOUS at 05:38

## 2023-06-23 RX ADMIN — IPRATROPIUM BROMIDE AND ALBUTEROL SULFATE 1 DOSE: 2.5; .5 SOLUTION RESPIRATORY (INHALATION) at 12:58

## 2023-06-23 RX ADMIN — ENOXAPARIN SODIUM 40 MG: 100 INJECTION SUBCUTANEOUS at 15:30

## 2023-06-23 RX ADMIN — METRONIDAZOLE 500 MG: 500 INJECTION, SOLUTION INTRAVENOUS at 01:02

## 2023-06-23 RX ADMIN — METOCLOPRAMIDE HYDROCHLORIDE 5 MG: 5 INJECTION INTRAMUSCULAR; INTRAVENOUS at 15:30

## 2023-06-23 RX ADMIN — IPRATROPIUM BROMIDE AND ALBUTEROL SULFATE 1 DOSE: 2.5; .5 SOLUTION RESPIRATORY (INHALATION) at 08:12

## 2023-06-23 RX ADMIN — ACETAMINOPHEN 650 MG: 650 SOLUTION ORAL at 13:32

## 2023-06-23 RX ADMIN — INSULIN LISPRO 2 UNITS: 100 INJECTION, SOLUTION INTRAVENOUS; SUBCUTANEOUS at 01:14

## 2023-06-23 RX ADMIN — SODIUM CHLORIDE, SODIUM GLUCONATE, SODIUM ACETATE, POTASSIUM CHLORIDE AND MAGNESIUM CHLORIDE 50 ML/HR: 526; 502; 368; 37; 30 INJECTION, SOLUTION INTRAVENOUS at 12:39

## 2023-06-23 RX ADMIN — ONDANSETRON 4 MG: 2 INJECTION INTRAMUSCULAR; INTRAVENOUS at 00:02

## 2023-06-23 RX ADMIN — METRONIDAZOLE 500 MG: 500 INJECTION, SOLUTION INTRAVENOUS at 10:04

## 2023-06-23 RX ADMIN — IPRATROPIUM BROMIDE AND ALBUTEROL SULFATE 1 DOSE: 2.5; .5 SOLUTION RESPIRATORY (INHALATION) at 20:39

## 2023-06-23 RX ADMIN — ONDANSETRON 4 MG: 2 INJECTION INTRAMUSCULAR; INTRAVENOUS at 17:33

## 2023-06-23 RX ADMIN — SODIUM PHOSPHATE, MONOBASIC, MONOHYDRATE AND SODIUM PHOSPHATE, DIBASIC, ANHYDROUS 10 MMOL: 142; 276 INJECTION, SOLUTION INTRAVENOUS at 12:10

## 2023-06-23 RX ADMIN — CEFTRIAXONE 1000 MG: 1 INJECTION, POWDER, FOR SOLUTION INTRAMUSCULAR; INTRAVENOUS at 10:10

## 2023-06-23 RX ADMIN — METOCLOPRAMIDE HYDROCHLORIDE 5 MG: 5 INJECTION INTRAMUSCULAR; INTRAVENOUS at 10:11

## 2023-06-23 RX ADMIN — METRONIDAZOLE 500 MG: 500 INJECTION, SOLUTION INTRAVENOUS at 16:27

## 2023-06-23 RX ADMIN — HYDROMORPHONE HYDROCHLORIDE 0.25 MG: 1 INJECTION, SOLUTION INTRAMUSCULAR; INTRAVENOUS; SUBCUTANEOUS at 17:32

## 2023-06-23 ASSESSMENT — PAIN SCALES - GENERAL
PAINLEVEL_OUTOF10: 3
PAINLEVEL_OUTOF10: 4
PAINLEVEL_OUTOF10: 2
PAINLEVEL_OUTOF10: 3
PAINLEVEL_OUTOF10: 2
PAINLEVEL_OUTOF10: 1

## 2023-06-23 ASSESSMENT — PAIN DESCRIPTION - ORIENTATION
ORIENTATION: RIGHT
ORIENTATION: MID
ORIENTATION: MID

## 2023-06-23 ASSESSMENT — PAIN DESCRIPTION - LOCATION
LOCATION: ABDOMEN

## 2023-06-23 ASSESSMENT — PAIN DESCRIPTION - DESCRIPTORS
DESCRIPTORS: ACHING

## 2023-06-23 ASSESSMENT — PAIN - FUNCTIONAL ASSESSMENT
PAIN_FUNCTIONAL_ASSESSMENT: PREVENTS OR INTERFERES SOME ACTIVE ACTIVITIES AND ADLS
PAIN_FUNCTIONAL_ASSESSMENT: PREVENTS OR INTERFERES SOME ACTIVE ACTIVITIES AND ADLS
PAIN_FUNCTIONAL_ASSESSMENT: ACTIVITIES ARE NOT PREVENTED

## 2023-06-23 ASSESSMENT — PAIN DESCRIPTION - FREQUENCY: FREQUENCY: INTERMITTENT

## 2023-06-23 ASSESSMENT — PAIN DESCRIPTION - ONSET: ONSET: ON-GOING

## 2023-06-23 ASSESSMENT — PAIN DESCRIPTION - PAIN TYPE: TYPE: SURGICAL PAIN

## 2023-06-23 NOTE — ADDENDUM NOTE
Addendum  created 06/23/23 0956 by ADILENE Sagastume CRNA    Flowsheet accepted, LDA properties accepted

## 2023-06-24 LAB
ALBUMIN SERPL-MCNC: 2.7 G/DL (ref 3.4–5)
ALP SERPL-CCNC: 77 U/L (ref 40–129)
ALT SERPL-CCNC: 22 U/L (ref 10–40)
AMYLASE FLD-CCNC: 9 U/L
ANION GAP SERPL CALCULATED.3IONS-SCNC: 15 MMOL/L (ref 3–16)
AST SERPL-CCNC: 22 U/L (ref 15–37)
BACTERIA SPEC AEROBE CULT: ABNORMAL
BACTERIA SPEC ANAEROBE CULT: ABNORMAL
BACTERIA SPEC ANAEROBE CULT: ABNORMAL
BASOPHILS # BLD: 0 K/UL (ref 0–0.2)
BASOPHILS NFR BLD: 0.3 %
BILIRUB DIRECT SERPL-MCNC: <0.2 MG/DL (ref 0–0.3)
BILIRUB INDIRECT SERPL-MCNC: ABNORMAL MG/DL (ref 0–1)
BILIRUB SERPL-MCNC: 0.3 MG/DL (ref 0–1)
BUN SERPL-MCNC: 6 MG/DL (ref 7–20)
CALCIUM SERPL-MCNC: 7.9 MG/DL (ref 8.3–10.6)
CHLORIDE SERPL-SCNC: 104 MMOL/L (ref 99–110)
CO2 SERPL-SCNC: 20 MMOL/L (ref 21–32)
CREAT SERPL-MCNC: <0.5 MG/DL (ref 0.8–1.3)
DEPRECATED RDW RBC AUTO: 14.1 % (ref 12.4–15.4)
EOSINOPHIL # BLD: 0.1 K/UL (ref 0–0.6)
EOSINOPHIL NFR BLD: 0.9 %
GFR SERPLBLD CREATININE-BSD FMLA CKD-EPI: >60 ML/MIN/{1.73_M2}
GLUCOSE BLD-MCNC: 187 MG/DL (ref 70–99)
GLUCOSE BLD-MCNC: 192 MG/DL (ref 70–99)
GLUCOSE BLD-MCNC: 220 MG/DL (ref 70–99)
GLUCOSE BLD-MCNC: 225 MG/DL (ref 70–99)
GLUCOSE BLD-MCNC: 225 MG/DL (ref 70–99)
GLUCOSE BLD-MCNC: 250 MG/DL (ref 70–99)
GLUCOSE BLD-MCNC: 263 MG/DL (ref 70–99)
GLUCOSE BLD-MCNC: 263 MG/DL (ref 70–99)
GLUCOSE BLD-MCNC: 294 MG/DL (ref 70–99)
GLUCOSE SERPL-MCNC: 239 MG/DL (ref 70–99)
GRAM STN SPEC: ABNORMAL
GRAM STN SPEC: ABNORMAL
HCT VFR BLD AUTO: 31 % (ref 40.5–52.5)
HGB BLD-MCNC: 10.4 G/DL (ref 13.5–17.5)
LYMPHOCYTES # BLD: 0.5 K/UL (ref 1–5.1)
LYMPHOCYTES NFR BLD: 4.8 %
MAGNESIUM SERPL-MCNC: 2.2 MG/DL (ref 1.8–2.4)
MCH RBC QN AUTO: 33.7 PG (ref 26–34)
MCHC RBC AUTO-ENTMCNC: 33.4 G/DL (ref 31–36)
MCV RBC AUTO: 100.8 FL (ref 80–100)
MONOCYTES # BLD: 0.4 K/UL (ref 0–1.3)
MONOCYTES NFR BLD: 4.2 %
NEUTROPHILS # BLD: 8.6 K/UL (ref 1.7–7.7)
NEUTROPHILS NFR BLD: 89.8 %
ORGANISM: ABNORMAL
PERFORMED ON: ABNORMAL
PHOSPHATE SERPL-MCNC: 2.2 MG/DL (ref 2.5–4.9)
PLATELET # BLD AUTO: 143 K/UL (ref 135–450)
PMV BLD AUTO: 7.2 FL (ref 5–10.5)
POTASSIUM SERPL-SCNC: 3.4 MMOL/L (ref 3.5–5.1)
PROT SERPL-MCNC: 5.6 G/DL (ref 6.4–8.2)
RBC # BLD AUTO: 3.08 M/UL (ref 4.2–5.9)
SODIUM SERPL-SCNC: 139 MMOL/L (ref 136–145)
SPECIMEN SOURCE FLD: NORMAL
WBC # BLD AUTO: 9.6 K/UL (ref 4–11)

## 2023-06-24 PROCEDURE — C9113 INJ PANTOPRAZOLE SODIUM, VIA: HCPCS | Performed by: STUDENT IN AN ORGANIZED HEALTH CARE EDUCATION/TRAINING PROGRAM

## 2023-06-24 PROCEDURE — 94640 AIRWAY INHALATION TREATMENT: CPT

## 2023-06-24 PROCEDURE — 6370000000 HC RX 637 (ALT 250 FOR IP): Performed by: STUDENT IN AN ORGANIZED HEALTH CARE EDUCATION/TRAINING PROGRAM

## 2023-06-24 PROCEDURE — 6360000002 HC RX W HCPCS: Performed by: STUDENT IN AN ORGANIZED HEALTH CARE EDUCATION/TRAINING PROGRAM

## 2023-06-24 PROCEDURE — 2580000003 HC RX 258: Performed by: STUDENT IN AN ORGANIZED HEALTH CARE EDUCATION/TRAINING PROGRAM

## 2023-06-24 PROCEDURE — 80076 HEPATIC FUNCTION PANEL: CPT

## 2023-06-24 PROCEDURE — 36415 COLL VENOUS BLD VENIPUNCTURE: CPT

## 2023-06-24 PROCEDURE — 6360000002 HC RX W HCPCS: Performed by: NURSE PRACTITIONER

## 2023-06-24 PROCEDURE — 82150 ASSAY OF AMYLASE: CPT

## 2023-06-24 PROCEDURE — 6370000000 HC RX 637 (ALT 250 FOR IP)

## 2023-06-24 PROCEDURE — 6360000002 HC RX W HCPCS

## 2023-06-24 PROCEDURE — 1200000000 HC SEMI PRIVATE

## 2023-06-24 PROCEDURE — 6370000000 HC RX 637 (ALT 250 FOR IP): Performed by: SURGERY

## 2023-06-24 PROCEDURE — 80069 RENAL FUNCTION PANEL: CPT

## 2023-06-24 PROCEDURE — A4216 STERILE WATER/SALINE, 10 ML: HCPCS | Performed by: STUDENT IN AN ORGANIZED HEALTH CARE EDUCATION/TRAINING PROGRAM

## 2023-06-24 PROCEDURE — 2500000003 HC RX 250 WO HCPCS: Performed by: STUDENT IN AN ORGANIZED HEALTH CARE EDUCATION/TRAINING PROGRAM

## 2023-06-24 PROCEDURE — 85025 COMPLETE CBC W/AUTO DIFF WBC: CPT

## 2023-06-24 PROCEDURE — 83735 ASSAY OF MAGNESIUM: CPT

## 2023-06-24 PROCEDURE — 94669 MECHANICAL CHEST WALL OSCILL: CPT

## 2023-06-24 RX ORDER — HYDRALAZINE HYDROCHLORIDE 20 MG/ML
5 INJECTION INTRAMUSCULAR; INTRAVENOUS EVERY 6 HOURS PRN
Status: DISCONTINUED | OUTPATIENT
Start: 2023-06-24 | End: 2023-06-28 | Stop reason: HOSPADM

## 2023-06-24 RX ORDER — POTASSIUM CHLORIDE 7.45 MG/ML
10 INJECTION INTRAVENOUS
Status: COMPLETED | OUTPATIENT
Start: 2023-06-24 | End: 2023-06-24

## 2023-06-24 RX ADMIN — METRONIDAZOLE 500 MG: 500 INJECTION, SOLUTION INTRAVENOUS at 12:40

## 2023-06-24 RX ADMIN — CEFTRIAXONE 1000 MG: 1 INJECTION, POWDER, FOR SOLUTION INTRAMUSCULAR; INTRAVENOUS at 07:30

## 2023-06-24 RX ADMIN — INSULIN HUMAN 5 UNITS: 100 INJECTION, SOLUTION PARENTERAL at 17:02

## 2023-06-24 RX ADMIN — IPRATROPIUM BROMIDE AND ALBUTEROL SULFATE 1 DOSE: 2.5; .5 SOLUTION RESPIRATORY (INHALATION) at 20:00

## 2023-06-24 RX ADMIN — INSULIN HUMAN 5 UNITS: 100 INJECTION, SOLUTION PARENTERAL at 21:17

## 2023-06-24 RX ADMIN — ACETAMINOPHEN 650 MG: 650 SOLUTION ORAL at 06:12

## 2023-06-24 RX ADMIN — ONDANSETRON 4 MG: 2 INJECTION INTRAMUSCULAR; INTRAVENOUS at 23:40

## 2023-06-24 RX ADMIN — INSULIN LISPRO 2 UNITS: 100 INJECTION, SOLUTION INTRAVENOUS; SUBCUTANEOUS at 06:20

## 2023-06-24 RX ADMIN — ACETAMINOPHEN 650 MG: 650 SOLUTION ORAL at 13:12

## 2023-06-24 RX ADMIN — POTASSIUM CHLORIDE 10 MEQ: 10 INJECTION, SOLUTION INTRAVENOUS at 21:09

## 2023-06-24 RX ADMIN — OXYCODONE HYDROCHLORIDE 10 MG: 5 SOLUTION ORAL at 06:08

## 2023-06-24 RX ADMIN — METRONIDAZOLE 500 MG: 500 INJECTION, SOLUTION INTRAVENOUS at 17:07

## 2023-06-24 RX ADMIN — METOCLOPRAMIDE HYDROCHLORIDE 5 MG: 5 INJECTION INTRAMUSCULAR; INTRAVENOUS at 09:47

## 2023-06-24 RX ADMIN — ENOXAPARIN SODIUM 40 MG: 100 INJECTION SUBCUTANEOUS at 09:46

## 2023-06-24 RX ADMIN — IPRATROPIUM BROMIDE AND ALBUTEROL SULFATE 1 DOSE: 2.5; .5 SOLUTION RESPIRATORY (INHALATION) at 11:48

## 2023-06-24 RX ADMIN — SODIUM CHLORIDE, PRESERVATIVE FREE 40 MG: 5 INJECTION INTRAVENOUS at 09:46

## 2023-06-24 RX ADMIN — OXYCODONE HYDROCHLORIDE 5 MG: 5 SOLUTION ORAL at 01:01

## 2023-06-24 RX ADMIN — IPRATROPIUM BROMIDE AND ALBUTEROL SULFATE 1 DOSE: 2.5; .5 SOLUTION RESPIRATORY (INHALATION) at 08:20

## 2023-06-24 RX ADMIN — POTASSIUM CHLORIDE 10 MEQ: 10 INJECTION, SOLUTION INTRAVENOUS at 18:52

## 2023-06-24 RX ADMIN — METOCLOPRAMIDE HYDROCHLORIDE 5 MG: 5 INJECTION INTRAMUSCULAR; INTRAVENOUS at 04:23

## 2023-06-24 RX ADMIN — METOCLOPRAMIDE HYDROCHLORIDE 5 MG: 5 INJECTION INTRAMUSCULAR; INTRAVENOUS at 21:17

## 2023-06-24 RX ADMIN — ONDANSETRON 4 MG: 2 INJECTION INTRAMUSCULAR; INTRAVENOUS at 00:15

## 2023-06-24 RX ADMIN — ACETAMINOPHEN 650 MG: 650 SOLUTION ORAL at 23:40

## 2023-06-24 RX ADMIN — INSULIN HUMAN 9 UNITS: 100 INJECTION, SOLUTION PARENTERAL at 18:35

## 2023-06-24 RX ADMIN — SODIUM CHLORIDE, PRESERVATIVE FREE 10 ML: 5 INJECTION INTRAVENOUS at 21:41

## 2023-06-24 RX ADMIN — METRONIDAZOLE 500 MG: 500 INJECTION, SOLUTION INTRAVENOUS at 01:11

## 2023-06-24 RX ADMIN — ACETAMINOPHEN 650 MG: 650 SOLUTION ORAL at 01:01

## 2023-06-24 RX ADMIN — INSULIN HUMAN 12 UNITS: 100 INJECTION, SOLUTION PARENTERAL at 15:08

## 2023-06-24 RX ADMIN — INSULIN LISPRO 4 UNITS: 100 INJECTION, SOLUTION INTRAVENOUS; SUBCUTANEOUS at 01:02

## 2023-06-24 RX ADMIN — HYDRALAZINE HYDROCHLORIDE 5 MG: 20 INJECTION INTRAMUSCULAR; INTRAVENOUS at 01:18

## 2023-06-24 RX ADMIN — DIBASIC SODIUM PHOSPHATE, MONOBASIC POTASSIUM PHOSPHATE AND MONOBASIC SODIUM PHOSPHATE 2 TABLET: 852; 155; 130 TABLET ORAL at 10:29

## 2023-06-24 RX ADMIN — POTASSIUM CHLORIDE 10 MEQ: 10 INJECTION, SOLUTION INTRAVENOUS at 19:50

## 2023-06-24 RX ADMIN — INSULIN HUMAN 9 UNITS: 100 INJECTION, SOLUTION PARENTERAL at 09:45

## 2023-06-24 RX ADMIN — HYDROMORPHONE HYDROCHLORIDE 0.5 MG: 1 INJECTION, SOLUTION INTRAMUSCULAR; INTRAVENOUS; SUBCUTANEOUS at 13:11

## 2023-06-24 RX ADMIN — POTASSIUM CHLORIDE 10 MEQ: 10 INJECTION, SOLUTION INTRAVENOUS at 17:47

## 2023-06-24 RX ADMIN — METOCLOPRAMIDE HYDROCHLORIDE 5 MG: 5 INJECTION INTRAMUSCULAR; INTRAVENOUS at 17:04

## 2023-06-24 RX ADMIN — PROCHLORPERAZINE EDISYLATE 5 MG: 5 INJECTION, SOLUTION INTRAMUSCULAR; INTRAVENOUS at 19:54

## 2023-06-24 RX ADMIN — OXYCODONE HYDROCHLORIDE 5 MG: 5 SOLUTION ORAL at 10:29

## 2023-06-24 RX ADMIN — IPRATROPIUM BROMIDE AND ALBUTEROL SULFATE 1 DOSE: 2.5; .5 SOLUTION RESPIRATORY (INHALATION) at 16:01

## 2023-06-24 RX ADMIN — ACETAMINOPHEN 650 MG: 650 SOLUTION ORAL at 17:03

## 2023-06-24 ASSESSMENT — PAIN DESCRIPTION - DESCRIPTORS
DESCRIPTORS: ACHING
DESCRIPTORS: ACHING;SORE
DESCRIPTORS: ACHING

## 2023-06-24 ASSESSMENT — PAIN DESCRIPTION - FREQUENCY
FREQUENCY: CONTINUOUS
FREQUENCY: CONTINUOUS
FREQUENCY: INTERMITTENT
FREQUENCY: INTERMITTENT

## 2023-06-24 ASSESSMENT — PAIN SCALES - GENERAL
PAINLEVEL_OUTOF10: 3
PAINLEVEL_OUTOF10: 3
PAINLEVEL_OUTOF10: 2
PAINLEVEL_OUTOF10: 7
PAINLEVEL_OUTOF10: 3
PAINLEVEL_OUTOF10: 3
PAINLEVEL_OUTOF10: 6
PAINLEVEL_OUTOF10: 2
PAINLEVEL_OUTOF10: 7
PAINLEVEL_OUTOF10: 5

## 2023-06-24 ASSESSMENT — PAIN DESCRIPTION - ONSET
ONSET: ON-GOING

## 2023-06-24 ASSESSMENT — PAIN DESCRIPTION - PAIN TYPE
TYPE: SURGICAL PAIN

## 2023-06-24 ASSESSMENT — PAIN - FUNCTIONAL ASSESSMENT
PAIN_FUNCTIONAL_ASSESSMENT: PREVENTS OR INTERFERES SOME ACTIVE ACTIVITIES AND ADLS
PAIN_FUNCTIONAL_ASSESSMENT: ACTIVITIES ARE NOT PREVENTED
PAIN_FUNCTIONAL_ASSESSMENT: PREVENTS OR INTERFERES SOME ACTIVE ACTIVITIES AND ADLS
PAIN_FUNCTIONAL_ASSESSMENT: ACTIVITIES ARE NOT PREVENTED
PAIN_FUNCTIONAL_ASSESSMENT: ACTIVITIES ARE NOT PREVENTED

## 2023-06-24 ASSESSMENT — PAIN DESCRIPTION - ORIENTATION
ORIENTATION: RIGHT;LOWER
ORIENTATION: MID
ORIENTATION: RIGHT;LOWER
ORIENTATION: MID
ORIENTATION: MID
ORIENTATION: RIGHT;LOWER

## 2023-06-24 ASSESSMENT — PAIN DESCRIPTION - LOCATION
LOCATION: ABDOMEN

## 2023-06-25 ENCOUNTER — APPOINTMENT (OUTPATIENT)
Dept: GENERAL RADIOLOGY | Age: 73
DRG: 405 | End: 2023-06-25
Attending: SURGERY
Payer: MEDICARE

## 2023-06-25 LAB
ALBUMIN SERPL-MCNC: 2.6 G/DL (ref 3.4–5)
ALP SERPL-CCNC: 75 U/L (ref 40–129)
ALT SERPL-CCNC: 17 U/L (ref 10–40)
ANION GAP SERPL CALCULATED.3IONS-SCNC: 12 MMOL/L (ref 3–16)
AST SERPL-CCNC: 14 U/L (ref 15–37)
BASOPHILS # BLD: 0 K/UL (ref 0–0.2)
BASOPHILS NFR BLD: 0.2 %
BILIRUB DIRECT SERPL-MCNC: <0.2 MG/DL (ref 0–0.3)
BILIRUB INDIRECT SERPL-MCNC: ABNORMAL MG/DL (ref 0–1)
BILIRUB SERPL-MCNC: 0.3 MG/DL (ref 0–1)
BUN SERPL-MCNC: 5 MG/DL (ref 7–20)
CALCIUM SERPL-MCNC: 8.1 MG/DL (ref 8.3–10.6)
CHLORIDE SERPL-SCNC: 101 MMOL/L (ref 99–110)
CO2 SERPL-SCNC: 25 MMOL/L (ref 21–32)
CREAT SERPL-MCNC: <0.5 MG/DL (ref 0.8–1.3)
DEPRECATED RDW RBC AUTO: 13.9 % (ref 12.4–15.4)
EOSINOPHIL # BLD: 0.3 K/UL (ref 0–0.6)
EOSINOPHIL NFR BLD: 2.8 %
GFR SERPLBLD CREATININE-BSD FMLA CKD-EPI: >60 ML/MIN/{1.73_M2}
GLUCOSE BLD-MCNC: 172 MG/DL (ref 70–99)
GLUCOSE BLD-MCNC: 175 MG/DL (ref 70–99)
GLUCOSE BLD-MCNC: 183 MG/DL (ref 70–99)
GLUCOSE BLD-MCNC: 190 MG/DL (ref 70–99)
GLUCOSE BLD-MCNC: 207 MG/DL (ref 70–99)
GLUCOSE BLD-MCNC: 227 MG/DL (ref 70–99)
GLUCOSE BLD-MCNC: 241 MG/DL (ref 70–99)
GLUCOSE BLD-MCNC: 248 MG/DL (ref 70–99)
GLUCOSE SERPL-MCNC: 185 MG/DL (ref 70–99)
HCT VFR BLD AUTO: 29.5 % (ref 40.5–52.5)
HGB BLD-MCNC: 10 G/DL (ref 13.5–17.5)
LYMPHOCYTES # BLD: 0.5 K/UL (ref 1–5.1)
LYMPHOCYTES NFR BLD: 5.7 %
MAGNESIUM SERPL-MCNC: 2.1 MG/DL (ref 1.8–2.4)
MCH RBC QN AUTO: 33.5 PG (ref 26–34)
MCHC RBC AUTO-ENTMCNC: 33.8 G/DL (ref 31–36)
MCV RBC AUTO: 99.2 FL (ref 80–100)
MONOCYTES # BLD: 0.4 K/UL (ref 0–1.3)
MONOCYTES NFR BLD: 4.8 %
NEUTROPHILS # BLD: 8 K/UL (ref 1.7–7.7)
NEUTROPHILS NFR BLD: 86.5 %
PERFORMED ON: ABNORMAL
PHOSPHATE SERPL-MCNC: 2.6 MG/DL (ref 2.5–4.9)
PLATELET # BLD AUTO: 151 K/UL (ref 135–450)
PMV BLD AUTO: 6.9 FL (ref 5–10.5)
POTASSIUM SERPL-SCNC: 3.4 MMOL/L (ref 3.5–5.1)
PROT SERPL-MCNC: 5.4 G/DL (ref 6.4–8.2)
RBC # BLD AUTO: 2.97 M/UL (ref 4.2–5.9)
SODIUM SERPL-SCNC: 138 MMOL/L (ref 136–145)
WBC # BLD AUTO: 9.2 K/UL (ref 4–11)

## 2023-06-25 PROCEDURE — 6360000002 HC RX W HCPCS: Performed by: STUDENT IN AN ORGANIZED HEALTH CARE EDUCATION/TRAINING PROGRAM

## 2023-06-25 PROCEDURE — 94669 MECHANICAL CHEST WALL OSCILL: CPT

## 2023-06-25 PROCEDURE — 6370000000 HC RX 637 (ALT 250 FOR IP): Performed by: STUDENT IN AN ORGANIZED HEALTH CARE EDUCATION/TRAINING PROGRAM

## 2023-06-25 PROCEDURE — 6370000000 HC RX 637 (ALT 250 FOR IP)

## 2023-06-25 PROCEDURE — 94640 AIRWAY INHALATION TREATMENT: CPT

## 2023-06-25 PROCEDURE — 85025 COMPLETE CBC W/AUTO DIFF WBC: CPT

## 2023-06-25 PROCEDURE — 74018 RADEX ABDOMEN 1 VIEW: CPT

## 2023-06-25 PROCEDURE — 80069 RENAL FUNCTION PANEL: CPT

## 2023-06-25 PROCEDURE — 6370000000 HC RX 637 (ALT 250 FOR IP): Performed by: SURGERY

## 2023-06-25 PROCEDURE — 2500000003 HC RX 250 WO HCPCS: Performed by: STUDENT IN AN ORGANIZED HEALTH CARE EDUCATION/TRAINING PROGRAM

## 2023-06-25 PROCEDURE — 83735 ASSAY OF MAGNESIUM: CPT

## 2023-06-25 PROCEDURE — 6360000002 HC RX W HCPCS: Performed by: NURSE PRACTITIONER

## 2023-06-25 PROCEDURE — 2580000003 HC RX 258: Performed by: STUDENT IN AN ORGANIZED HEALTH CARE EDUCATION/TRAINING PROGRAM

## 2023-06-25 PROCEDURE — 80076 HEPATIC FUNCTION PANEL: CPT

## 2023-06-25 PROCEDURE — 36415 COLL VENOUS BLD VENIPUNCTURE: CPT

## 2023-06-25 PROCEDURE — C9113 INJ PANTOPRAZOLE SODIUM, VIA: HCPCS | Performed by: STUDENT IN AN ORGANIZED HEALTH CARE EDUCATION/TRAINING PROGRAM

## 2023-06-25 PROCEDURE — 1200000000 HC SEMI PRIVATE

## 2023-06-25 RX ORDER — POTASSIUM CHLORIDE 20 MEQ/1
20 TABLET, EXTENDED RELEASE ORAL
Status: COMPLETED | OUTPATIENT
Start: 2023-06-25 | End: 2023-06-25

## 2023-06-25 RX ORDER — CALCIUM POLYCARBOPHIL 625 MG 625 MG/1
625 TABLET ORAL DAILY
Status: DISCONTINUED | OUTPATIENT
Start: 2023-06-25 | End: 2023-06-28 | Stop reason: HOSPADM

## 2023-06-25 RX ORDER — METHOCARBAMOL 500 MG/1
500 TABLET, FILM COATED ORAL 4 TIMES DAILY
Status: DISCONTINUED | OUTPATIENT
Start: 2023-06-25 | End: 2023-06-28 | Stop reason: HOSPADM

## 2023-06-25 RX ADMIN — METOCLOPRAMIDE HYDROCHLORIDE 5 MG: 5 INJECTION INTRAMUSCULAR; INTRAVENOUS at 04:02

## 2023-06-25 RX ADMIN — IPRATROPIUM BROMIDE AND ALBUTEROL SULFATE 1 DOSE: 2.5; .5 SOLUTION RESPIRATORY (INHALATION) at 08:14

## 2023-06-25 RX ADMIN — IPRATROPIUM BROMIDE AND ALBUTEROL SULFATE 1 DOSE: 2.5; .5 SOLUTION RESPIRATORY (INHALATION) at 20:17

## 2023-06-25 RX ADMIN — ACETAMINOPHEN 650 MG: 650 SOLUTION ORAL at 11:00

## 2023-06-25 RX ADMIN — HYDROMORPHONE HYDROCHLORIDE 0.25 MG: 1 INJECTION, SOLUTION INTRAMUSCULAR; INTRAVENOUS; SUBCUTANEOUS at 20:47

## 2023-06-25 RX ADMIN — SODIUM CHLORIDE, PRESERVATIVE FREE 40 MG: 5 INJECTION INTRAVENOUS at 08:19

## 2023-06-25 RX ADMIN — HUMAN INSULIN 10 UNITS: 100 INJECTION, SUSPENSION SUBCUTANEOUS at 11:39

## 2023-06-25 RX ADMIN — METOCLOPRAMIDE HYDROCHLORIDE 5 MG: 5 INJECTION INTRAMUSCULAR; INTRAVENOUS at 15:28

## 2023-06-25 RX ADMIN — POTASSIUM CHLORIDE 20 MEQ: 1500 TABLET, EXTENDED RELEASE ORAL at 18:06

## 2023-06-25 RX ADMIN — PROCHLORPERAZINE EDISYLATE 5 MG: 5 INJECTION, SOLUTION INTRAMUSCULAR; INTRAVENOUS at 20:46

## 2023-06-25 RX ADMIN — INSULIN HUMAN 5 UNITS: 100 INJECTION, SOLUTION PARENTERAL at 18:06

## 2023-06-25 RX ADMIN — PROCHLORPERAZINE EDISYLATE 5 MG: 5 INJECTION, SOLUTION INTRAMUSCULAR; INTRAVENOUS at 02:42

## 2023-06-25 RX ADMIN — HUMAN INSULIN 10 UNITS: 100 INJECTION, SUSPENSION SUBCUTANEOUS at 18:07

## 2023-06-25 RX ADMIN — POTASSIUM PHOSPHATE, MONOBASIC AND POTASSIUM PHOSPHATE, DIBASIC 20 MMOL: 224; 236 INJECTION, SOLUTION, CONCENTRATE INTRAVENOUS at 11:12

## 2023-06-25 RX ADMIN — POTASSIUM CHLORIDE 20 MEQ: 1500 TABLET, EXTENDED RELEASE ORAL at 11:40

## 2023-06-25 RX ADMIN — IPRATROPIUM BROMIDE AND ALBUTEROL SULFATE 1 DOSE: 2.5; .5 SOLUTION RESPIRATORY (INHALATION) at 12:01

## 2023-06-25 RX ADMIN — ENOXAPARIN SODIUM 40 MG: 100 INJECTION SUBCUTANEOUS at 08:19

## 2023-06-25 RX ADMIN — METOCLOPRAMIDE HYDROCHLORIDE 5 MG: 5 INJECTION INTRAMUSCULAR; INTRAVENOUS at 10:59

## 2023-06-25 RX ADMIN — METHOCARBAMOL 500 MG: 500 TABLET ORAL at 20:52

## 2023-06-25 RX ADMIN — INSULIN HUMAN 9 UNITS: 100 INJECTION, SOLUTION PARENTERAL at 15:28

## 2023-06-25 RX ADMIN — OXYCODONE HYDROCHLORIDE 5 MG: 5 SOLUTION ORAL at 11:27

## 2023-06-25 RX ADMIN — INSULIN HUMAN 9 UNITS: 100 INJECTION, SOLUTION PARENTERAL at 11:39

## 2023-06-25 RX ADMIN — HYDROMORPHONE HYDROCHLORIDE 0.25 MG: 1 INJECTION, SOLUTION INTRAMUSCULAR; INTRAVENOUS; SUBCUTANEOUS at 15:47

## 2023-06-25 RX ADMIN — ACETAMINOPHEN 650 MG: 650 SOLUTION ORAL at 18:06

## 2023-06-25 RX ADMIN — OXYCODONE HYDROCHLORIDE 10 MG: 5 SOLUTION ORAL at 18:05

## 2023-06-25 RX ADMIN — METRONIDAZOLE 500 MG: 500 INJECTION, SOLUTION INTRAVENOUS at 01:14

## 2023-06-25 RX ADMIN — HYDROMORPHONE HYDROCHLORIDE 0.5 MG: 1 INJECTION, SOLUTION INTRAMUSCULAR; INTRAVENOUS; SUBCUTANEOUS at 02:42

## 2023-06-25 RX ADMIN — METRONIDAZOLE 500 MG: 500 INJECTION, SOLUTION INTRAVENOUS at 08:20

## 2023-06-25 RX ADMIN — METRONIDAZOLE 500 MG: 500 INJECTION, SOLUTION INTRAVENOUS at 18:07

## 2023-06-25 RX ADMIN — INSULIN HUMAN 5 UNITS: 100 INJECTION, SOLUTION PARENTERAL at 06:23

## 2023-06-25 RX ADMIN — IPRATROPIUM BROMIDE AND ALBUTEROL SULFATE 1 DOSE: 2.5; .5 SOLUTION RESPIRATORY (INHALATION) at 16:26

## 2023-06-25 RX ADMIN — METOCLOPRAMIDE HYDROCHLORIDE 5 MG: 5 INJECTION INTRAMUSCULAR; INTRAVENOUS at 22:36

## 2023-06-25 RX ADMIN — ACETAMINOPHEN 650 MG: 650 SOLUTION ORAL at 06:24

## 2023-06-25 RX ADMIN — ONDANSETRON 4 MG: 2 INJECTION INTRAMUSCULAR; INTRAVENOUS at 18:02

## 2023-06-25 RX ADMIN — INSULIN HUMAN 9 UNITS: 100 INJECTION, SOLUTION PARENTERAL at 22:34

## 2023-06-25 RX ADMIN — DEXTRAN 70, AND HYPROMELLOSE 2910 1 DROP: 1; 3 SOLUTION/ DROPS OPHTHALMIC at 18:13

## 2023-06-25 RX ADMIN — CEFTRIAXONE 1000 MG: 1 INJECTION, POWDER, FOR SOLUTION INTRAMUSCULAR; INTRAVENOUS at 06:39

## 2023-06-25 RX ADMIN — CALCIUM POLYCARBOPHIL 625 MG: 625 TABLET ORAL at 11:03

## 2023-06-25 RX ADMIN — DIBASIC SODIUM PHOSPHATE, MONOBASIC POTASSIUM PHOSPHATE AND MONOBASIC SODIUM PHOSPHATE 2 TABLET: 852; 155; 130 TABLET ORAL at 20:53

## 2023-06-25 RX ADMIN — INSULIN HUMAN 9 UNITS: 100 INJECTION, SOLUTION PARENTERAL at 02:38

## 2023-06-25 RX ADMIN — DIBASIC SODIUM PHOSPHATE, MONOBASIC POTASSIUM PHOSPHATE AND MONOBASIC SODIUM PHOSPHATE 2 TABLET: 852; 155; 130 TABLET ORAL at 11:05

## 2023-06-25 RX ADMIN — ONDANSETRON 4 MG: 2 INJECTION INTRAMUSCULAR; INTRAVENOUS at 13:35

## 2023-06-25 RX ADMIN — POTASSIUM CHLORIDE 20 MEQ: 1500 TABLET, EXTENDED RELEASE ORAL at 11:07

## 2023-06-25 ASSESSMENT — PAIN DESCRIPTION - LOCATION
LOCATION: ABDOMEN

## 2023-06-25 ASSESSMENT — PAIN DESCRIPTION - ORIENTATION
ORIENTATION: RIGHT;LOWER

## 2023-06-25 ASSESSMENT — PAIN SCALES - GENERAL
PAINLEVEL_OUTOF10: 1
PAINLEVEL_OUTOF10: 3
PAINLEVEL_OUTOF10: 7
PAINLEVEL_OUTOF10: 3
PAINLEVEL_OUTOF10: 2
PAINLEVEL_OUTOF10: 2
PAINLEVEL_OUTOF10: 7
PAINLEVEL_OUTOF10: 3
PAINLEVEL_OUTOF10: 2
PAINLEVEL_OUTOF10: 6
PAINLEVEL_OUTOF10: 2
PAINLEVEL_OUTOF10: 4
PAINLEVEL_OUTOF10: 4
PAINLEVEL_OUTOF10: 2
PAINLEVEL_OUTOF10: 3

## 2023-06-25 ASSESSMENT — PAIN DESCRIPTION - PAIN TYPE
TYPE: SURGICAL PAIN

## 2023-06-25 ASSESSMENT — PAIN - FUNCTIONAL ASSESSMENT
PAIN_FUNCTIONAL_ASSESSMENT: PREVENTS OR INTERFERES SOME ACTIVE ACTIVITIES AND ADLS

## 2023-06-25 ASSESSMENT — PAIN DESCRIPTION - ONSET
ONSET: ON-GOING

## 2023-06-25 ASSESSMENT — PAIN DESCRIPTION - FREQUENCY
FREQUENCY: INTERMITTENT
FREQUENCY: CONTINUOUS

## 2023-06-25 ASSESSMENT — PAIN DESCRIPTION - DESCRIPTORS
DESCRIPTORS: ACHING
DESCRIPTORS: ACHING
DESCRIPTORS: ACHING;CRAMPING
DESCRIPTORS: CRAMPING
DESCRIPTORS: ACHING;CRAMPING
DESCRIPTORS: ACHING
DESCRIPTORS: ACHING

## 2023-06-25 ASSESSMENT — PAIN SCALES - WONG BAKER: WONGBAKER_NUMERICALRESPONSE: 0

## 2023-06-26 ENCOUNTER — APPOINTMENT (OUTPATIENT)
Dept: GENERAL RADIOLOGY | Age: 73
DRG: 405 | End: 2023-06-26
Attending: SURGERY
Payer: MEDICARE

## 2023-06-26 LAB
ALBUMIN SERPL-MCNC: 2.8 G/DL (ref 3.4–5)
ALP SERPL-CCNC: 85 U/L (ref 40–129)
ALT SERPL-CCNC: 15 U/L (ref 10–40)
ANION GAP SERPL CALCULATED.3IONS-SCNC: 10 MMOL/L (ref 3–16)
AST SERPL-CCNC: 12 U/L (ref 15–37)
BASOPHILS # BLD: 0 K/UL (ref 0–0.2)
BASOPHILS NFR BLD: 0.5 %
BILIRUB DIRECT SERPL-MCNC: <0.2 MG/DL (ref 0–0.3)
BILIRUB INDIRECT SERPL-MCNC: ABNORMAL MG/DL (ref 0–1)
BILIRUB SERPL-MCNC: <0.2 MG/DL (ref 0–1)
BUN SERPL-MCNC: 9 MG/DL (ref 7–20)
CALCIUM SERPL-MCNC: 8.4 MG/DL (ref 8.3–10.6)
CHLORIDE SERPL-SCNC: 99 MMOL/L (ref 99–110)
CO2 SERPL-SCNC: 27 MMOL/L (ref 21–32)
CREAT SERPL-MCNC: <0.5 MG/DL (ref 0.8–1.3)
CRP SERPL-MCNC: 72 MG/L (ref 0–5.1)
DEPRECATED RDW RBC AUTO: 14 % (ref 12.4–15.4)
EOSINOPHIL # BLD: 0.2 K/UL (ref 0–0.6)
EOSINOPHIL NFR BLD: 2.7 %
GFR SERPLBLD CREATININE-BSD FMLA CKD-EPI: >60 ML/MIN/{1.73_M2}
GLUCOSE BLD-MCNC: 126 MG/DL (ref 70–99)
GLUCOSE BLD-MCNC: 158 MG/DL (ref 70–99)
GLUCOSE BLD-MCNC: 168 MG/DL (ref 70–99)
GLUCOSE BLD-MCNC: 179 MG/DL (ref 70–99)
GLUCOSE BLD-MCNC: 203 MG/DL (ref 70–99)
GLUCOSE BLD-MCNC: 212 MG/DL (ref 70–99)
GLUCOSE SERPL-MCNC: 193 MG/DL (ref 70–99)
HCT VFR BLD AUTO: 29.1 % (ref 40.5–52.5)
HGB BLD-MCNC: 10 G/DL (ref 13.5–17.5)
LYMPHOCYTES # BLD: 0.5 K/UL (ref 1–5.1)
LYMPHOCYTES NFR BLD: 5.4 %
MAGNESIUM SERPL-MCNC: 1.9 MG/DL (ref 1.8–2.4)
MCH RBC QN AUTO: 33.5 PG (ref 26–34)
MCHC RBC AUTO-ENTMCNC: 34.3 G/DL (ref 31–36)
MCV RBC AUTO: 97.6 FL (ref 80–100)
MONOCYTES # BLD: 0.4 K/UL (ref 0–1.3)
MONOCYTES NFR BLD: 4.4 %
NEUTROPHILS # BLD: 7.6 K/UL (ref 1.7–7.7)
NEUTROPHILS NFR BLD: 87 %
PERFORMED ON: ABNORMAL
PHOSPHATE SERPL-MCNC: 3.4 MG/DL (ref 2.5–4.9)
PLATELET # BLD AUTO: 189 K/UL (ref 135–450)
PMV BLD AUTO: 7.2 FL (ref 5–10.5)
POTASSIUM SERPL-SCNC: 3.6 MMOL/L (ref 3.5–5.1)
PREALB SERPL-MCNC: 9.9 MG/DL (ref 20–40)
PROT SERPL-MCNC: 5.5 G/DL (ref 6.4–8.2)
RBC # BLD AUTO: 2.98 M/UL (ref 4.2–5.9)
SARS-COV-2 RDRP RESP QL NAA+PROBE: NOT DETECTED
SODIUM SERPL-SCNC: 136 MMOL/L (ref 136–145)
WBC # BLD AUTO: 8.7 K/UL (ref 4–11)

## 2023-06-26 PROCEDURE — 86140 C-REACTIVE PROTEIN: CPT

## 2023-06-26 PROCEDURE — 6370000000 HC RX 637 (ALT 250 FOR IP)

## 2023-06-26 PROCEDURE — 85025 COMPLETE CBC W/AUTO DIFF WBC: CPT

## 2023-06-26 PROCEDURE — 6360000002 HC RX W HCPCS: Performed by: NURSE PRACTITIONER

## 2023-06-26 PROCEDURE — 80069 RENAL FUNCTION PANEL: CPT

## 2023-06-26 PROCEDURE — 6370000000 HC RX 637 (ALT 250 FOR IP): Performed by: NURSE PRACTITIONER

## 2023-06-26 PROCEDURE — 6360000002 HC RX W HCPCS

## 2023-06-26 PROCEDURE — 6370000000 HC RX 637 (ALT 250 FOR IP): Performed by: SURGERY

## 2023-06-26 PROCEDURE — 94669 MECHANICAL CHEST WALL OSCILL: CPT

## 2023-06-26 PROCEDURE — 83735 ASSAY OF MAGNESIUM: CPT

## 2023-06-26 PROCEDURE — 1200000000 HC SEMI PRIVATE

## 2023-06-26 PROCEDURE — 80076 HEPATIC FUNCTION PANEL: CPT

## 2023-06-26 PROCEDURE — 84466 ASSAY OF TRANSFERRIN: CPT

## 2023-06-26 PROCEDURE — 2500000003 HC RX 250 WO HCPCS: Performed by: STUDENT IN AN ORGANIZED HEALTH CARE EDUCATION/TRAINING PROGRAM

## 2023-06-26 PROCEDURE — 6370000000 HC RX 637 (ALT 250 FOR IP): Performed by: STUDENT IN AN ORGANIZED HEALTH CARE EDUCATION/TRAINING PROGRAM

## 2023-06-26 PROCEDURE — 87635 SARS-COV-2 COVID-19 AMP PRB: CPT

## 2023-06-26 PROCEDURE — 74018 RADEX ABDOMEN 1 VIEW: CPT

## 2023-06-26 PROCEDURE — 94640 AIRWAY INHALATION TREATMENT: CPT

## 2023-06-26 PROCEDURE — 2580000003 HC RX 258: Performed by: STUDENT IN AN ORGANIZED HEALTH CARE EDUCATION/TRAINING PROGRAM

## 2023-06-26 PROCEDURE — 6360000002 HC RX W HCPCS: Performed by: STUDENT IN AN ORGANIZED HEALTH CARE EDUCATION/TRAINING PROGRAM

## 2023-06-26 PROCEDURE — 84134 ASSAY OF PREALBUMIN: CPT

## 2023-06-26 PROCEDURE — 36415 COLL VENOUS BLD VENIPUNCTURE: CPT

## 2023-06-26 PROCEDURE — 94761 N-INVAS EAR/PLS OXIMETRY MLT: CPT

## 2023-06-26 PROCEDURE — C9113 INJ PANTOPRAZOLE SODIUM, VIA: HCPCS | Performed by: STUDENT IN AN ORGANIZED HEALTH CARE EDUCATION/TRAINING PROGRAM

## 2023-06-26 RX ORDER — METOCLOPRAMIDE HYDROCHLORIDE 5 MG/ML
10 INJECTION INTRAMUSCULAR; INTRAVENOUS EVERY 8 HOURS
Status: DISCONTINUED | OUTPATIENT
Start: 2023-06-27 | End: 2023-06-28 | Stop reason: HOSPADM

## 2023-06-26 RX ORDER — POTASSIUM CHLORIDE 20 MEQ/1
40 TABLET, EXTENDED RELEASE ORAL ONCE
Status: DISCONTINUED | OUTPATIENT
Start: 2023-06-26 | End: 2023-06-26 | Stop reason: ALTCHOICE

## 2023-06-26 RX ORDER — MAGNESIUM SULFATE IN WATER 40 MG/ML
2000 INJECTION, SOLUTION INTRAVENOUS ONCE
Status: COMPLETED | OUTPATIENT
Start: 2023-06-26 | End: 2023-06-26

## 2023-06-26 RX ADMIN — MAGNESIUM SULFATE HEPTAHYDRATE 2000 MG: 40 INJECTION, SOLUTION INTRAVENOUS at 11:30

## 2023-06-26 RX ADMIN — SODIUM CHLORIDE, PRESERVATIVE FREE 10 ML: 5 INJECTION INTRAVENOUS at 22:46

## 2023-06-26 RX ADMIN — ACETAMINOPHEN 650 MG: 650 SOLUTION ORAL at 18:04

## 2023-06-26 RX ADMIN — INSULIN HUMAN 9 UNITS: 100 INJECTION, SOLUTION PARENTERAL at 03:24

## 2023-06-26 RX ADMIN — METHOCARBAMOL 500 MG: 500 TABLET ORAL at 22:34

## 2023-06-26 RX ADMIN — PROCHLORPERAZINE EDISYLATE 5 MG: 5 INJECTION, SOLUTION INTRAMUSCULAR; INTRAVENOUS at 11:14

## 2023-06-26 RX ADMIN — OXYCODONE HYDROCHLORIDE 5 MG: 5 SOLUTION ORAL at 13:41

## 2023-06-26 RX ADMIN — ACETAMINOPHEN 650 MG: 650 SOLUTION ORAL at 00:05

## 2023-06-26 RX ADMIN — INSULIN HUMAN 5 UNITS: 100 INJECTION, SOLUTION PARENTERAL at 14:59

## 2023-06-26 RX ADMIN — METHOCARBAMOL 500 MG: 500 TABLET ORAL at 18:04

## 2023-06-26 RX ADMIN — HYDRALAZINE HYDROCHLORIDE 5 MG: 20 INJECTION INTRAMUSCULAR; INTRAVENOUS at 08:26

## 2023-06-26 RX ADMIN — METHOCARBAMOL 500 MG: 500 TABLET ORAL at 08:14

## 2023-06-26 RX ADMIN — SODIUM CHLORIDE, PRESERVATIVE FREE 40 MG: 5 INJECTION INTRAVENOUS at 08:13

## 2023-06-26 RX ADMIN — ACETAMINOPHEN 650 MG: 650 SOLUTION ORAL at 06:55

## 2023-06-26 RX ADMIN — PROCHLORPERAZINE EDISYLATE 5 MG: 5 INJECTION, SOLUTION INTRAMUSCULAR; INTRAVENOUS at 05:22

## 2023-06-26 RX ADMIN — IPRATROPIUM BROMIDE AND ALBUTEROL SULFATE 1 DOSE: 2.5; .5 SOLUTION RESPIRATORY (INHALATION) at 20:52

## 2023-06-26 RX ADMIN — INSULIN HUMAN 5 UNITS: 100 INJECTION, SOLUTION PARENTERAL at 11:14

## 2023-06-26 RX ADMIN — METRONIDAZOLE 500 MG: 500 INJECTION, SOLUTION INTRAVENOUS at 08:16

## 2023-06-26 RX ADMIN — INSULIN HUMAN 5 UNITS: 100 INJECTION, SOLUTION PARENTERAL at 06:55

## 2023-06-26 RX ADMIN — HUMAN INSULIN 10 UNITS: 100 INJECTION, SUSPENSION SUBCUTANEOUS at 08:44

## 2023-06-26 RX ADMIN — CEFTRIAXONE 1000 MG: 1 INJECTION, POWDER, FOR SOLUTION INTRAMUSCULAR; INTRAVENOUS at 06:58

## 2023-06-26 RX ADMIN — CALCIUM POLYCARBOPHIL 625 MG: 625 TABLET ORAL at 08:16

## 2023-06-26 RX ADMIN — METRONIDAZOLE 500 MG: 500 INJECTION, SOLUTION INTRAVENOUS at 00:15

## 2023-06-26 RX ADMIN — POTASSIUM BICARBONATE 40 MEQ: 782 TABLET, EFFERVESCENT ORAL at 11:14

## 2023-06-26 RX ADMIN — HYDRALAZINE HYDROCHLORIDE 5 MG: 20 INJECTION INTRAMUSCULAR; INTRAVENOUS at 03:38

## 2023-06-26 RX ADMIN — IPRATROPIUM BROMIDE AND ALBUTEROL SULFATE 1 DOSE: 2.5; .5 SOLUTION RESPIRATORY (INHALATION) at 08:19

## 2023-06-26 RX ADMIN — INSULIN HUMAN 2 UNITS: 100 INJECTION, SOLUTION PARENTERAL at 18:03

## 2023-06-26 RX ADMIN — OXYCODONE HYDROCHLORIDE 5 MG: 5 SOLUTION ORAL at 03:25

## 2023-06-26 RX ADMIN — INSULIN HUMAN 5 UNITS: 100 INJECTION, SOLUTION PARENTERAL at 22:57

## 2023-06-26 RX ADMIN — IPRATROPIUM BROMIDE AND ALBUTEROL SULFATE 1 DOSE: 2.5; .5 SOLUTION RESPIRATORY (INHALATION) at 17:40

## 2023-06-26 RX ADMIN — IPRATROPIUM BROMIDE AND ALBUTEROL SULFATE 1 DOSE: 2.5; .5 SOLUTION RESPIRATORY (INHALATION) at 14:06

## 2023-06-26 RX ADMIN — METOCLOPRAMIDE HYDROCHLORIDE 5 MG: 5 INJECTION INTRAMUSCULAR; INTRAVENOUS at 14:59

## 2023-06-26 RX ADMIN — HUMAN INSULIN 10 UNITS: 100 INJECTION, SUSPENSION SUBCUTANEOUS at 15:00

## 2023-06-26 RX ADMIN — ONDANSETRON 4 MG: 2 INJECTION INTRAMUSCULAR; INTRAVENOUS at 00:04

## 2023-06-26 RX ADMIN — ACETAMINOPHEN 650 MG: 650 SOLUTION ORAL at 13:11

## 2023-06-26 RX ADMIN — ENOXAPARIN SODIUM 40 MG: 100 INJECTION SUBCUTANEOUS at 08:17

## 2023-06-26 RX ADMIN — METOCLOPRAMIDE HYDROCHLORIDE 5 MG: 5 INJECTION INTRAMUSCULAR; INTRAVENOUS at 11:13

## 2023-06-26 RX ADMIN — HYDROMORPHONE HYDROCHLORIDE 0.5 MG: 1 INJECTION, SOLUTION INTRAMUSCULAR; INTRAVENOUS; SUBCUTANEOUS at 05:22

## 2023-06-26 RX ADMIN — ONDANSETRON 4 MG: 2 INJECTION INTRAMUSCULAR; INTRAVENOUS at 08:44

## 2023-06-26 RX ADMIN — METOCLOPRAMIDE HYDROCHLORIDE 5 MG: 5 INJECTION INTRAMUSCULAR; INTRAVENOUS at 03:21

## 2023-06-26 ASSESSMENT — PAIN DESCRIPTION - DESCRIPTORS
DESCRIPTORS: CRAMPING
DESCRIPTORS: CRAMPING
DESCRIPTORS: ACHING
DESCRIPTORS: ACHING;CRAMPING
DESCRIPTORS: CRAMPING

## 2023-06-26 ASSESSMENT — PAIN DESCRIPTION - LOCATION
LOCATION: ABDOMEN
LOCATION: ABDOMEN;CHEST

## 2023-06-26 ASSESSMENT — PAIN SCALES - GENERAL
PAINLEVEL_OUTOF10: 3
PAINLEVEL_OUTOF10: 4
PAINLEVEL_OUTOF10: 7
PAINLEVEL_OUTOF10: 3
PAINLEVEL_OUTOF10: 2
PAINLEVEL_OUTOF10: 3
PAINLEVEL_OUTOF10: 5

## 2023-06-26 ASSESSMENT — PAIN DESCRIPTION - ORIENTATION
ORIENTATION: MID;UPPER
ORIENTATION: UPPER
ORIENTATION: MID;UPPER
ORIENTATION: RIGHT

## 2023-06-26 ASSESSMENT — PAIN DESCRIPTION - FREQUENCY: FREQUENCY: INTERMITTENT

## 2023-06-26 ASSESSMENT — PAIN - FUNCTIONAL ASSESSMENT
PAIN_FUNCTIONAL_ASSESSMENT: PREVENTS OR INTERFERES SOME ACTIVE ACTIVITIES AND ADLS
PAIN_FUNCTIONAL_ASSESSMENT: ACTIVITIES ARE NOT PREVENTED

## 2023-06-26 ASSESSMENT — PAIN DESCRIPTION - ONSET: ONSET: GRADUAL

## 2023-06-26 ASSESSMENT — PAIN DESCRIPTION - PAIN TYPE: TYPE: SURGICAL PAIN;ACUTE PAIN

## 2023-06-27 ENCOUNTER — APPOINTMENT (OUTPATIENT)
Dept: GENERAL RADIOLOGY | Age: 73
DRG: 405 | End: 2023-06-27
Attending: SURGERY
Payer: MEDICARE

## 2023-06-27 LAB
ALBUMIN SERPL-MCNC: 2.8 G/DL (ref 3.4–5)
ALBUMIN SERPL-MCNC: 2.8 G/DL (ref 3.4–5)
ALP SERPL-CCNC: 95 U/L (ref 40–129)
ALT SERPL-CCNC: 15 U/L (ref 10–40)
ANION GAP SERPL CALCULATED.3IONS-SCNC: 7 MMOL/L (ref 3–16)
ANISOCYTOSIS BLD QL SMEAR: ABNORMAL
AST SERPL-CCNC: 15 U/L (ref 15–37)
BASOPHILS # BLD: 0 K/UL (ref 0–0.2)
BASOPHILS NFR BLD: 0 %
BILIRUB DIRECT SERPL-MCNC: <0.2 MG/DL (ref 0–0.3)
BILIRUB INDIRECT SERPL-MCNC: ABNORMAL MG/DL (ref 0–1)
BILIRUB SERPL-MCNC: 0.3 MG/DL (ref 0–1)
BUN SERPL-MCNC: 7 MG/DL (ref 7–20)
CALCIUM SERPL-MCNC: 8.7 MG/DL (ref 8.3–10.6)
CHLORIDE SERPL-SCNC: 101 MMOL/L (ref 99–110)
CO2 SERPL-SCNC: 28 MMOL/L (ref 21–32)
CREAT SERPL-MCNC: <0.5 MG/DL (ref 0.8–1.3)
DEPRECATED RDW RBC AUTO: 13.9 % (ref 12.4–15.4)
EOSINOPHIL # BLD: 0 K/UL (ref 0–0.6)
EOSINOPHIL NFR BLD: 0 %
GFR SERPLBLD CREATININE-BSD FMLA CKD-EPI: >60 ML/MIN/{1.73_M2}
GLUCOSE BLD-MCNC: 106 MG/DL (ref 70–99)
GLUCOSE BLD-MCNC: 116 MG/DL (ref 70–99)
GLUCOSE BLD-MCNC: 125 MG/DL (ref 70–99)
GLUCOSE BLD-MCNC: 132 MG/DL (ref 70–99)
GLUCOSE BLD-MCNC: 138 MG/DL (ref 70–99)
GLUCOSE BLD-MCNC: 148 MG/DL (ref 70–99)
GLUCOSE BLD-MCNC: 220 MG/DL (ref 70–99)
GLUCOSE SERPL-MCNC: 99 MG/DL (ref 70–99)
HCT VFR BLD AUTO: 29.6 % (ref 40.5–52.5)
HGB BLD-MCNC: 10 G/DL (ref 13.5–17.5)
LYMPHOCYTES # BLD: 1.1 K/UL (ref 1–5.1)
LYMPHOCYTES NFR BLD: 14 %
MACROCYTES BLD QL SMEAR: ABNORMAL
MAGNESIUM SERPL-MCNC: 2.1 MG/DL (ref 1.8–2.4)
MCH RBC QN AUTO: 33 PG (ref 26–34)
MCHC RBC AUTO-ENTMCNC: 33.7 G/DL (ref 31–36)
MCV RBC AUTO: 97.8 FL (ref 80–100)
MICROCYTES BLD QL SMEAR: ABNORMAL
MONOCYTES # BLD: 0.3 K/UL (ref 0–1.3)
MONOCYTES NFR BLD: 4 %
MYELOCYTES NFR BLD MANUAL: 3 %
NEUTROPHILS # BLD: 6.2 K/UL (ref 1.7–7.7)
NEUTROPHILS NFR BLD: 76 %
NEUTS BAND NFR BLD MANUAL: 3 % (ref 0–7)
PERFORMED ON: ABNORMAL
PHOSPHATE SERPL-MCNC: 4.3 MG/DL (ref 2.5–4.9)
PLATELET # BLD AUTO: 201 K/UL (ref 135–450)
PLATELET BLD QL SMEAR: ADEQUATE
PMV BLD AUTO: 7.6 FL (ref 5–10.5)
POLYCHROMASIA BLD QL SMEAR: ABNORMAL
POTASSIUM SERPL-SCNC: 3.8 MMOL/L (ref 3.5–5.1)
PROT SERPL-MCNC: 5.5 G/DL (ref 6.4–8.2)
RBC # BLD AUTO: 3.03 M/UL (ref 4.2–5.9)
SLIDE REVIEW: ABNORMAL
SODIUM SERPL-SCNC: 136 MMOL/L (ref 136–145)
WBC # BLD AUTO: 7.6 K/UL (ref 4–11)

## 2023-06-27 PROCEDURE — 94640 AIRWAY INHALATION TREATMENT: CPT

## 2023-06-27 PROCEDURE — 97535 SELF CARE MNGMENT TRAINING: CPT

## 2023-06-27 PROCEDURE — 74018 RADEX ABDOMEN 1 VIEW: CPT

## 2023-06-27 PROCEDURE — 85025 COMPLETE CBC W/AUTO DIFF WBC: CPT

## 2023-06-27 PROCEDURE — 97530 THERAPEUTIC ACTIVITIES: CPT

## 2023-06-27 PROCEDURE — 2580000003 HC RX 258: Performed by: STUDENT IN AN ORGANIZED HEALTH CARE EDUCATION/TRAINING PROGRAM

## 2023-06-27 PROCEDURE — 6360000002 HC RX W HCPCS: Performed by: NURSE PRACTITIONER

## 2023-06-27 PROCEDURE — 83735 ASSAY OF MAGNESIUM: CPT

## 2023-06-27 PROCEDURE — 6370000000 HC RX 637 (ALT 250 FOR IP)

## 2023-06-27 PROCEDURE — 80069 RENAL FUNCTION PANEL: CPT

## 2023-06-27 PROCEDURE — 6360000002 HC RX W HCPCS: Performed by: STUDENT IN AN ORGANIZED HEALTH CARE EDUCATION/TRAINING PROGRAM

## 2023-06-27 PROCEDURE — 6370000000 HC RX 637 (ALT 250 FOR IP): Performed by: STUDENT IN AN ORGANIZED HEALTH CARE EDUCATION/TRAINING PROGRAM

## 2023-06-27 PROCEDURE — 97116 GAIT TRAINING THERAPY: CPT

## 2023-06-27 PROCEDURE — 6370000000 HC RX 637 (ALT 250 FOR IP): Performed by: SURGERY

## 2023-06-27 PROCEDURE — 6370000000 HC RX 637 (ALT 250 FOR IP): Performed by: NURSE PRACTITIONER

## 2023-06-27 PROCEDURE — 80076 HEPATIC FUNCTION PANEL: CPT

## 2023-06-27 PROCEDURE — C9113 INJ PANTOPRAZOLE SODIUM, VIA: HCPCS | Performed by: STUDENT IN AN ORGANIZED HEALTH CARE EDUCATION/TRAINING PROGRAM

## 2023-06-27 PROCEDURE — 36415 COLL VENOUS BLD VENIPUNCTURE: CPT

## 2023-06-27 PROCEDURE — 1200000000 HC SEMI PRIVATE

## 2023-06-27 RX ORDER — HYDROMORPHONE HYDROCHLORIDE 1 MG/ML
0.5 INJECTION, SOLUTION INTRAMUSCULAR; INTRAVENOUS; SUBCUTANEOUS
Status: DISCONTINUED | OUTPATIENT
Start: 2023-06-27 | End: 2023-06-28 | Stop reason: HOSPADM

## 2023-06-27 RX ORDER — HYDROMORPHONE HYDROCHLORIDE 1 MG/ML
0.25 INJECTION, SOLUTION INTRAMUSCULAR; INTRAVENOUS; SUBCUTANEOUS
Status: DISCONTINUED | OUTPATIENT
Start: 2023-06-27 | End: 2023-06-28 | Stop reason: HOSPADM

## 2023-06-27 RX ORDER — CETIRIZINE HYDROCHLORIDE 10 MG/1
5 TABLET ORAL DAILY
Status: DISCONTINUED | OUTPATIENT
Start: 2023-06-28 | End: 2023-06-28 | Stop reason: HOSPADM

## 2023-06-27 RX ADMIN — HYDROMORPHONE HYDROCHLORIDE 0.25 MG: 1 INJECTION, SOLUTION INTRAMUSCULAR; INTRAVENOUS; SUBCUTANEOUS at 07:18

## 2023-06-27 RX ADMIN — SODIUM CHLORIDE, PRESERVATIVE FREE 10 ML: 5 INJECTION INTRAVENOUS at 08:47

## 2023-06-27 RX ADMIN — INSULIN HUMAN 2 UNITS: 100 INJECTION, SOLUTION PARENTERAL at 03:00

## 2023-06-27 RX ADMIN — METOCLOPRAMIDE HYDROCHLORIDE 10 MG: 5 INJECTION INTRAMUSCULAR; INTRAVENOUS at 08:47

## 2023-06-27 RX ADMIN — SODIUM CHLORIDE, PRESERVATIVE FREE 40 MG: 5 INJECTION INTRAVENOUS at 08:47

## 2023-06-27 RX ADMIN — ACETAMINOPHEN 650 MG: 650 SOLUTION ORAL at 00:55

## 2023-06-27 RX ADMIN — INSULIN HUMAN 2 UNITS: 100 INJECTION, SOLUTION PARENTERAL at 18:07

## 2023-06-27 RX ADMIN — METHOCARBAMOL 500 MG: 500 TABLET ORAL at 16:30

## 2023-06-27 RX ADMIN — IPRATROPIUM BROMIDE AND ALBUTEROL SULFATE 1 DOSE: 2.5; .5 SOLUTION RESPIRATORY (INHALATION) at 20:21

## 2023-06-27 RX ADMIN — INSULIN HUMAN 9 UNITS: 100 INJECTION, SOLUTION PARENTERAL at 22:08

## 2023-06-27 RX ADMIN — METOCLOPRAMIDE HYDROCHLORIDE 10 MG: 5 INJECTION INTRAMUSCULAR; INTRAVENOUS at 00:32

## 2023-06-27 RX ADMIN — ONDANSETRON 4 MG: 2 INJECTION INTRAMUSCULAR; INTRAVENOUS at 06:35

## 2023-06-27 RX ADMIN — OXYCODONE HYDROCHLORIDE 5 MG: 5 SOLUTION ORAL at 19:31

## 2023-06-27 RX ADMIN — ONDANSETRON 4 MG: 2 INJECTION INTRAMUSCULAR; INTRAVENOUS at 00:31

## 2023-06-27 RX ADMIN — ENOXAPARIN SODIUM 40 MG: 100 INJECTION SUBCUTANEOUS at 08:47

## 2023-06-27 RX ADMIN — OXYCODONE HYDROCHLORIDE 5 MG: 5 SOLUTION ORAL at 00:54

## 2023-06-27 RX ADMIN — POTASSIUM BICARBONATE 20 MEQ: 782 TABLET, EFFERVESCENT ORAL at 13:03

## 2023-06-27 RX ADMIN — METHOCARBAMOL 500 MG: 500 TABLET ORAL at 21:21

## 2023-06-27 RX ADMIN — ONDANSETRON 4 MG: 4 TABLET, ORALLY DISINTEGRATING ORAL at 19:36

## 2023-06-27 RX ADMIN — SODIUM CHLORIDE, PRESERVATIVE FREE 10 ML: 5 INJECTION INTRAVENOUS at 10:31

## 2023-06-27 RX ADMIN — PROCHLORPERAZINE EDISYLATE 5 MG: 5 INJECTION, SOLUTION INTRAMUSCULAR; INTRAVENOUS at 10:30

## 2023-06-27 RX ADMIN — SODIUM CHLORIDE, PRESERVATIVE FREE 10 ML: 5 INJECTION INTRAVENOUS at 16:32

## 2023-06-27 RX ADMIN — IPRATROPIUM BROMIDE AND ALBUTEROL SULFATE 1 DOSE: 2.5; .5 SOLUTION RESPIRATORY (INHALATION) at 08:14

## 2023-06-27 RX ADMIN — INSULIN HUMAN 2 UNITS: 100 INJECTION, SOLUTION PARENTERAL at 10:20

## 2023-06-27 RX ADMIN — HYDROMORPHONE HYDROCHLORIDE 0.25 MG: 1 INJECTION, SOLUTION INTRAMUSCULAR; INTRAVENOUS; SUBCUTANEOUS at 11:45

## 2023-06-27 RX ADMIN — METOCLOPRAMIDE HYDROCHLORIDE 10 MG: 5 INJECTION INTRAMUSCULAR; INTRAVENOUS at 16:30

## 2023-06-27 RX ADMIN — ACETAMINOPHEN 650 MG: 650 SOLUTION ORAL at 18:07

## 2023-06-27 ASSESSMENT — PAIN - FUNCTIONAL ASSESSMENT
PAIN_FUNCTIONAL_ASSESSMENT: ACTIVITIES ARE NOT PREVENTED

## 2023-06-27 ASSESSMENT — PAIN SCALES - GENERAL
PAINLEVEL_OUTOF10: 4
PAINLEVEL_OUTOF10: 4
PAINLEVEL_OUTOF10: 2
PAINLEVEL_OUTOF10: 3
PAINLEVEL_OUTOF10: 5
PAINLEVEL_OUTOF10: 4
PAINLEVEL_OUTOF10: 5
PAINLEVEL_OUTOF10: 4
PAINLEVEL_OUTOF10: 5
PAINLEVEL_OUTOF10: 2
PAINLEVEL_OUTOF10: 3
PAINLEVEL_OUTOF10: 2

## 2023-06-27 ASSESSMENT — PAIN DESCRIPTION - PAIN TYPE
TYPE: ACUTE PAIN
TYPE: SURGICAL PAIN
TYPE: ACUTE PAIN;SURGICAL PAIN

## 2023-06-27 ASSESSMENT — PAIN DESCRIPTION - ORIENTATION
ORIENTATION: MID
ORIENTATION: LOWER
ORIENTATION: RIGHT;LOWER
ORIENTATION: MID
ORIENTATION: LOWER;RIGHT
ORIENTATION: MID
ORIENTATION: RIGHT
ORIENTATION: RIGHT;LOWER
ORIENTATION: LOWER

## 2023-06-27 ASSESSMENT — PAIN DESCRIPTION - ONSET
ONSET: SUDDEN
ONSET: SUDDEN
ONSET: ON-GOING

## 2023-06-27 ASSESSMENT — PAIN DESCRIPTION - LOCATION
LOCATION: ABDOMEN
LOCATION: ABDOMEN
LOCATION: BACK
LOCATION: BACK
LOCATION: BACK;ABDOMEN
LOCATION: ABDOMEN
LOCATION: ABDOMEN;BACK
LOCATION: ABDOMEN
LOCATION: BACK
LOCATION: ABDOMEN

## 2023-06-27 ASSESSMENT — PAIN DESCRIPTION - FREQUENCY
FREQUENCY: CONTINUOUS

## 2023-06-27 ASSESSMENT — PAIN DESCRIPTION - DESCRIPTORS
DESCRIPTORS: CRAMPING
DESCRIPTORS: CRAMPING
DESCRIPTORS: ACHING;SORE
DESCRIPTORS: ACHING

## 2023-06-28 ENCOUNTER — HOSPITAL ENCOUNTER (INPATIENT)
Age: 73
LOS: 6 days | Discharge: HOME | DRG: 948 | End: 2023-07-04
Attending: PHYSICAL MEDICINE & REHABILITATION | Admitting: PHYSICAL MEDICINE & REHABILITATION
Payer: MEDICARE

## 2023-06-28 ENCOUNTER — APPOINTMENT (OUTPATIENT)
Dept: GENERAL RADIOLOGY | Age: 73
DRG: 405 | End: 2023-06-28
Attending: SURGERY
Payer: MEDICARE

## 2023-06-28 VITALS
OXYGEN SATURATION: 98 % | HEIGHT: 68 IN | HEART RATE: 79 BPM | SYSTOLIC BLOOD PRESSURE: 139 MMHG | DIASTOLIC BLOOD PRESSURE: 77 MMHG | WEIGHT: 172.84 LBS | TEMPERATURE: 98.2 F | BODY MASS INDEX: 26.2 KG/M2 | RESPIRATION RATE: 16 BRPM

## 2023-06-28 DIAGNOSIS — Z90.49 H/O WHIPPLE PROCEDURE: Primary | ICD-10-CM

## 2023-06-28 DIAGNOSIS — Z90.410 H/O WHIPPLE PROCEDURE: Primary | ICD-10-CM

## 2023-06-28 PROBLEM — R53.81 DEBILITY: Status: ACTIVE | Noted: 2023-06-28

## 2023-06-28 LAB
ALBUMIN SERPL-MCNC: 3.1 G/DL (ref 3.4–5)
ALP SERPL-CCNC: 104 U/L (ref 40–129)
ALT SERPL-CCNC: 13 U/L (ref 10–40)
ANION GAP SERPL CALCULATED.3IONS-SCNC: 9 MMOL/L (ref 3–16)
AST SERPL-CCNC: 12 U/L (ref 15–37)
BASOPHILS # BLD: 0 K/UL (ref 0–0.2)
BASOPHILS NFR BLD: 0.3 %
BILIRUB DIRECT SERPL-MCNC: <0.2 MG/DL (ref 0–0.3)
BILIRUB INDIRECT SERPL-MCNC: ABNORMAL MG/DL (ref 0–1)
BILIRUB SERPL-MCNC: 0.3 MG/DL (ref 0–1)
BUN SERPL-MCNC: 9 MG/DL (ref 7–20)
CALCIUM SERPL-MCNC: 8.7 MG/DL (ref 8.3–10.6)
CHLORIDE SERPL-SCNC: 102 MMOL/L (ref 99–110)
CO2 SERPL-SCNC: 28 MMOL/L (ref 21–32)
CREAT SERPL-MCNC: <0.5 MG/DL (ref 0.8–1.3)
DEPRECATED RDW RBC AUTO: 13.8 % (ref 12.4–15.4)
EOSINOPHIL # BLD: 0.2 K/UL (ref 0–0.6)
EOSINOPHIL NFR BLD: 2.5 %
GFR SERPLBLD CREATININE-BSD FMLA CKD-EPI: >60 ML/MIN/{1.73_M2}
GLUCOSE BLD-MCNC: 125 MG/DL (ref 70–99)
GLUCOSE BLD-MCNC: 130 MG/DL (ref 70–99)
GLUCOSE BLD-MCNC: 131 MG/DL (ref 70–99)
GLUCOSE BLD-MCNC: 160 MG/DL (ref 70–99)
GLUCOSE BLD-MCNC: 197 MG/DL (ref 70–99)
GLUCOSE BLD-MCNC: 222 MG/DL (ref 70–99)
GLUCOSE SERPL-MCNC: 113 MG/DL (ref 70–99)
HCT VFR BLD AUTO: 29.4 % (ref 40.5–52.5)
HGB BLD-MCNC: 10.2 G/DL (ref 13.5–17.5)
LYMPHOCYTES # BLD: 0.7 K/UL (ref 1–5.1)
LYMPHOCYTES NFR BLD: 8 %
MAGNESIUM SERPL-MCNC: 2 MG/DL (ref 1.8–2.4)
MCH RBC QN AUTO: 33.7 PG (ref 26–34)
MCHC RBC AUTO-ENTMCNC: 34.8 G/DL (ref 31–36)
MCV RBC AUTO: 96.9 FL (ref 80–100)
MONOCYTES # BLD: 0.4 K/UL (ref 0–1.3)
MONOCYTES NFR BLD: 5 %
NEUTROPHILS # BLD: 7.5 K/UL (ref 1.7–7.7)
NEUTROPHILS NFR BLD: 84.2 %
PERFORMED ON: ABNORMAL
PHOSPHATE SERPL-MCNC: 4 MG/DL (ref 2.5–4.9)
PLATELET # BLD AUTO: 250 K/UL (ref 135–450)
PMV BLD AUTO: 7.3 FL (ref 5–10.5)
POTASSIUM SERPL-SCNC: 4 MMOL/L (ref 3.5–5.1)
PROT SERPL-MCNC: 5.8 G/DL (ref 6.4–8.2)
RBC # BLD AUTO: 3.04 M/UL (ref 4.2–5.9)
SODIUM SERPL-SCNC: 139 MMOL/L (ref 136–145)
TRANSFERRIN SERPL-MCNC: 164 MG/DL (ref 200–360)
WBC # BLD AUTO: 9 K/UL (ref 4–11)

## 2023-06-28 PROCEDURE — 1280000000 HC REHAB R&B

## 2023-06-28 PROCEDURE — 6370000000 HC RX 637 (ALT 250 FOR IP): Performed by: STUDENT IN AN ORGANIZED HEALTH CARE EDUCATION/TRAINING PROGRAM

## 2023-06-28 PROCEDURE — 80069 RENAL FUNCTION PANEL: CPT

## 2023-06-28 PROCEDURE — 6370000000 HC RX 637 (ALT 250 FOR IP): Performed by: SURGERY

## 2023-06-28 PROCEDURE — A4216 STERILE WATER/SALINE, 10 ML: HCPCS | Performed by: STUDENT IN AN ORGANIZED HEALTH CARE EDUCATION/TRAINING PROGRAM

## 2023-06-28 PROCEDURE — 6370000000 HC RX 637 (ALT 250 FOR IP): Performed by: PHYSICAL MEDICINE & REHABILITATION

## 2023-06-28 PROCEDURE — 2580000003 HC RX 258: Performed by: STUDENT IN AN ORGANIZED HEALTH CARE EDUCATION/TRAINING PROGRAM

## 2023-06-28 PROCEDURE — 6360000002 HC RX W HCPCS: Performed by: STUDENT IN AN ORGANIZED HEALTH CARE EDUCATION/TRAINING PROGRAM

## 2023-06-28 PROCEDURE — 94640 AIRWAY INHALATION TREATMENT: CPT

## 2023-06-28 PROCEDURE — 6360000002 HC RX W HCPCS: Performed by: NURSE PRACTITIONER

## 2023-06-28 PROCEDURE — 2580000003 HC RX 258: Performed by: PHYSICAL MEDICINE & REHABILITATION

## 2023-06-28 PROCEDURE — 80076 HEPATIC FUNCTION PANEL: CPT

## 2023-06-28 PROCEDURE — 74018 RADEX ABDOMEN 1 VIEW: CPT

## 2023-06-28 PROCEDURE — 94761 N-INVAS EAR/PLS OXIMETRY MLT: CPT

## 2023-06-28 PROCEDURE — 83735 ASSAY OF MAGNESIUM: CPT

## 2023-06-28 PROCEDURE — 6370000000 HC RX 637 (ALT 250 FOR IP)

## 2023-06-28 PROCEDURE — 36415 COLL VENOUS BLD VENIPUNCTURE: CPT

## 2023-06-28 PROCEDURE — 94669 MECHANICAL CHEST WALL OSCILL: CPT

## 2023-06-28 PROCEDURE — 6360000002 HC RX W HCPCS: Performed by: PHYSICAL MEDICINE & REHABILITATION

## 2023-06-28 PROCEDURE — 85025 COMPLETE CBC W/AUTO DIFF WBC: CPT

## 2023-06-28 PROCEDURE — C9113 INJ PANTOPRAZOLE SODIUM, VIA: HCPCS | Performed by: STUDENT IN AN ORGANIZED HEALTH CARE EDUCATION/TRAINING PROGRAM

## 2023-06-28 RX ORDER — METHOCARBAMOL 500 MG/1
500 TABLET, FILM COATED ORAL 4 TIMES DAILY
Status: DISCONTINUED | OUTPATIENT
Start: 2023-06-28 | End: 2023-07-04 | Stop reason: HOSPADM

## 2023-06-28 RX ORDER — METOCLOPRAMIDE HYDROCHLORIDE 5 MG/ML
10 INJECTION INTRAMUSCULAR; INTRAVENOUS EVERY 8 HOURS
Status: DISCONTINUED | OUTPATIENT
Start: 2023-06-29 | End: 2023-06-29

## 2023-06-28 RX ORDER — CETIRIZINE HYDROCHLORIDE 10 MG/1
5 TABLET ORAL DAILY
Status: DISCONTINUED | OUTPATIENT
Start: 2023-06-29 | End: 2023-07-04 | Stop reason: HOSPADM

## 2023-06-28 RX ORDER — GUAIFENESIN 600 MG/1
600 TABLET, EXTENDED RELEASE ORAL 2 TIMES DAILY
Status: DISCONTINUED | OUTPATIENT
Start: 2023-06-28 | End: 2023-07-04 | Stop reason: HOSPADM

## 2023-06-28 RX ORDER — CETIRIZINE HYDROCHLORIDE 10 MG/1
5 TABLET ORAL DAILY
Status: CANCELLED | OUTPATIENT
Start: 2023-06-29

## 2023-06-28 RX ORDER — ENOXAPARIN SODIUM 100 MG/ML
40 INJECTION SUBCUTANEOUS DAILY
Status: DISCONTINUED | OUTPATIENT
Start: 2023-06-28 | End: 2023-07-04 | Stop reason: HOSPADM

## 2023-06-28 RX ORDER — GLUCAGON 1 MG/ML
1 KIT INJECTION PRN
Status: DISCONTINUED | OUTPATIENT
Start: 2023-06-28 | End: 2023-07-04 | Stop reason: HOSPADM

## 2023-06-28 RX ORDER — SODIUM CHLORIDE 0.9 % (FLUSH) 0.9 %
10 SYRINGE (ML) INJECTION PRN
Status: CANCELLED | OUTPATIENT
Start: 2023-06-28

## 2023-06-28 RX ORDER — POLYETHYLENE GLYCOL 3350 17 G/17G
17 POWDER, FOR SOLUTION ORAL DAILY PRN
Status: CANCELLED | OUTPATIENT
Start: 2023-06-28

## 2023-06-28 RX ORDER — HYDRALAZINE HYDROCHLORIDE 20 MG/ML
5 INJECTION INTRAMUSCULAR; INTRAVENOUS EVERY 6 HOURS PRN
Status: DISCONTINUED | OUTPATIENT
Start: 2023-06-28 | End: 2023-07-04 | Stop reason: HOSPADM

## 2023-06-28 RX ORDER — CALCIUM POLYCARBOPHIL 625 MG 625 MG/1
625 TABLET ORAL DAILY
Status: DISCONTINUED | OUTPATIENT
Start: 2023-06-29 | End: 2023-07-04 | Stop reason: HOSPADM

## 2023-06-28 RX ORDER — ONDANSETRON 2 MG/ML
4 INJECTION INTRAMUSCULAR; INTRAVENOUS EVERY 6 HOURS PRN
Status: CANCELLED | OUTPATIENT
Start: 2023-06-28

## 2023-06-28 RX ORDER — OXYCODONE HCL 5 MG/5 ML
5 SOLUTION, ORAL ORAL EVERY 4 HOURS PRN
Status: DISCONTINUED | OUTPATIENT
Start: 2023-06-28 | End: 2023-07-04 | Stop reason: HOSPADM

## 2023-06-28 RX ORDER — METOCLOPRAMIDE HYDROCHLORIDE 5 MG/ML
10 INJECTION INTRAMUSCULAR; INTRAVENOUS EVERY 8 HOURS
Qty: 42 ML | Refills: 0 | Status: ON HOLD
Start: 2023-06-28 | End: 2023-06-28

## 2023-06-28 RX ORDER — GUAIFENESIN 600 MG/1
600 TABLET, EXTENDED RELEASE ORAL 2 TIMES DAILY
Status: CANCELLED | OUTPATIENT
Start: 2023-06-28

## 2023-06-28 RX ORDER — CALCIUM POLYCARBOPHIL 625 MG 625 MG/1
625 TABLET ORAL DAILY
Refills: 4 | Status: ON HOLD | COMMUNITY
Start: 2023-06-29 | End: 2023-06-28

## 2023-06-28 RX ORDER — METOCLOPRAMIDE HYDROCHLORIDE 5 MG/ML
10 INJECTION INTRAMUSCULAR; INTRAVENOUS EVERY 8 HOURS
Status: CANCELLED | OUTPATIENT
Start: 2023-06-28

## 2023-06-28 RX ORDER — GUAIFENESIN 600 MG/1
600 TABLET, EXTENDED RELEASE ORAL 2 TIMES DAILY
Status: DISCONTINUED | OUTPATIENT
Start: 2023-06-28 | End: 2023-06-28 | Stop reason: HOSPADM

## 2023-06-28 RX ORDER — SODIUM CHLORIDE 0.9 % (FLUSH) 0.9 %
10 SYRINGE (ML) INJECTION EVERY 12 HOURS SCHEDULED
Status: CANCELLED | OUTPATIENT
Start: 2023-06-28

## 2023-06-28 RX ORDER — SODIUM CHLORIDE 0.9 % (FLUSH) 0.9 %
10 SYRINGE (ML) INJECTION EVERY 12 HOURS SCHEDULED
Status: DISCONTINUED | OUTPATIENT
Start: 2023-06-28 | End: 2023-07-04 | Stop reason: HOSPADM

## 2023-06-28 RX ORDER — ONDANSETRON 2 MG/ML
4 INJECTION INTRAMUSCULAR; INTRAVENOUS EVERY 6 HOURS PRN
Status: DISCONTINUED | OUTPATIENT
Start: 2023-06-28 | End: 2023-07-04 | Stop reason: HOSPADM

## 2023-06-28 RX ORDER — METHOCARBAMOL 500 MG/1
500 TABLET, FILM COATED ORAL 4 TIMES DAILY
Status: CANCELLED | OUTPATIENT
Start: 2023-06-28

## 2023-06-28 RX ORDER — OXYCODONE HCL 5 MG/5 ML
10 SOLUTION, ORAL ORAL EVERY 4 HOURS PRN
Status: CANCELLED | OUTPATIENT
Start: 2023-06-28

## 2023-06-28 RX ORDER — ENOXAPARIN SODIUM 100 MG/ML
40 INJECTION SUBCUTANEOUS DAILY
Status: CANCELLED | OUTPATIENT
Start: 2023-06-28

## 2023-06-28 RX ORDER — SODIUM CHLORIDE 0.9 % (FLUSH) 0.9 %
10 SYRINGE (ML) INJECTION PRN
Status: DISCONTINUED | OUTPATIENT
Start: 2023-06-28 | End: 2023-07-04 | Stop reason: HOSPADM

## 2023-06-28 RX ORDER — ACETAMINOPHEN 160 MG/5ML
650 SOLUTION ORAL EVERY 6 HOURS SCHEDULED
Status: CANCELLED | OUTPATIENT
Start: 2023-06-28

## 2023-06-28 RX ORDER — ONDANSETRON 4 MG/1
4 TABLET, ORALLY DISINTEGRATING ORAL EVERY 8 HOURS PRN
Status: CANCELLED | OUTPATIENT
Start: 2023-06-28

## 2023-06-28 RX ORDER — IPRATROPIUM BROMIDE AND ALBUTEROL SULFATE 2.5; .5 MG/3ML; MG/3ML
1 SOLUTION RESPIRATORY (INHALATION)
Status: CANCELLED | OUTPATIENT
Start: 2023-06-28

## 2023-06-28 RX ORDER — OXYCODONE HCL 5 MG/5 ML
5 SOLUTION, ORAL ORAL EVERY 4 HOURS PRN
Status: CANCELLED | OUTPATIENT
Start: 2023-06-28

## 2023-06-28 RX ORDER — DEXTROSE MONOHYDRATE 100 MG/ML
INJECTION, SOLUTION INTRAVENOUS CONTINUOUS PRN
Status: CANCELLED | OUTPATIENT
Start: 2023-06-28

## 2023-06-28 RX ORDER — ALBUTEROL SULFATE 2.5 MG/3ML
2.5 SOLUTION RESPIRATORY (INHALATION) EVERY 6 HOURS PRN
Status: CANCELLED | OUTPATIENT
Start: 2023-06-28

## 2023-06-28 RX ORDER — ONDANSETRON 4 MG/1
4 TABLET, ORALLY DISINTEGRATING ORAL EVERY 8 HOURS PRN
Status: DISCONTINUED | OUTPATIENT
Start: 2023-06-28 | End: 2023-07-04 | Stop reason: HOSPADM

## 2023-06-28 RX ORDER — ACETAMINOPHEN 160 MG/5ML
650 SOLUTION ORAL EVERY 6 HOURS SCHEDULED
Status: DISCONTINUED | OUTPATIENT
Start: 2023-06-28 | End: 2023-07-04 | Stop reason: HOSPADM

## 2023-06-28 RX ORDER — BISACODYL 5 MG/1
5 TABLET, DELAYED RELEASE ORAL DAILY
Status: DISCONTINUED | OUTPATIENT
Start: 2023-06-28 | End: 2023-07-04 | Stop reason: HOSPADM

## 2023-06-28 RX ORDER — HYDRALAZINE HYDROCHLORIDE 20 MG/ML
5 INJECTION INTRAMUSCULAR; INTRAVENOUS EVERY 6 HOURS PRN
Status: CANCELLED | OUTPATIENT
Start: 2023-06-28

## 2023-06-28 RX ORDER — BISACODYL 5 MG/1
5 TABLET, DELAYED RELEASE ORAL DAILY
Status: CANCELLED | OUTPATIENT
Start: 2023-06-28

## 2023-06-28 RX ORDER — OXYCODONE HCL 5 MG/5 ML
5 SOLUTION, ORAL ORAL EVERY 4 HOURS PRN
Qty: 210 ML | Refills: 0 | Status: ON HOLD
Start: 2023-06-28 | End: 2023-07-03 | Stop reason: HOSPADM

## 2023-06-28 RX ORDER — METHOCARBAMOL 500 MG/1
500 TABLET, FILM COATED ORAL 4 TIMES DAILY
Qty: 40 TABLET | Refills: 0 | Status: ON HOLD
Start: 2023-06-28 | End: 2023-06-28

## 2023-06-28 RX ORDER — GLUCAGON 1 MG/ML
1 KIT INJECTION PRN
Status: CANCELLED | OUTPATIENT
Start: 2023-06-28

## 2023-06-28 RX ORDER — OXYCODONE HCL 5 MG/5 ML
10 SOLUTION, ORAL ORAL EVERY 4 HOURS PRN
Status: DISCONTINUED | OUTPATIENT
Start: 2023-06-28 | End: 2023-07-04 | Stop reason: HOSPADM

## 2023-06-28 RX ORDER — ALBUTEROL SULFATE 2.5 MG/3ML
2.5 SOLUTION RESPIRATORY (INHALATION) EVERY 6 HOURS PRN
Status: DISCONTINUED | OUTPATIENT
Start: 2023-06-28 | End: 2023-07-04 | Stop reason: HOSPADM

## 2023-06-28 RX ORDER — CALCIUM POLYCARBOPHIL 625 MG 625 MG/1
625 TABLET ORAL DAILY
Status: CANCELLED | OUTPATIENT
Start: 2023-06-29

## 2023-06-28 RX ORDER — DEXTROSE MONOHYDRATE 100 MG/ML
INJECTION, SOLUTION INTRAVENOUS CONTINUOUS PRN
Status: DISCONTINUED | OUTPATIENT
Start: 2023-06-28 | End: 2023-07-04 | Stop reason: HOSPADM

## 2023-06-28 RX ORDER — IPRATROPIUM BROMIDE AND ALBUTEROL SULFATE 2.5; .5 MG/3ML; MG/3ML
1 SOLUTION RESPIRATORY (INHALATION)
Status: DISCONTINUED | OUTPATIENT
Start: 2023-06-28 | End: 2023-06-30

## 2023-06-28 RX ORDER — POLYETHYLENE GLYCOL 3350 17 G/17G
17 POWDER, FOR SOLUTION ORAL DAILY PRN
Status: DISCONTINUED | OUTPATIENT
Start: 2023-06-28 | End: 2023-07-03

## 2023-06-28 RX ADMIN — CALCIUM POLYCARBOPHIL 625 MG: 625 TABLET ORAL at 09:08

## 2023-06-28 RX ADMIN — OXYCODONE HYDROCHLORIDE 5 MG: 5 SOLUTION ORAL at 16:40

## 2023-06-28 RX ADMIN — ONDANSETRON 4 MG: 2 INJECTION INTRAMUSCULAR; INTRAVENOUS at 22:14

## 2023-06-28 RX ADMIN — ACETAMINOPHEN 650 MG: 650 SOLUTION ORAL at 13:07

## 2023-06-28 RX ADMIN — IPRATROPIUM BROMIDE AND ALBUTEROL SULFATE 1 DOSE: 2.5; .5 SOLUTION RESPIRATORY (INHALATION) at 16:26

## 2023-06-28 RX ADMIN — INSULIN HUMAN 5 UNITS: 100 INJECTION, SOLUTION PARENTERAL at 22:14

## 2023-06-28 RX ADMIN — OXYCODONE HYDROCHLORIDE 10 MG: 5 SOLUTION ORAL at 22:30

## 2023-06-28 RX ADMIN — INSULIN HUMAN 2 UNITS: 100 INJECTION, SOLUTION PARENTERAL at 01:41

## 2023-06-28 RX ADMIN — IPRATROPIUM BROMIDE AND ALBUTEROL SULFATE 1 DOSE: 2.5; .5 SOLUTION RESPIRATORY (INHALATION) at 21:51

## 2023-06-28 RX ADMIN — METOCLOPRAMIDE HYDROCHLORIDE 10 MG: 5 INJECTION INTRAMUSCULAR; INTRAVENOUS at 16:41

## 2023-06-28 RX ADMIN — METHOCARBAMOL 500 MG: 500 TABLET ORAL at 09:07

## 2023-06-28 RX ADMIN — GUAIFENESIN 600 MG: 600 TABLET ORAL at 09:07

## 2023-06-28 RX ADMIN — INSULIN HUMAN 2 UNITS: 100 INJECTION, SOLUTION PARENTERAL at 09:08

## 2023-06-28 RX ADMIN — METHOCARBAMOL 500 MG: 500 TABLET ORAL at 16:40

## 2023-06-28 RX ADMIN — HUMAN INSULIN 10 UNITS: 100 INJECTION, SUSPENSION SUBCUTANEOUS at 17:57

## 2023-06-28 RX ADMIN — INSULIN HUMAN 5 UNITS: 100 INJECTION, SOLUTION PARENTERAL at 13:08

## 2023-06-28 RX ADMIN — SODIUM CHLORIDE, PRESERVATIVE FREE 10 ML: 5 INJECTION INTRAVENOUS at 22:25

## 2023-06-28 RX ADMIN — METHOCARBAMOL 500 MG: 500 TABLET ORAL at 13:07

## 2023-06-28 RX ADMIN — SODIUM CHLORIDE, PRESERVATIVE FREE 10 ML: 5 INJECTION INTRAVENOUS at 01:32

## 2023-06-28 RX ADMIN — ACETAMINOPHEN 650 MG: 650 SOLUTION ORAL at 09:06

## 2023-06-28 RX ADMIN — ACETAMINOPHEN 650 MG: 650 SOLUTION ORAL at 01:29

## 2023-06-28 RX ADMIN — ACETAMINOPHEN 650 MG: 650 SOLUTION ORAL at 22:29

## 2023-06-28 RX ADMIN — OXYCODONE HYDROCHLORIDE 5 MG: 5 SOLUTION ORAL at 01:30

## 2023-06-28 RX ADMIN — GUAIFENESIN 600 MG: 600 TABLET ORAL at 22:15

## 2023-06-28 RX ADMIN — METOCLOPRAMIDE HYDROCHLORIDE 10 MG: 5 INJECTION INTRAMUSCULAR; INTRAVENOUS at 22:29

## 2023-06-28 RX ADMIN — IPRATROPIUM BROMIDE AND ALBUTEROL SULFATE 1 DOSE: 2.5; .5 SOLUTION RESPIRATORY (INHALATION) at 12:50

## 2023-06-28 RX ADMIN — INSULIN HUMAN 9 UNITS: 100 INJECTION, SOLUTION PARENTERAL at 17:56

## 2023-06-28 RX ADMIN — HUMAN INSULIN 10 UNITS: 100 INJECTION, SUSPENSION SUBCUTANEOUS at 09:11

## 2023-06-28 RX ADMIN — INSULIN HUMAN 2 UNITS: 100 INJECTION, SOLUTION PARENTERAL at 06:15

## 2023-06-28 RX ADMIN — ENOXAPARIN SODIUM 40 MG: 100 INJECTION SUBCUTANEOUS at 09:07

## 2023-06-28 RX ADMIN — ONDANSETRON 4 MG: 2 INJECTION INTRAMUSCULAR; INTRAVENOUS at 16:41

## 2023-06-28 RX ADMIN — METOCLOPRAMIDE HYDROCHLORIDE 10 MG: 5 INJECTION INTRAMUSCULAR; INTRAVENOUS at 01:21

## 2023-06-28 RX ADMIN — METHOCARBAMOL 500 MG: 500 TABLET ORAL at 22:14

## 2023-06-28 RX ADMIN — SODIUM CHLORIDE, PRESERVATIVE FREE 10 ML: 5 INJECTION INTRAVENOUS at 09:09

## 2023-06-28 RX ADMIN — ENOXAPARIN SODIUM 40 MG: 100 INJECTION SUBCUTANEOUS at 22:29

## 2023-06-28 RX ADMIN — METOCLOPRAMIDE HYDROCHLORIDE 10 MG: 5 INJECTION INTRAMUSCULAR; INTRAVENOUS at 09:07

## 2023-06-28 RX ADMIN — SODIUM CHLORIDE, PRESERVATIVE FREE 40 MG: 5 INJECTION INTRAVENOUS at 09:08

## 2023-06-28 ASSESSMENT — PAIN DESCRIPTION - PAIN TYPE
TYPE: SURGICAL PAIN
TYPE: ACUTE PAIN;SURGICAL PAIN
TYPE: SURGICAL PAIN
TYPE: ACUTE PAIN
TYPE: SURGICAL PAIN

## 2023-06-28 ASSESSMENT — PAIN SCALES - GENERAL
PAINLEVEL_OUTOF10: 3
PAINLEVEL_OUTOF10: 1
PAINLEVEL_OUTOF10: 6
PAINLEVEL_OUTOF10: 4
PAINLEVEL_OUTOF10: 3
PAINLEVEL_OUTOF10: 1
PAINLEVEL_OUTOF10: 5
PAINLEVEL_OUTOF10: 7
PAINLEVEL_OUTOF10: 3
PAINLEVEL_OUTOF10: 1

## 2023-06-28 ASSESSMENT — PAIN DESCRIPTION - LOCATION
LOCATION: ABDOMEN
LOCATION: BACK
LOCATION: ABDOMEN
LOCATION: ABDOMEN
LOCATION: BACK
LOCATION: ABDOMEN
LOCATION: BACK
LOCATION: BACK
LOCATION: ABDOMEN

## 2023-06-28 ASSESSMENT — PAIN DESCRIPTION - FREQUENCY
FREQUENCY: INTERMITTENT
FREQUENCY: CONTINUOUS
FREQUENCY: INTERMITTENT
FREQUENCY: INTERMITTENT
FREQUENCY: CONTINUOUS
FREQUENCY: INTERMITTENT
FREQUENCY: INTERMITTENT

## 2023-06-28 ASSESSMENT — PAIN DESCRIPTION - ONSET
ONSET: SUDDEN
ONSET: GRADUAL
ONSET: SUDDEN
ONSET: GRADUAL

## 2023-06-28 ASSESSMENT — PAIN - FUNCTIONAL ASSESSMENT
PAIN_FUNCTIONAL_ASSESSMENT: ACTIVITIES ARE NOT PREVENTED

## 2023-06-28 ASSESSMENT — PAIN DESCRIPTION - DESCRIPTORS
DESCRIPTORS: DISCOMFORT
DESCRIPTORS: CRAMPING
DESCRIPTORS: CRAMPING
DESCRIPTORS: DISCOMFORT;ACHING
DESCRIPTORS: CRAMPING
DESCRIPTORS: DISCOMFORT

## 2023-06-28 ASSESSMENT — PAIN DESCRIPTION - ORIENTATION
ORIENTATION: LOWER
ORIENTATION: RIGHT;LOWER
ORIENTATION: RIGHT;LEFT
ORIENTATION: LOWER

## 2023-06-29 LAB
GLUCOSE BLD-MCNC: 110 MG/DL (ref 70–99)
GLUCOSE BLD-MCNC: 113 MG/DL (ref 70–99)
GLUCOSE BLD-MCNC: 138 MG/DL (ref 70–99)
GLUCOSE BLD-MCNC: 187 MG/DL (ref 70–99)
GLUCOSE BLD-MCNC: 195 MG/DL (ref 70–99)
GLUCOSE BLD-MCNC: 228 MG/DL (ref 70–99)
PERFORMED ON: ABNORMAL

## 2023-06-29 PROCEDURE — 2580000003 HC RX 258: Performed by: PHYSICAL MEDICINE & REHABILITATION

## 2023-06-29 PROCEDURE — 92610 EVALUATE SWALLOWING FUNCTION: CPT

## 2023-06-29 PROCEDURE — 97116 GAIT TRAINING THERAPY: CPT

## 2023-06-29 PROCEDURE — A4216 STERILE WATER/SALINE, 10 ML: HCPCS | Performed by: PHYSICAL MEDICINE & REHABILITATION

## 2023-06-29 PROCEDURE — 6360000002 HC RX W HCPCS: Performed by: PHYSICAL MEDICINE & REHABILITATION

## 2023-06-29 PROCEDURE — 1280000000 HC REHAB R&B

## 2023-06-29 PROCEDURE — C9113 INJ PANTOPRAZOLE SODIUM, VIA: HCPCS | Performed by: PHYSICAL MEDICINE & REHABILITATION

## 2023-06-29 PROCEDURE — 6370000000 HC RX 637 (ALT 250 FOR IP): Performed by: PHYSICAL MEDICINE & REHABILITATION

## 2023-06-29 PROCEDURE — 94669 MECHANICAL CHEST WALL OSCILL: CPT

## 2023-06-29 PROCEDURE — 6360000002 HC RX W HCPCS: Performed by: STUDENT IN AN ORGANIZED HEALTH CARE EDUCATION/TRAINING PROGRAM

## 2023-06-29 PROCEDURE — 97535 SELF CARE MNGMENT TRAINING: CPT

## 2023-06-29 PROCEDURE — 94640 AIRWAY INHALATION TREATMENT: CPT

## 2023-06-29 PROCEDURE — 97162 PT EVAL MOD COMPLEX 30 MIN: CPT

## 2023-06-29 PROCEDURE — 97530 THERAPEUTIC ACTIVITIES: CPT

## 2023-06-29 PROCEDURE — 97166 OT EVAL MOD COMPLEX 45 MIN: CPT

## 2023-06-29 RX ORDER — METOCLOPRAMIDE HYDROCHLORIDE 5 MG/ML
10 INJECTION INTRAMUSCULAR; INTRAVENOUS EVERY 6 HOURS
Status: DISCONTINUED | OUTPATIENT
Start: 2023-06-29 | End: 2023-07-04 | Stop reason: HOSPADM

## 2023-06-29 RX ADMIN — GUAIFENESIN 600 MG: 600 TABLET ORAL at 20:56

## 2023-06-29 RX ADMIN — METHOCARBAMOL 500 MG: 500 TABLET ORAL at 20:56

## 2023-06-29 RX ADMIN — HUMAN INSULIN 10 UNITS: 100 INJECTION, SUSPENSION SUBCUTANEOUS at 17:04

## 2023-06-29 RX ADMIN — INSULIN HUMAN 9 UNITS: 100 INJECTION, SOLUTION PARENTERAL at 11:48

## 2023-06-29 RX ADMIN — SODIUM CHLORIDE, PRESERVATIVE FREE 10 ML: 5 INJECTION INTRAVENOUS at 20:55

## 2023-06-29 RX ADMIN — ACETAMINOPHEN 650 MG: 650 SOLUTION ORAL at 10:01

## 2023-06-29 RX ADMIN — METHOCARBAMOL 500 MG: 500 TABLET ORAL at 17:02

## 2023-06-29 RX ADMIN — SODIUM CHLORIDE, PRESERVATIVE FREE 10 ML: 5 INJECTION INTRAVENOUS at 09:44

## 2023-06-29 RX ADMIN — CETIRIZINE HYDROCHLORIDE 5 MG: 10 TABLET, FILM COATED ORAL at 09:42

## 2023-06-29 RX ADMIN — METOCLOPRAMIDE HYDROCHLORIDE 10 MG: 5 INJECTION INTRAMUSCULAR; INTRAVENOUS at 20:57

## 2023-06-29 RX ADMIN — IPRATROPIUM BROMIDE AND ALBUTEROL SULFATE 1 DOSE: 2.5; .5 SOLUTION RESPIRATORY (INHALATION) at 07:10

## 2023-06-29 RX ADMIN — METHOCARBAMOL 500 MG: 500 TABLET ORAL at 09:42

## 2023-06-29 RX ADMIN — IPRATROPIUM BROMIDE AND ALBUTEROL SULFATE 1 DOSE: 2.5; .5 SOLUTION RESPIRATORY (INHALATION) at 11:06

## 2023-06-29 RX ADMIN — METOCLOPRAMIDE HYDROCHLORIDE 10 MG: 5 INJECTION INTRAMUSCULAR; INTRAVENOUS at 09:43

## 2023-06-29 RX ADMIN — METHOCARBAMOL 500 MG: 500 TABLET ORAL at 14:31

## 2023-06-29 RX ADMIN — METOCLOPRAMIDE HYDROCHLORIDE 10 MG: 5 INJECTION INTRAMUSCULAR; INTRAVENOUS at 17:01

## 2023-06-29 RX ADMIN — SODIUM CHLORIDE, PRESERVATIVE FREE 40 MG: 5 INJECTION INTRAVENOUS at 09:42

## 2023-06-29 RX ADMIN — IPRATROPIUM BROMIDE AND ALBUTEROL SULFATE 1 DOSE: 2.5; .5 SOLUTION RESPIRATORY (INHALATION) at 21:38

## 2023-06-29 RX ADMIN — ENOXAPARIN SODIUM 40 MG: 100 INJECTION SUBCUTANEOUS at 20:56

## 2023-06-29 RX ADMIN — IPRATROPIUM BROMIDE AND ALBUTEROL SULFATE 1 DOSE: 2.5; .5 SOLUTION RESPIRATORY (INHALATION) at 15:07

## 2023-06-29 RX ADMIN — INSULIN HUMAN 5 UNITS: 100 INJECTION, SOLUTION PARENTERAL at 21:59

## 2023-06-29 RX ADMIN — ACETAMINOPHEN 650 MG: 650 SOLUTION ORAL at 03:53

## 2023-06-29 RX ADMIN — ACETAMINOPHEN 650 MG: 650 SOLUTION ORAL at 17:02

## 2023-06-29 RX ADMIN — GUAIFENESIN 600 MG: 600 TABLET ORAL at 09:42

## 2023-06-29 RX ADMIN — ACETAMINOPHEN 650 MG: 650 SOLUTION ORAL at 20:56

## 2023-06-29 RX ADMIN — OXYCODONE HYDROCHLORIDE 5 MG: 5 SOLUTION ORAL at 19:15

## 2023-06-29 RX ADMIN — CALCIUM POLYCARBOPHIL 625 MG: 625 TABLET ORAL at 09:42

## 2023-06-29 ASSESSMENT — PAIN DESCRIPTION - FREQUENCY
FREQUENCY: INTERMITTENT

## 2023-06-29 ASSESSMENT — PAIN DESCRIPTION - ONSET
ONSET: GRADUAL

## 2023-06-29 ASSESSMENT — PAIN DESCRIPTION - ORIENTATION
ORIENTATION: RIGHT;LEFT

## 2023-06-29 ASSESSMENT — PAIN SCALES - GENERAL
PAINLEVEL_OUTOF10: 3
PAINLEVEL_OUTOF10: 4
PAINLEVEL_OUTOF10: 4
PAINLEVEL_OUTOF10: 1
PAINLEVEL_OUTOF10: 3
PAINLEVEL_OUTOF10: 1
PAINLEVEL_OUTOF10: 3
PAINLEVEL_OUTOF10: 3

## 2023-06-29 ASSESSMENT — PAIN - FUNCTIONAL ASSESSMENT
PAIN_FUNCTIONAL_ASSESSMENT: ACTIVITIES ARE NOT PREVENTED

## 2023-06-29 ASSESSMENT — PAIN DESCRIPTION - PAIN TYPE
TYPE: SURGICAL PAIN

## 2023-06-29 ASSESSMENT — PAIN DESCRIPTION - DESCRIPTORS
DESCRIPTORS: ACHING
DESCRIPTORS: ACHING;DISCOMFORT
DESCRIPTORS: DISCOMFORT
DESCRIPTORS: ACHING
DESCRIPTORS: ACHING;SHARP
DESCRIPTORS: DISCOMFORT
DESCRIPTORS: DISCOMFORT
DESCRIPTORS: ACHING

## 2023-06-29 ASSESSMENT — PAIN SCALES - WONG BAKER
WONGBAKER_NUMERICALRESPONSE: 0

## 2023-06-29 ASSESSMENT — PAIN DESCRIPTION - LOCATION
LOCATION: ABDOMEN

## 2023-06-30 PROBLEM — Z90.410 H/O WHIPPLE PROCEDURE: Status: ACTIVE | Noted: 2023-06-30

## 2023-06-30 PROBLEM — Z90.49 H/O WHIPPLE PROCEDURE: Status: ACTIVE | Noted: 2023-06-30

## 2023-06-30 LAB
ANION GAP SERPL CALCULATED.3IONS-SCNC: 10 MMOL/L (ref 3–16)
BASOPHILS # BLD: 0 K/UL (ref 0–0.2)
BASOPHILS NFR BLD: 0.2 %
BUN SERPL-MCNC: 7 MG/DL (ref 7–20)
CALCIUM SERPL-MCNC: 8.6 MG/DL (ref 8.3–10.6)
CHLORIDE SERPL-SCNC: 100 MMOL/L (ref 99–110)
CO2 SERPL-SCNC: 27 MMOL/L (ref 21–32)
CREAT SERPL-MCNC: <0.5 MG/DL (ref 0.8–1.3)
DEPRECATED RDW RBC AUTO: 13.9 % (ref 12.4–15.4)
EOSINOPHIL # BLD: 0.1 K/UL (ref 0–0.6)
EOSINOPHIL NFR BLD: 1.5 %
GFR SERPLBLD CREATININE-BSD FMLA CKD-EPI: >60 ML/MIN/{1.73_M2}
GLUCOSE BLD-MCNC: 108 MG/DL (ref 70–99)
GLUCOSE BLD-MCNC: 134 MG/DL (ref 70–99)
GLUCOSE BLD-MCNC: 146 MG/DL (ref 70–99)
GLUCOSE BLD-MCNC: 148 MG/DL (ref 70–99)
GLUCOSE BLD-MCNC: 156 MG/DL (ref 70–99)
GLUCOSE SERPL-MCNC: 150 MG/DL (ref 70–99)
HCT VFR BLD AUTO: 28.9 % (ref 40.5–52.5)
HGB BLD-MCNC: 9.9 G/DL (ref 13.5–17.5)
LYMPHOCYTES # BLD: 0.7 K/UL (ref 1–5.1)
LYMPHOCYTES NFR BLD: 8.5 %
MAGNESIUM SERPL-MCNC: 1.9 MG/DL (ref 1.8–2.4)
MCH RBC QN AUTO: 33.6 PG (ref 26–34)
MCHC RBC AUTO-ENTMCNC: 34.2 G/DL (ref 31–36)
MCV RBC AUTO: 98.4 FL (ref 80–100)
MONOCYTES # BLD: 0.4 K/UL (ref 0–1.3)
MONOCYTES NFR BLD: 4.5 %
NEUTROPHILS # BLD: 7.3 K/UL (ref 1.7–7.7)
NEUTROPHILS NFR BLD: 85.3 %
PERFORMED ON: ABNORMAL
PHOSPHATE SERPL-MCNC: 2.9 MG/DL (ref 2.5–4.9)
PLATELET # BLD AUTO: 256 K/UL (ref 135–450)
PMV BLD AUTO: 7 FL (ref 5–10.5)
POTASSIUM SERPL-SCNC: 3.9 MMOL/L (ref 3.5–5.1)
RBC # BLD AUTO: 2.94 M/UL (ref 4.2–5.9)
SODIUM SERPL-SCNC: 137 MMOL/L (ref 136–145)
WBC # BLD AUTO: 8.6 K/UL (ref 4–11)

## 2023-06-30 PROCEDURE — 84100 ASSAY OF PHOSPHORUS: CPT

## 2023-06-30 PROCEDURE — 94640 AIRWAY INHALATION TREATMENT: CPT

## 2023-06-30 PROCEDURE — 6360000002 HC RX W HCPCS: Performed by: PHYSICAL MEDICINE & REHABILITATION

## 2023-06-30 PROCEDURE — C9113 INJ PANTOPRAZOLE SODIUM, VIA: HCPCS | Performed by: PHYSICAL MEDICINE & REHABILITATION

## 2023-06-30 PROCEDURE — 97110 THERAPEUTIC EXERCISES: CPT

## 2023-06-30 PROCEDURE — 51798 US URINE CAPACITY MEASURE: CPT

## 2023-06-30 PROCEDURE — 97530 THERAPEUTIC ACTIVITIES: CPT

## 2023-06-30 PROCEDURE — 99024 POSTOP FOLLOW-UP VISIT: CPT | Performed by: SURGERY

## 2023-06-30 PROCEDURE — 6360000002 HC RX W HCPCS: Performed by: STUDENT IN AN ORGANIZED HEALTH CARE EDUCATION/TRAINING PROGRAM

## 2023-06-30 PROCEDURE — 6370000000 HC RX 637 (ALT 250 FOR IP): Performed by: PHYSICAL MEDICINE & REHABILITATION

## 2023-06-30 PROCEDURE — A4216 STERILE WATER/SALINE, 10 ML: HCPCS | Performed by: PHYSICAL MEDICINE & REHABILITATION

## 2023-06-30 PROCEDURE — 94761 N-INVAS EAR/PLS OXIMETRY MLT: CPT

## 2023-06-30 PROCEDURE — 2580000003 HC RX 258: Performed by: PHYSICAL MEDICINE & REHABILITATION

## 2023-06-30 PROCEDURE — 83735 ASSAY OF MAGNESIUM: CPT

## 2023-06-30 PROCEDURE — 85025 COMPLETE CBC W/AUTO DIFF WBC: CPT

## 2023-06-30 PROCEDURE — 97116 GAIT TRAINING THERAPY: CPT

## 2023-06-30 PROCEDURE — 1280000000 HC REHAB R&B

## 2023-06-30 PROCEDURE — 97535 SELF CARE MNGMENT TRAINING: CPT

## 2023-06-30 PROCEDURE — 36415 COLL VENOUS BLD VENIPUNCTURE: CPT

## 2023-06-30 PROCEDURE — 80048 BASIC METABOLIC PNL TOTAL CA: CPT

## 2023-06-30 RX ADMIN — ACETAMINOPHEN 650 MG: 650 SOLUTION ORAL at 10:24

## 2023-06-30 RX ADMIN — IPRATROPIUM BROMIDE AND ALBUTEROL SULFATE 1 DOSE: 2.5; .5 SOLUTION RESPIRATORY (INHALATION) at 07:43

## 2023-06-30 RX ADMIN — SODIUM CHLORIDE, PRESERVATIVE FREE 10 ML: 5 INJECTION INTRAVENOUS at 08:03

## 2023-06-30 RX ADMIN — INSULIN HUMAN 2 UNITS: 100 INJECTION, SOLUTION PARENTERAL at 21:56

## 2023-06-30 RX ADMIN — BISACODYL 5 MG: 5 TABLET, COATED ORAL at 07:59

## 2023-06-30 RX ADMIN — METOCLOPRAMIDE HYDROCHLORIDE 10 MG: 5 INJECTION INTRAMUSCULAR; INTRAVENOUS at 03:51

## 2023-06-30 RX ADMIN — CETIRIZINE HYDROCHLORIDE 5 MG: 10 TABLET, FILM COATED ORAL at 08:04

## 2023-06-30 RX ADMIN — METOCLOPRAMIDE HYDROCHLORIDE 10 MG: 5 INJECTION INTRAMUSCULAR; INTRAVENOUS at 16:19

## 2023-06-30 RX ADMIN — METHOCARBAMOL 500 MG: 500 TABLET ORAL at 08:00

## 2023-06-30 RX ADMIN — METHOCARBAMOL 500 MG: 500 TABLET ORAL at 21:58

## 2023-06-30 RX ADMIN — ENOXAPARIN SODIUM 40 MG: 100 INJECTION SUBCUTANEOUS at 21:56

## 2023-06-30 RX ADMIN — METOCLOPRAMIDE HYDROCHLORIDE 10 MG: 5 INJECTION INTRAMUSCULAR; INTRAVENOUS at 21:58

## 2023-06-30 RX ADMIN — CALCIUM POLYCARBOPHIL 625 MG: 625 TABLET ORAL at 08:02

## 2023-06-30 RX ADMIN — METOCLOPRAMIDE HYDROCHLORIDE 10 MG: 5 INJECTION INTRAMUSCULAR; INTRAVENOUS at 08:02

## 2023-06-30 RX ADMIN — SODIUM CHLORIDE, PRESERVATIVE FREE 10 ML: 5 INJECTION INTRAVENOUS at 22:03

## 2023-06-30 RX ADMIN — SODIUM CHLORIDE, PRESERVATIVE FREE 40 MG: 5 INJECTION INTRAVENOUS at 08:03

## 2023-06-30 RX ADMIN — HUMAN INSULIN 10 UNITS: 100 INJECTION, SUSPENSION SUBCUTANEOUS at 17:51

## 2023-06-30 RX ADMIN — METHOCARBAMOL 500 MG: 500 TABLET ORAL at 17:51

## 2023-06-30 RX ADMIN — METHOCARBAMOL 500 MG: 500 TABLET ORAL at 13:32

## 2023-06-30 RX ADMIN — GUAIFENESIN 600 MG: 600 TABLET ORAL at 21:58

## 2023-06-30 RX ADMIN — GUAIFENESIN 600 MG: 600 TABLET ORAL at 08:00

## 2023-06-30 RX ADMIN — ACETAMINOPHEN 650 MG: 650 SOLUTION ORAL at 16:33

## 2023-06-30 RX ADMIN — HUMAN INSULIN 10 UNITS: 100 INJECTION, SUSPENSION SUBCUTANEOUS at 10:24

## 2023-06-30 RX ADMIN — ACETAMINOPHEN 650 MG: 650 SOLUTION ORAL at 03:51

## 2023-06-30 RX ADMIN — ACETAMINOPHEN 650 MG: 650 SOLUTION ORAL at 21:57

## 2023-06-30 RX ADMIN — ALBUTEROL SULFATE 2.5 MG: 2.5 SOLUTION RESPIRATORY (INHALATION) at 22:28

## 2023-06-30 ASSESSMENT — PAIN DESCRIPTION - DESCRIPTORS
DESCRIPTORS: CRAMPING
DESCRIPTORS: ACHING;CRAMPING;STABBING
DESCRIPTORS: SHARP
DESCRIPTORS: CRAMPING
DESCRIPTORS: STABBING;CRAMPING
DESCRIPTORS: CRAMPING

## 2023-06-30 ASSESSMENT — PAIN - FUNCTIONAL ASSESSMENT
PAIN_FUNCTIONAL_ASSESSMENT: ACTIVITIES ARE NOT PREVENTED
PAIN_FUNCTIONAL_ASSESSMENT: ACTIVITIES ARE NOT PREVENTED
PAIN_FUNCTIONAL_ASSESSMENT: PREVENTS OR INTERFERES SOME ACTIVE ACTIVITIES AND ADLS
PAIN_FUNCTIONAL_ASSESSMENT: ACTIVITIES ARE NOT PREVENTED
PAIN_FUNCTIONAL_ASSESSMENT: ACTIVITIES ARE NOT PREVENTED

## 2023-06-30 ASSESSMENT — PAIN SCALES - WONG BAKER
WONGBAKER_NUMERICALRESPONSE: 0
WONGBAKER_NUMERICALRESPONSE: 0

## 2023-06-30 ASSESSMENT — PAIN SCALES - GENERAL
PAINLEVEL_OUTOF10: 3
PAINLEVEL_OUTOF10: 2
PAINLEVEL_OUTOF10: 0
PAINLEVEL_OUTOF10: 2
PAINLEVEL_OUTOF10: 0
PAINLEVEL_OUTOF10: 0
PAINLEVEL_OUTOF10: 2

## 2023-06-30 ASSESSMENT — PAIN DESCRIPTION - ORIENTATION
ORIENTATION: RIGHT;LOWER
ORIENTATION: RIGHT;LEFT
ORIENTATION: RIGHT
ORIENTATION: RIGHT
ORIENTATION: LOWER;RIGHT
ORIENTATION: MID

## 2023-06-30 ASSESSMENT — PAIN DESCRIPTION - ONSET
ONSET: GRADUAL
ONSET: GRADUAL

## 2023-06-30 ASSESSMENT — PAIN DESCRIPTION - LOCATION
LOCATION: ABDOMEN
LOCATION: BACK
LOCATION: ABDOMEN;BACK
LOCATION: ABDOMEN
LOCATION: ABDOMEN;BACK
LOCATION: ABDOMEN;BACK

## 2023-06-30 ASSESSMENT — PAIN DESCRIPTION - PAIN TYPE
TYPE: SURGICAL PAIN
TYPE: SURGICAL PAIN

## 2023-06-30 ASSESSMENT — PAIN DESCRIPTION - FREQUENCY
FREQUENCY: INTERMITTENT
FREQUENCY: INTERMITTENT

## 2023-07-01 LAB
GLUCOSE BLD-MCNC: 132 MG/DL (ref 70–99)
GLUCOSE BLD-MCNC: 136 MG/DL (ref 70–99)
GLUCOSE BLD-MCNC: 160 MG/DL (ref 70–99)
GLUCOSE BLD-MCNC: 214 MG/DL (ref 70–99)
GLUCOSE BLD-MCNC: 88 MG/DL (ref 70–99)
PERFORMED ON: ABNORMAL
PERFORMED ON: NORMAL

## 2023-07-01 PROCEDURE — 6360000002 HC RX W HCPCS: Performed by: PHYSICAL MEDICINE & REHABILITATION

## 2023-07-01 PROCEDURE — 2580000003 HC RX 258: Performed by: PHYSICAL MEDICINE & REHABILITATION

## 2023-07-01 PROCEDURE — 6360000002 HC RX W HCPCS: Performed by: STUDENT IN AN ORGANIZED HEALTH CARE EDUCATION/TRAINING PROGRAM

## 2023-07-01 PROCEDURE — 6370000000 HC RX 637 (ALT 250 FOR IP): Performed by: PHYSICAL MEDICINE & REHABILITATION

## 2023-07-01 PROCEDURE — A4216 STERILE WATER/SALINE, 10 ML: HCPCS | Performed by: PHYSICAL MEDICINE & REHABILITATION

## 2023-07-01 PROCEDURE — C9113 INJ PANTOPRAZOLE SODIUM, VIA: HCPCS | Performed by: PHYSICAL MEDICINE & REHABILITATION

## 2023-07-01 PROCEDURE — 94761 N-INVAS EAR/PLS OXIMETRY MLT: CPT

## 2023-07-01 PROCEDURE — 94640 AIRWAY INHALATION TREATMENT: CPT

## 2023-07-01 PROCEDURE — 1280000000 HC REHAB R&B

## 2023-07-01 RX ADMIN — SODIUM CHLORIDE, PRESERVATIVE FREE 10 ML: 5 INJECTION INTRAVENOUS at 09:21

## 2023-07-01 RX ADMIN — ALBUTEROL SULFATE 2.5 MG: 2.5 SOLUTION RESPIRATORY (INHALATION) at 21:08

## 2023-07-01 RX ADMIN — METHOCARBAMOL 500 MG: 500 TABLET ORAL at 15:38

## 2023-07-01 RX ADMIN — HUMAN INSULIN 10 UNITS: 100 INJECTION, SUSPENSION SUBCUTANEOUS at 06:36

## 2023-07-01 RX ADMIN — GUAIFENESIN 600 MG: 600 TABLET ORAL at 19:58

## 2023-07-01 RX ADMIN — ACETAMINOPHEN 650 MG: 650 SOLUTION ORAL at 15:38

## 2023-07-01 RX ADMIN — SODIUM CHLORIDE, PRESERVATIVE FREE 10 ML: 5 INJECTION INTRAVENOUS at 19:57

## 2023-07-01 RX ADMIN — ONDANSETRON 4 MG: 4 TABLET, ORALLY DISINTEGRATING ORAL at 20:18

## 2023-07-01 RX ADMIN — METHOCARBAMOL 500 MG: 500 TABLET ORAL at 12:24

## 2023-07-01 RX ADMIN — METOCLOPRAMIDE HYDROCHLORIDE 10 MG: 5 INJECTION INTRAMUSCULAR; INTRAVENOUS at 04:13

## 2023-07-01 RX ADMIN — ENOXAPARIN SODIUM 40 MG: 100 INJECTION SUBCUTANEOUS at 21:27

## 2023-07-01 RX ADMIN — BISACODYL 5 MG: 5 TABLET, COATED ORAL at 09:21

## 2023-07-01 RX ADMIN — ACETAMINOPHEN 650 MG: 650 SOLUTION ORAL at 04:13

## 2023-07-01 RX ADMIN — METHOCARBAMOL 500 MG: 500 TABLET ORAL at 19:57

## 2023-07-01 RX ADMIN — SODIUM CHLORIDE, PRESERVATIVE FREE 10 ML: 5 INJECTION INTRAVENOUS at 04:18

## 2023-07-01 RX ADMIN — ALBUTEROL SULFATE 2.5 MG: 2.5 SOLUTION RESPIRATORY (INHALATION) at 07:47

## 2023-07-01 RX ADMIN — HUMAN INSULIN 10 UNITS: 100 INJECTION, SUSPENSION SUBCUTANEOUS at 17:03

## 2023-07-01 RX ADMIN — SODIUM CHLORIDE, PRESERVATIVE FREE 40 MG: 5 INJECTION INTRAVENOUS at 09:21

## 2023-07-01 RX ADMIN — CETIRIZINE HYDROCHLORIDE 5 MG: 10 TABLET, FILM COATED ORAL at 09:22

## 2023-07-01 RX ADMIN — ACETAMINOPHEN 650 MG: 650 SOLUTION ORAL at 22:33

## 2023-07-01 RX ADMIN — METOCLOPRAMIDE HYDROCHLORIDE 10 MG: 5 INJECTION INTRAMUSCULAR; INTRAVENOUS at 15:38

## 2023-07-01 RX ADMIN — ACETAMINOPHEN 650 MG: 650 SOLUTION ORAL at 09:21

## 2023-07-01 RX ADMIN — CALCIUM POLYCARBOPHIL 625 MG: 625 TABLET ORAL at 09:21

## 2023-07-01 RX ADMIN — METOCLOPRAMIDE HYDROCHLORIDE 10 MG: 5 INJECTION INTRAMUSCULAR; INTRAVENOUS at 09:23

## 2023-07-01 RX ADMIN — METHOCARBAMOL 500 MG: 500 TABLET ORAL at 09:21

## 2023-07-01 RX ADMIN — INSULIN HUMAN 9 UNITS: 100 INJECTION, SOLUTION PARENTERAL at 17:06

## 2023-07-01 RX ADMIN — GUAIFENESIN 600 MG: 600 TABLET ORAL at 09:22

## 2023-07-01 RX ADMIN — METOCLOPRAMIDE HYDROCHLORIDE 10 MG: 5 INJECTION INTRAMUSCULAR; INTRAVENOUS at 21:27

## 2023-07-01 RX ADMIN — INSULIN HUMAN 2 UNITS: 100 INJECTION, SOLUTION PARENTERAL at 06:39

## 2023-07-01 ASSESSMENT — PAIN DESCRIPTION - ORIENTATION
ORIENTATION: RIGHT;LEFT;UPPER
ORIENTATION: LOWER;RIGHT
ORIENTATION: RIGHT;LEFT;UPPER

## 2023-07-01 ASSESSMENT — PAIN DESCRIPTION - LOCATION
LOCATION: LEG
LOCATION: ABDOMEN
LOCATION: LEG
LOCATION: ABDOMEN
LOCATION: LEG
LOCATION: LEG

## 2023-07-01 ASSESSMENT — PAIN DESCRIPTION - ONSET
ONSET: GRADUAL

## 2023-07-01 ASSESSMENT — PAIN DESCRIPTION - PAIN TYPE
TYPE: SURGICAL PAIN

## 2023-07-01 ASSESSMENT — PAIN DESCRIPTION - FREQUENCY
FREQUENCY: INTERMITTENT

## 2023-07-01 ASSESSMENT — PAIN DESCRIPTION - DESCRIPTORS
DESCRIPTORS: DISCOMFORT

## 2023-07-01 ASSESSMENT — PAIN SCALES - GENERAL
PAINLEVEL_OUTOF10: 0
PAINLEVEL_OUTOF10: 3
PAINLEVEL_OUTOF10: 2
PAINLEVEL_OUTOF10: 3
PAINLEVEL_OUTOF10: 0
PAINLEVEL_OUTOF10: 2
PAINLEVEL_OUTOF10: 2
PAINLEVEL_OUTOF10: 1
PAINLEVEL_OUTOF10: 1
PAINLEVEL_OUTOF10: 2

## 2023-07-01 ASSESSMENT — PAIN SCALES - WONG BAKER
WONGBAKER_NUMERICALRESPONSE: 0

## 2023-07-01 ASSESSMENT — PAIN - FUNCTIONAL ASSESSMENT
PAIN_FUNCTIONAL_ASSESSMENT: ACTIVITIES ARE NOT PREVENTED

## 2023-07-02 LAB
GLUCOSE BLD-MCNC: 156 MG/DL (ref 70–99)
GLUCOSE BLD-MCNC: 161 MG/DL (ref 70–99)
GLUCOSE BLD-MCNC: 166 MG/DL (ref 70–99)
GLUCOSE BLD-MCNC: 176 MG/DL (ref 70–99)
PERFORMED ON: ABNORMAL

## 2023-07-02 PROCEDURE — 99024 POSTOP FOLLOW-UP VISIT: CPT | Performed by: SURGERY

## 2023-07-02 PROCEDURE — 97116 GAIT TRAINING THERAPY: CPT

## 2023-07-02 PROCEDURE — 2580000003 HC RX 258: Performed by: PHYSICAL MEDICINE & REHABILITATION

## 2023-07-02 PROCEDURE — 6360000002 HC RX W HCPCS: Performed by: STUDENT IN AN ORGANIZED HEALTH CARE EDUCATION/TRAINING PROGRAM

## 2023-07-02 PROCEDURE — 97530 THERAPEUTIC ACTIVITIES: CPT

## 2023-07-02 PROCEDURE — 6360000002 HC RX W HCPCS: Performed by: PHYSICAL MEDICINE & REHABILITATION

## 2023-07-02 PROCEDURE — 1280000000 HC REHAB R&B

## 2023-07-02 PROCEDURE — 6370000000 HC RX 637 (ALT 250 FOR IP)

## 2023-07-02 PROCEDURE — 97535 SELF CARE MNGMENT TRAINING: CPT

## 2023-07-02 PROCEDURE — C9113 INJ PANTOPRAZOLE SODIUM, VIA: HCPCS | Performed by: PHYSICAL MEDICINE & REHABILITATION

## 2023-07-02 PROCEDURE — 94640 AIRWAY INHALATION TREATMENT: CPT

## 2023-07-02 PROCEDURE — 97110 THERAPEUTIC EXERCISES: CPT

## 2023-07-02 PROCEDURE — 6370000000 HC RX 637 (ALT 250 FOR IP): Performed by: PHYSICAL MEDICINE & REHABILITATION

## 2023-07-02 RX ORDER — LIDOCAINE 4 G/G
1 PATCH TOPICAL DAILY
Status: DISCONTINUED | OUTPATIENT
Start: 2023-07-02 | End: 2023-07-04 | Stop reason: HOSPADM

## 2023-07-02 RX ADMIN — ACETAMINOPHEN 650 MG: 650 SOLUTION ORAL at 04:08

## 2023-07-02 RX ADMIN — SODIUM CHLORIDE, PRESERVATIVE FREE 10 ML: 5 INJECTION INTRAVENOUS at 21:21

## 2023-07-02 RX ADMIN — GUAIFENESIN 600 MG: 600 TABLET ORAL at 07:22

## 2023-07-02 RX ADMIN — SODIUM CHLORIDE, PRESERVATIVE FREE 40 MG: 5 INJECTION INTRAVENOUS at 07:22

## 2023-07-02 RX ADMIN — CALCIUM POLYCARBOPHIL 625 MG: 625 TABLET ORAL at 07:22

## 2023-07-02 RX ADMIN — METHOCARBAMOL 500 MG: 500 TABLET ORAL at 11:57

## 2023-07-02 RX ADMIN — ENOXAPARIN SODIUM 40 MG: 100 INJECTION SUBCUTANEOUS at 21:21

## 2023-07-02 RX ADMIN — METOCLOPRAMIDE HYDROCHLORIDE 10 MG: 5 INJECTION INTRAMUSCULAR; INTRAVENOUS at 17:03

## 2023-07-02 RX ADMIN — METOCLOPRAMIDE HYDROCHLORIDE 10 MG: 5 INJECTION INTRAMUSCULAR; INTRAVENOUS at 21:21

## 2023-07-02 RX ADMIN — METHOCARBAMOL 500 MG: 500 TABLET ORAL at 17:04

## 2023-07-02 RX ADMIN — CETIRIZINE HYDROCHLORIDE 5 MG: 10 TABLET, FILM COATED ORAL at 07:21

## 2023-07-02 RX ADMIN — METHOCARBAMOL 500 MG: 500 TABLET ORAL at 07:21

## 2023-07-02 RX ADMIN — METHOCARBAMOL 500 MG: 500 TABLET ORAL at 21:04

## 2023-07-02 RX ADMIN — METOCLOPRAMIDE HYDROCHLORIDE 10 MG: 5 INJECTION INTRAMUSCULAR; INTRAVENOUS at 04:08

## 2023-07-02 RX ADMIN — ACETAMINOPHEN 650 MG: 650 SOLUTION ORAL at 21:04

## 2023-07-02 RX ADMIN — GUAIFENESIN 600 MG: 600 TABLET ORAL at 21:04

## 2023-07-02 RX ADMIN — SODIUM CHLORIDE, PRESERVATIVE FREE 10 ML: 5 INJECTION INTRAVENOUS at 07:26

## 2023-07-02 RX ADMIN — BISACODYL 5 MG: 5 TABLET, COATED ORAL at 07:22

## 2023-07-02 RX ADMIN — ALBUTEROL SULFATE 2.5 MG: 2.5 SOLUTION RESPIRATORY (INHALATION) at 05:31

## 2023-07-02 RX ADMIN — OXYCODONE HYDROCHLORIDE 10 MG: 5 SOLUTION ORAL at 17:50

## 2023-07-02 RX ADMIN — HUMAN INSULIN 10 UNITS: 100 INJECTION, SUSPENSION SUBCUTANEOUS at 08:39

## 2023-07-02 RX ADMIN — ALBUTEROL SULFATE 2.5 MG: 2.5 SOLUTION RESPIRATORY (INHALATION) at 18:27

## 2023-07-02 RX ADMIN — INSULIN HUMAN 5 UNITS: 100 INJECTION, SOLUTION PARENTERAL at 21:22

## 2023-07-02 RX ADMIN — METOCLOPRAMIDE HYDROCHLORIDE 10 MG: 5 INJECTION INTRAMUSCULAR; INTRAVENOUS at 07:25

## 2023-07-02 RX ADMIN — POLYETHYLENE GLYCOL 3350 17 G: 17 POWDER, FOR SOLUTION ORAL at 17:06

## 2023-07-02 ASSESSMENT — PAIN DESCRIPTION - DESCRIPTORS
DESCRIPTORS: ACHING
DESCRIPTORS: DISCOMFORT
DESCRIPTORS: ACHING;DISCOMFORT
DESCRIPTORS: DISCOMFORT

## 2023-07-02 ASSESSMENT — PAIN DESCRIPTION - ONSET
ONSET: GRADUAL

## 2023-07-02 ASSESSMENT — PAIN DESCRIPTION - FREQUENCY
FREQUENCY: INTERMITTENT

## 2023-07-02 ASSESSMENT — PAIN DESCRIPTION - LOCATION
LOCATION: LEG
LOCATION: HEAD;BACK;ABDOMEN
LOCATION: BACK
LOCATION: LEG
LOCATION: BACK
LOCATION: ABDOMEN;BACK

## 2023-07-02 ASSESSMENT — PAIN DESCRIPTION - PAIN TYPE
TYPE: ACUTE PAIN
TYPE: ACUTE PAIN
TYPE: SURGICAL PAIN
TYPE: SURGICAL PAIN

## 2023-07-02 ASSESSMENT — PAIN - FUNCTIONAL ASSESSMENT
PAIN_FUNCTIONAL_ASSESSMENT: ACTIVITIES ARE NOT PREVENTED

## 2023-07-02 ASSESSMENT — PAIN DESCRIPTION - ORIENTATION
ORIENTATION: LOWER
ORIENTATION: LOWER
ORIENTATION: LEFT;RIGHT;UPPER
ORIENTATION: LOWER
ORIENTATION: RIGHT;LEFT;UPPER

## 2023-07-02 ASSESSMENT — PAIN SCALES - GENERAL
PAINLEVEL_OUTOF10: 2
PAINLEVEL_OUTOF10: 2
PAINLEVEL_OUTOF10: 7
PAINLEVEL_OUTOF10: 0
PAINLEVEL_OUTOF10: 4
PAINLEVEL_OUTOF10: 2
PAINLEVEL_OUTOF10: 1
PAINLEVEL_OUTOF10: 2

## 2023-07-02 ASSESSMENT — PAIN SCALES - WONG BAKER: WONGBAKER_NUMERICALRESPONSE: 0

## 2023-07-03 LAB
ANION GAP SERPL CALCULATED.3IONS-SCNC: 9 MMOL/L (ref 3–16)
BASOPHILS # BLD: 0 K/UL (ref 0–0.2)
BASOPHILS NFR BLD: 0.5 %
BUN SERPL-MCNC: 7 MG/DL (ref 7–20)
CALCIUM SERPL-MCNC: 9 MG/DL (ref 8.3–10.6)
CHLORIDE SERPL-SCNC: 103 MMOL/L (ref 99–110)
CO2 SERPL-SCNC: 28 MMOL/L (ref 21–32)
CREAT SERPL-MCNC: <0.5 MG/DL (ref 0.8–1.3)
DEPRECATED RDW RBC AUTO: 13.7 % (ref 12.4–15.4)
EOSINOPHIL # BLD: 0.2 K/UL (ref 0–0.6)
EOSINOPHIL NFR BLD: 3.1 %
FUNGUS SPEC CULT: NORMAL
FUNGUS SPEC CULT: NORMAL
GFR SERPLBLD CREATININE-BSD FMLA CKD-EPI: >60 ML/MIN/{1.73_M2}
GLUCOSE BLD-MCNC: 105 MG/DL (ref 70–99)
GLUCOSE BLD-MCNC: 155 MG/DL (ref 70–99)
GLUCOSE BLD-MCNC: 160 MG/DL (ref 70–99)
GLUCOSE BLD-MCNC: 192 MG/DL (ref 70–99)
GLUCOSE SERPL-MCNC: 134 MG/DL (ref 70–99)
HCT VFR BLD AUTO: 28.7 % (ref 40.5–52.5)
HGB BLD-MCNC: 9.7 G/DL (ref 13.5–17.5)
LOEFFLER MB STN SPEC: NORMAL
LOEFFLER MB STN SPEC: NORMAL
LYMPHOCYTES # BLD: 0.6 K/UL (ref 1–5.1)
LYMPHOCYTES NFR BLD: 11.4 %
MAGNESIUM SERPL-MCNC: 1.9 MG/DL (ref 1.8–2.4)
MCH RBC QN AUTO: 33.3 PG (ref 26–34)
MCHC RBC AUTO-ENTMCNC: 34 G/DL (ref 31–36)
MCV RBC AUTO: 97.9 FL (ref 80–100)
MONOCYTES # BLD: 0.3 K/UL (ref 0–1.3)
MONOCYTES NFR BLD: 4.7 %
NEUTROPHILS # BLD: 4.5 K/UL (ref 1.7–7.7)
NEUTROPHILS NFR BLD: 80.3 %
PERFORMED ON: ABNORMAL
PHOSPHATE SERPL-MCNC: 4 MG/DL (ref 2.5–4.9)
PLATELET # BLD AUTO: 321 K/UL (ref 135–450)
PMV BLD AUTO: 6.6 FL (ref 5–10.5)
POTASSIUM SERPL-SCNC: 3.8 MMOL/L (ref 3.5–5.1)
RBC # BLD AUTO: 2.93 M/UL (ref 4.2–5.9)
SODIUM SERPL-SCNC: 140 MMOL/L (ref 136–145)
WBC # BLD AUTO: 5.6 K/UL (ref 4–11)

## 2023-07-03 PROCEDURE — 97110 THERAPEUTIC EXERCISES: CPT

## 2023-07-03 PROCEDURE — 2580000003 HC RX 258: Performed by: PHYSICAL MEDICINE & REHABILITATION

## 2023-07-03 PROCEDURE — 6370000000 HC RX 637 (ALT 250 FOR IP): Performed by: NURSE PRACTITIONER

## 2023-07-03 PROCEDURE — 97535 SELF CARE MNGMENT TRAINING: CPT

## 2023-07-03 PROCEDURE — 6370000000 HC RX 637 (ALT 250 FOR IP)

## 2023-07-03 PROCEDURE — C9113 INJ PANTOPRAZOLE SODIUM, VIA: HCPCS | Performed by: PHYSICAL MEDICINE & REHABILITATION

## 2023-07-03 PROCEDURE — 36415 COLL VENOUS BLD VENIPUNCTURE: CPT

## 2023-07-03 PROCEDURE — 1280000000 HC REHAB R&B

## 2023-07-03 PROCEDURE — 84100 ASSAY OF PHOSPHORUS: CPT

## 2023-07-03 PROCEDURE — 6370000000 HC RX 637 (ALT 250 FOR IP): Performed by: STUDENT IN AN ORGANIZED HEALTH CARE EDUCATION/TRAINING PROGRAM

## 2023-07-03 PROCEDURE — 97116 GAIT TRAINING THERAPY: CPT

## 2023-07-03 PROCEDURE — 94640 AIRWAY INHALATION TREATMENT: CPT

## 2023-07-03 PROCEDURE — 94761 N-INVAS EAR/PLS OXIMETRY MLT: CPT

## 2023-07-03 PROCEDURE — 6360000002 HC RX W HCPCS: Performed by: NURSE PRACTITIONER

## 2023-07-03 PROCEDURE — 97530 THERAPEUTIC ACTIVITIES: CPT

## 2023-07-03 PROCEDURE — 6360000002 HC RX W HCPCS: Performed by: STUDENT IN AN ORGANIZED HEALTH CARE EDUCATION/TRAINING PROGRAM

## 2023-07-03 PROCEDURE — A4216 STERILE WATER/SALINE, 10 ML: HCPCS | Performed by: PHYSICAL MEDICINE & REHABILITATION

## 2023-07-03 PROCEDURE — 80048 BASIC METABOLIC PNL TOTAL CA: CPT

## 2023-07-03 PROCEDURE — 6360000002 HC RX W HCPCS: Performed by: PHYSICAL MEDICINE & REHABILITATION

## 2023-07-03 PROCEDURE — 6370000000 HC RX 637 (ALT 250 FOR IP): Performed by: PHYSICAL MEDICINE & REHABILITATION

## 2023-07-03 PROCEDURE — 85025 COMPLETE CBC W/AUTO DIFF WBC: CPT

## 2023-07-03 PROCEDURE — 83735 ASSAY OF MAGNESIUM: CPT

## 2023-07-03 RX ORDER — BISACODYL 10 MG
10 SUPPOSITORY, RECTAL RECTAL DAILY PRN
Status: DISCONTINUED | OUTPATIENT
Start: 2023-07-03 | End: 2023-07-04 | Stop reason: HOSPADM

## 2023-07-03 RX ORDER — CALCIUM POLYCARBOPHIL 625 MG 625 MG/1
625 TABLET ORAL DAILY
Refills: 4 | COMMUNITY
Start: 2023-07-03

## 2023-07-03 RX ORDER — METHOCARBAMOL 500 MG/1
500 TABLET, FILM COATED ORAL 4 TIMES DAILY
Qty: 40 TABLET | Refills: 0 | Status: SHIPPED | OUTPATIENT
Start: 2023-07-03 | End: 2023-07-13

## 2023-07-03 RX ORDER — PANTOPRAZOLE SODIUM 40 MG/1
40 TABLET, DELAYED RELEASE ORAL
Qty: 90 TABLET | Refills: 1 | Status: SHIPPED | OUTPATIENT
Start: 2023-07-03

## 2023-07-03 RX ORDER — POLYETHYLENE GLYCOL 3350 17 G/17G
17 POWDER, FOR SOLUTION ORAL DAILY
Status: DISCONTINUED | OUTPATIENT
Start: 2023-07-03 | End: 2023-07-04 | Stop reason: HOSPADM

## 2023-07-03 RX ORDER — MAGNESIUM SULFATE IN WATER 40 MG/ML
2000 INJECTION, SOLUTION INTRAVENOUS ONCE
Status: COMPLETED | OUTPATIENT
Start: 2023-07-03 | End: 2023-07-03

## 2023-07-03 RX ORDER — OXYCODONE HCL 5 MG/5 ML
5 SOLUTION, ORAL ORAL EVERY 4 HOURS PRN
Qty: 120 ML | Refills: 0 | Status: SHIPPED | OUTPATIENT
Start: 2023-07-03 | End: 2023-07-08

## 2023-07-03 RX ORDER — POLYETHYLENE GLYCOL 3350 17 G/17G
17 POWDER, FOR SOLUTION ORAL DAILY
Qty: 527 G | Refills: 1 | Status: SHIPPED | OUTPATIENT
Start: 2023-07-04 | End: 2023-09-04

## 2023-07-03 RX ORDER — CETIRIZINE HYDROCHLORIDE 5 MG/1
5 TABLET ORAL DAILY
Qty: 90 TABLET | Refills: 1 | Status: SHIPPED | OUTPATIENT
Start: 2023-07-04

## 2023-07-03 RX ORDER — GUAIFENESIN 600 MG/1
600 TABLET, EXTENDED RELEASE ORAL 2 TIMES DAILY
Qty: 60 TABLET | Refills: 0 | Status: SHIPPED | OUTPATIENT
Start: 2023-07-03

## 2023-07-03 RX ORDER — DOCUSATE SODIUM 100 MG/1
100 CAPSULE, LIQUID FILLED ORAL 2 TIMES DAILY
Status: DISCONTINUED | OUTPATIENT
Start: 2023-07-03 | End: 2023-07-04 | Stop reason: HOSPADM

## 2023-07-03 RX ORDER — LIDOCAINE 4 G/G
1 PATCH TOPICAL DAILY
Qty: 3 EACH | Refills: 1 | Status: SHIPPED | OUTPATIENT
Start: 2023-07-04

## 2023-07-03 RX ORDER — PSEUDOEPHEDRINE HCL 30 MG
100 TABLET ORAL 2 TIMES DAILY
Qty: 60 CAPSULE | Refills: 0 | Status: SHIPPED | OUTPATIENT
Start: 2023-07-03

## 2023-07-03 RX ADMIN — SODIUM CHLORIDE, PRESERVATIVE FREE 10 ML: 5 INJECTION INTRAVENOUS at 20:17

## 2023-07-03 RX ADMIN — ALBUTEROL SULFATE 2.5 MG: 2.5 SOLUTION RESPIRATORY (INHALATION) at 06:31

## 2023-07-03 RX ADMIN — ACETAMINOPHEN 650 MG: 650 SOLUTION ORAL at 21:33

## 2023-07-03 RX ADMIN — METHOCARBAMOL 500 MG: 500 TABLET ORAL at 17:51

## 2023-07-03 RX ADMIN — ACETAMINOPHEN 650 MG: 650 SOLUTION ORAL at 04:18

## 2023-07-03 RX ADMIN — ALBUTEROL SULFATE 2.5 MG: 2.5 SOLUTION RESPIRATORY (INHALATION) at 23:25

## 2023-07-03 RX ADMIN — CETIRIZINE HYDROCHLORIDE 5 MG: 10 TABLET, FILM COATED ORAL at 08:42

## 2023-07-03 RX ADMIN — BISACODYL 5 MG: 5 TABLET, COATED ORAL at 08:42

## 2023-07-03 RX ADMIN — METOCLOPRAMIDE HYDROCHLORIDE 10 MG: 5 INJECTION INTRAMUSCULAR; INTRAVENOUS at 20:17

## 2023-07-03 RX ADMIN — POLYETHYLENE GLYCOL 3350 17 G: 17 POWDER, FOR SOLUTION ORAL at 11:16

## 2023-07-03 RX ADMIN — SODIUM CHLORIDE, PRESERVATIVE FREE 10 ML: 5 INJECTION INTRAVENOUS at 15:24

## 2023-07-03 RX ADMIN — DOCUSATE SODIUM 100 MG: 100 CAPSULE, LIQUID FILLED ORAL at 11:17

## 2023-07-03 RX ADMIN — INSULIN HUMAN 5 UNITS: 100 INJECTION, SOLUTION PARENTERAL at 09:07

## 2023-07-03 RX ADMIN — ACETAMINOPHEN 650 MG: 650 SOLUTION ORAL at 11:16

## 2023-07-03 RX ADMIN — GUAIFENESIN 600 MG: 600 TABLET ORAL at 08:42

## 2023-07-03 RX ADMIN — METHOCARBAMOL 500 MG: 500 TABLET ORAL at 08:43

## 2023-07-03 RX ADMIN — HUMAN INSULIN 10 UNITS: 100 INJECTION, SUSPENSION SUBCUTANEOUS at 08:41

## 2023-07-03 RX ADMIN — DOCUSATE SODIUM 100 MG: 100 CAPSULE, LIQUID FILLED ORAL at 20:18

## 2023-07-03 RX ADMIN — MAGNESIUM SULFATE HEPTAHYDRATE 2000 MG: 40 INJECTION, SOLUTION INTRAVENOUS at 11:55

## 2023-07-03 RX ADMIN — METHOCARBAMOL 500 MG: 500 TABLET ORAL at 20:17

## 2023-07-03 RX ADMIN — GUAIFENESIN 600 MG: 600 TABLET ORAL at 20:18

## 2023-07-03 RX ADMIN — INSULIN HUMAN 5 UNITS: 100 INJECTION, SOLUTION PARENTERAL at 17:45

## 2023-07-03 RX ADMIN — METHOCARBAMOL 500 MG: 500 TABLET ORAL at 15:23

## 2023-07-03 RX ADMIN — ENOXAPARIN SODIUM 40 MG: 100 INJECTION SUBCUTANEOUS at 20:17

## 2023-07-03 RX ADMIN — CALCIUM POLYCARBOPHIL 625 MG: 625 TABLET ORAL at 08:42

## 2023-07-03 RX ADMIN — SODIUM CHLORIDE, PRESERVATIVE FREE 10 ML: 5 INJECTION INTRAVENOUS at 11:57

## 2023-07-03 RX ADMIN — SODIUM CHLORIDE, PRESERVATIVE FREE 40 MG: 5 INJECTION INTRAVENOUS at 08:44

## 2023-07-03 RX ADMIN — METOCLOPRAMIDE HYDROCHLORIDE 10 MG: 5 INJECTION INTRAMUSCULAR; INTRAVENOUS at 15:23

## 2023-07-03 RX ADMIN — METOCLOPRAMIDE HYDROCHLORIDE 10 MG: 5 INJECTION INTRAMUSCULAR; INTRAVENOUS at 04:19

## 2023-07-03 RX ADMIN — METOCLOPRAMIDE HYDROCHLORIDE 10 MG: 5 INJECTION INTRAMUSCULAR; INTRAVENOUS at 08:44

## 2023-07-03 RX ADMIN — POTASSIUM BICARBONATE 20 MEQ: 782 TABLET, EFFERVESCENT ORAL at 11:56

## 2023-07-03 RX ADMIN — ACETAMINOPHEN 650 MG: 650 SOLUTION ORAL at 17:51

## 2023-07-03 RX ADMIN — ALBUTEROL SULFATE 2.5 MG: 2.5 SOLUTION RESPIRATORY (INHALATION) at 15:27

## 2023-07-03 ASSESSMENT — PAIN DESCRIPTION - LOCATION
LOCATION: ABDOMEN
LOCATION: BACK;ABDOMEN
LOCATION: ABDOMEN
LOCATION: BACK
LOCATION: ABDOMEN
LOCATION: ABDOMEN;BACK

## 2023-07-03 ASSESSMENT — PAIN DESCRIPTION - DESCRIPTORS
DESCRIPTORS: STABBING
DESCRIPTORS: DISCOMFORT
DESCRIPTORS: STABBING
DESCRIPTORS: PRESSURE;CRAMPING
DESCRIPTORS: STABBING

## 2023-07-03 ASSESSMENT — PAIN SCALES - GENERAL
PAINLEVEL_OUTOF10: 0
PAINLEVEL_OUTOF10: 2
PAINLEVEL_OUTOF10: 0
PAINLEVEL_OUTOF10: 3
PAINLEVEL_OUTOF10: 1
PAINLEVEL_OUTOF10: 2
PAINLEVEL_OUTOF10: 1
PAINLEVEL_OUTOF10: 1
PAINLEVEL_OUTOF10: 2
PAINLEVEL_OUTOF10: 2

## 2023-07-03 ASSESSMENT — PAIN DESCRIPTION - ORIENTATION
ORIENTATION: LOWER
ORIENTATION: MID;LOWER

## 2023-07-03 ASSESSMENT — PAIN SCALES - WONG BAKER: WONGBAKER_NUMERICALRESPONSE: 0

## 2023-07-03 ASSESSMENT — PAIN DESCRIPTION - ONSET: ONSET: ON-GOING

## 2023-07-03 ASSESSMENT — PAIN DESCRIPTION - PAIN TYPE: TYPE: ACUTE PAIN

## 2023-07-03 ASSESSMENT — PAIN DESCRIPTION - FREQUENCY: FREQUENCY: INTERMITTENT

## 2023-07-03 ASSESSMENT — PAIN - FUNCTIONAL ASSESSMENT: PAIN_FUNCTIONAL_ASSESSMENT: ACTIVITIES ARE NOT PREVENTED

## 2023-07-03 NOTE — PATIENT CARE CONFERENCE
Inpatient Rehabilitation  Weekly Team Conference Note  The [address] [place] [address]  [phone]  Patient Name: Grayson Laguerre        MRN: [id number]    : 1950  (67 y.o.)  Gender: male   Referring Practitioner: DO Nikolay  Diagnosis: cholangiocarcinoma  The team conference for this patient was held on 7/3/2023 at 10:30am by:  Mark Murray.  DO Nikolay    Current/Goal QM SCORES  QM Current/Goal Score   Eating CARE Score: 6 / Discharge Goal: Independent   Oral Hygiene CARE Score: 4 / Discharge Goal: Independent   Shower/Bathing CARE Score: 3 / Discharge Goal: Independent   UB Dressing CARE Score: 4 / Discharge Goal: Independent   LB Dressing CARE Score: 3 / Discharge Goal: Independent   Putting on/off Footwear CARE Score: 5 / Discharge Goal: Independent   Toileting Hygiene CARE Score: 6 / Discharge Goal: Independent   Bladder Continence Bladder Continence: Always continent    Bowel Continence Bowel Continence: Not rated    Toilet Transfers CARE Score: 4 / Discharge Goal: Independent   Shower/Bathe Self  CARE Score: 3 / Discharge Goal: Independent   Rolling Left and Right CARE Score: 6 / Discharge Goal: Independent   Sit to Lying CARE Score: 4 / Discharge Goal: Independent   Lying to Sitting on Bedside CARE Score: 4 / Discharge Goal: Independent   Sit to Stand CARE Score: 4 / Discharge Goal: Independent   Chair/Bed to Chair Transfer CARE Score: 4 / Discharge Goal: Independent   Car Transfers CARE Score: 4 / Discharge Goal: Independent   Walk 10 Feet CARE Score: 4 / Discharge Goal: Independent   Walk 50 Feet with Two Turns CARE Score: 4 / Discharge Goal: Independent   Walk 150 Feet CARE Score: 4 / Discharge Goal: Independent   Walk 10 Feet on Uneven Surfaces CARE Score: 4 / Discharge Goal: Independent   1 Step (Curb) CARE Score: 4 / Discharge Goal: Independent   4 Steps CARE Score: 4 / Discharge Goal: Independent   12 Steps CARE Score: 88 / Discharge Goal:

## 2023-07-03 NOTE — PLAN OF CARE
Problem: Discharge Planning  Goal: Discharge to home or other facility with appropriate resources  Outcome: Progressing  Flowsheets (Taken 7/3/2023 0359)  Discharge to home or other facility with appropriate resources:   Identify barriers to discharge with patient and caregiver   Identify discharge learning needs (meds, wound care, etc)     Problem: Pain  Goal: Verbalizes/displays adequate comfort level or baseline comfort level  Outcome: Progressing  Flowsheets (Taken 7/3/2023 0359)  Verbalizes/displays adequate comfort level or baseline comfort level:   Encourage patient to monitor pain and request assistance   Assess pain using appropriate pain scale   Administer analgesics based on type and severity of pain and evaluate response   Implement non-pharmacological measures as appropriate and evaluate response     Problem: Skin/Tissue Integrity  Goal: Absence of new skin breakdown  Outcome: Progressing  Note: Skin warm and dry. Abdominal incision well approximated. Staples intact. Lap sited open to air. Buttock reddened. Patient encouraged to turn and reposition self frequently. No new skin issues.

## 2023-07-03 NOTE — DISCHARGE INSTR - COC
Continuity of Care Form    Patient Name: Ben Barrios   :  1950  MRN:  [id number]    Admit date:  2023  Discharge date:  2023    Code Status Order: Full Code   Advance Directives:     Admitting Physician:  Sandra Butler DO  PCP: Migdalia Stephens MD    Discharging Nurse: [facility] Unit/Room#: 9987/6293-38  Discharging Unit Phone Number:     Emergency Contact:   Extended Emergency Contact Information  Primary Emergency Contact: Christa Rdoriguez   Address: [address] [place] [address], [address] [place] [address] [address] Phone: [phone]  Mobile Phone: [phone]  Relation: Spouse   needed?  No    Past Surgical History:  Past Surgical History:   Procedure Laterality Date    CHOLECYSTECTOMY      CYST REMOVAL      ERCP N/A 2022    ERCP BIOPSY performed by Michele Richard MD at [address]    ERCP  2022    ERCP SPHINCTER/PAPILLOTOMY performed by Michele Richard MD at [address] [facility]    ERCP  2022    ERCP STENT INSERTION performed by Michele Richard MD at [address]    ERCP N/A 2022    ERCP STENT REMOVAL performed by Michele Richard MD at [address] [facility] [address]    ERCP  2022    ERCP STENT INSERTION performed by Michele Richard MD at [address] [facility] [address] [address]    ERCP  2022    ERCP BIOPSY performed by Michele Richard MD at [address]    ERCP N/A 2022    ERCP STENT INSERTION performed by Michele Richard MD at [address] [facility] [address]    ERCP  2022    ERCP ENDOSCOPIC RETROGRADE CHOLANGIOPANCREATOGRAPHY DIRECT VISUALIZATION performed by Michele Richard MD at [address]    ERCP N/A 2022    ERCP BILIARY BRUSHING performed by Michele Richard MD at [address]    ERCP  2022    ERCP STENT INSERTION performed by Michele Richard MD at [address]    ERCP N/A 2023

## 2023-07-03 NOTE — CARE COORDINATION
Case Management Assessment            Discharge Note                    Date / Time of Note: 7/3/2023 4:32 PM                  Discharge Note Completed by: ROYAL Bravo    Patient Name: Ej Shen   YOB: 1950  Diagnosis: Debility [R53.81]   Date / Time: 6/28/2023  6:57 PM    Current PCP: Tray Weber MD  Clinic patient: No    Hospitalization in the last 30 days: Yes       Advance Directives:  Code Status: Full Code  [place] DNR form completed and on chart: Not Indicated    Financial:  Payor: MEDICARE / Plan: MEDICARE PART A AND B / Product Type: *No Product type* /      Pharmacy:    [address], [address]  [address] [facility] [address] [facility]  Phone: [phone] Fax: [phone]      Assistance purchasing medications?: Potential Assistance Purchasing Medications: No  Assistance provided by Case Management: Community Prescription Assistance Programs and None at this time    Does patient want to participate in local refill/ meds to beds program?:      Meds To appssavvy Rules:  1. Can ONLY be done Monday- Friday between 8:30am-5pm  2. Prescription(s) must be in pharmacy by 3pm to be filled same day  3. Copy of patient's insurance/ prescription drug card and patient face sheet must be sent along with the prescription(s)  4. Cost of Rx cannot be added to hospital bill. If financial assistance is needed, please contact unit  or ;  or  CANNOT provide pharmacy voucher for patients co-pays  5.  Patients can then  the prescription on their way out of the hospital at discharge, or pharmacy can deliver to the bedside if staff is available. (payment due at time of pick-up or delivery - cash, check, or card accepted)     Able to afford home medications/ co-pay costs: Yes    ADLS:  Current PT AM-PAC Score:   /24  Current OT AM-PAC Score:

## 2023-07-04 VITALS
OXYGEN SATURATION: 96 % | TEMPERATURE: 97.6 F | BODY MASS INDEX: 23.52 KG/M2 | RESPIRATION RATE: 16 BRPM | SYSTOLIC BLOOD PRESSURE: 138 MMHG | HEART RATE: 81 BPM | HEIGHT: 71 IN | DIASTOLIC BLOOD PRESSURE: 78 MMHG | WEIGHT: 167.99 LBS

## 2023-07-04 LAB
GLUCOSE BLD-MCNC: 140 MG/DL (ref 70–99)
GLUCOSE BLD-MCNC: 159 MG/DL (ref 70–99)
PERFORMED ON: ABNORMAL
PERFORMED ON: ABNORMAL

## 2023-07-04 PROCEDURE — 94640 AIRWAY INHALATION TREATMENT: CPT

## 2023-07-04 PROCEDURE — 6360000002 HC RX W HCPCS: Performed by: STUDENT IN AN ORGANIZED HEALTH CARE EDUCATION/TRAINING PROGRAM

## 2023-07-04 PROCEDURE — A4216 STERILE WATER/SALINE, 10 ML: HCPCS | Performed by: PHYSICAL MEDICINE & REHABILITATION

## 2023-07-04 PROCEDURE — 6360000002 HC RX W HCPCS: Performed by: PHYSICAL MEDICINE & REHABILITATION

## 2023-07-04 PROCEDURE — 6370000000 HC RX 637 (ALT 250 FOR IP)

## 2023-07-04 PROCEDURE — 94761 N-INVAS EAR/PLS OXIMETRY MLT: CPT

## 2023-07-04 PROCEDURE — 6370000000 HC RX 637 (ALT 250 FOR IP): Performed by: PHYSICAL MEDICINE & REHABILITATION

## 2023-07-04 PROCEDURE — C9113 INJ PANTOPRAZOLE SODIUM, VIA: HCPCS | Performed by: PHYSICAL MEDICINE & REHABILITATION

## 2023-07-04 PROCEDURE — 2580000003 HC RX 258: Performed by: PHYSICAL MEDICINE & REHABILITATION

## 2023-07-04 PROCEDURE — 6370000000 HC RX 637 (ALT 250 FOR IP): Performed by: STUDENT IN AN ORGANIZED HEALTH CARE EDUCATION/TRAINING PROGRAM

## 2023-07-04 RX ORDER — DOCUSATE SODIUM 100 MG/1
100 CAPSULE, LIQUID FILLED ORAL 2 TIMES DAILY PRN
Qty: 60 CAPSULE | Refills: 0 | Status: SHIPPED | OUTPATIENT
Start: 2023-07-04 | End: 2023-08-03

## 2023-07-04 RX ORDER — METOCLOPRAMIDE 5 MG/1
5 TABLET ORAL 3 TIMES DAILY
Qty: 90 TABLET | Refills: 0 | Status: SHIPPED | OUTPATIENT
Start: 2023-07-04 | End: 2023-08-03

## 2023-07-04 RX ADMIN — POLYETHYLENE GLYCOL 3350 17 G: 17 POWDER, FOR SOLUTION ORAL at 08:22

## 2023-07-04 RX ADMIN — ALBUTEROL SULFATE 2.5 MG: 2.5 SOLUTION RESPIRATORY (INHALATION) at 06:55

## 2023-07-04 RX ADMIN — GUAIFENESIN 600 MG: 600 TABLET ORAL at 08:22

## 2023-07-04 RX ADMIN — SODIUM CHLORIDE, PRESERVATIVE FREE 10 ML: 5 INJECTION INTRAVENOUS at 08:23

## 2023-07-04 RX ADMIN — ACETAMINOPHEN 650 MG: 650 SOLUTION ORAL at 03:59

## 2023-07-04 RX ADMIN — INSULIN HUMAN 2 UNITS: 100 INJECTION, SOLUTION PARENTERAL at 11:16

## 2023-07-04 RX ADMIN — BISACODYL 5 MG: 5 TABLET, COATED ORAL at 08:22

## 2023-07-04 RX ADMIN — CETIRIZINE HYDROCHLORIDE 5 MG: 10 TABLET, FILM COATED ORAL at 08:22

## 2023-07-04 RX ADMIN — METOCLOPRAMIDE HYDROCHLORIDE 10 MG: 5 INJECTION INTRAMUSCULAR; INTRAVENOUS at 03:59

## 2023-07-04 RX ADMIN — INSULIN HUMAN 5 UNITS: 100 INJECTION, SOLUTION PARENTERAL at 08:21

## 2023-07-04 RX ADMIN — ACETAMINOPHEN 650 MG: 650 SOLUTION ORAL at 11:05

## 2023-07-04 RX ADMIN — METHOCARBAMOL 500 MG: 500 TABLET ORAL at 08:22

## 2023-07-04 RX ADMIN — DOCUSATE SODIUM 100 MG: 100 CAPSULE, LIQUID FILLED ORAL at 08:22

## 2023-07-04 RX ADMIN — SODIUM CHLORIDE, PRESERVATIVE FREE 40 MG: 5 INJECTION INTRAVENOUS at 08:23

## 2023-07-04 RX ADMIN — HUMAN INSULIN 10 UNITS: 100 INJECTION, SUSPENSION SUBCUTANEOUS at 08:21

## 2023-07-04 RX ADMIN — CALCIUM POLYCARBOPHIL 625 MG: 625 TABLET ORAL at 08:22

## 2023-07-04 RX ADMIN — METOCLOPRAMIDE HYDROCHLORIDE 10 MG: 5 INJECTION INTRAMUSCULAR; INTRAVENOUS at 08:23

## 2023-07-04 ASSESSMENT — PAIN SCALES - GENERAL
PAINLEVEL_OUTOF10: 0
PAINLEVEL_OUTOF10: 0
PAINLEVEL_OUTOF10: 3
PAINLEVEL_OUTOF10: 0

## 2023-07-04 ASSESSMENT — PAIN DESCRIPTION - LOCATION: LOCATION: BACK

## 2023-07-04 ASSESSMENT — PAIN DESCRIPTION - FREQUENCY: FREQUENCY: CONTINUOUS

## 2023-07-04 ASSESSMENT — PAIN DESCRIPTION - ONSET: ONSET: ON-GOING

## 2023-07-04 ASSESSMENT — PAIN SCALES - WONG BAKER
WONGBAKER_NUMERICALRESPONSE: 0
WONGBAKER_NUMERICALRESPONSE: 0

## 2023-07-04 ASSESSMENT — PAIN DESCRIPTION - ORIENTATION: ORIENTATION: MID

## 2023-07-04 ASSESSMENT — PAIN DESCRIPTION - PAIN TYPE: TYPE: CHRONIC PAIN

## 2023-07-04 ASSESSMENT — PAIN DESCRIPTION - DESCRIPTORS: DESCRIPTORS: DISCOMFORT

## 2023-07-04 ASSESSMENT — PAIN - FUNCTIONAL ASSESSMENT: PAIN_FUNCTIONAL_ASSESSMENT: ACTIVITIES ARE NOT PREVENTED

## 2023-07-04 NOTE — PLAN OF CARE
Problem: Discharge Planning  Goal: Discharge to home or other facility with appropriate resources  Outcome: Progressing     Problem: Safety - Adult  Goal: Free from fall injury  Outcome: Progressing  Flowsheets (Taken 7/3/2023 2336)  Free From Fall Injury: Instruct family/caregiver on patient safety     Problem: Pain  Goal: Verbalizes/displays adequate comfort level or baseline comfort level  Outcome: Progressing  Flowsheets (Taken 7/3/2023 2000)  Verbalizes/displays adequate comfort level or baseline comfort level:   Encourage patient to monitor pain and request assistance   Assess pain using appropriate pain scale   Administer analgesics based on type and severity of pain and evaluate response     Problem: ABCDS Injury Assessment  Goal: Absence of physical injury  Outcome: Progressing     Problem: Nutrition Deficit:  Goal: Optimize nutritional status  Outcome: Progressing  Flowsheets (Taken 7/3/2023 1040 by Maurice Reddy RD)  Nutrient intake appropriate for improving, restoring, or maintaining nutritional needs:   Monitor oral intake, labs, and treatment plans   Recommend appropriate diets, oral nutritional supplements, and vitamin/mineral supplements     Problem: Skin/Tissue Integrity  Goal: Absence of new skin breakdown  Description: 1. Monitor for areas of redness and/or skin breakdown  2. Assess vascular access sites hourly  3. Every 4-6 hours minimum:  Change oxygen saturation probe site  4. Every 4-6 hours:  If on nasal continuous positive airway pressure, respiratory therapy assess nares and determine need for appliance change or resting period.   Outcome: Progressing

## 2023-07-04 NOTE — DISCHARGE INSTRUCTIONS
Your information:  Name: Sameera Jackson  : 1950    Your instructions: The following personal items were collected during your admission and were returned to you:    Belongings  Dental Appliances: None  Vision - Corrective Lenses: Eyeglasses  Hearing Aid: None  Clothing: Footwear, Pants, Shirt, Socks, Undergarments  Jewelry: None  Electronic Devices: Cell Phone,   Weapons (Notify Protective Services/Security): None  Home Medications: None  Valuables Given To: Patient  Provide Name(s) of Who Valuable(s) Were Given To: na      Surgical Oncology Discharge Instructions:    Discharge Instructions:    Diet:   Continue to eat as we discussed - very small meals with softer/easily digestible foods multiple times per day. You can then sip on protein drinks in-beween meals. If you have increasing bloating or cramping try to eat more full liquids or clear liquids. Wound Care: Your staples were removed and steri-strips are in place. These will peel of on their own. You can trim the edges if they start to peel up. You can shower, no soaking or swimming for another 2-4 weeks. You can continue to change the dressing over your old drain site as need. Activity:   No heavy lifting greater than a milk jug for 6 weeks. Medications: Take your Reglan as prescribed and the bowel regimen ( Colace, Miralax). Pain management:   Unless informed of any restrictions by your primary care physician, please use your preferred over-the-counter pain reliever (Tylenol or Ibuprofen) as your primary pain medication. If you have pain that persists despite over-the-counter pain medications, you have been provided with a prescription for an opioid/narcotic pain reliever (Roxicodone). No driving or operating machinery while taking opioid/narcotic medications.      Bowel Regimen:   Opioid/Narcotic pain relievers have a common side effect of constipation; therefore, you have been provided with a prescription for a stool

## 2023-07-04 NOTE — PLAN OF CARE
Problem: Discharge Planning  Goal: Discharge to home or other facility with appropriate resources  7/4/2023 0954 by Young Spears RN  Outcome: Completed  7/3/2023 2339 by Jeiosn Lauren RN  Outcome: Progressing     Problem: Safety - Adult  Goal: Free from fall injury  7/4/2023 0954 by Young Spears RN  Outcome: Completed  7/3/2023 2339 by Jeison Lauren RN  Outcome: Progressing  Flowsheets (Taken 7/3/2023 2336)  Free From Fall Injury: Instruct family/caregiver on patient safety     Problem: Pain  Goal: Verbalizes/displays adequate comfort level or baseline comfort level  7/4/2023 0954 by Young Spears RN  Outcome: Completed  7/3/2023 2339 by Jeison Lauren RN  Outcome: Progressing  Flowsheets (Taken 7/3/2023 2000)  Verbalizes/displays adequate comfort level or baseline comfort level:   Encourage patient to monitor pain and request assistance   Assess pain using appropriate pain scale   Administer analgesics based on type and severity of pain and evaluate response     Problem: ABCDS Injury Assessment  Goal: Absence of physical injury  7/4/2023 0954 by Young Spears RN  Outcome: Completed  7/3/2023 2339 by Jeison Lauren RN  Outcome: Progressing     Problem: Nutrition Deficit:  Goal: Optimize nutritional status  7/4/2023 0954 by Young Spears RN  Outcome: Completed  7/3/2023 2339 by Jeison Lauren RN  Outcome: Progressing  Flowsheets (Taken 7/3/2023 1040 by Jovan Bro RD)  Nutrient intake appropriate for improving, restoring, or maintaining nutritional needs:   Monitor oral intake, labs, and treatment plans   Recommend appropriate diets, oral nutritional supplements, and vitamin/mineral supplements     Problem: Skin/Tissue Integrity  Goal: Absence of new skin breakdown  Description: 1. Monitor for areas of redness and/or skin breakdown  2. Assess vascular access sites hourly  3. Every 4-6 hours minimum:  Change oxygen saturation probe site  4.   Every 4-6 hours:  If on nasal continuous positive airway pressure,

## 2023-07-06 PROBLEM — Z01.810 ENCOUNTER FOR PRE-OPERATIVE CARDIOVASCULAR CLEARANCE: Status: RESOLVED | Noted: 2023-06-06 | Resolved: 2023-07-06

## 2023-07-07 ENCOUNTER — OFFICE VISIT (OUTPATIENT)
Dept: SURGERY | Age: 73
End: 2023-07-07

## 2023-07-07 VITALS
SYSTOLIC BLOOD PRESSURE: 151 MMHG | BODY MASS INDEX: 23.13 KG/M2 | WEIGHT: 165.2 LBS | HEIGHT: 71 IN | HEART RATE: 73 BPM | DIASTOLIC BLOOD PRESSURE: 88 MMHG | TEMPERATURE: 99 F

## 2023-07-07 DIAGNOSIS — C22.1 METASTATIC CHOLANGIOCARCINOMA TO BILE DUCT (HCC): Primary | ICD-10-CM

## 2023-07-07 DIAGNOSIS — Z09 POSTOP CHECK: ICD-10-CM

## 2023-07-07 DIAGNOSIS — C78.89 METASTATIC CHOLANGIOCARCINOMA TO BILE DUCT (HCC): Primary | ICD-10-CM

## 2023-07-07 PROCEDURE — 99024 POSTOP FOLLOW-UP VISIT: CPT | Performed by: NURSE PRACTITIONER

## 2023-07-10 LAB
FUNGUS SPEC CULT: NORMAL
FUNGUS SPEC CULT: NORMAL
LOEFFLER MB STN SPEC: NORMAL
LOEFFLER MB STN SPEC: NORMAL

## 2023-07-11 ENCOUNTER — TELEPHONE (OUTPATIENT)
Dept: SURGERY | Age: 73
End: 2023-07-11

## 2023-07-11 DIAGNOSIS — C24.0 BILE DUCT CANCER (HCC): ICD-10-CM

## 2023-07-11 DIAGNOSIS — C22.1 METASTATIC CHOLANGIOCARCINOMA TO BILE DUCT (HCC): Primary | ICD-10-CM

## 2023-07-11 DIAGNOSIS — C78.89 METASTATIC CHOLANGIOCARCINOMA TO BILE DUCT (HCC): Primary | ICD-10-CM

## 2023-07-11 NOTE — TELEPHONE ENCOUNTER
Patient called to update the team:    - He has an appt 7/12 with Dr. Deborah Spencer for radiation  - He has another appt 7/18 with Dr. Jennifer Stevens for oncology     Please call patient with next steps moving forward at [phone]

## 2023-07-14 NOTE — TELEPHONE ENCOUNTER
RN informed pt to keep appointment with Radiation and Medical Oncologist and to let us know when he needs his Port.

## 2023-07-19 ENCOUNTER — HOSPITAL ENCOUNTER (OUTPATIENT)
Age: 73
Discharge: HOME | End: 2023-07-19
Payer: MEDICARE

## 2023-07-19 LAB
25(OH)D3 SERPL-MCNC: 15.1 NG/ML
ALBUMIN SERPL-MCNC: 4 G/DL (ref 3.4–5)
ALBUMIN/GLOB SERPL: 1.6 {RATIO} (ref 1.1–2.2)
ALP SERPL-CCNC: 74 U/L (ref 40–129)
ALT SERPL-CCNC: 16 U/L (ref 10–40)
ANION GAP SERPL CALCULATED.3IONS-SCNC: 14 MMOL/L (ref 3–16)
AST SERPL-CCNC: 18 U/L (ref 15–37)
BASOPHILS # BLD: 0 K/UL (ref 0–0.2)
BASOPHILS NFR BLD: 0.3 %
BILIRUB SERPL-MCNC: 0.5 MG/DL (ref 0–1)
BILIRUB UR QL STRIP.AUTO: NEGATIVE
BUN SERPL-MCNC: 7 MG/DL (ref 7–20)
CALCIUM SERPL-MCNC: 9.4 MG/DL (ref 8.3–10.6)
CHLORIDE SERPL-SCNC: 104 MMOL/L (ref 99–110)
CLARITY UR: CLEAR
CO2 SERPL-SCNC: 25 MMOL/L (ref 21–32)
COLOR UR: YELLOW
CREAT SERPL-MCNC: <0.5 MG/DL (ref 0.8–1.3)
D DIMER: 1.43 UG/ML FEU (ref 0–0.6)
DEPRECATED RDW RBC AUTO: 14 % (ref 12.4–15.4)
EOSINOPHIL # BLD: 0.4 K/UL (ref 0–0.6)
EOSINOPHIL NFR BLD: 7.2 %
ERYTHROCYTE [SEDIMENTATION RATE] IN BLOOD BY WESTERGREN METHOD: 33 MM/HR (ref 0–20)
FOLATE SERPL-MCNC: 11.12 NG/ML (ref 4.78–24.2)
GFR SERPLBLD CREATININE-BSD FMLA CKD-EPI: >60 ML/MIN/{1.73_M2}
GLUCOSE SERPL-MCNC: 105 MG/DL (ref 70–99)
GLUCOSE UR STRIP.AUTO-MCNC: NEGATIVE MG/DL
HBV SURFACE AB SERPL IA-ACNC: <3.5 MIU/ML
HCT VFR BLD AUTO: 36.5 % (ref 40.5–52.5)
HCV AB SERPL QL IA: NORMAL
HGB BLD-MCNC: 12.5 G/DL (ref 13.5–17.5)
HGB UR QL STRIP.AUTO: NEGATIVE
INR PPP: 0.99 (ref 0.84–1.16)
KETONES UR STRIP.AUTO-MCNC: NEGATIVE MG/DL
LEUKOCYTE ESTERASE UR QL STRIP.AUTO: NEGATIVE
LYMPHOCYTES # BLD: 0.6 K/UL (ref 1–5.1)
LYMPHOCYTES NFR BLD: 11.8 %
MCH RBC QN AUTO: 32.8 PG (ref 26–34)
MCHC RBC AUTO-ENTMCNC: 34.1 G/DL (ref 31–36)
MCV RBC AUTO: 96.1 FL (ref 80–100)
MONOCYTES # BLD: 0.3 K/UL (ref 0–1.3)
MONOCYTES NFR BLD: 5 %
NEUTROPHILS # BLD: 3.9 K/UL (ref 1.7–7.7)
NEUTROPHILS NFR BLD: 75.7 %
NITRITE UR QL STRIP.AUTO: NEGATIVE
PH UR STRIP.AUTO: 7 [PH] (ref 5–8)
PLATELET # BLD AUTO: 175 K/UL (ref 135–450)
PMV BLD AUTO: 6.6 FL (ref 5–10.5)
POTASSIUM SERPL-SCNC: 4 MMOL/L (ref 3.5–5.1)
PROT UR STRIP.AUTO-MCNC: NEGATIVE MG/DL
PROTHROMBIN TIME: 13 SEC (ref 11.5–14.8)
RBC # BLD AUTO: 3.8 M/UL (ref 4.2–5.9)
SODIUM SERPL-SCNC: 143 MMOL/L (ref 136–145)
SP GR UR STRIP.AUTO: 1.01 (ref 1–1.03)
UA DIPSTICK W REFLEX MICRO PNL UR: NORMAL
URATE SERPL-MCNC: 7 MG/DL (ref 3.5–7.2)
URN SPEC COLLECT METH UR: NORMAL
UROBILINOGEN UR STRIP-ACNC: 0.2 E.U./DL
VIT B12 SERPL-MCNC: 272 PG/ML (ref 211–911)
WBC # BLD AUTO: 5.2 K/UL (ref 4–11)

## 2023-07-19 PROCEDURE — 80053 COMPREHEN METABOLIC PANEL: CPT

## 2023-07-19 PROCEDURE — 82728 ASSAY OF FERRITIN: CPT

## 2023-07-19 PROCEDURE — 84165 PROTEIN E-PHORESIS SERUM: CPT

## 2023-07-19 PROCEDURE — 87086 URINE CULTURE/COLONY COUNT: CPT

## 2023-07-19 PROCEDURE — 84155 ASSAY OF PROTEIN SERUM: CPT

## 2023-07-19 PROCEDURE — 82746 ASSAY OF FOLIC ACID SERUM: CPT

## 2023-07-19 PROCEDURE — 82306 VITAMIN D 25 HYDROXY: CPT

## 2023-07-19 PROCEDURE — 85652 RBC SED RATE AUTOMATED: CPT

## 2023-07-19 PROCEDURE — 86038 ANTINUCLEAR ANTIBODIES: CPT

## 2023-07-19 PROCEDURE — 86706 HEP B SURFACE ANTIBODY: CPT

## 2023-07-19 PROCEDURE — 82607 VITAMIN B-12: CPT

## 2023-07-19 PROCEDURE — 81003 URINALYSIS AUTO W/O SCOPE: CPT

## 2023-07-19 PROCEDURE — 36415 COLL VENOUS BLD VENIPUNCTURE: CPT

## 2023-07-19 PROCEDURE — 84443 ASSAY THYROID STIM HORMONE: CPT

## 2023-07-19 PROCEDURE — 84550 ASSAY OF BLOOD/URIC ACID: CPT

## 2023-07-19 PROCEDURE — 83883 ASSAY NEPHELOMETRY NOT SPEC: CPT

## 2023-07-19 PROCEDURE — 86803 HEPATITIS C AB TEST: CPT

## 2023-07-19 PROCEDURE — 86334 IMMUNOFIX E-PHORESIS SERUM: CPT

## 2023-07-19 PROCEDURE — 86301 IMMUNOASSAY TUMOR CA 19-9: CPT

## 2023-07-19 PROCEDURE — 84436 ASSAY OF TOTAL THYROXINE: CPT

## 2023-07-19 PROCEDURE — 83540 ASSAY OF IRON: CPT

## 2023-07-19 PROCEDURE — 84153 ASSAY OF PSA TOTAL: CPT

## 2023-07-19 PROCEDURE — 85025 COMPLETE CBC W/AUTO DIFF WBC: CPT

## 2023-07-19 PROCEDURE — 85610 PROTHROMBIN TIME: CPT

## 2023-07-19 PROCEDURE — 83550 IRON BINDING TEST: CPT

## 2023-07-19 PROCEDURE — 83615 LACTATE (LD) (LDH) ENZYME: CPT

## 2023-07-19 PROCEDURE — 85379 FIBRIN DEGRADATION QUANT: CPT

## 2023-07-19 PROCEDURE — 82378 CARCINOEMBRYONIC ANTIGEN: CPT

## 2023-07-20 LAB
ANA SER QL IA: NEGATIVE
BACTERIA UR CULT: NORMAL
CEA SERPL-MCNC: 1.1 NG/ML (ref 0–5)
FERRITIN SERPL IA-MCNC: 149.8 NG/ML (ref 30–400)
IRON SERPL-MCNC: 115 UG/DL (ref 59–158)
LDH SERPL L TO P-CCNC: 203 U/L (ref 100–190)
PSA SERPL DL<=0.01 NG/ML-MCNC: 1.64 NG/ML (ref 0–4)
T4 SERPL-MCNC: 8.1 UG/DL (ref 4.5–10.9)
TIBC SERPL-MCNC: 300 UG/DL (ref 260–445)
TSH SERPL DL<=0.005 MIU/L-ACNC: 1.89 UIU/ML (ref 0.27–4.2)

## 2023-07-21 ENCOUNTER — HOSPITAL ENCOUNTER (OUTPATIENT)
Dept: PET IMAGING | Age: 73
Discharge: HOME | End: 2023-07-21
Payer: MEDICARE

## 2023-07-21 DIAGNOSIS — C25.9 MALIGNANT NEOPLASM OF PANCREAS, UNSPECIFIED LOCATION OF MALIGNANCY (HCC): ICD-10-CM

## 2023-07-21 LAB
ALBUMIN SERPL ELPH-MCNC: 3 G/DL (ref 3.1–4.9)
ALPHA1 GLOB SERPL ELPH-MCNC: 0.3 G/DL (ref 0.2–0.4)
ALPHA2 GLOB SERPL ELPH-MCNC: 1 G/DL (ref 0.4–1.1)
B-GLOBULIN SERPL ELPH-MCNC: 1.1 G/DL (ref 0.9–1.6)
CANCER AG19-9 SERPL IA-ACNC: 12 U/ML (ref 0–35)
GAMMA GLOB SERPL ELPH-MCNC: 1.1 G/DL (ref 0.6–1.8)
KAPPA LC FREE SER-MCNC: 19.39 MG/L (ref 3.3–19.4)
KAPPA LC FREE/LAMBDA FREE SER: 1.29 {RATIO} (ref 0.26–1.65)
LAMBDA LC FREE SERPL-MCNC: 15.05 MG/L (ref 5.71–26.3)
PROT SERPL-MCNC: 6.5 G/DL (ref 6.4–8.2)
RPT COMMENT: NORMAL
SPE/IFE INTERPRETATION: NORMAL

## 2023-07-21 PROCEDURE — 78815 PET IMAGE W/CT SKULL-THIGH: CPT

## 2023-07-21 PROCEDURE — 3430000000 HC RX DIAGNOSTIC RADIOPHARMACEUTICAL: Performed by: INTERNAL MEDICINE

## 2023-07-21 PROCEDURE — A9552 F18 FDG: HCPCS | Performed by: INTERNAL MEDICINE

## 2023-07-21 RX ORDER — FLUDEOXYGLUCOSE F 18 200 MCI/ML
13.97 INJECTION, SOLUTION INTRAVENOUS
Status: COMPLETED | OUTPATIENT
Start: 2023-07-21 | End: 2023-07-21

## 2023-07-21 RX ADMIN — FLUDEOXYGLUCOSE F 18 13.97 MILLICURIE: 200 INJECTION, SOLUTION INTRAVENOUS at 10:25

## 2023-07-31 ENCOUNTER — TELEPHONE (OUTPATIENT)
Dept: SURGERY | Age: 73
End: 2023-07-31

## 2023-07-31 NOTE — TELEPHONE ENCOUNTER
Patient called with the following questions. *Does he need to continue taking Colace & Reglan? If he does he will need refills. *When can he restart taking Allopurinol, Gemfibrozil, Triamterene-hydochlorothiazide? *How long does he need to continue wearing the compression socks? *Does he need to keep the 8/15/23 appointment with his cardiologist?   *When will the port be put in for chemotherapy? *Do we have results of the PET scan from 7/21/32?     Please call: [phone]

## 2023-08-02 DIAGNOSIS — C78.89 METASTATIC CHOLANGIOCARCINOMA TO BILE DUCT (HCC): Primary | ICD-10-CM

## 2023-08-02 DIAGNOSIS — C22.1 METASTATIC CHOLANGIOCARCINOMA TO BILE DUCT (HCC): Primary | ICD-10-CM

## 2023-08-02 NOTE — TELEPHONE ENCOUNTER
Returned call to Slade mann. Discussed management of bowel regimen - OK to continue Colace or another OTC stool softener, may adjust dosage as needed. Will send refill of reglan as he feels this is helpful and would like to continue. OK to resume home meds. Discussed compression stockings - he can continue to use them but can take breaks PRN. If he experiences no swelling at all he can discontinue use. PET results discussed. Stable. Discussed port placement - he would like to begin chemo within the next several weeks. Feels he is getting stronger each day. Offered potential date 8/16- he states this works well for him. Will tentatively plan on 8/16 but will adjust if this doesn't work for him.

## 2023-08-03 ENCOUNTER — TELEPHONE (OUTPATIENT)
Dept: SURGERY | Age: 73
End: 2023-08-03

## 2023-08-03 NOTE — TELEPHONE ENCOUNTER
Patient called requesting a refill for Metoclopramide (Reglan) 5mg. Pharmacy verified. Please notify patient when complete.

## 2023-08-04 DIAGNOSIS — C78.89 METASTATIC CHOLANGIOCARCINOMA TO BILE DUCT (HCC): Primary | ICD-10-CM

## 2023-08-04 DIAGNOSIS — C22.1 METASTATIC CHOLANGIOCARCINOMA TO BILE DUCT (HCC): Primary | ICD-10-CM

## 2023-08-04 RX ORDER — METOCLOPRAMIDE 5 MG/1
5 TABLET ORAL 3 TIMES DAILY
Qty: 90 TABLET | Refills: 1 | Status: SHIPPED | OUTPATIENT
Start: 2023-08-04 | End: 2023-10-03

## 2023-08-04 NOTE — TELEPHONE ENCOUNTER
Refill sent to [address] [place] [address] in [facility]. Patient notified. Updated on port placement and arrival time - scheduled for 11:30 on 8/16, he will arrive at 9:30. Knows to be NPO after midnight.

## 2023-08-10 RX ORDER — GEMFIBROZIL 600 MG/1
600 TABLET, FILM COATED ORAL
COMMUNITY

## 2023-08-10 RX ORDER — HYDROCHLOROTHIAZIDE 25 MG/1
TABLET ORAL 2 TIMES DAILY
COMMUNITY

## 2023-08-10 RX ORDER — DICYCLOMINE HYDROCHLORIDE 10 MG/1
10 CAPSULE ORAL EVERY EVENING
COMMUNITY

## 2023-08-10 NOTE — PROGRESS NOTES
Place patient label inside box (if no patient label, complete below)  Name:  :  MR#:     Radha Daily / PROCEDURE  I (we), Jan Moya (Patient Name) authorize DR Star Santamaria (Provider / Danica Clayton) and/or such assistants as may be selected by him/her, to perform the following operation/procedure(s): PORT PLACEMENT       Note: If unable to obtain consent prior to an emergent procedure, document the emergent reason in the medical record. This procedure has been explained to my (our) satisfaction and included in the explanation was: The intended benefit, nature, and extent of the procedure to be performed; The significant risks involved and the probability of success; Alternative procedures and methods of treatment; The dangers and probable consequences of such alternatives (including no procedure or treatment); The expected consequences of the procedure on my future health; Whether other qualified individuals would be performing important surgical tasks and/or whether  would be present to advise or support the procedure. I (we) understand that there are other risks of infection and other serious complications in the pre-operative/procedural and postoperative/procedural stages of my (our) care. I (we) have asked all of the questions which I (we) thought were important in deciding whether or not to undergo treatment or diagnosis. These questions have been answered to my (our) satisfaction. I (we) understand that no assurance can be given that the procedure will be a success, and no guarantee or warranty of success has been given to me (us). It has been explained to me (us) that during the course of the operation/procedure, unforeseen conditions may be revealed that necessitate extension of the original procedure(s) or different procedure(s) than those set forth in Paragraph 1.  I (we) authorize and request that the ______________________________________________________________________________________________    If a phone consent is obtained, consent will be documented by using two health care professionals, each affirming that the consenting party has no questions and gives consent for the procedure discussed with the physician/provider.   _____________________          ____________________       _____/_____am/pm   2nd witness to phone consent        Printed name           Date / Time    Informed Consent:  I have provided the explanation described above in section 1 to the patient and/or legal representative.  I have provided the patient and/or legal representative with an opportunity to ask any questions about the proposed operation/procedure.   ___________________________          ____________________         ____/____am/pm  Provider / Proceduralist                            Printed name            Date / Time  Revised 8/2/2021                                                                      Page 2 of 2

## 2023-08-10 NOTE — PROGRESS NOTES
8/10 0945 ROUTED ALL ABN LABS FROM  TO [facility], [facility]-DG   8/10 1151 SPOKE W PAT AT DR FELICIANO'S OFFICE, REQ H&P-DG    PT HAS CARD APT DR LOZANO 8/15-DG     SENT TO ALEXX AT [facility]S OFFICE:  [11:57 AM] Emily Buchanan    ON R.D  50, SCHEDULED FOR ,HAS ALLERGY TO [place], PLEASE FAX NEW ANTIBIOTIC.  SAID HE HAD H&P T [facility] OFFICE AND APT W CARD DR Wilian Williamson 8/15, SO FAR WE DON'T HAVE H&P, FYI, SPOKE W PAT FROM THEIR OFFICE TO REQUEST.-DG [facility] RESPONDED WILL BE TAKEN CARE OF DOS-DG

## 2023-08-10 NOTE — PROGRESS NOTES
[facility] PRE-SURGICAL TESTING INSTRUCTIONS                      PRIOR TO PROCEDURE DATE:    1. PLEASE FOLLOW ANY INSTRUCTIONS GIVEN TO YOU PER YOUR SURGEON. 2. Arrange for someone to drive you home and be with you for the first 24 hours after discharge for your safety after your procedure for which you received sedation. Ensure it is someone we can share information with regarding your discharge. NOTE: At this time ONLY 2 ADULTS may accompany you   One person ENCOURAGED to stay at hospital entire time if outpatient surgery      3. You must contact your surgeon for instructions IF:  You are taking any blood thinners, aspirin, anti-inflammatory or vitamins. There is a change in your physical condition such as a cold, fever, rash, cuts, sores, or any other infection, especially near your surgical site. 4. Do not drink alcohol the day before or day of your procedure. Do not use any recreational marijuana at least 24 hours or street drugs (heroin, cocaine) at minimum 5 days prior to your procedure. 5. A Pre-Surgical History and Physical MUST be completed WITHIN 30 DAYS OR LESS prior to your procedure. by your Physician or an Urgent Care        THE DAY OF YOUR PROCEDURE:  1. Follow instructions for ARRIVAL TIME as DIRECTED BY YOUR SURGEON. 2. Enter the MAIN entrance from [employer] and follow the signs to the free Parking [employer] (offered free of charge 7 am-5pm). 3. Enter the Main Entrance of the hospital (do not enter from the lower level of the parking garage). Upon entrance, check in with the  at the surgical information desk on your LEFT. Bring your insurance card and photo ID to register      4. DO NOT EAT ANYTHING 8 hours prior to arrival for surgery. You may have up to 8 ounces of water 4 hours prior to your arrival for surgery.    NOTE: ALL Gastric, Bariatric & Bowel surgery patients - you MUST follow your surgeon's instructions regarding

## 2023-08-15 ENCOUNTER — TELEPHONE (OUTPATIENT)
Dept: SURGERY | Age: 73
End: 2023-08-15

## 2023-08-15 ENCOUNTER — OFFICE VISIT (OUTPATIENT)
Dept: CARDIOLOGY CLINIC | Age: 73
End: 2023-08-15
Payer: MEDICARE

## 2023-08-15 VITALS
HEIGHT: 71 IN | OXYGEN SATURATION: 99 % | SYSTOLIC BLOOD PRESSURE: 110 MMHG | WEIGHT: 165 LBS | DIASTOLIC BLOOD PRESSURE: 68 MMHG | BODY MASS INDEX: 23.1 KG/M2 | HEART RATE: 62 BPM

## 2023-08-15 DIAGNOSIS — Z87.891 FORMER SMOKER: ICD-10-CM

## 2023-08-15 DIAGNOSIS — C80.1 ADENOCARCINOMA (HCC): ICD-10-CM

## 2023-08-15 DIAGNOSIS — Z01.810 PREOPERATIVE CARDIOVASCULAR EXAMINATION: Primary | ICD-10-CM

## 2023-08-15 DIAGNOSIS — I10 PRIMARY HYPERTENSION: ICD-10-CM

## 2023-08-15 PROCEDURE — G8427 DOCREV CUR MEDS BY ELIG CLIN: HCPCS | Performed by: INTERNAL MEDICINE

## 2023-08-15 PROCEDURE — 93000 ELECTROCARDIOGRAM COMPLETE: CPT | Performed by: INTERNAL MEDICINE

## 2023-08-15 PROCEDURE — 1036F TOBACCO NON-USER: CPT | Performed by: INTERNAL MEDICINE

## 2023-08-15 PROCEDURE — 1124F ACP DISCUSS-NO DSCNMKR DOCD: CPT | Performed by: INTERNAL MEDICINE

## 2023-08-15 PROCEDURE — 3017F COLORECTAL CA SCREEN DOC REV: CPT | Performed by: INTERNAL MEDICINE

## 2023-08-15 PROCEDURE — G8420 CALC BMI NORM PARAMETERS: HCPCS | Performed by: INTERNAL MEDICINE

## 2023-08-15 PROCEDURE — 3074F SYST BP LT 130 MM HG: CPT | Performed by: INTERNAL MEDICINE

## 2023-08-15 PROCEDURE — 3078F DIAST BP <80 MM HG: CPT | Performed by: INTERNAL MEDICINE

## 2023-08-15 PROCEDURE — 99214 OFFICE O/P EST MOD 30 MIN: CPT | Performed by: INTERNAL MEDICINE

## 2023-08-15 NOTE — PROGRESS NOTES
accuracy; however, inadvertent computerized transcription errors may be present.     Asa House MD, [address] [place] [address]  [phone] [facility]  [phone] [facility]  8/15/2023  1:53 PM

## 2023-08-15 NOTE — PATIENT INSTRUCTIONS
Your provider has ordered testing for further evaluation. An order/prescription has been included in your paper work. To schedule outpatient testing, contact Central Scheduling by calling [address] [place] [address] ([phone]). Plan:  Order echocardiogram ~ pre-operative evaluation prior to chemotherapy  ~ A test that records movement of your heart valves and chambers by ultrasound. Evaluates heart valves, any chamber enlargement, abnormal openings, or any fluid in the sac surrounding the heart. Order Lexiscan stress myoview ~ pre-operative evaluation prior to chemotherapy  ~ A small IV will be placed into your arm to administer a radioisotope (myoview) to visualize your heart. . You will then waiting period to allow the tracer to be absorbed by the heart muscle. After the waiting period, we will take pictures of the blood flow to your heart muscle with the nuclear camera. Following your pictures, we will perform your stress test. We can do your stress test by having you walk on the treadmill or by giving you a medication ([address] [place] [address] [address]) which makes your body think it is exercising. We will monitor you before, during, and after your stress test to make sure you are safe and comfortable. Roger Torres 1950 to proceed as planned with port placement procedure as scheduled.    EKG reviewed  Follow up in [place], [place]

## 2023-08-15 NOTE — TELEPHONE ENCOUNTER
Call placed to [facility]. Will arrive at [facility] main entrance at [address] [facility] to be NPO after midnight. OK to take protonix with sip of water.  Aware to bring albuterol inhaler with him to hospital.

## 2023-08-16 ENCOUNTER — HOSPITAL ENCOUNTER (OUTPATIENT)
Age: 73
Setting detail: OUTPATIENT SURGERY
Discharge: HOME | End: 2023-08-16
Attending: SURGERY | Admitting: SURGERY
Payer: MEDICARE

## 2023-08-16 ENCOUNTER — ANESTHESIA EVENT (OUTPATIENT)
Dept: OPERATING ROOM | Age: 73
End: 2023-08-16
Payer: MEDICARE

## 2023-08-16 ENCOUNTER — APPOINTMENT (OUTPATIENT)
Dept: GENERAL RADIOLOGY | Age: 73
End: 2023-08-16
Attending: SURGERY
Payer: MEDICARE

## 2023-08-16 ENCOUNTER — ANESTHESIA (OUTPATIENT)
Dept: OPERATING ROOM | Age: 73
End: 2023-08-16
Payer: MEDICARE

## 2023-08-16 VITALS
RESPIRATION RATE: 18 BRPM | WEIGHT: 163.8 LBS | TEMPERATURE: 97.8 F | HEART RATE: 62 BPM | BODY MASS INDEX: 22.93 KG/M2 | DIASTOLIC BLOOD PRESSURE: 73 MMHG | OXYGEN SATURATION: 99 % | SYSTOLIC BLOOD PRESSURE: 120 MMHG | HEIGHT: 71 IN

## 2023-08-16 LAB
ALBUMIN SERPL-MCNC: 4.6 G/DL (ref 3.4–5)
ALBUMIN/GLOB SERPL: 1.6 {RATIO} (ref 1.1–2.2)
ALP SERPL-CCNC: 67 U/L (ref 40–129)
ALT SERPL-CCNC: 11 U/L (ref 10–40)
ANION GAP SERPL CALCULATED.3IONS-SCNC: 10 MMOL/L (ref 3–16)
AST SERPL-CCNC: 16 U/L (ref 15–37)
BILIRUB SERPL-MCNC: 0.5 MG/DL (ref 0–1)
BUN SERPL-MCNC: 13 MG/DL (ref 7–20)
CALCIUM SERPL-MCNC: 9.6 MG/DL (ref 8.3–10.6)
CHLORIDE SERPL-SCNC: 101 MMOL/L (ref 99–110)
CO2 SERPL-SCNC: 30 MMOL/L (ref 21–32)
CREAT SERPL-MCNC: <0.5 MG/DL (ref 0.8–1.3)
DEPRECATED RDW RBC AUTO: 13.4 % (ref 12.4–15.4)
GFR SERPLBLD CREATININE-BSD FMLA CKD-EPI: >60 ML/MIN/{1.73_M2}
GLUCOSE BLD-MCNC: 110 MG/DL (ref 70–99)
GLUCOSE BLD-MCNC: 124 MG/DL (ref 70–99)
GLUCOSE BLD-MCNC: 136 MG/DL (ref 70–99)
GLUCOSE SERPL-MCNC: 124 MG/DL (ref 70–99)
HCT VFR BLD AUTO: 38 % (ref 40.5–52.5)
HGB BLD-MCNC: 13.2 G/DL (ref 13.5–17.5)
MCH RBC QN AUTO: 33 PG (ref 26–34)
MCHC RBC AUTO-ENTMCNC: 34.6 G/DL (ref 31–36)
MCV RBC AUTO: 95.5 FL (ref 80–100)
PERFORMED ON: ABNORMAL
PLATELET # BLD AUTO: 187 K/UL (ref 135–450)
PMV BLD AUTO: 7.4 FL (ref 5–10.5)
POTASSIUM SERPL-SCNC: 3.9 MMOL/L (ref 3.5–5.1)
PROT SERPL-MCNC: 7.4 G/DL (ref 6.4–8.2)
RBC # BLD AUTO: 3.98 M/UL (ref 4.2–5.9)
SODIUM SERPL-SCNC: 141 MMOL/L (ref 136–145)
WBC # BLD AUTO: 4.8 K/UL (ref 4–11)

## 2023-08-16 PROCEDURE — 77001 FLUOROGUIDE FOR VEIN DEVICE: CPT | Performed by: SURGERY

## 2023-08-16 PROCEDURE — 2580000003 HC RX 258: Performed by: SURGERY

## 2023-08-16 PROCEDURE — 3700000001 HC ADD 15 MINUTES (ANESTHESIA): Performed by: SURGERY

## 2023-08-16 PROCEDURE — 7100000000 HC PACU RECOVERY - FIRST 15 MIN: Performed by: SURGERY

## 2023-08-16 PROCEDURE — 7100000011 HC PHASE II RECOVERY - ADDTL 15 MIN: Performed by: SURGERY

## 2023-08-16 PROCEDURE — 2500000003 HC RX 250 WO HCPCS: Performed by: NURSE ANESTHETIST, CERTIFIED REGISTERED

## 2023-08-16 PROCEDURE — 3600000012 HC SURGERY LEVEL 2 ADDTL 15MIN: Performed by: SURGERY

## 2023-08-16 PROCEDURE — 77001 FLUOROGUIDE FOR VEIN DEVICE: CPT

## 2023-08-16 PROCEDURE — 3700000000 HC ANESTHESIA ATTENDED CARE: Performed by: SURGERY

## 2023-08-16 PROCEDURE — 7100000001 HC PACU RECOVERY - ADDTL 15 MIN: Performed by: SURGERY

## 2023-08-16 PROCEDURE — 85027 COMPLETE CBC AUTOMATED: CPT

## 2023-08-16 PROCEDURE — 36561 INSERT TUNNELED CV CATH: CPT | Performed by: SURGERY

## 2023-08-16 PROCEDURE — 6360000002 HC RX W HCPCS: Performed by: NURSE ANESTHETIST, CERTIFIED REGISTERED

## 2023-08-16 PROCEDURE — 71045 X-RAY EXAM CHEST 1 VIEW: CPT

## 2023-08-16 PROCEDURE — 6360000002 HC RX W HCPCS: Performed by: SURGERY

## 2023-08-16 PROCEDURE — 3600000002 HC SURGERY LEVEL 2 BASE: Performed by: SURGERY

## 2023-08-16 PROCEDURE — C1788 PORT, INDWELLING, IMP: HCPCS | Performed by: SURGERY

## 2023-08-16 PROCEDURE — 2709999900 HC NON-CHARGEABLE SUPPLY: Performed by: SURGERY

## 2023-08-16 PROCEDURE — 80053 COMPREHEN METABOLIC PANEL: CPT

## 2023-08-16 PROCEDURE — A4217 STERILE WATER/SALINE, 500 ML: HCPCS | Performed by: SURGERY

## 2023-08-16 PROCEDURE — 7100000010 HC PHASE II RECOVERY - FIRST 15 MIN: Performed by: SURGERY

## 2023-08-16 PROCEDURE — 2580000003 HC RX 258: Performed by: NURSE ANESTHETIST, CERTIFIED REGISTERED

## 2023-08-16 DEVICE — PORT INFUS SGL LUMN ATTCH POLYUR OPN END CATH 8FR POWERPRT: Type: IMPLANTABLE DEVICE | Status: FUNCTIONAL

## 2023-08-16 RX ORDER — PROCHLORPERAZINE EDISYLATE 5 MG/ML
5 INJECTION INTRAMUSCULAR; INTRAVENOUS
Status: DISCONTINUED | OUTPATIENT
Start: 2023-08-16 | End: 2023-08-16 | Stop reason: HOSPADM

## 2023-08-16 RX ORDER — ONDANSETRON 2 MG/ML
4 INJECTION INTRAMUSCULAR; INTRAVENOUS
Status: DISCONTINUED | OUTPATIENT
Start: 2023-08-16 | End: 2023-08-16 | Stop reason: HOSPADM

## 2023-08-16 RX ORDER — PROPOFOL 10 MG/ML
INJECTION, EMULSION INTRAVENOUS CONTINUOUS PRN
Status: DISCONTINUED | OUTPATIENT
Start: 2023-08-16 | End: 2023-08-16 | Stop reason: SDUPTHER

## 2023-08-16 RX ORDER — OXYCODONE HYDROCHLORIDE 5 MG/1
5 TABLET ORAL
Status: DISCONTINUED | OUTPATIENT
Start: 2023-08-16 | End: 2023-08-16 | Stop reason: HOSPADM

## 2023-08-16 RX ORDER — LIDOCAINE HYDROCHLORIDE 20 MG/ML
INJECTION, SOLUTION INFILTRATION; PERINEURAL PRN
Status: DISCONTINUED | OUTPATIENT
Start: 2023-08-16 | End: 2023-08-16 | Stop reason: SDUPTHER

## 2023-08-16 RX ORDER — MAGNESIUM HYDROXIDE 1200 MG/15ML
LIQUID ORAL CONTINUOUS PRN
Status: DISCONTINUED | OUTPATIENT
Start: 2023-08-16 | End: 2023-08-16 | Stop reason: HOSPADM

## 2023-08-16 RX ORDER — SODIUM CHLORIDE, SODIUM LACTATE, POTASSIUM CHLORIDE, CALCIUM CHLORIDE 600; 310; 30; 20 MG/100ML; MG/100ML; MG/100ML; MG/100ML
INJECTION, SOLUTION INTRAVENOUS CONTINUOUS
Status: DISCONTINUED | OUTPATIENT
Start: 2023-08-16 | End: 2023-08-16 | Stop reason: HOSPADM

## 2023-08-16 RX ORDER — SODIUM CHLORIDE 0.9 % (FLUSH) 0.9 %
5-40 SYRINGE (ML) INJECTION PRN
Status: DISCONTINUED | OUTPATIENT
Start: 2023-08-16 | End: 2023-08-16 | Stop reason: HOSPADM

## 2023-08-16 RX ORDER — KETAMINE HYDROCHLORIDE 50 MG/ML
INJECTION, SOLUTION, CONCENTRATE INTRAMUSCULAR; INTRAVENOUS PRN
Status: DISCONTINUED | OUTPATIENT
Start: 2023-08-16 | End: 2023-08-16 | Stop reason: SDUPTHER

## 2023-08-16 RX ORDER — BUPIVACAINE HYDROCHLORIDE 5 MG/ML
INJECTION, SOLUTION EPIDURAL; INTRACAUDAL PRN
Status: DISCONTINUED | OUTPATIENT
Start: 2023-08-16 | End: 2023-08-16 | Stop reason: HOSPADM

## 2023-08-16 RX ORDER — HEPARIN 100 UNIT/ML
SYRINGE INTRAVENOUS PRN
Status: DISCONTINUED | OUTPATIENT
Start: 2023-08-16 | End: 2023-08-16 | Stop reason: HOSPADM

## 2023-08-16 RX ORDER — SODIUM CHLORIDE 9 MG/ML
INJECTION, SOLUTION INTRAVENOUS PRN
Status: DISCONTINUED | OUTPATIENT
Start: 2023-08-16 | End: 2023-08-16 | Stop reason: HOSPADM

## 2023-08-16 RX ORDER — LABETALOL HYDROCHLORIDE 5 MG/ML
10 INJECTION, SOLUTION INTRAVENOUS
Status: DISCONTINUED | OUTPATIENT
Start: 2023-08-16 | End: 2023-08-16 | Stop reason: HOSPADM

## 2023-08-16 RX ORDER — MIDAZOLAM HYDROCHLORIDE 1 MG/ML
INJECTION INTRAMUSCULAR; INTRAVENOUS PRN
Status: DISCONTINUED | OUTPATIENT
Start: 2023-08-16 | End: 2023-08-16 | Stop reason: SDUPTHER

## 2023-08-16 RX ORDER — HYDRALAZINE HYDROCHLORIDE 20 MG/ML
10 INJECTION INTRAMUSCULAR; INTRAVENOUS
Status: DISCONTINUED | OUTPATIENT
Start: 2023-08-16 | End: 2023-08-16 | Stop reason: HOSPADM

## 2023-08-16 RX ORDER — SODIUM CHLORIDE 0.9 % (FLUSH) 0.9 %
5-40 SYRINGE (ML) INJECTION EVERY 12 HOURS SCHEDULED
Status: DISCONTINUED | OUTPATIENT
Start: 2023-08-16 | End: 2023-08-16 | Stop reason: HOSPADM

## 2023-08-16 RX ORDER — LIDOCAINE HYDROCHLORIDE 10 MG/ML
1 INJECTION, SOLUTION EPIDURAL; INFILTRATION; INTRACAUDAL; PERINEURAL
Status: DISCONTINUED | OUTPATIENT
Start: 2023-08-16 | End: 2023-08-16 | Stop reason: HOSPADM

## 2023-08-16 RX ORDER — FENTANYL CITRATE 50 UG/ML
INJECTION, SOLUTION INTRAMUSCULAR; INTRAVENOUS PRN
Status: DISCONTINUED | OUTPATIENT
Start: 2023-08-16 | End: 2023-08-16 | Stop reason: SDUPTHER

## 2023-08-16 RX ORDER — SODIUM CHLORIDE, SODIUM LACTATE, POTASSIUM CHLORIDE, CALCIUM CHLORIDE 600; 310; 30; 20 MG/100ML; MG/100ML; MG/100ML; MG/100ML
INJECTION, SOLUTION INTRAVENOUS CONTINUOUS PRN
Status: DISCONTINUED | OUTPATIENT
Start: 2023-08-16 | End: 2023-08-16 | Stop reason: SDUPTHER

## 2023-08-16 RX ORDER — PROPOFOL 10 MG/ML
INJECTION, EMULSION INTRAVENOUS PRN
Status: DISCONTINUED | OUTPATIENT
Start: 2023-08-16 | End: 2023-08-16 | Stop reason: SDUPTHER

## 2023-08-16 RX ORDER — MEPERIDINE HYDROCHLORIDE 25 MG/ML
12.5 INJECTION INTRAMUSCULAR; INTRAVENOUS; SUBCUTANEOUS EVERY 5 MIN PRN
Status: DISCONTINUED | OUTPATIENT
Start: 2023-08-16 | End: 2023-08-16 | Stop reason: HOSPADM

## 2023-08-16 RX ADMIN — FENTANYL CITRATE 100 MCG: 50 INJECTION, SOLUTION INTRAMUSCULAR; INTRAVENOUS at 10:08

## 2023-08-16 RX ADMIN — MIDAZOLAM HYDROCHLORIDE 2 MG: 2 INJECTION, SOLUTION INTRAMUSCULAR; INTRAVENOUS at 10:00

## 2023-08-16 RX ADMIN — KETAMINE HYDROCHLORIDE 20 MG: 50 INJECTION, SOLUTION INTRAMUSCULAR; INTRAVENOUS at 10:08

## 2023-08-16 RX ADMIN — SODIUM CHLORIDE, SODIUM LACTATE, POTASSIUM CHLORIDE, AND CALCIUM CHLORIDE: .6; .31; .03; .02 INJECTION, SOLUTION INTRAVENOUS at 09:50

## 2023-08-16 RX ADMIN — PROPOFOL 30 MG: 10 INJECTION, EMULSION INTRAVENOUS at 10:08

## 2023-08-16 RX ADMIN — PROPOFOL 140 MCG/KG/MIN: 10 INJECTION, EMULSION INTRAVENOUS at 10:08

## 2023-08-16 RX ADMIN — CEFAZOLIN 100 MG: 2 INJECTION, POWDER, FOR SOLUTION INTRAMUSCULAR; INTRAVENOUS at 10:08

## 2023-08-16 RX ADMIN — LIDOCAINE HYDROCHLORIDE 50 MG: 20 INJECTION, SOLUTION INFILTRATION; PERINEURAL at 10:08

## 2023-08-16 ASSESSMENT — PAIN SCALES - GENERAL
PAINLEVEL_OUTOF10: 0

## 2023-08-16 ASSESSMENT — PAIN - FUNCTIONAL ASSESSMENT: PAIN_FUNCTIONAL_ASSESSMENT: 0-10

## 2023-08-16 NOTE — H&P
I have reviewed the H&P and examined this patient. I have reviewed with the patient and /or family the risks, benefits and alternatives to this procedure and they seem to understand and agree to proceed.   Perri Covington MD no fever and no chills.

## 2023-08-16 NOTE — PROGRESS NOTES
Pt arrived from OR, s/p RIGHT SUBCLAVIAN PORT PLACEMENT - Right, report received from CRNA and Dr. Alejandra Linton, pt sedated on arrival, oral airway removed and pt fell back to sleep

## 2023-08-16 NOTE — DISCHARGE INSTRUCTIONS
Wound Care:   Skin glue was used to cover your incisions. It will peel off on its own in approximately 10 days. If glue does not peel off in 10 days, you may use petroleum jelly to remove. You may shower after surgery, but do not soak or scrub area of incision for 7-10 days. Pain Medication:  Most patients that have had this procedure report that their post-operative pain was relieved by their preferred of over-the-counter medication alone. Use over-the-counter medications as your primary pain reliever. Diet:   You may resume a regular diet. Activity:  No vigorous activity immediately after surgery. You may return to your pre-operative level activity gradually as tolerated. Return Precautions: It is normal for the area around your incisions to be tender and slightly red for the first 24-48 hours after surgery. If you notice increased redness, worsening pain, drainage from wound, fevers, or have any other concerns about your incision or post-operative course, however, call or return to the Emergency Department. Follow-up:   No follow up appointment is necessary. [facility]    There are potential side effects of anesthesia or sedation you may experience for the first 24 hours. These side effects include:    Confusion or Memory loss, Dizziness, or Delayed Reaction Times   [x]A responsible person should be with you for the next 24 hours. Do not operate any vehicles (automobiles, bicycles, motorcycles) or power tools or machinery for 24 hours. Do not sign any legal documents or make any legal decisions for 24 hours. Do not drink alcohol for 24 hours or while taking narcotic pain medication. Nausea    [x]Start with light diet and progress to your normal diet as you feel like eating. However, if you experience nausea or repeated episodes of vomiting which persist beyond 12-24 hours, notify your physician.   Once nausea has passed, remember to keep

## 2023-08-16 NOTE — ANESTHESIA PRE PROCEDURE
discussed with patient. Plan discussed with CRNA.     Attending anesthesiologist reviewed and agrees with Preprocedure content          Moon Morocho MD   8/16/2023

## 2023-08-16 NOTE — BRIEF OP NOTE
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 Easily advanced wire. Placement of part tubing was confirmed with fluoroscopy.        Electronically signed by Marlin Doyle DO on 8/16/2023 at 11:22 AM

## 2023-08-16 NOTE — ANESTHESIA POSTPROCEDURE EVALUATION
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     Elenita Phase I: Elenita Score: 8    Elenita Phase II:        Anesthesia Post Evaluation    Patient location during evaluation: PACU  Patient participation: complete - patient participated  Level of consciousness: awake and alert  Airway patency: patent  Nausea & Vomiting: no nausea and no vomiting  Complications: no  Cardiovascular status: hemodynamically stable  Respiratory status: acceptable  Hydration status: euvolemic  Multimodal analgesia pain management approach  Pain management: satisfactory to patient

## 2023-08-16 NOTE — OP NOTE
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 Conscious sedation was achieved. The patient was then prepped and draped in standard sterile fashion. Attention was then turned to the anterior chest wall. Using an 18-gauge needle, the right subclavian vein was accessed on the 1st attempt. A guidewire was passed and position confirmed with fluoroscopy. An 11-blade scalpel was then used to make a 3 cm incision parallel to the clavicle at vein access site. Using Bovie electrocautery, a pocket was created for the port. The dilator introducer was then introduced without difficulty over guidewire. The dilator and guidewire was subsequently removed and the port-a-cath catheter advanced. Sheath was removed. Catheter location was checked with fluoroscopy. The catheter was divided near the pocket and then connected to the port. The port was placed in the pocket and fixed to the underlying tissues with Ethibond suture. The port was subsequently aspirated and flushed without difficulty. Using 3-0 Vicryl, the subcutaneous layer was reapproximated. Skin was approximated with 3-0 vicryl in subcuticular manner. Skin adhesive was then placed over the incision. The patient was subsequently awakened and transferred to PACU in stable condition. Sponge & needle count are correct at the end of the procedure. I was present for the entire case.     Recommendations:  CXR ordered to verify placement. Fluoroscopy interpretation: I used real time fluoroscopic guidance thorough out the procedure to confirm location of guide wire and catheter. Final position of catheter in its entire course was checked using fluoroscopy. Images are interpreted by me during the procedure.      Antonella Hendrickson MD  Surgical Oncologist

## 2023-08-25 ENCOUNTER — HOSPITAL ENCOUNTER (OUTPATIENT)
Age: 73
Discharge: HOME | End: 2023-08-25
Payer: MEDICARE

## 2023-08-25 PROBLEM — K65.1 RIGHT UPPER QUADRANT ABDOMINAL ABSCESS (HCC): Status: ACTIVE | Noted: 2023-08-25

## 2023-08-25 LAB
ALBUMIN SERPL-MCNC: 4.4 G/DL (ref 3.4–5)
ALBUMIN/GLOB SERPL: 1.7 {RATIO} (ref 1.1–2.2)
ALP SERPL-CCNC: 63 U/L (ref 40–129)
ALT SERPL-CCNC: 9 U/L (ref 10–40)
ANION GAP SERPL CALCULATED.3IONS-SCNC: 12 MMOL/L (ref 3–16)
AST SERPL-CCNC: 14 U/L (ref 15–37)
BASOPHILS # BLD: 0 K/UL (ref 0–0.2)
BASOPHILS NFR BLD: 0.5 %
BILIRUB SERPL-MCNC: 0.5 MG/DL (ref 0–1)
BUN SERPL-MCNC: 12 MG/DL (ref 7–20)
CALCIUM SERPL-MCNC: 9.6 MG/DL (ref 8.3–10.6)
CEA SERPL-MCNC: 1 NG/ML (ref 0–5)
CHLORIDE SERPL-SCNC: 102 MMOL/L (ref 99–110)
CO2 SERPL-SCNC: 26 MMOL/L (ref 21–32)
CREAT SERPL-MCNC: <0.5 MG/DL (ref 0.8–1.3)
DEPRECATED RDW RBC AUTO: 13.4 % (ref 12.4–15.4)
EOSINOPHIL # BLD: 0.1 K/UL (ref 0–0.6)
EOSINOPHIL NFR BLD: 2.4 %
GFR SERPLBLD CREATININE-BSD FMLA CKD-EPI: >60 ML/MIN/{1.73_M2}
GLUCOSE SERPL-MCNC: 88 MG/DL (ref 70–99)
HCT VFR BLD AUTO: 36.7 % (ref 40.5–52.5)
HGB BLD-MCNC: 12.7 G/DL (ref 13.5–17.5)
LDH SERPL L TO P-CCNC: 249 U/L (ref 100–190)
LYMPHOCYTES # BLD: 0.7 K/UL (ref 1–5.1)
LYMPHOCYTES NFR BLD: 17.4 %
MCH RBC QN AUTO: 32.8 PG (ref 26–34)
MCHC RBC AUTO-ENTMCNC: 34.5 G/DL (ref 31–36)
MCV RBC AUTO: 94.9 FL (ref 80–100)
MONOCYTES # BLD: 0.3 K/UL (ref 0–1.3)
MONOCYTES NFR BLD: 6.6 %
NEUTROPHILS # BLD: 3 K/UL (ref 1.7–7.7)
NEUTROPHILS NFR BLD: 73.1 %
PLATELET # BLD AUTO: 197 K/UL (ref 135–450)
PMV BLD AUTO: 7.2 FL (ref 5–10.5)
POTASSIUM SERPL-SCNC: 3.9 MMOL/L (ref 3.5–5.1)
PROT SERPL-MCNC: 7 G/DL (ref 6.4–8.2)
RBC # BLD AUTO: 3.87 M/UL (ref 4.2–5.9)
SODIUM SERPL-SCNC: 140 MMOL/L (ref 136–145)
WBC # BLD AUTO: 4.2 K/UL (ref 4–11)

## 2023-08-25 PROCEDURE — 83615 LACTATE (LD) (LDH) ENZYME: CPT

## 2023-08-25 PROCEDURE — 80053 COMPREHEN METABOLIC PANEL: CPT

## 2023-08-25 PROCEDURE — 85025 COMPLETE CBC W/AUTO DIFF WBC: CPT

## 2023-08-25 PROCEDURE — 36415 COLL VENOUS BLD VENIPUNCTURE: CPT

## 2023-08-25 PROCEDURE — 82378 CARCINOEMBRYONIC ANTIGEN: CPT

## 2023-08-28 ENCOUNTER — HOSPITAL ENCOUNTER (OUTPATIENT)
Dept: NON INVASIVE DIAGNOSTICS | Age: 73
Discharge: HOME | End: 2023-08-28
Payer: MEDICARE

## 2023-08-28 ENCOUNTER — HOSPITAL ENCOUNTER (OUTPATIENT)
Dept: NUCLEAR MEDICINE | Age: 73
Discharge: HOME | End: 2023-08-28
Payer: MEDICARE

## 2023-08-28 ENCOUNTER — TELEPHONE (OUTPATIENT)
Dept: CARDIOLOGY CLINIC | Age: 73
End: 2023-08-28

## 2023-08-28 DIAGNOSIS — I10 PRIMARY HYPERTENSION: ICD-10-CM

## 2023-08-28 DIAGNOSIS — Z01.810 PREOPERATIVE CARDIOVASCULAR EXAMINATION: ICD-10-CM

## 2023-08-28 DIAGNOSIS — Z87.891 FORMER SMOKER: ICD-10-CM

## 2023-08-28 LAB
LV EF: 55 %
LV EF: 68 %
LVEF MODALITY: NORMAL
LVEF MODALITY: NORMAL

## 2023-08-28 PROCEDURE — 6360000002 HC RX W HCPCS: Performed by: INTERNAL MEDICINE

## 2023-08-28 PROCEDURE — 93017 CV STRESS TEST TRACING ONLY: CPT

## 2023-08-28 PROCEDURE — 78452 HT MUSCLE IMAGE SPECT MULT: CPT

## 2023-08-28 PROCEDURE — 93306 TTE W/DOPPLER COMPLETE: CPT

## 2023-08-28 PROCEDURE — A9502 TC99M TETROFOSMIN: HCPCS | Performed by: INTERNAL MEDICINE

## 2023-08-28 PROCEDURE — 3430000000 HC RX DIAGNOSTIC RADIOPHARMACEUTICAL: Performed by: INTERNAL MEDICINE

## 2023-08-28 RX ORDER — REGADENOSON 0.08 MG/ML
0.4 INJECTION, SOLUTION INTRAVENOUS
Status: COMPLETED | OUTPATIENT
Start: 2023-08-28 | End: 2023-08-28

## 2023-08-28 RX ADMIN — TETROFOSMIN 31.2 MILLICURIE: 1.38 INJECTION, POWDER, LYOPHILIZED, FOR SOLUTION INTRAVENOUS at 08:35

## 2023-08-28 RX ADMIN — TETROFOSMIN 11 MILLICURIE: 1.38 INJECTION, POWDER, LYOPHILIZED, FOR SOLUTION INTRAVENOUS at 07:25

## 2023-08-28 RX ADMIN — REGADENOSON 0.4 MG: 0.08 INJECTION, SOLUTION INTRAVENOUS at 08:35

## 2023-08-28 NOTE — TELEPHONE ENCOUNTER
Created telephone encounter. Spoke with Tani Palafox relayed message per [facility] regarding echo. Pt verbalized understanding.

## 2023-08-28 NOTE — TELEPHONE ENCOUNTER
----- Message from Slade Butcher MD sent at 8/28/2023  1:40 PM EDT -----  There are minor findings that are noticed on the testing. The overall findings suggest normal age related results, but there are degree of changes that will need to be monitored. Please call patient with results.

## 2023-08-28 NOTE — PROGRESS NOTES
Pt completed stress portion of cardiac stress test. Pt c/o sob, and dizziness, symptoms resolved in recovery. Pt is discharged to nuclear department for final scan. Nuclear tech will remove PIV. Discharge instructions given to pt. Pt verbalizes understanding to discharge instructions.

## 2023-08-29 ENCOUNTER — TELEPHONE (OUTPATIENT)
Dept: CARDIOLOGY CLINIC | Age: 73
End: 2023-08-29

## 2023-08-29 ENCOUNTER — TELEPHONE (OUTPATIENT)
Dept: SURGERY | Age: 73
End: 2023-08-29

## 2023-08-29 DIAGNOSIS — I25.10 ATHEROSCLEROSIS OF NATIVE CORONARY ARTERY OF NATIVE HEART WITHOUT ANGINA PECTORIS: ICD-10-CM

## 2023-08-29 DIAGNOSIS — Z01.810 PREOPERATIVE CARDIOVASCULAR EXAMINATION: Primary | ICD-10-CM

## 2023-08-29 DIAGNOSIS — I10 PRIMARY HYPERTENSION: ICD-10-CM

## 2023-08-29 NOTE — TELEPHONE ENCOUNTER
Patient called to let the team know that Dr. Evelin Luna at the [facility] cannot see him until 9/25/2023 for chemo. The patient thought Dr. Javi Stewart wanted him to get started earlier. The patient would also like to know what stage cancer he has? He believes Dr. Evelin Luna said it was stage 4, but he would like to double check with Dr. Javi Stewart.      Patient is awaiting a call back at [phone]

## 2023-08-29 NOTE — TELEPHONE ENCOUNTER
----- Message from Sandeep Spencer MD sent at 8/29/2023  8:53 AM EDT -----  Would let the patient know that the stress test was not interpretable. The nuclear portion was technically limited. We will need to get a different test done at some point.     I suggest a dobutamine stress echocardiogram.

## 2023-08-29 NOTE — TELEPHONE ENCOUNTER
Contacted RN for Dr. Jackson Cabot regarding start date for chemotherapy. Nurse states Dr. Jackson Cabot has had a family emergency and [place] of the [place] until the end of the month.

## 2023-08-30 LAB — CANCER AG19-9 SERPL IA-ACNC: 11 U/ML (ref 0–35)

## 2023-08-31 ENCOUNTER — TELEPHONE (OUTPATIENT)
Dept: CARDIOLOGY CLINIC | Age: 73
End: 2023-08-31

## 2023-08-31 NOTE — TELEPHONE ENCOUNTER
Pt stated that he has a stress test on 9/14/2023. Pt would like to know if he needs to hold any of his medications the night prior and/or morning of. Please advise.

## 2023-09-04 ENCOUNTER — HOSPITAL ENCOUNTER (OUTPATIENT)
Age: 73
End: 2023-09-04
Payer: MEDICARE

## 2023-09-04 ENCOUNTER — HOSPITAL ENCOUNTER (OUTPATIENT)
Age: 73
Discharge: HOME | End: 2023-09-04
Payer: MEDICARE

## 2023-09-04 LAB
BASOPHILS # BLD: 0 K/UL (ref 0–0.2)
BASOPHILS NFR BLD: 0.3 %
BILIRUB UR QL STRIP.AUTO: NEGATIVE
CLARITY UR: CLEAR
COLOR UR: YELLOW
D DIMER: 0.62 UG/ML FEU (ref 0–0.6)
DEPRECATED RDW RBC AUTO: 13.6 % (ref 12.4–15.4)
EOSINOPHIL # BLD: 0.1 K/UL (ref 0–0.6)
EOSINOPHIL NFR BLD: 2.6 %
ERYTHROCYTE [SEDIMENTATION RATE] IN BLOOD BY WESTERGREN METHOD: 25 MM/HR (ref 0–20)
GLUCOSE UR STRIP.AUTO-MCNC: NEGATIVE MG/DL
HCT VFR BLD AUTO: 35.2 % (ref 40.5–52.5)
HGB BLD-MCNC: 12.2 G/DL (ref 13.5–17.5)
HGB UR QL STRIP.AUTO: NEGATIVE
INR PPP: 0.92 (ref 0.84–1.16)
KETONES UR STRIP.AUTO-MCNC: NEGATIVE MG/DL
LEUKOCYTE ESTERASE UR QL STRIP.AUTO: NEGATIVE
LYMPHOCYTES # BLD: 0.8 K/UL (ref 1–5.1)
LYMPHOCYTES NFR BLD: 18.4 %
MCH RBC QN AUTO: 33.1 PG (ref 26–34)
MCHC RBC AUTO-ENTMCNC: 34.7 G/DL (ref 31–36)
MCV RBC AUTO: 95.3 FL (ref 80–100)
MONOCYTES # BLD: 0.3 K/UL (ref 0–1.3)
MONOCYTES NFR BLD: 6.7 %
NEUTROPHILS # BLD: 3.2 K/UL (ref 1.7–7.7)
NEUTROPHILS NFR BLD: 72 %
NITRITE UR QL STRIP.AUTO: NEGATIVE
PH UR STRIP.AUTO: 7 [PH] (ref 5–8)
PLATELET # BLD AUTO: 199 K/UL (ref 135–450)
PMV BLD AUTO: 6.8 FL (ref 5–10.5)
PROT UR STRIP.AUTO-MCNC: NEGATIVE MG/DL
PROTHROMBIN TIME: 12.4 SEC (ref 11.5–14.8)
RBC # BLD AUTO: 3.7 M/UL (ref 4.2–5.9)
SP GR UR STRIP.AUTO: 1.01 (ref 1–1.03)
UA DIPSTICK W REFLEX MICRO PNL UR: NORMAL
URATE SERPL-MCNC: 4.5 MG/DL (ref 3.5–7.2)
URN SPEC COLLECT METH UR: NORMAL
UROBILINOGEN UR STRIP-ACNC: 0.2 E.U./DL
WBC # BLD AUTO: 4.4 K/UL (ref 4–11)

## 2023-09-04 PROCEDURE — 86803 HEPATITIS C AB TEST: CPT

## 2023-09-04 PROCEDURE — 84436 ASSAY OF TOTAL THYROXINE: CPT

## 2023-09-04 PROCEDURE — 82746 ASSAY OF FOLIC ACID SERUM: CPT

## 2023-09-04 PROCEDURE — 36415 COLL VENOUS BLD VENIPUNCTURE: CPT

## 2023-09-04 PROCEDURE — 82378 CARCINOEMBRYONIC ANTIGEN: CPT

## 2023-09-04 PROCEDURE — 87086 URINE CULTURE/COLONY COUNT: CPT

## 2023-09-04 PROCEDURE — 85379 FIBRIN DEGRADATION QUANT: CPT

## 2023-09-04 PROCEDURE — 84443 ASSAY THYROID STIM HORMONE: CPT

## 2023-09-04 PROCEDURE — 81003 URINALYSIS AUTO W/O SCOPE: CPT

## 2023-09-04 PROCEDURE — 82306 VITAMIN D 25 HYDROXY: CPT

## 2023-09-04 PROCEDURE — 83883 ASSAY NEPHELOMETRY NOT SPEC: CPT

## 2023-09-04 PROCEDURE — 84165 PROTEIN E-PHORESIS SERUM: CPT

## 2023-09-04 PROCEDURE — 84550 ASSAY OF BLOOD/URIC ACID: CPT

## 2023-09-04 PROCEDURE — 86038 ANTINUCLEAR ANTIBODIES: CPT

## 2023-09-04 PROCEDURE — 83540 ASSAY OF IRON: CPT

## 2023-09-04 PROCEDURE — 83615 LACTATE (LD) (LDH) ENZYME: CPT

## 2023-09-04 PROCEDURE — 86334 IMMUNOFIX E-PHORESIS SERUM: CPT

## 2023-09-04 PROCEDURE — 82728 ASSAY OF FERRITIN: CPT

## 2023-09-04 PROCEDURE — 82607 VITAMIN B-12: CPT

## 2023-09-04 PROCEDURE — 86706 HEP B SURFACE ANTIBODY: CPT

## 2023-09-04 PROCEDURE — 83550 IRON BINDING TEST: CPT

## 2023-09-04 PROCEDURE — 84155 ASSAY OF PROTEIN SERUM: CPT

## 2023-09-04 PROCEDURE — 80053 COMPREHEN METABOLIC PANEL: CPT

## 2023-09-04 PROCEDURE — 85610 PROTHROMBIN TIME: CPT

## 2023-09-04 PROCEDURE — 86301 IMMUNOASSAY TUMOR CA 19-9: CPT

## 2023-09-04 PROCEDURE — 85025 COMPLETE CBC W/AUTO DIFF WBC: CPT

## 2023-09-04 PROCEDURE — 85652 RBC SED RATE AUTOMATED: CPT

## 2023-09-04 PROCEDURE — 84153 ASSAY OF PSA TOTAL: CPT

## 2023-09-05 LAB
25(OH)D3 SERPL-MCNC: 19.9 NG/ML
ALBUMIN SERPL-MCNC: 4.4 G/DL (ref 3.4–5)
ALBUMIN/GLOB SERPL: 1.6 {RATIO} (ref 1.1–2.2)
ALP SERPL-CCNC: 62 U/L (ref 40–129)
ALT SERPL-CCNC: 10 U/L (ref 10–40)
ANION GAP SERPL CALCULATED.3IONS-SCNC: 11 MMOL/L (ref 3–16)
AST SERPL-CCNC: 17 U/L (ref 15–37)
BILIRUB SERPL-MCNC: 0.5 MG/DL (ref 0–1)
BUN SERPL-MCNC: 17 MG/DL (ref 7–20)
CALCIUM SERPL-MCNC: 9.5 MG/DL (ref 8.3–10.6)
CEA SERPL-MCNC: 1.1 NG/ML (ref 0–5)
CHLORIDE SERPL-SCNC: 100 MMOL/L (ref 99–110)
CO2 SERPL-SCNC: 28 MMOL/L (ref 21–32)
CREAT SERPL-MCNC: <0.5 MG/DL (ref 0.8–1.3)
FERRITIN SERPL IA-MCNC: 89.8 NG/ML (ref 30–400)
FOLATE SERPL-MCNC: 12.73 NG/ML (ref 4.78–24.2)
GFR SERPLBLD CREATININE-BSD FMLA CKD-EPI: >60 ML/MIN/{1.73_M2}
GLUCOSE SERPL-MCNC: 108 MG/DL (ref 70–99)
HBV SURFACE AB SERPL IA-ACNC: <3.5 MIU/ML
HCV AB SERPL QL IA: NORMAL
IRON SATN MFR SERPL: 38 % (ref 20–50)
IRON SERPL-MCNC: 165 UG/DL (ref 59–158)
LDH SERPL L TO P-CCNC: 145 U/L (ref 100–190)
POTASSIUM SERPL-SCNC: 4.3 MMOL/L (ref 3.5–5.1)
SODIUM SERPL-SCNC: 139 MMOL/L (ref 136–145)
T4 SERPL-MCNC: 6.4 UG/DL (ref 4.5–10.9)
TIBC SERPL-MCNC: 437 UG/DL (ref 260–445)
TSH SERPL DL<=0.005 MIU/L-ACNC: 1.78 UIU/ML (ref 0.27–4.2)
VIT B12 SERPL-MCNC: 261 PG/ML (ref 211–911)

## 2023-09-06 LAB
ALBUMIN SERPL ELPH-MCNC: 3.6 G/DL (ref 3.1–4.9)
ALPHA1 GLOB SERPL ELPH-MCNC: 0.3 G/DL (ref 0.2–0.4)
ALPHA2 GLOB SERPL ELPH-MCNC: 0.7 G/DL (ref 0.4–1.1)
ANA SER QL IA: NEGATIVE
B-GLOBULIN SERPL ELPH-MCNC: 1.3 G/DL (ref 0.9–1.6)
BACTERIA UR CULT: NORMAL
GAMMA GLOB SERPL ELPH-MCNC: 1.2 G/DL (ref 0.6–1.8)
KAPPA LC FREE SER-MCNC: 19.54 MG/L (ref 3.3–19.4)
KAPPA LC FREE/LAMBDA FREE SER: 1.31 {RATIO} (ref 0.26–1.65)
LAMBDA LC FREE SERPL-MCNC: 14.87 MG/L (ref 5.71–26.3)
PROT SERPL-MCNC: 7.1 G/DL (ref 6.4–8.2)
PSA SERPL DL<=0.01 NG/ML-MCNC: 1.13 NG/ML (ref 0–4)
RPT COMMENT: ABNORMAL
SPE/IFE INTERPRETATION: NORMAL

## 2023-09-07 ENCOUNTER — TELEPHONE (OUTPATIENT)
Dept: CARDIOLOGY CLINIC | Age: 73
End: 2023-09-07

## 2023-09-07 ENCOUNTER — TELEPHONE (OUTPATIENT)
Dept: SURGERY | Age: 73
End: 2023-09-07

## 2023-09-07 NOTE — TELEPHONE ENCOUNTER
Pt called and stated he is scheduled for stress test on 09/14 but he started chemo on 09/06 and wants to make sure that wont effect anything.  Please advise

## 2023-09-07 NOTE — TELEPHONE ENCOUNTER
Call placed to Ebony Jorge - states he is feeling well and is currently receiving his first chemotherapy treatment. No questions or concerns at this time. Instructed to call with any questions or concerns. Verbalized understanding.

## 2023-09-07 NOTE — TELEPHONE ENCOUNTER
Should not be a problem unless he feels weak. We can reschedule if he is not up for it after the chemo.

## 2023-09-09 LAB — CANCER AG19-9 SERPL IA-ACNC: 9 U/ML (ref 0–35)

## 2023-09-14 ENCOUNTER — HOSPITAL ENCOUNTER (OUTPATIENT)
Dept: NON INVASIVE DIAGNOSTICS | Age: 73
Discharge: HOME | End: 2023-09-14
Attending: INTERNAL MEDICINE

## 2023-09-14 PROBLEM — Z01.810 PREOPERATIVE CARDIOVASCULAR EXAMINATION: Status: RESOLVED | Noted: 2023-06-06 | Resolved: 2023-09-14

## 2023-11-15 ENCOUNTER — TELEPHONE (OUTPATIENT)
Dept: SURGERY | Age: 73
End: 2023-11-15

## 2023-12-27 ENCOUNTER — HOSPITAL ENCOUNTER (OUTPATIENT)
Age: 73
Discharge: HOME | End: 2023-12-27
Attending: SURGERY
Payer: MEDICARE

## 2023-12-27 ENCOUNTER — HOSPITAL ENCOUNTER (OUTPATIENT)
Dept: CT IMAGING | Age: 73
Discharge: HOME | End: 2023-12-27
Attending: SURGERY
Payer: MEDICARE

## 2023-12-27 DIAGNOSIS — C78.89 METASTATIC CHOLANGIOCARCINOMA TO BILE DUCT (HCC): ICD-10-CM

## 2023-12-27 DIAGNOSIS — C22.1 METASTATIC CHOLANGIOCARCINOMA TO BILE DUCT (HCC): ICD-10-CM

## 2023-12-27 PROCEDURE — 6360000004 HC RX CONTRAST MEDICATION: Performed by: SURGERY

## 2023-12-27 PROCEDURE — 71260 CT THORAX DX C+: CPT

## 2023-12-27 RX ADMIN — DIATRIZOATE MEGLUMINE AND DIATRIZOATE SODIUM 12 ML: 660; 100 LIQUID ORAL; RECTAL at 11:32

## 2023-12-27 RX ADMIN — IOMEPROL INJECTION 75 ML: 714 INJECTION, SOLUTION INTRAVASCULAR at 11:43

## 2024-01-10 RX ORDER — METOCLOPRAMIDE 5 MG/1
TABLET ORAL
Qty: 30 TABLET | Refills: 0 | Status: SHIPPED | OUTPATIENT
Start: 2024-01-10

## 2024-01-17 ENCOUNTER — TELEPHONE (OUTPATIENT)
Dept: SURGERY | Age: 74
End: 2024-01-17

## 2024-01-18 NOTE — PROGRESS NOTES
GASTROINTESTINAL ENDOSCOPY N/A 03/11/2022    UPPER EUS W/ANES. performed by Tab Pandey MD at [facility] ENDOSCOPY    WHIPPLE PROCEDURE W/ LAPAROSCOPY      6/19/23     History reviewed. No pertinent family history.    Social History:   Social History     Tobacco Use    Smoking status: Former    Smokeless tobacco: Never    Tobacco comments:     quit 40-50 years ago   Substance Use Topics    Alcohol use: No     Comment: AWARE NOT TO DRINK DOS OR DAY BEFORE-DG      Tobacco cessation counseling provided as appropriate.      Current Outpatient Medications:     famotidine (PEPCID) 20 MG tablet, Take 1 tablet by mouth daily, Disp: , Rfl:     metoclopramide (REGLAN) 5 MG tablet, TAKE 1 TABLET BY MOUTH EVERY 8 HOURS AS NEEDED FOR  NAUSEA  AND  FULLNESS., Disp: 30 tablet, Rfl: 0    ciprofloxacin (CIPRO) 500 MG tablet, Take 1 tablet by mouth 2 times daily, Disp: , Rfl:     dexamethasone (DECADRON) 4 MG tablet, , Disp: , Rfl:     FEROSUL 325 (65 Fe) MG tablet, Take 1 tablet by mouth daily, Disp: , Rfl:     Lancets (ONETOUCH DELICA PLUS [id number]) MISC, USE 1 TWICE DAILY, Disp: , Rfl:     ondansetron (ZOFRAN-ODT) 8 MG TBDP disintegrating tablet, DISSOLVE 1 TABLET IN MOUTH EVERY 8 HOURS THREE TIMES DAILY AS NEEDED FOR NAUSEA, Disp: , Rfl:     triamterene-hydroCHLOROthiazide (DYAZIDE) 37.5-25 MG per capsule, Take 1 capsule by mouth 2 times daily, Disp: , Rfl:     allopurinol (ZYLOPRIM) 300 MG tablet, Take 1 tablet by mouth daily, Disp: , Rfl:     dicyclomine (BENTYL) 20 MG tablet, Take 1 tablet by mouth daily, Disp: , Rfl:     Cyanocobalamin (B-12) 500 MCG SUBL, DISSOLVE 1 TABLET UNDER THE TONGUE ONCE EVERY MONTH, Disp: , Rfl:     vitamin D (ERGOCALCIFEROL) 1.25 MG (53273 UT) CAPS capsule, Take 1 capsule by mouth once a week, Disp: , Rfl:     hydroCHLOROthiazide (HYDRODIURIL) 25 MG tablet, Take by mouth 2 times daily PT UNSURE OF DOSE, AWARE NOT TO TAKE DOS-DG, Disp: , Rfl:     ALLOPURINOL PO, Take by mouth daily

## 2024-01-23 ENCOUNTER — OFFICE VISIT (OUTPATIENT)
Dept: SURGERY | Age: 74
End: 2024-01-23
Payer: MEDICARE

## 2024-01-23 VITALS
BODY MASS INDEX: 24.58 KG/M2 | SYSTOLIC BLOOD PRESSURE: 157 MMHG | HEIGHT: 71 IN | DIASTOLIC BLOOD PRESSURE: 83 MMHG | TEMPERATURE: 98 F | WEIGHT: 175.6 LBS | HEART RATE: 69 BPM

## 2024-01-23 DIAGNOSIS — C22.1 CHOLANGIOCARCINOMA (HCC): Primary | ICD-10-CM

## 2024-01-23 DIAGNOSIS — K31.84 GASTROPARESIS: ICD-10-CM

## 2024-01-23 PROCEDURE — 3017F COLORECTAL CA SCREEN DOC REV: CPT | Performed by: SURGERY

## 2024-01-23 PROCEDURE — 3077F SYST BP >= 140 MM HG: CPT | Performed by: SURGERY

## 2024-01-23 PROCEDURE — G8427 DOCREV CUR MEDS BY ELIG CLIN: HCPCS | Performed by: SURGERY

## 2024-01-23 PROCEDURE — 99214 OFFICE O/P EST MOD 30 MIN: CPT | Performed by: SURGERY

## 2024-01-23 PROCEDURE — G8482 FLU IMMUNIZE ORDER/ADMIN: HCPCS | Performed by: SURGERY

## 2024-01-23 PROCEDURE — 3079F DIAST BP 80-89 MM HG: CPT | Performed by: SURGERY

## 2024-01-23 PROCEDURE — 1036F TOBACCO NON-USER: CPT | Performed by: SURGERY

## 2024-01-23 PROCEDURE — 1124F ACP DISCUSS-NO DSCNMKR DOCD: CPT | Performed by: SURGERY

## 2024-01-23 PROCEDURE — G8420 CALC BMI NORM PARAMETERS: HCPCS | Performed by: SURGERY

## 2024-01-23 RX ORDER — FAMOTIDINE 20 MG/1
20 TABLET, FILM COATED ORAL DAILY
COMMUNITY
Start: 2023-12-11 | End: 2024-01-24

## 2024-01-24 ENCOUNTER — TELEPHONE (OUTPATIENT)
Dept: SURGERY | Age: 74
End: 2024-01-24

## 2024-01-24 DIAGNOSIS — K21.9 GASTROESOPHAGEAL REFLUX DISEASE WITHOUT ESOPHAGITIS: ICD-10-CM

## 2024-01-24 DIAGNOSIS — C22.1 CHOLANGIOCARCINOMA (HCC): Primary | ICD-10-CM

## 2024-01-24 RX ORDER — METOCLOPRAMIDE 5 MG/1
5 TABLET ORAL 3 TIMES DAILY PRN
Qty: 30 TABLET | Refills: 1 | Status: SHIPPED | OUTPATIENT
Start: 2024-01-24

## 2024-01-24 RX ORDER — PANTOPRAZOLE SODIUM 40 MG/1
40 TABLET, DELAYED RELEASE ORAL
Qty: 90 TABLET | Refills: 1 | Status: SHIPPED | OUTPATIENT
Start: 2024-01-24

## 2024-01-24 NOTE — TELEPHONE ENCOUNTER
Mao called requesting refill of pantoprazole and reglan.   Refills sent to pharmacy on file.   Instructed to call with any questions or concerns. Verbalized understanding.

## 2024-03-08 LAB
Lab: NORMAL
REPORT: NORMAL
THIS TEST SENT TO: NORMAL

## 2024-04-08 LAB
ALBUMIN SERPL-MCNC: 4.4 G/DL
ALP BLD-CCNC: 52 U/L
ALT SERPL-CCNC: 27 U/L
ANION GAP SERPL CALCULATED.3IONS-SCNC: 11.4 MMOL/L
AST SERPL-CCNC: 40 U/L
BASOPHILS ABSOLUTE: ABNORMAL
BASOPHILS RELATIVE PERCENT: 0.9 %
BILIRUB SERPL-MCNC: 0.6 MG/DL (ref 0.1–1.4)
BUN BLDV-MCNC: 14 MG/DL
CALCIUM SERPL-MCNC: 9.7 MG/DL
CHLORIDE BLD-SCNC: 102 MMOL/L
CO2: 27 MMOL/L
CREAT SERPL-MCNC: 0.7 MG/DL
EGFR: 111
EOSINOPHILS ABSOLUTE: ABNORMAL
EOSINOPHILS RELATIVE PERCENT: 1.3 %
GLUCOSE BLD-MCNC: 109 MG/DL
HCT VFR BLD CALC: 29.7 % (ref 41–53)
HEMOGLOBIN: 10.2 G/DL (ref 13.5–17.5)
LYMPHOCYTES ABSOLUTE: 0.6 /ΜL
LYMPHOCYTES RELATIVE PERCENT: 27.2 %
MCH RBC QN AUTO: 35.9 PG
MCHC RBC AUTO-ENTMCNC: 34.3 G/DL
MCV RBC AUTO: 104.6 FL
MONOCYTES ABSOLUTE: ABNORMAL
MONOCYTES RELATIVE PERCENT: 17 %
NEUTROPHILS ABSOLUTE: 1.2 /ΜL
NEUTROPHILS RELATIVE PERCENT: 51.5 %
PDW BLD-RTO: 16.6 %
PLATELET # BLD: 135 K/ΜL
PMV BLD AUTO: ABNORMAL FL
POTASSIUM SERPL-SCNC: 3.4 MMOL/L
RBC # BLD: 2.84 10^6/ΜL
SODIUM BLD-SCNC: 137 MMOL/L
TOTAL PROTEIN: 7.2
WBC # BLD: 2.4 10^3/ML

## 2024-04-09 ENCOUNTER — OFFICE VISIT (OUTPATIENT)
Dept: FAMILY MEDICINE CLINIC | Age: 74
End: 2024-04-09
Payer: MEDICARE

## 2024-04-09 VITALS
OXYGEN SATURATION: 95 % | HEART RATE: 70 BPM | BODY MASS INDEX: 27.31 KG/M2 | DIASTOLIC BLOOD PRESSURE: 78 MMHG | HEIGHT: 67 IN | SYSTOLIC BLOOD PRESSURE: 130 MMHG | WEIGHT: 174 LBS

## 2024-04-09 DIAGNOSIS — I10 ESSENTIAL HYPERTENSION: ICD-10-CM

## 2024-04-09 DIAGNOSIS — K21.9 GASTROESOPHAGEAL REFLUX DISEASE, UNSPECIFIED WHETHER ESOPHAGITIS PRESENT: ICD-10-CM

## 2024-04-09 DIAGNOSIS — E11.00 TYPE 2 DIABETES MELLITUS WITH HYPEROSMOLARITY WITHOUT COMA, WITH LONG-TERM CURRENT USE OF INSULIN (HCC): ICD-10-CM

## 2024-04-09 DIAGNOSIS — C80.1 ADENOCARCINOMA (HCC): ICD-10-CM

## 2024-04-09 DIAGNOSIS — C22.1 CHOLANGIOCARCINOMA (HCC): ICD-10-CM

## 2024-04-09 DIAGNOSIS — Z79.4 TYPE 2 DIABETES MELLITUS WITH HYPEROSMOLARITY WITHOUT COMA, WITH LONG-TERM CURRENT USE OF INSULIN (HCC): ICD-10-CM

## 2024-04-09 DIAGNOSIS — E55.9 VITAMIN D DEFICIENCY: ICD-10-CM

## 2024-04-09 DIAGNOSIS — N40.0 BENIGN PROSTATIC HYPERPLASIA, UNSPECIFIED WHETHER LOWER URINARY TRACT SYMPTOMS PRESENT: ICD-10-CM

## 2024-04-09 DIAGNOSIS — Z76.89 ENCOUNTER TO ESTABLISH CARE: Primary | ICD-10-CM

## 2024-04-09 DIAGNOSIS — M1A.0711 CHRONIC IDIOPATHIC GOUT INVOLVING TOE OF RIGHT FOOT WITH TOPHUS: ICD-10-CM

## 2024-04-09 PROCEDURE — 1036F TOBACCO NON-USER: CPT

## 2024-04-09 PROCEDURE — 36415 COLL VENOUS BLD VENIPUNCTURE: CPT

## 2024-04-09 PROCEDURE — 3046F HEMOGLOBIN A1C LEVEL >9.0%: CPT

## 2024-04-09 PROCEDURE — G2211 COMPLEX E/M VISIT ADD ON: HCPCS

## 2024-04-09 PROCEDURE — G8427 DOCREV CUR MEDS BY ELIG CLIN: HCPCS

## 2024-04-09 PROCEDURE — 3075F SYST BP GE 130 - 139MM HG: CPT

## 2024-04-09 PROCEDURE — 3078F DIAST BP <80 MM HG: CPT

## 2024-04-09 PROCEDURE — 3017F COLORECTAL CA SCREEN DOC REV: CPT

## 2024-04-09 PROCEDURE — 2022F DILAT RTA XM EVC RTNOPTHY: CPT

## 2024-04-09 PROCEDURE — 99204 OFFICE O/P NEW MOD 45 MIN: CPT

## 2024-04-09 PROCEDURE — G8419 CALC BMI OUT NRM PARAM NOF/U: HCPCS

## 2024-04-09 PROCEDURE — 1124F ACP DISCUSS-NO DSCNMKR DOCD: CPT

## 2024-04-09 RX ORDER — LANCETS 30 GAUGE
EACH MISCELLANEOUS
Qty: 100 EACH | Refills: 5 | Status: SHIPPED | OUTPATIENT
Start: 2024-04-09

## 2024-04-09 RX ORDER — ASPIRIN 81 MG/1
81 TABLET ORAL DAILY
COMMUNITY

## 2024-04-09 SDOH — ECONOMIC STABILITY: FOOD INSECURITY: WITHIN THE PAST 12 MONTHS, YOU WORRIED THAT YOUR FOOD WOULD RUN OUT BEFORE YOU GOT MONEY TO BUY MORE.: NEVER TRUE

## 2024-04-09 SDOH — ECONOMIC STABILITY: HOUSING INSECURITY
IN THE LAST 12 MONTHS, WAS THERE A TIME WHEN YOU DID NOT HAVE A STEADY PLACE TO SLEEP OR SLEPT IN A SHELTER (INCLUDING NOW)?: NO

## 2024-04-09 SDOH — ECONOMIC STABILITY: FOOD INSECURITY: WITHIN THE PAST 12 MONTHS, THE FOOD YOU BOUGHT JUST DIDN'T LAST AND YOU DIDN'T HAVE MONEY TO GET MORE.: NEVER TRUE

## 2024-04-09 SDOH — ECONOMIC STABILITY: INCOME INSECURITY: HOW HARD IS IT FOR YOU TO PAY FOR THE VERY BASICS LIKE FOOD, HOUSING, MEDICAL CARE, AND HEATING?: NOT HARD AT ALL

## 2024-04-09 ASSESSMENT — PATIENT HEALTH QUESTIONNAIRE - PHQ9
SUM OF ALL RESPONSES TO PHQ QUESTIONS 1-9: 1
SUM OF ALL RESPONSES TO PHQ9 QUESTIONS 1 & 2: 1
2. FEELING DOWN, DEPRESSED OR HOPELESS: NOT AT ALL
SUM OF ALL RESPONSES TO PHQ QUESTIONS 1-9: 1
1. LITTLE INTEREST OR PLEASURE IN DOING THINGS: SEVERAL DAYS

## 2024-04-09 ASSESSMENT — ENCOUNTER SYMPTOMS
GASTROINTESTINAL NEGATIVE: 1
RESPIRATORY NEGATIVE: 1

## 2024-04-09 NOTE — PROGRESS NOTES
Dilute, (age 12y+), IM, 30 mcg/0.3 mL 09/08/2022    COVID-19, PFIZER PURPLE top, DILUTE for use, (age 12 y+), 30mcg/0.3mL 02/26/2021, 03/19/2021, 09/30/2021    Influenza Virus Vaccine 09/30/2021, 09/30/2021    Influenza, High Dose (Fluzone 65 yrs and older) 10/13/2023    Pneumococcal, PCV-13, PREVNAR 13, (age 6w+), IM, 0.5mL 12/08/2020    TDaP, ADACEL (age 10y-64y), BOOSTRIX (age 10y+), IM, 0.5mL 09/08/2022       Allergies   Allergen Reactions    Amoxicillin-Pot Clavulanate Other (See Comments)     PATIENT DOES NOT REMEMBER EVER HAVING A REACTION,    DOES FINE WITH KEFLEX    Codeine      Pt states he took medication without food and had a reaction, states this gave him chest pain       Outpatient Medications Marked as Taking for the 4/9/24 encounter (Office Visit) with Chago Craft APRN - CNP   Medication Sig Dispense Refill    aspirin 81 MG EC tablet Take 1 tablet by mouth daily      Lancets (ONETOUCH DELICA PLUS [id number]) MISC USE 1 TWICE DAILY 100 each 5    metoclopramide (REGLAN) 5 MG tablet Take 1 tablet by mouth 3 times daily as needed (For nausea/bloating) 30 tablet 1    pantoprazole (PROTONIX) 40 MG tablet Take 1 tablet by mouth every morning (before breakfast) 90 tablet 1    ciprofloxacin (CIPRO) 500 MG tablet Take 1 tablet by mouth 2 times daily      dexamethasone (DECADRON) 4 MG tablet       FEROSUL 325 (65 Fe) MG tablet Take 1 tablet by mouth daily      ondansetron (ZOFRAN-ODT) 8 MG TBDP disintegrating tablet DISSOLVE 1 TABLET IN MOUTH EVERY 8 HOURS THREE TIMES DAILY AS NEEDED FOR NAUSEA      triamterene-hydroCHLOROthiazide (DYAZIDE) 37.5-25 MG per capsule Take 1 capsule by mouth 2 times daily      allopurinol (ZYLOPRIM) 300 MG tablet Take 1 tablet by mouth daily      dicyclomine (BENTYL) 20 MG tablet Take 1 tablet by mouth daily      Cyanocobalamin (B-12) 500 MCG SUBL DISSOLVE 1 TABLET UNDER THE TONGUE ONCE EVERY MONTH      vitamin D (ERGOCALCIFEROL) 1.25 MG (90016 UT) CAPS capsule Take 1 capsule

## 2024-04-10 DIAGNOSIS — E55.9 VITAMIN D DEFICIENCY: Primary | ICD-10-CM

## 2024-04-10 LAB
25(OH)D3 SERPL-MCNC: 23 NG/ML
CHOLEST SERPL-MCNC: 167 MG/DL (ref 0–199)
CREAT UR-MCNC: 65.7 MG/DL (ref 39–259)
EST. AVERAGE GLUCOSE BLD GHB EST-MCNC: 93.9 MG/DL
HBA1C MFR BLD: 4.9 %
HDLC SERPL-MCNC: 37 MG/DL (ref 40–60)
LDLC SERPL CALC-MCNC: 85 MG/DL
MICROALBUMIN UR DL<=1MG/L-MCNC: <1.2 MG/DL
MICROALBUMIN/CREAT UR: NORMAL MG/G (ref 0–30)
TRIGL SERPL-MCNC: 223 MG/DL (ref 0–150)
VLDLC SERPL CALC-MCNC: 45 MG/DL

## 2024-04-10 RX ORDER — ERGOCALCIFEROL 1.25 MG/1
50000 CAPSULE ORAL WEEKLY
Qty: 5 CAPSULE | Refills: 2 | Status: SHIPPED | OUTPATIENT
Start: 2024-04-10

## 2024-04-15 ENCOUNTER — TELEPHONE (OUTPATIENT)
Dept: SURGERY | Age: 74
End: 2024-04-15

## 2024-04-17 ENCOUNTER — TELEPHONE (OUTPATIENT)
Dept: SURGERY | Age: 74
End: 2024-04-17

## 2024-04-26 ENCOUNTER — HOSPITAL ENCOUNTER (OUTPATIENT)
Dept: PET IMAGING | Age: 74
Discharge: HOME | End: 2024-04-26
Payer: MEDICARE

## 2024-04-26 DIAGNOSIS — C25.0 MALIGNANT NEOPLASM OF HEAD OF PANCREAS (HCC): ICD-10-CM

## 2024-04-26 PROCEDURE — 3430000000 HC RX DIAGNOSTIC RADIOPHARMACEUTICAL: Performed by: INTERNAL MEDICINE

## 2024-04-26 PROCEDURE — 78815 PET IMAGE W/CT SKULL-THIGH: CPT

## 2024-04-26 PROCEDURE — A9609 HC RX DIAGNOSTIC RADIOPHARMACEUTICAL: HCPCS | Performed by: INTERNAL MEDICINE

## 2024-04-26 RX ORDER — FLUDEOXYGLUCOSE F 18 200 MCI/ML
15.61 INJECTION, SOLUTION INTRAVENOUS
Status: COMPLETED | OUTPATIENT
Start: 2024-04-26 | End: 2024-04-26

## 2024-04-26 RX ADMIN — FLUDEOXYGLUCOSE F 18 15.61 MILLICURIE: 200 INJECTION, SOLUTION INTRAVENOUS at 10:56

## 2024-04-29 ENCOUNTER — TELEPHONE (OUTPATIENT)
Dept: SURGERY | Age: 74
End: 2024-04-29

## 2024-04-29 NOTE — TELEPHONE ENCOUNTER
Patient called in stating that he had his PET CT scan done at [facility] on 4/26/2024    Patient would like to know if Dr. Mcdonald has read the results and when he should come in for a follow up    Please contact the patient at [phone]   TRANSFER - IN REPORT:    Verbal report received from DAGMAR Palacios on Derick Jane Jr  being received from ED for ordered procedure      Report consisted of patient's Situation, Background, Assessment and   Recommendations(SBAR).     Information from the following report(s) Nurse Handoff Report, Index, Intake/Output, MAR, and Recent Results was reviewed with the receiving nurse.    Opportunity for questions and clarification was provided.      Assessment completed upon patient's arrival to unit and care assumed.

## 2024-05-06 NOTE — PROGRESS NOTES
[facility] SURGICAL ONCOLOGY  [address] [place] [address]  [address]  [facility] [address]  Dept: [phone]  Dept Fax: [phone]    Visit Date: 5/7/2024    HPI:   Sharath Ceja is a 73 y.o. male returns today with his Daughter to follow up on his stage IV intrahepatic cholangiocarcinoma. He is s/p robotic assisted pancreaticoduodenectomy converted to open 6/19/2023. Patient is followed by Dr. Beltran and completed 12 cycles of FOLFIRINOX on 3/20/24 per patient.     Currently patient endorses occasional RLQ abdominal pain. Eating well, maintaining his weight.    Past Medical History:   Diagnosis Date    Adenocarcinoma (HCC) 06/06/2023    8 + LYPMH NODES, WHIPPLE    Asthma     Blood circulation, collateral     Carpal tunnel syndrome     CLL (chronic lymphocytic leukemia) (HCC)     DENIES    Crohn's colitis 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    Hypertension     Liver abscess     DRAINED    Pneumonia     pneumonia,asthma    prostate     infection    Seasonal allergies     Wears glasses      Past Surgical History:   Procedure Laterality Date    CARDIAC CATHETERIZATION      1980S, DR ROWELL, NO ISSUES SINCE, HAS APT 8/14/23    CHOLECYSTECTOMY      CT LIVER ABSCESS ASPIRATION      PT STATES DRAINED LIVER ABSCESS    CYST REMOVAL      KNEE & CLOSE TO RECTUM    ERCP N/A 01/26/2022    ERCP BIOPSY performed by Tab Pandey MD at [facility] ENDOSCOPY    ERCP  01/26/2022    ERCP SPHINCTER/PAPILLOTOMY performed by Tab Pandey MD at [facility] ENDOSCOPY    ERCP  01/26/2022    ERCP STENT INSERTION performed by Tab Pandey MD at [facility] ENDOSCOPY    ERCP N/A 03/11/2022    ERCP STENT REMOVAL performed by Tab Pandey MD at [facility] ENDOSCOPY    ERCP  03/11/2022    ERCP

## 2024-05-07 ENCOUNTER — OFFICE VISIT (OUTPATIENT)
Dept: SURGERY | Age: 74
End: 2024-05-07
Payer: MEDICARE

## 2024-05-07 VITALS
WEIGHT: 176 LBS | HEIGHT: 67 IN | HEART RATE: 64 BPM | BODY MASS INDEX: 27.62 KG/M2 | SYSTOLIC BLOOD PRESSURE: 119 MMHG | DIASTOLIC BLOOD PRESSURE: 71 MMHG | TEMPERATURE: 97 F

## 2024-05-07 DIAGNOSIS — C78.89 METASTATIC CHOLANGIOCARCINOMA TO BILE DUCT (HCC): Primary | ICD-10-CM

## 2024-05-07 DIAGNOSIS — C22.1 METASTATIC CHOLANGIOCARCINOMA TO BILE DUCT (HCC): Primary | ICD-10-CM

## 2024-05-07 PROCEDURE — 1036F TOBACCO NON-USER: CPT | Performed by: SURGERY

## 2024-05-07 PROCEDURE — 3074F SYST BP LT 130 MM HG: CPT | Performed by: SURGERY

## 2024-05-07 PROCEDURE — 3078F DIAST BP <80 MM HG: CPT | Performed by: SURGERY

## 2024-05-07 PROCEDURE — 99213 OFFICE O/P EST LOW 20 MIN: CPT | Performed by: SURGERY

## 2024-05-07 PROCEDURE — G8427 DOCREV CUR MEDS BY ELIG CLIN: HCPCS | Performed by: SURGERY

## 2024-05-07 PROCEDURE — G8419 CALC BMI OUT NRM PARAM NOF/U: HCPCS | Performed by: SURGERY

## 2024-05-07 PROCEDURE — 1124F ACP DISCUSS-NO DSCNMKR DOCD: CPT | Performed by: SURGERY

## 2024-05-07 PROCEDURE — 3017F COLORECTAL CA SCREEN DOC REV: CPT | Performed by: SURGERY

## 2024-05-10 LAB
ALBUMIN SERPL-MCNC: 4.5 G/DL
ALP BLD-CCNC: 55 U/L
ALT SERPL-CCNC: 32 U/L
ANION GAP SERPL CALCULATED.3IONS-SCNC: 11.6 MMOL/L
AST SERPL-CCNC: 71 U/L
BASOPHILS ABSOLUTE: ABNORMAL
BASOPHILS RELATIVE PERCENT: 0.5 %
BILIRUB SERPL-MCNC: 0.7 MG/DL (ref 0.1–1.4)
BUN BLDV-MCNC: 16 MG/DL
CALCIUM SERPL-MCNC: 9.5 MG/DL
CHLORIDE BLD-SCNC: 101 MMOL/L
CO2: 29 MMOL/L
CREAT SERPL-MCNC: 0.5 MG/DL
EGFR: 163
EOSINOPHILS ABSOLUTE: ABNORMAL
EOSINOPHILS RELATIVE PERCENT: 2.8 %
GLUCOSE BLD-MCNC: 165 MG/DL
HCT VFR BLD CALC: 34.7 % (ref 41–53)
HEMOGLOBIN: 12.1 G/DL (ref 13.5–17.5)
LYMPHOCYTES ABSOLUTE: 0.6 /ΜL
LYMPHOCYTES RELATIVE PERCENT: 14.9 %
MCH RBC QN AUTO: 35.4 PG
MCHC RBC AUTO-ENTMCNC: 34.9 G/DL
MCV RBC AUTO: 101.5 FL
MONOCYTES ABSOLUTE: ABNORMAL
MONOCYTES RELATIVE PERCENT: 9.7 %
NEUTROPHILS ABSOLUTE: 2.7 /ΜL
NEUTROPHILS RELATIVE PERCENT: 70.3 %
PDW BLD-RTO: 13 %
PLATELET # BLD: 191 K/ΜL
PMV BLD AUTO: ABNORMAL FL
POTASSIUM SERPL-SCNC: 3.6 MMOL/L
PROSTATE SPECIFIC ANTIGEN: 0.9 NG/ML
RBC # BLD: 3.42 10^6/ΜL
SODIUM BLD-SCNC: 138 MMOL/L
TOTAL PROTEIN: 7.5
WBC # BLD: 3.9 10^3/ML

## 2024-05-14 RX ORDER — DICYCLOMINE HCL 20 MG
20 TABLET ORAL DAILY
Qty: 90 TABLET | Refills: 0 | Status: SHIPPED | OUTPATIENT
Start: 2024-05-14

## 2024-05-14 RX ORDER — TRIAMTERENE AND HYDROCHLOROTHIAZIDE 37.5; 25 MG/1; MG/1
1 CAPSULE ORAL 2 TIMES DAILY
Qty: 180 CAPSULE | Refills: 0 | Status: SHIPPED | OUTPATIENT
Start: 2024-05-14

## 2024-05-14 RX ORDER — GEMFIBROZIL 600 MG/1
600 TABLET, FILM COATED ORAL
Qty: 60 TABLET | Refills: 5 | Status: SHIPPED | OUTPATIENT
Start: 2024-05-14

## 2024-06-04 DIAGNOSIS — E11.9 TYPE 2 DIABETES MELLITUS WITHOUT COMPLICATION, WITH LONG-TERM CURRENT USE OF INSULIN (HCC): Primary | ICD-10-CM

## 2024-06-04 DIAGNOSIS — Z79.4 TYPE 2 DIABETES MELLITUS WITHOUT COMPLICATION, WITH LONG-TERM CURRENT USE OF INSULIN (HCC): Primary | ICD-10-CM

## 2024-06-04 RX ORDER — BLOOD SUGAR DIAGNOSTIC
STRIP MISCELLANEOUS
Qty: 200 EACH | Refills: 2 | Status: SHIPPED | OUTPATIENT
Start: 2024-06-04

## 2024-06-10 RX ORDER — ALBUTEROL SULFATE 90 UG/1
2 AEROSOL, METERED RESPIRATORY (INHALATION) 4 TIMES DAILY PRN
Qty: 18 G | Refills: 3 | Status: SHIPPED | OUTPATIENT
Start: 2024-06-10

## 2024-06-10 RX ORDER — LANCETS 30 GAUGE
EACH MISCELLANEOUS
Qty: 100 EACH | Refills: 5 | Status: SHIPPED | OUTPATIENT
Start: 2024-06-10

## 2024-06-18 NOTE — TELEPHONE ENCOUNTER
Pt states he is taking 20 units twice daily, needs a refill, not sure if you want to change dosage?

## 2024-07-09 ENCOUNTER — TELEPHONE (OUTPATIENT)
Dept: SURGERY | Age: 74
End: 2024-07-09

## 2024-07-09 ENCOUNTER — OFFICE VISIT (OUTPATIENT)
Dept: FAMILY MEDICINE CLINIC | Age: 74
End: 2024-07-09
Payer: MEDICARE

## 2024-07-09 VITALS
SYSTOLIC BLOOD PRESSURE: 120 MMHG | HEART RATE: 66 BPM | HEIGHT: 71 IN | DIASTOLIC BLOOD PRESSURE: 69 MMHG | OXYGEN SATURATION: 99 % | BODY MASS INDEX: 24.33 KG/M2 | WEIGHT: 173.8 LBS

## 2024-07-09 DIAGNOSIS — E11.00 TYPE 2 DIABETES MELLITUS WITH HYPEROSMOLARITY WITHOUT COMA, WITH LONG-TERM CURRENT USE OF INSULIN (HCC): ICD-10-CM

## 2024-07-09 DIAGNOSIS — I10 ESSENTIAL HYPERTENSION: ICD-10-CM

## 2024-07-09 DIAGNOSIS — C80.1 ADENOCARCINOMA (HCC): ICD-10-CM

## 2024-07-09 DIAGNOSIS — Z00.00 INITIAL MEDICARE ANNUAL WELLNESS VISIT: Primary | ICD-10-CM

## 2024-07-09 DIAGNOSIS — K21.9 GASTROESOPHAGEAL REFLUX DISEASE, UNSPECIFIED WHETHER ESOPHAGITIS PRESENT: ICD-10-CM

## 2024-07-09 DIAGNOSIS — M1A.0711 CHRONIC IDIOPATHIC GOUT INVOLVING TOE OF RIGHT FOOT WITH TOPHUS: ICD-10-CM

## 2024-07-09 DIAGNOSIS — K50.80 CROHN'S DISEASE OF BOTH SMALL AND LARGE INTESTINE WITHOUT COMPLICATIONS (HCC): ICD-10-CM

## 2024-07-09 DIAGNOSIS — C22.1 CHOLANGIOCARCINOMA (HCC): ICD-10-CM

## 2024-07-09 DIAGNOSIS — N40.0 BENIGN PROSTATIC HYPERPLASIA, UNSPECIFIED WHETHER LOWER URINARY TRACT SYMPTOMS PRESENT: ICD-10-CM

## 2024-07-09 DIAGNOSIS — Z79.4 TYPE 2 DIABETES MELLITUS WITH HYPEROSMOLARITY WITHOUT COMA, WITH LONG-TERM CURRENT USE OF INSULIN (HCC): ICD-10-CM

## 2024-07-09 PROBLEM — E44.0 MODERATE PROTEIN-CALORIE MALNUTRITION (HCC): Status: RESOLVED | Noted: 2022-03-11 | Resolved: 2024-07-09

## 2024-07-09 PROBLEM — E11.22 TYPE 2 DIABETES MELLITUS WITH CHRONIC KIDNEY DISEASE (HCC): Status: ACTIVE | Noted: 2024-07-09

## 2024-07-09 LAB
CHOLEST SERPL-MCNC: 119 MG/DL (ref 0–199)
HDLC SERPL-MCNC: 36 MG/DL (ref 40–60)
LDLC SERPL CALC-MCNC: 65 MG/DL
TRIGL SERPL-MCNC: 91 MG/DL (ref 0–150)
URATE SERPL-MCNC: 4.5 MG/DL (ref 3.5–7.2)
VLDLC SERPL CALC-MCNC: 18 MG/DL

## 2024-07-09 PROCEDURE — G0438 PPPS, INITIAL VISIT: HCPCS

## 2024-07-09 PROCEDURE — G8427 DOCREV CUR MEDS BY ELIG CLIN: HCPCS

## 2024-07-09 PROCEDURE — 99497 ADVNCD CARE PLAN 30 MIN: CPT

## 2024-07-09 PROCEDURE — 99215 OFFICE O/P EST HI 40 MIN: CPT

## 2024-07-09 PROCEDURE — 3044F HG A1C LEVEL LT 7.0%: CPT

## 2024-07-09 PROCEDURE — G8420 CALC BMI NORM PARAMETERS: HCPCS

## 2024-07-09 PROCEDURE — 3017F COLORECTAL CA SCREEN DOC REV: CPT

## 2024-07-09 PROCEDURE — 2022F DILAT RTA XM EVC RTNOPTHY: CPT

## 2024-07-09 PROCEDURE — 3074F SYST BP LT 130 MM HG: CPT

## 2024-07-09 PROCEDURE — 3078F DIAST BP <80 MM HG: CPT

## 2024-07-09 PROCEDURE — 1124F ACP DISCUSS-NO DSCNMKR DOCD: CPT

## 2024-07-09 PROCEDURE — 36415 COLL VENOUS BLD VENIPUNCTURE: CPT

## 2024-07-09 PROCEDURE — 1036F TOBACCO NON-USER: CPT

## 2024-07-09 RX ORDER — METOCLOPRAMIDE 5 MG/1
5 TABLET ORAL 2 TIMES DAILY
Qty: 60 TABLET | Refills: 1 | Status: SHIPPED | OUTPATIENT
Start: 2024-07-09

## 2024-07-09 RX ORDER — BLOOD SUGAR DIAGNOSTIC
STRIP MISCELLANEOUS
Qty: 400 EACH | Refills: 2 | Status: SHIPPED | OUTPATIENT
Start: 2024-07-09

## 2024-07-09 ASSESSMENT — LIFESTYLE VARIABLES: HOW OFTEN DO YOU HAVE A DRINK CONTAINING ALCOHOL: NEVER

## 2024-07-09 ASSESSMENT — PATIENT HEALTH QUESTIONNAIRE - PHQ9
1. LITTLE INTEREST OR PLEASURE IN DOING THINGS: NOT AT ALL
SUM OF ALL RESPONSES TO PHQ QUESTIONS 1-9: 0
SUM OF ALL RESPONSES TO PHQ9 QUESTIONS 1 & 2: 0
SUM OF ALL RESPONSES TO PHQ QUESTIONS 1-9: 0
2. FEELING DOWN, DEPRESSED OR HOPELESS: NOT AT ALL

## 2024-07-09 NOTE — PROGRESS NOTES
Medicare Annual Wellness Visit    Sharath Ceja is here for Medicare AWV (3 month follow/ AWV)    Assessment & Plan   Initial Medicare annual wellness visit  Type 2 diabetes mellitus with hyperosmolarity without coma, with long-term current use of insulin (HCC)  Repeat labs today.Sensory exam of the foot is normal, tested with the monofilament. Good pulses, no lesions or ulcers, good peripheral pulses..  Being continue on current treatment plan.  -     LIPID PANEL  -     Hemoglobin A1C  -      DIABETES FOOT EXAM  -     ONETOUCH ULTRA strip; USE 1 STRIP TO CHECK GLUCOSE TWICE DAILY, Disp-400 each, R-2, DAWNormal  Adenocarcinoma (HCC)  Continue following oncology as planned.  Cholangiocarcinoma (HCC)  Continue following oncology as planned.  -     metoclopramide (REGLAN) 5 MG tablet; Take 1 tablet by mouth in the morning and at bedtime, Disp-60 tablet, R-1Normal  Essential hypertension  BP stable at this time at 120/69.  Continue on current treatment plan.  Continue following cardiology as advised.  -     LIPID PANEL  Benign prostatic hyperplasia, unspecified whether lower urinary tract symptoms present  Controlled on current treatment plan.  No changes.  Most recent PSA normal.  Gastroesophageal reflux disease, unspecified whether esophagitis present  Controlled on current treatment plan.  Reglan refilled today.  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  For the time being continue on current treatment plan.  -Uric acid  Crohn's disease of both small and large intestine without complications (HCC)  Medication refill today.  Continue following GI and oncology/hematology as planned.  -     metoclopramide (REGLAN) 5 MG tablet; Take 1 tablet by mouth in the morning and at bedtime, Disp-60 tablet, R-1Normal  Recommendations for Preventive Services

## 2024-07-09 NOTE — TELEPHONE ENCOUNTER
Patient called in requesting to have an order for a CT put into his chart so he can have that completed before his next office visit    Please contact the patient at [phone]

## 2024-07-10 DIAGNOSIS — C22.1 METASTATIC CHOLANGIOCARCINOMA TO BILE DUCT (HCC): Primary | ICD-10-CM

## 2024-07-10 DIAGNOSIS — C22.1 CHOLANGIOCARCINOMA (HCC): ICD-10-CM

## 2024-07-10 DIAGNOSIS — C78.89 METASTATIC CHOLANGIOCARCINOMA TO BILE DUCT (HCC): Primary | ICD-10-CM

## 2024-07-10 LAB
EST. AVERAGE GLUCOSE BLD GHB EST-MCNC: 91.1 MG/DL
HBA1C MFR BLD: 4.8 %

## 2024-07-10 NOTE — TELEPHONE ENCOUNTER
MA called patient and let him know that we out the order in for his CT Scan and MA offered to call and schedule it for him and he said he could do it because he keeps track of all of his appointment and MA told him that was fine and we would see him at his appointment.

## 2024-07-29 DIAGNOSIS — K21.9 GASTROESOPHAGEAL REFLUX DISEASE, UNSPECIFIED WHETHER ESOPHAGITIS PRESENT: ICD-10-CM

## 2024-07-29 DIAGNOSIS — K50.80 CROHN'S DISEASE OF BOTH SMALL AND LARGE INTESTINE WITHOUT COMPLICATIONS (HCC): ICD-10-CM

## 2024-07-29 DIAGNOSIS — C22.1 CHOLANGIOCARCINOMA (HCC): ICD-10-CM

## 2024-07-29 DIAGNOSIS — K21.9 GASTROESOPHAGEAL REFLUX DISEASE WITHOUT ESOPHAGITIS: ICD-10-CM

## 2024-07-29 RX ORDER — METOCLOPRAMIDE 5 MG/1
5 TABLET ORAL 2 TIMES DAILY
Qty: 60 TABLET | Refills: 1 | Status: SHIPPED | OUTPATIENT
Start: 2024-07-29

## 2024-07-29 RX ORDER — PANTOPRAZOLE SODIUM 40 MG/1
40 TABLET, DELAYED RELEASE ORAL
Qty: 90 TABLET | Refills: 1 | Status: SHIPPED | OUTPATIENT
Start: 2024-07-29

## 2024-07-30 ENCOUNTER — HOSPITAL ENCOUNTER (OUTPATIENT)
Dept: CT IMAGING | Age: 74
Discharge: HOME | End: 2024-07-30
Payer: MEDICARE

## 2024-07-30 ENCOUNTER — HOSPITAL ENCOUNTER (OUTPATIENT)
Age: 74
Discharge: HOME | End: 2024-07-30
Payer: MEDICARE

## 2024-07-30 DIAGNOSIS — C22.1 CHOLANGIOCARCINOMA (HCC): ICD-10-CM

## 2024-07-30 DIAGNOSIS — C22.1 METASTATIC CHOLANGIOCARCINOMA TO BILE DUCT (HCC): ICD-10-CM

## 2024-07-30 DIAGNOSIS — C78.89 METASTATIC CHOLANGIOCARCINOMA TO BILE DUCT (HCC): ICD-10-CM

## 2024-07-30 LAB
CREAT SERPL-MCNC: <0.5 MG/DL (ref 0.8–1.3)
GFR SERPLBLD CREATININE-BSD FMLA CKD-EPI: >90 ML/MIN/{1.73_M2}

## 2024-07-30 PROCEDURE — 82565 ASSAY OF CREATININE: CPT

## 2024-07-30 PROCEDURE — 36415 COLL VENOUS BLD VENIPUNCTURE: CPT

## 2024-07-30 PROCEDURE — 71260 CT THORAX DX C+: CPT

## 2024-07-30 PROCEDURE — 6360000004 HC RX CONTRAST MEDICATION: Performed by: NURSE PRACTITIONER

## 2024-07-30 RX ADMIN — IOMEPROL INJECTION 75 ML: 714 INJECTION, SOLUTION INTRAVASCULAR at 11:02

## 2024-07-30 RX ADMIN — DIATRIZOATE MEGLUMINE AND DIATRIZOATE SODIUM 12 ML: 660; 100 LIQUID ORAL; RECTAL at 10:54

## 2024-08-09 ENCOUNTER — OFFICE VISIT (OUTPATIENT)
Dept: SURGERY | Age: 74
End: 2024-08-09
Payer: MEDICARE

## 2024-08-09 VITALS
DIASTOLIC BLOOD PRESSURE: 81 MMHG | BODY MASS INDEX: 24.22 KG/M2 | HEART RATE: 63 BPM | SYSTOLIC BLOOD PRESSURE: 137 MMHG | HEIGHT: 71 IN | TEMPERATURE: 97.3 F | WEIGHT: 173 LBS

## 2024-08-09 DIAGNOSIS — C22.1 CHOLANGIOCARCINOMA (HCC): Primary | ICD-10-CM

## 2024-08-09 PROCEDURE — G8420 CALC BMI NORM PARAMETERS: HCPCS | Performed by: SURGERY

## 2024-08-09 PROCEDURE — 3075F SYST BP GE 130 - 139MM HG: CPT | Performed by: SURGERY

## 2024-08-09 PROCEDURE — 1036F TOBACCO NON-USER: CPT | Performed by: SURGERY

## 2024-08-09 PROCEDURE — G8427 DOCREV CUR MEDS BY ELIG CLIN: HCPCS | Performed by: SURGERY

## 2024-08-09 PROCEDURE — 3017F COLORECTAL CA SCREEN DOC REV: CPT | Performed by: SURGERY

## 2024-08-09 PROCEDURE — 1124F ACP DISCUSS-NO DSCNMKR DOCD: CPT | Performed by: SURGERY

## 2024-08-09 PROCEDURE — 3079F DIAST BP 80-89 MM HG: CPT | Performed by: SURGERY

## 2024-08-09 PROCEDURE — 99213 OFFICE O/P EST LOW 20 MIN: CPT | Performed by: SURGERY

## 2024-08-12 RX ORDER — DICYCLOMINE HCL 20 MG
20 TABLET ORAL DAILY
Qty: 90 TABLET | Refills: 0 | Status: SHIPPED | OUTPATIENT
Start: 2024-08-12

## 2024-08-12 RX ORDER — TRIAMTERENE AND HYDROCHLOROTHIAZIDE 37.5; 25 MG/1; MG/1
1 CAPSULE ORAL 2 TIMES DAILY
Qty: 180 CAPSULE | Refills: 3 | Status: SHIPPED | OUTPATIENT
Start: 2024-08-12

## 2024-09-10 DIAGNOSIS — Z79.4 TYPE 2 DIABETES MELLITUS WITH HYPEROSMOLARITY WITHOUT COMA, WITH LONG-TERM CURRENT USE OF INSULIN (HCC): Primary | ICD-10-CM

## 2024-09-10 DIAGNOSIS — E11.00 TYPE 2 DIABETES MELLITUS WITH HYPEROSMOLARITY WITHOUT COMA, WITH LONG-TERM CURRENT USE OF INSULIN (HCC): Primary | ICD-10-CM

## 2024-09-10 RX ORDER — METFORMIN HCL 500 MG
500 TABLET, EXTENDED RELEASE 24 HR ORAL 2 TIMES DAILY
Qty: 180 TABLET | Refills: 3 | Status: SHIPPED | OUTPATIENT
Start: 2024-09-10

## 2024-09-30 DIAGNOSIS — M1A.09X0 CHRONIC GOUT OF MULTIPLE SITES, UNSPECIFIED CAUSE: Primary | ICD-10-CM

## 2024-09-30 RX ORDER — ALLOPURINOL 300 MG/1
300 TABLET ORAL DAILY
Qty: 90 TABLET | Refills: 0 | Status: SHIPPED | OUTPATIENT
Start: 2024-09-30 | End: 2024-12-29

## 2024-10-19 DIAGNOSIS — C22.1 CHOLANGIOCARCINOMA (HCC): ICD-10-CM

## 2024-10-19 DIAGNOSIS — K50.80 CROHN'S DISEASE OF BOTH SMALL AND LARGE INTESTINE WITHOUT COMPLICATIONS (HCC): ICD-10-CM

## 2024-10-19 DIAGNOSIS — K21.9 GASTROESOPHAGEAL REFLUX DISEASE, UNSPECIFIED WHETHER ESOPHAGITIS PRESENT: ICD-10-CM

## 2024-10-21 RX ORDER — METOCLOPRAMIDE 5 MG/1
5 TABLET ORAL 2 TIMES DAILY
Qty: 60 TABLET | Refills: 0 | Status: SHIPPED | OUTPATIENT
Start: 2024-10-21

## 2024-10-31 DIAGNOSIS — E11.00 TYPE 2 DIABETES MELLITUS WITH HYPEROSMOLARITY WITHOUT COMA, WITH LONG-TERM CURRENT USE OF INSULIN (HCC): Primary | ICD-10-CM

## 2024-10-31 DIAGNOSIS — Z79.4 TYPE 2 DIABETES MELLITUS WITH HYPEROSMOLARITY WITHOUT COMA, WITH LONG-TERM CURRENT USE OF INSULIN (HCC): Primary | ICD-10-CM

## 2024-10-31 RX ORDER — LANCETS 30 GAUGE
EACH MISCELLANEOUS
Qty: 100 EACH | Refills: 5 | Status: ON HOLD | OUTPATIENT
Start: 2024-10-31

## 2024-11-03 ENCOUNTER — APPOINTMENT (OUTPATIENT)
Dept: CT IMAGING | Age: 74
DRG: 445 | End: 2024-11-03
Payer: MEDICARE

## 2024-11-03 ENCOUNTER — HOSPITAL ENCOUNTER (INPATIENT)
Age: 74
LOS: 8 days | Discharge: HOME | DRG: 445 | End: 2024-11-11
Attending: EMERGENCY MEDICINE | Admitting: INTERNAL MEDICINE
Payer: MEDICARE

## 2024-11-03 DIAGNOSIS — M79.662 BILATERAL CALF PAIN: ICD-10-CM

## 2024-11-03 DIAGNOSIS — Z85.09 HISTORY OF CHOLANGIOCARCINOMA: ICD-10-CM

## 2024-11-03 DIAGNOSIS — M79.661 BILATERAL CALF PAIN: ICD-10-CM

## 2024-11-03 DIAGNOSIS — Z90.410 H/O WHIPPLE PROCEDURE: ICD-10-CM

## 2024-11-03 DIAGNOSIS — Z90.49 H/O WHIPPLE PROCEDURE: ICD-10-CM

## 2024-11-03 DIAGNOSIS — K83.1 BILIARY OBSTRUCTION: Primary | ICD-10-CM

## 2024-11-03 DIAGNOSIS — K65.1 RIGHT UPPER QUADRANT ABDOMINAL ABSCESS (HCC): ICD-10-CM

## 2024-11-03 DIAGNOSIS — R82.998 DARK URINE: ICD-10-CM

## 2024-11-03 LAB
ALBUMIN SERPL-MCNC: 4.4 G/DL (ref 3.4–5)
ALBUMIN/GLOB SERPL: 1.9 {RATIO} (ref 1.1–2.2)
ALP SERPL-CCNC: 504 U/L (ref 40–129)
ALT SERPL-CCNC: 351 U/L (ref 10–40)
ANION GAP SERPL CALCULATED.3IONS-SCNC: 14 MMOL/L (ref 3–16)
AST SERPL-CCNC: 269 U/L (ref 15–37)
BASOPHILS # BLD: 0 K/UL (ref 0–0.2)
BASOPHILS NFR BLD: 0.2 %
BILIRUB SERPL-MCNC: 1.1 MG/DL (ref 0–1)
BILIRUB UR QL STRIP.AUTO: ABNORMAL
BUN SERPL-MCNC: 11 MG/DL (ref 7–20)
CALCIUM SERPL-MCNC: 9.7 MG/DL (ref 8.3–10.6)
CHLORIDE SERPL-SCNC: 91 MMOL/L (ref 99–110)
CLARITY UR: CLEAR
CO2 SERPL-SCNC: 27 MMOL/L (ref 21–32)
COLOR UR: YELLOW
CREAT SERPL-MCNC: 0.6 MG/DL (ref 0.8–1.3)
DEPRECATED RDW RBC AUTO: 13.1 % (ref 12.4–15.4)
EOSINOPHIL # BLD: 0.1 K/UL (ref 0–0.6)
EOSINOPHIL NFR BLD: 1.3 %
EPI CELLS #/AREA URNS HPF: ABNORMAL /HPF (ref 0–5)
GFR SERPLBLD CREATININE-BSD FMLA CKD-EPI: >90 ML/MIN/{1.73_M2}
GLUCOSE BLD-MCNC: 121 MG/DL (ref 70–99)
GLUCOSE SERPL-MCNC: 90 MG/DL (ref 70–99)
GLUCOSE UR STRIP.AUTO-MCNC: NEGATIVE MG/DL
HCT VFR BLD AUTO: 36.6 % (ref 40.5–52.5)
HGB BLD-MCNC: 13 G/DL (ref 13.5–17.5)
HGB UR QL STRIP.AUTO: NEGATIVE
KETONES UR STRIP.AUTO-MCNC: ABNORMAL MG/DL
LEUKOCYTE ESTERASE UR QL STRIP.AUTO: ABNORMAL
LIPASE SERPL-CCNC: 11 U/L (ref 13–60)
LYMPHOCYTES # BLD: 0.6 K/UL (ref 1–5.1)
LYMPHOCYTES NFR BLD: 8.7 %
MCH RBC QN AUTO: 36.2 PG (ref 26–34)
MCHC RBC AUTO-ENTMCNC: 35.5 G/DL (ref 31–36)
MCV RBC AUTO: 101.9 FL (ref 80–100)
MONOCYTES # BLD: 0.6 K/UL (ref 0–1.3)
MONOCYTES NFR BLD: 8.5 %
NEUTROPHILS # BLD: 5.5 K/UL (ref 1.7–7.7)
NEUTROPHILS NFR BLD: 81.3 %
NITRITE UR QL STRIP.AUTO: NEGATIVE
PERFORMED ON: ABNORMAL
PH UR STRIP.AUTO: 6 [PH] (ref 5–8)
PLATELET # BLD AUTO: 190 K/UL (ref 135–450)
PMV BLD AUTO: 6.6 FL (ref 5–10.5)
POTASSIUM SERPL-SCNC: 3.7 MMOL/L (ref 3.5–5.1)
PROT SERPL-MCNC: 6.7 G/DL (ref 6.4–8.2)
PROT UR STRIP.AUTO-MCNC: NEGATIVE MG/DL
RBC # BLD AUTO: 3.59 M/UL (ref 4.2–5.9)
RBC #/AREA URNS HPF: ABNORMAL /HPF (ref 0–4)
SODIUM SERPL-SCNC: 132 MMOL/L (ref 136–145)
SP GR UR STRIP.AUTO: 1.02 (ref 1–1.03)
UA DIPSTICK W REFLEX MICRO PNL UR: YES
URN SPEC COLLECT METH UR: ABNORMAL
UROBILINOGEN UR STRIP-ACNC: 2 E.U./DL
WBC # BLD AUTO: 6.8 K/UL (ref 4–11)
WBC #/AREA URNS HPF: ABNORMAL /HPF (ref 0–5)

## 2024-11-03 PROCEDURE — 2580000003 HC RX 258: Performed by: STUDENT IN AN ORGANIZED HEALTH CARE EDUCATION/TRAINING PROGRAM

## 2024-11-03 PROCEDURE — 80053 COMPREHEN METABOLIC PANEL: CPT

## 2024-11-03 PROCEDURE — 83690 ASSAY OF LIPASE: CPT

## 2024-11-03 PROCEDURE — 74176 CT ABD & PELVIS W/O CONTRAST: CPT

## 2024-11-03 PROCEDURE — 94761 N-INVAS EAR/PLS OXIMETRY MLT: CPT

## 2024-11-03 PROCEDURE — 36415 COLL VENOUS BLD VENIPUNCTURE: CPT

## 2024-11-03 PROCEDURE — 6360000002 HC RX W HCPCS: Performed by: STUDENT IN AN ORGANIZED HEALTH CARE EDUCATION/TRAINING PROGRAM

## 2024-11-03 PROCEDURE — 1200000000 HC SEMI PRIVATE

## 2024-11-03 PROCEDURE — 94640 AIRWAY INHALATION TREATMENT: CPT

## 2024-11-03 PROCEDURE — 85025 COMPLETE CBC W/AUTO DIFF WBC: CPT

## 2024-11-03 PROCEDURE — 6370000000 HC RX 637 (ALT 250 FOR IP): Performed by: NURSE PRACTITIONER

## 2024-11-03 PROCEDURE — 81001 URINALYSIS AUTO W/SCOPE: CPT

## 2024-11-03 PROCEDURE — 93005 ELECTROCARDIOGRAM TRACING: CPT | Performed by: EMERGENCY MEDICINE

## 2024-11-03 PROCEDURE — 6360000002 HC RX W HCPCS: Performed by: NURSE PRACTITIONER

## 2024-11-03 PROCEDURE — 87086 URINE CULTURE/COLONY COUNT: CPT

## 2024-11-03 PROCEDURE — 99285 EMERGENCY DEPT VISIT HI MDM: CPT

## 2024-11-03 RX ORDER — ALBUTEROL SULFATE 90 UG/1
2 INHALANT RESPIRATORY (INHALATION)
Status: DISCONTINUED | OUTPATIENT
Start: 2024-11-04 | End: 2024-11-11 | Stop reason: HOSPADM

## 2024-11-03 RX ORDER — FAMOTIDINE 20 MG/1
20 TABLET, FILM COATED ORAL NIGHTLY
Status: DISCONTINUED | OUTPATIENT
Start: 2024-11-03 | End: 2024-11-11 | Stop reason: HOSPADM

## 2024-11-03 RX ORDER — ONDANSETRON 4 MG/1
4 TABLET, ORALLY DISINTEGRATING ORAL EVERY 8 HOURS PRN
Status: DISCONTINUED | OUTPATIENT
Start: 2024-11-03 | End: 2024-11-11 | Stop reason: HOSPADM

## 2024-11-03 RX ORDER — ALBUTEROL SULFATE 90 UG/1
2 INHALANT RESPIRATORY (INHALATION) 4 TIMES DAILY PRN
Status: DISCONTINUED | OUTPATIENT
Start: 2024-11-03 | End: 2024-11-11 | Stop reason: HOSPADM

## 2024-11-03 RX ORDER — SODIUM CHLORIDE 9 MG/ML
INJECTION, SOLUTION INTRAVENOUS PRN
Status: DISCONTINUED | OUTPATIENT
Start: 2024-11-03 | End: 2024-11-11 | Stop reason: HOSPADM

## 2024-11-03 RX ORDER — MORPHINE SULFATE 2 MG/ML
2 INJECTION, SOLUTION INTRAMUSCULAR; INTRAVENOUS EVERY 4 HOURS PRN
Status: DISCONTINUED | OUTPATIENT
Start: 2024-11-03 | End: 2024-11-07 | Stop reason: ALTCHOICE

## 2024-11-03 RX ORDER — POTASSIUM CHLORIDE 1500 MG/1
40 TABLET, EXTENDED RELEASE ORAL PRN
Status: DISCONTINUED | OUTPATIENT
Start: 2024-11-03 | End: 2024-11-03

## 2024-11-03 RX ORDER — PANTOPRAZOLE SODIUM 40 MG/1
40 TABLET, DELAYED RELEASE ORAL
Status: DISCONTINUED | OUTPATIENT
Start: 2024-11-04 | End: 2024-11-11 | Stop reason: HOSPADM

## 2024-11-03 RX ORDER — ACETAMINOPHEN 325 MG/1
650 TABLET ORAL EVERY 6 HOURS PRN
Status: DISCONTINUED | OUTPATIENT
Start: 2024-11-03 | End: 2024-11-03

## 2024-11-03 RX ORDER — POTASSIUM CHLORIDE 7.45 MG/ML
10 INJECTION INTRAVENOUS PRN
Status: DISCONTINUED | OUTPATIENT
Start: 2024-11-03 | End: 2024-11-03

## 2024-11-03 RX ORDER — SODIUM CHLORIDE 0.9 % (FLUSH) 0.9 %
5-40 SYRINGE (ML) INJECTION EVERY 12 HOURS SCHEDULED
Status: DISCONTINUED | OUTPATIENT
Start: 2024-11-03 | End: 2024-11-11 | Stop reason: HOSPADM

## 2024-11-03 RX ORDER — FAMOTIDINE 20 MG/1
20 TABLET, FILM COATED ORAL DAILY
COMMUNITY

## 2024-11-03 RX ORDER — ALLOPURINOL 300 MG/1
300 TABLET ORAL DAILY
Status: DISCONTINUED | OUTPATIENT
Start: 2024-11-03 | End: 2024-11-11 | Stop reason: HOSPADM

## 2024-11-03 RX ORDER — ACETAMINOPHEN 650 MG/1
650 SUPPOSITORY RECTAL EVERY 6 HOURS PRN
Status: DISCONTINUED | OUTPATIENT
Start: 2024-11-03 | End: 2024-11-03

## 2024-11-03 RX ORDER — FINASTERIDE 5 MG/1
5 TABLET, FILM COATED ORAL EVERY EVENING
Status: DISCONTINUED | OUTPATIENT
Start: 2024-11-03 | End: 2024-11-11 | Stop reason: HOSPADM

## 2024-11-03 RX ORDER — SODIUM CHLORIDE 0.9 % (FLUSH) 0.9 %
5-40 SYRINGE (ML) INJECTION PRN
Status: DISCONTINUED | OUTPATIENT
Start: 2024-11-03 | End: 2024-11-11 | Stop reason: HOSPADM

## 2024-11-03 RX ORDER — ONDANSETRON 2 MG/ML
4 INJECTION INTRAMUSCULAR; INTRAVENOUS EVERY 6 HOURS PRN
Status: DISCONTINUED | OUTPATIENT
Start: 2024-11-03 | End: 2024-11-11 | Stop reason: HOSPADM

## 2024-11-03 RX ORDER — MAGNESIUM SULFATE IN WATER 40 MG/ML
2000 INJECTION, SOLUTION INTRAVENOUS PRN
Status: DISCONTINUED | OUTPATIENT
Start: 2024-11-03 | End: 2024-11-03

## 2024-11-03 RX ORDER — ENOXAPARIN SODIUM 100 MG/ML
40 INJECTION SUBCUTANEOUS NIGHTLY
Status: DISCONTINUED | OUTPATIENT
Start: 2024-11-03 | End: 2024-11-11 | Stop reason: HOSPADM

## 2024-11-03 RX ORDER — POLYETHYLENE GLYCOL 3350 17 G/17G
17 POWDER, FOR SOLUTION ORAL DAILY PRN
Status: DISCONTINUED | OUTPATIENT
Start: 2024-11-03 | End: 2024-11-11 | Stop reason: HOSPADM

## 2024-11-03 RX ADMIN — ALLOPURINOL 300 MG: 300 TABLET ORAL at 21:31

## 2024-11-03 RX ADMIN — PIPERACILLIN AND TAZOBACTAM 4500 MG: 4; .5 INJECTION, POWDER, LYOPHILIZED, FOR SOLUTION INTRAVENOUS; PARENTERAL at 21:33

## 2024-11-03 RX ADMIN — ALBUTEROL SULFATE 2 PUFF: 90 AEROSOL, METERED RESPIRATORY (INHALATION) at 20:14

## 2024-11-03 RX ADMIN — FAMOTIDINE 20 MG: 20 TABLET ORAL at 21:31

## 2024-11-03 RX ADMIN — ENOXAPARIN SODIUM 40 MG: 100 INJECTION SUBCUTANEOUS at 21:31

## 2024-11-03 RX ADMIN — FINASTERIDE 5 MG: 5 TABLET, FILM COATED ORAL at 21:31

## 2024-11-03 ASSESSMENT — PAIN - FUNCTIONAL ASSESSMENT: PAIN_FUNCTIONAL_ASSESSMENT: NONE - DENIES PAIN

## 2024-11-03 ASSESSMENT — LIFESTYLE VARIABLES: HOW OFTEN DO YOU HAVE A DRINK CONTAINING ALCOHOL: NEVER

## 2024-11-03 NOTE — CONSULTS
Consult received. Spoke with ER provider.  Chart reviewed.   Formal consult to follow tomorrow.  Please call with questions and concerns before then.

## 2024-11-03 NOTE — ED PROVIDER NOTES
biliary strictures, stones, in the past given his history of cholangiocarcinoma.  Right now he has no leukocytosis, and the CT scan does show intrahepatic ductal dilatation which is new since July.  Will discuss with patient's GI doctor, Dr. Martinez.     4:08 PM EST  Spoke with Kaela BECERRA, and agrees with plan.  N.p.o. at midnight.  Plan for likely ERCP tomorrow.  She will order antibiotics if she feels it is necessary after chart review.    As this patient requires further evaluation and management as above, this patient will be admitted to the hospital for subsequent care. I spoke with Dr. Birmingham who accepts patient for admission. They are available to place admission orders.        CONSULTS:   IP CONSULT TO GI  Hospitalist for admission.     Is this patient to be included in the SEP-1 Core Measure due to severe sepsis or septic shock?   No     Exclusion criteria - the patient is NOT to be included for SEP-1 Core Measure due to:  Infection is not suspected      CLINICAL IMPRESSION:     ICD-10-CM    1. Biliary obstruction  K83.1       2. History of cholangiocarcinoma  Z85.09       3. H/O Whipple procedure  Z90.410     Z90.49       4. Dark urine  R82.998           DISPOSITION:  I discussed the results, including any incidental findings, with patient and through shared decision making;   Disposition today from the ED will be: Discharge to home in fair condition.   Questions answered. They are agreeable to plan and express understanding of plan.     Social Determinants : None      CRITICAL CARE:   Total critical care time is 00 minutes, which excludes separately billable procedures and updating family. Time spent is specifically for management of the presenting complaint and symptoms initially, direct bedside care, reevaluation, review of records, and consultation.  There was a high probability of clinically significant life-threatening deterioration in the patient's condition, which required my urgent

## 2024-11-04 ENCOUNTER — HOSPITAL ENCOUNTER (INPATIENT)
Age: 74
Discharge: HOME | DRG: 445 | End: 2024-11-06
Payer: MEDICARE

## 2024-11-04 ENCOUNTER — APPOINTMENT (OUTPATIENT)
Dept: MRI IMAGING | Age: 74
DRG: 445 | End: 2024-11-04
Payer: MEDICARE

## 2024-11-04 ENCOUNTER — APPOINTMENT (OUTPATIENT)
Dept: GENERAL RADIOLOGY | Age: 74
DRG: 445 | End: 2024-11-04
Payer: MEDICARE

## 2024-11-04 PROBLEM — Z85.09 HISTORY OF CHOLANGIOCARCINOMA: Status: ACTIVE | Noted: 2024-11-04

## 2024-11-04 PROBLEM — R82.998 DARK URINE: Status: ACTIVE | Noted: 2024-11-04

## 2024-11-04 PROBLEM — M79.662 BILATERAL CALF PAIN: Status: ACTIVE | Noted: 2024-11-04

## 2024-11-04 PROBLEM — M79.661 BILATERAL CALF PAIN: Status: ACTIVE | Noted: 2024-11-04

## 2024-11-04 LAB
ALBUMIN SERPL-MCNC: 3.9 G/DL (ref 3.4–5)
ALBUMIN/GLOB SERPL: 1.9 {RATIO} (ref 1.1–2.2)
ALP SERPL-CCNC: 531 U/L (ref 40–129)
ALT SERPL-CCNC: 321 U/L (ref 10–40)
ANION GAP SERPL CALCULATED.3IONS-SCNC: 11 MMOL/L (ref 3–16)
AST SERPL-CCNC: 237 U/L (ref 15–37)
BACTERIA UR CULT: NORMAL
BASOPHILS # BLD: 0 K/UL (ref 0–0.2)
BASOPHILS NFR BLD: 0.2 %
BILIRUB SERPL-MCNC: 0.8 MG/DL (ref 0–1)
BUN SERPL-MCNC: 10 MG/DL (ref 7–20)
CALCIUM SERPL-MCNC: 9.2 MG/DL (ref 8.3–10.6)
CHLORIDE SERPL-SCNC: 94 MMOL/L (ref 99–110)
CO2 SERPL-SCNC: 28 MMOL/L (ref 21–32)
CREAT SERPL-MCNC: 0.6 MG/DL (ref 0.8–1.3)
DEPRECATED RDW RBC AUTO: 12.8 % (ref 12.4–15.4)
EKG ATRIAL RATE: 70 BPM
EKG DIAGNOSIS: NORMAL
EKG P AXIS: 57 DEGREES
EKG P-R INTERVAL: 214 MS
EKG Q-T INTERVAL: 400 MS
EKG QRS DURATION: 88 MS
EKG QTC CALCULATION (BAZETT): 432 MS
EKG R AXIS: 35 DEGREES
EKG T AXIS: 41 DEGREES
EKG VENTRICULAR RATE: 70 BPM
EOSINOPHIL # BLD: 0.1 K/UL (ref 0–0.6)
EOSINOPHIL NFR BLD: 1.7 %
FOLATE SERPL-MCNC: 23.6 NG/ML (ref 4.78–24.2)
GFR SERPLBLD CREATININE-BSD FMLA CKD-EPI: >90 ML/MIN/{1.73_M2}
GLUCOSE BLD-MCNC: 116 MG/DL (ref 70–99)
GLUCOSE BLD-MCNC: 127 MG/DL (ref 70–99)
GLUCOSE BLD-MCNC: 131 MG/DL (ref 70–99)
GLUCOSE BLD-MCNC: 142 MG/DL (ref 70–99)
GLUCOSE BLD-MCNC: 217 MG/DL (ref 70–99)
GLUCOSE SERPL-MCNC: 110 MG/DL (ref 70–99)
HCT VFR BLD AUTO: 34.6 % (ref 40.5–52.5)
HGB BLD-MCNC: 12.5 G/DL (ref 13.5–17.5)
LYMPHOCYTES # BLD: 0.5 K/UL (ref 1–5.1)
LYMPHOCYTES NFR BLD: 9.2 %
MAGNESIUM SERPL-MCNC: 2.24 MG/DL (ref 1.8–2.4)
MCH RBC QN AUTO: 36.3 PG (ref 26–34)
MCHC RBC AUTO-ENTMCNC: 36 G/DL (ref 31–36)
MCV RBC AUTO: 100.8 FL (ref 80–100)
MONOCYTES # BLD: 0.5 K/UL (ref 0–1.3)
MONOCYTES NFR BLD: 9.3 %
NEUTROPHILS # BLD: 4.2 K/UL (ref 1.7–7.7)
NEUTROPHILS NFR BLD: 79.6 %
PERFORMED ON: ABNORMAL
PLATELET # BLD AUTO: 182 K/UL (ref 135–450)
PMV BLD AUTO: 6.9 FL (ref 5–10.5)
POTASSIUM SERPL-SCNC: 3.5 MMOL/L (ref 3.5–5.1)
PROT SERPL-MCNC: 6 G/DL (ref 6.4–8.2)
RBC # BLD AUTO: 3.43 M/UL (ref 4.2–5.9)
SODIUM SERPL-SCNC: 133 MMOL/L (ref 136–145)
VIT B12 SERPL-MCNC: 2707 PG/ML (ref 211–911)
WBC # BLD AUTO: 5.3 K/UL (ref 4–11)

## 2024-11-04 PROCEDURE — 70030 X-RAY EYE FOR FOREIGN BODY: CPT

## 2024-11-04 PROCEDURE — 93010 ELECTROCARDIOGRAM REPORT: CPT | Performed by: STUDENT IN AN ORGANIZED HEALTH CARE EDUCATION/TRAINING PROGRAM

## 2024-11-04 PROCEDURE — 99233 SBSQ HOSP IP/OBS HIGH 50: CPT

## 2024-11-04 PROCEDURE — 94640 AIRWAY INHALATION TREATMENT: CPT

## 2024-11-04 PROCEDURE — 2580000003 HC RX 258: Performed by: STUDENT IN AN ORGANIZED HEALTH CARE EDUCATION/TRAINING PROGRAM

## 2024-11-04 PROCEDURE — 6360000002 HC RX W HCPCS: Performed by: NURSE PRACTITIONER

## 2024-11-04 PROCEDURE — 6370000000 HC RX 637 (ALT 250 FOR IP): Performed by: STUDENT IN AN ORGANIZED HEALTH CARE EDUCATION/TRAINING PROGRAM

## 2024-11-04 PROCEDURE — 93970 EXTREMITY STUDY: CPT

## 2024-11-04 PROCEDURE — 74183 MRI ABD W/O CNTR FLWD CNTR: CPT

## 2024-11-04 PROCEDURE — 82746 ASSAY OF FOLIC ACID SERUM: CPT

## 2024-11-04 PROCEDURE — 85025 COMPLETE CBC W/AUTO DIFF WBC: CPT

## 2024-11-04 PROCEDURE — 80053 COMPREHEN METABOLIC PANEL: CPT

## 2024-11-04 PROCEDURE — 83735 ASSAY OF MAGNESIUM: CPT

## 2024-11-04 PROCEDURE — 82607 VITAMIN B-12: CPT

## 2024-11-04 PROCEDURE — 6370000000 HC RX 637 (ALT 250 FOR IP): Performed by: INTERNAL MEDICINE

## 2024-11-04 PROCEDURE — 1200000000 HC SEMI PRIVATE

## 2024-11-04 PROCEDURE — 94761 N-INVAS EAR/PLS OXIMETRY MLT: CPT

## 2024-11-04 PROCEDURE — 2580000003 HC RX 258: Performed by: NURSE PRACTITIONER

## 2024-11-04 PROCEDURE — A9579 GAD-BASE MR CONTRAST NOS,1ML: HCPCS

## 2024-11-04 PROCEDURE — 6360000002 HC RX W HCPCS: Performed by: STUDENT IN AN ORGANIZED HEALTH CARE EDUCATION/TRAINING PROGRAM

## 2024-11-04 PROCEDURE — 36415 COLL VENOUS BLD VENIPUNCTURE: CPT

## 2024-11-04 PROCEDURE — 6360000004 HC RX CONTRAST MEDICATION

## 2024-11-04 PROCEDURE — 6370000000 HC RX 637 (ALT 250 FOR IP): Performed by: NURSE PRACTITIONER

## 2024-11-04 RX ORDER — POLYETHYLENE GLYCOL 3350 17 G/17G
17 POWDER, FOR SOLUTION ORAL DAILY
Status: DISPENSED | OUTPATIENT
Start: 2024-11-04 | End: 2024-11-06

## 2024-11-04 RX ORDER — DOCUSATE SODIUM 100 MG/1
100 CAPSULE, LIQUID FILLED ORAL DAILY
Status: DISCONTINUED | OUTPATIENT
Start: 2024-11-04 | End: 2024-11-11 | Stop reason: HOSPADM

## 2024-11-04 RX ORDER — INSULIN LISPRO 100 [IU]/ML
0-4 INJECTION, SOLUTION INTRAVENOUS; SUBCUTANEOUS EVERY 6 HOURS SCHEDULED
Status: DISCONTINUED | OUTPATIENT
Start: 2024-11-04 | End: 2024-11-11 | Stop reason: HOSPADM

## 2024-11-04 RX ORDER — TRIAMTERENE AND HYDROCHLOROTHIAZIDE 37.5; 25 MG/1; MG/1
1 TABLET ORAL 2 TIMES DAILY
Status: DISCONTINUED | OUTPATIENT
Start: 2024-11-04 | End: 2024-11-11 | Stop reason: HOSPADM

## 2024-11-04 RX ORDER — DEXTROSE MONOHYDRATE 100 MG/ML
INJECTION, SOLUTION INTRAVENOUS CONTINUOUS PRN
Status: DISCONTINUED | OUTPATIENT
Start: 2024-11-04 | End: 2024-11-11 | Stop reason: HOSPADM

## 2024-11-04 RX ORDER — GLUCAGON 1 MG/ML
1 KIT INJECTION PRN
Status: DISCONTINUED | OUTPATIENT
Start: 2024-11-04 | End: 2024-11-11 | Stop reason: HOSPADM

## 2024-11-04 RX ORDER — BISACODYL 10 MG
10 SUPPOSITORY, RECTAL RECTAL ONCE
Status: COMPLETED | OUTPATIENT
Start: 2024-11-04 | End: 2024-11-04

## 2024-11-04 RX ADMIN — FINASTERIDE 5 MG: 5 TABLET, FILM COATED ORAL at 17:59

## 2024-11-04 RX ADMIN — FAMOTIDINE 20 MG: 20 TABLET ORAL at 20:36

## 2024-11-04 RX ADMIN — PIPERACILLIN AND TAZOBACTAM 3375 MG: 3; .375 INJECTION, POWDER, LYOPHILIZED, FOR SOLUTION INTRAVENOUS at 03:24

## 2024-11-04 RX ADMIN — ALBUTEROL SULFATE 2 PUFF: 90 AEROSOL, METERED RESPIRATORY (INHALATION) at 07:59

## 2024-11-04 RX ADMIN — ALBUTEROL SULFATE 2 PUFF: 90 AEROSOL, METERED RESPIRATORY (INHALATION) at 19:33

## 2024-11-04 RX ADMIN — PIPERACILLIN AND TAZOBACTAM 3375 MG: 3; .375 INJECTION, POWDER, LYOPHILIZED, FOR SOLUTION INTRAVENOUS at 11:58

## 2024-11-04 RX ADMIN — BISACODYL 10 MG: 10 SUPPOSITORY RECTAL at 03:25

## 2024-11-04 RX ADMIN — GADOTERIDOL 14 ML: 279.3 INJECTION, SOLUTION INTRAVENOUS at 16:05

## 2024-11-04 RX ADMIN — PIPERACILLIN AND TAZOBACTAM 3375 MG: 3; .375 INJECTION, POWDER, LYOPHILIZED, FOR SOLUTION INTRAVENOUS at 20:35

## 2024-11-04 RX ADMIN — ENOXAPARIN SODIUM 40 MG: 100 INJECTION SUBCUTANEOUS at 20:36

## 2024-11-04 RX ADMIN — SODIUM CHLORIDE, PRESERVATIVE FREE 5 ML: 5 INJECTION INTRAVENOUS at 20:35

## 2024-11-04 RX ADMIN — SODIUM CHLORIDE, PRESERVATIVE FREE 10 ML: 5 INJECTION INTRAVENOUS at 11:59

## 2024-11-04 NOTE — CONSULTS
Gastroenterology Consult Note    Patient:   Sharath Ceja   :    1950   Facility:   [facility]  Referring/PCP: No primary care provider on file.  Date:     2024  Consultant:   ADILENE Otto CNP      Chief Complaint   Patient presents with    Urinary Frequency     Patient states that he has been having increased urination and lower abdominal pain        History of Present illness   Pt. Is a 73 yo male with pmx of DM, GERD, gout, Crohn's disease, HTN, asthma, and stage IV intrahepatic cholangiocarcinoma s/p pancreaticoduodenectomy  who originally presented to ED 11/3 with c/o abdominal pain. He reports pain started RLQ about two days ago after eating fatty foods. He reports some chills at home. He reports dark urine.  He reports chronic constipation. He denies nausea, vomiting, fever, diarrhea, melena, lack of appetite, or weight loss. He reports taking laxatives daily for constipation. He reports his stools were green several weeks ago and his Oncologist prescribed him course of antibiotics. He was found to have cholangiocarcinoma following ERCP for biliary stricture with Dr. Pandey. He then had Whipple  with Dr. Mcdonald. He reports his last follow up visit with him was two months ago. His Oncologist is Dr. Beltran. He reports completing chemotherapy 3/24. He reports follow up appointment next month. He is prescribed daily ASA, PPI, and iron supplements.     Past Medical History:   Diagnosis Date    Adenocarcinoma (HCC) 2023    8 + LYPMH NODES, WHIPPLE    Asthma     Blood circulation, collateral     Carpal tunnel syndrome     CLL (chronic lymphocytic leukemia) (HCC)     DENIES    Crohn's colitis (HCC)     Dental crowns present     AND FALSE TOOTH    Diabetes mellitus (HCC)     emboli     pulmonary emboli in     GERD (gastroesophageal reflux disease)     Chrons disease    Gout     Hx of blood clots     , AFTER TREE LIMB HIT TESTICLE

## 2024-11-04 NOTE — PLAN OF CARE
Problem: Chronic Conditions and Co-morbidities  Goal: Patient's chronic conditions and co-morbidity symptoms are monitored and maintained or improved  Outcome: Progressing      Problem: Gastrointestinal - Adult  Goal: Minimal or absence of nausea and vomiting  Outcome: Progressing     Problem: Gastrointestinal - Adult  Goal: Maintains or returns to baseline bowel function  Outcome: Progressing     Problem: Gastrointestinal - Adult  Goal: Maintains adequate nutritional intake  Outcome: Progressing

## 2024-11-04 NOTE — ACP (ADVANCE CARE PLANNING)
Advance Care Planning     General Advance Care Planning (ACP) Conversation    Date of Conversation: 11/4/2024  Conducted with: Patient with Decision Making Capacity  Other persons present: None    Healthcare Decision Maker:   Primary Decision Maker: Natasha Ceja R - Spouse - [phone]       Content/Action Overview:  DECLINED ACP Conversation - will revisit periodically  Reviewed DNR/DNI and patient elects Full Code (Attempt Resuscitation)        Length of Voluntary ACP Conversation in minutes:  <16 minutes (Non-Billable)    Eulalio Thomas RN

## 2024-11-04 NOTE — CARE COORDINATION
Case Management Assessment  Initial Evaluation    Date/Time of Evaluation: 11/4/2024 9:16 AM  Assessment Completed by: Eulalio Thomas RN    If patient is discharged prior to next notation, then this note serves as note for discharge by case management.    Patient Name: Sharath Ceja                   YOB: 1950  Diagnosis: Biliary obstruction [K83.1]  Dark urine [R82.998]  History of cholangiocarcinoma [Z85.09]  H/O Whipple procedure [Z90.410, Z90.49]                   Date / Time: 11/3/2024 11:24 AM    Patient Admission Status: Inpatient   Readmission Risk (Low < 19, Mod (19-27), High > 27): Readmission Risk Score: 14.8    Current PCP: No primary care provider on file.  PCP verified by CM? Yes    Chart Reviewed: Yes      History Provided by: Patient  Patient Orientation: Alert and Oriented    Patient Cognition: Alert    Hospitalization in the last 30 days (Readmission):  No    If yes, Readmission Assessment in CM Navigator will be completed.    Advance Directives:      Code Status: Full Code   Patient's Primary Decision Maker is: Legal Next of Kin    Primary Decision Maker: Natasha Ceja R - Spouse - [phone]    Discharge Planning:    Patient lives with: Spouse/Significant Other Type of Home: Trailer/Mobile Home  Primary Care Giver: Self  Patient Support Systems include: Spouse/Significant Other   Current Financial resources: Medicare  Current community resources: None  Current services prior to admission: None            Current DME:              Type of Home Care services:  None    ADLS  Prior functional level: Independent in ADLs/IADLs  Current functional level: Independent in ADLs/IADLs    PT AM-PAC:   /24  OT AM-PAC:   /24    Family can provide assistance at DC: Yes  Would you like Case Management to discuss the discharge plan with any other family members/significant others, and if so, who? No  Plans to Return to Present Housing: Yes  Other Identified Issues/Barriers to RETURNING to

## 2024-11-04 NOTE — Clinical Note
Patient admitted to room ____ from Dr. Office/allison admit/ER. Patient oriented to room, call light, bed rails, phone, lights and bathroom. Patient instructed about the schedule of the day including: vital sign frequency, lab draws, possible tests, frequency of MD and staff rounds, daily weights, I &O's and prescribed diet. Bed alarm deferred patient low fall risk and refuses alarm. Telemetry box in place, patient aware of placement and reason. Bed locked, in lowest position, side rails up 2/4, call light within reach.    Recliner Assessment:     Patient {Demonstrate:38088}     Bedside Mobility Assessment Tool (BMAT):     Assessment Level 1- Sit and Shake    1. From a semi-reclined position, ask patient to sit up and rotate to a seated position at the side of the bed. Can use the bedrail.    2. Ask patient to reach out and grab your hand and shake making sure patient reaches across his/her midline.   {Assessment Level 1:68705}    Assessment Level 2- Stretch and Point   1. With patient in seated position at the side of the bed, have patient place both feet on the floor (or stool) with knees no higher than hips.    2. Ask patient to stretch one leg and straighten the knee, then bend the ankle/flex and point the toes. If appropriate, repeat with the other leg.   {Assessment Level 2:18222}    Assessment Level 3- Stand   1. Ask patient to elevate off the bed or chair (seated to standing) using an assistive device (cane, bedrail).    2. Patient should be able to raise buttocks off be and hold for a count of five. May repeat once.   {Assessment Level 3:36365}    Assessment Level 4- Walk   1. Ask patient to march in place at bedside.    2. Then ask patient to advance step and return each foot. Some medical conditions may render a patient from stepping backwards, use your best clinical judgement.   {Assessment Level 4:31473}      Mobility Level- {Mobility Level:92180}        4 Eyes Skin Assessment     The patient is being

## 2024-11-05 ENCOUNTER — ANESTHESIA EVENT (OUTPATIENT)
Dept: ENDOSCOPY | Age: 74
End: 2024-11-05
Payer: MEDICARE

## 2024-11-05 LAB
ALBUMIN SERPL-MCNC: 3.7 G/DL (ref 3.4–5)
ALBUMIN/GLOB SERPL: 1.5 {RATIO} (ref 1.1–2.2)
ALP SERPL-CCNC: 690 U/L (ref 40–129)
ALT SERPL-CCNC: 387 U/L (ref 10–40)
ANION GAP SERPL CALCULATED.3IONS-SCNC: 12 MMOL/L (ref 3–16)
AST SERPL-CCNC: 358 U/L (ref 15–37)
BASOPHILS # BLD: 0 K/UL (ref 0–0.2)
BASOPHILS NFR BLD: 0.3 %
BILIRUB SERPL-MCNC: 1.1 MG/DL (ref 0–1)
BUN SERPL-MCNC: 9 MG/DL (ref 7–20)
CALCIUM SERPL-MCNC: 9 MG/DL (ref 8.3–10.6)
CHLORIDE SERPL-SCNC: 96 MMOL/L (ref 99–110)
CO2 SERPL-SCNC: 26 MMOL/L (ref 21–32)
CREAT SERPL-MCNC: 0.5 MG/DL (ref 0.8–1.3)
DEPRECATED RDW RBC AUTO: 12.9 % (ref 12.4–15.4)
ECHO BSA: 1.92 M2
EOSINOPHIL # BLD: 0.1 K/UL (ref 0–0.6)
EOSINOPHIL NFR BLD: 2.6 %
GFR SERPLBLD CREATININE-BSD FMLA CKD-EPI: >90 ML/MIN/{1.73_M2}
GLUCOSE BLD-MCNC: 128 MG/DL (ref 70–99)
GLUCOSE BLD-MCNC: 129 MG/DL (ref 70–99)
GLUCOSE BLD-MCNC: 200 MG/DL (ref 70–99)
GLUCOSE BLD-MCNC: 261 MG/DL (ref 70–99)
GLUCOSE SERPL-MCNC: 109 MG/DL (ref 70–99)
HCT VFR BLD AUTO: 36.4 % (ref 40.5–52.5)
HGB BLD-MCNC: 12.8 G/DL (ref 13.5–17.5)
LYMPHOCYTES # BLD: 0.5 K/UL (ref 1–5.1)
LYMPHOCYTES NFR BLD: 12.2 %
MAGNESIUM SERPL-MCNC: 2.2 MG/DL (ref 1.8–2.4)
MCH RBC QN AUTO: 35.6 PG (ref 26–34)
MCHC RBC AUTO-ENTMCNC: 35.2 G/DL (ref 31–36)
MCV RBC AUTO: 101 FL (ref 80–100)
MONOCYTES # BLD: 0.4 K/UL (ref 0–1.3)
MONOCYTES NFR BLD: 8.6 %
NEUTROPHILS # BLD: 3.2 K/UL (ref 1.7–7.7)
NEUTROPHILS NFR BLD: 76.3 %
PERFORMED ON: ABNORMAL
PLATELET # BLD AUTO: 187 K/UL (ref 135–450)
PMV BLD AUTO: 7 FL (ref 5–10.5)
POTASSIUM SERPL-SCNC: 3.5 MMOL/L (ref 3.5–5.1)
PROT SERPL-MCNC: 6.1 G/DL (ref 6.4–8.2)
RBC # BLD AUTO: 3.6 M/UL (ref 4.2–5.9)
SODIUM SERPL-SCNC: 134 MMOL/L (ref 136–145)
WBC # BLD AUTO: 4.2 K/UL (ref 4–11)

## 2024-11-05 PROCEDURE — 85025 COMPLETE CBC W/AUTO DIFF WBC: CPT

## 2024-11-05 PROCEDURE — 94761 N-INVAS EAR/PLS OXIMETRY MLT: CPT

## 2024-11-05 PROCEDURE — 83735 ASSAY OF MAGNESIUM: CPT

## 2024-11-05 PROCEDURE — 6360000002 HC RX W HCPCS: Performed by: STUDENT IN AN ORGANIZED HEALTH CARE EDUCATION/TRAINING PROGRAM

## 2024-11-05 PROCEDURE — 2580000003 HC RX 258: Performed by: NURSE PRACTITIONER

## 2024-11-05 PROCEDURE — 6360000002 HC RX W HCPCS: Performed by: NURSE PRACTITIONER

## 2024-11-05 PROCEDURE — 6370000000 HC RX 637 (ALT 250 FOR IP)

## 2024-11-05 PROCEDURE — 93970 EXTREMITY STUDY: CPT | Performed by: SURGERY

## 2024-11-05 PROCEDURE — 2580000003 HC RX 258

## 2024-11-05 PROCEDURE — 80053 COMPREHEN METABOLIC PANEL: CPT

## 2024-11-05 PROCEDURE — 36415 COLL VENOUS BLD VENIPUNCTURE: CPT

## 2024-11-05 PROCEDURE — 99232 SBSQ HOSP IP/OBS MODERATE 35: CPT | Performed by: INTERNAL MEDICINE

## 2024-11-05 PROCEDURE — 2580000003 HC RX 258: Performed by: STUDENT IN AN ORGANIZED HEALTH CARE EDUCATION/TRAINING PROGRAM

## 2024-11-05 PROCEDURE — 1200000000 HC SEMI PRIVATE

## 2024-11-05 PROCEDURE — 94640 AIRWAY INHALATION TREATMENT: CPT

## 2024-11-05 PROCEDURE — 6370000000 HC RX 637 (ALT 250 FOR IP): Performed by: NURSE PRACTITIONER

## 2024-11-05 RX ORDER — SODIUM CHLORIDE 9 MG/ML
INJECTION, SOLUTION INTRAVENOUS CONTINUOUS
Status: DISCONTINUED | OUTPATIENT
Start: 2024-11-05 | End: 2024-11-06

## 2024-11-05 RX ADMIN — PIPERACILLIN AND TAZOBACTAM 3375 MG: 3; .375 INJECTION, POWDER, LYOPHILIZED, FOR SOLUTION INTRAVENOUS at 03:25

## 2024-11-05 RX ADMIN — PIPERACILLIN AND TAZOBACTAM 3375 MG: 3; .375 INJECTION, POWDER, LYOPHILIZED, FOR SOLUTION INTRAVENOUS at 20:22

## 2024-11-05 RX ADMIN — SODIUM CHLORIDE: 9 INJECTION, SOLUTION INTRAVENOUS at 12:00

## 2024-11-05 RX ADMIN — ENOXAPARIN SODIUM 40 MG: 100 INJECTION SUBCUTANEOUS at 20:28

## 2024-11-05 RX ADMIN — ALLOPURINOL 300 MG: 300 TABLET ORAL at 12:01

## 2024-11-05 RX ADMIN — ALBUTEROL SULFATE 2 PUFF: 90 AEROSOL, METERED RESPIRATORY (INHALATION) at 19:49

## 2024-11-05 RX ADMIN — ALBUTEROL SULFATE 2 PUFF: 90 AEROSOL, METERED RESPIRATORY (INHALATION) at 07:44

## 2024-11-05 RX ADMIN — FINASTERIDE 5 MG: 5 TABLET, FILM COATED ORAL at 20:29

## 2024-11-05 RX ADMIN — POLYETHYLENE GLYCOL (3350) 17 G: 17 POWDER, FOR SOLUTION ORAL at 12:02

## 2024-11-05 RX ADMIN — PIPERACILLIN AND TAZOBACTAM 3375 MG: 3; .375 INJECTION, POWDER, LYOPHILIZED, FOR SOLUTION INTRAVENOUS at 12:01

## 2024-11-05 RX ADMIN — SODIUM CHLORIDE, PRESERVATIVE FREE 10 ML: 5 INJECTION INTRAVENOUS at 12:04

## 2024-11-05 RX ADMIN — DOCUSATE SODIUM 100 MG: 100 CAPSULE, LIQUID FILLED ORAL at 12:01

## 2024-11-05 RX ADMIN — FAMOTIDINE 20 MG: 20 TABLET ORAL at 20:29

## 2024-11-05 NOTE — PLAN OF CARE
Problem: Chronic Conditions and Co-morbidities  Goal: Patient's chronic conditions and co-morbidity symptoms are monitored and maintained or improved  11/5/2024 0002 by Damon Vo RN  Outcome: Progressing  Flowsheets (Taken 11/4/2024 2030)  Care Plan - Patient's Chronic Conditions and Co-Morbidity Symptoms are Monitored and Maintained or Improved: Monitor and assess patient's chronic conditions and comorbid symptoms for stability, deterioration, or improvement     Problem: Discharge Planning  Goal: Discharge to home or other facility with appropriate resources  11/5/2024 0002 by Damon Vo RN  Outcome: Progressing  Flowsheets (Taken 11/4/2024 2030)  Discharge to home or other facility with appropriate resources: Identify barriers to discharge with patient and caregiver     Problem: Safety - Adult  Goal: Free from fall injury  11/5/2024 0002 by Damon Vo RN  Outcome: Progressing     Problem: ABCDS Injury Assessment  Goal: Absence of physical injury  11/5/2024 0002 by Damon Vo RN  Outcome: Progressing     Problem: Gastrointestinal - Adult  Goal: Minimal or absence of nausea and vomiting  11/5/2024 0002 by Damon Vo RN  Outcome: Progressing  Flowsheets (Taken 11/4/2024 2030)  Minimal or absence of nausea and vomiting:   Maintain NPO status until nausea and vomiting are resolved   Administer IV fluids as ordered to ensure adequate hydration     Problem: Gastrointestinal - Adult  Goal: Maintains or returns to baseline bowel function  11/5/2024 0002 by Damon Vo RN  Outcome: Progressing  Flowsheets (Taken 11/4/2024 2030)  Maintains or returns to baseline bowel function: Assess bowel function     Problem: Gastrointestinal - Adult  Goal: Maintains adequate nutritional intake  11/5/2024 0002 by Damon Vo RN  Outcome: Progressing  Flowsheets (Taken 11/4/2024 2030)  Maintains adequate nutritional intake: Identify factors contributing to decreased intake, treat as appropriate

## 2024-11-06 ENCOUNTER — ANESTHESIA (OUTPATIENT)
Dept: ENDOSCOPY | Age: 74
End: 2024-11-06
Payer: MEDICARE

## 2024-11-06 LAB
ALBUMIN SERPL-MCNC: 3.5 G/DL (ref 3.4–5)
ALBUMIN/GLOB SERPL: 1.6 {RATIO} (ref 1.1–2.2)
ALP SERPL-CCNC: 838 U/L (ref 40–129)
ALT SERPL-CCNC: 587 U/L (ref 10–40)
ANION GAP SERPL CALCULATED.3IONS-SCNC: 11 MMOL/L (ref 3–16)
AST SERPL-CCNC: 673 U/L (ref 15–37)
BASOPHILS # BLD: 0 K/UL (ref 0–0.2)
BASOPHILS NFR BLD: 0.6 %
BILIRUB SERPL-MCNC: 2 MG/DL (ref 0–1)
BUN SERPL-MCNC: 5 MG/DL (ref 7–20)
CALCIUM SERPL-MCNC: 8.8 MG/DL (ref 8.3–10.6)
CHLORIDE SERPL-SCNC: 100 MMOL/L (ref 99–110)
CO2 SERPL-SCNC: 26 MMOL/L (ref 21–32)
CREAT SERPL-MCNC: 0.5 MG/DL (ref 0.8–1.3)
DEPRECATED RDW RBC AUTO: 13.1 % (ref 12.4–15.4)
EOSINOPHIL # BLD: 0.1 K/UL (ref 0–0.6)
EOSINOPHIL NFR BLD: 3.6 %
GFR SERPLBLD CREATININE-BSD FMLA CKD-EPI: >90 ML/MIN/{1.73_M2}
GLUCOSE BLD-MCNC: 137 MG/DL (ref 70–99)
GLUCOSE BLD-MCNC: 137 MG/DL (ref 70–99)
GLUCOSE BLD-MCNC: 142 MG/DL (ref 70–99)
GLUCOSE BLD-MCNC: 163 MG/DL (ref 70–99)
GLUCOSE BLD-MCNC: 311 MG/DL (ref 70–99)
GLUCOSE SERPL-MCNC: 118 MG/DL (ref 70–99)
HCT VFR BLD AUTO: 35.1 % (ref 40.5–52.5)
HGB BLD-MCNC: 12.4 G/DL (ref 13.5–17.5)
INR PPP: 0.96 (ref 0.85–1.15)
LYMPHOCYTES # BLD: 0.5 K/UL (ref 1–5.1)
LYMPHOCYTES NFR BLD: 15.9 %
MAGNESIUM SERPL-MCNC: 2.08 MG/DL (ref 1.8–2.4)
MCH RBC QN AUTO: 35.7 PG (ref 26–34)
MCHC RBC AUTO-ENTMCNC: 35.3 G/DL (ref 31–36)
MCV RBC AUTO: 101 FL (ref 80–100)
MONOCYTES # BLD: 0.2 K/UL (ref 0–1.3)
MONOCYTES NFR BLD: 7.2 %
NEUTROPHILS # BLD: 2.2 K/UL (ref 1.7–7.7)
NEUTROPHILS NFR BLD: 72.7 %
PERFORMED ON: ABNORMAL
PLATELET # BLD AUTO: 184 K/UL (ref 135–450)
PMV BLD AUTO: 6.8 FL (ref 5–10.5)
POTASSIUM SERPL-SCNC: 3.4 MMOL/L (ref 3.5–5.1)
PROT SERPL-MCNC: 5.7 G/DL (ref 6.4–8.2)
PROTHROMBIN TIME: 13 SEC (ref 11.9–14.9)
RBC # BLD AUTO: 3.47 M/UL (ref 4.2–5.9)
SODIUM SERPL-SCNC: 137 MMOL/L (ref 136–145)
WBC # BLD AUTO: 3 K/UL (ref 4–11)

## 2024-11-06 PROCEDURE — 3700000001 HC ADD 15 MINUTES (ANESTHESIA): Performed by: INTERNAL MEDICINE

## 2024-11-06 PROCEDURE — 0F9930Z DRAINAGE OF COMMON BILE DUCT WITH DRAINAGE DEVICE, PERCUTANEOUS APPROACH: ICD-10-PCS | Performed by: INTERNAL MEDICINE

## 2024-11-06 PROCEDURE — 2580000003 HC RX 258: Performed by: NURSE ANESTHETIST, CERTIFIED REGISTERED

## 2024-11-06 PROCEDURE — 0F9D30Z DRAINAGE OF PANCREATIC DUCT WITH DRAINAGE DEVICE, PERCUTANEOUS APPROACH: ICD-10-PCS | Performed by: INTERNAL MEDICINE

## 2024-11-06 PROCEDURE — 7100000001 HC PACU RECOVERY - ADDTL 15 MIN: Performed by: INTERNAL MEDICINE

## 2024-11-06 PROCEDURE — 6370000000 HC RX 637 (ALT 250 FOR IP): Performed by: ANESTHESIOLOGY

## 2024-11-06 PROCEDURE — 99232 SBSQ HOSP IP/OBS MODERATE 35: CPT | Performed by: INTERNAL MEDICINE

## 2024-11-06 PROCEDURE — 6360000002 HC RX W HCPCS: Performed by: STUDENT IN AN ORGANIZED HEALTH CARE EDUCATION/TRAINING PROGRAM

## 2024-11-06 PROCEDURE — 36415 COLL VENOUS BLD VENIPUNCTURE: CPT

## 2024-11-06 PROCEDURE — 94640 AIRWAY INHALATION TREATMENT: CPT

## 2024-11-06 PROCEDURE — 85025 COMPLETE CBC W/AUTO DIFF WBC: CPT

## 2024-11-06 PROCEDURE — 6370000000 HC RX 637 (ALT 250 FOR IP): Performed by: NURSE PRACTITIONER

## 2024-11-06 PROCEDURE — 80053 COMPREHEN METABOLIC PANEL: CPT

## 2024-11-06 PROCEDURE — 83735 ASSAY OF MAGNESIUM: CPT

## 2024-11-06 PROCEDURE — 6370000000 HC RX 637 (ALT 250 FOR IP): Performed by: INTERNAL MEDICINE

## 2024-11-06 PROCEDURE — BF111ZZ FLUOROSCOPY OF BILIARY AND PANCREATIC DUCTS USING LOW OSMOLAR CONTRAST: ICD-10-PCS | Performed by: INTERNAL MEDICINE

## 2024-11-06 PROCEDURE — 2580000003 HC RX 258

## 2024-11-06 PROCEDURE — 6360000002 HC RX W HCPCS: Performed by: NURSE ANESTHETIST, CERTIFIED REGISTERED

## 2024-11-06 PROCEDURE — 2580000003 HC RX 258: Performed by: STUDENT IN AN ORGANIZED HEALTH CARE EDUCATION/TRAINING PROGRAM

## 2024-11-06 PROCEDURE — 7100000000 HC PACU RECOVERY - FIRST 15 MIN: Performed by: INTERNAL MEDICINE

## 2024-11-06 PROCEDURE — 2580000003 HC RX 258: Performed by: ANESTHESIOLOGY

## 2024-11-06 PROCEDURE — 3609018800 HC ERCP DX COLLECTION SPECIMEN BRUSHING/WASHING: Performed by: INTERNAL MEDICINE

## 2024-11-06 PROCEDURE — 2580000003 HC RX 258: Performed by: NURSE PRACTITIONER

## 2024-11-06 PROCEDURE — 2500000003 HC RX 250 WO HCPCS: Performed by: ANESTHESIOLOGY

## 2024-11-06 PROCEDURE — 2500000003 HC RX 250 WO HCPCS: Performed by: NURSE ANESTHETIST, CERTIFIED REGISTERED

## 2024-11-06 PROCEDURE — 3700000000 HC ANESTHESIA ATTENDED CARE: Performed by: INTERNAL MEDICINE

## 2024-11-06 PROCEDURE — 1200000000 HC SEMI PRIVATE

## 2024-11-06 PROCEDURE — 85610 PROTHROMBIN TIME: CPT

## 2024-11-06 PROCEDURE — 2709999900 HC NON-CHARGEABLE SUPPLY: Performed by: INTERNAL MEDICINE

## 2024-11-06 RX ORDER — NALOXONE HYDROCHLORIDE 0.4 MG/ML
INJECTION, SOLUTION INTRAMUSCULAR; INTRAVENOUS; SUBCUTANEOUS PRN
Status: DISCONTINUED | OUTPATIENT
Start: 2024-11-06 | End: 2024-11-06 | Stop reason: HOSPADM

## 2024-11-06 RX ORDER — OXYCODONE HYDROCHLORIDE 5 MG/1
5 TABLET ORAL PRN
Status: DISCONTINUED | OUTPATIENT
Start: 2024-11-06 | End: 2024-11-06 | Stop reason: HOSPADM

## 2024-11-06 RX ORDER — SODIUM CHLORIDE 0.9 % (FLUSH) 0.9 %
5-40 SYRINGE (ML) INJECTION PRN
Status: DISCONTINUED | OUTPATIENT
Start: 2024-11-06 | End: 2024-11-06 | Stop reason: HOSPADM

## 2024-11-06 RX ORDER — ONDANSETRON 2 MG/ML
4 INJECTION INTRAMUSCULAR; INTRAVENOUS
Status: DISCONTINUED | OUTPATIENT
Start: 2024-11-06 | End: 2024-11-06 | Stop reason: HOSPADM

## 2024-11-06 RX ORDER — ONDANSETRON 2 MG/ML
INJECTION INTRAMUSCULAR; INTRAVENOUS
Status: DISCONTINUED | OUTPATIENT
Start: 2024-11-06 | End: 2024-11-06 | Stop reason: SDUPTHER

## 2024-11-06 RX ORDER — ROCURONIUM BROMIDE 10 MG/ML
INJECTION, SOLUTION INTRAVENOUS
Status: DISCONTINUED | OUTPATIENT
Start: 2024-11-06 | End: 2024-11-06 | Stop reason: SDUPTHER

## 2024-11-06 RX ORDER — PROPOFOL 10 MG/ML
INJECTION, EMULSION INTRAVENOUS
Status: DISCONTINUED | OUTPATIENT
Start: 2024-11-06 | End: 2024-11-06 | Stop reason: SDUPTHER

## 2024-11-06 RX ORDER — SODIUM CHLORIDE 0.9 % (FLUSH) 0.9 %
5-40 SYRINGE (ML) INJECTION EVERY 12 HOURS SCHEDULED
Status: DISCONTINUED | OUTPATIENT
Start: 2024-11-06 | End: 2024-11-06 | Stop reason: HOSPADM

## 2024-11-06 RX ORDER — LIDOCAINE HYDROCHLORIDE 20 MG/ML
INJECTION, SOLUTION INFILTRATION; PERINEURAL
Status: DISCONTINUED | OUTPATIENT
Start: 2024-11-06 | End: 2024-11-06 | Stop reason: SDUPTHER

## 2024-11-06 RX ORDER — IPRATROPIUM BROMIDE AND ALBUTEROL SULFATE 2.5; .5 MG/3ML; MG/3ML
1 SOLUTION RESPIRATORY (INHALATION) ONCE
Status: COMPLETED | OUTPATIENT
Start: 2024-11-06 | End: 2024-11-06

## 2024-11-06 RX ORDER — SODIUM CHLORIDE 9 MG/ML
INJECTION, SOLUTION INTRAVENOUS PRN
Status: DISCONTINUED | OUTPATIENT
Start: 2024-11-06 | End: 2024-11-06 | Stop reason: HOSPADM

## 2024-11-06 RX ORDER — SODIUM CHLORIDE, SODIUM LACTATE, POTASSIUM CHLORIDE, CALCIUM CHLORIDE 600; 310; 30; 20 MG/100ML; MG/100ML; MG/100ML; MG/100ML
INJECTION, SOLUTION INTRAVENOUS CONTINUOUS
Status: DISCONTINUED | OUTPATIENT
Start: 2024-11-06 | End: 2024-11-06 | Stop reason: HOSPADM

## 2024-11-06 RX ORDER — MEPERIDINE HYDROCHLORIDE 25 MG/ML
12.5 INJECTION INTRAMUSCULAR; INTRAVENOUS; SUBCUTANEOUS EVERY 5 MIN PRN
Status: DISCONTINUED | OUTPATIENT
Start: 2024-11-06 | End: 2024-11-06 | Stop reason: HOSPADM

## 2024-11-06 RX ORDER — TRIAMCINOLONE ACETONIDE 1 MG/G
CREAM TOPICAL 2 TIMES DAILY
Status: DISCONTINUED | OUTPATIENT
Start: 2024-11-06 | End: 2024-11-11 | Stop reason: HOSPADM

## 2024-11-06 RX ORDER — OXYCODONE HYDROCHLORIDE 5 MG/1
10 TABLET ORAL PRN
Status: DISCONTINUED | OUTPATIENT
Start: 2024-11-06 | End: 2024-11-06 | Stop reason: HOSPADM

## 2024-11-06 RX ORDER — DEXAMETHASONE SODIUM PHOSPHATE 4 MG/ML
INJECTION, SOLUTION INTRA-ARTICULAR; INTRALESIONAL; INTRAMUSCULAR; INTRAVENOUS; SOFT TISSUE
Status: DISCONTINUED | OUTPATIENT
Start: 2024-11-06 | End: 2024-11-06 | Stop reason: SDUPTHER

## 2024-11-06 RX ORDER — SODIUM CHLORIDE 9 MG/ML
INJECTION, SOLUTION INTRAVENOUS
Status: DISCONTINUED | OUTPATIENT
Start: 2024-11-06 | End: 2024-11-06 | Stop reason: SDUPTHER

## 2024-11-06 RX ADMIN — IPRATROPIUM BROMIDE AND ALBUTEROL SULFATE 1 DOSE: 2.5; .5 SOLUTION RESPIRATORY (INHALATION) at 11:33

## 2024-11-06 RX ADMIN — DEXAMETHASONE SODIUM PHOSPHATE 8 MG: 4 INJECTION, SOLUTION INTRAMUSCULAR; INTRAVENOUS at 13:13

## 2024-11-06 RX ADMIN — PROPOFOL 50 MG: 10 INJECTION, EMULSION INTRAVENOUS at 12:52

## 2024-11-06 RX ADMIN — SODIUM CHLORIDE: 9 INJECTION, SOLUTION INTRAVENOUS at 06:28

## 2024-11-06 RX ADMIN — PROPOFOL 150 MG: 10 INJECTION, EMULSION INTRAVENOUS at 12:51

## 2024-11-06 RX ADMIN — ALBUTEROL SULFATE 2 PUFF: 90 AEROSOL, METERED RESPIRATORY (INHALATION) at 19:45

## 2024-11-06 RX ADMIN — Medication 1 LOZENGE: at 23:55

## 2024-11-06 RX ADMIN — ONDANSETRON 4 MG: 2 INJECTION INTRAMUSCULAR; INTRAVENOUS at 13:13

## 2024-11-06 RX ADMIN — PIPERACILLIN AND TAZOBACTAM 3375 MG: 3; .375 INJECTION, POWDER, LYOPHILIZED, FOR SOLUTION INTRAVENOUS at 18:06

## 2024-11-06 RX ADMIN — FAMOTIDINE 20 MG: 10 INJECTION, SOLUTION INTRAVENOUS at 11:33

## 2024-11-06 RX ADMIN — ROCURONIUM BROMIDE 50 MG: 10 INJECTION, SOLUTION INTRAVENOUS at 12:51

## 2024-11-06 RX ADMIN — SODIUM CHLORIDE: 0.9 INJECTION, SOLUTION INTRAVENOUS at 12:47

## 2024-11-06 RX ADMIN — SODIUM CHLORIDE, PRESERVATIVE FREE 10 ML: 5 INJECTION INTRAVENOUS at 09:05

## 2024-11-06 RX ADMIN — FINASTERIDE 5 MG: 5 TABLET, FILM COATED ORAL at 18:01

## 2024-11-06 RX ADMIN — SODIUM CHLORIDE, PRESERVATIVE FREE 10 ML: 5 INJECTION INTRAVENOUS at 20:57

## 2024-11-06 RX ADMIN — LIDOCAINE HYDROCHLORIDE 60 MG: 20 INJECTION, SOLUTION INFILTRATION; PERINEURAL at 12:51

## 2024-11-06 RX ADMIN — SUGAMMADEX 200 MG: 100 INJECTION, SOLUTION INTRAVENOUS at 13:39

## 2024-11-06 RX ADMIN — PIPERACILLIN AND TAZOBACTAM 3375 MG: 3; .375 INJECTION, POWDER, LYOPHILIZED, FOR SOLUTION INTRAVENOUS at 02:48

## 2024-11-06 RX ADMIN — ALBUTEROL SULFATE 2 PUFF: 90 AEROSOL, METERED RESPIRATORY (INHALATION) at 07:51

## 2024-11-06 RX ADMIN — FAMOTIDINE 20 MG: 20 TABLET ORAL at 20:56

## 2024-11-06 RX ADMIN — PANTOPRAZOLE SODIUM 40 MG: 40 TABLET, DELAYED RELEASE ORAL at 06:22

## 2024-11-06 ASSESSMENT — PAIN DESCRIPTION - ORIENTATION: ORIENTATION: RIGHT

## 2024-11-06 ASSESSMENT — PAIN SCALES - GENERAL
PAINLEVEL_OUTOF10: 0
PAINLEVEL_OUTOF10: 0
PAINLEVEL_OUTOF10: 1
PAINLEVEL_OUTOF10: 0

## 2024-11-06 ASSESSMENT — PAIN DESCRIPTION - LOCATION: LOCATION: THROAT

## 2024-11-06 ASSESSMENT — PAIN - FUNCTIONAL ASSESSMENT
PAIN_FUNCTIONAL_ASSESSMENT: ACTIVITIES ARE NOT PREVENTED
PAIN_FUNCTIONAL_ASSESSMENT: 0-10
PAIN_FUNCTIONAL_ASSESSMENT: 0-10

## 2024-11-06 ASSESSMENT — PAIN DESCRIPTION - ONSET: ONSET: PROGRESSIVE

## 2024-11-06 ASSESSMENT — PAIN DESCRIPTION - FREQUENCY: FREQUENCY: INTERMITTENT

## 2024-11-06 ASSESSMENT — LIFESTYLE VARIABLES: SMOKING_STATUS: 0

## 2024-11-06 ASSESSMENT — ENCOUNTER SYMPTOMS: SHORTNESS OF BREATH: 1

## 2024-11-06 ASSESSMENT — PAIN DESCRIPTION - PAIN TYPE: TYPE: ACUTE PAIN

## 2024-11-06 ASSESSMENT — PAIN DESCRIPTION - DESCRIPTORS: DESCRIPTORS: SORE

## 2024-11-06 NOTE — ANESTHESIA PRE PROCEDURE
(+) GERD: well controlled, liver disease (elev lfts):     (-) no renal disease, bowel prep and no morbid obesity      ROS comment: Hx crohns.   Endo/Other:    (+) DiabetesType II DM, using insulin, : arthritis:., electrolyte abnormalities, malignancy/cancer (cholangiocarcinoma).                 Abdominal:       Abdomen: soft.      Vascular: negative vascular ROS.         Other Findings: R chest port            Anesthesia Plan      general     ASA 3       Induction: intravenous.    MIPS: Postoperative opioids intended and Prophylactic antiemetics administered.  Anesthetic plan and risks discussed with patient.      Plan discussed with CRNA.                  This pre-anesthesia assessment may be used as a history and physical.    DOS STAFF ADDENDUM:    Pt seen and examined, chart reviewed (including anesthesia, drug and allergy history).  No interval changes to history and physical examination.  Anesthetic plan, risks, benefits, alternatives, and personnel involved discussed with patient.  Patient verbalized an understanding and agrees to proceed.      Pepito Faust MD  November 6, 2024  11:32 AM  Pepito Faust MD   11/6/2024

## 2024-11-06 NOTE — PLAN OF CARE
Problem: Chronic Conditions and Co-morbidities  Goal: Patient's chronic conditions and co-morbidity symptoms are monitored and maintained or improved  Outcome: Progressing     Problem: Discharge Planning  Goal: Discharge to home or other facility with appropriate resources  Outcome: Progressing     Problem: Safety - Adult  Goal: Free from fall injury  Outcome: Progressing     Problem: ABCDS Injury Assessment  Goal: Absence of physical injury  Outcome: Progressing     Problem: Gastrointestinal - Adult  Goal: Minimal or absence of nausea and vomiting  Outcome: Progressing  Goal: Maintains or returns to baseline bowel function  Outcome: Progressing  Goal: Maintains adequate nutritional intake  Outcome: Progressing

## 2024-11-06 NOTE — ANESTHESIA POSTPROCEDURE EVALUATION
Department of Anesthesiology  Postprocedure Note    Patient: Sharath Ceja  MRN: [id number]  YOB: 1950  Date of evaluation: 11/6/2024    Procedure Summary       Date: 11/06/24 Room / Location: [address]    Anesthesia Start: 1247 Anesthesia Stop: 1351    Procedure: ERCP WF W/ANES. (PEDI COLON SCOPE) (12:00) Diagnosis:       Jaundice      (Jaundice [R17])    Surgeons: Tab Pandey MD Responsible Provider: Pepito Faust MD    Anesthesia Type: general ASA Status: 3            Anesthesia Type: No value filed.    Elenita Phase I: Elenita Score: 10    Elenita Phase II:      Anesthesia Post Evaluation    Patient location during evaluation: bedside  Patient participation: complete - patient participated  Level of consciousness: awake and alert  Airway patency: patent  Nausea & Vomiting: no nausea  Cardiovascular status: hemodynamically stable  Respiratory status: acceptable  Hydration status: euvolemic  Pain management: adequate      Vitals:    11/06/24 1351 11/06/24 1356 11/06/24 1402 11/06/24 1434   BP: (!) 146/84 (!) 143/78 (!) 147/86 (!) 140/77   Pulse: 66 60 61 58   Resp: 16 16 16 16   Temp: 97.7 °F (36.5 °C) 97.7 °F (36.5 °C) 97.7 °F (36.5 °C) 97.7 °F (36.5 °C)   TempSrc: Infrared Infrared Infrared Infrared   SpO2: 97% 97% 98% 97%   Weight:       Height:          No notable events documented.

## 2024-11-06 NOTE — PLAN OF CARE
Problem: Chronic Conditions and Co-morbidities  Goal: Patient's chronic conditions and co-morbidity symptoms are monitored and maintained or improved  11/5/2024 2322 by Catie Shaffer RN  Outcome: Progressing  11/5/2024 2018 by Antwan Steward RN  Outcome: Progressing  Flowsheets (Taken 11/5/2024 2010 by Catie Shaffer RN)  Care Plan - Patient's Chronic Conditions and Co-Morbidity Symptoms are Monitored and Maintained or Improved: Monitor and assess patient's chronic conditions and comorbid symptoms for stability, deterioration, or improvement     Problem: Discharge Planning  Goal: Discharge to home or other facility with appropriate resources  11/5/2024 2322 by Catie Shaffer RN  Outcome: Progressing  11/5/2024 2018 by Antwan Steward RN  Outcome: Progressing  Flowsheets (Taken 11/5/2024 2010 by Catie Shaffer RN)  Discharge to home or other facility with appropriate resources: Identify barriers to discharge with patient and caregiver     Problem: Safety - Adult  Goal: Free from fall injury  11/5/2024 2322 by Catie Shaffer RN  Outcome: Progressing  11/5/2024 2018 by Antwan Steward RN  Outcome: Progressing     Problem: ABCDS Injury Assessment  Goal: Absence of physical injury  11/5/2024 2322 by Catie Shaffer RN  Outcome: Progressing  Flowsheets (Taken 11/5/2024 2321)  Absence of Physical Injury: Implement safety measures based on patient assessment  11/5/2024 2018 by Antwan Steward RN  Outcome: Progressing     Problem: Gastrointestinal - Adult  Goal: Minimal or absence of nausea and vomiting  11/5/2024 2322 by Catie Shaffer RN  Outcome: Progressing  11/5/2024 2018 by Antwan Steward RN  Outcome: Progressing  Flowsheets (Taken 11/5/2024 2010 by Catie Shaffer RN)  Minimal or absence of nausea and vomiting: Administer IV fluids as ordered to ensure adequate hydration  Goal: Maintains or returns to baseline bowel function  11/5/2024 2322 by Catie Shaffer RN  Outcome: Progressing  11/5/2024 2018 by

## 2024-11-06 NOTE — H&P
History and Physical      Name:  Sharath Ceja /Age/Sex: 1950  (74 y.o. male)   MRN & CSN:  [id number] & [id number] Encounter Date/Time:11/3/2024 8:25 PM EST   Location:   PCP: No primary care provider on file.       Assessment and Plan:   Sharath Ceja is a 74 y.o. male with cholangiocarcinoma s/p Whipple procedure in 2023, BPH, gout, GERD, T2DM with long-term use of insulin, Crohn's disease, hypertension who presented from home with abdominal pain and dark discoloration of urine    Obstructive jaundice  Concern for biliary stricture  History of cholangiocarcinoma status post Whipple procedure in 2023 and adjuvant chemotherapy completed 2024  CT abdomen pelvis reviewed increasing intrahepatic ductal dilatation  Pain control and antiemetic as needed  Start IV Zosyn, keep NPO for possible ERCP in a.m. as recommended by gastroenterology.    Constipation  Optimize bowel regimen    T2DM with long-term use of insulin  Low-dose insulin sliding scale POC glucose every 4 hour, hypoglycemia protocol    BPH  Gout  GERD  Hypertension  Crohn's disease    Hold home medications due to n.p.o. status    Inpatient MedSurg telemetry  Full code    Disposition:     Current Living situation: Home  Expected Disposition: Home  Estimated D/C: 2 days    Diet Diet NPO   DVT Prophylaxis [x] Lovenox, []  Heparin, [] SCDs, [] Ambulation,  [] Eliquis, [] Xarelto, [] Coumadin   Code Status Full Code   Surrogate Decision Maker/ NICK Kaur     Personally reviewed Lab Studies and Imaging   Discussed management of the case with ER provider who recommended admission due to obstructive jaundice  EKG interpreted personally and results normal sinus rhythm 70/min PVCs first-degree AV block no acute ST-T wave abnormality    History from:     Patient     History of Present Illness:     Chief Complaint   Patient presents with    Urinary Frequency     Patient states that he has been having increased urination 
  guaiFENesin (MUCINEX) 600 MG extended release tablet Take 1 tablet by mouth 2 times daily 7/3/23  Yes Guy Link DO   lidocaine 4 % external patch Place 1 patch onto the skin daily  Patient taking differently: Place 1 patch onto the skin as needed 7/4/23  Yes Guy Link DO   polycarbophil (FIBERCON) 625 MG tablet Take 1 tablet by mouth daily 7/3/23  Yes Guy Link DO   finasteride (PROSCAR) 5 MG tablet Take 1 tablet by mouth every evening   Yes Ricki Hudson MD   insulin NPH (HUMULIN N;NOVOLIN N) 100 UNIT/ML injection vial Inject 20 Units into the skin 2 times daily (before meals) 6/18/24   Chago Craft, APRN - CNP   aspirin 81 MG EC tablet Take 1 tablet by mouth daily  Patient not taking: Reported on 11/3/2024    Ricki Hudson MD   ciprofloxacin (CIPRO) 500 MG tablet Take 1 tablet by mouth 2 times daily  Patient not taking: Reported on 11/3/2024 10/27/23   Ricki Hudson MD   dexamethasone (DECADRON) 4 MG tablet  10/18/23   Ricki Hudson MD   FEROSUL 325 (65 Fe) MG tablet Take 1 tablet by mouth daily 10/24/23   Ricki Hudson MD        Allergies:   Allergies   Allergen Reactions    Codeine      Pt states he took medication without food and had a reaction, states this gave him chest pain    Amoxicillin-Pot Clavulanate Other (See Comments)     Patient did not have a rxn to Augmentin, MD called and told him to stop taking it.  Has tolerated Zosyn.    DOES FINE WITH KEFLEX       Nurses notes reviewed and agreed.    Physical Exam:  Vital Signs: /75   Pulse 55   Temp 97.7 °F (36.5 °C) (Temporal)   Resp 16   Ht 1.803 m (5' 11\")   Wt 73.5 kg (162 lb 1.6 oz)   SpO2 97%   BMI 22.61 kg/m²    Airway: Mallampati: II (soft palate, uvula, fauces visible)  Pulmonary:Normal  Cardiac:Normal  Abdomen:Normal    Pre-Procedure Assessment / Plan:  ASA: Class 3 - A patient with severe systemic disease that limits activity but is not incapacitating  Level of

## 2024-11-07 ENCOUNTER — APPOINTMENT (OUTPATIENT)
Dept: INTERVENTIONAL RADIOLOGY/VASCULAR | Age: 74
DRG: 445 | End: 2024-11-07
Attending: INTERNAL MEDICINE
Payer: MEDICARE

## 2024-11-07 LAB
ALBUMIN SERPL-MCNC: 3.6 G/DL (ref 3.4–5)
ALBUMIN/GLOB SERPL: 1.8 {RATIO} (ref 1.1–2.2)
ALP SERPL-CCNC: 774 U/L (ref 40–129)
ALT SERPL-CCNC: 462 U/L (ref 10–40)
ANION GAP SERPL CALCULATED.3IONS-SCNC: 9 MMOL/L (ref 3–16)
AST SERPL-CCNC: 257 U/L (ref 15–37)
BASOPHILS # BLD: 0 K/UL (ref 0–0.2)
BASOPHILS NFR BLD: 0.1 %
BILIRUB DIRECT SERPL-MCNC: 1 MG/DL (ref 0–0.3)
BILIRUB INDIRECT SERPL-MCNC: 0.3 MG/DL (ref 0–1)
BILIRUB SERPL-MCNC: 1.3 MG/DL (ref 0–1)
BUN SERPL-MCNC: 8 MG/DL (ref 7–20)
CALCIUM SERPL-MCNC: 8.8 MG/DL (ref 8.3–10.6)
CHLORIDE SERPL-SCNC: 105 MMOL/L (ref 99–110)
CO2 SERPL-SCNC: 25 MMOL/L (ref 21–32)
CREAT SERPL-MCNC: 0.5 MG/DL (ref 0.8–1.3)
DEPRECATED RDW RBC AUTO: 13.2 % (ref 12.4–15.4)
EOSINOPHIL # BLD: 0 K/UL (ref 0–0.6)
EOSINOPHIL NFR BLD: 0.1 %
GFR SERPLBLD CREATININE-BSD FMLA CKD-EPI: >90 ML/MIN/{1.73_M2}
GLUCOSE BLD-MCNC: 152 MG/DL (ref 70–99)
GLUCOSE BLD-MCNC: 220 MG/DL (ref 70–99)
GLUCOSE BLD-MCNC: 238 MG/DL (ref 70–99)
GLUCOSE BLD-MCNC: 382 MG/DL (ref 70–99)
GLUCOSE SERPL-MCNC: 184 MG/DL (ref 70–99)
HCT VFR BLD AUTO: 33.9 % (ref 40.5–52.5)
HGB BLD-MCNC: 11.9 G/DL (ref 13.5–17.5)
INR PPP: 0.98 (ref 0.85–1.15)
LYMPHOCYTES # BLD: 0.5 K/UL (ref 1–5.1)
LYMPHOCYTES NFR BLD: 8.3 %
MAGNESIUM SERPL-MCNC: 2.21 MG/DL (ref 1.8–2.4)
MCH RBC QN AUTO: 36.1 PG (ref 26–34)
MCHC RBC AUTO-ENTMCNC: 35 G/DL (ref 31–36)
MCV RBC AUTO: 103.2 FL (ref 80–100)
MONOCYTES # BLD: 0.3 K/UL (ref 0–1.3)
MONOCYTES NFR BLD: 4.7 %
NEUTROPHILS # BLD: 5.2 K/UL (ref 1.7–7.7)
NEUTROPHILS NFR BLD: 86.8 %
PERFORMED ON: ABNORMAL
PLATELET # BLD AUTO: 188 K/UL (ref 135–450)
PMV BLD AUTO: 6.7 FL (ref 5–10.5)
POTASSIUM SERPL-SCNC: 3.4 MMOL/L (ref 3.5–5.1)
PROT SERPL-MCNC: 5.6 G/DL (ref 6.4–8.2)
PROTHROMBIN TIME: 13.2 SEC (ref 11.9–14.9)
RBC # BLD AUTO: 3.28 M/UL (ref 4.2–5.9)
SODIUM SERPL-SCNC: 139 MMOL/L (ref 136–145)
WBC # BLD AUTO: 6 K/UL (ref 4–11)

## 2024-11-07 PROCEDURE — 85610 PROTHROMBIN TIME: CPT

## 2024-11-07 PROCEDURE — 83735 ASSAY OF MAGNESIUM: CPT

## 2024-11-07 PROCEDURE — 2580000003 HC RX 258: Performed by: NURSE PRACTITIONER

## 2024-11-07 PROCEDURE — 6370000000 HC RX 637 (ALT 250 FOR IP): Performed by: NURSE PRACTITIONER

## 2024-11-07 PROCEDURE — 1200000000 HC SEMI PRIVATE

## 2024-11-07 PROCEDURE — 99152 MOD SED SAME PHYS/QHP 5/>YRS: CPT

## 2024-11-07 PROCEDURE — C1729 CATH, DRAINAGE: HCPCS

## 2024-11-07 PROCEDURE — 6370000000 HC RX 637 (ALT 250 FOR IP): Performed by: INTERNAL MEDICINE

## 2024-11-07 PROCEDURE — 6360000002 HC RX W HCPCS: Performed by: NURSE PRACTITIONER

## 2024-11-07 PROCEDURE — 6370000000 HC RX 637 (ALT 250 FOR IP): Performed by: STUDENT IN AN ORGANIZED HEALTH CARE EDUCATION/TRAINING PROGRAM

## 2024-11-07 PROCEDURE — 6360000004 HC RX CONTRAST MEDICATION: Performed by: INTERNAL MEDICINE

## 2024-11-07 PROCEDURE — 99232 SBSQ HOSP IP/OBS MODERATE 35: CPT | Performed by: INTERNAL MEDICINE

## 2024-11-07 PROCEDURE — 2580000003 HC RX 258: Performed by: STUDENT IN AN ORGANIZED HEALTH CARE EDUCATION/TRAINING PROGRAM

## 2024-11-07 PROCEDURE — 80053 COMPREHEN METABOLIC PANEL: CPT

## 2024-11-07 PROCEDURE — 94640 AIRWAY INHALATION TREATMENT: CPT

## 2024-11-07 PROCEDURE — 99153 MOD SED SAME PHYS/QHP EA: CPT

## 2024-11-07 PROCEDURE — 47534 PLMT BILIARY DRAINAGE CATH: CPT

## 2024-11-07 PROCEDURE — 36415 COLL VENOUS BLD VENIPUNCTURE: CPT

## 2024-11-07 PROCEDURE — 6360000002 HC RX W HCPCS: Performed by: INTERNAL MEDICINE

## 2024-11-07 PROCEDURE — 85025 COMPLETE CBC W/AUTO DIFF WBC: CPT

## 2024-11-07 PROCEDURE — 6360000002 HC RX W HCPCS: Performed by: STUDENT IN AN ORGANIZED HEALTH CARE EDUCATION/TRAINING PROGRAM

## 2024-11-07 PROCEDURE — 6360000002 HC RX W HCPCS: Performed by: RADIOLOGY

## 2024-11-07 RX ORDER — DIPHENHYDRAMINE HYDROCHLORIDE 50 MG/ML
INJECTION INTRAMUSCULAR; INTRAVENOUS PRN
Status: COMPLETED | OUTPATIENT
Start: 2024-11-07 | End: 2024-11-07

## 2024-11-07 RX ORDER — FENTANYL CITRATE 50 UG/ML
INJECTION, SOLUTION INTRAMUSCULAR; INTRAVENOUS PRN
Status: COMPLETED | OUTPATIENT
Start: 2024-11-07 | End: 2024-11-07

## 2024-11-07 RX ORDER — MIDAZOLAM HYDROCHLORIDE 5 MG/ML
INJECTION, SOLUTION INTRAMUSCULAR; INTRAVENOUS PRN
Status: COMPLETED | OUTPATIENT
Start: 2024-11-07 | End: 2024-11-07

## 2024-11-07 RX ORDER — OXYCODONE HYDROCHLORIDE 5 MG/1
5 TABLET ORAL EVERY 4 HOURS PRN
Status: DISCONTINUED | OUTPATIENT
Start: 2024-11-07 | End: 2024-11-08

## 2024-11-07 RX ADMIN — HYDROMORPHONE HYDROCHLORIDE 0.5 MG: 1 INJECTION, SOLUTION INTRAMUSCULAR; INTRAVENOUS; SUBCUTANEOUS at 18:20

## 2024-11-07 RX ADMIN — PANTOPRAZOLE SODIUM 40 MG: 40 TABLET, DELAYED RELEASE ORAL at 06:48

## 2024-11-07 RX ADMIN — PIPERACILLIN AND TAZOBACTAM 3375 MG: 3; .375 INJECTION, POWDER, LYOPHILIZED, FOR SOLUTION INTRAVENOUS at 04:45

## 2024-11-07 RX ADMIN — ALBUTEROL SULFATE 2 PUFF: 90 AEROSOL, METERED RESPIRATORY (INHALATION) at 19:40

## 2024-11-07 RX ADMIN — FINASTERIDE 5 MG: 5 TABLET, FILM COATED ORAL at 17:37

## 2024-11-07 RX ADMIN — OXYCODONE 5 MG: 5 TABLET ORAL at 14:17

## 2024-11-07 RX ADMIN — MIDAZOLAM HYDROCHLORIDE 1 MG: 5 INJECTION, SOLUTION INTRAMUSCULAR; INTRAVENOUS at 12:02

## 2024-11-07 RX ADMIN — FENTANYL CITRATE 50 MCG: 50 INJECTION INTRAMUSCULAR; INTRAVENOUS at 12:25

## 2024-11-07 RX ADMIN — HYDROMORPHONE HYDROCHLORIDE 0.5 MG: 1 INJECTION, SOLUTION INTRAMUSCULAR; INTRAVENOUS; SUBCUTANEOUS at 15:32

## 2024-11-07 RX ADMIN — FENTANYL CITRATE 25 MCG: 50 INJECTION INTRAMUSCULAR; INTRAVENOUS at 12:36

## 2024-11-07 RX ADMIN — HYDROMORPHONE HYDROCHLORIDE 0.5 MG: 1 INJECTION, SOLUTION INTRAMUSCULAR; INTRAVENOUS; SUBCUTANEOUS at 21:50

## 2024-11-07 RX ADMIN — ENOXAPARIN SODIUM 40 MG: 100 INJECTION SUBCUTANEOUS at 21:52

## 2024-11-07 RX ADMIN — INSULIN LISPRO 4 UNITS: 100 INJECTION, SOLUTION INTRAVENOUS; SUBCUTANEOUS at 00:01

## 2024-11-07 RX ADMIN — MIDAZOLAM HYDROCHLORIDE 1 MG: 5 INJECTION, SOLUTION INTRAMUSCULAR; INTRAVENOUS at 12:25

## 2024-11-07 RX ADMIN — DIPHENHYDRAMINE HYDROCHLORIDE 50 MG: 50 INJECTION, SOLUTION INTRAMUSCULAR; INTRAVENOUS at 12:02

## 2024-11-07 RX ADMIN — IOHEXOL 50 ML: 240 INJECTION, SOLUTION INTRATHECAL; INTRAVASCULAR; INTRAVENOUS; ORAL at 12:52

## 2024-11-07 RX ADMIN — MIDAZOLAM HYDROCHLORIDE 0.5 MG: 5 INJECTION, SOLUTION INTRAMUSCULAR; INTRAVENOUS at 12:13

## 2024-11-07 RX ADMIN — MIDAZOLAM HYDROCHLORIDE 0.5 MG: 5 INJECTION, SOLUTION INTRAMUSCULAR; INTRAVENOUS at 12:36

## 2024-11-07 RX ADMIN — FAMOTIDINE 20 MG: 20 TABLET ORAL at 21:50

## 2024-11-07 RX ADMIN — SODIUM CHLORIDE, PRESERVATIVE FREE 10 ML: 5 INJECTION INTRAVENOUS at 21:50

## 2024-11-07 RX ADMIN — FENTANYL CITRATE 25 MCG: 50 INJECTION INTRAMUSCULAR; INTRAVENOUS at 12:30

## 2024-11-07 RX ADMIN — TRIAMCINOLONE ACETONIDE: 1 CREAM TOPICAL at 22:12

## 2024-11-07 RX ADMIN — FENTANYL CITRATE 25 MCG: 50 INJECTION INTRAMUSCULAR; INTRAVENOUS at 12:13

## 2024-11-07 RX ADMIN — FENTANYL CITRATE 50 MCG: 50 INJECTION INTRAMUSCULAR; INTRAVENOUS at 12:01

## 2024-11-07 RX ADMIN — PIPERACILLIN AND TAZOBACTAM 3375 MG: 3; .375 INJECTION, POWDER, LYOPHILIZED, FOR SOLUTION INTRAVENOUS at 13:28

## 2024-11-07 RX ADMIN — FENTANYL CITRATE 100 MCG: 50 INJECTION INTRAMUSCULAR; INTRAVENOUS at 12:52

## 2024-11-07 RX ADMIN — ALBUTEROL SULFATE 2 PUFF: 90 AEROSOL, METERED RESPIRATORY (INHALATION) at 09:29

## 2024-11-07 RX ADMIN — PIPERACILLIN AND TAZOBACTAM 3375 MG: 3; .375 INJECTION, POWDER, LYOPHILIZED, FOR SOLUTION INTRAVENOUS at 22:05

## 2024-11-07 RX ADMIN — MIDAZOLAM HYDROCHLORIDE 0.5 MG: 5 INJECTION, SOLUTION INTRAMUSCULAR; INTRAVENOUS at 12:30

## 2024-11-07 RX ADMIN — MIDAZOLAM HYDROCHLORIDE 0.5 MG: 5 INJECTION, SOLUTION INTRAMUSCULAR; INTRAVENOUS at 12:20

## 2024-11-07 RX ADMIN — FENTANYL CITRATE 25 MCG: 50 INJECTION INTRAMUSCULAR; INTRAVENOUS at 12:20

## 2024-11-07 ASSESSMENT — PAIN DESCRIPTION - DESCRIPTORS
DESCRIPTORS: ACHING;DISCOMFORT
DESCRIPTORS: ACHING;DISCOMFORT;SHARP
DESCRIPTORS: ACHING;SHARP;SORE;SHOOTING
DESCRIPTORS: ACHING;DISCOMFORT

## 2024-11-07 ASSESSMENT — PAIN DESCRIPTION - LOCATION
LOCATION: ABDOMEN

## 2024-11-07 ASSESSMENT — PAIN DESCRIPTION - PAIN TYPE
TYPE: SURGICAL PAIN

## 2024-11-07 ASSESSMENT — PAIN DESCRIPTION - FREQUENCY
FREQUENCY: INTERMITTENT
FREQUENCY: INTERMITTENT
FREQUENCY: CONTINUOUS
FREQUENCY: INTERMITTENT

## 2024-11-07 ASSESSMENT — PAIN SCALES - GENERAL
PAINLEVEL_OUTOF10: 4
PAINLEVEL_OUTOF10: 8
PAINLEVEL_OUTOF10: 2
PAINLEVEL_OUTOF10: 10
PAINLEVEL_OUTOF10: 3
PAINLEVEL_OUTOF10: 8
PAINLEVEL_OUTOF10: 0
PAINLEVEL_OUTOF10: 4

## 2024-11-07 ASSESSMENT — PAIN DESCRIPTION - ONSET
ONSET: ON-GOING

## 2024-11-07 ASSESSMENT — PAIN DESCRIPTION - ORIENTATION
ORIENTATION: MID;LOWER
ORIENTATION: MID;LOWER
ORIENTATION: MID
ORIENTATION: MID

## 2024-11-07 NOTE — OR NURSING
Pt arrived for image guided biliary drain. Procedure explained including the risk and benefits of the procedure. All questions answered. Pt verbalizes understanding of the of procedure and states no more questions. Consent signed. Vital signs stable, labs, allergies, medications, and code status reviewed. No contraindications noted.     Vitals:    11/07/24 0931   BP:    Pulse:    Resp:    Temp:    SpO2: 94%    (vital signs in table format)    Elenita Score  2 - Able to move 4 extremities voluntarily on command  2 - BP+/- 20mmHg of normal  2 - Fully awake  2 - Able to maintain oxygen saturation >92% on room air  2 - Able to breathe deeply and cough freely    Allergies  Codeine and Amoxicillin-pot clavulanate    Labs  Lab Results   Component Value Date    INR 0.98 11/07/2024    PROTIME 13.2 11/07/2024       Lab Results   Component Value Date    CREATININE 0.5 (L) 11/07/2024    BUN 8 11/07/2024     11/07/2024    K 3.4 (L) 11/07/2024     11/07/2024    CO2 25 11/07/2024       Lab Results   Component Value Date    WBC 6.0 11/07/2024    HGB 11.9 (L) 11/07/2024    HCT 33.9 (L) 11/07/2024    .2 (H) 11/07/2024     11/07/2024

## 2024-11-07 NOTE — PLAN OF CARE
Problem: Chronic Conditions and Co-morbidities  Goal: Patient's chronic conditions and co-morbidity symptoms are monitored and maintained or improved  Outcome: Progressing     Problem: Discharge Planning  Goal: Discharge to home or other facility with appropriate resources  Outcome: Progressing     Problem: Safety - Adult  Goal: Free from fall injury  Outcome: Progressing     Problem: ABCDS Injury Assessment  Goal: Absence of physical injury  Outcome: Progressing     Problem: Gastrointestinal - Adult  Goal: Minimal or absence of nausea and vomiting  Outcome: Progressing  Goal: Maintains or returns to baseline bowel function  Outcome: Progressing  Goal: Maintains adequate nutritional intake  Outcome: Progressing     Problem: Pain  Goal: Verbalizes/displays adequate comfort level or baseline comfort level  Outcome: Progressing  Flowsheets (Taken 11/6/2024 2045 by Shanna Vanegas RN)  Verbalizes/displays adequate comfort level or baseline comfort level: Encourage patient to monitor pain and request assistance

## 2024-11-07 NOTE — OR NURSING
Image guided biliary drain  insertion bilaterally completed. Dr. Murguia placed right 10.2 Prydeinig 40 cm [facility] LOT # [id number] EXP 6/1/26 .And left 8.5 40 cm [employer] LOT# [id number] Exp 6/17/25. Drain/tube dressing clean, dry, and intact. Pt tolerated procedure without any signs or symptoms of distress. Vital signs stable. Report called to Kaila MORRISON. Pt transported back to  in stable condition via bed by transport.     Medications  Versed: 4 mg  Fentanyl: 300 mcg    Vital Signs  Vitals:    11/07/24 1253   BP: (!) 177/103   Pulse: 69   Resp: 14   Temp:    SpO2: 97%    (vital signs in table format)    Post Elenita  2 - Able to move 4 extremities voluntarily on command  2 - BP+/- 20mmHg of normal  2 - Fully awake  2 - Able to maintain oxygen saturation >92% on room air  2 - Able to breathe deeply and cough freely

## 2024-11-08 ENCOUNTER — APPOINTMENT (OUTPATIENT)
Dept: GENERAL RADIOLOGY | Age: 74
DRG: 445 | End: 2024-11-08
Payer: MEDICARE

## 2024-11-08 PROBLEM — E83.42 HYPOMAGNESEMIA: Status: ACTIVE | Noted: 2024-11-08

## 2024-11-08 LAB
ALBUMIN SERPL-MCNC: 3.4 G/DL (ref 3.4–5)
ALBUMIN SERPL-MCNC: 3.4 G/DL (ref 3.4–5)
ALBUMIN/GLOB SERPL: 1.4 {RATIO} (ref 1.1–2.2)
ALP SERPL-CCNC: 632 U/L (ref 40–129)
ALP SERPL-CCNC: 649 U/L (ref 40–129)
ALT SERPL-CCNC: 338 U/L (ref 10–40)
ALT SERPL-CCNC: 341 U/L (ref 10–40)
ANION GAP SERPL CALCULATED.3IONS-SCNC: 10 MMOL/L (ref 3–16)
AST SERPL-CCNC: 91 U/L (ref 15–37)
AST SERPL-CCNC: 92 U/L (ref 15–37)
BASOPHILS # BLD: 0 K/UL (ref 0–0.2)
BASOPHILS NFR BLD: 0.4 %
BILIRUB DIRECT SERPL-MCNC: 0.6 MG/DL (ref 0–0.3)
BILIRUB INDIRECT SERPL-MCNC: 0.4 MG/DL (ref 0–1)
BILIRUB SERPL-MCNC: 1 MG/DL (ref 0–1)
BILIRUB SERPL-MCNC: 1 MG/DL (ref 0–1)
BUN SERPL-MCNC: 15 MG/DL (ref 7–20)
CALCIUM SERPL-MCNC: 8.1 MG/DL (ref 8.3–10.6)
CHLORIDE SERPL-SCNC: 97 MMOL/L (ref 99–110)
CO2 SERPL-SCNC: 26 MMOL/L (ref 21–32)
CREAT SERPL-MCNC: 0.5 MG/DL (ref 0.8–1.3)
DEPRECATED RDW RBC AUTO: 13.7 % (ref 12.4–15.4)
EOSINOPHIL # BLD: 0 K/UL (ref 0–0.6)
EOSINOPHIL NFR BLD: 0.2 %
GFR SERPLBLD CREATININE-BSD FMLA CKD-EPI: >90 ML/MIN/{1.73_M2}
GLUCOSE BLD-MCNC: 161 MG/DL (ref 70–99)
GLUCOSE BLD-MCNC: 172 MG/DL (ref 70–99)
GLUCOSE BLD-MCNC: 179 MG/DL (ref 70–99)
GLUCOSE BLD-MCNC: 210 MG/DL (ref 70–99)
GLUCOSE BLD-MCNC: 251 MG/DL (ref 70–99)
GLUCOSE SERPL-MCNC: 176 MG/DL (ref 70–99)
HCT VFR BLD AUTO: 35 % (ref 40.5–52.5)
HGB BLD-MCNC: 12.2 G/DL (ref 13.5–17.5)
LYMPHOCYTES # BLD: 0.4 K/UL (ref 1–5.1)
LYMPHOCYTES NFR BLD: 5.7 %
MAGNESIUM SERPL-MCNC: 1.77 MG/DL (ref 1.8–2.4)
MCH RBC QN AUTO: 36.1 PG (ref 26–34)
MCHC RBC AUTO-ENTMCNC: 34.8 G/DL (ref 31–36)
MCV RBC AUTO: 103.6 FL (ref 80–100)
MONOCYTES # BLD: 0.3 K/UL (ref 0–1.3)
MONOCYTES NFR BLD: 3.6 %
NEUTROPHILS # BLD: 6.4 K/UL (ref 1.7–7.7)
NEUTROPHILS NFR BLD: 90.1 %
PERFORMED ON: ABNORMAL
PLATELET # BLD AUTO: 176 K/UL (ref 135–450)
PMV BLD AUTO: 6.6 FL (ref 5–10.5)
POTASSIUM SERPL-SCNC: 3.3 MMOL/L (ref 3.5–5.1)
PROT SERPL-MCNC: 5.9 G/DL (ref 6.4–8.2)
PROT SERPL-MCNC: 5.9 G/DL (ref 6.4–8.2)
RBC # BLD AUTO: 3.38 M/UL (ref 4.2–5.9)
SODIUM SERPL-SCNC: 133 MMOL/L (ref 136–145)
WBC # BLD AUTO: 7.2 K/UL (ref 4–11)

## 2024-11-08 PROCEDURE — 6370000000 HC RX 637 (ALT 250 FOR IP): Performed by: NURSE PRACTITIONER

## 2024-11-08 PROCEDURE — 94761 N-INVAS EAR/PLS OXIMETRY MLT: CPT

## 2024-11-08 PROCEDURE — 6360000002 HC RX W HCPCS: Performed by: STUDENT IN AN ORGANIZED HEALTH CARE EDUCATION/TRAINING PROGRAM

## 2024-11-08 PROCEDURE — 6370000000 HC RX 637 (ALT 250 FOR IP): Performed by: INTERNAL MEDICINE

## 2024-11-08 PROCEDURE — 85025 COMPLETE CBC W/AUTO DIFF WBC: CPT

## 2024-11-08 PROCEDURE — 80053 COMPREHEN METABOLIC PANEL: CPT

## 2024-11-08 PROCEDURE — 2580000003 HC RX 258: Performed by: NURSE PRACTITIONER

## 2024-11-08 PROCEDURE — 1200000000 HC SEMI PRIVATE

## 2024-11-08 PROCEDURE — 6360000002 HC RX W HCPCS: Performed by: NURSE PRACTITIONER

## 2024-11-08 PROCEDURE — 94640 AIRWAY INHALATION TREATMENT: CPT

## 2024-11-08 PROCEDURE — 83735 ASSAY OF MAGNESIUM: CPT

## 2024-11-08 PROCEDURE — 6360000002 HC RX W HCPCS: Performed by: INTERNAL MEDICINE

## 2024-11-08 PROCEDURE — 6370000000 HC RX 637 (ALT 250 FOR IP): Performed by: STUDENT IN AN ORGANIZED HEALTH CARE EDUCATION/TRAINING PROGRAM

## 2024-11-08 PROCEDURE — 99233 SBSQ HOSP IP/OBS HIGH 50: CPT | Performed by: INTERNAL MEDICINE

## 2024-11-08 PROCEDURE — 2580000003 HC RX 258: Performed by: STUDENT IN AN ORGANIZED HEALTH CARE EDUCATION/TRAINING PROGRAM

## 2024-11-08 PROCEDURE — 36415 COLL VENOUS BLD VENIPUNCTURE: CPT

## 2024-11-08 RX ORDER — OXYCODONE HYDROCHLORIDE 5 MG/1
10 TABLET ORAL EVERY 4 HOURS PRN
Status: DISCONTINUED | OUTPATIENT
Start: 2024-11-08 | End: 2024-11-11 | Stop reason: HOSPADM

## 2024-11-08 RX ORDER — MAGNESIUM SULFATE 1 G/100ML
1000 INJECTION INTRAVENOUS ONCE
Status: COMPLETED | OUTPATIENT
Start: 2024-11-08 | End: 2024-11-08

## 2024-11-08 RX ORDER — POTASSIUM CHLORIDE 7.45 MG/ML
10 INJECTION INTRAVENOUS PRN
Status: DISCONTINUED | OUTPATIENT
Start: 2024-11-08 | End: 2024-11-11 | Stop reason: HOSPADM

## 2024-11-08 RX ORDER — GUAIFENESIN 600 MG/1
600 TABLET, EXTENDED RELEASE ORAL 2 TIMES DAILY
Status: DISCONTINUED | OUTPATIENT
Start: 2024-11-08 | End: 2024-11-11 | Stop reason: HOSPADM

## 2024-11-08 RX ORDER — POTASSIUM CHLORIDE 1500 MG/1
40 TABLET, EXTENDED RELEASE ORAL PRN
Status: DISCONTINUED | OUTPATIENT
Start: 2024-11-08 | End: 2024-11-11 | Stop reason: HOSPADM

## 2024-11-08 RX ORDER — OXYCODONE HYDROCHLORIDE 5 MG/1
5 TABLET ORAL EVERY 4 HOURS PRN
Status: DISCONTINUED | OUTPATIENT
Start: 2024-11-08 | End: 2024-11-11 | Stop reason: HOSPADM

## 2024-11-08 RX ADMIN — GUAIFENESIN 600 MG: 600 TABLET, EXTENDED RELEASE ORAL at 12:07

## 2024-11-08 RX ADMIN — HYDROMORPHONE HYDROCHLORIDE 0.5 MG: 1 INJECTION, SOLUTION INTRAMUSCULAR; INTRAVENOUS; SUBCUTANEOUS at 01:48

## 2024-11-08 RX ADMIN — PIPERACILLIN AND TAZOBACTAM 3375 MG: 3; .375 INJECTION, POWDER, LYOPHILIZED, FOR SOLUTION INTRAVENOUS at 12:06

## 2024-11-08 RX ADMIN — TRIAMCINOLONE ACETONIDE: 1 CREAM TOPICAL at 08:30

## 2024-11-08 RX ADMIN — OXYCODONE 10 MG: 5 TABLET ORAL at 12:07

## 2024-11-08 RX ADMIN — ALBUTEROL SULFATE 2 PUFF: 90 AEROSOL, METERED RESPIRATORY (INHALATION) at 17:47

## 2024-11-08 RX ADMIN — ALBUTEROL SULFATE 2 PUFF: 90 AEROSOL, METERED RESPIRATORY (INHALATION) at 11:53

## 2024-11-08 RX ADMIN — PIPERACILLIN AND TAZOBACTAM 3375 MG: 3; .375 INJECTION, POWDER, LYOPHILIZED, FOR SOLUTION INTRAVENOUS at 22:11

## 2024-11-08 RX ADMIN — OXYCODONE 10 MG: 5 TABLET ORAL at 16:12

## 2024-11-08 RX ADMIN — OXYCODONE 5 MG: 5 TABLET ORAL at 03:05

## 2024-11-08 RX ADMIN — TRIAMCINOLONE ACETONIDE: 1 CREAM TOPICAL at 21:33

## 2024-11-08 RX ADMIN — ALLOPURINOL 300 MG: 300 TABLET ORAL at 08:28

## 2024-11-08 RX ADMIN — ALBUTEROL SULFATE 2 PUFF: 90 AEROSOL, METERED RESPIRATORY (INHALATION) at 08:10

## 2024-11-08 RX ADMIN — SODIUM CHLORIDE, PRESERVATIVE FREE 10 ML: 5 INJECTION INTRAVENOUS at 21:31

## 2024-11-08 RX ADMIN — ENOXAPARIN SODIUM 40 MG: 100 INJECTION SUBCUTANEOUS at 21:32

## 2024-11-08 RX ADMIN — PIPERACILLIN AND TAZOBACTAM 3375 MG: 3; .375 INJECTION, POWDER, LYOPHILIZED, FOR SOLUTION INTRAVENOUS at 05:28

## 2024-11-08 RX ADMIN — GUAIFENESIN 600 MG: 600 TABLET, EXTENDED RELEASE ORAL at 21:32

## 2024-11-08 RX ADMIN — HYDROMORPHONE HYDROCHLORIDE 0.5 MG: 1 INJECTION, SOLUTION INTRAMUSCULAR; INTRAVENOUS; SUBCUTANEOUS at 05:22

## 2024-11-08 RX ADMIN — MAGNESIUM SULFATE IN DEXTROSE 1000 MG: 10 INJECTION, SOLUTION INTRAVENOUS at 12:06

## 2024-11-08 RX ADMIN — FINASTERIDE 5 MG: 5 TABLET, FILM COATED ORAL at 16:12

## 2024-11-08 RX ADMIN — OXYCODONE 10 MG: 5 TABLET ORAL at 21:31

## 2024-11-08 RX ADMIN — SODIUM CHLORIDE, PRESERVATIVE FREE 10 ML: 5 INJECTION INTRAVENOUS at 08:29

## 2024-11-08 RX ADMIN — PANTOPRAZOLE SODIUM 40 MG: 40 TABLET, DELAYED RELEASE ORAL at 05:22

## 2024-11-08 RX ADMIN — INSULIN LISPRO 1 UNITS: 100 INJECTION, SOLUTION INTRAVENOUS; SUBCUTANEOUS at 12:07

## 2024-11-08 RX ADMIN — FAMOTIDINE 20 MG: 20 TABLET ORAL at 21:32

## 2024-11-08 ASSESSMENT — PAIN DESCRIPTION - DESCRIPTORS
DESCRIPTORS: ACHING
DESCRIPTORS: ACHING;DISCOMFORT;SHARP;SORE;STABBING
DESCRIPTORS: ACHING
DESCRIPTORS: ACHING;DISCOMFORT

## 2024-11-08 ASSESSMENT — PAIN SCALES - GENERAL
PAINLEVEL_OUTOF10: 8
PAINLEVEL_OUTOF10: 0
PAINLEVEL_OUTOF10: 0
PAINLEVEL_OUTOF10: 5
PAINLEVEL_OUTOF10: 7
PAINLEVEL_OUTOF10: 5

## 2024-11-08 ASSESSMENT — PAIN DESCRIPTION - LOCATION
LOCATION: ABDOMEN

## 2024-11-08 ASSESSMENT — PAIN SCALES - WONG BAKER: WONGBAKER_NUMERICALRESPONSE: NO HURT

## 2024-11-08 ASSESSMENT — PAIN - FUNCTIONAL ASSESSMENT
PAIN_FUNCTIONAL_ASSESSMENT: ACTIVITIES ARE NOT PREVENTED
PAIN_FUNCTIONAL_ASSESSMENT: PREVENTS OR INTERFERES SOME ACTIVE ACTIVITIES AND ADLS
PAIN_FUNCTIONAL_ASSESSMENT: PREVENTS OR INTERFERES SOME ACTIVE ACTIVITIES AND ADLS

## 2024-11-08 ASSESSMENT — PAIN DESCRIPTION - ORIENTATION
ORIENTATION: RIGHT;LEFT;INNER
ORIENTATION: RIGHT;LEFT;INNER
ORIENTATION: RIGHT;LEFT
ORIENTATION: RIGHT;UPPER

## 2024-11-08 ASSESSMENT — PAIN DESCRIPTION - PAIN TYPE: TYPE: ACUTE PAIN

## 2024-11-08 ASSESSMENT — PAIN DESCRIPTION - ONSET
ONSET: AWAKENED FROM SLEEP
ONSET: AWAKENED FROM SLEEP

## 2024-11-08 ASSESSMENT — PAIN DESCRIPTION - FREQUENCY
FREQUENCY: CONTINUOUS
FREQUENCY: CONTINUOUS

## 2024-11-08 NOTE — PLAN OF CARE
Problem: Chronic Conditions and Co-morbidities  Goal: Patient's chronic conditions and co-morbidity symptoms are monitored and maintained or improved  11/8/2024 0919 by Karel Parkinson RN  Outcome: Progressing  11/8/2024 0035 by Ailze Yanez RN  Outcome: Progressing  11/8/2024 0034 by Alize Yanez RN  Outcome: Progressing  Flowsheets (Taken 11/8/2024 0034)  Care Plan - Patient's Chronic Conditions and Co-Morbidity Symptoms are Monitored and Maintained or Improved: Monitor and assess patient's chronic conditions and comorbid symptoms for stability, deterioration, or improvement     Problem: Discharge Planning  Goal: Discharge to home or other facility with appropriate resources  11/8/2024 0919 by Karel Parkinson RN  Outcome: Progressing  11/8/2024 0035 by Alize Yanez RN  Outcome: Progressing  Flowsheets (Taken 11/8/2024 0034)  Discharge to home or other facility with appropriate resources: Identify barriers to discharge with patient and caregiver     Problem: Safety - Adult  Goal: Free from fall injury  11/8/2024 0035 by Alize Yanez RN  Outcome: Progressing     Problem: ABCDS Injury Assessment  Goal: Absence of physical injury  Outcome: Progressing     Problem: Gastrointestinal - Adult  Goal: Minimal or absence of nausea and vomiting  Outcome: Progressing  Goal: Maintains or returns to baseline bowel function  Outcome: Progressing  Goal: Maintains adequate nutritional intake  Outcome: Progressing  Flowsheets (Taken 11/7/2024 2000 by Shanna Vanegas, RN)  Maintains adequate nutritional intake: Monitor percentage of each meal consumed     Problem: Pain  Goal: Verbalizes/displays adequate comfort level or baseline comfort level  Outcome: Progressing  Flowsheets  Taken 11/8/2024 0305 by Alize Yanez RN  Verbalizes/displays adequate comfort level or baseline comfort level: Assess pain using appropriate pain scale  Taken 11/7/2024 2150 by Shanna Vanegas RN  Verbalizes/displays adequate comfort level or  Problem: Knowledge Deficit  Goal: Patient/family/caregiver demonstrates understanding of disease process, treatment plan, medications, and discharge instructions  Description: Complete learning assessment and assess knowledge base.   Interventions:  - Provide teaching at level of understanding  - Provide teaching via preferred learning methods  Outcome: Progressing

## 2024-11-08 NOTE — PLAN OF CARE
Problem: Chronic Conditions and Co-morbidities  Goal: Patient's chronic conditions and co-morbidity symptoms are monitored and maintained or improved  11/8/2024 0035 by Alize Yanez RN  Outcome: Progressing     Problem: Discharge Planning  Goal: Discharge to home or other facility with appropriate resources  Outcome: Progressing     Problem: Safety - Adult  Goal: Free from fall injury  Outcome: Progressing

## 2024-11-09 LAB
ALBUMIN SERPL-MCNC: 3.2 G/DL (ref 3.4–5)
ALBUMIN/GLOB SERPL: 1.2 {RATIO} (ref 1.1–2.2)
ALP SERPL-CCNC: 464 U/L (ref 40–129)
ALT SERPL-CCNC: 211 U/L (ref 10–40)
ANION GAP SERPL CALCULATED.3IONS-SCNC: 9 MMOL/L (ref 3–16)
AST SERPL-CCNC: 31 U/L (ref 15–37)
BASOPHILS # BLD: 0 K/UL (ref 0–0.2)
BASOPHILS NFR BLD: 0.2 %
BILIRUB SERPL-MCNC: 0.6 MG/DL (ref 0–1)
BUN SERPL-MCNC: 8 MG/DL (ref 7–20)
CALCIUM SERPL-MCNC: 8.1 MG/DL (ref 8.3–10.6)
CHLORIDE SERPL-SCNC: 97 MMOL/L (ref 99–110)
CO2 SERPL-SCNC: 28 MMOL/L (ref 21–32)
CREAT SERPL-MCNC: 0.5 MG/DL (ref 0.8–1.3)
DEPRECATED RDW RBC AUTO: 13.4 % (ref 12.4–15.4)
EOSINOPHIL # BLD: 0 K/UL (ref 0–0.6)
EOSINOPHIL NFR BLD: 0.7 %
GFR SERPLBLD CREATININE-BSD FMLA CKD-EPI: >90 ML/MIN/{1.73_M2}
GLUCOSE BLD-MCNC: 174 MG/DL (ref 70–99)
GLUCOSE BLD-MCNC: 177 MG/DL (ref 70–99)
GLUCOSE BLD-MCNC: 181 MG/DL (ref 70–99)
GLUCOSE BLD-MCNC: 220 MG/DL (ref 70–99)
GLUCOSE BLD-MCNC: 221 MG/DL (ref 70–99)
GLUCOSE SERPL-MCNC: 169 MG/DL (ref 70–99)
HCT VFR BLD AUTO: 31.8 % (ref 40.5–52.5)
HGB BLD-MCNC: 11.5 G/DL (ref 13.5–17.5)
LYMPHOCYTES # BLD: 0.4 K/UL (ref 1–5.1)
LYMPHOCYTES NFR BLD: 7.3 %
MCH RBC QN AUTO: 36.7 PG (ref 26–34)
MCHC RBC AUTO-ENTMCNC: 36 G/DL (ref 31–36)
MCV RBC AUTO: 101.9 FL (ref 80–100)
MONOCYTES # BLD: 0.2 K/UL (ref 0–1.3)
MONOCYTES NFR BLD: 4.2 %
NEUTROPHILS # BLD: 4.7 K/UL (ref 1.7–7.7)
NEUTROPHILS NFR BLD: 87.6 %
PERFORMED ON: ABNORMAL
PLATELET # BLD AUTO: 182 K/UL (ref 135–450)
PMV BLD AUTO: 6.6 FL (ref 5–10.5)
POTASSIUM SERPL-SCNC: 3.5 MMOL/L (ref 3.5–5.1)
PROT SERPL-MCNC: 5.9 G/DL (ref 6.4–8.2)
RBC # BLD AUTO: 3.12 M/UL (ref 4.2–5.9)
SODIUM SERPL-SCNC: 134 MMOL/L (ref 136–145)
WBC # BLD AUTO: 5.3 K/UL (ref 4–11)

## 2024-11-09 PROCEDURE — 6360000002 HC RX W HCPCS: Performed by: NURSE PRACTITIONER

## 2024-11-09 PROCEDURE — 2580000003 HC RX 258: Performed by: NURSE PRACTITIONER

## 2024-11-09 PROCEDURE — 36415 COLL VENOUS BLD VENIPUNCTURE: CPT

## 2024-11-09 PROCEDURE — 94761 N-INVAS EAR/PLS OXIMETRY MLT: CPT

## 2024-11-09 PROCEDURE — 6370000000 HC RX 637 (ALT 250 FOR IP): Performed by: INTERNAL MEDICINE

## 2024-11-09 PROCEDURE — 2580000003 HC RX 258: Performed by: STUDENT IN AN ORGANIZED HEALTH CARE EDUCATION/TRAINING PROGRAM

## 2024-11-09 PROCEDURE — 6370000000 HC RX 637 (ALT 250 FOR IP): Performed by: STUDENT IN AN ORGANIZED HEALTH CARE EDUCATION/TRAINING PROGRAM

## 2024-11-09 PROCEDURE — 6370000000 HC RX 637 (ALT 250 FOR IP): Performed by: NURSE PRACTITIONER

## 2024-11-09 PROCEDURE — 6370000000 HC RX 637 (ALT 250 FOR IP)

## 2024-11-09 PROCEDURE — 1200000000 HC SEMI PRIVATE

## 2024-11-09 PROCEDURE — 94640 AIRWAY INHALATION TREATMENT: CPT

## 2024-11-09 PROCEDURE — 85025 COMPLETE CBC W/AUTO DIFF WBC: CPT

## 2024-11-09 PROCEDURE — 6360000002 HC RX W HCPCS: Performed by: STUDENT IN AN ORGANIZED HEALTH CARE EDUCATION/TRAINING PROGRAM

## 2024-11-09 PROCEDURE — 80053 COMPREHEN METABOLIC PANEL: CPT

## 2024-11-09 RX ADMIN — TRIAMCINOLONE ACETONIDE: 1 CREAM TOPICAL at 21:45

## 2024-11-09 RX ADMIN — INSULIN LISPRO 1 UNITS: 100 INJECTION, SOLUTION INTRAVENOUS; SUBCUTANEOUS at 13:09

## 2024-11-09 RX ADMIN — SODIUM CHLORIDE, PRESERVATIVE FREE 10 ML: 5 INJECTION INTRAVENOUS at 08:26

## 2024-11-09 RX ADMIN — ALLOPURINOL 300 MG: 300 TABLET ORAL at 08:25

## 2024-11-09 RX ADMIN — PANTOPRAZOLE SODIUM 40 MG: 40 TABLET, DELAYED RELEASE ORAL at 05:02

## 2024-11-09 RX ADMIN — PIPERACILLIN AND TAZOBACTAM 3375 MG: 3; .375 INJECTION, POWDER, LYOPHILIZED, FOR SOLUTION INTRAVENOUS at 21:50

## 2024-11-09 RX ADMIN — PIPERACILLIN AND TAZOBACTAM 3375 MG: 3; .375 INJECTION, POWDER, LYOPHILIZED, FOR SOLUTION INTRAVENOUS at 05:14

## 2024-11-09 RX ADMIN — FINASTERIDE 5 MG: 5 TABLET, FILM COATED ORAL at 16:56

## 2024-11-09 RX ADMIN — FAMOTIDINE 20 MG: 20 TABLET ORAL at 21:35

## 2024-11-09 RX ADMIN — ENOXAPARIN SODIUM 40 MG: 100 INJECTION SUBCUTANEOUS at 21:36

## 2024-11-09 RX ADMIN — SODIUM CHLORIDE, PRESERVATIVE FREE 10 ML: 5 INJECTION INTRAVENOUS at 21:35

## 2024-11-09 RX ADMIN — OXYCODONE 10 MG: 5 TABLET ORAL at 17:22

## 2024-11-09 RX ADMIN — TRIAMCINOLONE ACETONIDE: 1 CREAM TOPICAL at 08:26

## 2024-11-09 RX ADMIN — OXYCODONE 10 MG: 5 TABLET ORAL at 08:25

## 2024-11-09 RX ADMIN — OXYCODONE 10 MG: 5 TABLET ORAL at 13:09

## 2024-11-09 RX ADMIN — PIPERACILLIN AND TAZOBACTAM 3375 MG: 3; .375 INJECTION, POWDER, LYOPHILIZED, FOR SOLUTION INTRAVENOUS at 13:11

## 2024-11-09 RX ADMIN — ALBUTEROL SULFATE 2 PUFF: 90 AEROSOL, METERED RESPIRATORY (INHALATION) at 08:10

## 2024-11-09 RX ADMIN — ALBUTEROL SULFATE 2 PUFF: 90 AEROSOL, METERED RESPIRATORY (INHALATION) at 19:38

## 2024-11-09 RX ADMIN — ONDANSETRON 4 MG: 2 INJECTION INTRAMUSCULAR; INTRAVENOUS at 18:25

## 2024-11-09 RX ADMIN — GUAIFENESIN 600 MG: 600 TABLET, EXTENDED RELEASE ORAL at 08:25

## 2024-11-09 RX ADMIN — OXYCODONE 10 MG: 5 TABLET ORAL at 21:35

## 2024-11-09 RX ADMIN — GUAIFENESIN 600 MG: 600 TABLET, EXTENDED RELEASE ORAL at 21:35

## 2024-11-09 RX ADMIN — DOCUSATE SODIUM 100 MG: 100 CAPSULE, LIQUID FILLED ORAL at 08:25

## 2024-11-09 ASSESSMENT — PAIN SCALES - GENERAL
PAINLEVEL_OUTOF10: 10
PAINLEVEL_OUTOF10: 0
PAINLEVEL_OUTOF10: 10
PAINLEVEL_OUTOF10: 10
PAINLEVEL_OUTOF10: 9
PAINLEVEL_OUTOF10: 0
PAINLEVEL_OUTOF10: 0

## 2024-11-09 ASSESSMENT — PAIN DESCRIPTION - DESCRIPTORS
DESCRIPTORS: GNAWING
DESCRIPTORS: ACHING
DESCRIPTORS: ACHING;DISCOMFORT;TENDER;SORE;SHARP
DESCRIPTORS: ACHING;GNAWING

## 2024-11-09 ASSESSMENT — PAIN DESCRIPTION - PAIN TYPE: TYPE: ACUTE PAIN

## 2024-11-09 ASSESSMENT — PAIN DESCRIPTION - LOCATION
LOCATION: ABDOMEN

## 2024-11-09 ASSESSMENT — PAIN DESCRIPTION - ORIENTATION: ORIENTATION: RIGHT;UPPER

## 2024-11-09 ASSESSMENT — PAIN SCALES - WONG BAKER
WONGBAKER_NUMERICALRESPONSE: NO HURT
WONGBAKER_NUMERICALRESPONSE: NO HURT

## 2024-11-09 ASSESSMENT — PAIN - FUNCTIONAL ASSESSMENT: PAIN_FUNCTIONAL_ASSESSMENT: ACTIVITIES ARE NOT PREVENTED

## 2024-11-09 NOTE — PLAN OF CARE
Problem: Chronic Conditions and Co-morbidities  Goal: Patient's chronic conditions and co-morbidity symptoms are monitored and maintained or improved  11/9/2024 0958 by Karel Parkinson RN  Outcome: Progressing  11/8/2024 2214 by Juwan Lunsford RN  Outcome: Progressing     Problem: Discharge Planning  Goal: Discharge to home or other facility with appropriate resources  11/9/2024 0958 by Karel Parkinson RN  Outcome: Progressing  11/8/2024 2214 by Juwan Lunsford RN  Outcome: Progressing     Problem: Safety - Adult  Goal: Free from fall injury  11/8/2024 2214 by Juwan Lunsford RN  Outcome: Progressing     Problem: ABCDS Injury Assessment  Goal: Absence of physical injury  11/9/2024 0958 by Karel Parkinson RN  Outcome: Progressing  11/8/2024 2214 by Juwan Lunsford RN  Outcome: Progressing     Problem: Gastrointestinal - Adult  Goal: Minimal or absence of nausea and vomiting  11/9/2024 0958 by Karel Parkinson RN  Outcome: Progressing  11/8/2024 2214 by Juwan Lunsford RN  Outcome: Progressing  Goal: Maintains or returns to baseline bowel function  11/8/2024 2214 by Juwan Lunsford RN  Outcome: Progressing  Goal: Maintains adequate nutritional intake  11/9/2024 0958 by Karel Parkinson RN  Outcome: Progressing  11/8/2024 2214 by Juwan Lunsford RN  Outcome: Progressing     Problem: Pain  Goal: Verbalizes/displays adequate comfort level or baseline comfort level  11/9/2024 0958 by Karel Parkinson RN  Outcome: Progressing  11/8/2024 2214 by Juwan Lunsford RN  Outcome: Progressing

## 2024-11-09 NOTE — PLAN OF CARE
Problem: Chronic Conditions and Co-morbidities  Goal: Patient's chronic conditions and co-morbidity symptoms are monitored and maintained or improved  11/8/2024 2214 by Juwan Lunsford RN  Outcome: Progressing  11/8/2024 0919 by Karel Parkinson RN  Outcome: Progressing     Problem: Discharge Planning  Goal: Discharge to home or other facility with appropriate resources  11/8/2024 2214 by Juwan Lunsford RN  Outcome: Progressing  11/8/2024 0919 by Karel Parkinson RN  Outcome: Progressing     Problem: Safety - Adult  Goal: Free from fall injury  Outcome: Progressing     Problem: ABCDS Injury Assessment  Goal: Absence of physical injury  11/8/2024 2214 by Juwan Lunsford RN  Outcome: Progressing  11/8/2024 0919 by Karel Parkinson RN  Outcome: Progressing     Problem: Gastrointestinal - Adult  Goal: Minimal or absence of nausea and vomiting  11/8/2024 2214 by Juwan Lunsford RN  Outcome: Progressing  11/8/2024 0919 by Karel Parkinson RN  Outcome: Progressing  Goal: Maintains or returns to baseline bowel function  11/8/2024 2214 by Juwan Lunsford RN  Outcome: Progressing  11/8/2024 0919 by Karel Parkinson RN  Outcome: Progressing  Goal: Maintains adequate nutritional intake  11/8/2024 2214 by Juwan Lunsford RN  Outcome: Progressing  11/8/2024 0919 by Karel Parkinson RN  Outcome: Progressing  Flowsheets (Taken 11/7/2024 2000 by Shanna Vanegas RN)  Maintains adequate nutritional intake: Monitor percentage of each meal consumed     Problem: Pain  Goal: Verbalizes/displays adequate comfort level or baseline comfort level  11/8/2024 2214 by Juwan Lnusford RN  Outcome: Progressing  11/8/2024 0919 by Karel Parkinson RN  Outcome: Progressing  Flowsheets  Taken 11/8/2024 0305 by Alize Yanez RN  Verbalizes/displays adequate comfort level or baseline comfort level: Assess pain using appropriate pain scale  Taken 11/7/2024 2150 by Shanna Vanegas RN  Verbalizes/displays adequate comfort level or baseline

## 2024-11-10 LAB
ALBUMIN SERPL-MCNC: 3.2 G/DL (ref 3.4–5)
ALBUMIN/GLOB SERPL: 1.1 {RATIO} (ref 1.1–2.2)
ALP SERPL-CCNC: 410 U/L (ref 40–129)
ALT SERPL-CCNC: 168 U/L (ref 10–40)
ANION GAP SERPL CALCULATED.3IONS-SCNC: 8 MMOL/L (ref 3–16)
AST SERPL-CCNC: 21 U/L (ref 15–37)
BASOPHILS # BLD: 0 K/UL (ref 0–0.2)
BASOPHILS NFR BLD: 0.2 %
BILIRUB SERPL-MCNC: 0.5 MG/DL (ref 0–1)
BUN SERPL-MCNC: 9 MG/DL (ref 7–20)
CALCIUM SERPL-MCNC: 8.6 MG/DL (ref 8.3–10.6)
CHLORIDE SERPL-SCNC: 100 MMOL/L (ref 99–110)
CO2 SERPL-SCNC: 29 MMOL/L (ref 21–32)
CREAT SERPL-MCNC: 0.5 MG/DL (ref 0.8–1.3)
DEPRECATED RDW RBC AUTO: 13.3 % (ref 12.4–15.4)
EOSINOPHIL # BLD: 0 K/UL (ref 0–0.6)
EOSINOPHIL NFR BLD: 0.7 %
GFR SERPLBLD CREATININE-BSD FMLA CKD-EPI: >90 ML/MIN/{1.73_M2}
GLUCOSE BLD-MCNC: 149 MG/DL (ref 70–99)
GLUCOSE BLD-MCNC: 169 MG/DL (ref 70–99)
GLUCOSE BLD-MCNC: 175 MG/DL (ref 70–99)
GLUCOSE BLD-MCNC: 184 MG/DL (ref 70–99)
GLUCOSE BLD-MCNC: 235 MG/DL (ref 70–99)
GLUCOSE SERPL-MCNC: 158 MG/DL (ref 70–99)
HCT VFR BLD AUTO: 31.7 % (ref 40.5–52.5)
HGB BLD-MCNC: 11.2 G/DL (ref 13.5–17.5)
LYMPHOCYTES # BLD: 0.4 K/UL (ref 1–5.1)
LYMPHOCYTES NFR BLD: 8.2 %
MCH RBC QN AUTO: 36.2 PG (ref 26–34)
MCHC RBC AUTO-ENTMCNC: 35.4 G/DL (ref 31–36)
MCV RBC AUTO: 102.3 FL (ref 80–100)
MONOCYTES # BLD: 0.2 K/UL (ref 0–1.3)
MONOCYTES NFR BLD: 4.3 %
NEUTROPHILS # BLD: 4.2 K/UL (ref 1.7–7.7)
NEUTROPHILS NFR BLD: 86.6 %
PERFORMED ON: ABNORMAL
PLATELET # BLD AUTO: 194 K/UL (ref 135–450)
PMV BLD AUTO: 7.1 FL (ref 5–10.5)
POTASSIUM SERPL-SCNC: 4.2 MMOL/L (ref 3.5–5.1)
PROT SERPL-MCNC: 6 G/DL (ref 6.4–8.2)
RBC # BLD AUTO: 3.1 M/UL (ref 4.2–5.9)
SODIUM SERPL-SCNC: 137 MMOL/L (ref 136–145)
WBC # BLD AUTO: 4.8 K/UL (ref 4–11)

## 2024-11-10 PROCEDURE — 80053 COMPREHEN METABOLIC PANEL: CPT

## 2024-11-10 PROCEDURE — 2580000003 HC RX 258: Performed by: NURSE PRACTITIONER

## 2024-11-10 PROCEDURE — 6360000002 HC RX W HCPCS: Performed by: NURSE PRACTITIONER

## 2024-11-10 PROCEDURE — 6360000002 HC RX W HCPCS: Performed by: STUDENT IN AN ORGANIZED HEALTH CARE EDUCATION/TRAINING PROGRAM

## 2024-11-10 PROCEDURE — 94761 N-INVAS EAR/PLS OXIMETRY MLT: CPT

## 2024-11-10 PROCEDURE — 36415 COLL VENOUS BLD VENIPUNCTURE: CPT

## 2024-11-10 PROCEDURE — 6370000000 HC RX 637 (ALT 250 FOR IP): Performed by: INTERNAL MEDICINE

## 2024-11-10 PROCEDURE — 6370000000 HC RX 637 (ALT 250 FOR IP)

## 2024-11-10 PROCEDURE — 6370000000 HC RX 637 (ALT 250 FOR IP): Performed by: NURSE PRACTITIONER

## 2024-11-10 PROCEDURE — 2580000003 HC RX 258: Performed by: STUDENT IN AN ORGANIZED HEALTH CARE EDUCATION/TRAINING PROGRAM

## 2024-11-10 PROCEDURE — 6370000000 HC RX 637 (ALT 250 FOR IP): Performed by: STUDENT IN AN ORGANIZED HEALTH CARE EDUCATION/TRAINING PROGRAM

## 2024-11-10 PROCEDURE — 85025 COMPLETE CBC W/AUTO DIFF WBC: CPT

## 2024-11-10 PROCEDURE — 94640 AIRWAY INHALATION TREATMENT: CPT

## 2024-11-10 PROCEDURE — 1200000000 HC SEMI PRIVATE

## 2024-11-10 RX ADMIN — PIPERACILLIN AND TAZOBACTAM 3375 MG: 3; .375 INJECTION, POWDER, LYOPHILIZED, FOR SOLUTION INTRAVENOUS at 07:01

## 2024-11-10 RX ADMIN — PANTOPRAZOLE SODIUM 40 MG: 40 TABLET, DELAYED RELEASE ORAL at 06:53

## 2024-11-10 RX ADMIN — FINASTERIDE 5 MG: 5 TABLET, FILM COATED ORAL at 16:28

## 2024-11-10 RX ADMIN — PIPERACILLIN AND TAZOBACTAM 3375 MG: 3; .375 INJECTION, POWDER, LYOPHILIZED, FOR SOLUTION INTRAVENOUS at 20:21

## 2024-11-10 RX ADMIN — GUAIFENESIN 600 MG: 600 TABLET, EXTENDED RELEASE ORAL at 20:08

## 2024-11-10 RX ADMIN — FAMOTIDINE 20 MG: 20 TABLET ORAL at 20:08

## 2024-11-10 RX ADMIN — ALLOPURINOL 300 MG: 300 TABLET ORAL at 08:19

## 2024-11-10 RX ADMIN — INSULIN LISPRO 1 UNITS: 100 INJECTION, SOLUTION INTRAVENOUS; SUBCUTANEOUS at 00:10

## 2024-11-10 RX ADMIN — GUAIFENESIN 600 MG: 600 TABLET, EXTENDED RELEASE ORAL at 08:19

## 2024-11-10 RX ADMIN — ONDANSETRON 4 MG: 2 INJECTION INTRAMUSCULAR; INTRAVENOUS at 00:02

## 2024-11-10 RX ADMIN — ALBUTEROL SULFATE 2 PUFF: 90 AEROSOL, METERED RESPIRATORY (INHALATION) at 08:21

## 2024-11-10 RX ADMIN — SODIUM CHLORIDE, PRESERVATIVE FREE 10 ML: 5 INJECTION INTRAVENOUS at 08:20

## 2024-11-10 RX ADMIN — ONDANSETRON 4 MG: 2 INJECTION INTRAMUSCULAR; INTRAVENOUS at 18:32

## 2024-11-10 RX ADMIN — OXYCODONE 5 MG: 5 TABLET ORAL at 10:53

## 2024-11-10 RX ADMIN — PIPERACILLIN AND TAZOBACTAM 3375 MG: 3; .375 INJECTION, POWDER, LYOPHILIZED, FOR SOLUTION INTRAVENOUS at 12:21

## 2024-11-10 RX ADMIN — DOCUSATE SODIUM 100 MG: 100 CAPSULE, LIQUID FILLED ORAL at 08:19

## 2024-11-10 RX ADMIN — OXYCODONE 5 MG: 5 TABLET ORAL at 20:08

## 2024-11-10 RX ADMIN — ENOXAPARIN SODIUM 40 MG: 100 INJECTION SUBCUTANEOUS at 20:06

## 2024-11-10 RX ADMIN — SODIUM CHLORIDE, PRESERVATIVE FREE 10 ML: 5 INJECTION INTRAVENOUS at 20:09

## 2024-11-10 RX ADMIN — TRIAMCINOLONE ACETONIDE: 1 CREAM TOPICAL at 20:10

## 2024-11-10 RX ADMIN — ALBUTEROL SULFATE 2 PUFF: 90 AEROSOL, METERED RESPIRATORY (INHALATION) at 19:12

## 2024-11-10 RX ADMIN — TRIAMCINOLONE ACETONIDE: 1 CREAM TOPICAL at 08:21

## 2024-11-10 ASSESSMENT — PAIN DESCRIPTION - ORIENTATION: ORIENTATION: RIGHT;UPPER

## 2024-11-10 ASSESSMENT — PAIN DESCRIPTION - DESCRIPTORS
DESCRIPTORS: ACHING;DISCOMFORT;SHARP;SORE;TENDER
DESCRIPTORS: ACHING

## 2024-11-10 ASSESSMENT — PAIN SCALES - GENERAL
PAINLEVEL_OUTOF10: 6
PAINLEVEL_OUTOF10: 6
PAINLEVEL_OUTOF10: 0

## 2024-11-10 ASSESSMENT — PAIN DESCRIPTION - LOCATION
LOCATION: ABDOMEN
LOCATION: ABDOMEN

## 2024-11-10 ASSESSMENT — PAIN - FUNCTIONAL ASSESSMENT: PAIN_FUNCTIONAL_ASSESSMENT: ACTIVITIES ARE NOT PREVENTED

## 2024-11-10 ASSESSMENT — PAIN DESCRIPTION - PAIN TYPE: TYPE: ACUTE PAIN

## 2024-11-10 NOTE — PLAN OF CARE
Problem: Chronic Conditions and Co-morbidities  Goal: Patient's chronic conditions and co-morbidity symptoms are monitored and maintained or improved  11/10/2024 0942 by Karel Parkinson RN  Outcome: Progressing  11/9/2024 2157 by Juwan Lunsford RN  Outcome: Progressing     Problem: Discharge Planning  Goal: Discharge to home or other facility with appropriate resources  11/10/2024 0942 by Karel Parkinson RN  Outcome: Progressing  11/9/2024 2157 by Juwan Lunsford RN  Outcome: Progressing     Problem: Safety - Adult  Goal: Free from fall injury  11/9/2024 2157 by Juwan Lunsford RN  Outcome: Progressing     Problem: ABCDS Injury Assessment  Goal: Absence of physical injury  11/10/2024 0942 by Karel Parkinson RN  Outcome: Progressing  11/9/2024 2157 by Juwan Lunsford RN  Outcome: Progressing     Problem: Gastrointestinal - Adult  Goal: Minimal or absence of nausea and vomiting  11/10/2024 0942 by Karel Parkinson RN  Outcome: Progressing  11/9/2024 2157 by Juwan Lunsford RN  Outcome: Progressing  Goal: Maintains or returns to baseline bowel function  11/10/2024 0942 by Karel Parkinson RN  Outcome: Progressing  11/9/2024 2157 by Juwan Lunsford RN  Outcome: Progressing  Goal: Maintains adequate nutritional intake  11/10/2024 0942 by Karel Parkinson RN  Outcome: Progressing  11/9/2024 2157 by Juwan Lunsford RN  Outcome: Progressing     Problem: Pain  Goal: Verbalizes/displays adequate comfort level or baseline comfort level  11/10/2024 0942 by Karel Parkinson RN  Outcome: Progressing  11/9/2024 2157 by Juwan Lunsford RN  Outcome: Progressing

## 2024-11-10 NOTE — PLAN OF CARE
Problem: Chronic Conditions and Co-morbidities  Goal: Patient's chronic conditions and co-morbidity symptoms are monitored and maintained or improved  11/9/2024 2157 by Juwan Lunsford RN  Outcome: Progressing  11/9/2024 0958 by Karel Parkinson RN  Outcome: Progressing     Problem: Discharge Planning  Goal: Discharge to home or other facility with appropriate resources  11/9/2024 2157 by Juwan Lunsford RN  Outcome: Progressing  11/9/2024 0958 by Karel Parkinson RN  Outcome: Progressing     Problem: Safety - Adult  Goal: Free from fall injury  Outcome: Progressing     Problem: ABCDS Injury Assessment  Goal: Absence of physical injury  11/9/2024 2157 by Juwan Lunsford RN  Outcome: Progressing  11/9/2024 0958 by Karel Parkinson RN  Outcome: Progressing     Problem: Gastrointestinal - Adult  Goal: Minimal or absence of nausea and vomiting  11/9/2024 2157 by Juwan Lunsford RN  Outcome: Progressing  11/9/2024 0958 by Karel Parkinson RN  Outcome: Progressing  Goal: Maintains or returns to baseline bowel function  Outcome: Progressing  Goal: Maintains adequate nutritional intake  11/9/2024 2157 by Juwan Lunsford RN  Outcome: Progressing  11/9/2024 0958 by Karel Parkinson RN  Outcome: Progressing     Problem: Pain  Goal: Verbalizes/displays adequate comfort level or baseline comfort level  11/9/2024 2157 by Juwan Lunsford RN  Outcome: Progressing  11/9/2024 0958 by Karel Parkinson RN  Outcome: Progressing

## 2024-11-11 VITALS
RESPIRATION RATE: 16 BRPM | DIASTOLIC BLOOD PRESSURE: 78 MMHG | BODY MASS INDEX: 22.69 KG/M2 | SYSTOLIC BLOOD PRESSURE: 137 MMHG | HEART RATE: 62 BPM | TEMPERATURE: 98.1 F | HEIGHT: 71 IN | WEIGHT: 162.1 LBS | OXYGEN SATURATION: 95 %

## 2024-11-11 LAB
ALBUMIN SERPL-MCNC: 3.2 G/DL (ref 3.4–5)
ALBUMIN/GLOB SERPL: 1.2 {RATIO} (ref 1.1–2.2)
ALP SERPL-CCNC: 362 U/L (ref 40–129)
ALT SERPL-CCNC: 124 U/L (ref 10–40)
ANION GAP SERPL CALCULATED.3IONS-SCNC: 9 MMOL/L (ref 3–16)
AST SERPL-CCNC: 17 U/L (ref 15–37)
BASOPHILS # BLD: 0 K/UL (ref 0–0.2)
BASOPHILS NFR BLD: 0.5 %
BILIRUB SERPL-MCNC: 0.5 MG/DL (ref 0–1)
BUN SERPL-MCNC: 9 MG/DL (ref 7–20)
CALCIUM SERPL-MCNC: 8.6 MG/DL (ref 8.3–10.6)
CHLORIDE SERPL-SCNC: 98 MMOL/L (ref 99–110)
CO2 SERPL-SCNC: 28 MMOL/L (ref 21–32)
CREAT SERPL-MCNC: 0.6 MG/DL (ref 0.8–1.3)
DEPRECATED RDW RBC AUTO: 13.4 % (ref 12.4–15.4)
EOSINOPHIL # BLD: 0.1 K/UL (ref 0–0.6)
EOSINOPHIL NFR BLD: 2.3 %
GFR SERPLBLD CREATININE-BSD FMLA CKD-EPI: >90 ML/MIN/{1.73_M2}
GLUCOSE BLD-MCNC: 133 MG/DL (ref 70–99)
GLUCOSE BLD-MCNC: 200 MG/DL (ref 70–99)
GLUCOSE SERPL-MCNC: 125 MG/DL (ref 70–99)
HCT VFR BLD AUTO: 33.5 % (ref 40.5–52.5)
HGB BLD-MCNC: 11.9 G/DL (ref 13.5–17.5)
LYMPHOCYTES # BLD: 0.4 K/UL (ref 1–5.1)
LYMPHOCYTES NFR BLD: 8.9 %
MCH RBC QN AUTO: 36.4 PG (ref 26–34)
MCHC RBC AUTO-ENTMCNC: 35.4 G/DL (ref 31–36)
MCV RBC AUTO: 103.1 FL (ref 80–100)
MONOCYTES # BLD: 0.2 K/UL (ref 0–1.3)
MONOCYTES NFR BLD: 4.5 %
NEUTROPHILS # BLD: 3.8 K/UL (ref 1.7–7.7)
NEUTROPHILS NFR BLD: 83.8 %
PERFORMED ON: ABNORMAL
PERFORMED ON: ABNORMAL
PLATELET # BLD AUTO: 208 K/UL (ref 135–450)
PMV BLD AUTO: 6.8 FL (ref 5–10.5)
POTASSIUM SERPL-SCNC: 4 MMOL/L (ref 3.5–5.1)
PROT SERPL-MCNC: 5.9 G/DL (ref 6.4–8.2)
RBC # BLD AUTO: 3.25 M/UL (ref 4.2–5.9)
SODIUM SERPL-SCNC: 135 MMOL/L (ref 136–145)
WBC # BLD AUTO: 4.5 K/UL (ref 4–11)

## 2024-11-11 PROCEDURE — 6370000000 HC RX 637 (ALT 250 FOR IP): Performed by: NURSE PRACTITIONER

## 2024-11-11 PROCEDURE — 36415 COLL VENOUS BLD VENIPUNCTURE: CPT

## 2024-11-11 PROCEDURE — 80053 COMPREHEN METABOLIC PANEL: CPT

## 2024-11-11 PROCEDURE — 6360000002 HC RX W HCPCS: Performed by: NURSE PRACTITIONER

## 2024-11-11 PROCEDURE — 85025 COMPLETE CBC W/AUTO DIFF WBC: CPT

## 2024-11-11 PROCEDURE — 99238 HOSP IP/OBS DSCHRG MGMT 30/<: CPT | Performed by: INTERNAL MEDICINE

## 2024-11-11 PROCEDURE — 6360000002 HC RX W HCPCS: Performed by: STUDENT IN AN ORGANIZED HEALTH CARE EDUCATION/TRAINING PROGRAM

## 2024-11-11 PROCEDURE — 94640 AIRWAY INHALATION TREATMENT: CPT

## 2024-11-11 PROCEDURE — 6370000000 HC RX 637 (ALT 250 FOR IP)

## 2024-11-11 PROCEDURE — 94761 N-INVAS EAR/PLS OXIMETRY MLT: CPT

## 2024-11-11 PROCEDURE — 2580000003 HC RX 258: Performed by: STUDENT IN AN ORGANIZED HEALTH CARE EDUCATION/TRAINING PROGRAM

## 2024-11-11 PROCEDURE — 6370000000 HC RX 637 (ALT 250 FOR IP): Performed by: INTERNAL MEDICINE

## 2024-11-11 RX ORDER — OXYCODONE HYDROCHLORIDE 5 MG/1
5 TABLET ORAL EVERY 8 HOURS PRN
Qty: 12 TABLET | Refills: 0 | Status: SHIPPED | OUTPATIENT
Start: 2024-11-11 | End: 2024-11-14

## 2024-11-11 RX ADMIN — DOCUSATE SODIUM 100 MG: 100 CAPSULE, LIQUID FILLED ORAL at 08:20

## 2024-11-11 RX ADMIN — GUAIFENESIN 600 MG: 600 TABLET, EXTENDED RELEASE ORAL at 08:20

## 2024-11-11 RX ADMIN — OXYCODONE 5 MG: 5 TABLET ORAL at 08:20

## 2024-11-11 RX ADMIN — PIPERACILLIN AND TAZOBACTAM 3375 MG: 3; .375 INJECTION, POWDER, LYOPHILIZED, FOR SOLUTION INTRAVENOUS at 06:34

## 2024-11-11 RX ADMIN — ONDANSETRON 4 MG: 2 INJECTION INTRAMUSCULAR; INTRAVENOUS at 00:49

## 2024-11-11 RX ADMIN — OXYCODONE 5 MG: 5 TABLET ORAL at 00:45

## 2024-11-11 RX ADMIN — ALLOPURINOL 300 MG: 300 TABLET ORAL at 08:21

## 2024-11-11 RX ADMIN — TRIAMCINOLONE ACETONIDE: 1 CREAM TOPICAL at 08:26

## 2024-11-11 RX ADMIN — ALBUTEROL SULFATE 2 PUFF: 90 AEROSOL, METERED RESPIRATORY (INHALATION) at 07:44

## 2024-11-11 RX ADMIN — PANTOPRAZOLE SODIUM 40 MG: 40 TABLET, DELAYED RELEASE ORAL at 05:52

## 2024-11-11 ASSESSMENT — PAIN DESCRIPTION - PAIN TYPE: TYPE: ACUTE PAIN

## 2024-11-11 ASSESSMENT — PAIN DESCRIPTION - LOCATION
LOCATION: ABDOMEN
LOCATION: ABDOMEN

## 2024-11-11 ASSESSMENT — PAIN DESCRIPTION - DESCRIPTORS
DESCRIPTORS: ACHING;DISCOMFORT
DESCRIPTORS: ACHING;DISCOMFORT;SHARP;SORE;TENDER

## 2024-11-11 ASSESSMENT — PAIN SCALES - GENERAL
PAINLEVEL_OUTOF10: 8
PAINLEVEL_OUTOF10: 0

## 2024-11-11 ASSESSMENT — PAIN DESCRIPTION - ORIENTATION
ORIENTATION: RIGHT;UPPER
ORIENTATION: RIGHT;UPPER

## 2024-11-11 NOTE — PROGRESS NOTES
Hospitalist Progress Note  11/10/2024 4:23 PM  Subjective:   Admit Date: 11/3/2024  PCP: No primary care provider on file.    Overnight Events: pain controlled better, tolerating diet, drain +    Data:   CBC:   Recent Labs     11/08/24 0922 11/09/24  1231 11/10/24  0604   WBC 7.2 5.3 4.8   HGB 12.2* 11.5* 11.2*    182 194     BMP:    Recent Labs     11/08/24 0922 11/09/24  1231 11/10/24  0604   * 134* 137   K 3.3* 3.5 4.2   CL 97* 97* 100   CO2 26 28 29   BUN 15 8 9   CREATININE 0.5* 0.5* 0.5*   GLUCOSE 176* 169* 158*     Hepatic:   Recent Labs     11/08/24 0922 11/09/24  1231 11/10/24  0604   AST 92*  91* 31 21   *  338* 211* 168*   BILITOT 1.0  1.0 0.6 0.5   ALKPHOS 632*  649* 464* 410*       Objective:   Vitals: BP (!) 141/80   Pulse 58   Temp 98.6 °F (37 °C) (Oral)   Resp 16   Ht 1.803 m (5' 11\")   Wt 73.5 kg (162 lb 1.6 oz)   SpO2 94%   BMI 22.61 kg/m²     Gen: mod distress. Alert.+Pleasant male   Resp: No accessory muscle use. No crackles. No wheezes. No rhonchi.   CV: Regular rate. Regular rhythm. No murmur.  No rub. No edema.   Peripheral Pulses: +2 palpable, equal bilaterally   GI: + drain in place,  RUQ tender . + BS  Skin: Warm and dry. No nodule on exposed extremities. No rash on exposed extremities.   M/S: No cyanosis. No joint deformity. No clubbing. No edema  Neuro: Awake. Grossly nonfocal    Psych: Oriented x 3. No anxiety or agitation.     Assessment:   Principal Problem:    Biliary obstruction  Active Problems:    Bilateral calf pain    Dark urine    History of cholangiocarcinoma    Hypomagnesemia  Resolved Problems:    * No resolved hospital problems. 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 
                                                     Hospitalist Progress Note  11/9/2024 5:27 PM  Subjective:   Admit Date: 11/3/2024  PCP: No primary care provider on file.    Overnight Events: states he is feeling better, pain still uncontrolled to touch, drain working well, tolerating diet      Data:   CBC:   Recent Labs     11/07/24  0903 11/08/24 0922 11/09/24  1231   WBC 6.0 7.2 5.3   HGB 11.9* 12.2* 11.5*    176 182     BMP:    Recent Labs     11/07/24  0903 11/08/24 0922 11/09/24  1231    133* 134*   K 3.4* 3.3* 3.5    97* 97*   CO2 25 26 28   BUN 8 15 8   CREATININE 0.5* 0.5* 0.5*   GLUCOSE 184* 176* 169*     Hepatic:   Recent Labs     11/07/24 0903 11/08/24 0922 11/09/24  1231   * 92*  91* 31   * 341*  338* 211*   BILITOT 1.3* 1.0  1.0 0.6   ALKPHOS 774* 632*  649* 464*       Objective:   Vitals: /74   Pulse 57   Temp 98.4 °F (36.9 °C) (Oral)   Resp 17   Ht 1.803 m (5' 11\")   Wt 73.5 kg (162 lb 1.6 oz)   SpO2 95%   BMI 22.61 kg/m²       Gen: mod distress. Alert.+Pleasant male   Eyes: PERRL.. no  sclera icterus. No conjunctival injection.   Neck: No JVD.  Trachea midline.  Resp: No accessory muscle use. No crackles. No wheezes. No rhonchi.   CV: Regular rate. Regular rhythm. No murmur.  No rub. No edema.   Peripheral Pulses: +2 palpable, equal bilaterally   GI: + drain in place,  RUQ tender . + BS  Skin: Warm and dry. No nodule on exposed extremities. No rash on exposed extremities.   M/S: No cyanosis. No joint deformity. No clubbing. No edema  Neuro: Awake. Grossly nonfocal    Psych: Oriented x 3. No anxiety or agitation.        Assessment:   Principal Problem:    Biliary obstruction  Active Problems:    Bilateral calf pain    Dark urine    History of cholangiocarcinoma    Hypomagnesemia  Resolved Problems:    * No resolved hospital problems. *    Plan:     #Acute transaminitis  #Concern for biliary obstruction   #Intraheptic ductal dilatation   #Stage 
                                     PROGRESS NOTE  S:74 yrs Patient  admitted on 11/3/2024 with Biliary obstruction [K83.1]  Dark urine [R82.998]  History of cholangiocarcinoma [Z85.09]  H/O Whipple procedure [Z90.410, Z90.49] .  Today, he denies any complaints. He has been NPO today.     Exam:   Vitals:    11/05/24 1015   BP: 125/80   Pulse: 57   Resp: 16   Temp: 97.5 °F (36.4 °C)   SpO2: 96%   Generalized: alert, no acute distress  HEENT: sclera clear, anicteric  Neck supple, trachea midline  Heart NSR  Abdomen soft, TTP RLQ, ND  Extremities: no edema    Medications: Reviewed    Labs:  CBC:   Recent Labs     11/03/24  1311 11/04/24  0612 11/05/24  0551   WBC 6.8 5.3 4.2   HGB 13.0* 12.5* 12.8*   HCT 36.6* 34.6* 36.4*   .9* 100.8* 101.0*    182 187     BMP:   Recent Labs     11/03/24  1311 11/04/24  0612 11/05/24  0551   * 133* 134*   K 3.7 3.5 3.5   CL 91* 94* 96*   CO2 27 28 26   BUN 11 10 9   CREATININE 0.6* 0.6* 0.5*     LIVER PROFILE:   Recent Labs     11/03/24  1311 11/04/24  0612 11/05/24  0551   * 237* 358*   * 321* 387*   LIPASE 11.0*  --   --    BILITOT 1.1* 0.8 1.1*   ALKPHOS 504* 531* 690*     Imaging  MRCP w wo con, 11/4  Nonspecific mild-to-moderate intra and extrahepatic bile duct dilatation new  compared to prior study may be related to postsurgical scarring or underlying  malignancy.  No discrete choledocholithiasis demonstrated.  Significantly  limited exam without IV contrast administration.   Unremarkable appearance of the pancreatic duct, well-defined to its surgical  insertion on the duodenum.    Assessment  73 yo male with pmx of DM, GERD, gout, Crohn's disease, HTN, asthma, and stage IV intrahepatic cholangiocarcinoma s/p Whipple admitted for abdominal pain, elevated LFTs, and mild to moderate intra/extrahepatic bile duct dilation with no choledocholithiasis noted.      Plan  Continue supportive care  Trend LFTs daily  Continue Zosyn  PPI daily  Continue 
                                     PROGRESS NOTE  S:74 yrs Patient  admitted on 11/3/2024 with Biliary obstruction [K83.1]  Dark urine [R82.998]  History of cholangiocarcinoma [Z85.09]  H/O Whipple procedure [Z90.410, Z90.49] .  Today, he has no complaints. He is wanting to eat.     Exam:   Vitals:    11/07/24 0931   BP:    Pulse:    Resp:    Temp:    SpO2: 94%   Generalized: alert, no acute distress  HEENT: sclera clear, anicteric  Neck supple, trachea midline  Heart NSR  Abdomen soft, NT, ND  Extremities: no edema    Medications: Reviewed    Labs:  CBC:   Recent Labs     11/05/24  0551 11/06/24  0608 11/07/24  0903   WBC 4.2 3.0* 6.0   HGB 12.8* 12.4* 11.9*   HCT 36.4* 35.1* 33.9*   .0* 101.0* 103.2*    184 188     BMP:   Recent Labs     11/05/24  0551 11/06/24  0608 11/07/24  0903   * 137 139   K 3.5 3.4* 3.4*   CL 96* 100 105   CO2 26 26 25   BUN 9 5* 8   CREATININE 0.5* 0.5* 0.5*     LIVER PROFILE:   Recent Labs     11/05/24  0551 11/06/24  0608 11/07/24  0903   * 673* 257*   * 587* 462*   BILIDIR  --   --  1.0*   BILITOT 1.1* 2.0* 1.3*   ALKPHOS 690* 838* 774*     PT/INR:   Recent Labs     11/06/24  0608 11/07/24  0534   INR 0.96 0.98     Attending Supervising Physician's Attestation Statement  The patient is a 74 y.o. male. I have performed a history and physical examination of the patient. I discussed the case with LENORE Laughlin    I reviewed the patient's Past Medical History, Past Surgical History, Medications, and Allergies.     Physical Exam:  Vitals:    11/07/24 1602 11/07/24 1626 11/07/24 1815 11/07/24 1850   BP:  124/63 127/68    Pulse:  65     Resp: 16 16  16   Temp:  97.9 °F (36.6 °C)     TempSrc:  Oral     SpO2:  93%     Weight:       Height:           Physical Examination:   General  - ill-appearing, distress  Mental status - alert, oriented to person, place, and time  Eyes - sclera icteric  Neck - supple, no significant adenopathy  Heart - normal rate 
                                     PROGRESS NOTE  S:74 yrs Patient  admitted on 11/3/2024 with Biliary obstruction [K83.1]  Dark urine [R82.998]  History of cholangiocarcinoma [Z85.09]  H/O Whipple procedure [Z90.410, Z90.49] .  Today, he reports soreness and some weakness. Bilateral biliary drains with bilious output. He is tolerating diet.    Exam:   Vitals:    11/08/24 0815   BP: 119/71   Pulse: 66   Resp: 17   Temp: 99 °F (37.2 °C)   SpO2: 95%   Generalized: alert, no acute distress  HEENT: sclera clear, anicteric  Neck supple, trachea midline  Heart NSR  Abdomen soft, NT, +biliary drains  Extremities: no edema    Medications: Reviewed    Labs:  CBC:   Recent Labs     11/06/24  0608 11/07/24  0903 11/08/24  0922   WBC 3.0* 6.0 7.2   HGB 12.4* 11.9* 12.2*   HCT 35.1* 33.9* 35.0*   .0* 103.2* 103.6*    188 176     Attending Supervising Physician's Attestation Statement  The patient is a 74 y.o. male. I have performed a history and physical examination of the patient. I discussed the case with LENORE Laughlin    I reviewed the patient's Past Medical History, Past Surgical History, Medications, and Allergies.     Physical Exam:  Vitals:    11/08/24 1207 11/08/24 1237 11/08/24 1343 11/08/24 1642   BP: 126/67  127/72    Pulse: 64  62    Resp: 17 16 16 17   Temp: 98.1 °F (36.7 °C)  98.6 °F (37 °C)    TempSrc: Oral  Oral    SpO2: 97%  94%    Weight:       Height:           Physical Examination:   General  - ill-appearing, mild distress  Mental status - alert, oriented to person, place, and time  Eyes - sclera icteric  Neck - supple, no significant adenopathy  Heart - normal rate and regular rhythm  Abdomen - soft, NT, ND. SAMI drain with bilious output  Extremities - no pedal edema         Assessment  73 yo male with pmx of DM, GERD, gout, Crohn's disease, HTN, asthma, and stage IV intrahepatic cholangiocarcinoma s/p Whipple admitted with obstructive jaundice and cholangitis s/p PTC drain placement 
   11/07/24 1000   RT Protocol   History Pulmonary Disease 1   Respiratory pattern 0   Breath sounds 2   Cough 0   Indications for Bronchodilator Therapy Decreased or absent breath sounds   Bronchodilator Assessment Score 3       
   11/08/24 1000   RT Protocol   History Pulmonary Disease 1   Respiratory pattern 0   Breath sounds 0   Cough 0   Indications for Bronchodilator Therapy Decreased or absent breath sounds   Bronchodilator Assessment Score 1       
  Physician Progress Note      PATIENT:               JO IVERSON  CSN #:                  [id number]  :                       1950  ADMIT DATE:       11/3/2024 11:24 AM  DISCH DATE:  RESPONDING  PROVIDER #:        JOIE CARDOZO          QUERY TEXT:    Patient admitted with obstruction of bile duct . Noted documentation of Mild   hyponatremia in Internal medicine Progress notes on . In order to support   the diagnosis of Mild hyponatremia, please include additional clinical   indicators in your documentation.  Or please document if the diagnosis of Mild   hyponatremia has been ruled out after further study.    The medical record reflects the following:  Risk Factors:  DM, HTN, Hx of cholangiocarcinoma  Clinical Indicators: Internal medicine PN on - Mild hyponatremia, likely   2/2 to Acute transaminitis and NPO  Sodium - 132, 133, 134 (mmol/L) from 11/3 to   Treatment: 0.9 % sodium chloride infusion, Monitoring serial sodium values    Thank you,  Josephine Mir [facility], [facility]  Options provided:  -- Hyponatremia present as evidenced by, Please document evidence.  -- Hyponatremia was ruled out  -- Other - I will add my own diagnosis  -- Disagree - Not applicable / Not valid  -- Disagree - Clinically unable to determine / Unknown  -- Refer to Clinical Documentation Reviewer    PROVIDER RESPONSE TEXT:    Hyponatremia is present as evidenced by low sodium,below normal    Query created by: Josephine Mir on 2024 10:45 PM      Electronically signed by:  JOIE CARDOZO 2024 7:34 AM          
4 Eyes Skin Assessment     NAME:  Sharath Ceja  YOB: 1950  MEDICAL RECORD NUMBER:  [id number]    The patient is being assessed for  Admission    I agree that at least one RN has performed a thorough Head to Toe Skin Assessment on the patient. ALL assessment sites listed below have been assessed.      Areas assessed by both nurses: Blanchable redness to coccyx. Scattered bruising to BUE. Penis gland slightly red at base.    Head, Face, Ears, Shoulders, Back, Chest, Arms, Elbows, Hands, Sacrum. Buttock, Coccyx, Ischium, and Legs. Feet and Heels        Does the Patient have a Wound? No noted wound(s)       Mickey Prevention initiated by RN: No  Wound Care Orders initiated by RN: No    Pressure Injury (Stage 3,4, Unstageable, DTI, NWPT, and Complex wounds) if present, place Wound referral order by RN under : No    New Ostomies, if present place, Ostomy referral order under : No     Nurse 1 eSignature: Electronically signed by Kenyetta Silvestre RN on 11/3/24 at 7:11 PM EST    **SHARE this note so that the co-signing nurse can place an eSignature**    Nurse 2 eSignature: Electronically signed by TERENCE CAVAZOS RN on 11/3/24 at 7:13 PM EST   
Admit to med-surg.   Patient with cholangiocarcinoma  S/p Whipple.  F/w Dr. Mcdonald.   GI consulted in the ED.  Requested admission.  Plans for ERCP tomorrow.     Mary DE LA VEGAP-C  11/3/2024    
Bedside Mobility Assessment Tool (BMAT):     Assessment Level 1- Sit and Shake    1. From a semi-reclined position, ask patient to sit up and rotate to a seated position at the side of the bed. Can use the bedrail.    2. Ask patient to reach out and grab your hand and shake making sure patient reaches across his/her midline.   Pass- Patient is able to come to a seated position, maintain core strength. Maintains seated balance while reaching across midline. Move on to Assessment Level 2.     Assessment Level 2- Stretch and Point   1. With patient in seated position at the side of the bed, have patient place both feet on the floor (or stool) with knees no higher than hips.    2. Ask patient to stretch one leg and straighten the knee, then bend the ankle/flex and point the toes. If appropriate, repeat with the other leg.   Pass- Patient is able to demonstrate appropriate quad strength on intended weight bearing limb(s). Move onto Assessment Level 3.     Assessment Level 3- Stand   1. Ask patient to elevate off the bed or chair (seated to standing) using an assistive device (cane, bedrail).    2. Patient should be able to raise buttocks off be and hold for a count of five. May repeat once.   Pass- Patient maintains standing stability for at least 5 seconds, proceed to assessment level 4.    Assessment Level 4- Walk   1. Ask patient to march in place at bedside.    2. Then ask patient to advance step and return each foot. Some medical conditions may render a patient from stepping backwards, use your best clinical judgement.   Pass- Patient demonstrates balance while shifting weight and ability to step, takes independent steps, does not use assistive device patient is MOBILITY LEVEL 4.      Mobility Level- 4   
Call Placed to Antwan Keller on [address]. Patient has order for biliary drain.   Patient has to be off Lovenox for 1 full dose 24hr period before procedure. Plan for Drain Placement tomorrow 11/7 at 1300.   
IV catheter discontinued intact. Site without signs and symptoms of complications. Dressing and pressure applied.   
Morning med given. IV Abx infusing. No other needs noted at this time. Bed alarm in place. Call light and bedside table within reach.   
New orders for dilaudid d/t 10/10 pain given.   
PM Assessment completed. Pt does not express any pain or discomfort at this time. Respirations are even & easy. No complaints voiced. Pt denies needs at this time. SR up x 2, and bed in low position. Call light is within reach.    Bedside Mobility Assessment Tool (BMAT):     Assessment Level 1- Sit and Shake    1. From a semi-reclined position, ask patient to sit up and rotate to a seated position at the side of the bed. Can use the bedrail.    2. Ask patient to reach out and grab your hand and shake making sure patient reaches across his/her midline.   Pass- Patient is able to come to a seated position, maintain core strength. Maintains seated balance while reaching across midline. Move on to Assessment Level 2.     Assessment Level 2- Stretch and Point   1. With patient in seated position at the side of the bed, have patient place both feet on the floor (or stool) with knees no higher than hips.    2. Ask patient to stretch one leg and straighten the knee, then bend the ankle/flex and point the toes. If appropriate, repeat with the other leg.   Pass- Patient is able to demonstrate appropriate quad strength on intended weight bearing limb(s). Move onto Assessment Level 3.     Assessment Level 3- Stand   1. Ask patient to elevate off the bed or chair (seated to standing) using an assistive device (cane, bedrail).    2. Patient should be able to raise buttocks off be and hold for a count of five. May repeat once.   Pass- Patient maintains standing stability for at least 5 seconds, proceed to assessment level 4.    Assessment Level 4- Walk   1. Ask patient to march in place at bedside.    2. Then ask patient to advance step and return each foot. Some medical conditions may render a patient from stepping backwards, use your best clinical judgement.   Pass- Patient demonstrates balance while shifting weight and ability to step, takes independent steps, does not use assistive device patient is MOBILITY LEVEL 
Patient awake in bed. Assisted to BR and back to bed. C/o sudden nausea PRN Zofran given. No other needs noted at this time. Bed alarm in place. Call light and bedside table within reach.   
Patient leaving the unit for endo procedure.  
Patient left via bed to OR  
Phase II:  1.  Patient is identified using name and the date of birth.  2.  The patient is free from signs and symptoms of chemical, electrical, laser, radiation, positioning, or transfer/transport injury.  3.  The patient receives appropriate medication(s), safely administered during the Perioperative period.  4.  The patient has wound/tissue perfusion consistent with or improved from baseline levels established preoperatively.  5.  The patient is at or returning to normothermia at the conclusion of the immediate postoperative period.  6.  The patient's fluid, electrolyte, and acid base balances are consistent with or improved from baseline levels established preoperatively.  7.  The patient's pulmonary function is consistent with or improved from baseline levels established preoperatively.  8.  The patient's cardiovascular status is consistent with or improved from baseline levels established preoperatively.  9.  The patient/caregiver demonstrates knowledge of nutritional management related to the operative or other invasive procedure.  10.  The patient/caregiver demonstrates knowledge of medication, pain, and wound management.  11.  The patient participates in the rehabilitation process as applicable.  12.  The patient/caregiver participates in decisions affection his or her Perioperative plan of care.  13.  The patient's care is consistent with the individualized Perioperative plan of care.  14.  The patient's right to privacy is maintained.  15.  The patient is the recipient of competent and ethical care within legal standards of practice.  16.  The patient's value system, lifestyle, ethnicity, and culture are considered, respected, and incorporated in the Perioperative plan of care and understands special services available.  17.  The patient demonstrates and/or reports adequate pain control throughout the the Perioperative period.  18.  The patient's neurological status is consistent with or improved from 
Pt given written and verbal discharge instructions. Pt indicated understanding of home medication and care instructions. Prescriptions given to pt via meds to beds.  Pt packed own belongings. Pt taken down to family car via wheelchair.     
Pt is a/o in stable condition waiting for Dr Jordan to come talk to him so he can back to the floor.   
Pt resting in bed. Pt has c/o pain at this time. NPO in place. MRI screening complete. Call bell and needs within reach. No needs or concerns at this time.   Bedside Mobility Assessment Tool (BMAT):     Assessment Level 1- Sit and Shake    1. From a semi-reclined position, ask patient to sit up and rotate to a seated position at the side of the bed. Can use the bedrail.    2. Ask patient to reach out and grab your hand and shake making sure patient reaches across his/her midline.   Pass- Patient is able to come to a seated position, maintain core strength. Maintains seated balance while reaching across midline. Move on to Assessment Level 2.     Assessment Level 2- Stretch and Point   1. With patient in seated position at the side of the bed, have patient place both feet on the floor (or stool) with knees no higher than hips.    2. Ask patient to stretch one leg and straighten the knee, then bend the ankle/flex and point the toes. If appropriate, repeat with the other leg.   Pass- Patient is able to demonstrate appropriate quad strength on intended weight bearing limb(s). Move onto Assessment Level 3.     Assessment Level 3- Stand   1. Ask patient to elevate off the bed or chair (seated to standing) using an assistive device (cane, bedrail).    2. Patient should be able to raise buttocks off be and hold for a count of five. May repeat once.   Pass- Patient maintains standing stability for at least 5 seconds, proceed to assessment level 4.    Assessment Level 4- Walk   1. Ask patient to march in place at bedside.    2. Then ask patient to advance step and return each foot. Some medical conditions may render a patient from stepping backwards, use your best clinical judgement.   Pass- Patient demonstrates balance while shifting weight and ability to step, takes independent steps, does not use assistive device patient is MOBILITY LEVEL 4.      Mobility Level- 4   
Pt's rhythm changed from SR with 1st degree AV block to trigeminy, vss, pt asymptomatic. Notified MD. No orders received at this time.  
RT Inhaler-Nebulizer Bronchodilator Protocol Note    There is a bronchodilator order in the chart from a provider indicating to follow the RT Bronchodilator Protocol and there is an “Initiate RT Inhaler-Nebulizer Bronchodilator Protocol” order as well (see protocol at bottom of note).    CXR Findings:  No results found.    The findings from the last RT Protocol Assessment were as follows:   History Pulmonary Disease: (P) Smoker 15 pack years or more  Respiratory Pattern: (P) Regular pattern and RR 12-20 bpm  Breath Sounds: (P) Slightly diminished and/or crackles  Cough: (P) Strong, spontaneous, non-productive  Indication for Bronchodilator Therapy: (P) Decreased or absent breath sounds  Bronchodilator Assessment Score: (P) 3    Aerosolized bronchodilator medication orders have been revised according to the RT Inhaler-Nebulizer Bronchodilator Protocol below.    Respiratory Therapist to perform RT Therapy Protocol Assessment initially then follow the protocol.  Repeat RT Therapy Protocol Assessment PRN for score 0-3 or on second treatment, BID, and PRN for scores above 3.    No Indications - adjust the frequency to every 6 hours PRN wheezing or bronchospasm, if no treatments needed after 48 hours then discontinue using Per Protocol order mode.     If indication present, adjust the RT bronchodilator orders based on the Bronchodilator Assessment Score as indicated below.  Use Inhaler orders unless patient has one or more of the following: on home nebulizer, not able to hold breath for 10 seconds, is not alert and oriented, cannot activate and use MDI correctly, or respiratory rate 25 breaths per minute or more, then use the equivalent nebulizer order(s) with same Frequency and PRN reasons based on the score.  If a patient is on this medication at home then do not decrease Frequency below that used at home.    0-3 - enter or revise RT bronchodilator order(s) to equivalent RT Bronchodilator order with Frequency of every 4 
RT Inhaler-Nebulizer Bronchodilator Protocol Note    There is a bronchodilator order in the chart from a provider indicating to follow the RT Bronchodilator Protocol and there is an “Initiate RT Inhaler-Nebulizer Bronchodilator Protocol” order as well (see protocol at bottom of note).    CXR Findings:  No results found.    The findings from the last RT Protocol Assessment were as follows:   History Pulmonary Disease: Smoker 15 pack years or more  Respiratory Pattern: Regular pattern and RR 12-20 bpm  Breath Sounds: Clear breath sounds  Cough: Strong, spontaneous, non-productive  Indication for Bronchodilator Therapy: None  Bronchodilator Assessment Score: 1    Aerosolized bronchodilator medication orders have been revised according to the RT Inhaler-Nebulizer Bronchodilator Protocol below.    Respiratory Therapist to perform RT Therapy Protocol Assessment initially then follow the protocol.  Repeat RT Therapy Protocol Assessment PRN for score 0-3 or on second treatment, BID, and PRN for scores above 3.    No Indications - adjust the frequency to every 6 hours PRN wheezing or bronchospasm, if no treatments needed after 48 hours then discontinue using Per Protocol order mode.     If indication present, adjust the RT bronchodilator orders based on the Bronchodilator Assessment Score as indicated below.  Use Inhaler orders unless patient has one or more of the following: on home nebulizer, not able to hold breath for 10 seconds, is not alert and oriented, cannot activate and use MDI correctly, or respiratory rate 25 breaths per minute or more, then use the equivalent nebulizer order(s) with same Frequency and PRN reasons based on the score.  If a patient is on this medication at home then do not decrease Frequency below that used at home.    0-3 - enter or revise RT bronchodilator order(s) to equivalent RT Bronchodilator order with Frequency of every 4 hours PRN for wheezing or increased work of breathing using Per 
RT Inhaler-Nebulizer Bronchodilator Protocol Note    There is a bronchodilator order in the chart from a provider indicating to follow the RT Bronchodilator Protocol and there is an “Initiate RT Inhaler-Nebulizer Bronchodilator Protocol” order as well (see protocol at bottom of note).    CXR Findings:  No results found.    The findings from the last RT Protocol Assessment were as follows:   History Pulmonary Disease: Smoker 15 pack years or more  Respiratory Pattern: Regular pattern and RR 12-20 bpm  Breath Sounds: Intermittent or unilateral wheezes  Cough: Strong, spontaneous, non-productive  Indication for Bronchodilator Therapy: Wheezing associated with pulm disorder  Bronchodilator Assessment Score: 5    Aerosolized bronchodilator medication orders have been revised according to the RT Inhaler-Nebulizer Bronchodilator Protocol below.    Respiratory Therapist to perform RT Therapy Protocol Assessment initially then follow the protocol.  Repeat RT Therapy Protocol Assessment PRN for score 0-3 or on second treatment, BID, and PRN for scores above 3.    No Indications - adjust the frequency to every 6 hours PRN wheezing or bronchospasm, if no treatments needed after 48 hours then discontinue using Per Protocol order mode.     If indication present, adjust the RT bronchodilator orders based on the Bronchodilator Assessment Score as indicated below.  Use Inhaler orders unless patient has one or more of the following: on home nebulizer, not able to hold breath for 10 seconds, is not alert and oriented, cannot activate and use MDI correctly, or respiratory rate 25 breaths per minute or more, then use the equivalent nebulizer order(s) with same Frequency and PRN reasons based on the score.  If a patient is on this medication at home then do not decrease Frequency below that used at home.    0-3 - enter or revise RT bronchodilator order(s) to equivalent RT Bronchodilator order with Frequency of every 4 hours PRN for 
RT Inhaler-Nebulizer Bronchodilator Protocol Note    There is a bronchodilator order in the chart from a provider indicating to follow the RT Bronchodilator Protocol and there is an “Initiate RT Inhaler-Nebulizer Bronchodilator Protocol” order as well (see protocol at bottom of note).    CXR Findings:  No results found.    The findings from the last RT Protocol Assessment were as follows:   History Pulmonary Disease: Smoker 15 pack years or more  Respiratory Pattern: Regular pattern and RR 12-20 bpm  Breath Sounds: Slightly diminished and/or crackles  Cough: Strong, spontaneous, non-productive  Indication for Bronchodilator Therapy: Decreased or absent breath sounds  Bronchodilator Assessment Score: 3    Aerosolized bronchodilator medication orders have been revised according to the RT Inhaler-Nebulizer Bronchodilator Protocol below.    Respiratory Therapist to perform RT Therapy Protocol Assessment initially then follow the protocol.  Repeat RT Therapy Protocol Assessment PRN for score 0-3 or on second treatment, BID, and PRN for scores above 3.    No Indications - adjust the frequency to every 6 hours PRN wheezing or bronchospasm, if no treatments needed after 48 hours then discontinue using Per Protocol order mode.     If indication present, adjust the RT bronchodilator orders based on the Bronchodilator Assessment Score as indicated below.  Use Inhaler orders unless patient has one or more of the following: on home nebulizer, not able to hold breath for 10 seconds, is not alert and oriented, cannot activate and use MDI correctly, or respiratory rate 25 breaths per minute or more, then use the equivalent nebulizer order(s) with same Frequency and PRN reasons based on the score.  If a patient is on this medication at home then do not decrease Frequency below that used at home.    0-3 - enter or revise RT bronchodilator order(s) to equivalent RT Bronchodilator order with Frequency of every 4 hours PRN for wheezing 
RT Inhaler-Nebulizer Bronchodilator Protocol Note    There is a bronchodilator order in the chart from a provider indicating to follow the RT Bronchodilator Protocol and there is an “Initiate RT Inhaler-Nebulizer Bronchodilator Protocol” order as well (see protocol at bottom of note).    CXR Findings:  No results found.    The findings from the last RT Protocol Assessment were as follows:   History Pulmonary Disease: Smoker 15 pack years or more  Respiratory Pattern: Regular pattern and RR 12-20 bpm  Breath Sounds: Slightly diminished and/or crackles  Cough: Strong, spontaneous, non-productive  Indication for Bronchodilator Therapy: On home bronchodilators  Bronchodilator Assessment Score: 3    Aerosolized bronchodilator medication orders have been revised according to the RT Inhaler-Nebulizer Bronchodilator Protocol below.    Respiratory Therapist to perform RT Therapy Protocol Assessment initially then follow the protocol.  Repeat RT Therapy Protocol Assessment PRN for score 0-3 or on second treatment, BID, and PRN for scores above 3.    No Indications - adjust the frequency to every 6 hours PRN wheezing or bronchospasm, if no treatments needed after 48 hours then discontinue using Per Protocol order mode.     If indication present, adjust the RT bronchodilator orders based on the Bronchodilator Assessment Score as indicated below.  Use Inhaler orders unless patient has one or more of the following: on home nebulizer, not able to hold breath for 10 seconds, is not alert and oriented, cannot activate and use MDI correctly, or respiratory rate 25 breaths per minute or more, then use the equivalent nebulizer order(s) with same Frequency and PRN reasons based on the score.  If a patient is on this medication at home then do not decrease Frequency below that used at home.    0-3 - enter or revise RT bronchodilator order(s) to equivalent RT Bronchodilator order with Frequency of every 4 hours PRN for wheezing or 
Report given to PRINCE Moncada over the phone regarding patient's ERCP.  
Shift assessment complete. Patient denies any pain at this time and denies any needs. Will continue to monitor.  
Transferred care to PRINCE Moncada. Face to face bedside report given, no need voiced at this time.    
Writer spoke with Flory Laughlin. Patient will be npo at midnight and have an ERCP tomorrow around 1200. Patient informed of the plan  
AC  sodium chloride flush, 5-40 mL, 2 times per day  enoxaparin, 40 mg, Nightly  piperacillin-tazobactam, 3,375 mg, Q8H  albuterol sulfate HFA, 2 puff, BID RT      PRN Medications:  glucose, 4 tablet, PRN  dextrose bolus, 125 mL, PRN   Or  dextrose bolus, 250 mL, PRN  glucagon (rDNA), 1 mg, PRN  dextrose, , Continuous PRN  albuterol sulfate HFA, 2 puff, 4x Daily PRN  sodium chloride flush, 5-40 mL, PRN  sodium chloride, , PRN  ondansetron, 4 mg, Q8H PRN   Or  ondansetron, 4 mg, Q6H PRN  polyethylene glycol, 17 g, Daily PRN  morphine, 2 mg, Q4H PRN          Data:  CBC:   Recent Labs     11/04/24  0612 11/05/24  0551 11/06/24  0608   WBC 5.3 4.2 3.0*   HGB 12.5* 12.8* 12.4*   HCT 34.6* 36.4* 35.1*   .8* 101.0* 101.0*    187 184     BMP:   Recent Labs     11/04/24  0612 11/05/24  0551 11/06/24  0608   * 134* 137   K 3.5 3.5 3.4*   CL 94* 96* 100   CO2 28 26 26   BUN 10 9 5*   CREATININE 0.6* 0.5* 0.5*     LIVER PROFILE:   Recent Labs     11/04/24  0612 11/05/24  0551 11/06/24  0608   * 358* 673*   * 387* 587*   BILITOT 0.8 1.1* 2.0*   ALKPHOS 531* 690* 838*     PT/INR:   Recent Labs     11/06/24  0608   PROTIME 13.0   INR 0.96       CULTURES  Results       Procedure Component Value Units Date/Time    Culture, Urine [[id number]]     Order Status: Canceled Specimen: Urine, clean catch     Culture, Urine [[id number]] Collected: 11/03/24 1145    Order Status: Completed Specimen: Urine, clean catch Updated: 11/04/24 1100     Urine Culture, Routine No growth at 18 to 36 hours    Narrative:      ORDER#: [id number]                          ORDERED BY: EVELIN MENDEZ  SOURCE: Urine Clean Catch                  COLLECTED:  11/03/24 11:45  ANTIBIOTICS AT [facility].:                      RECEIVED :  11/03/24 13:08               RADIOLOGY  Vascular duplex lower extremity venous bilateral   Final Result      MRI ABDOMEN W WO CONTRAST MRCP   Final Result   Nonspecific mild-to-moderate intra and 
Nightly  finasteride, 5 mg, QPM  pantoprazole, 40 mg, QAM AC  sodium chloride flush, 5-40 mL, 2 times per day  enoxaparin, 40 mg, Nightly  albuterol sulfate HFA, 2 puff, BID RT      PRN Medications:  oxyCODONE, 5 mg, Q4H PRN   Or  oxyCODONE, 10 mg, Q4H PRN  potassium chloride, 40 mEq, PRN   Or  potassium alternative oral replacement, 40 mEq, PRN   Or  potassium chloride, 10 mEq, PRN  HYDROmorphone, 0.5 mg, Q3H PRN  Benzocaine-Menthol, 1 lozenge, Q2H PRN  glucose, 4 tablet, PRN  dextrose bolus, 125 mL, PRN   Or  dextrose bolus, 250 mL, PRN  glucagon (rDNA), 1 mg, PRN  dextrose, , Continuous PRN  albuterol sulfate HFA, 2 puff, 4x Daily PRN  sodium chloride flush, 5-40 mL, PRN  sodium chloride, , PRN  ondansetron, 4 mg, Q8H PRN   Or  ondansetron, 4 mg, Q6H PRN  polyethylene glycol, 17 g, Daily PRN          Data:  CBC:   Recent Labs     11/06/24  0608 11/07/24  0903 11/08/24  0922   WBC 3.0* 6.0 7.2   HGB 12.4* 11.9* 12.2*   HCT 35.1* 33.9* 35.0*   .0* 103.2* 103.6*    188 176     BMP:   Recent Labs     11/06/24  0608 11/07/24  0903 11/08/24  0922    139 133*   K 3.4* 3.4* 3.3*    105 97*   CO2 26 25 26   BUN 5* 8 15   CREATININE 0.5* 0.5* 0.5*     LIVER PROFILE:   Recent Labs     11/06/24  0608 11/07/24  0903 11/08/24  0922   * 257* 92*  91*   * 462* 341*  338*   BILIDIR  --  1.0* 0.6*   BILITOT 2.0* 1.3* 1.0  1.0   ALKPHOS 838* 774* 632*  649*     PT/INR:   Recent Labs     11/06/24  0608 11/07/24  0534   PROTIME 13.0 13.2   INR 0.96 0.98       CULTURES  Results       Procedure Component Value Units Date/Time    Culture, Urine [[id number]]     Order Status: Canceled Specimen: Urine, clean catch     Culture, Urine [[id number]] Collected: 11/03/24 1145    Order Status: Completed Specimen: Urine, clean catch Updated: 11/04/24 1100     Urine Culture, Routine No growth at 18 to 36 hours    Narrative:      ORDER#: [id number]                          ORDERED BY: VANESSA 
PRN  dextrose bolus, 125 mL, PRN   Or  dextrose bolus, 250 mL, PRN  glucagon (rDNA), 1 mg, PRN  dextrose, , Continuous PRN  albuterol sulfate HFA, 2 puff, 4x Daily PRN  sodium chloride flush, 5-40 mL, PRN  sodium chloride, , PRN  ondansetron, 4 mg, Q8H PRN   Or  ondansetron, 4 mg, Q6H PRN  polyethylene glycol, 17 g, Daily PRN  morphine, 2 mg, Q4H PRN          Data:  CBC:   Recent Labs     11/03/24  1311 11/04/24  0612   WBC 6.8 5.3   HGB 13.0* 12.5*   HCT 36.6* 34.6*   .9* 100.8*    182     BMP:   Recent Labs     11/03/24  1311 11/04/24  0612   * 133*   K 3.7 3.5   CL 91* 94*   CO2 27 28   BUN 11 10   CREATININE 0.6* 0.6*     LIVER PROFILE:   Recent Labs     11/03/24  1311 11/04/24  0612   * 237*   * 321*   LIPASE 11.0*  --    BILITOT 1.1* 0.8   ALKPHOS 504* 531*     PT/INR: No results for input(s): \"PROTIME\", \"INR\" in the last 72 hours.    CULTURES  Results       Procedure Component Value Units Date/Time    Culture, Urine [[id number]]     Order Status: Canceled Specimen: Urine, clean catch     Culture, Urine [[id number]] Collected: 11/03/24 1145    Order Status: Sent Specimen: Urine, clean catch Updated: 11/03/24 1308               RADIOLOGY  CT ABDOMEN PELVIS WO CONTRAST Additional Contrast? None   Preliminary Result   1. Intrahepatic ductal dilatation, increased since July 30, 2024.  Please   correlate with labs for evidence of biliary obstruction.   2. Surgical changes related to Whipple procedure.   3. No evidence of obstructive uropathy.   4. Mild prostatomegaly.   5. Moderate-to-large volume of stool in the colon.  Please correlate with   clinical symptoms of constipation.         MRI ABDOMEN W WO CONTRAST MRCP    (Results Pending)         Assessment/Plan:  #Acute transaminitis  #Concern for biliary obstruction   #Intraheptic ductal dilatation   #Stage IV intrahepatic cholangiocarcinoma with liver mets S/p whipple procedure 2023  #Dark urine   -completed chemotherapy 
medication at home then do not decrease Frequency below that used at home.    0-3 - enter or revise RT bronchodilator order(s) to equivalent RT Bronchodilator order with Frequency of every 4 hours PRN for wheezing or increased work of breathing using Per Protocol order mode.        4-6 - enter or revise RT Bronchodilator order(s) to two equivalent RT bronchodilator orders with one order with BID Frequency and one order with Frequency of every 4 hours PRN wheezing or increased work of breathing using Per Protocol order mode.        7-10 - enter or revise RT Bronchodilator order(s) to two equivalent RT bronchodilator orders with one order with TID Frequency and one order with Frequency of every 4 hours PRN wheezing or increased work of breathing using Per Protocol order mode.       11-13 - enter or revise RT Bronchodilator order(s) to one equivalent RT bronchodilator order with QID Frequency and an Albuterol order with Frequency of every 4 hours PRN wheezing or increased work of breathing using Per Protocol order mode.      Greater than 13 - enter or revise RT Bronchodilator order(s) to one equivalent RT bronchodilator order with every 4 hours Frequency and an Albuterol order with Frequency of every 2 hours PRN wheezing or increased work of breathing using Per Protocol order mode.       Electronically signed by Blaise Harmon RCP on 11/3/2024 at 9:40 PM  
phase.  Interventions- orient the patient to the environment, especially the location of the bathroom; provide treaded socks/non-skid footwear; demonstrate and teach back use of the nurse's call system; instruct the patient to call for help before getting out of bed; lock all movable equipment before transferring patient; keep bed in lowest position possible.     
disease   -on reglan and bentyl at home   -follows with GI     #HLD   -gemfibrozil     #Gout   -on allopurinol     #BPH   -on proscar     DVT Prophylaxis: Lovenox   Diet: ADULT DIET; Full Liquid  Diet NPO  Code Status: Full Code    Maryuri Burnette MD  11/05/24  4:45 PM      
drain placed in IR today 11/7.  -monitor LFTs   - add dilaudid for abd pain     #Mild hyponatremia   -likely 2/2 to above and NPO  -monitor     #Constipation   -bowel regimen   -given dulcolax suppository   -miralax   -will add colace   -GI consulted   + BM today    #Macrocytic anemia   -B12, folate pending  -monitor CBC   -on ferrous sulfate and B12 at home     #DM type 2   -on metformin   -SSI   -on insulin NPH-hold for now with NPO   -monitor BG    #Bilateral leg cramps  -venous US done with hx of VTE. Neg for PE  -electrolytes WNLs     #HTN   -on dyazide at home-hold for now with lower Bps     #Pyuria   -urine culture negative     #Crohns disease   -on reglan and bentyl at home   -follows with GI     #HLD   -gemfibrozil     #Gout   -on allopurinol     #BPH   -on proscar     DVT Prophylaxis: Lovenox   Diet: ADULT DIET; Regular; Low Fat/Low Chol/High Fiber/DAT  Code Status: Full Code    Maryuri Burnette MD  11/07/24  3:26 PM

## 2024-11-11 NOTE — PLAN OF CARE
Problem: Chronic Conditions and Co-morbidities  Goal: Patient's chronic conditions and co-morbidity symptoms are monitored and maintained or improved  11/11/2024 1028 by Lizbeth Whyte RN  Outcome: Adequate for Discharge  11/11/2024 0944 by Lizbeth Whyte RN  Outcome: Progressing     Problem: Discharge Planning  Goal: Discharge to home or other facility with appropriate resources  11/11/2024 1028 by Lizbeth Whyte RN  Outcome: Adequate for Discharge  11/11/2024 0944 by Lizbeth Whyte RN  Outcome: Progressing     Problem: Safety - Adult  Goal: Free from fall injury  11/11/2024 1028 by Lizbeth Whyte RN  Outcome: Adequate for Discharge  11/11/2024 0944 by Lizbeth Whyte RN  Outcome: Progressing     Problem: ABCDS Injury Assessment  Goal: Absence of physical injury  11/11/2024 1028 by Lizbeth Whyte RN  Outcome: Adequate for Discharge  11/11/2024 0944 by Lizbeth Whyte RN  Outcome: Progressing     Problem: Gastrointestinal - Adult  Goal: Minimal or absence of nausea and vomiting  Outcome: Adequate for Discharge  Goal: Maintains or returns to baseline bowel function  Outcome: Adequate for Discharge  Goal: Maintains adequate nutritional intake  Outcome: Adequate for Discharge     Problem: Pain  Goal: Verbalizes/displays adequate comfort level or baseline comfort level  Outcome: Adequate for Discharge

## 2024-11-11 NOTE — PLAN OF CARE
Problem: Chronic Conditions and Co-morbidities  Goal: Patient's chronic conditions and co-morbidity symptoms are monitored and maintained or improved  11/10/2024 2023 by Juwan Lunsford RN  Outcome: Progressing  11/10/2024 0942 by Karel Parkinson RN  Outcome: Progressing     Problem: Discharge Planning  Goal: Discharge to home or other facility with appropriate resources  11/10/2024 2023 by Juwan Lunsford RN  Outcome: Progressing  11/10/2024 0942 by Karel Parkinson RN  Outcome: Progressing     Problem: Safety - Adult  Goal: Free from fall injury  Outcome: Progressing     Problem: ABCDS Injury Assessment  Goal: Absence of physical injury  11/10/2024 2023 by Juwan Lunsford RN  Outcome: Progressing  11/10/2024 0942 by Karel Parkinson RN  Outcome: Progressing     Problem: Gastrointestinal - Adult  Goal: Minimal or absence of nausea and vomiting  11/10/2024 2023 by Juwan Lunsford RN  Outcome: Progressing  11/10/2024 0942 by Karel Parkinson RN  Outcome: Progressing  Goal: Maintains or returns to baseline bowel function  11/10/2024 2023 by Juwan Lunsford RN  Outcome: Progressing  11/10/2024 0942 by Karel Parkinson RN  Outcome: Progressing  Goal: Maintains adequate nutritional intake  11/10/2024 2023 by Juwan Lunsford RN  Outcome: Progressing  11/10/2024 0942 by Karel Parkinson RN  Outcome: Progressing     Problem: Pain  Goal: Verbalizes/displays adequate comfort level or baseline comfort level  11/10/2024 2023 by Juwan Lunsford RN  Outcome: Progressing  11/10/2024 0942 by Karel Parkinson RN  Outcome: Progressing

## 2024-11-11 NOTE — CARE COORDINATION
Met with pt at bedside. Pt to DC home. Declines HHC. Pt spouse to transport pt home. No further DC or DME needs identified.    IMM- 11/11    O2- 92% RA

## 2024-11-11 NOTE — FLOWSHEET NOTE
11/03/24 1937   Handoff   Communication Given Shift Handoff   Handoff Given To Patsy Rosario   Handoff Received From Kenyetta Silvestre   Handoff Communication Face to Face;At bedside   Time Handoff Given 1937   End of Shift Check Performed Yes       
   11/05/24 0705   Handoff   Communication Given Shift Handoff   Handoff Given To Antwan MORRISON   Handoff Received From Marjorie MORRISON   Handoff Communication Face to Face;At bedside   Time Handoff Given 0705   End of Shift Check Performed Yes     Pt awake in bed. Still NPO; Denies needs at this time. Call light within reach.    
   11/07/24 0815   Vital Signs   Temp 97.6 °F (36.4 °C)   Temp Source Oral   Pulse 58   Respirations 16   /72   MAP (Calculated) 92   BP Location Right upper arm   BP Method Automatic   Patient Position High fowlers   Pain Assessment   Pain Assessment None - Denies Pain   Opioid-Induced Sedation   POSS Score 1   RASS   Leiva Agitation Sedation Scale (RASS) 0   Oxygen Therapy   SpO2 94 %   O2 Device None (Room air)     Alert and oriented. Skin w/d. Resp ee unlabored. Meds given. Nrsg asmt completed. See flow sheet and mAR.  NPO for procedure. Continues IV atb.  No s/s distress noted. CHG wipes. Call light in easy reach.   
   11/07/24 1315   Vital Signs   Temp 97.4 °F (36.3 °C)   Temp Source Oral   Pulse 61   Heart Rate Source Monitor   Respirations 16   BP (!) 164/81   MAP (Calculated) 109   BP Location Right upper arm   BP Method Automatic   Patient Position Supine   Pain Assessment   Pain Assessment 0-10   Pain Level 4  (mesage left for MD to get PRN meds on board)   Patient's Stated Pain Goal 2   Pain Location Abdomen   Pain Orientation Mid   Pain Descriptors Aching;Discomfort   Pain Type Surgical pain   Pain Frequency Intermittent   Pain Onset On-going   Non-Pharmaceutical Pain Intervention(s) Ice   Opioid-Induced Sedation   POSS Score 2   RASS   Leiva Agitation Sedation Scale (RASS) 0   Oxygen Therapy   SpO2 92 %   O2 Device None (Room air)     VSS. Skin w/d. Resp ee unlabored.  Dressings with drains on right and left abd.  Message left for MD for prn pain meds d/t none on board.  Spouse at bedside. No s/s distress noted. IV atb started. Bed alarm on. Call light in easy reach.   
Patient admitted to room 226-1 from ER. Patient oriented to room, call light, bed rails, phone, lights and bathroom. Patient instructed about the schedule of the day including: vital sign frequency, lab draws, possible tests, frequency of MD and staff rounds, daily weights, I &O's, and prescribed diet. Blood glucose on admission 121. Bed alarm on. Telemetry box in place, patient aware of placement and reason. Bed locked, in lowest position, side rails up 2/4, call light within reach.      Patient is able to demonstrate the ability to move from a reclining position to an upright position within the recliner.      Bedside Mobility Assessment Tool (BMAT):     Assessment Level 1- Sit and Shake    1. From a semi-reclined position, ask patient to sit up and rotate to a seated position at the side of the bed. Can use the bedrail.    2. Ask patient to reach out and grab your hand and shake making sure patient reaches across his/her midline.   Pass- Patient is able to come to a seated position, maintain core strength. Maintains seated balance while reaching across midline. Move on to Assessment Level 2.     Assessment Level 2- Stretch and Point   1. With patient in seated position at the side of the bed, have patient place both feet on the floor (or stool) with knees no higher than hips.    2. Ask patient to stretch one leg and straighten the knee, then bend the ankle/flex and point the toes. If appropriate, repeat with the other leg.   Pass- Patient is able to demonstrate appropriate quad strength on intended weight bearing limb(s). Move onto Assessment Level 3.     Assessment Level 3- Stand   1. Ask patient to elevate off the bed or chair (seated to standing) using an assistive device (cane, bedrail).    2. Patient should be able to raise buttocks off be and hold for a count of five. May repeat once.   Pass- Patient maintains standing stability for at least 5 seconds, proceed to assessment level 4.    Assessment Level 4- 
Shift assessment complete. See doc flow. Nightly medications given see MAR. A/O x4. Bilat biliary drains in place, drain to gravity. IV Abx. C/o abd pain PRN Oxy IR given. No other needs noted at this time. Bed alarm in place. Call light and bedside table within easy reach.    11/08/24 2118   Vital Signs   Temp 99 °F (37.2 °C)   Temp Source Oral   Pulse 70   Heart Rate Source Monitor   Respirations 16   /67   MAP (Calculated) 91   BP Location Right upper arm   BP Method Automatic   Patient Position Semi fowlers   Oxygen Therapy   SpO2 93 %   O2 Device None (Room air)       
Shift assessment complete. See doc flow. Nightly medications given see MAR. A/O x4. Bilat biliary drains in place, drain to gravity. IV Abx. C/o abd pain PRN Oxy IR given. Patient had an episode of nausea this evening after dinner and Zofran was given. No other needs noted at this time. Bed alarm in place. Call light and bedside table within easy reach.    11/09/24 2115   Vital Signs   Temp 98.7 °F (37.1 °C)   Temp Source Oral   Pulse 62   Heart Rate Source Monitor   Respirations 16   /74   MAP (Calculated) 95   BP Location Right upper arm   BP Method Automatic   Patient Position Semi fowlers   Oxygen Therapy   SpO2 94 %   O2 Device None (Room air)       
Shift assessment complete. See doc flow. Nightly medications given see MAR. A/O x4. Bilat biliary drains in place, drain to gravity. IV Abx. C/o abd pain PRN Oxy IR given. Patient had another episode of nausea this evening after dinner and Zofran was given. No other needs noted at this time. Bed alarm in place. Call light and bedside table within easy reach.    11/10/24 1943   Vital Signs   Temp 98.2 °F (36.8 °C)   Temp Source Oral   Pulse 57   Heart Rate Source Monitor   Respirations 16   BP (!) 155/78   MAP (Calculated) 104   BP Location Right upper arm   BP Method Automatic   Patient Position High fowlers   Oxygen Therapy   SpO2 93 %   O2 Device None (Room air)       
count of five. May repeat once.   Pass- Patient maintains standing stability for at least 5 seconds, proceed to assessment level 4.    Assessment Level 4- Walk   1. Ask patient to march in place at bedside.    2. Then ask patient to advance step and return each foot. Some medical conditions may render a patient from stepping backwards, use your best clinical judgement.   Pass- Patient demonstrates balance while shifting weight and ability to step, takes independent steps, does not use assistive device patient is MOBILITY LEVEL 4.      Mobility Level- 4 SBA    
five. May repeat once.   Pass- Patient maintains standing stability for at least 5 seconds, proceed to assessment level 4.    Assessment Level 4- Walk   1. Ask patient to march in place at bedside.    2. Then ask patient to advance step and return each foot. Some medical conditions may render a patient from stepping backwards, use your best clinical judgement.   Fail- Patient not able to complete tasks OR requires use of assistive device. Patient is MOBILITY LEVEL 3.       Mobility Level- 3

## 2024-11-11 NOTE — PLAN OF CARE
Problem: Chronic Conditions and Co-morbidities  Goal: Patient's chronic conditions and co-morbidity symptoms are monitored and maintained or improved  11/11/2024 0944 by Lizbeth Whyte RN  Outcome: Progressing     Problem: Discharge Planning  Goal: Discharge to home or other facility with appropriate resources  11/11/2024 0944 by Lizbeth Whyte RN  Outcome: Progressing     Problem: Safety - Adult  Goal: Free from fall injury  11/11/2024 0944 by Lizbeth Whyte RN  Outcome: Progressing     Problem: ABCDS Injury Assessment  Goal: Absence of physical injury  11/11/2024 0944 by Lizbeth Whyte RN  Outcome: Progressing

## 2024-11-11 NOTE — DISCHARGE SUMMARY
Name:  Sharath Ceja  Room:  0226/0226-01  MRN:    [id number]    Discharge Summary      This discharge summary is in conjunction with a complete physical exam done on the day of discharge.      Discharging Physician: ARBEN MORENO MD      Admit: 11/3/2024  Discharge:  11/11/2024     Diagnoses this Admission    Principal Problem:    Biliary obstruction  Active Problems:    Bilateral calf pain    Dark urine    History of cholangiocarcinoma    Hypomagnesemia  Resolved Problems:    * No resolved hospital problems. *          Procedures (Please Review Full Report for Details)      Consults    IP CONSULT TO GI      HPI:    Sharath Ceja is a 74 y.o. male with cholangiocarcinoma s/p Whipple procedure in June 2023, BPH, gout, GERD, T2DM with long-term use of insulin, Crohn's disease, hypertension who presented from home with abdominal pain and dark discoloration of urine.  Patient states that for past 2 to 3 weeks he has noticed mild generalized abdominal pain and dark discoloration of urine.  Today after he ate pizza in the afternoon started having worsening pain associated with chills hence came to ER.  Patient was admitted by my colleague earlier today.  During my evaluation he was alert oriented x 3 hemodynamically stable denies any chest pain LOC dizziness nausea vomiting diarrhea fever night sweats.       Physical Exam at Discharge:  BP (!) 152/86   Pulse 67   Temp 99.1 °F (37.3 °C) (Oral)   Resp 16   Ht 1.803 m (5' 11\")   Wt 73.5 kg (162 lb 1.6 oz)   SpO2 92%   BMI 22.61 kg/m²     Gen: mod distress. Alert.+Pleasant male   Resp: No accessory muscle use. No crackles. No wheezes. No rhonchi.   CV: Regular rate. Regular rhythm. No murmur.  No rub. No edema.   Peripheral Pulses: +2 palpable, equal bilaterally   GI: + drain in place,  RUQ tender . + BS  Skin: Warm and dry. No nodule on exposed extremities. No rash on exposed extremities.   M/S: No cyanosis. No joint deformity. No clubbing. No

## 2024-11-14 ENCOUNTER — ANESTHESIA EVENT (OUTPATIENT)
Dept: ENDOSCOPY | Age: 74
End: 2024-11-14
Payer: MEDICARE

## 2024-11-15 DIAGNOSIS — K21.9 GASTROESOPHAGEAL REFLUX DISEASE, UNSPECIFIED WHETHER ESOPHAGITIS PRESENT: ICD-10-CM

## 2024-11-15 DIAGNOSIS — K50.80 CROHN'S DISEASE OF BOTH SMALL AND LARGE INTESTINE WITHOUT COMPLICATIONS (HCC): ICD-10-CM

## 2024-11-15 DIAGNOSIS — C22.1 CHOLANGIOCARCINOMA (HCC): ICD-10-CM

## 2024-11-15 RX ORDER — METOCLOPRAMIDE 5 MG/1
5 TABLET ORAL 2 TIMES DAILY
Qty: 60 TABLET | Refills: 0 | Status: SHIPPED | OUTPATIENT
Start: 2024-11-15

## 2024-11-19 ENCOUNTER — HOSPITAL ENCOUNTER (OUTPATIENT)
Age: 74
Setting detail: OUTPATIENT SURGERY
Discharge: HOME | End: 2024-11-19
Attending: INTERNAL MEDICINE | Admitting: INTERNAL MEDICINE
Payer: MEDICARE

## 2024-11-19 ENCOUNTER — ANESTHESIA (OUTPATIENT)
Dept: ENDOSCOPY | Age: 74
End: 2024-11-19
Payer: MEDICARE

## 2024-11-19 ENCOUNTER — APPOINTMENT (OUTPATIENT)
Dept: GENERAL RADIOLOGY | Age: 74
End: 2024-11-19
Attending: INTERNAL MEDICINE
Payer: MEDICARE

## 2024-11-19 VITALS
DIASTOLIC BLOOD PRESSURE: 79 MMHG | SYSTOLIC BLOOD PRESSURE: 131 MMHG | HEIGHT: 71 IN | RESPIRATION RATE: 13 BRPM | BODY MASS INDEX: 23.8 KG/M2 | HEART RATE: 79 BPM | WEIGHT: 170 LBS | OXYGEN SATURATION: 92 % | TEMPERATURE: 97 F

## 2024-11-19 DIAGNOSIS — K83.1 OBSTRUCTIVE JAUNDICE: ICD-10-CM

## 2024-11-19 DIAGNOSIS — K83.09 CHOLANGITIS: ICD-10-CM

## 2024-11-19 LAB
ALBUMIN SERPL-MCNC: 3.8 G/DL (ref 3.4–5)
ALBUMIN/GLOB SERPL: 1.1 {RATIO} (ref 1.1–2.2)
ALP SERPL-CCNC: 231 U/L (ref 40–129)
ALT SERPL-CCNC: 26 U/L (ref 10–40)
ANION GAP SERPL CALCULATED.3IONS-SCNC: 15 MMOL/L (ref 3–16)
AST SERPL-CCNC: 18 U/L (ref 15–37)
BASOPHILS # BLD: 0 K/UL (ref 0–0.2)
BASOPHILS NFR BLD: 0.5 %
BILIRUB SERPL-MCNC: 0.6 MG/DL (ref 0–1)
BUN SERPL-MCNC: 13 MG/DL (ref 7–20)
CALCIUM SERPL-MCNC: 8.8 MG/DL (ref 8.3–10.6)
CHLORIDE SERPL-SCNC: 94 MMOL/L (ref 99–110)
CO2 SERPL-SCNC: 26 MMOL/L (ref 21–32)
CREAT SERPL-MCNC: 0.5 MG/DL (ref 0.8–1.3)
DEPRECATED RDW RBC AUTO: 13.6 % (ref 12.4–15.4)
EOSINOPHIL # BLD: 0.1 K/UL (ref 0–0.6)
EOSINOPHIL NFR BLD: 1.3 %
GFR SERPLBLD CREATININE-BSD FMLA CKD-EPI: >90 ML/MIN/{1.73_M2}
GLUCOSE BLD-MCNC: 83 MG/DL (ref 70–99)
GLUCOSE SERPL-MCNC: 91 MG/DL (ref 70–99)
HCT VFR BLD AUTO: 40.9 % (ref 40.5–52.5)
HGB BLD-MCNC: 14.3 G/DL (ref 13.5–17.5)
LYMPHOCYTES # BLD: 1 K/UL (ref 1–5.1)
LYMPHOCYTES NFR BLD: 10.4 %
MAGNESIUM SERPL-MCNC: 1.62 MG/DL (ref 1.8–2.4)
MCH RBC QN AUTO: 34.4 PG (ref 26–34)
MCHC RBC AUTO-ENTMCNC: 34.9 G/DL (ref 31–36)
MCV RBC AUTO: 98.5 FL (ref 80–100)
MONOCYTES # BLD: 0.5 K/UL (ref 0–1.3)
MONOCYTES NFR BLD: 5.3 %
NEUTROPHILS # BLD: 7.7 K/UL (ref 1.7–7.7)
NEUTROPHILS NFR BLD: 82.5 %
PERFORMED ON: NORMAL
PLATELET # BLD AUTO: 277 K/UL (ref 135–450)
PMV BLD AUTO: 6.9 FL (ref 5–10.5)
POTASSIUM SERPL-SCNC: 3.3 MMOL/L (ref 3.5–5.1)
PROT SERPL-MCNC: 7.4 G/DL (ref 6.4–8.2)
RBC # BLD AUTO: 4.15 M/UL (ref 4.2–5.9)
SODIUM SERPL-SCNC: 135 MMOL/L (ref 136–145)
WBC # BLD AUTO: 9.3 K/UL (ref 4–11)

## 2024-11-19 PROCEDURE — C1726 CATH, BAL DIL, NON-VASCULAR: HCPCS | Performed by: INTERNAL MEDICINE

## 2024-11-19 PROCEDURE — 7100000001 HC PACU RECOVERY - ADDTL 15 MIN: Performed by: INTERNAL MEDICINE

## 2024-11-19 PROCEDURE — 83735 ASSAY OF MAGNESIUM: CPT

## 2024-11-19 PROCEDURE — 3609014200 HC ERCP W/BIOPSY SINGLE/MULTIPLE: Performed by: INTERNAL MEDICINE

## 2024-11-19 PROCEDURE — 7100000010 HC PHASE II RECOVERY - FIRST 15 MIN: Performed by: INTERNAL MEDICINE

## 2024-11-19 PROCEDURE — 88305 TISSUE EXAM BY PATHOLOGIST: CPT

## 2024-11-19 PROCEDURE — 7100000011 HC PHASE II RECOVERY - ADDTL 15 MIN: Performed by: INTERNAL MEDICINE

## 2024-11-19 PROCEDURE — C1769 GUIDE WIRE: HCPCS | Performed by: INTERNAL MEDICINE

## 2024-11-19 PROCEDURE — 2500000003 HC RX 250 WO HCPCS

## 2024-11-19 PROCEDURE — 2580000003 HC RX 258: Performed by: NURSE ANESTHETIST, CERTIFIED REGISTERED

## 2024-11-19 PROCEDURE — 85025 COMPLETE CBC W/AUTO DIFF WBC: CPT

## 2024-11-19 PROCEDURE — 3700000000 HC ANESTHESIA ATTENDED CARE: Performed by: INTERNAL MEDICINE

## 2024-11-19 PROCEDURE — 3700000001 HC ADD 15 MINUTES (ANESTHESIA): Performed by: INTERNAL MEDICINE

## 2024-11-19 PROCEDURE — 7100000000 HC PACU RECOVERY - FIRST 15 MIN: Performed by: INTERNAL MEDICINE

## 2024-11-19 PROCEDURE — 2500000003 HC RX 250 WO HCPCS: Performed by: NURSE ANESTHETIST, CERTIFIED REGISTERED

## 2024-11-19 PROCEDURE — 2709999900 HC NON-CHARGEABLE SUPPLY: Performed by: INTERNAL MEDICINE

## 2024-11-19 PROCEDURE — C1748 ENDOSCOPE, SINGLE, UGI: HCPCS | Performed by: INTERNAL MEDICINE

## 2024-11-19 PROCEDURE — 6360000002 HC RX W HCPCS: Performed by: NURSE ANESTHETIST, CERTIFIED REGISTERED

## 2024-11-19 PROCEDURE — 2500000003 HC RX 250 WO HCPCS: Performed by: ANESTHESIOLOGY

## 2024-11-19 PROCEDURE — 80053 COMPREHEN METABOLIC PANEL: CPT

## 2024-11-19 PROCEDURE — 2580000003 HC RX 258: Performed by: ANESTHESIOLOGY

## 2024-11-19 PROCEDURE — 74330 X-RAY BILE/PANC ENDOSCOPY: CPT

## 2024-11-19 PROCEDURE — 36415 COLL VENOUS BLD VENIPUNCTURE: CPT

## 2024-11-19 RX ORDER — ONDANSETRON 2 MG/ML
INJECTION INTRAMUSCULAR; INTRAVENOUS
Status: DISCONTINUED | OUTPATIENT
Start: 2024-11-19 | End: 2024-11-19 | Stop reason: SDUPTHER

## 2024-11-19 RX ORDER — ROCURONIUM BROMIDE 10 MG/ML
INJECTION, SOLUTION INTRAVENOUS
Status: DISCONTINUED | OUTPATIENT
Start: 2024-11-19 | End: 2024-11-19 | Stop reason: SDUPTHER

## 2024-11-19 RX ORDER — SODIUM CHLORIDE 0.9 % (FLUSH) 0.9 %
5-40 SYRINGE (ML) INJECTION PRN
Status: DISCONTINUED | OUTPATIENT
Start: 2024-11-19 | End: 2024-11-19 | Stop reason: HOSPADM

## 2024-11-19 RX ORDER — MEPERIDINE HYDROCHLORIDE 25 MG/ML
12.5 INJECTION INTRAMUSCULAR; INTRAVENOUS; SUBCUTANEOUS EVERY 5 MIN PRN
Status: DISCONTINUED | OUTPATIENT
Start: 2024-11-19 | End: 2024-11-19 | Stop reason: HOSPADM

## 2024-11-19 RX ORDER — SODIUM CHLORIDE 9 MG/ML
INJECTION, SOLUTION INTRAVENOUS PRN
Status: DISCONTINUED | OUTPATIENT
Start: 2024-11-19 | End: 2024-11-19 | Stop reason: HOSPADM

## 2024-11-19 RX ORDER — FENTANYL CITRATE 50 UG/ML
INJECTION, SOLUTION INTRAMUSCULAR; INTRAVENOUS
Status: DISCONTINUED | OUTPATIENT
Start: 2024-11-19 | End: 2024-11-19 | Stop reason: SDUPTHER

## 2024-11-19 RX ORDER — SODIUM CHLORIDE 9 MG/ML
INJECTION, SOLUTION INTRAVENOUS
Status: DISCONTINUED | OUTPATIENT
Start: 2024-11-19 | End: 2024-11-19 | Stop reason: SDUPTHER

## 2024-11-19 RX ORDER — SODIUM CHLORIDE 0.9 % (FLUSH) 0.9 %
5-40 SYRINGE (ML) INJECTION EVERY 12 HOURS SCHEDULED
Status: DISCONTINUED | OUTPATIENT
Start: 2024-11-19 | End: 2024-11-19 | Stop reason: HOSPADM

## 2024-11-19 RX ORDER — NALOXONE HYDROCHLORIDE 0.4 MG/ML
INJECTION, SOLUTION INTRAMUSCULAR; INTRAVENOUS; SUBCUTANEOUS PRN
Status: DISCONTINUED | OUTPATIENT
Start: 2024-11-19 | End: 2024-11-19 | Stop reason: HOSPADM

## 2024-11-19 RX ORDER — LIDOCAINE HYDROCHLORIDE 20 MG/ML
INJECTION, SOLUTION INFILTRATION; PERINEURAL
Status: DISCONTINUED | OUTPATIENT
Start: 2024-11-19 | End: 2024-11-19 | Stop reason: SDUPTHER

## 2024-11-19 RX ORDER — PROPOFOL 10 MG/ML
INJECTION, EMULSION INTRAVENOUS
Status: DISCONTINUED | OUTPATIENT
Start: 2024-11-19 | End: 2024-11-19 | Stop reason: SDUPTHER

## 2024-11-19 RX ORDER — DEXAMETHASONE SODIUM PHOSPHATE 4 MG/ML
INJECTION, SOLUTION INTRA-ARTICULAR; INTRALESIONAL; INTRAMUSCULAR; INTRAVENOUS; SOFT TISSUE
Status: DISCONTINUED | OUTPATIENT
Start: 2024-11-19 | End: 2024-11-19 | Stop reason: SDUPTHER

## 2024-11-19 RX ORDER — OXYCODONE HYDROCHLORIDE 5 MG/1
5 TABLET ORAL PRN
Status: DISCONTINUED | OUTPATIENT
Start: 2024-11-19 | End: 2024-11-19 | Stop reason: HOSPADM

## 2024-11-19 RX ORDER — OXYCODONE HYDROCHLORIDE 5 MG/1
10 TABLET ORAL PRN
Status: DISCONTINUED | OUTPATIENT
Start: 2024-11-19 | End: 2024-11-19 | Stop reason: HOSPADM

## 2024-11-19 RX ORDER — SODIUM CHLORIDE, SODIUM LACTATE, POTASSIUM CHLORIDE, CALCIUM CHLORIDE 600; 310; 30; 20 MG/100ML; MG/100ML; MG/100ML; MG/100ML
INJECTION, SOLUTION INTRAVENOUS CONTINUOUS
Status: DISCONTINUED | OUTPATIENT
Start: 2024-11-19 | End: 2024-11-19 | Stop reason: HOSPADM

## 2024-11-19 RX ORDER — ONDANSETRON 2 MG/ML
4 INJECTION INTRAMUSCULAR; INTRAVENOUS
Status: DISCONTINUED | OUTPATIENT
Start: 2024-11-19 | End: 2024-11-19 | Stop reason: HOSPADM

## 2024-11-19 RX ADMIN — ONDANSETRON 4 MG: 2 INJECTION INTRAMUSCULAR; INTRAVENOUS at 14:50

## 2024-11-19 RX ADMIN — LIDOCAINE HYDROCHLORIDE 100 MG: 20 INJECTION, SOLUTION INFILTRATION; PERINEURAL at 14:44

## 2024-11-19 RX ADMIN — SUGAMMADEX 200 MG: 100 INJECTION, SOLUTION INTRAVENOUS at 15:48

## 2024-11-19 RX ADMIN — ROCURONIUM BROMIDE 15 MG: 10 INJECTION, SOLUTION INTRAVENOUS at 15:07

## 2024-11-19 RX ADMIN — SODIUM CHLORIDE: 0.9 INJECTION, SOLUTION INTRAVENOUS at 14:39

## 2024-11-19 RX ADMIN — FENTANYL CITRATE 25 MCG: 50 INJECTION INTRAMUSCULAR; INTRAVENOUS at 15:04

## 2024-11-19 RX ADMIN — DEXAMETHASONE SODIUM PHOSPHATE 8 MG: 4 INJECTION, SOLUTION INTRAMUSCULAR; INTRAVENOUS at 14:50

## 2024-11-19 RX ADMIN — PROPOFOL 150 MG: 10 INJECTION, EMULSION INTRAVENOUS at 14:44

## 2024-11-19 RX ADMIN — Medication 20 MG: at 12:38

## 2024-11-19 RX ADMIN — FENTANYL CITRATE 75 MCG: 50 INJECTION INTRAMUSCULAR; INTRAVENOUS at 14:44

## 2024-11-19 RX ADMIN — ROCURONIUM BROMIDE 50 MG: 10 INJECTION, SOLUTION INTRAVENOUS at 14:44

## 2024-11-19 ASSESSMENT — ENCOUNTER SYMPTOMS: SHORTNESS OF BREATH: 1

## 2024-11-19 ASSESSMENT — LIFESTYLE VARIABLES: SMOKING_STATUS: 0

## 2024-11-19 ASSESSMENT — PAIN SCALES - GENERAL
PAINLEVEL_OUTOF10: 0

## 2024-11-19 ASSESSMENT — PAIN - FUNCTIONAL ASSESSMENT: PAIN_FUNCTIONAL_ASSESSMENT: 0-10

## 2024-11-19 NOTE — ANESTHESIA POSTPROCEDURE EVALUATION
Department of Anesthesiology  Postprocedure Note    Patient: Sharath Ceja  MRN: [id number]  YOB: 1950  Date of evaluation: 11/19/2024    Procedure Summary       Date: 11/19/24 Room / Location: [address]    Anesthesia Start: 1439 Anesthesia Stop: 1603    Procedures:       ERCP WF W/ANES. (14:00) (RENDEZVOUS ERCP)      ENDOSCOPIC RETROGRADE CHOLANGIOPANCREATOGRAPHY BIOPSY Diagnosis:       Cholangitis      Obstructive jaundice      (Cholangitis [K83.09])      (Obstructive jaundice [K83.1])    Surgeons: Tab Pandey MD Responsible Provider: Pepito Faust MD    Anesthesia Type: general ASA Status: 3            Anesthesia Type: No value filed.    Elenita Phase I: Elenita Score: 9    Elenita Phase II: Elenita Score: 10    Anesthesia Post Evaluation    Patient location during evaluation: bedside  Patient participation: complete - patient participated  Level of consciousness: awake and alert  Nausea & Vomiting: no nausea  Cardiovascular status: hemodynamically stable  Respiratory status: acceptable  Hydration status: euvolemic  Pain management: adequate      Vitals:    11/19/24 1611 11/19/24 1615 11/19/24 1623 11/19/24 1628   BP:   133/78 131/79   Pulse:       Resp:       Temp: 97 °F (36.1 °C) 97 °F (36.1 °C) 97 °F (36.1 °C) 97 °F (36.1 °C)   TempSrc: Temporal Temporal Temporal Temporal   SpO2:       Weight:       Height:          No notable events documented.

## 2024-11-19 NOTE — ANESTHESIA PRE PROCEDURE
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    Obstruction of biliary stent T85.590A    Elevation of levels of liver transaminase levels R74.01    Adenocarcinoma (HCC) C80.1    Former smoker Z87.891    Diarrhea R19.7    Upper gastrointestinal hemorrhage K92.2    Cholangiocarcinoma (HCC) C22.1    Debility R53.81    H/O Whipple procedure Z90.410, Z90.49    Right upper quadrant abdominal abscess (HCC) K65.1    Type 2 diabetes mellitus with chronic kidney disease E11.22    Biliary obstruction K83.1    Bilateral calf pain M79.661, M79.662    Dark urine R82.998    History of cholangiocarcinoma Z85.09    Hypomagnesemia E83.42       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    8 + LYPMH NODES, WHIPPLE    Asthma     Blood circulation, collateral     Carpal tunnel syndrome     CLL (chronic lymphocytic leukemia) (HCC)     DENIES    Crohn's colitis 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    Hypertension     Liver abscess     DRAINED    Pneumonia     pneumonia,asthma    prostate     infection    Seasonal allergies     Wears glasses        Past Surgical History:        Procedure Laterality Date    CARDIAC CATHETERIZATION      1980S, DR ROWELL, NO ISSUES SINCE, HAS APT 8/14/23    CHOLECYSTECTOMY      CT LIVER ABSCESS ASPIRATION      PT STATES DRAINED LIVER ABSCESS    CYST REMOVAL      KNEE & CLOSE TO RECTUM    ERCP

## 2024-11-19 NOTE — H&P
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     Past Medical History:   Diagnosis Date    Adenocarcinoma (HCC) 2023    8 + LYPMH NODES, WHIPPLE    Asthma     Blood circulation, collateral     Carpal tunnel syndrome     CLL (chronic lymphocytic leukemia) (HCC)     DENIES    Crohn's colitis (HCC)     Dental crowns present     AND FALSE TOOTH    Diabetes mellitus (HCC)     emboli     pulmonary emboli in     GERD (gastroesophageal reflux disease)     Chrons disease    Gout     Hx of blood clots     , AFTER TREE LIMB HIT TESTICLE    Hypertension     Liver abscess     DRAINED    Pneumonia     pneumonia,asthma    prostate     infection    Seasonal allergies     Wears glasses      Past Surgical History:   Procedure Laterality Date    CARDIAC CATHETERIZATION      , DR ROWELL, NO ISSUES SINCE, HAS [address]    CHOLECYSTECTOMY      CT LIVER ABSCESS ASPIRATION      PT STATES DRAINED LIVER ABSCESS    CYST REMOVAL      KNEE & CLOSE TO RECTUM    ERCP N/A 2022    ERCP BIOPSY performed by Tab Pandey MD at [facility] ENDOSCOPY    ERCP  2022    ERCP SPHINCTER/PAPILLOTOMY performed by Tab Pandey MD at [facility] ENDOSCOPY    ERCP  2022    ERCP STENT INSERTION performed by Tab Pandey MD at [facility] ENDOSCOPY    ERCP N/A 2022    ERCP STENT REMOVAL performed by Tab Pandey MD at [facility] ENDOSCOPY    ERCP  2022    ERCP STENT INSERTION performed by Tab Pandey MD at [facility] ENDOSCOPY    ERCP  2022    ERCP BIOPSY performed by Tab Pandey MD at [facility] ENDOSCOPY    ERCP N/A 2022    ERCP STENT INSERTION

## 2024-11-21 ENCOUNTER — TELEPHONE (OUTPATIENT)
Dept: INTERVENTIONAL RADIOLOGY/VASCULAR | Age: 74
End: 2024-11-21

## 2024-11-21 NOTE — TELEPHONE ENCOUNTER
Call placed to [facility] to discuss Dannie and Dr. Patterson joint procedure. Mariah will return phone call to IR when available.

## 2024-12-03 ENCOUNTER — ANESTHESIA EVENT (OUTPATIENT)
Dept: ENDOSCOPY | Age: 74
End: 2024-12-03
Payer: MEDICARE

## 2024-12-03 RX ORDER — PANCRELIPASE LIPASE, PANCRELIPASE PROTEASE, PANCRELIPASE AMYLASE 252600; 60000; 189600 [USP'U]/1; [USP'U]/1; [USP'U]/1
1 CAPSULE, DELAYED RELEASE ORAL
COMMUNITY

## 2024-12-04 ENCOUNTER — OFFICE VISIT (OUTPATIENT)
Dept: FAMILY MEDICINE CLINIC | Age: 74
End: 2024-12-04
Payer: MEDICARE

## 2024-12-04 ENCOUNTER — TELEPHONE (OUTPATIENT)
Dept: INTERVENTIONAL RADIOLOGY/VASCULAR | Age: 74
End: 2024-12-04

## 2024-12-04 VITALS
BODY MASS INDEX: 21.84 KG/M2 | WEIGHT: 156 LBS | DIASTOLIC BLOOD PRESSURE: 68 MMHG | HEIGHT: 71 IN | OXYGEN SATURATION: 98 % | SYSTOLIC BLOOD PRESSURE: 118 MMHG | HEART RATE: 84 BPM

## 2024-12-04 DIAGNOSIS — K21.9 GASTROESOPHAGEAL REFLUX DISEASE, UNSPECIFIED WHETHER ESOPHAGITIS PRESENT: ICD-10-CM

## 2024-12-04 DIAGNOSIS — Z79.4 TYPE 2 DIABETES MELLITUS WITHOUT COMPLICATION, WITH LONG-TERM CURRENT USE OF INSULIN (HCC): ICD-10-CM

## 2024-12-04 DIAGNOSIS — C22.1 CHOLANGIOCARCINOMA (HCC): ICD-10-CM

## 2024-12-04 DIAGNOSIS — E11.9 TYPE 2 DIABETES MELLITUS WITHOUT COMPLICATION, WITH LONG-TERM CURRENT USE OF INSULIN (HCC): ICD-10-CM

## 2024-12-04 DIAGNOSIS — M54.50 ACUTE LOW BACK PAIN, UNSPECIFIED BACK PAIN LATERALITY, UNSPECIFIED WHETHER SCIATICA PRESENT: ICD-10-CM

## 2024-12-04 DIAGNOSIS — R30.9 PAINFUL URINATION: Primary | ICD-10-CM

## 2024-12-04 DIAGNOSIS — K83.1 BILIARY OBSTRUCTION: ICD-10-CM

## 2024-12-04 DIAGNOSIS — K50.80 CROHN'S DISEASE OF BOTH SMALL AND LARGE INTESTINE WITHOUT COMPLICATIONS (HCC): ICD-10-CM

## 2024-12-04 LAB
BILIRUBIN, POC: NEGATIVE
BLOOD URINE, POC: NEGATIVE
CLARITY, POC: CLEAR
COLOR, POC: YELLOW
GLUCOSE URINE, POC: NEGATIVE MG/DL
KETONES, POC: NEGATIVE MG/DL
LEUKOCYTE EST, POC: NEGATIVE
NITRITE, POC: NEGATIVE
PH, POC: 7
PROTEIN, POC: NEGATIVE MG/DL
SPECIFIC GRAVITY, POC: 1.01
UROBILINOGEN, POC: 0.2 MG/DL

## 2024-12-04 PROCEDURE — 1160F RVW MEDS BY RX/DR IN RCRD: CPT | Performed by: FAMILY MEDICINE

## 2024-12-04 PROCEDURE — 99214 OFFICE O/P EST MOD 30 MIN: CPT | Performed by: FAMILY MEDICINE

## 2024-12-04 PROCEDURE — 1123F ACP DISCUSS/DSCN MKR DOCD: CPT | Performed by: FAMILY MEDICINE

## 2024-12-04 PROCEDURE — 3044F HG A1C LEVEL LT 7.0%: CPT | Performed by: FAMILY MEDICINE

## 2024-12-04 PROCEDURE — 81002 URINALYSIS NONAUTO W/O SCOPE: CPT | Performed by: FAMILY MEDICINE

## 2024-12-04 PROCEDURE — 1159F MED LIST DOCD IN RCRD: CPT | Performed by: FAMILY MEDICINE

## 2024-12-04 PROCEDURE — 3074F SYST BP LT 130 MM HG: CPT | Performed by: FAMILY MEDICINE

## 2024-12-04 PROCEDURE — 3078F DIAST BP <80 MM HG: CPT | Performed by: FAMILY MEDICINE

## 2024-12-04 RX ORDER — METOCLOPRAMIDE 5 MG/1
5 TABLET ORAL 2 TIMES DAILY
Qty: 60 TABLET | Refills: 0 | Status: SHIPPED | OUTPATIENT
Start: 2024-12-04

## 2024-12-04 RX ORDER — DICYCLOMINE HCL 20 MG
20 TABLET ORAL DAILY
Qty: 90 TABLET | Refills: 0 | Status: SHIPPED | OUTPATIENT
Start: 2024-12-04

## 2024-12-04 RX ORDER — GREEN TEA/HOODIA GORDONII 315-12.5MG
1000 CAPSULE ORAL DAILY
Qty: 90 TABLET | Refills: 3 | Status: SHIPPED | OUTPATIENT
Start: 2024-12-04

## 2024-12-04 NOTE — PROGRESS NOTES
Sharath Ceja (:  1950) is a 74 y.o. male,Established patient, here for evaluation of the following chief complaint(s):  Diabetes, Hypertension, Gastroesophageal Reflux, and Lower Back Pain         Assessment & Plan  Painful urination   Urine dip shows no signs of infection.     Orders:    POCT Urinalysis no Micro    Acute low back pain, unspecified back pain laterality, unspecified whether sciatica present    R/o UTI    Orders:    POCT Urinalysis no Micro    Cholangiocarcinoma (HCC)   Monitored by specialist- no acute findings meriting change in the plan    Orders:    metoclopramide (REGLAN) 5 MG tablet; Take 1 tablet by mouth in the morning and at bedtime    Gastroesophageal reflux disease, unspecified whether esophagitis present   Chronic, at goal (stable), continue current treatment plan refilled Reglan and Bentyl    Orders:    metoclopramide (REGLAN) 5 MG tablet; Take 1 tablet by mouth in the morning and at bedtime    Crohn's disease of both small and large intestine without complications (HCC)   Chronic, at goal (stable), continue Reglan and Bentyl    Orders:    dicyclomine (BENTYL) 20 MG tablet; Take 1 tablet by mouth daily    metoclopramide (REGLAN) 5 MG tablet; Take 1 tablet by mouth in the morning and at bedtime    Cyanocobalamin (B-12) 500 MCG SUBL; Take 1,000 mg by mouth daily    Type 2 diabetes mellitus without complication, with long-term current use of insulin (HCC)    Continue sliding scale NPH and metformin.  Will get A1c with next lab work.    Orders:    insulin NPH (HUMULIN N;NOVOLIN N) 100 UNIT/ML injection vial; Inject 20 Units into the skin 2 times daily (before meals)    Hemoglobin A1C; Future    Biliary obstruction    Scheduled for procedure follow-up with gastroenterology           Return for dm2.       Subjective   HPI  74-year-old male here for follow-up visit.  Currently experiencing biliary obstruction.  Following up with gastroenterology with a planned procedure between

## 2024-12-04 NOTE — ASSESSMENT & PLAN NOTE
Chronic, at goal (stable), continue current treatment plan refilled Reglan and Bentyl    Orders:    metoclopramide (REGLAN) 5 MG tablet; Take 1 tablet by mouth in the morning and at bedtime

## 2024-12-04 NOTE — ASSESSMENT & PLAN NOTE
Monitored by specialist- no acute findings meriting change in the plan    Orders:    metoclopramide (REGLAN) 5 MG tablet; Take 1 tablet by mouth in the morning and at bedtime

## 2024-12-04 NOTE — ASSESSMENT & PLAN NOTE
Continue sliding scale NPH and metformin.  Will get A1c with next lab work.    Orders:    insulin NPH (HUMULIN N;NOVOLIN N) 100 UNIT/ML injection vial; Inject 20 Units into the skin 2 times daily (before meals)    Hemoglobin A1C; Future

## 2024-12-04 NOTE — TELEPHONE ENCOUNTER
Spoke with Endo Supervisor Zahida MORRISON, Patient Scheduled for Endo/IR joint procedure on 12/11/24 1030 am. Patient will be readied by Endo and recover in PACU/SDS. Procedure to be down in IR Suite.

## 2024-12-05 RX ORDER — ALBUTEROL SULFATE 90 UG/1
INHALANT RESPIRATORY (INHALATION)
Qty: 1 EACH | Refills: 3 | Status: SHIPPED | OUTPATIENT
Start: 2024-12-05

## 2024-12-11 ENCOUNTER — HOSPITAL ENCOUNTER (OUTPATIENT)
Age: 74
Setting detail: OUTPATIENT SURGERY
Discharge: HOME | End: 2024-12-11
Attending: INTERNAL MEDICINE | Admitting: INTERNAL MEDICINE
Payer: MEDICARE

## 2024-12-11 ENCOUNTER — APPOINTMENT (OUTPATIENT)
Dept: INTERVENTIONAL RADIOLOGY/VASCULAR | Age: 74
End: 2024-12-11
Attending: INTERNAL MEDICINE
Payer: MEDICARE

## 2024-12-11 ENCOUNTER — ANESTHESIA (OUTPATIENT)
Dept: ENDOSCOPY | Age: 74
End: 2024-12-11
Payer: MEDICARE

## 2024-12-11 VITALS
TEMPERATURE: 97 F | HEART RATE: 81 BPM | WEIGHT: 170 LBS | SYSTOLIC BLOOD PRESSURE: 142 MMHG | DIASTOLIC BLOOD PRESSURE: 82 MMHG | OXYGEN SATURATION: 94 % | RESPIRATION RATE: 24 BRPM | HEIGHT: 71 IN | BODY MASS INDEX: 23.8 KG/M2

## 2024-12-11 DIAGNOSIS — K91.89: ICD-10-CM

## 2024-12-11 DIAGNOSIS — K83.09 CHOLANGITIS: ICD-10-CM

## 2024-12-11 DIAGNOSIS — K83.1 OBSTRUCTIVE JAUNDICE: ICD-10-CM

## 2024-12-11 DIAGNOSIS — K83.1: ICD-10-CM

## 2024-12-11 LAB
ALBUMIN SERPL-MCNC: 3.6 G/DL (ref 3.4–5)
ALBUMIN/GLOB SERPL: 1.3 {RATIO} (ref 1.1–2.2)
ALP SERPL-CCNC: 176 U/L (ref 40–129)
ALT SERPL-CCNC: 19 U/L (ref 10–40)
ANION GAP SERPL CALCULATED.3IONS-SCNC: 11 MMOL/L (ref 3–16)
AST SERPL-CCNC: 21 U/L (ref 15–37)
BILIRUB SERPL-MCNC: 0.6 MG/DL (ref 0–1)
BUN SERPL-MCNC: 8 MG/DL (ref 7–20)
CALCIUM SERPL-MCNC: 8.7 MG/DL (ref 8.3–10.6)
CHLORIDE SERPL-SCNC: 98 MMOL/L (ref 99–110)
CO2 SERPL-SCNC: 28 MMOL/L (ref 21–32)
CREAT SERPL-MCNC: 0.3 MG/DL (ref 0.8–1.3)
DEPRECATED RDW RBC AUTO: 14.4 % (ref 12.4–15.4)
GFR SERPLBLD CREATININE-BSD FMLA CKD-EPI: >90 ML/MIN/{1.73_M2}
GLUCOSE BLD-MCNC: 67 MG/DL (ref 70–99)
GLUCOSE BLD-MCNC: 77 MG/DL (ref 70–99)
GLUCOSE SERPL-MCNC: 86 MG/DL (ref 70–99)
HCT VFR BLD AUTO: 34.6 % (ref 40.5–52.5)
HGB BLD-MCNC: 11.8 G/DL (ref 13.5–17.5)
INR PPP: 1.16 (ref 0.85–1.15)
MAGNESIUM SERPL-MCNC: 1.62 MG/DL (ref 1.8–2.4)
MCH RBC QN AUTO: 33.3 PG (ref 26–34)
MCHC RBC AUTO-ENTMCNC: 34.3 G/DL (ref 31–36)
MCV RBC AUTO: 97 FL (ref 80–100)
PERFORMED ON: ABNORMAL
PERFORMED ON: NORMAL
PLATELET # BLD AUTO: 194 K/UL (ref 135–450)
PMV BLD AUTO: 6.8 FL (ref 5–10.5)
POTASSIUM SERPL-SCNC: 2.7 MMOL/L (ref 3.5–5.1)
POTASSIUM SERPL-SCNC: 2.9 MMOL/L (ref 3.5–5.1)
POTASSIUM SERPL-SCNC: 3.5 MMOL/L (ref 3.5–5.1)
PROT SERPL-MCNC: 6.3 G/DL (ref 6.4–8.2)
PROTHROMBIN TIME: 15 SEC (ref 11.9–14.9)
RBC # BLD AUTO: 3.56 M/UL (ref 4.2–5.9)
SODIUM SERPL-SCNC: 137 MMOL/L (ref 136–145)
WBC # BLD AUTO: 5.2 K/UL (ref 4–11)

## 2024-12-11 PROCEDURE — 84132 ASSAY OF SERUM POTASSIUM: CPT

## 2024-12-11 PROCEDURE — 7100000000 HC PACU RECOVERY - FIRST 15 MIN: Performed by: INTERNAL MEDICINE

## 2024-12-11 PROCEDURE — 3700000001 HC ADD 15 MINUTES (ANESTHESIA): Performed by: INTERNAL MEDICINE

## 2024-12-11 PROCEDURE — 6370000000 HC RX 637 (ALT 250 FOR IP): Performed by: ANESTHESIOLOGY

## 2024-12-11 PROCEDURE — 85027 COMPLETE CBC AUTOMATED: CPT

## 2024-12-11 PROCEDURE — 6360000002 HC RX W HCPCS: Performed by: ANESTHESIOLOGY

## 2024-12-11 PROCEDURE — 7100000001 HC PACU RECOVERY - ADDTL 15 MIN: Performed by: INTERNAL MEDICINE

## 2024-12-11 PROCEDURE — 36415 COLL VENOUS BLD VENIPUNCTURE: CPT

## 2024-12-11 PROCEDURE — 80053 COMPREHEN METABOLIC PANEL: CPT

## 2024-12-11 PROCEDURE — 3700000000 HC ANESTHESIA ATTENDED CARE: Performed by: INTERNAL MEDICINE

## 2024-12-11 PROCEDURE — 7100000010 HC PHASE II RECOVERY - FIRST 15 MIN: Performed by: INTERNAL MEDICINE

## 2024-12-11 PROCEDURE — 47542 DILATE BILIARY DUCT/AMPULLA: CPT

## 2024-12-11 PROCEDURE — 7100000011 HC PHASE II RECOVERY - ADDTL 15 MIN: Performed by: INTERNAL MEDICINE

## 2024-12-11 PROCEDURE — 10030 IMG GID FLU COLL DRG SFT TIS: CPT

## 2024-12-11 PROCEDURE — 88305 TISSUE EXAM BY PATHOLOGIST: CPT

## 2024-12-11 PROCEDURE — 2500000003 HC RX 250 WO HCPCS

## 2024-12-11 PROCEDURE — 83735 ASSAY OF MAGNESIUM: CPT

## 2024-12-11 PROCEDURE — 2720000010 HC SURG SUPPLY STERILE: Performed by: INTERNAL MEDICINE

## 2024-12-11 PROCEDURE — 6360000002 HC RX W HCPCS

## 2024-12-11 PROCEDURE — 76000 FLUOROSCOPY <1 HR PHYS/QHP: CPT

## 2024-12-11 PROCEDURE — 88173 CYTOPATH EVAL FNA REPORT: CPT

## 2024-12-11 PROCEDURE — C1769 GUIDE WIRE: HCPCS

## 2024-12-11 PROCEDURE — 88341 IMHCHEM/IMCYTCHM EA ADD ANTB: CPT

## 2024-12-11 PROCEDURE — 85610 PROTHROMBIN TIME: CPT

## 2024-12-11 PROCEDURE — 2709999900 HC NON-CHARGEABLE SUPPLY: Performed by: INTERNAL MEDICINE

## 2024-12-11 PROCEDURE — 2580000003 HC RX 258: Performed by: ANESTHESIOLOGY

## 2024-12-11 PROCEDURE — 47536 EXCHANGE BILIARY DRG CATH: CPT

## 2024-12-11 PROCEDURE — 2580000003 HC RX 258

## 2024-12-11 PROCEDURE — 88342 IMHCHEM/IMCYTCHM 1ST ANTB: CPT

## 2024-12-11 PROCEDURE — 3609018900 HC ERCP: Performed by: INTERNAL MEDICINE

## 2024-12-11 RX ORDER — MEPERIDINE HYDROCHLORIDE 25 MG/ML
12.5 INJECTION INTRAMUSCULAR; INTRAVENOUS; SUBCUTANEOUS EVERY 5 MIN PRN
Status: DISCONTINUED | OUTPATIENT
Start: 2024-12-11 | End: 2024-12-11 | Stop reason: HOSPADM

## 2024-12-11 RX ORDER — ONDANSETRON 2 MG/ML
4 INJECTION INTRAMUSCULAR; INTRAVENOUS
Status: DISCONTINUED | OUTPATIENT
Start: 2024-12-11 | End: 2024-12-11 | Stop reason: HOSPADM

## 2024-12-11 RX ORDER — SODIUM CHLORIDE 9 MG/ML
INJECTION, SOLUTION INTRAVENOUS PRN
Status: DISCONTINUED | OUTPATIENT
Start: 2024-12-11 | End: 2024-12-11 | Stop reason: HOSPADM

## 2024-12-11 RX ORDER — POTASSIUM CHLORIDE 1500 MG/1
20 TABLET, EXTENDED RELEASE ORAL ONCE
Status: COMPLETED | OUTPATIENT
Start: 2024-12-11 | End: 2024-12-11

## 2024-12-11 RX ORDER — DIPHENHYDRAMINE HYDROCHLORIDE 50 MG/ML
12.5 INJECTION INTRAMUSCULAR; INTRAVENOUS
Status: DISCONTINUED | OUTPATIENT
Start: 2024-12-11 | End: 2024-12-11 | Stop reason: HOSPADM

## 2024-12-11 RX ORDER — SODIUM CHLORIDE, SODIUM LACTATE, POTASSIUM CHLORIDE, CALCIUM CHLORIDE 600; 310; 30; 20 MG/100ML; MG/100ML; MG/100ML; MG/100ML
INJECTION, SOLUTION INTRAVENOUS
Status: DISCONTINUED | OUTPATIENT
Start: 2024-12-11 | End: 2024-12-11 | Stop reason: SDUPTHER

## 2024-12-11 RX ORDER — ONDANSETRON 2 MG/ML
4 INJECTION INTRAMUSCULAR; INTRAVENOUS EVERY 8 HOURS PRN
Status: DISCONTINUED | OUTPATIENT
Start: 2024-12-11 | End: 2024-12-11 | Stop reason: HOSPADM

## 2024-12-11 RX ORDER — HYDROCODONE BITARTRATE AND ACETAMINOPHEN 5; 325 MG/1; MG/1
2 TABLET ORAL EVERY 4 HOURS PRN
Status: DISCONTINUED | OUTPATIENT
Start: 2024-12-11 | End: 2024-12-11 | Stop reason: HOSPADM

## 2024-12-11 RX ORDER — OXYCODONE HYDROCHLORIDE 5 MG/1
10 TABLET ORAL PRN
Status: DISCONTINUED | OUTPATIENT
Start: 2024-12-11 | End: 2024-12-11 | Stop reason: HOSPADM

## 2024-12-11 RX ORDER — DEXAMETHASONE SODIUM PHOSPHATE 4 MG/ML
INJECTION, SOLUTION INTRA-ARTICULAR; INTRALESIONAL; INTRAMUSCULAR; INTRAVENOUS; SOFT TISSUE
Status: DISCONTINUED | OUTPATIENT
Start: 2024-12-11 | End: 2024-12-11 | Stop reason: SDUPTHER

## 2024-12-11 RX ORDER — SODIUM CHLORIDE 0.9 % (FLUSH) 0.9 %
5-40 SYRINGE (ML) INJECTION PRN
Status: DISCONTINUED | OUTPATIENT
Start: 2024-12-11 | End: 2024-12-11 | Stop reason: HOSPADM

## 2024-12-11 RX ORDER — CEFTRIAXONE 1 G/1
INJECTION, POWDER, FOR SOLUTION INTRAMUSCULAR; INTRAVENOUS
Status: DISCONTINUED | OUTPATIENT
Start: 2024-12-11 | End: 2024-12-11 | Stop reason: SDUPTHER

## 2024-12-11 RX ORDER — OXYCODONE HYDROCHLORIDE 5 MG/1
5 TABLET ORAL PRN
Status: DISCONTINUED | OUTPATIENT
Start: 2024-12-11 | End: 2024-12-11 | Stop reason: HOSPADM

## 2024-12-11 RX ORDER — SODIUM CHLORIDE 0.9 % (FLUSH) 0.9 %
5-40 SYRINGE (ML) INJECTION EVERY 12 HOURS SCHEDULED
Status: DISCONTINUED | OUTPATIENT
Start: 2024-12-11 | End: 2024-12-11 | Stop reason: HOSPADM

## 2024-12-11 RX ORDER — METOPROLOL TARTRATE 1 MG/ML
INJECTION, SOLUTION INTRAVENOUS
Status: DISCONTINUED | OUTPATIENT
Start: 2024-12-11 | End: 2024-12-11 | Stop reason: SDUPTHER

## 2024-12-11 RX ORDER — ONDANSETRON 2 MG/ML
INJECTION INTRAMUSCULAR; INTRAVENOUS
Status: DISCONTINUED | OUTPATIENT
Start: 2024-12-11 | End: 2024-12-11 | Stop reason: SDUPTHER

## 2024-12-11 RX ORDER — ROCURONIUM BROMIDE 10 MG/ML
INJECTION, SOLUTION INTRAVENOUS
Status: DISCONTINUED | OUTPATIENT
Start: 2024-12-11 | End: 2024-12-11 | Stop reason: SDUPTHER

## 2024-12-11 RX ORDER — POTASSIUM CHLORIDE 1500 MG/1
40 TABLET, EXTENDED RELEASE ORAL ONCE
Status: COMPLETED | OUTPATIENT
Start: 2024-12-11 | End: 2024-12-11

## 2024-12-11 RX ORDER — SODIUM CHLORIDE, SODIUM LACTATE, POTASSIUM CHLORIDE, CALCIUM CHLORIDE 600; 310; 30; 20 MG/100ML; MG/100ML; MG/100ML; MG/100ML
INJECTION, SOLUTION INTRAVENOUS CONTINUOUS
Status: DISCONTINUED | OUTPATIENT
Start: 2024-12-11 | End: 2024-12-11 | Stop reason: HOSPADM

## 2024-12-11 RX ORDER — FENTANYL CITRATE 50 UG/ML
INJECTION, SOLUTION INTRAMUSCULAR; INTRAVENOUS
Status: DISCONTINUED | OUTPATIENT
Start: 2024-12-11 | End: 2024-12-11 | Stop reason: SDUPTHER

## 2024-12-11 RX ORDER — PROPOFOL 10 MG/ML
INJECTION, EMULSION INTRAVENOUS
Status: DISCONTINUED | OUTPATIENT
Start: 2024-12-11 | End: 2024-12-11 | Stop reason: SDUPTHER

## 2024-12-11 RX ORDER — LABETALOL HYDROCHLORIDE 5 MG/ML
5 INJECTION, SOLUTION INTRAVENOUS EVERY 10 MIN PRN
Status: DISCONTINUED | OUTPATIENT
Start: 2024-12-11 | End: 2024-12-11 | Stop reason: HOSPADM

## 2024-12-11 RX ORDER — POTASSIUM CHLORIDE 7.45 MG/ML
10 INJECTION INTRAVENOUS ONCE
Status: COMPLETED | OUTPATIENT
Start: 2024-12-11 | End: 2024-12-11

## 2024-12-11 RX ORDER — HYDROCODONE BITARTRATE AND ACETAMINOPHEN 5; 325 MG/1; MG/1
1 TABLET ORAL EVERY 4 HOURS PRN
Status: DISCONTINUED | OUTPATIENT
Start: 2024-12-11 | End: 2024-12-11 | Stop reason: HOSPADM

## 2024-12-11 RX ORDER — NALOXONE HYDROCHLORIDE 0.4 MG/ML
INJECTION, SOLUTION INTRAMUSCULAR; INTRAVENOUS; SUBCUTANEOUS PRN
Status: DISCONTINUED | OUTPATIENT
Start: 2024-12-11 | End: 2024-12-11 | Stop reason: HOSPADM

## 2024-12-11 RX ADMIN — FENTANYL CITRATE 50 MCG: 50 INJECTION INTRAMUSCULAR; INTRAVENOUS at 13:41

## 2024-12-11 RX ADMIN — ROCURONIUM BROMIDE 10 MG: 10 INJECTION, SOLUTION INTRAVENOUS at 14:15

## 2024-12-11 RX ADMIN — FENTANYL CITRATE 25 MCG: 50 INJECTION INTRAMUSCULAR; INTRAVENOUS at 14:33

## 2024-12-11 RX ADMIN — PROPOFOL 120 MG: 10 INJECTION, EMULSION INTRAVENOUS at 13:45

## 2024-12-11 RX ADMIN — PROPOFOL 30 MG: 10 INJECTION, EMULSION INTRAVENOUS at 13:48

## 2024-12-11 RX ADMIN — ROCURONIUM BROMIDE 50 MG: 10 INJECTION, SOLUTION INTRAVENOUS at 13:47

## 2024-12-11 RX ADMIN — DEXAMETHASONE SODIUM PHOSPHATE 6 MG: 4 INJECTION, SOLUTION INTRAMUSCULAR; INTRAVENOUS at 13:56

## 2024-12-11 RX ADMIN — ONDANSETRON 4 MG: 2 INJECTION INTRAMUSCULAR; INTRAVENOUS at 16:34

## 2024-12-11 RX ADMIN — SODIUM CHLORIDE, POTASSIUM CHLORIDE, SODIUM LACTATE AND CALCIUM CHLORIDE: 600; 310; 30; 20 INJECTION, SOLUTION INTRAVENOUS at 13:39

## 2024-12-11 RX ADMIN — SODIUM CHLORIDE, POTASSIUM CHLORIDE, SODIUM LACTATE AND CALCIUM CHLORIDE: 600; 310; 30; 20 INJECTION, SOLUTION INTRAVENOUS at 11:28

## 2024-12-11 RX ADMIN — METOPROLOL TARTRATE 2.5 MG: 5 INJECTION INTRAVENOUS at 15:23

## 2024-12-11 RX ADMIN — CEFTRIAXONE SODIUM 2 G: 1 INJECTION, POWDER, FOR SOLUTION INTRAMUSCULAR; INTRAVENOUS at 13:56

## 2024-12-11 RX ADMIN — METOPROLOL TARTRATE 2.5 MG: 5 INJECTION INTRAVENOUS at 15:34

## 2024-12-11 RX ADMIN — POTASSIUM CHLORIDE 20 MEQ: 1500 TABLET, EXTENDED RELEASE ORAL at 13:23

## 2024-12-11 RX ADMIN — SUGAMMADEX 200 MG: 100 INJECTION, SOLUTION INTRAVENOUS at 15:28

## 2024-12-11 RX ADMIN — POTASSIUM CHLORIDE 10 MEQ: 7.46 INJECTION, SOLUTION INTRAVENOUS at 11:32

## 2024-12-11 RX ADMIN — ONDANSETRON 4 MG: 2 INJECTION INTRAMUSCULAR; INTRAVENOUS at 13:56

## 2024-12-11 RX ADMIN — FENTANYL CITRATE 25 MCG: 50 INJECTION INTRAMUSCULAR; INTRAVENOUS at 14:15

## 2024-12-11 RX ADMIN — ROCURONIUM BROMIDE 10 MG: 10 INJECTION, SOLUTION INTRAVENOUS at 14:51

## 2024-12-11 RX ADMIN — PROPOFOL 30 MG: 10 INJECTION, EMULSION INTRAVENOUS at 15:16

## 2024-12-11 ASSESSMENT — PAIN SCALES - GENERAL: PAINLEVEL_OUTOF10: 3

## 2024-12-11 ASSESSMENT — PAIN - FUNCTIONAL ASSESSMENT
PAIN_FUNCTIONAL_ASSESSMENT: 0-10
PAIN_FUNCTIONAL_ASSESSMENT: ACTIVITIES ARE NOT PREVENTED

## 2024-12-11 ASSESSMENT — PAIN DESCRIPTION - DESCRIPTORS: DESCRIPTORS: SHARP

## 2024-12-11 ASSESSMENT — PAIN DESCRIPTION - LOCATION: LOCATION: ABDOMEN

## 2024-12-11 NOTE — H&P
History and Physical / Pre-Sedation Assessment    Patient:  Sharath Ceja   :   1950     Intended Procedure:  IR guided Cholangioscopy    HPI: 74 year old male with cholangiocarcinoma s/p whipple and hepaticojejunostomy c/b anastamotic stricture presents for spyglass cholangioscopy    Past Medical History:   Diagnosis Date    Adenocarcinoma (HCC) 2023    8 + LYPMH NODES, WHIPPLE    Asthma     Blood circulation, collateral     Carpal tunnel syndrome     CLL (chronic lymphocytic leukemia) (HCC)     DENIES    Crohn's colitis (HCC)     Dental crowns present     AND FALSE TOOTH    Diabetes mellitus (HCC)     emboli     pulmonary emboli in     GERD (gastroesophageal reflux disease)     Chrons disease    Gout     Hx of blood clots     , AFTER TREE LIMB HIT TESTICLE    Hypertension     Liver abscess     DRAINED    Pneumonia     pneumonia,asthma    prostate     infection    Seasonal allergies     Wears glasses      Past Surgical History:   Procedure Laterality Date    CARDIAC CATHETERIZATION      , DR ROWELL, NO ISSUES SINCE, HAS [address]    CHOLECYSTECTOMY      CT LIVER ABSCESS ASPIRATION      PT STATES DRAINED LIVER ABSCESS    CYST REMOVAL      KNEE & CLOSE TO RECTUM    ERCP N/A 2022    ERCP BIOPSY performed by Tab Pandey MD at [facility] ENDOSCOPY    ERCP  2022    ERCP SPHINCTER/PAPILLOTOMY performed by Tab Pandey MD at [facility] ENDOSCOPY    ERCP  2022    ERCP STENT INSERTION performed by Tab Pandey MD at [facility] ENDOSCOPY    ERCP N/A 2022    ERCP STENT REMOVAL performed by Tab Pandey MD at [facility] ENDOSCOPY    ERCP  2022    ERCP STENT INSERTION performed by Tab Pandey MD at [facility] ENDOSCOPY    ERCP  2022    ERCP BIOPSY performed by Tab Pandey MD at [facility] ENDOSCOPY    ERCP N/A 2022    ERCP STENT INSERTION performed by Tab Pandey MD at

## 2024-12-11 NOTE — OR NURSING
Pharmacy faxed in request for medication refill. Medication(s) set up as pending orders from medication list.    Preferred pharmacy has been set up and verified            Preferred contact number#    Mobile:    Mobile [phone]            Sedation and Vitals per anesthesia.

## 2024-12-11 NOTE — BRIEF OP NOTE
Interventional Radiology  Brief Postoperative Note    Patient: Sharath Ceja  YOB: 1950  MRN: [id number]      Pre-operative Diagnosis: Biliary stricture    Post-operative Diagnosis: Same    Procedure: Cholangiogram with biliary dilation and catheter exchange/upsize x2    Anesthesia: General    Surgeons/Assistants: Jesus Manuel Patterson DO    Estimated Blood Loss: less than 50     Complications: None    Infection Present At Time Of Surgery (PATOS) (choose all levels that have infection present):  No infection present    Specimens: Was Not Obtained    Findings:   Severe stricture of the biliary-hepaticojejeunal anastomosis.   Cholangioscopy was performed by Dr Pandey via the right biliary access and sampling was performed.   The stricture was dilated to 8 mm via both left and right access points.  The biliary drains were exchanged and upsized to 12-F, then placed to gravity drainage.     Blood mixed with bile in the biliary drainage bags is expected.     Will follow up with GI pending biopsy results for next steps (stent placement vs surgical revision).       Jesus Manuel Patterson DO  12/11/2024, 3:39 PM  Interventional Radiology  [phone] ([phone])

## 2024-12-11 NOTE — ANESTHESIA POSTPROCEDURE EVALUATION
12/11/2024 09:57 AM        GLUCOSE                  86                  12/11/2024 09:57 AM   COAGS  Lab Results       Component                Value               Date/Time                  PROTIME                  15.0 (H)            12/11/2024 09:57 AM        INR                      1.16 (H)            12/11/2024 09:57 AM        APTT                     28.9                06/19/2023 06:07 AM     Intake & Output:  @24HRIO@    Nausea & Vomiting:  No    Level of Consciousness:  Awake    Pain Assessment:  Adequate analgesia    Anesthesia Complications:  No apparent anesthetic complications    SUMMARY      Vital signs stable  OK to discharge from Stage I post anesthesia care.  Care transferred from Anesthesiology department on discharge from perioperative area     No notable events documented.

## 2024-12-11 NOTE — PROGRESS NOTES
Procedural plan discussed with patient and patient's wife. All questions were answered and consent was obtained.    Jesus Manuel Patterson DO  12/11/2024, 10:22 AM  Interventional Radiology  [phone] ([phone])

## 2024-12-11 NOTE — ANESTHESIA PRE PROCEDURE
Department of Anesthesiology  Preprocedure Note       Name:  Sharath Ceja   Age:  74 y.o.  :  1950                                          MRN:  [id number]         Date:  2024      Surgeon: Surgeon(s):  Tab Pandey MD    Procedure: Procedure(s):  ERCP W/IR W/ANES. (10:30)  ERCP    Medications prior to admission:   Prior to Admission medications    Medication Sig Start Date End Date Taking? Authorizing Provider   Pancrelipase, Lip-Prot-Amyl, (ZENPEP) 98924-534284 units CPEP Take 1 tablet by mouth 3 times daily (before meals)   Yes ProviderRicki MD   famotidine (PEPCID) 20 MG tablet Take 1 tablet by mouth 2 times daily   Yes ProviderRicki MD   allopurinol (ZYLOPRIM) 300 MG tablet Take 1 tablet by mouth daily 24 Yes Katia Rodriges APRN - CNP   metFORMIN (GLUCOPHAGE-XR) 500 MG extended release tablet Take 1 tablet by mouth 2 times daily 9/10/24  Yes Jesus Manuel Fabian MD   triamterene-hydroCHLOROthiazide (DYAZIDE) 37.5-25 MG per capsule Take 1 capsule by mouth twice daily 24  Yes Chago Craft APRN - CNP   pantoprazole (PROTONIX) 40 MG tablet Take 1 tablet by mouth every morning (before breakfast) 24  Yes Jesus Manuel Fabian MD   vitamin D (ERGOCALCIFEROL) 1.25 MG (25320 UT) CAPS capsule Take 1 capsule by mouth once a week 4/10/24  Yes Chago Craft APRN - CNP   ondansetron (ZOFRAN-ODT) 8 MG TBDP disintegrating tablet DISSOLVE 1 TABLET IN MOUTH EVERY 8 HOURS THREE TIMES DAILY AS NEEDED FOR NAUSEA 23  Yes Ricki Hudson MD   guaiFENesin (MUCINEX) 600 MG extended release tablet Take 1 tablet by mouth 2 times daily 7/3/23  Yes Guy Link DO   lidocaine 4 % external patch Place 1 patch onto the skin daily 23  Yes Guy Link DO   polycarbophil (FIBERCON) 625 MG tablet Take 1 tablet by mouth daily 7/3/23  Yes Heis, Guy L, DO   finasteride (PROSCAR) 5 MG tablet Take 1 tablet by mouth every evening   Yes

## 2024-12-11 NOTE — PROGRESS NOTES
Pt given Potassium Chloride 40 meq po and Potassium Chloride 10 meq IVPB started per Dr. Lambert's orders. Current KCL is 2.7.

## 2024-12-12 ENCOUNTER — TELEPHONE (OUTPATIENT)
Dept: FAMILY MEDICINE CLINIC | Age: 74
End: 2024-12-12

## 2024-12-12 NOTE — TELEPHONE ENCOUNTER
Patient stated that he recently had an ERCP done at [facility]. Stated that his potassium was extremely low.     Would you like him to make an appt to discuss this?     ERCP records are in chart if needed to be reviewed.

## 2024-12-18 ENCOUNTER — OFFICE VISIT (OUTPATIENT)
Dept: FAMILY MEDICINE CLINIC | Age: 74
End: 2024-12-18
Payer: MEDICARE

## 2024-12-18 VITALS
WEIGHT: 157 LBS | DIASTOLIC BLOOD PRESSURE: 74 MMHG | HEIGHT: 67 IN | HEART RATE: 108 BPM | OXYGEN SATURATION: 98 % | BODY MASS INDEX: 24.64 KG/M2 | SYSTOLIC BLOOD PRESSURE: 130 MMHG

## 2024-12-18 DIAGNOSIS — C80.1 ADENOCARCINOMA (HCC): ICD-10-CM

## 2024-12-18 DIAGNOSIS — N18.2 TYPE 2 DIABETES MELLITUS WITH STAGE 2 CHRONIC KIDNEY DISEASE, WITH LONG-TERM CURRENT USE OF INSULIN (HCC): ICD-10-CM

## 2024-12-18 DIAGNOSIS — E87.6 HYPOKALEMIA: Primary | ICD-10-CM

## 2024-12-18 DIAGNOSIS — E11.22 TYPE 2 DIABETES MELLITUS WITH STAGE 2 CHRONIC KIDNEY DISEASE, WITH LONG-TERM CURRENT USE OF INSULIN (HCC): ICD-10-CM

## 2024-12-18 DIAGNOSIS — K50.80 CROHN'S DISEASE OF BOTH SMALL AND LARGE INTESTINE WITHOUT COMPLICATION (HCC): ICD-10-CM

## 2024-12-18 DIAGNOSIS — Z79.4 TYPE 2 DIABETES MELLITUS WITH STAGE 2 CHRONIC KIDNEY DISEASE, WITH LONG-TERM CURRENT USE OF INSULIN (HCC): ICD-10-CM

## 2024-12-18 DIAGNOSIS — C22.1 CHOLANGIOCARCINOMA (HCC): ICD-10-CM

## 2024-12-18 PROBLEM — K50.90 CROHN'S DISEASE (HCC): Chronic | Status: ACTIVE | Noted: 2022-01-24

## 2024-12-18 PROCEDURE — 1036F TOBACCO NON-USER: CPT | Performed by: FAMILY MEDICINE

## 2024-12-18 PROCEDURE — 1159F MED LIST DOCD IN RCRD: CPT | Performed by: FAMILY MEDICINE

## 2024-12-18 PROCEDURE — G8420 CALC BMI NORM PARAMETERS: HCPCS | Performed by: FAMILY MEDICINE

## 2024-12-18 PROCEDURE — 3075F SYST BP GE 130 - 139MM HG: CPT | Performed by: FAMILY MEDICINE

## 2024-12-18 PROCEDURE — 1123F ACP DISCUSS/DSCN MKR DOCD: CPT | Performed by: FAMILY MEDICINE

## 2024-12-18 PROCEDURE — 3044F HG A1C LEVEL LT 7.0%: CPT | Performed by: FAMILY MEDICINE

## 2024-12-18 PROCEDURE — 2022F DILAT RTA XM EVC RTNOPTHY: CPT | Performed by: FAMILY MEDICINE

## 2024-12-18 PROCEDURE — G8427 DOCREV CUR MEDS BY ELIG CLIN: HCPCS | Performed by: FAMILY MEDICINE

## 2024-12-18 PROCEDURE — 99213 OFFICE O/P EST LOW 20 MIN: CPT | Performed by: FAMILY MEDICINE

## 2024-12-18 PROCEDURE — 3078F DIAST BP <80 MM HG: CPT | Performed by: FAMILY MEDICINE

## 2024-12-18 PROCEDURE — G8484 FLU IMMUNIZE NO ADMIN: HCPCS | Performed by: FAMILY MEDICINE

## 2024-12-18 PROCEDURE — 36415 COLL VENOUS BLD VENIPUNCTURE: CPT | Performed by: FAMILY MEDICINE

## 2024-12-18 PROCEDURE — 3017F COLORECTAL CA SCREEN DOC REV: CPT | Performed by: FAMILY MEDICINE

## 2024-12-18 RX ORDER — POLYETHYLENE GLYCOL 3350 17 G/17G
17 POWDER, FOR SOLUTION ORAL DAILY PRN
COMMUNITY

## 2024-12-18 SDOH — ECONOMIC STABILITY: INCOME INSECURITY: HOW HARD IS IT FOR YOU TO PAY FOR THE VERY BASICS LIKE FOOD, HOUSING, MEDICAL CARE, AND HEATING?: NOT HARD AT ALL

## 2024-12-18 SDOH — ECONOMIC STABILITY: FOOD INSECURITY: WITHIN THE PAST 12 MONTHS, YOU WORRIED THAT YOUR FOOD WOULD RUN OUT BEFORE YOU GOT MONEY TO BUY MORE.: NEVER TRUE

## 2024-12-18 SDOH — ECONOMIC STABILITY: FOOD INSECURITY: WITHIN THE PAST 12 MONTHS, THE FOOD YOU BOUGHT JUST DIDN'T LAST AND YOU DIDN'T HAVE MONEY TO GET MORE.: NEVER TRUE

## 2024-12-18 ASSESSMENT — PATIENT HEALTH QUESTIONNAIRE - PHQ9
SUM OF ALL RESPONSES TO PHQ QUESTIONS 1-9: 0
2. FEELING DOWN, DEPRESSED OR HOPELESS: NOT AT ALL
SUM OF ALL RESPONSES TO PHQ QUESTIONS 1-9: 0
SUM OF ALL RESPONSES TO PHQ9 QUESTIONS 1 & 2: 0
1. LITTLE INTEREST OR PLEASURE IN DOING THINGS: NOT AT ALL

## 2024-12-18 NOTE — ASSESSMENT & PLAN NOTE
Hemoglobin A1c is due will draw this today.  Continue current insulin    Orders:    Hemoglobin A1C

## 2024-12-18 NOTE — PROGRESS NOTES
Sharath Ceja (:  1950) is a 74 y.o. male,Established patient, here for evaluation of the following chief complaint(s):  Diabetes         Assessment & Plan  Hypokalemia  Recheck lab work today    Orders:    Basic Metabolic Panel    Magnesium    Type 2 diabetes mellitus with stage 2 chronic kidney disease, with long-term current use of insulin (HCC)  Hemoglobin A1c is due will draw this today.  Continue current insulin    Orders:    Hemoglobin A1C    Cholangiocarcinoma (HCC)  Biopsies were taken on .  Follow-up with surgery.         Adenocarcinoma (HCC)    As above follow-up with         Crohn's disease of both small and large intestine without complication (HCC)    Continue current treatment.  Follow-up with surgery and gastroenterology           Return in about 3 months (around 3/18/2025) for diabetes.       Subjective   HPI  74-year-old male here for follow-up visit.  Recent postoperative hypokalemia.  He had it replaced in the hospital is due for a follow-up continues to have bilious drainage.  Review of Systems   No fevers chills headache chest pain shortness of    Objective   Physical Exam  HENT:      Head: Normocephalic and atraumatic.   Eyes:      Extraocular Movements: Extraocular movements intact.   Cardiovascular:      Rate and Rhythm: Normal rate and regular rhythm.   Pulmonary:      Effort: Pulmonary effort is normal.      Breath sounds: No wheezing, rhonchi or rales.   Abdominal:      Comments: Percutaneous drain in the right abdominal wall   Musculoskeletal:      Cervical back: Neck supple.   Neurological:      Mental Status: He is alert and oriented to person, place, and time.                  An electronic signature was used to authenticate this note.    --Real Whitehead MD

## 2024-12-19 ENCOUNTER — TELEPHONE (OUTPATIENT)
Dept: INTERVENTIONAL RADIOLOGY/VASCULAR | Age: 74
End: 2024-12-19

## 2024-12-19 LAB
ANION GAP SERPL CALCULATED.3IONS-SCNC: 17 MMOL/L (ref 3–16)
BUN SERPL-MCNC: 8 MG/DL (ref 7–20)
CALCIUM SERPL-MCNC: 9.8 MG/DL (ref 8.3–10.6)
CHLORIDE SERPL-SCNC: 96 MMOL/L (ref 99–110)
CO2 SERPL-SCNC: 26 MMOL/L (ref 21–32)
CREAT SERPL-MCNC: 0.5 MG/DL (ref 0.8–1.3)
EST. AVERAGE GLUCOSE BLD GHB EST-MCNC: 114 MG/DL
GFR SERPLBLD CREATININE-BSD FMLA CKD-EPI: >90 ML/MIN/{1.73_M2}
GLUCOSE SERPL-MCNC: 91 MG/DL (ref 70–99)
HBA1C MFR BLD: 5.6 %
MAGNESIUM SERPL-MCNC: 2.05 MG/DL (ref 1.8–2.4)
POTASSIUM SERPL-SCNC: 3.3 MMOL/L (ref 3.5–5.1)
SODIUM SERPL-SCNC: 139 MMOL/L (ref 136–145)

## 2024-12-19 NOTE — TELEPHONE ENCOUNTER
Call received from Dr. Pandey's office asking for IR to Call the patient concerning Drain dressings needing to be changed.  Call placed to Patient.  Patient teaching concerning changing dressings. Patient verbalized understanding and repeated back to this RN the teaching instructions given.    Patient then stated \" I had a really rough night last night, My right shoulder and right leg were hurting and restless, I ended up taking a pain pill\" Patient then asked about medication being decreased.  This RN stated patient needed to Call Dr. Pandey's office concerning last night and the doctor would be the one to change/ adjust any medication.      Gave the patient [place] [facility] phone number and stated patient could call if he could not find any dressing supplies close to home. [place] [facility] could get him necessary supplies if needed.  Patient stated he thinks he could find them close to home.

## 2024-12-20 ENCOUNTER — HOSPITAL ENCOUNTER (OUTPATIENT)
Age: 74
Discharge: HOME | End: 2024-12-20
Payer: MEDICARE

## 2024-12-20 LAB
ALBUMIN SERPL-MCNC: 4.1 G/DL (ref 3.4–5)
ALP SERPL-CCNC: 183 U/L (ref 40–129)
ALT SERPL-CCNC: 21 U/L (ref 10–40)
AST SERPL-CCNC: 20 U/L (ref 15–37)
BILIRUB DIRECT SERPL-MCNC: 0.2 MG/DL (ref 0–0.3)
BILIRUB INDIRECT SERPL-MCNC: 0.4 MG/DL (ref 0–1)
BILIRUB SERPL-MCNC: 0.6 MG/DL (ref 0–1)
PROT SERPL-MCNC: 7.6 G/DL (ref 6.4–8.2)

## 2024-12-20 PROCEDURE — 80076 HEPATIC FUNCTION PANEL: CPT

## 2024-12-26 DIAGNOSIS — M1A.09X0 CHRONIC GOUT OF MULTIPLE SITES, UNSPECIFIED CAUSE: ICD-10-CM

## 2024-12-27 LAB — CANCER AG19-9 SERPL IA-ACNC: 227 U/ML (ref 0–35)

## 2024-12-27 NOTE — TELEPHONE ENCOUNTER
Future appt scheduled 03/05/2025                          Last appt 12/11/2024      Refill Request     CONFIRM preferrred pharmacy with the patient.    If Mail Order Rx - Pend for 90 day refill.      Last Seen: Last Seen Department: 12/18/2024  Last Seen by PCP: Visit date not found    Last Written: 09/30/2024    If no future appointment scheduled, route STAFF MESSAGE with patient name to the  Pool for scheduling.      Next Appointment:   Future Appointments   Date Time Provider Department Center   2/4/2025 11:00 AM Omar Mcdonald MD [place] [facility]   3/5/2025  4:00 PM Real Whitehead MD [place] [facility] DEP       Message sent to  to schedule appt with patient?  NO      Requested Prescriptions     Pending Prescriptions Disp Refills    allopurinol (ZYLOPRIM) 300 MG tablet [Pharmacy Med Name: Allopurinol 300 MG Oral Tablet] 90 tablet 0     Sig: Take 1 tablet by mouth once daily

## 2024-12-30 RX ORDER — ALLOPURINOL 300 MG/1
300 TABLET ORAL DAILY
Qty: 90 TABLET | Refills: 1 | Status: SHIPPED | OUTPATIENT
Start: 2024-12-30

## 2024-12-31 ENCOUNTER — APPOINTMENT (OUTPATIENT)
Dept: INTERVENTIONAL RADIOLOGY/VASCULAR | Age: 74
DRG: 445 | End: 2024-12-31
Payer: MEDICARE

## 2024-12-31 ENCOUNTER — APPOINTMENT (OUTPATIENT)
Dept: CT IMAGING | Age: 74
DRG: 445 | End: 2024-12-31
Payer: MEDICARE

## 2024-12-31 ENCOUNTER — HOSPITAL ENCOUNTER (INPATIENT)
Age: 74
LOS: 1 days | Discharge: HOME | DRG: 445 | End: 2025-01-01
Attending: EMERGENCY MEDICINE | Admitting: INTERNAL MEDICINE
Payer: MEDICARE

## 2024-12-31 DIAGNOSIS — E87.6 HYPOKALEMIA: ICD-10-CM

## 2024-12-31 DIAGNOSIS — R10.84 GENERALIZED ABDOMINAL PAIN: Primary | ICD-10-CM

## 2024-12-31 DIAGNOSIS — E83.42 HYPOMAGNESEMIA: ICD-10-CM

## 2024-12-31 LAB
ALBUMIN SERPL-MCNC: 3.4 G/DL (ref 3.4–5)
ALBUMIN SERPL-MCNC: 3.6 G/DL (ref 3.4–5)
ALBUMIN/GLOB SERPL: 0.9 {RATIO} (ref 1.1–2.2)
ALP SERPL-CCNC: 208 U/L (ref 40–129)
ALP SERPL-CCNC: 225 U/L (ref 40–129)
ALT SERPL-CCNC: 19 U/L (ref 10–40)
ALT SERPL-CCNC: 21 U/L (ref 10–40)
ANION GAP SERPL CALCULATED.3IONS-SCNC: 13 MMOL/L (ref 3–16)
AST SERPL-CCNC: 23 U/L (ref 15–37)
AST SERPL-CCNC: 25 U/L (ref 15–37)
BASOPHILS # BLD: 0 K/UL (ref 0–0.2)
BASOPHILS NFR BLD: 0.2 %
BILIRUB DIRECT SERPL-MCNC: 0.4 MG/DL (ref 0–0.3)
BILIRUB INDIRECT SERPL-MCNC: 0.2 MG/DL (ref 0–1)
BILIRUB SERPL-MCNC: 0.5 MG/DL (ref 0–1)
BILIRUB SERPL-MCNC: 0.6 MG/DL (ref 0–1)
BILIRUB UR QL STRIP.AUTO: NEGATIVE
BUN SERPL-MCNC: 6 MG/DL (ref 7–20)
CALCIUM SERPL-MCNC: 8.4 MG/DL (ref 8.3–10.6)
CHLORIDE SERPL-SCNC: 98 MMOL/L (ref 99–110)
CLARITY UR: CLEAR
CO2 SERPL-SCNC: 26 MMOL/L (ref 21–32)
COLOR UR: YELLOW
CREAT SERPL-MCNC: 0.4 MG/DL (ref 0.8–1.3)
DEPRECATED RDW RBC AUTO: 14.4 % (ref 12.4–15.4)
EKG ATRIAL RATE: 73 BPM
EKG DIAGNOSIS: NORMAL
EKG P AXIS: 48 DEGREES
EKG P-R INTERVAL: 206 MS
EKG Q-T INTERVAL: 420 MS
EKG QRS DURATION: 94 MS
EKG QTC CALCULATION (BAZETT): 462 MS
EKG R AXIS: 32 DEGREES
EKG T AXIS: 55 DEGREES
EKG VENTRICULAR RATE: 73 BPM
EOSINOPHIL # BLD: 0.1 K/UL (ref 0–0.6)
EOSINOPHIL NFR BLD: 2.3 %
GFR SERPLBLD CREATININE-BSD FMLA CKD-EPI: >90 ML/MIN/{1.73_M2}
GLUCOSE BLD-MCNC: 223 MG/DL (ref 70–99)
GLUCOSE BLD-MCNC: 62 MG/DL (ref 70–99)
GLUCOSE BLD-MCNC: 70 MG/DL (ref 70–99)
GLUCOSE SERPL-MCNC: 82 MG/DL (ref 70–99)
GLUCOSE UR STRIP.AUTO-MCNC: NEGATIVE MG/DL
HCT VFR BLD AUTO: 31.8 % (ref 40.5–52.5)
HGB BLD-MCNC: 10.8 G/DL (ref 13.5–17.5)
HGB UR QL STRIP.AUTO: NEGATIVE
INR PPP: 1.17 (ref 0.85–1.15)
KETONES UR STRIP.AUTO-MCNC: NEGATIVE MG/DL
LACTATE BLDV-SCNC: 1.7 MMOL/L (ref 0.4–1.9)
LEUKOCYTE ESTERASE UR QL STRIP.AUTO: NEGATIVE
LIPASE SERPL-CCNC: 18 U/L (ref 13–60)
LYMPHOCYTES # BLD: 0.6 K/UL (ref 1–5.1)
LYMPHOCYTES NFR BLD: 12.8 %
MAGNESIUM SERPL-MCNC: 1.74 MG/DL (ref 1.8–2.4)
MCH RBC QN AUTO: 33 PG (ref 26–34)
MCHC RBC AUTO-ENTMCNC: 34.1 G/DL (ref 31–36)
MCV RBC AUTO: 96.9 FL (ref 80–100)
MONOCYTES # BLD: 0.3 K/UL (ref 0–1.3)
MONOCYTES NFR BLD: 7.4 %
NEUTROPHILS # BLD: 3.3 K/UL (ref 1.7–7.7)
NEUTROPHILS NFR BLD: 77.3 %
NITRITE UR QL STRIP.AUTO: NEGATIVE
NT-PROBNP SERPL-MCNC: 110 PG/ML (ref 0–449)
PERFORMED ON: ABNORMAL
PERFORMED ON: ABNORMAL
PERFORMED ON: NORMAL
PH UR STRIP.AUTO: 7 [PH] (ref 5–8)
PLATELET # BLD AUTO: 165 K/UL (ref 135–450)
PMV BLD AUTO: 7.3 FL (ref 5–10.5)
POTASSIUM SERPL-SCNC: 2.9 MMOL/L (ref 3.5–5.1)
PROT SERPL-MCNC: 6.7 G/DL (ref 6.4–8.2)
PROT SERPL-MCNC: 7.1 G/DL (ref 6.4–8.2)
PROT UR STRIP.AUTO-MCNC: NEGATIVE MG/DL
PROTHROMBIN TIME: 15.1 SEC (ref 11.9–14.9)
RBC # BLD AUTO: 3.28 M/UL (ref 4.2–5.9)
SODIUM SERPL-SCNC: 137 MMOL/L (ref 136–145)
SP GR UR STRIP.AUTO: 1.01 (ref 1–1.03)
TROPONIN, HIGH SENSITIVITY: 12 NG/L (ref 0–22)
TROPONIN, HIGH SENSITIVITY: 12 NG/L (ref 0–22)
UA COMPLETE W REFLEX CULTURE PNL UR: NORMAL
UA DIPSTICK W REFLEX MICRO PNL UR: NORMAL
URN SPEC COLLECT METH UR: NORMAL
UROBILINOGEN UR STRIP-ACNC: 0.2 E.U./DL
WBC # BLD AUTO: 4.3 K/UL (ref 4–11)

## 2024-12-31 PROCEDURE — 99285 EMERGENCY DEPT VISIT HI MDM: CPT

## 2024-12-31 PROCEDURE — 76000 FLUOROSCOPY <1 HR PHYS/QHP: CPT

## 2024-12-31 PROCEDURE — 85025 COMPLETE CBC W/AUTO DIFF WBC: CPT

## 2024-12-31 PROCEDURE — 93005 ELECTROCARDIOGRAM TRACING: CPT | Performed by: REGISTERED NURSE

## 2024-12-31 PROCEDURE — 74177 CT ABD & PELVIS W/CONTRAST: CPT

## 2024-12-31 PROCEDURE — 6370000000 HC RX 637 (ALT 250 FOR IP)

## 2024-12-31 PROCEDURE — 84484 ASSAY OF TROPONIN QUANT: CPT

## 2024-12-31 PROCEDURE — 6360000002 HC RX W HCPCS: Performed by: REGISTERED NURSE

## 2024-12-31 PROCEDURE — 83880 ASSAY OF NATRIURETIC PEPTIDE: CPT

## 2024-12-31 PROCEDURE — 83735 ASSAY OF MAGNESIUM: CPT

## 2024-12-31 PROCEDURE — 6360000004 HC RX CONTRAST MEDICATION: Performed by: REGISTERED NURSE

## 2024-12-31 PROCEDURE — BF111ZZ FLUOROSCOPY OF BILIARY AND PANCREATIC DUCTS USING LOW OSMOLAR CONTRAST: ICD-10-PCS | Performed by: INTERNAL MEDICINE

## 2024-12-31 PROCEDURE — 96374 THER/PROPH/DIAG INJ IV PUSH: CPT

## 2024-12-31 PROCEDURE — 6360000002 HC RX W HCPCS

## 2024-12-31 PROCEDURE — 83690 ASSAY OF LIPASE: CPT

## 2024-12-31 PROCEDURE — 93010 ELECTROCARDIOGRAM REPORT: CPT | Performed by: INTERNAL MEDICINE

## 2024-12-31 PROCEDURE — 6370000000 HC RX 637 (ALT 250 FOR IP): Performed by: REGISTERED NURSE

## 2024-12-31 PROCEDURE — 85610 PROTHROMBIN TIME: CPT

## 2024-12-31 PROCEDURE — 80053 COMPREHEN METABOLIC PANEL: CPT

## 2024-12-31 PROCEDURE — 96375 TX/PRO/DX INJ NEW DRUG ADDON: CPT

## 2024-12-31 PROCEDURE — 81003 URINALYSIS AUTO W/O SCOPE: CPT

## 2024-12-31 PROCEDURE — 36415 COLL VENOUS BLD VENIPUNCTURE: CPT

## 2024-12-31 PROCEDURE — 83605 ASSAY OF LACTIC ACID: CPT

## 2024-12-31 PROCEDURE — 1200000000 HC SEMI PRIVATE

## 2024-12-31 PROCEDURE — 2500000003 HC RX 250 WO HCPCS

## 2024-12-31 RX ORDER — SODIUM CHLORIDE 9 MG/ML
INJECTION, SOLUTION INTRAVENOUS PRN
Status: DISCONTINUED | OUTPATIENT
Start: 2024-12-31 | End: 2025-01-01 | Stop reason: HOSPADM

## 2024-12-31 RX ORDER — POTASSIUM CHLORIDE 1500 MG/1
40 TABLET, EXTENDED RELEASE ORAL ONCE
Status: COMPLETED | OUTPATIENT
Start: 2024-12-31 | End: 2024-12-31

## 2024-12-31 RX ORDER — FINASTERIDE 5 MG/1
5 TABLET, FILM COATED ORAL EVERY EVENING
Status: DISCONTINUED | OUTPATIENT
Start: 2024-12-31 | End: 2025-01-01 | Stop reason: HOSPADM

## 2024-12-31 RX ORDER — ALLOPURINOL 300 MG/1
300 TABLET ORAL DAILY
Status: DISCONTINUED | OUTPATIENT
Start: 2024-12-31 | End: 2025-01-01 | Stop reason: HOSPADM

## 2024-12-31 RX ORDER — POLYETHYLENE GLYCOL 3350 17 G/17G
17 POWDER, FOR SOLUTION ORAL DAILY PRN
Status: DISCONTINUED | OUTPATIENT
Start: 2024-12-31 | End: 2025-01-01 | Stop reason: HOSPADM

## 2024-12-31 RX ORDER — MAGNESIUM SULFATE IN WATER 40 MG/ML
2000 INJECTION, SOLUTION INTRAVENOUS ONCE
Status: COMPLETED | OUTPATIENT
Start: 2024-12-31 | End: 2024-12-31

## 2024-12-31 RX ORDER — FAMOTIDINE 20 MG/1
20 TABLET, FILM COATED ORAL 2 TIMES DAILY
Status: DISCONTINUED | OUTPATIENT
Start: 2024-12-31 | End: 2025-01-01 | Stop reason: HOSPADM

## 2024-12-31 RX ORDER — PANTOPRAZOLE SODIUM 40 MG/1
40 TABLET, DELAYED RELEASE ORAL
Status: DISCONTINUED | OUTPATIENT
Start: 2025-01-01 | End: 2025-01-01 | Stop reason: HOSPADM

## 2024-12-31 RX ORDER — DEXTROSE MONOHYDRATE 100 MG/ML
INJECTION, SOLUTION INTRAVENOUS CONTINUOUS PRN
Status: DISCONTINUED | OUTPATIENT
Start: 2024-12-31 | End: 2025-01-01 | Stop reason: HOSPADM

## 2024-12-31 RX ORDER — ERGOCALCIFEROL 1.25 MG/1
50000 CAPSULE, LIQUID FILLED ORAL WEEKLY
Status: DISCONTINUED | OUTPATIENT
Start: 2025-01-06 | End: 2025-01-01 | Stop reason: HOSPADM

## 2024-12-31 RX ORDER — TRIAMTERENE AND HYDROCHLOROTHIAZIDE 37.5; 25 MG/1; MG/1
1 TABLET ORAL 2 TIMES DAILY
Status: DISCONTINUED | OUTPATIENT
Start: 2024-12-31 | End: 2025-01-01 | Stop reason: HOSPADM

## 2024-12-31 RX ORDER — POTASSIUM CHLORIDE 1500 MG/1
40 TABLET, EXTENDED RELEASE ORAL PRN
Status: DISCONTINUED | OUTPATIENT
Start: 2024-12-31 | End: 2025-01-01 | Stop reason: HOSPADM

## 2024-12-31 RX ORDER — POTASSIUM CHLORIDE 7.45 MG/ML
10 INJECTION INTRAVENOUS
Status: DISPENSED | OUTPATIENT
Start: 2024-12-31 | End: 2024-12-31

## 2024-12-31 RX ORDER — GLUCAGON 1 MG/ML
1 KIT INJECTION PRN
Status: DISCONTINUED | OUTPATIENT
Start: 2024-12-31 | End: 2025-01-01 | Stop reason: HOSPADM

## 2024-12-31 RX ORDER — ACETAMINOPHEN 325 MG/1
650 TABLET ORAL EVERY 6 HOURS PRN
Status: DISCONTINUED | OUTPATIENT
Start: 2024-12-31 | End: 2025-01-01 | Stop reason: HOSPADM

## 2024-12-31 RX ORDER — ONDANSETRON 2 MG/ML
4 INJECTION INTRAMUSCULAR; INTRAVENOUS EVERY 6 HOURS PRN
Status: DISCONTINUED | OUTPATIENT
Start: 2024-12-31 | End: 2025-01-01 | Stop reason: HOSPADM

## 2024-12-31 RX ORDER — SODIUM CHLORIDE 0.9 % (FLUSH) 0.9 %
5-40 SYRINGE (ML) INJECTION PRN
Status: DISCONTINUED | OUTPATIENT
Start: 2024-12-31 | End: 2025-01-01 | Stop reason: HOSPADM

## 2024-12-31 RX ORDER — SODIUM CHLORIDE 0.9 % (FLUSH) 0.9 %
5-40 SYRINGE (ML) INJECTION EVERY 12 HOURS SCHEDULED
Status: DISCONTINUED | OUTPATIENT
Start: 2024-12-31 | End: 2025-01-01 | Stop reason: HOSPADM

## 2024-12-31 RX ORDER — ONDANSETRON 4 MG/1
4 TABLET, ORALLY DISINTEGRATING ORAL EVERY 8 HOURS PRN
Status: DISCONTINUED | OUTPATIENT
Start: 2024-12-31 | End: 2025-01-01 | Stop reason: HOSPADM

## 2024-12-31 RX ORDER — ENOXAPARIN SODIUM 100 MG/ML
40 INJECTION SUBCUTANEOUS DAILY
Status: DISCONTINUED | OUTPATIENT
Start: 2024-12-31 | End: 2025-01-01 | Stop reason: HOSPADM

## 2024-12-31 RX ORDER — DICYCLOMINE HCL 20 MG
20 TABLET ORAL DAILY
Status: DISCONTINUED | OUTPATIENT
Start: 2024-12-31 | End: 2025-01-01 | Stop reason: HOSPADM

## 2024-12-31 RX ORDER — MAGNESIUM SULFATE IN WATER 40 MG/ML
2000 INJECTION, SOLUTION INTRAVENOUS PRN
Status: DISCONTINUED | OUTPATIENT
Start: 2024-12-31 | End: 2025-01-01 | Stop reason: HOSPADM

## 2024-12-31 RX ORDER — POTASSIUM CHLORIDE 7.45 MG/ML
10 INJECTION INTRAVENOUS PRN
Status: DISCONTINUED | OUTPATIENT
Start: 2024-12-31 | End: 2025-01-01 | Stop reason: HOSPADM

## 2024-12-31 RX ORDER — METOCLOPRAMIDE 10 MG/1
5 TABLET ORAL 2 TIMES DAILY
Status: DISCONTINUED | OUTPATIENT
Start: 2024-12-31 | End: 2025-01-01 | Stop reason: HOSPADM

## 2024-12-31 RX ORDER — ALBUTEROL SULFATE 90 UG/1
2 INHALANT RESPIRATORY (INHALATION) EVERY 6 HOURS PRN
Status: DISCONTINUED | OUTPATIENT
Start: 2024-12-31 | End: 2025-01-01 | Stop reason: HOSPADM

## 2024-12-31 RX ORDER — IOPAMIDOL 755 MG/ML
75 INJECTION, SOLUTION INTRAVASCULAR
Status: COMPLETED | OUTPATIENT
Start: 2024-12-31 | End: 2024-12-31

## 2024-12-31 RX ORDER — MULTIVITAMIN WITH IRON
1000 TABLET ORAL DAILY
Status: DISCONTINUED | OUTPATIENT
Start: 2025-01-01 | End: 2025-01-01 | Stop reason: HOSPADM

## 2024-12-31 RX ORDER — ACETAMINOPHEN 650 MG/1
650 SUPPOSITORY RECTAL EVERY 6 HOURS PRN
Status: DISCONTINUED | OUTPATIENT
Start: 2024-12-31 | End: 2025-01-01 | Stop reason: HOSPADM

## 2024-12-31 RX ORDER — INSULIN LISPRO 100 [IU]/ML
0-4 INJECTION, SOLUTION INTRAVENOUS; SUBCUTANEOUS
Status: DISCONTINUED | OUTPATIENT
Start: 2024-12-31 | End: 2025-01-01 | Stop reason: HOSPADM

## 2024-12-31 RX ADMIN — ALLOPURINOL 300 MG: 300 TABLET ORAL at 18:41

## 2024-12-31 RX ADMIN — POTASSIUM CHLORIDE 10 MEQ: 7.46 INJECTION, SOLUTION INTRAVENOUS at 12:39

## 2024-12-31 RX ADMIN — IOPAMIDOL 75 ML: 755 INJECTION, SOLUTION INTRAVENOUS at 12:04

## 2024-12-31 RX ADMIN — METOCLOPRAMIDE 5 MG: 10 TABLET ORAL at 21:59

## 2024-12-31 RX ADMIN — ACETAMINOPHEN 650 MG: 325 TABLET ORAL at 21:58

## 2024-12-31 RX ADMIN — FAMOTIDINE 20 MG: 20 TABLET, FILM COATED ORAL at 21:59

## 2024-12-31 RX ADMIN — ENOXAPARIN SODIUM 40 MG: 100 INJECTION SUBCUTANEOUS at 18:41

## 2024-12-31 RX ADMIN — FINASTERIDE 5 MG: 5 TABLET, FILM COATED ORAL at 18:41

## 2024-12-31 RX ADMIN — POTASSIUM CHLORIDE 40 MEQ: 1500 TABLET, EXTENDED RELEASE ORAL at 12:26

## 2024-12-31 RX ADMIN — INSULIN LISPRO 1 UNITS: 100 INJECTION, SOLUTION INTRAVENOUS; SUBCUTANEOUS at 21:58

## 2024-12-31 RX ADMIN — IOHEXOL 50 ML: 240 INJECTION, SOLUTION INTRATHECAL; INTRAVASCULAR; INTRAVENOUS; ORAL at 16:59

## 2024-12-31 RX ADMIN — DICYCLOMINE HYDROCHLORIDE 20 MG: 20 TABLET ORAL at 18:41

## 2024-12-31 RX ADMIN — Medication 10 ML: at 21:59

## 2024-12-31 RX ADMIN — MAGNESIUM SULFATE HEPTAHYDRATE 2000 MG: 40 INJECTION, SOLUTION INTRAVENOUS at 13:26

## 2024-12-31 ASSESSMENT — LIFESTYLE VARIABLES
HOW MANY STANDARD DRINKS CONTAINING ALCOHOL DO YOU HAVE ON A TYPICAL DAY: PATIENT DOES NOT DRINK
HOW OFTEN DO YOU HAVE A DRINK CONTAINING ALCOHOL: NEVER

## 2024-12-31 NOTE — FLOWSHEET NOTE
12/31/24 1753   Vital Signs   Temp 97.6 °F (36.4 °C)   Temp Source Oral   Pulse 78   Heart Rate Source Monitor   Respirations 20   BP (!) 146/81   MAP (Calculated) 103   BP Location Right upper arm   BP Method Automatic   Patient Position Semi fowlers   Oxygen Therapy   SpO2 95 %   O2 Device None (Room air)     Patient up from ED. Vss. Sugar 62 OJ provided now 70.    "    S-(situation): Pt calling to report weakness    B-(background): Pt noted she has bee weak for the last several weeks. Pt stated she is having issues with ileostomy and urostomy. Pt stated she was given new supplies and are not working. Pt noted the skin is irritated around both stomas, urine and stool is getting on skin. Pt noted some mild bleeding from skin around stoma's.     A-(assessment): Pt stated Urine and stool are being produced with normal amounts and consistency. Pt noted she is not eating and drinking as much as usual due to not wanting to have to change bags as supplies are running low and leaking issues.     R-(recommendations): Pt advised to be seen as soon as possible to evaluate and treat. Pt offered today appt, declined due to being to late in day. Pt offered appt tomorrow morning, decline due to having to work a [employer], writer advised it is necessary to miss work. Pt stated she cannot miss work. Pt advised to be sure to eat and drink plenty as this may help with fatigue.     Writer and Pt discussed if WOC nurse involved. Pt stated does have WOC nurse through [facility]'s home care. Pt advised will send to PCP/Team to review and attempt to schedule appointment or have WOC nurse referral.        Reason for Disposition    [1] Mild redness or irritation of abdomen skin around stoma AND [2] no improvement after using CARE ADVICE    [1] Leaking under seal on barrier shield on pouch (or pouch wafer for 2-piece pouch) AND [2] no improvement after using CARE ADVICE    Mild bleeding from stoma tissue (stoma is moist, pink to red-colored tissue)    Leaking under seal on barrier shield on pouch (or pouch wafer for 2-piece pouch)    Answer Assessment - Initial Assessment Questions  1. DESCRIPTION: \"Describe how you are feeling.\"      Tired, weakness  2. SEVERITY: \"How bad is it?\"  \"Can you stand and walk?\"    - MILD - Feels weak or tired, but does not interfere with work, school or normal " "activities    - MODERATE - Able to stand and walk; weakness interferes with work, school, or normal activities    - SEVERE - Unable to stand or walk      moderate  3. ONSET:  \"When did the weakness begin?\"      A while ago, back into July 4. CAUSE: \"What do you think is causing the weakness?\"      unsure  5. MEDICINES: \"Have you recently started a new medicine or had a change in the amount of a medicine?\"      no  6. OTHER SYMPTOMS: \"Do you have any other symptoms?\" (e.g., chest pain, fever, cough, SOB, vomiting, diarrhea, bleeding, other areas of pain)      Skin deterioration, bleeding stoma, LYNN,   7. PREGNANCY: \"Is there any chance you are pregnant?\" \"When was your last menstrual period?\"      No    Answer Assessment - Initial Assessment Questions  1.  TYPE: \"What type of ostomy do you have?\" (e.g., ileostomy, colostomy; temporary, long-term [permanent]).      ileostomy  2.  LOCATION: \"Where is the ostomy located?\"      Middle of abd  3.  DURATION: \"How long have you had the ostomy?\" (e.g., days, weeks, months, years).      20 years  4.  MAIN CONCERN: \"What is your main concern or symptom today?\" (e.g., skin redness or irritation, bleeding, leaking, change in output or appearance of ostomy).      Skin redness, bleeding  5. DAILY OSTOMY OUTPUT DIARY: \"Do you keep a daily diary of the output from your ostomy\"?       no  6.  OSTOMY OUTPUT: \"How much stool output over a 24 hour period are you having?\" (e.g.,  ml; consistency of water, milkshake, pudding)      Normal output  7.  OTHER SYMPTOMS: \"Are you having any other symptoms?\" (e.g., fever, vomiting, blood in stool, abdominal pain, dizziness)      none    Protocols used: OSTOMY SYMPTOMS AND JOPGILOVS-S-RZ, WEAKNESS (GENERALIZED) AND FATIGUE-A-OH    RAVEN Mejía [facility] [place] Triage    "

## 2024-12-31 NOTE — PROGRESS NOTES
Case reviewed with IR.  Will inject tubes to assess reportedly blocked drain.  Patient does not appear septic.  Formal consult to follow tomorrow,    Electronically signed by: ADILENE Ovalle 12/31/2024 3:45 PM           (O) [phone]  (F) [phone]  Available via perfect serve

## 2024-12-31 NOTE — ED NOTES
6154 call placed to [facility]   1350 consult completed with call back from [facility] speaking with Mary Padilla CNP

## 2024-12-31 NOTE — PROGRESS NOTES
Pt to ED for decreased output of biliary drains, placed by Dominik Martinez and Lenoardo. Contrast injected through bilateral drains. Wide open bilaterally. Dressing changed to both drains.

## 2024-12-31 NOTE — PROGRESS NOTES
-- DO NOT REPLY / DO NOT REPLY ALL --  -- Message is from Engagement Center Operations (ECO) --    General Patient Message: Patient currently at [facility] and their system is down they are asking if  can call in for refill request      Caller Information         Type Contact Phone/Fax    01/19/2024 09:39 AM CST Phone (Incoming) Ant Pizarro (Self) [phone] (M)          Alternative phone number: n/a    Can a detailed message be left? Yes    Message Turnaround:                Biliary cholangiogram negative, contrast injected. Great flow, bilateral. Flushed with saline, and draining well. Julee from ED updated.

## 2024-12-31 NOTE — PROGRESS NOTES
4 Eyes Skin Assessment     NAME:  Sharath Ceja  YOB: 1950  MEDICAL RECORD NUMBER:  [id number]    The patient is being assessed for  Admission    I agree that at least one RN has performed a thorough Head to Toe Skin Assessment on the patient. ALL assessment sites listed below have been assessed.      Areas assessed by both nurses:    Head, Face, Ears, Shoulders, Back, Chest, Arms, Elbows, Hands, Sacrum. Buttock, Coccyx, Ischium, Legs. Feet and Heels, and Under Medical Devices     Per patient no skin issues. Patient refusing 4eyes at this time.  2 biliary tubes. Scattered bruising.          Does the Patient have a Wound? No noted wound(s)       Mickey Prevention initiated by RN: No  Wound Care Orders initiated by RN: No    Pressure Injury (Stage 3,4, Unstageable, DTI, NWPT, and Complex wounds) if present, place Wound referral order by RN under : No    New Ostomies, if present place, Ostomy referral order under : No     Nurse 1 eSignature: Electronically signed by Zunilda Jacobson RN on 12/31/24 at 6:45 PM EST    **SHARE this note so that the co-signing nurse can place an eSignature**    Nurse 2 eSignature: {Esignature:[id number]}

## 2024-12-31 NOTE — PROGRESS NOTES
RT Inhaler-Nebulizer Bronchodilator Protocol Note    There is a bronchodilator order in the chart from a provider indicating to follow the RT Bronchodilator Protocol and there is an “Initiate RT Inhaler-Nebulizer Bronchodilator Protocol” order as well (see protocol at bottom of note).    CXR Findings:  No results found.    The findings from the last RT Protocol Assessment were as follows:   History Pulmonary Disease: (P) Chronic pulmonary disease  Respiratory Pattern: (P) Regular pattern and RR 12-20 bpm  Breath Sounds: (P) Clear breath sounds  Cough: (P) Strong, spontaneous, non-productive  Indication for Bronchodilator Therapy:    Bronchodilator Assessment Score: (P) 2    Aerosolized bronchodilator medication orders have been revised according to the RT Inhaler-Nebulizer Bronchodilator Protocol below.    Respiratory Therapist to perform RT Therapy Protocol Assessment initially then follow the protocol.  Repeat RT Therapy Protocol Assessment PRN for score 0-3 or on second treatment, BID, and PRN for scores above 3.    No Indications - adjust the frequency to every 6 hours PRN wheezing or bronchospasm, if no treatments needed after 48 hours then discontinue using Per Protocol order mode.     If indication present, adjust the RT bronchodilator orders based on the Bronchodilator Assessment Score as indicated below.  Use Inhaler orders unless patient has one or more of the following: on home nebulizer, not able to hold breath for 10 seconds, is not alert and oriented, cannot activate and use MDI correctly, or respiratory rate 25 breaths per minute or more, then use the equivalent nebulizer order(s) with same Frequency and PRN reasons based on the score.  If a patient is on this medication at home then do not decrease Frequency below that used at home.    0-3 - enter or revise RT bronchodilator order(s) to equivalent RT Bronchodilator order with Frequency of every 4 hours PRN for wheezing or increased work of breathing  using Per Protocol order mode.        4-6 - enter or revise RT Bronchodilator order(s) to two equivalent RT bronchodilator orders with one order with BID Frequency and one order with Frequency of every 4 hours PRN wheezing or increased work of breathing using Per Protocol order mode.        7-10 - enter or revise RT Bronchodilator order(s) to two equivalent RT bronchodilator orders with one order with TID Frequency and one order with Frequency of every 4 hours PRN wheezing or increased work of breathing using Per Protocol order mode.       11-13 - enter or revise RT Bronchodilator order(s) to one equivalent RT bronchodilator order with QID Frequency and an Albuterol order with Frequency of every 4 hours PRN wheezing or increased work of breathing using Per Protocol order mode.      Greater than 13 - enter or revise RT Bronchodilator order(s) to one equivalent RT bronchodilator order with every 4 hours Frequency and an Albuterol order with Frequency of every 2 hours PRN wheezing or increased work of breathing using Per Protocol order mode.     RT to enter RT Home Evaluation for COPD & MDI Assessment order using Per Protocol order mode.    Electronically signed by Андрей Mariano RCP on 12/31/2024 at 5:58 PM

## 2024-12-31 NOTE — H&P
Hospital Medicine History & Physical      PCP: Real Whitehead MD    Date of Admission: 12/31/2024    Date of Service: Pt seen/examined on 12/31/24       Chief Complaint:    Chief Complaint   Patient presents with    Clogged biliary tube- straight into liver     Has tube into biliary tube and tube into liver- states tube started clogging yesterday morning. No output since yesterday morning           History Of Present Illness:      The patient is a 74 y.o. male with PMHx of cholangiocarcinoma s/p Whipple procedure in June 2023, BPH, gout, GERD, T2DM with long-term use of insulin, Crohn's disease, hypertension who presents to [facility] with drain blockage. Pt stated he was doing well until yesterday when he notices his drainage slowing down.  Today it stopped draining.  He denies any fever, chills, abdominal pain.  Stated he has been eating well at home, no nausea or vomiting.  Pt is s/p biliary cholangiogram with IR and contrast injected and now drains are draining well. History obtained from the patient and review of EMR.     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    8 + LYPMH NODES, WHIPPLE    Asthma     Blood circulation, collateral     Carpal tunnel syndrome     CLL (chronic lymphocytic leukemia) (HCC)     DENIES    Crohn's colitis 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    Hypertension     Liver abscess     DRAINED    Pneumonia     pneumonia,asthma    prostate     infection    Seasonal allergies     Wears glasses        Past Surgical History:        Procedure Laterality Date    CARDIAC CATHETERIZATION      1980S, DR ROWELL, NO ISSUES SINCE, HAS APT 8/14/23    CHOLECYSTECTOMY      CT LIVER ABSCESS ASPIRATION      PT STATES DRAINED LIVER ABSCESS    CYST REMOVAL       liver. There is mild   intrahepatic ductal dilatation and mild amount of fluid in the agustin hepatis   which may be a slightly dilated common bile duct.  There is focal area of   decreased attenuation in the right hepatic lobe juxtaposed to the agustin   hepatis which may reflect small hematoma, seroma, biloma, or less likely   abscess.  Recommend close clinical and laboratory correlation.  Recommend   short interval follow-up and consider MRI with and without contrast to   further characterize if appropriate   2. Findings consistent with prior Whipple procedure.   3. Mild diffuse distension of the small bowel with diffuse mucosal thickening   throughout. There is stool and fluid throughout the colon with slight diffuse   mucosal thickening. Findings may represent a diffuse enterocolitis.   4. Mild amount of retroperitoneal stranding but no large fluid collection.             Principal Problem:    Biliary obstruction  Active Problems:    Gastroesophageal reflux disease    Hyponatremia    Hypokalemia    Crohn's disease (HCC)    Cholangiocarcinoma (HCC)    H/O Whipple procedure    History of cholangiocarcinoma  Resolved Problems:    * No resolved hospital problems. 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

## 2024-12-31 NOTE — ED PROVIDER NOTES
ED Attending Attestation Note    I personally saw the patient and made/approved the management plan and take responsibility for patient management.     Briefly, 74 y.o. male presents with concern that his biliary tube is not draining since yesterday.     Focused exam:   Gen: 74 y.o. male, NAD  Patient with 2 biliary tubes with dark brown/yellow drainage noted within the bags that is minimal.  He is resting comfortably in bed at this time.    Imaging:   CT ABDOMEN PELVIS W IV CONTRAST Additional Contrast? None   Final Result   1. 2 drainage catheters are identified within the liver. There is mild   intrahepatic ductal dilatation and mild amount of fluid in the agustin hepatis   which may be a slightly dilated common bile duct.  There is focal area of   decreased attenuation in the right hepatic lobe juxtaposed to the agustin   hepatis which may reflect small hematoma, seroma, biloma, or less likely   abscess.  Recommend close clinical and laboratory correlation.  Recommend   short interval follow-up and consider MRI with and without contrast to   further characterize if appropriate   2. Findings consistent with prior Whipple procedure.   3. Mild diffuse distension of the small bowel with diffuse mucosal thickening   throughout. There is stool and fluid throughout the colon with slight diffuse   mucosal thickening. Findings may represent a diffuse enterocolitis.   4. Mild amount of retroperitoneal stranding but no large fluid collection.         IR ABSCESS EVAL THRU EXISTING CATH    (Results Pending)      The Ekg interpreted by me shows  normal sinus rhythm with a rate of 73  Axis is   Normal  QTc is  within an acceptable range  Intervals and Durations are unremarkable.      ST Segments: nonspecific changes  No significant change from prior EKG dated 11/3/24      MDM:   Patient presenting due to concern for biliary drain dysfunction.  No acute distress at this time and no hemodynamic instability.  She has been consulted  and plan is to have further evaluation and IR, with plan to admit to the hospitalist team for overall management coordination of care      For further details of the patient's emergency department visit, please see the advanced practice provider's documentation.    Natasha Boothe MD     This report has been produced using speech recognition software and may contain errors related to that system including errors in grammar, punctuation, and spelling, as well as words and phrases that may be inappropriate. If there are any questions or concerns please feel free to contact the dictating provider for clarification.        Natasha Boothe MD  12/31/24 [id number]

## 2024-12-31 NOTE — PROGRESS NOTES
Received call regarding consult to IR regarding possible biliary blockage. Dr. Cortez made aware.

## 2025-01-01 VITALS
HEART RATE: 73 BPM | WEIGHT: 161.7 LBS | HEIGHT: 67 IN | TEMPERATURE: 98.2 F | SYSTOLIC BLOOD PRESSURE: 126 MMHG | OXYGEN SATURATION: 96 % | DIASTOLIC BLOOD PRESSURE: 75 MMHG | BODY MASS INDEX: 25.38 KG/M2 | RESPIRATION RATE: 18 BRPM

## 2025-01-01 LAB
ANION GAP SERPL CALCULATED.3IONS-SCNC: 10 MMOL/L (ref 3–16)
BASOPHILS # BLD: 0 K/UL (ref 0–0.2)
BASOPHILS NFR BLD: 0.3 %
BUN SERPL-MCNC: 5 MG/DL (ref 7–20)
CALCIUM SERPL-MCNC: 8.1 MG/DL (ref 8.3–10.6)
CHLORIDE SERPL-SCNC: 102 MMOL/L (ref 99–110)
CO2 SERPL-SCNC: 25 MMOL/L (ref 21–32)
CREAT SERPL-MCNC: 0.4 MG/DL (ref 0.8–1.3)
DEPRECATED RDW RBC AUTO: 14.4 % (ref 12.4–15.4)
EOSINOPHIL # BLD: 0.1 K/UL (ref 0–0.6)
EOSINOPHIL NFR BLD: 2.4 %
GFR SERPLBLD CREATININE-BSD FMLA CKD-EPI: >90 ML/MIN/{1.73_M2}
GLUCOSE BLD-MCNC: 109 MG/DL (ref 70–99)
GLUCOSE BLD-MCNC: 216 MG/DL (ref 70–99)
GLUCOSE SERPL-MCNC: 97 MG/DL (ref 70–99)
HCT VFR BLD AUTO: 28.8 % (ref 40.5–52.5)
HGB BLD-MCNC: 9.9 G/DL (ref 13.5–17.5)
LYMPHOCYTES # BLD: 0.4 K/UL (ref 1–5.1)
LYMPHOCYTES NFR BLD: 11.7 %
MAGNESIUM SERPL-MCNC: 1.81 MG/DL (ref 1.8–2.4)
MCH RBC QN AUTO: 33.3 PG (ref 26–34)
MCHC RBC AUTO-ENTMCNC: 34.5 G/DL (ref 31–36)
MCV RBC AUTO: 96.4 FL (ref 80–100)
MONOCYTES # BLD: 0.3 K/UL (ref 0–1.3)
MONOCYTES NFR BLD: 9.3 %
NEUTROPHILS # BLD: 2.6 K/UL (ref 1.7–7.7)
NEUTROPHILS NFR BLD: 76.3 %
PERFORMED ON: ABNORMAL
PERFORMED ON: ABNORMAL
PLATELET # BLD AUTO: 136 K/UL (ref 135–450)
PMV BLD AUTO: 7.6 FL (ref 5–10.5)
POTASSIUM SERPL-SCNC: 3.1 MMOL/L (ref 3.5–5.1)
RBC # BLD AUTO: 2.98 M/UL (ref 4.2–5.9)
SODIUM SERPL-SCNC: 137 MMOL/L (ref 136–145)
WBC # BLD AUTO: 3.5 K/UL (ref 4–11)

## 2025-01-01 PROCEDURE — 83036 HEMOGLOBIN GLYCOSYLATED A1C: CPT

## 2025-01-01 PROCEDURE — 6360000002 HC RX W HCPCS

## 2025-01-01 PROCEDURE — 2500000003 HC RX 250 WO HCPCS

## 2025-01-01 PROCEDURE — 80048 BASIC METABOLIC PNL TOTAL CA: CPT

## 2025-01-01 PROCEDURE — 83735 ASSAY OF MAGNESIUM: CPT

## 2025-01-01 PROCEDURE — 99239 HOSP IP/OBS DSCHRG MGMT >30: CPT | Performed by: INTERNAL MEDICINE

## 2025-01-01 PROCEDURE — 6370000000 HC RX 637 (ALT 250 FOR IP): Performed by: INTERNAL MEDICINE

## 2025-01-01 PROCEDURE — 85025 COMPLETE CBC W/AUTO DIFF WBC: CPT

## 2025-01-01 PROCEDURE — 36415 COLL VENOUS BLD VENIPUNCTURE: CPT

## 2025-01-01 PROCEDURE — 6370000000 HC RX 637 (ALT 250 FOR IP)

## 2025-01-01 RX ORDER — POTASSIUM CHLORIDE 1500 MG/1
20 TABLET, EXTENDED RELEASE ORAL 2 TIMES DAILY
COMMUNITY
Start: 2025-01-01

## 2025-01-01 RX ORDER — MAGNESIUM OXIDE 400 MG/1
400 TABLET ORAL DAILY
COMMUNITY
Start: 2025-01-01

## 2025-01-01 RX ORDER — CIPROFLOXACIN 500 MG/1
500 TABLET, FILM COATED ORAL EVERY 12 HOURS SCHEDULED
Status: DISCONTINUED | OUTPATIENT
Start: 2025-01-01 | End: 2025-01-01 | Stop reason: HOSPADM

## 2025-01-01 RX ORDER — METRONIDAZOLE 500 MG/1
500 TABLET ORAL EVERY 8 HOURS SCHEDULED
Qty: 29 TABLET | Refills: 0 | Status: SHIPPED | OUTPATIENT
Start: 2025-01-01

## 2025-01-01 RX ORDER — METRONIDAZOLE 250 MG/1
500 TABLET ORAL EVERY 8 HOURS SCHEDULED
Status: DISCONTINUED | OUTPATIENT
Start: 2025-01-01 | End: 2025-01-01 | Stop reason: HOSPADM

## 2025-01-01 RX ORDER — METRONIDAZOLE 250 MG/1
500 TABLET ORAL EVERY 8 HOURS SCHEDULED
Status: DISCONTINUED | OUTPATIENT
Start: 2025-01-01 | End: 2025-01-01

## 2025-01-01 RX ORDER — TRIAMTERENE AND HYDROCHLOROTHIAZIDE 37.5; 25 MG/1; MG/1
1 CAPSULE ORAL DAILY
COMMUNITY
Start: 2025-01-02

## 2025-01-01 RX ORDER — CIPROFLOXACIN 500 MG/1
500 TABLET, FILM COATED ORAL EVERY 12 HOURS SCHEDULED
Qty: 19 TABLET | Refills: 0 | Status: SHIPPED | OUTPATIENT
Start: 2025-01-01

## 2025-01-01 RX ORDER — POTASSIUM CHLORIDE 1500 MG/1
40 TABLET, EXTENDED RELEASE ORAL 2 TIMES DAILY
Status: DISCONTINUED | OUTPATIENT
Start: 2025-01-01 | End: 2025-01-01 | Stop reason: HOSPADM

## 2025-01-01 RX ADMIN — INSULIN LISPRO 1 UNITS: 100 INJECTION, SOLUTION INTRAVENOUS; SUBCUTANEOUS at 12:05

## 2025-01-01 RX ADMIN — METRONIDAZOLE 500 MG: 250 TABLET ORAL at 13:35

## 2025-01-01 RX ADMIN — POTASSIUM CHLORIDE 40 MEQ: 1500 TABLET, EXTENDED RELEASE ORAL at 08:21

## 2025-01-01 RX ADMIN — CIPROFOLXACIN 500 MG: 500 TABLET ORAL at 12:05

## 2025-01-01 RX ADMIN — DICYCLOMINE HYDROCHLORIDE 20 MG: 20 TABLET ORAL at 08:22

## 2025-01-01 RX ADMIN — METOCLOPRAMIDE 5 MG: 10 TABLET ORAL at 08:21

## 2025-01-01 RX ADMIN — Medication 1000 MCG: at 08:22

## 2025-01-01 RX ADMIN — ALLOPURINOL 300 MG: 300 TABLET ORAL at 08:21

## 2025-01-01 RX ADMIN — PANCRELIPASE LIPASE, PANCRELIPASE PROTEASE, PANCRELIPASE AMYLASE 60000 UNITS: 20000; 63000; 84000 CAPSULE, DELAYED RELEASE ORAL at 00:13

## 2025-01-01 RX ADMIN — Medication 10 ML: at 08:25

## 2025-01-01 RX ADMIN — PANCRELIPASE LIPASE, PANCRELIPASE PROTEASE, PANCRELIPASE AMYLASE 60000 UNITS: 20000; 63000; 84000 CAPSULE, DELAYED RELEASE ORAL at 08:20

## 2025-01-01 RX ADMIN — FAMOTIDINE 20 MG: 20 TABLET, FILM COATED ORAL at 08:22

## 2025-01-01 RX ADMIN — ENOXAPARIN SODIUM 40 MG: 100 INJECTION SUBCUTANEOUS at 08:21

## 2025-01-01 RX ADMIN — PANTOPRAZOLE SODIUM 40 MG: 40 TABLET, DELAYED RELEASE ORAL at 06:51

## 2025-01-01 NOTE — PROGRESS NOTES
Discussed discharge with patient and wife including medications, follow-up, and drain care at home. IV removed. No new concerns at this time. Family to transport home.

## 2025-01-01 NOTE — PROGRESS NOTES
Pt sitting in his bed this AM during shift assessment. He is alert and oriented and accepting of care. Pt reports \"feeling better\" this morning with no concerns for pain/discomfort. Vitals stable. Biliary drains sites intact with no drainage noted at site. Bags intact with clear brown drainage noted in each. Abdomen distended, which pt reports is his baseline but that he has been having large Bms. GI has cleared patient for DC with oral abx. Possible DC home today. IV intact. Call light within reach. No new concerns at this time.     Bedside Mobility Assessment Tool (BMAT):     Assessment Level 1- Sit and Shake    1. From a semi-reclined position, ask patient to sit up and rotate to a seated position at the side of the bed. Can use the bedrail.    2. Ask patient to reach out and grab your hand and shake making sure patient reaches across his/her midline.   Pass- Patient is able to come to a seated position, maintain core strength. Maintains seated balance while reaching across midline. Move on to Assessment Level 2.     Assessment Level 2- Stretch and Point   1. With patient in seated position at the side of the bed, have patient place both feet on the floor (or stool) with knees no higher than hips.    2. Ask patient to stretch one leg and straighten the knee, then bend the ankle/flex and point the toes. If appropriate, repeat with the other leg.   Pass- Patient is able to demonstrate appropriate quad strength on intended weight bearing limb(s). Move onto Assessment Level 3.     Assessment Level 3- Stand   1. Ask patient to elevate off the bed or chair (seated to standing) using an assistive device (cane, bedrail).    2. Patient should be able to raise buttocks off be and hold for a count of five. May repeat once.   Pass- Patient maintains standing stability for at least 5 seconds, proceed to assessment level 4.    Assessment Level 4- Walk   1. Ask patient to march in place at bedside.    2. Then ask patient to  advance step and return each foot. Some medical conditions may render a patient from stepping backwards, use your best clinical judgement.   Pass- Patient demonstrates balance while shifting weight and ability to step, takes independent steps, does not use assistive device patient is MOBILITY LEVEL 4.      Mobility Level- 4

## 2025-01-01 NOTE — PLAN OF CARE
Problem: Chronic Conditions and Co-morbidities  Goal: Patient's chronic conditions and co-morbidity symptoms are monitored and maintained or improved  1/1/2025 0831 by Pearl Shelton RN  Outcome: Progressing  Flowsheets (Taken 1/1/2025 0831)  Care Plan - Patient's Chronic Conditions and Co-Morbidity Symptoms are Monitored and Maintained or Improved:   Monitor and assess patient's chronic conditions and comorbid symptoms for stability, deterioration, or improvement   Collaborate with multidisciplinary team to address chronic and comorbid conditions and prevent exacerbation or deterioration  Note: Cleared by GI  1/1/2025 0411 by Dora Whitaker, RN  Outcome: Progressing

## 2025-01-01 NOTE — PLAN OF CARE
Problem: Chronic Conditions and Co-morbidities  Goal: Patient's chronic conditions and co-morbidity symptoms are monitored and maintained or improved  1/1/2025 1311 by Pearl Shelton RN  Outcome: Completed  1/1/2025 0831 by Pearl Shelton RN  Outcome: Progressing  Flowsheets (Taken 1/1/2025 0831)  Care Plan - Patient's Chronic Conditions and Co-Morbidity Symptoms are Monitored and Maintained or Improved:   Monitor and assess patient's chronic conditions and comorbid symptoms for stability, deterioration, or improvement   Collaborate with multidisciplinary team to address chronic and comorbid conditions and prevent exacerbation or deterioration  Note: Cleared by GI  1/1/2025 0411 by Dora Whitaker RN  Outcome: Progressing     Problem: Discharge Planning  Goal: Discharge to home or other facility with appropriate resources  1/1/2025 1311 by Pearl Shelton RN  Outcome: Completed  1/1/2025 0411 by Dora Whitaker RN  Outcome: Progressing  Flowsheets (Taken 12/31/2024 1827 by Zunilda Jacobson, RN)  Discharge to home or other facility with appropriate resources: Identify barriers to discharge with patient and caregiver     Problem: Safety - Adult  Goal: Free from fall injury  1/1/2025 1311 by Pearl Shelton RN  Outcome: Completed  1/1/2025 0411 by Dora Whitaker RN  Outcome: Progressing

## 2025-01-01 NOTE — PLAN OF CARE
Problem: Chronic Conditions and Co-morbidities  Goal: Patient's chronic conditions and co-morbidity symptoms are monitored and maintained or improved  Outcome: Progressing     Problem: Discharge Planning  Goal: Discharge to home or other facility with appropriate resources  Outcome: Progressing  Flowsheets (Taken 12/31/2024 5137 by Zunilda Jacobson, RN)  Discharge to home or other facility with appropriate resources: Identify barriers to discharge with patient and caregiver     Problem: Safety - Adult  Goal: Free from fall injury  Outcome: Progressing

## 2025-01-01 NOTE — CONSULTS
Gastroenterology Consult Note    Patient:   Sharath Ceja   :    1950   Facility:     [address] [facility]  [address] [place] [address] [place] [address]   Referring/PCP: Real Whitehead MD  Date:     2025  Consultant:   Sylvia More MD    Subjective:     74 y.o. male with PMHx of cholangiocarcinoma s/p Whipple procedure in 2023, BPH, gout, GERD, DM2, Crohn's disease, hypertension presents with blocked biliary drains on which GI is consulted.    The patient reported progressive slowing and the drainage from his biliary drains until they stopped draining yesterday.  He denies any acute complaints such as abdominal pain or blood in the stool or jaundice.  He also denies any fever at home but he was noted to have a temperature of 100.6 while in the hospital yesterday.  He underwent IR cholangiogram and contrast injection of the drains and he reports that they are now draining well.  Reviewed IR documentation.  He does not have any significant hyperbilirubinemia or abnormal liver enzymes.  CT imaging on presentation demonstrated diffuse mucosal thickening in the small bowel.  He has a known diagnosis of Crohn's disease.  At the time of my assessment the patient is feeling better and he is back to his usual baseline.  He reports he has been having some sore throat recently.    Past Medical History:   Diagnosis Date    Adenocarcinoma (HCC) 2023    8 + LYPMH NODES, WHIPPLE    Asthma     Blood circulation, collateral     Carpal tunnel syndrome     CLL (chronic lymphocytic leukemia) (HCC)     DENIES    Crohn's colitis (HCC)     Dental crowns present     AND FALSE TOOTH    Diabetes mellitus (HCC)     emboli     pulmonary emboli in     GERD (gastroesophageal reflux disease)     Chrons disease    Gout     Hx of blood clots     , AFTER TREE LIMB HIT TESTICLE    Hypertension     Liver abscess     DRAINED    Pneumonia     pneumonia,asthma    prostate      Peritoneum/Retroperitoneum: There is a mild amount of retroperitoneal stranding but no large fluid collection. Bones/Soft Tissues: No obvious lytic or blastic lesion.  Degenerative changes throughout the spine.     1. 2 drainage catheters are identified within the liver. There is mild intrahepatic ductal dilatation and mild amount of fluid in the agustin hepatis which may be a slightly dilated common bile duct.  There is focal area of decreased attenuation in the right hepatic lobe juxtaposed to the agustin hepatis which may reflect small hematoma, seroma, biloma, or less likely abscess.  Recommend close clinical and laboratory correlation.  Recommend short interval follow-up and consider MRI with and without contrast to further characterize if appropriate 2. Findings consistent with prior Whipple procedure. 3. Mild diffuse distension of the small bowel with diffuse mucosal thickening throughout. There is stool and fluid throughout the colon with slight diffuse mucosal thickening. Findings may represent a diffuse enterocolitis. 4. Mild amount of retroperitoneal stranding but no large fluid collection.        Hospital Problems             Last Modified POA    * (Principal) Biliary obstruction 12/31/2024 Yes    Gastroesophageal reflux disease 12/31/2024 Yes    Hyponatremia 12/31/2024 Yes    Hypokalemia 12/31/2024 Yes    Crohn's disease (HCC) (Chronic) 12/31/2024 Yes    Cholangiocarcinoma (HCC) 12/31/2024 Yes    H/O Whipple procedure 12/31/2024 Yes    History of cholangiocarcinoma 12/31/2024 Yes     Assessment:     74 y.o. male with PMHx of cholangiocarcinoma s/p Whipple procedure in June 2023, BPH, gout, GERD, DM2, Crohn's disease, hypertension presents with blocked biliary drains on which GI is consulted.    Plan:   -Patient is status post cholangiogram by IR which unclogged his drains and they appear to be draining adequately now.  -He does not have any significant hyperbilirubinemia.  He did have a single episode of

## 2025-01-01 NOTE — PROGRESS NOTES
/75   Pulse 92   Temp (!) 100.6 °F (38.1 °C) (Oral)   Resp 20   Ht 1.702 m (5' 7\")   Wt 73.3 kg (161 lb 11.2 oz)   SpO2 98%   BMI 25.33 kg/m²     Assessment complete. Pt resting in bed, A/O x4. No s/s of distress. VSS. Pt denies pain.   Meds passed; see MAR.  Turkey sand box provided. Pt pleasant and talkative.   Bed locked and in lowest position. Pt refusing alarm, educated on fall risks. Pt states he will call out before getting up. Bedside table and call light within reach. All needs met, pt denies needs at this time.

## 2025-01-01 NOTE — DISCHARGE INSTRUCTIONS
Your information:  Name: Sharath Ceja  : 1950    Your instructions:    ***    What to do after you leave the hospital:    Recommended diet: {diet:14258}    Recommended activity: {discharge activity:05443}        The following personal items were collected during your admission and were returned to you:    Belongings  Dental Appliances: None  Vision - Corrective Lenses: Eyeglasses  Hearing Aid: None  Clothing: Footwear, Undergarments, Pants, Shirt  Jewelry: None  Electronic Devices: Cell Phone  Weapons (Notify Protective Services/Security): None  Other Valuables: Sent home  Home Medications: Sent home  Valuables Given To: Family (Comment)  Provide Name(s) of Who Valuable(s) Were Given To: wife    Information obtained by:  By signing below, I understand that if any problems occur once I leave the hospital I am to contact ***.  I understand and acknowledge receipt of the instructions indicated above.

## 2025-01-01 NOTE — DISCHARGE INSTR - DIET

## 2025-01-01 NOTE — PROGRESS NOTES
12/31/24  1128 01/01/25  0644   WBC 4.3 3.5*   RBC 3.28* 2.98*   HGB 10.8* 9.9*   HCT 31.8* 28.8*   MCV 96.9 96.4   RDW 14.4 14.4    136     BMP:   Recent Labs     12/31/24  1128 01/01/25  0645    137   K 2.9* 3.1*   CL 98* 102   CO2 26 25   BUN 6* 5*   CREATININE 0.4* 0.4*     BNP: No results for input(s): \"BNP\" in the last 72 hours.  PT/INR:   Recent Labs     12/31/24  1128   PROTIME 15.1*   INR 1.17*     APTT: No results for input(s): \"APTT\" in the last 72 hours.  CARDIAC ENZYMES: No results for input(s): \"CKMB\", \"CKMBINDEX\", \"TROPONINI\" in the last 72 hours.    Invalid input(s): \"CKTOTAL;3\"  FASTING LIPID PANEL:  Lab Results   Component Value Date    CHOL 119 07/09/2024    HDL 36 (L) 07/09/2024    TRIG 91 07/09/2024     LIVER PROFILE:   Recent Labs     12/31/24  1128 12/31/24  1758   AST 23 25   ALT 19 21   BILIDIR  --  0.4*   BILITOT 0.5 0.6   ALKPHOS 208* 225*          Cultures  ***     Results       ** No results found for the last 336 hours. **              Radiology  IR INJ PERC CHOLA EXIST ACCESS W IMAGING   Final Result   Widely patent bilateral intrahepatic biliary catheters, without evidence of   filling defect, stenosis, or biliary obstruction.  The catheters were   copiously flushed with saline and draining adequately postprocedure.         CT ABDOMEN PELVIS W IV CONTRAST Additional Contrast? None   Final Result   1. 2 drainage catheters are identified within the liver. There is mild   intrahepatic ductal dilatation and mild amount of fluid in the agustin hepatis   which may be a slightly dilated common bile duct.  There is focal area of   decreased attenuation in the right hepatic lobe juxtaposed to the agustin   hepatis which may reflect small hematoma, seroma, biloma, or less likely   abscess.  Recommend close clinical and laboratory correlation.  Recommend   short interval follow-up and consider MRI with and without contrast to   further characterize if appropriate   2. Findings

## 2025-01-01 NOTE — PROGRESS NOTES
Hand off report given to PRINCE John.   Patient is stable showing no signs of distress and has no current needs at this time.   Call light is in reach and bed is in lowest position.    Care is transferred at this time.

## 2025-01-01 NOTE — CARE COORDINATION
Patient discharging prior to CM going into room. Spoke with Pearl MORRISON. No needs identified.    IMM on 12/31/24

## 2025-01-02 LAB
EST. AVERAGE GLUCOSE BLD GHB EST-MCNC: 108.3 MG/DL
HBA1C MFR BLD: 5.4 %

## 2025-01-06 NOTE — DISCHARGE SUMMARY
Lab Results   Component Value Date    WBC 3.5 (L) 01/01/2025    HGB 9.9 (L) 01/01/2025    HCT 28.8 (L) 01/01/2025    MCV 96.4 01/01/2025     01/01/2025     Lab Results   Component Value Date     01/01/2025    K 3.1 (L) 01/01/2025     01/01/2025    CO2 25 01/01/2025    BUN 5 (L) 01/01/2025    CREATININE 0.4 (L) 01/01/2025    GLUCOSE 97 01/01/2025    CALCIUM 8.1 (L) 01/01/2025    BILITOT 0.6 12/31/2024    ALKPHOS 225 (H) 12/31/2024    AST 25 12/31/2024    ALT 21 12/31/2024    LABGLOM >90 01/01/2025    GFRAA >60 07/15/2022    AGRATIO 0.9 (L) 12/31/2024         CULTURES  Results       ** No results found for the last 336 hours. **              RADIOLOGY  IR INJ PERC CHOLA EXIST ACCESS W IMAGING   Final Result   Widely patent bilateral intrahepatic biliary catheters, without evidence of   filling defect, stenosis, or biliary obstruction.  The catheters were   copiously flushed with saline and draining adequately postprocedure.         CT ABDOMEN PELVIS W IV CONTRAST Additional Contrast? None   Final Result   1. 2 drainage catheters are identified within the liver. There is mild   intrahepatic ductal dilatation and mild amount of fluid in the agustin hepatis   which may be a slightly dilated common bile duct.  There is focal area of   decreased attenuation in the right hepatic lobe juxtaposed to the agustin   hepatis which may reflect small hematoma, seroma, biloma, or less likely   abscess.  Recommend close clinical and laboratory correlation.  Recommend   short interval follow-up and consider MRI with and without contrast to   further characterize if appropriate   2. Findings consistent with prior Whipple procedure.   3. Mild diffuse distension of the small bowel with diffuse mucosal thickening   throughout. There is stool and fluid throughout the colon with slight diffuse   mucosal thickening. Findings may represent a diffuse enterocolitis.   4. Mild amount of retroperitoneal stranding but no large

## 2025-01-10 PROBLEM — R10.84 GENERALIZED ABDOMINAL PAIN: Status: ACTIVE | Noted: 2025-01-10

## 2025-01-10 NOTE — PROGRESS NOTES
Physician Progress Note      PATIENT:               JO IVERSON  CSN #:                  [id number]  :                       1950  ADMIT DATE:       2024 10:36 AM  DISCH DATE:        2025 2:00 PM  RESPONDING  PROVIDER #:        Maryuri Burnette MD          QUERY TEXT:    Pt admitted with concern for biliary obstruction.  Noted documentation of   blocked biliary drains by GI consultant.  If possible, please document in   progress notes and discharge summary:    The medical record reflects the following:  Risk Factors: biliary drains  Clinical Indicators: \"Has tube into biliary tube and tube into liver- states   tube started clogging yesterday morning. No output since yesterday morning\"   noted in ED, \"blocked biliary drains\" and \"-Patient is status post   cholangiogram by IR which unclogged his drains and they appear to be draining   adequately now.\"  noted by GI, IR notes, \"There was mild resistance of both   catheters upon initial contrast injection which suggests some biliary sludge   which may have been mildly clogging each of the catheters.\"  Treatment: cholangiogram by IR, GI consult, supportive care  Options provided:  -- Obstructed biliary catheters confirmed present on admission  -- Obstructed biliary catheters ruled out  -- Defer to GI consultant documentation regarding obstructed biliary catheters  -- Other - I will add my own diagnosis  -- Disagree - Not applicable / Not valid  -- Disagree - Clinically unable to determine / Unknown  -- Refer to Clinical Documentation Reviewer    PROVIDER RESPONSE TEXT:    The diagnosis of Obstructed biliary catheters was confirmed as present on   admission.    Query created by: Shanna Childress on 1/10/2025 7:49 AM      Electronically signed by:  Maryuri Burnette MD 1/10/2025 8:52 AM

## 2025-01-13 DIAGNOSIS — K21.9 GASTROESOPHAGEAL REFLUX DISEASE, UNSPECIFIED WHETHER ESOPHAGITIS PRESENT: ICD-10-CM

## 2025-01-13 DIAGNOSIS — K50.80 CROHN'S DISEASE OF BOTH SMALL AND LARGE INTESTINE WITHOUT COMPLICATIONS (HCC): ICD-10-CM

## 2025-01-13 DIAGNOSIS — C22.1 CHOLANGIOCARCINOMA (HCC): ICD-10-CM

## 2025-01-13 RX ORDER — METOCLOPRAMIDE 5 MG/1
5 TABLET ORAL 2 TIMES DAILY
Qty: 60 TABLET | Refills: 5 | Status: SHIPPED | OUTPATIENT
Start: 2025-01-13

## 2025-01-14 DIAGNOSIS — Z79.4 TYPE 2 DIABETES MELLITUS WITH HYPEROSMOLARITY WITHOUT COMA, WITH LONG-TERM CURRENT USE OF INSULIN (HCC): ICD-10-CM

## 2025-01-14 DIAGNOSIS — E11.00 TYPE 2 DIABETES MELLITUS WITH HYPEROSMOLARITY WITHOUT COMA, WITH LONG-TERM CURRENT USE OF INSULIN (HCC): ICD-10-CM

## 2025-01-14 RX ORDER — LANCETS 30 GAUGE
EACH MISCELLANEOUS
Qty: 200 EACH | Refills: 3 | Status: SHIPPED | OUTPATIENT
Start: 2025-01-14

## 2025-01-14 RX ORDER — BLOOD SUGAR DIAGNOSTIC
STRIP MISCELLANEOUS
Qty: 200 EACH | Refills: 2 | Status: SHIPPED | OUTPATIENT
Start: 2025-01-14

## 2025-01-16 ENCOUNTER — TELEPHONE (OUTPATIENT)
Dept: SURGERY | Age: 75
End: 2025-01-16

## 2025-01-16 NOTE — TELEPHONE ENCOUNTER
Gali from Dr. Beltran's office called wanting to know if Dr. Mcdonald has been in communication with Dr. Pandey regarding patient's recent hospitalizations, procedure and pathology findings. Patient has been dealing with abdominal pain, weakness, fatigue and Biliary obstruction. Patient has had two biopsies which were negative. 1 was from an insufficient sample. Dr. Beltran consulted IR who felt like the patient needed to follow up with Dr. Mcdonald. Dr. Beltran believes a diagnosis can be obtained through a liver biopsy. Dr. Beltran is concerned with cancer reoccurrence. RN will notift Dr. Mcdonald

## 2025-01-16 NOTE — TELEPHONE ENCOUNTER
RN contacted pt regarding call from Dr. Beltran's office. Patient states he has been feeling like he has no energy over the last month or so. Also endorses gaseous pain that is sometimes relived with passing gas. States occasional nausea, no vomiting. 5# weight loss over the last two weeks. Right shoulder pain that radiates to his right leg. Two drains averaging 500 ml's each of black/monet colored fluid. Patient does not feel an admission is necessary. RN offered office appointment from 2/4/24 to be moved to tomorrow. Patient cannot make it tomorrow. Appointment changed to 1/24/25

## 2025-01-17 ENCOUNTER — HOSPITAL ENCOUNTER (OUTPATIENT)
Dept: CT IMAGING | Age: 75
Discharge: HOME | End: 2025-01-17
Attending: SURGERY
Payer: MEDICARE

## 2025-01-17 ENCOUNTER — OFFICE VISIT (OUTPATIENT)
Dept: SURGERY | Age: 75
End: 2025-01-17
Payer: MEDICARE

## 2025-01-17 VITALS
BODY MASS INDEX: 24.8 KG/M2 | HEIGHT: 67 IN | SYSTOLIC BLOOD PRESSURE: 120 MMHG | TEMPERATURE: 98.3 F | HEART RATE: 89 BPM | WEIGHT: 158 LBS | DIASTOLIC BLOOD PRESSURE: 75 MMHG

## 2025-01-17 DIAGNOSIS — C78.89 METASTATIC CHOLANGIOCARCINOMA TO BILE DUCT (HCC): Primary | ICD-10-CM

## 2025-01-17 DIAGNOSIS — R10.9 RIGHT SIDED ABDOMINAL PAIN: ICD-10-CM

## 2025-01-17 DIAGNOSIS — C22.1 METASTATIC CHOLANGIOCARCINOMA TO BILE DUCT (HCC): ICD-10-CM

## 2025-01-17 DIAGNOSIS — C22.1 METASTATIC CHOLANGIOCARCINOMA TO BILE DUCT (HCC): Primary | ICD-10-CM

## 2025-01-17 DIAGNOSIS — R16.0 LIVER MASS: ICD-10-CM

## 2025-01-17 DIAGNOSIS — C78.89 METASTATIC CHOLANGIOCARCINOMA TO BILE DUCT (HCC): ICD-10-CM

## 2025-01-17 PROBLEM — R97.0 ELEVATED CARCINOEMBRYONIC ANTIGEN: Status: ACTIVE | Noted: 2024-09-18

## 2025-01-17 PROBLEM — Z90.49 ACQUIRED ABSENCE OF OTHER SPECIFIED PARTS OF DIGESTIVE TRACT: Status: ACTIVE | Noted: 2024-09-18

## 2025-01-17 PROBLEM — D51.9 VITAMIN B12 DEFICIENCY ANEMIA: Status: ACTIVE | Noted: 2024-12-30

## 2025-01-17 PROBLEM — E55.9 VITAMIN D DEFICIENCY: Status: ACTIVE | Noted: 2024-09-18

## 2025-01-17 PROBLEM — E11.9 TYPE 2 DIABETES MELLITUS WITHOUT COMPLICATIONS (HCC): Status: ACTIVE | Noted: 2024-09-18

## 2025-01-17 LAB
ALBUMIN SERPL-MCNC: 3.5 G/DL (ref 3.4–5)
ALP SERPL-CCNC: 164 U/L (ref 40–129)
ALT SERPL-CCNC: 16 U/L (ref 10–40)
ALT SERPL-CCNC: ABNORMAL U/L (ref 10–40)
ANION GAP SERPL CALCULATED.3IONS-SCNC: 12 MMOL/L (ref 3–16)
AST SERPL-CCNC: 39 U/L (ref 15–37)
BASOPHILS # BLD: 0 K/UL (ref 0–0.2)
BASOPHILS NFR BLD: 0.4 %
BILIRUB DIRECT SERPL-MCNC: <0.1 MG/DL (ref 0–0.3)
BILIRUB INDIRECT SERPL-MCNC: 0.5 MG/DL (ref 0–1)
BILIRUB SERPL-MCNC: 0.6 MG/DL (ref 0–1)
BUN SERPL-MCNC: 5 MG/DL (ref 7–20)
CALCIUM SERPL-MCNC: 9.3 MG/DL (ref 8.3–10.6)
CHLORIDE SERPL-SCNC: 97 MMOL/L (ref 99–110)
CO2 SERPL-SCNC: 24 MMOL/L (ref 21–32)
CREAT SERPL-MCNC: 0.5 MG/DL (ref 0.8–1.3)
DEPRECATED RDW RBC AUTO: 15.3 % (ref 12.4–15.4)
EOSINOPHIL # BLD: 0.1 K/UL (ref 0–0.6)
EOSINOPHIL NFR BLD: 1.6 %
GFR SERPLBLD CREATININE-BSD FMLA CKD-EPI: >90 ML/MIN/{1.73_M2}
GLUCOSE SERPL-MCNC: 156 MG/DL (ref 70–99)
HCT VFR BLD AUTO: 30 % (ref 40.5–52.5)
HGB BLD-MCNC: 9.8 G/DL (ref 13.5–17.5)
LYMPHOCYTES # BLD: 0.5 K/UL (ref 1–5.1)
LYMPHOCYTES NFR BLD: 9.7 %
MCH RBC QN AUTO: 31.6 PG (ref 26–34)
MCHC RBC AUTO-ENTMCNC: 32.6 G/DL (ref 31–36)
MCV RBC AUTO: 96.7 FL (ref 80–100)
MONOCYTES # BLD: 0.4 K/UL (ref 0–1.3)
MONOCYTES NFR BLD: 8.5 %
NEUTROPHILS # BLD: 4.1 K/UL (ref 1.7–7.7)
NEUTROPHILS NFR BLD: 79.8 %
PLATELET # BLD AUTO: 179 K/UL (ref 135–450)
PMV BLD AUTO: 7.5 FL (ref 5–10.5)
POTASSIUM SERPL-SCNC: 3.1 MMOL/L (ref 3.5–5.1)
POTASSIUM SERPL-SCNC: ABNORMAL MMOL/L (ref 3.5–5.1)
PROT SERPL-MCNC: 7.4 G/DL (ref 6.4–8.2)
RBC # BLD AUTO: 3.1 M/UL (ref 4.2–5.9)
REASON FOR REJECTION: NORMAL
REJECTED TEST: NORMAL
SODIUM SERPL-SCNC: 133 MMOL/L (ref 136–145)
WBC # BLD AUTO: 5.1 K/UL (ref 4–11)

## 2025-01-17 PROCEDURE — G8427 DOCREV CUR MEDS BY ELIG CLIN: HCPCS | Performed by: SURGERY

## 2025-01-17 PROCEDURE — 1111F DSCHRG MED/CURRENT MED MERGE: CPT | Performed by: SURGERY

## 2025-01-17 PROCEDURE — 1123F ACP DISCUSS/DSCN MKR DOCD: CPT | Performed by: SURGERY

## 2025-01-17 PROCEDURE — 1036F TOBACCO NON-USER: CPT | Performed by: SURGERY

## 2025-01-17 PROCEDURE — 6360000004 HC RX CONTRAST MEDICATION: Performed by: SURGERY

## 2025-01-17 PROCEDURE — 80048 BASIC METABOLIC PNL TOTAL CA: CPT

## 2025-01-17 PROCEDURE — 3074F SYST BP LT 130 MM HG: CPT | Performed by: SURGERY

## 2025-01-17 PROCEDURE — 85025 COMPLETE CBC W/AUTO DIFF WBC: CPT

## 2025-01-17 PROCEDURE — 84132 ASSAY OF SERUM POTASSIUM: CPT

## 2025-01-17 PROCEDURE — 71260 CT THORAX DX C+: CPT

## 2025-01-17 PROCEDURE — G8420 CALC BMI NORM PARAMETERS: HCPCS | Performed by: SURGERY

## 2025-01-17 PROCEDURE — 80076 HEPATIC FUNCTION PANEL: CPT

## 2025-01-17 PROCEDURE — 3017F COLORECTAL CA SCREEN DOC REV: CPT | Performed by: SURGERY

## 2025-01-17 PROCEDURE — 36415 COLL VENOUS BLD VENIPUNCTURE: CPT

## 2025-01-17 PROCEDURE — 99215 OFFICE O/P EST HI 40 MIN: CPT | Performed by: SURGERY

## 2025-01-17 PROCEDURE — 84460 ALANINE AMINO (ALT) (SGPT): CPT

## 2025-01-17 PROCEDURE — 3078F DIAST BP <80 MM HG: CPT | Performed by: SURGERY

## 2025-01-17 RX ORDER — IOPAMIDOL 755 MG/ML
75 INJECTION, SOLUTION INTRAVASCULAR
Status: COMPLETED | OUTPATIENT
Start: 2025-01-17 | End: 2025-01-17

## 2025-01-17 RX ORDER — ONDANSETRON 8 MG/1
8 TABLET, ORALLY DISINTEGRATING ORAL EVERY 8 HOURS PRN
Qty: 30 TABLET | Refills: 0 | Status: SHIPPED | OUTPATIENT
Start: 2025-01-17 | End: 2025-01-27

## 2025-01-17 RX ORDER — OXYCODONE HYDROCHLORIDE 5 MG/1
5 TABLET ORAL EVERY 4 HOURS PRN
Qty: 30 TABLET | Refills: 0 | Status: SHIPPED | OUTPATIENT
Start: 2025-01-17 | End: 2025-01-22

## 2025-01-17 RX ADMIN — IOPAMIDOL 75 ML: 755 INJECTION, SOLUTION INTRAVENOUS at 13:33

## 2025-01-17 NOTE — PROGRESS NOTES
[facility] SURGICAL ONCOLOGY  [address] [place] [address]  [address]  [facility] [address]  Dept: [phone]  Dept Fax: [phone]    Visit Date: 1/17/25    HPI:   Sharath Ceja is a 74 y.o. male who is here today with complaints of a lot of pain to his right neck, shoulder, arm, side, back and leg. All right sided. Also endorses muscle spasms to right back and side. Patient contacted our office this morning requested pain medicine. Patient was advised to come to office today or go to the ER for evaluation. Patient refused ER visit.     Patient contact our office with similar complaint's of feeling like he has no energy over the last month or so. Also endorses gaseous pain that is sometimes relived with passing gas. States occasional nausea, no vomiting. 5# weight loss over the last two weeks. Right shoulder pain that radiates to his right leg. Two drains averaging 500 ml's each of black/monet colored fluid. Patient does not feel an admission is necessary.     Patient is followed by Dr. Beltran and completed 12 cycles of FOLFIRINOX on 3/20/24.     Since last seen patient has underwent two ERCPs with Dr. Pandey with biopsies showing atypical cells, negative for malignancy.     He is complaining today of significant right sided abdominal pain that radiates to his back and neck. The pain started about 1 week ago but has significantly worsened on a daily basis over the last week.     Eating and drinking. Reports he has to eat every 3 hours or he gets extremely weak.     Past Medical History:   Diagnosis Date    Adenocarcinoma (HCC) 06/06/2023    8 + LYPMH NODES, WHIPPLE    Asthma     Blood circulation, collateral     Carpal tunnel syndrome     CLL (chronic lymphocytic leukemia) (HCC)     DENIES    Crohn's colitis (HCC)     Dental crowns present     AND FALSE TOOTH    Diabetes mellitus (HCC)     emboli     pulmonary emboli in 1980    GERD (gastroesophageal reflux disease)     Chrons disease    Gout     Hx of blood

## 2025-01-18 DIAGNOSIS — K21.9 GASTROESOPHAGEAL REFLUX DISEASE WITHOUT ESOPHAGITIS: ICD-10-CM

## 2025-01-20 ENCOUNTER — TELEPHONE (OUTPATIENT)
Dept: INTERVENTIONAL RADIOLOGY/VASCULAR | Age: 75
End: 2025-01-20

## 2025-01-20 ENCOUNTER — TELEPHONE (OUTPATIENT)
Dept: SURGERY | Age: 75
End: 2025-01-20

## 2025-01-20 ENCOUNTER — HOSPITAL ENCOUNTER (OUTPATIENT)
Age: 75
Discharge: HOME | End: 2025-01-20
Payer: MEDICARE

## 2025-01-20 DIAGNOSIS — C22.1 INTRAHEPATIC BILE DUCT CARCINOMA (HCC): Primary | ICD-10-CM

## 2025-01-20 DIAGNOSIS — C22.1 INTRAHEPATIC BILE DUCT CARCINOMA (HCC): ICD-10-CM

## 2025-01-20 DIAGNOSIS — C22.1 CHOLANGIOCARCINOMA (HCC): Primary | ICD-10-CM

## 2025-01-20 DIAGNOSIS — K83.1 BILIARY STRICTURE: ICD-10-CM

## 2025-01-20 LAB
ALBUMIN SERPL-MCNC: 3.6 G/DL (ref 3.4–5)
ALP SERPL-CCNC: 221 U/L (ref 40–129)
ALT SERPL-CCNC: 16 U/L (ref 10–40)
ANION GAP SERPL CALCULATED.3IONS-SCNC: 10 MMOL/L (ref 3–16)
AST SERPL-CCNC: 19 U/L (ref 15–37)
BASOPHILS # BLD: 0 K/UL (ref 0–0.2)
BASOPHILS NFR BLD: 0.3 %
BILIRUB DIRECT SERPL-MCNC: 0.3 MG/DL (ref 0–0.3)
BILIRUB INDIRECT SERPL-MCNC: 0.2 MG/DL (ref 0–1)
BILIRUB SERPL-MCNC: 0.5 MG/DL (ref 0–1)
BUN SERPL-MCNC: 6 MG/DL (ref 7–20)
CALCIUM SERPL-MCNC: 8.9 MG/DL (ref 8.3–10.6)
CHLORIDE SERPL-SCNC: 99 MMOL/L (ref 99–110)
CO2 SERPL-SCNC: 28 MMOL/L (ref 21–32)
CREAT SERPL-MCNC: <0.5 MG/DL (ref 0.8–1.3)
DEPRECATED RDW RBC AUTO: 15 % (ref 12.4–15.4)
EOSINOPHIL # BLD: 0.1 K/UL (ref 0–0.6)
EOSINOPHIL NFR BLD: 2.3 %
GFR SERPLBLD CREATININE-BSD FMLA CKD-EPI: >90 ML/MIN/{1.73_M2}
GLUCOSE SERPL-MCNC: 236 MG/DL (ref 70–99)
HCT VFR BLD AUTO: 31.4 % (ref 40.5–52.5)
HGB BLD-MCNC: 10.4 G/DL (ref 13.5–17.5)
LYMPHOCYTES # BLD: 0.5 K/UL (ref 1–5.1)
LYMPHOCYTES NFR BLD: 9.9 %
MCH RBC QN AUTO: 31.8 PG (ref 26–34)
MCHC RBC AUTO-ENTMCNC: 33.1 G/DL (ref 31–36)
MCV RBC AUTO: 96.1 FL (ref 80–100)
MONOCYTES # BLD: 0.5 K/UL (ref 0–1.3)
MONOCYTES NFR BLD: 8.5 %
NEUTROPHILS # BLD: 4.2 K/UL (ref 1.7–7.7)
NEUTROPHILS NFR BLD: 79 %
PLATELET # BLD AUTO: 179 K/UL (ref 135–450)
PMV BLD AUTO: 6.9 FL (ref 5–10.5)
POTASSIUM SERPL-SCNC: 3.2 MMOL/L (ref 3.5–5.1)
PROT SERPL-MCNC: 7.3 G/DL (ref 6.4–8.2)
RBC # BLD AUTO: 3.27 M/UL (ref 4.2–5.9)
SODIUM SERPL-SCNC: 137 MMOL/L (ref 136–145)
WBC # BLD AUTO: 5.4 K/UL (ref 4–11)

## 2025-01-20 PROCEDURE — 80048 BASIC METABOLIC PNL TOTAL CA: CPT

## 2025-01-20 PROCEDURE — 80076 HEPATIC FUNCTION PANEL: CPT

## 2025-01-20 PROCEDURE — 85025 COMPLETE CBC W/AUTO DIFF WBC: CPT

## 2025-01-20 PROCEDURE — 36415 COLL VENOUS BLD VENIPUNCTURE: CPT

## 2025-01-20 RX ORDER — PANTOPRAZOLE SODIUM 40 MG/1
TABLET, DELAYED RELEASE ORAL
Qty: 90 TABLET | Refills: 3 | Status: SHIPPED | OUTPATIENT
Start: 2025-01-20

## 2025-01-20 NOTE — TELEPHONE ENCOUNTER
MA called Hortencia Gonzalez to get patient in for a drain study and they are going to call MA right back after she talks to the doctor to see if he can do it.

## 2025-01-20 NOTE — TELEPHONE ENCOUNTER
Mao called the office to follow up on clogged drains.   Denies nausea or vomiting. No increase in pain. Middle abdominal drain stopped draining yesterday around 1200 noon, right-sided drain became clogged around 9am today.     He continues to tolerate liquids and food. Last dose of oxycodone was yesterday evening. Pain is well controlled - has taken \"3 or 4\" oxycodone since Rx sent on Friday afternoon.     No fevers or chills.     Discussed obtaining drain study today at [facility]. He does have liver biopsy scheduled for Thursday at [facility]. He is willing to go to [place] for drain study today if appt available. Time he last had something to eat or drink was 11:30am.

## 2025-01-20 NOTE — TELEPHONE ENCOUNTER
Call from Dr Spaulding's office requesting to have perc/esther drain flushed. Returned call to Rajani Nassar CNP. Per Dr Cortez, \"after reviewing pt's most recent CT scan, drains are in the appropriate position. Non-draining isn't necessarily a bad thing. The body is meant to drain forward, so not draing could potentially be a good thing. Dr Cortez's recommendation is that a new bilirubin be drawn and examination to determine if the pt is symptomatic or any increased jaundice. \"  Number to call if anything additional is needed [phone].

## 2025-01-20 NOTE — TELEPHONE ENCOUNTER
Patient verbalized an understanding to be at [facility] at 10:30 Thursday morning. NPO after midnight and will need a  home. Patient states that the drains to his chest and right side are now \"plugged\" up. No port to flush. RN will discuss with Dr. Mcdonald and Rajani RODRIGUEZ

## 2025-01-22 ENCOUNTER — TELEPHONE (OUTPATIENT)
Dept: SURGERY | Age: 75
End: 2025-01-22

## 2025-01-22 NOTE — TELEPHONE ENCOUNTER
Patient called wanting clarification on the testing that was ordered for him.    Please call patient: [phone].

## 2025-01-22 NOTE — TELEPHONE ENCOUNTER
Patient states he is holding his own. Continues with weakness. States he did have a sweating episode last night which has resolved. States no drainage from drains since Sunday and Monday. RN offered to admit the patient for further testing. Patient refused. Biopsy of liver scheduled for tomorrow

## 2025-01-23 ENCOUNTER — HOSPITAL ENCOUNTER (OUTPATIENT)
Dept: CT IMAGING | Age: 75
Discharge: HOME | End: 2025-01-23
Attending: SURGERY
Payer: MEDICARE

## 2025-01-23 ENCOUNTER — HOSPITAL ENCOUNTER (OUTPATIENT)
Dept: INTERVENTIONAL RADIOLOGY/VASCULAR | Age: 75
Discharge: HOME | End: 2025-01-25
Attending: SURGERY
Payer: MEDICARE

## 2025-01-23 VITALS
RESPIRATION RATE: 22 BRPM | DIASTOLIC BLOOD PRESSURE: 79 MMHG | OXYGEN SATURATION: 93 % | TEMPERATURE: 98.7 F | HEART RATE: 97 BPM | SYSTOLIC BLOOD PRESSURE: 139 MMHG

## 2025-01-23 VITALS
HEART RATE: 73 BPM | RESPIRATION RATE: 18 BRPM | HEIGHT: 67 IN | OXYGEN SATURATION: 99 % | TEMPERATURE: 98.5 F | DIASTOLIC BLOOD PRESSURE: 60 MMHG | WEIGHT: 158 LBS | BODY MASS INDEX: 24.8 KG/M2 | SYSTOLIC BLOOD PRESSURE: 103 MMHG

## 2025-01-23 DIAGNOSIS — R16.0 LIVER MASS: ICD-10-CM

## 2025-01-23 DIAGNOSIS — C78.89 METASTATIC CHOLANGIOCARCINOMA TO BILE DUCT (HCC): ICD-10-CM

## 2025-01-23 DIAGNOSIS — C22.1 METASTATIC CHOLANGIOCARCINOMA TO BILE DUCT (HCC): ICD-10-CM

## 2025-01-23 DIAGNOSIS — C22.1 CHOLANGIOCARCINOMA (HCC): ICD-10-CM

## 2025-01-23 DIAGNOSIS — K83.1 BILIARY STRICTURE: ICD-10-CM

## 2025-01-23 LAB
APTT BLD: 34.5 SEC (ref 22.1–36.4)
ECHO BSA: 1.84 M2
GLUCOSE BLD-MCNC: 100 MG/DL (ref 70–99)
GLUCOSE BLD-MCNC: 111 MG/DL (ref 70–99)
INR PPP: 1.16 (ref 0.85–1.15)
PERFORMED ON: ABNORMAL
PERFORMED ON: ABNORMAL
PLATELET # BLD AUTO: 177 K/UL (ref 135–450)
PROTHROMBIN TIME: 15 SEC (ref 11.9–14.9)

## 2025-01-23 PROCEDURE — C1729 CATH, DRAINAGE: HCPCS

## 2025-01-23 PROCEDURE — 85730 THROMBOPLASTIN TIME PARTIAL: CPT

## 2025-01-23 PROCEDURE — 99152 MOD SED SAME PHYS/QHP 5/>YRS: CPT

## 2025-01-23 PROCEDURE — 6370000000 HC RX 637 (ALT 250 FOR IP): Performed by: RADIOLOGY

## 2025-01-23 PROCEDURE — 7100000010 HC PHASE II RECOVERY - FIRST 15 MIN

## 2025-01-23 PROCEDURE — 47536 EXCHANGE BILIARY DRG CATH: CPT

## 2025-01-23 PROCEDURE — 77012 CT SCAN FOR NEEDLE BIOPSY: CPT

## 2025-01-23 PROCEDURE — 6360000002 HC RX W HCPCS: Performed by: RADIOLOGY

## 2025-01-23 PROCEDURE — 36415 COLL VENOUS BLD VENIPUNCTURE: CPT

## 2025-01-23 PROCEDURE — 7100000011 HC PHASE II RECOVERY - ADDTL 15 MIN

## 2025-01-23 PROCEDURE — 85049 AUTOMATED PLATELET COUNT: CPT

## 2025-01-23 PROCEDURE — 85610 PROTHROMBIN TIME: CPT

## 2025-01-23 RX ORDER — OXYCODONE HYDROCHLORIDE 5 MG/1
5 TABLET ORAL ONCE
Status: COMPLETED | OUTPATIENT
Start: 2025-01-23 | End: 2025-01-23

## 2025-01-23 RX ORDER — FENTANYL CITRATE 50 UG/ML
INJECTION, SOLUTION INTRAMUSCULAR; INTRAVENOUS PRN
Status: DISCONTINUED | OUTPATIENT
Start: 2025-01-23 | End: 2025-01-24 | Stop reason: HOSPADM

## 2025-01-23 RX ORDER — LIDOCAINE HYDROCHLORIDE 10 MG/ML
INJECTION, SOLUTION EPIDURAL; INFILTRATION; INTRACAUDAL; PERINEURAL PRN
Status: DISCONTINUED | OUTPATIENT
Start: 2025-01-23 | End: 2025-01-24 | Stop reason: HOSPADM

## 2025-01-23 RX ORDER — MIDAZOLAM HYDROCHLORIDE 1 MG/ML
INJECTION, SOLUTION INTRAMUSCULAR; INTRAVENOUS PRN
Status: COMPLETED | OUTPATIENT
Start: 2025-01-23 | End: 2025-01-23

## 2025-01-23 RX ORDER — FENTANYL CITRATE 50 UG/ML
INJECTION, SOLUTION INTRAMUSCULAR; INTRAVENOUS PRN
Status: COMPLETED | OUTPATIENT
Start: 2025-01-23 | End: 2025-01-23

## 2025-01-23 RX ADMIN — OXYCODONE 5 MG: 5 TABLET ORAL at 14:57

## 2025-01-23 RX ADMIN — LIDOCAINE HYDROCHLORIDE 15 ML: 10 INJECTION, SOLUTION EPIDURAL; INFILTRATION; INTRACAUDAL; PERINEURAL at 11:53

## 2025-01-23 RX ADMIN — MIDAZOLAM HYDROCHLORIDE 0.5 MG: 1 INJECTION, SOLUTION INTRAMUSCULAR; INTRAVENOUS at 12:28

## 2025-01-23 RX ADMIN — FENTANYL CITRATE 25 MCG: 50 INJECTION, SOLUTION INTRAMUSCULAR; INTRAVENOUS at 11:52

## 2025-01-23 RX ADMIN — FENTANYL CITRATE 25 MCG: 50 INJECTION, SOLUTION INTRAMUSCULAR; INTRAVENOUS at 12:27

## 2025-01-23 ASSESSMENT — PAIN SCALES - GENERAL: PAINLEVEL_OUTOF10: 5

## 2025-01-23 ASSESSMENT — PAIN DESCRIPTION - DESCRIPTORS
DESCRIPTORS: SORE
DESCRIPTORS: SORE
DESCRIPTORS: SHARP

## 2025-01-23 ASSESSMENT — PAIN DESCRIPTION - PAIN TYPE: TYPE: SURGICAL PAIN

## 2025-01-23 ASSESSMENT — PAIN - FUNCTIONAL ASSESSMENT: PAIN_FUNCTIONAL_ASSESSMENT: 0-10

## 2025-01-23 ASSESSMENT — PAIN DESCRIPTION - ORIENTATION: ORIENTATION: RIGHT

## 2025-01-23 ASSESSMENT — PAIN DESCRIPTION - LOCATION: LOCATION: FLANK

## 2025-01-23 ASSESSMENT — PAIN DESCRIPTION - ONSET: ONSET: OTHER (COMMENT)

## 2025-01-23 NOTE — PROCEDURES
PROCEDURE NOTE  Date: 1/23/2025   Name: Sharath Ceja  YOB: 1950    Procedures        IR Brief Postoperative Note    Sharath Ceja  YOB: 1950  [id number]    Pre-operative Diagnosis: biliary obstruction    Post-operative Diagnosis: Same    Procedure: right and left biliary drain exchange, 12 Fr    Anesthesia: mod    Surgeons/Assistants: gage    Estimated Blood Loss: Minimal    Complications: none    Specimens: were not obtained    See full procedure dictation to follow      Nik Galeano MD MD  1/23/2025

## 2025-01-23 NOTE — PROGRESS NOTES
Biopsy site clean, dry and intact. No signs of bleeding/hematoma. C/o soreness at site, pain medication administered per MD order. BP stable, 93% on RA. Bilateral drain dressings clean, dry and intact. Patient/family given discharge instructions. Patient/family verbalize understanding of discharge instructions, all questions addressed, copy given to patient/family. Pt transferred to vehicle via wheelchair to be discharged home with family.

## 2025-01-23 NOTE — DISCHARGE INSTRUCTIONS
DISCHARGE INSTRUCTIONS AFTER LIVER BIOPSY    Your recovery process will vary depending on the type of procedure performed.  You may be taken to the recovery room for observation if your procedure was done in a procedure room or in the radiology department.  Once your blood pressure, pulse, and breathing are stable and you are alert, you may be taken to a hospital room or discharged home.   You will need to lie on the procedural side for 2-3 hours and be monitored for any bleeding or change in vital signs.      AFTER YOU LEAVE THE HOSPITAL:    You will need to have someone to drive you home.  Rest at home today.  The biopsy site may be tender or sore for several days after the biopsy. You may be prescribed medication for pain.  Please follow the instruction and do not drive while taking prescription pain medications.  Or you may use Tylenol or acetaminophen as needed for pain per instructions.  Avoid taking aspirin or anti-inflammatory medication (such as Ibuprofen, Advil, Naprosyn or Motrin) for 1 week after the procedure.  These medications can cause bleeding.  You may remove the bandage from the puncture site after 24 hours.  You may then shower and gently wash the site with soap and water.  Pat it dry.  Do Not do any heavy lifting or strenuous activity for 3-4 days after the procedure. No driving for 48 hrs.  For 24 hours, do not drink alcohol, operate machinery, sign important papers, or make important decision if sedation medications were received.    CALL YOUR DOCTOR IMMEDIATELY IF:    DRAINAGE OR INCREASED BLEEDING FROM THE BIOPSY SITE.  DIZZINESS OR LIGHTHEADEDNESS  SUDDEN OR INCREASED SHORTNESS OF BREATH  SHAKING, CHILLS OR FEVER GREATER THAN 100.4.  INCREASED REDNESS, TENDERNESS, OR SWELLING AT THE BIOPSY SITE  INCREASING PAIN  RECTAL BLEEDING OR BLOODY STOOLS.  INABILITY TO URINATE    IF YOU HAVE ANY QUESTIONS OR CONCERNS PLEASE NOTIFY your doctor.    [facility]  Cardiovascular Special

## 2025-01-23 NOTE — H&P
Patient:  Sharath Ceja   :   1950      Relevant clinical history, particularly as it involves the pending procedure, was reviewed and discussed.    The procedure including risks and benefits was discussed at length with the patient (or designated family member) and all questions were answered.  Informed consent to proceed with the procedure was given.    Vital signs were monitored and documented by the Radiology nurse.    Targeted physical examination  Heart : regular rate and rhythm  Lungs : clear, breathing easily  Condition : stable    [facility] nurses notes reviewed and agreed.    Past Medical History:        Diagnosis Date    Adenocarcinoma (HCC) 2023    8 + LYPMH NODES, WHIPPLE    Asthma     Blood circulation, collateral     Carpal tunnel syndrome     CLL (chronic lymphocytic leukemia) (HCC)     DENIES    Crohn's colitis (HCC)     Dental crowns present     AND FALSE TOOTH    Diabetes mellitus (HCC)     emboli     pulmonary emboli in     GERD (gastroesophageal reflux disease)     Chrons disease    Gout     Hx of blood clots     , AFTER TREE LIMB HIT TESTICLE    Hypertension     Liver abscess     DRAINED    Pneumonia     pneumonia,asthma    prostate     infection    Seasonal allergies     Wears glasses        Past Surgical History:           Procedure Laterality Date    CARDIAC CATHETERIZATION      , DR ROWELL, NO ISSUES SINCE, HAS [address]    CHOLECYSTECTOMY      CT LIVER ABSCESS ASPIRATION      PT STATES DRAINED LIVER ABSCESS    CYST REMOVAL      KNEE & CLOSE TO RECTUM    ERCP N/A 2022    ERCP BIOPSY performed by Tab Pandey MD at [facility] ENDOSCOPY    ERCP  2022    ERCP SPHINCTER/PAPILLOTOMY performed by Tab Pandey MD at [facility] ENDOSCOPY    ERCP  2022    ERCP STENT INSERTION performed by Tab Pandey MD at [facility] ENDOSCOPY    ERCP N/A 2022    ERCP STENT REMOVAL performed by Tab Pandey MD

## 2025-01-23 NOTE — PROCEDURES
PROCEDURE NOTE  Date: 1/23/2025   Name: Sharath Ceja  YOB: 1950    Procedures        IR Brief Postoperative Note    Sharath Ceja  YOB: 1950  [id number]    Pre-operative Diagnosis: liver lesion    Post-operative Diagnosis: Same    Procedure: CT liver bx    Anesthesia: mod    Surgeons/Assistants: gage    Estimated Blood Loss: Minimal    Complications: none    Specimens: were obtained    See full procedure dictation to follow      Nik Galeano MD MD  1/23/2025

## 2025-01-23 NOTE — PROGRESS NOTES
IMAGING SERVICES NURSING PROGRESS NOTE    Procedure:  Liver Biopsy  January 23, 2025  Sharath Ceja      Allergies:    Allergies   Allergen Reactions    Codeine      Pt states he took medication without food and had a reaction, states this gave him chest pain    Amoxicillin-Pot Clavulanate Other (See Comments)     Patient did not have a rxn to Augmentin, MD called and told him to stop taking it.  Has tolerated Zosyn.    DOES FINE WITH KEFLEX       Vitals:    01/23/25 1012   BP: 122/71   Pulse: 76   Resp: 20   Temp: 98.5 °F (36.9 °C)   SpO2: 94%       Recent lab work reviewed with MD: yes    Procedure explained to patient by MD: yes   Informed consent obtained:yes  Family with patient:yes    Mental Status:  Normal  Readiness to learn:  Yes  Barriers to learning: No    Pain Assessment Pre-Procedure:  Pain Present:  yes: sharp intermittent pain to right side  Pain Score:  8  Pain Quality/Description:  Sharp    Time out Procedure Verification with:  [x] RN  [x] Physician  [x] Patient  [x] Other: CT Technologist  Procedure site marked, if applicable:  Yes    Note: Patient arrived A & O x 4, breathing easily on room air, Spoke to Dr. Galeano prior to procedure.  Procedural sedation:  Fentanyl:  25mcg  Versed:    0.5mg  Post Procedureal Note:  Patient tolerated procedure well.  Report given to cath lab PRINCE Fair.  Patient transported in stable conditon to cath lab.    Pain Assessment Post-Procedure:  Pain Present:  yes: right side  Pain Score:  8  Pain Quality/Description:  Sharp    Plan of Care Goals:  Safety measures met:  Yes  Patient understands explanation of procedure:  Yes    Time in:  1135  Time out:  1210  Antonia Palacios RN.  R.N. 1/23/2025

## 2025-01-24 ENCOUNTER — TELEPHONE (OUTPATIENT)
Dept: SURGERY | Age: 75
End: 2025-01-24

## 2025-01-24 NOTE — TELEPHONE ENCOUNTER
Call placed to Mao after liver biopsy and drain exchange yesterday at [facility].   He reports he feels \"stronger\" and \"brighter\" today than he has in a while.   Tolerating diet. Has not had a bowel movement today but did take MiraLAX yesterday and feels he will have bowel movement today. Has some pain at RUQ with deep inspiration. Does not feel that this is worsening.     Drain output is comparable in color and quantity of output to what it has been in the recent days before they stopped draining.     Instructed to notify office with changes, go to ER if he develops any acute changes or severe pain at biopsy site. Verbalized understanding, expressed appreciation for follow-up.

## 2025-02-05 ENCOUNTER — TELEPHONE (OUTPATIENT)
Dept: SURGERY | Age: 75
End: 2025-02-05

## 2025-02-05 ENCOUNTER — HOSPITAL ENCOUNTER (INPATIENT)
Age: 75
LOS: 2 days | Discharge: HOME | End: 2025-02-07
Attending: EMERGENCY MEDICINE | Admitting: INTERNAL MEDICINE
Payer: MEDICARE

## 2025-02-05 ENCOUNTER — APPOINTMENT (OUTPATIENT)
Dept: GENERAL RADIOLOGY | Age: 75
End: 2025-02-05
Payer: MEDICARE

## 2025-02-05 ENCOUNTER — APPOINTMENT (OUTPATIENT)
Dept: CT IMAGING | Age: 75
End: 2025-02-05
Payer: MEDICARE

## 2025-02-05 DIAGNOSIS — D64.9 ANEMIA, UNSPECIFIED TYPE: ICD-10-CM

## 2025-02-05 DIAGNOSIS — E87.6 HYPOKALEMIA: ICD-10-CM

## 2025-02-05 DIAGNOSIS — E83.42 HYPOMAGNESEMIA: ICD-10-CM

## 2025-02-05 DIAGNOSIS — Z85.09 HISTORY OF CHOLANGIOCARCINOMA: ICD-10-CM

## 2025-02-05 DIAGNOSIS — J18.9 PNEUMONIA DUE TO INFECTIOUS ORGANISM, UNSPECIFIED LATERALITY, UNSPECIFIED PART OF LUNG: Primary | ICD-10-CM

## 2025-02-05 DIAGNOSIS — R91.1 LUNG NODULE SEEN ON IMAGING STUDY: ICD-10-CM

## 2025-02-05 LAB
ALBUMIN SERPL-MCNC: 2.8 G/DL (ref 3.4–5)
ALBUMIN/GLOB SERPL: 0.8 {RATIO} (ref 1.1–2.2)
ALP SERPL-CCNC: 275 U/L (ref 40–129)
ALT SERPL-CCNC: 13 U/L (ref 10–40)
ANION GAP SERPL CALCULATED.3IONS-SCNC: 11 MMOL/L (ref 3–16)
AST SERPL-CCNC: 18 U/L (ref 15–37)
BASE EXCESS BLDV CALC-SCNC: 0.8 MMOL/L (ref -3–3)
BASOPHILS # BLD: 0 K/UL (ref 0–0.2)
BASOPHILS NFR BLD: 0.3 %
BILIRUB SERPL-MCNC: 0.4 MG/DL (ref 0–1)
BILIRUB UR QL STRIP.AUTO: NEGATIVE
BUN SERPL-MCNC: 6 MG/DL (ref 7–20)
CALCIUM SERPL-MCNC: 8.3 MG/DL (ref 8.3–10.6)
CHLORIDE SERPL-SCNC: 100 MMOL/L (ref 99–110)
CHP ED QC CHECK: YES
CLARITY UR: CLEAR
CO2 BLDV-SCNC: 25 MMOL/L
CO2 SERPL-SCNC: 24 MMOL/L (ref 21–32)
COHGB MFR BLDV: 2.6 % (ref 0–1.5)
COLOR UR: YELLOW
CREAT SERPL-MCNC: 0.4 MG/DL (ref 0.8–1.3)
DEPRECATED RDW RBC AUTO: 15.4 % (ref 12.4–15.4)
EOSINOPHIL # BLD: 0 K/UL (ref 0–0.6)
EOSINOPHIL NFR BLD: 0.5 %
FLUAV RNA RESP QL NAA+PROBE: NOT DETECTED
FLUBV RNA RESP QL NAA+PROBE: NOT DETECTED
GFR SERPLBLD CREATININE-BSD FMLA CKD-EPI: >90 ML/MIN/{1.73_M2}
GLUCOSE BLD-MCNC: 137 MG/DL
GLUCOSE BLD-MCNC: 137 MG/DL (ref 70–99)
GLUCOSE BLD-MCNC: 213 MG/DL (ref 70–99)
GLUCOSE BLD-MCNC: 277 MG/DL (ref 70–99)
GLUCOSE SERPL-MCNC: 210 MG/DL (ref 70–99)
GLUCOSE UR STRIP.AUTO-MCNC: NEGATIVE MG/DL
HCO3 BLDV-SCNC: 23.8 MMOL/L (ref 23–29)
HCT VFR BLD AUTO: 25.6 % (ref 40.5–52.5)
HGB BLD-MCNC: 8.7 G/DL (ref 13.5–17.5)
HGB UR QL STRIP.AUTO: NEGATIVE
KETONES UR STRIP.AUTO-MCNC: NEGATIVE MG/DL
LACTATE BLDV-SCNC: 1.9 MMOL/L (ref 0.4–1.9)
LEUKOCYTE ESTERASE UR QL STRIP.AUTO: NEGATIVE
LIPASE SERPL-CCNC: 28 U/L (ref 13–60)
LYMPHOCYTES # BLD: 0.5 K/UL (ref 1–5.1)
LYMPHOCYTES NFR BLD: 8.9 %
MAGNESIUM SERPL-MCNC: 1.73 MG/DL (ref 1.8–2.4)
MCH RBC QN AUTO: 31.4 PG (ref 26–34)
MCHC RBC AUTO-ENTMCNC: 34 G/DL (ref 31–36)
MCV RBC AUTO: 92.4 FL (ref 80–100)
METHGB MFR BLDV: 0.2 %
MONOCYTES # BLD: 0.3 K/UL (ref 0–1.3)
MONOCYTES NFR BLD: 6.2 %
NEUTROPHILS # BLD: 4.5 K/UL (ref 1.7–7.7)
NEUTROPHILS NFR BLD: 84.1 %
NITRITE UR QL STRIP.AUTO: NEGATIVE
NT-PROBNP SERPL-MCNC: 347 PG/ML (ref 0–449)
O2 CT VFR BLDV CALC: 11 VOL %
O2 THERAPY: ABNORMAL
PCO2 BLDV: 31.3 MMHG (ref 40–50)
PERFORMED ON: ABNORMAL
PH BLDV: 7.5 [PH] (ref 7.35–7.45)
PH UR STRIP.AUTO: 6.5 [PH] (ref 5–8)
PLATELET # BLD AUTO: 186 K/UL (ref 135–450)
PMV BLD AUTO: 6.8 FL (ref 5–10.5)
PO2 BLDV: 68.7 MMHG (ref 25–40)
POTASSIUM SERPL-SCNC: 3.3 MMOL/L (ref 3.5–5.1)
PROCALCITONIN SERPL IA-MCNC: 0.11 NG/ML (ref 0–0.15)
PROT SERPL-MCNC: 6.5 G/DL (ref 6.4–8.2)
PROT UR STRIP.AUTO-MCNC: NEGATIVE MG/DL
RBC # BLD AUTO: 2.77 M/UL (ref 4.2–5.9)
RSV BY PCR: NOT DETECTED
SAO2 % BLDV: 95 %
SARS-COV-2 RNA RESP QL NAA+PROBE: NOT DETECTED
SODIUM SERPL-SCNC: 135 MMOL/L (ref 136–145)
SP GR UR STRIP.AUTO: 1.02 (ref 1–1.03)
TROPONIN, HIGH SENSITIVITY: 11 NG/L (ref 0–22)
TROPONIN, HIGH SENSITIVITY: 11 NG/L (ref 0–22)
UA COMPLETE W REFLEX CULTURE PNL UR: NORMAL
UA DIPSTICK W REFLEX MICRO PNL UR: NORMAL
URN SPEC COLLECT METH UR: NORMAL
UROBILINOGEN UR STRIP-ACNC: 0.2 E.U./DL
WBC # BLD AUTO: 5.4 K/UL (ref 4–11)

## 2025-02-05 PROCEDURE — 1200000000 HC SEMI PRIVATE

## 2025-02-05 PROCEDURE — 6360000002 HC RX W HCPCS

## 2025-02-05 PROCEDURE — 6360000002 HC RX W HCPCS: Performed by: REGISTERED NURSE

## 2025-02-05 PROCEDURE — 2580000003 HC RX 258

## 2025-02-05 PROCEDURE — 6360000004 HC RX CONTRAST MEDICATION: Performed by: REGISTERED NURSE

## 2025-02-05 PROCEDURE — 84484 ASSAY OF TROPONIN QUANT: CPT

## 2025-02-05 PROCEDURE — 93005 ELECTROCARDIOGRAM TRACING: CPT | Performed by: REGISTERED NURSE

## 2025-02-05 PROCEDURE — 2580000003 HC RX 258: Performed by: REGISTERED NURSE

## 2025-02-05 PROCEDURE — 83880 ASSAY OF NATRIURETIC PEPTIDE: CPT

## 2025-02-05 PROCEDURE — 83735 ASSAY OF MAGNESIUM: CPT

## 2025-02-05 PROCEDURE — 2500000003 HC RX 250 WO HCPCS

## 2025-02-05 PROCEDURE — 96375 TX/PRO/DX INJ NEW DRUG ADDON: CPT

## 2025-02-05 PROCEDURE — 71260 CT THORAX DX C+: CPT

## 2025-02-05 PROCEDURE — 84145 PROCALCITONIN (PCT): CPT

## 2025-02-05 PROCEDURE — 83605 ASSAY OF LACTIC ACID: CPT

## 2025-02-05 PROCEDURE — 6370000000 HC RX 637 (ALT 250 FOR IP)

## 2025-02-05 PROCEDURE — 71046 X-RAY EXAM CHEST 2 VIEWS: CPT

## 2025-02-05 PROCEDURE — 87449 NOS EACH ORGANISM AG IA: CPT

## 2025-02-05 PROCEDURE — 99285 EMERGENCY DEPT VISIT HI MDM: CPT

## 2025-02-05 PROCEDURE — 82803 BLOOD GASES ANY COMBINATION: CPT

## 2025-02-05 PROCEDURE — 87040 BLOOD CULTURE FOR BACTERIA: CPT

## 2025-02-05 PROCEDURE — 96374 THER/PROPH/DIAG INJ IV PUSH: CPT

## 2025-02-05 PROCEDURE — 83690 ASSAY OF LIPASE: CPT

## 2025-02-05 PROCEDURE — 85025 COMPLETE CBC W/AUTO DIFF WBC: CPT

## 2025-02-05 PROCEDURE — 80053 COMPREHEN METABOLIC PANEL: CPT

## 2025-02-05 PROCEDURE — 81003 URINALYSIS AUTO W/O SCOPE: CPT

## 2025-02-05 PROCEDURE — 74177 CT ABD & PELVIS W/CONTRAST: CPT

## 2025-02-05 PROCEDURE — 6370000000 HC RX 637 (ALT 250 FOR IP): Performed by: REGISTERED NURSE

## 2025-02-05 PROCEDURE — 36415 COLL VENOUS BLD VENIPUNCTURE: CPT

## 2025-02-05 RX ORDER — BENZONATATE 100 MG/1
100 CAPSULE ORAL 3 TIMES DAILY PRN
Status: DISCONTINUED | OUTPATIENT
Start: 2025-02-05 | End: 2025-02-07 | Stop reason: HOSPADM

## 2025-02-05 RX ORDER — PANTOPRAZOLE SODIUM 40 MG/1
40 TABLET, DELAYED RELEASE ORAL
Status: DISCONTINUED | OUTPATIENT
Start: 2025-02-06 | End: 2025-02-07 | Stop reason: HOSPADM

## 2025-02-05 RX ORDER — INSULIN LISPRO 100 [IU]/ML
0-4 INJECTION, SOLUTION INTRAVENOUS; SUBCUTANEOUS
Status: DISCONTINUED | OUTPATIENT
Start: 2025-02-05 | End: 2025-02-07 | Stop reason: HOSPADM

## 2025-02-05 RX ORDER — ONDANSETRON 4 MG/1
4 TABLET, ORALLY DISINTEGRATING ORAL EVERY 8 HOURS PRN
Status: DISCONTINUED | OUTPATIENT
Start: 2025-02-05 | End: 2025-02-07 | Stop reason: HOSPADM

## 2025-02-05 RX ORDER — GUAIFENESIN 600 MG/1
600 TABLET, EXTENDED RELEASE ORAL 2 TIMES DAILY
Status: DISCONTINUED | OUTPATIENT
Start: 2025-02-05 | End: 2025-02-07 | Stop reason: HOSPADM

## 2025-02-05 RX ORDER — POTASSIUM CHLORIDE 1500 MG/1
20 TABLET, EXTENDED RELEASE ORAL 2 TIMES DAILY
Status: DISCONTINUED | OUTPATIENT
Start: 2025-02-05 | End: 2025-02-07 | Stop reason: HOSPADM

## 2025-02-05 RX ORDER — FAMOTIDINE 20 MG/1
20 TABLET, FILM COATED ORAL 2 TIMES DAILY
Status: DISCONTINUED | OUTPATIENT
Start: 2025-02-05 | End: 2025-02-07 | Stop reason: HOSPADM

## 2025-02-05 RX ORDER — POTASSIUM CHLORIDE 1500 MG/1
20 TABLET, EXTENDED RELEASE ORAL ONCE
Status: COMPLETED | OUTPATIENT
Start: 2025-02-05 | End: 2025-02-05

## 2025-02-05 RX ORDER — GLUCAGON 1 MG/ML
1 KIT INJECTION PRN
Status: DISCONTINUED | OUTPATIENT
Start: 2025-02-05 | End: 2025-02-07 | Stop reason: HOSPADM

## 2025-02-05 RX ORDER — POLYETHYLENE GLYCOL 3350 17 G/17G
17 POWDER, FOR SOLUTION ORAL DAILY PRN
Status: DISCONTINUED | OUTPATIENT
Start: 2025-02-05 | End: 2025-02-07 | Stop reason: HOSPADM

## 2025-02-05 RX ORDER — ALBUTEROL SULFATE 90 UG/1
1 INHALANT RESPIRATORY (INHALATION) EVERY 4 HOURS PRN
Status: DISCONTINUED | OUTPATIENT
Start: 2025-02-05 | End: 2025-02-07 | Stop reason: HOSPADM

## 2025-02-05 RX ORDER — LANOLIN ALCOHOL/MO/W.PET/CERES
400 CREAM (GRAM) TOPICAL ONCE
Status: COMPLETED | OUTPATIENT
Start: 2025-02-05 | End: 2025-02-05

## 2025-02-05 RX ORDER — DEXTROSE MONOHYDRATE 100 MG/ML
INJECTION, SOLUTION INTRAVENOUS CONTINUOUS PRN
Status: DISCONTINUED | OUTPATIENT
Start: 2025-02-05 | End: 2025-02-07 | Stop reason: HOSPADM

## 2025-02-05 RX ORDER — ONDANSETRON 2 MG/ML
4 INJECTION INTRAMUSCULAR; INTRAVENOUS EVERY 6 HOURS PRN
Status: DISCONTINUED | OUTPATIENT
Start: 2025-02-05 | End: 2025-02-07 | Stop reason: HOSPADM

## 2025-02-05 RX ORDER — TRIAMTERENE AND HYDROCHLOROTHIAZIDE 37.5; 25 MG/1; MG/1
1 TABLET ORAL DAILY
Status: DISCONTINUED | OUTPATIENT
Start: 2025-02-05 | End: 2025-02-07 | Stop reason: HOSPADM

## 2025-02-05 RX ORDER — SODIUM CHLORIDE 0.9 % (FLUSH) 0.9 %
5-40 SYRINGE (ML) INJECTION PRN
Status: DISCONTINUED | OUTPATIENT
Start: 2025-02-05 | End: 2025-02-07 | Stop reason: HOSPADM

## 2025-02-05 RX ORDER — SODIUM CHLORIDE 0.9 % (FLUSH) 0.9 %
5-40 SYRINGE (ML) INJECTION EVERY 12 HOURS SCHEDULED
Status: DISCONTINUED | OUTPATIENT
Start: 2025-02-05 | End: 2025-02-07 | Stop reason: HOSPADM

## 2025-02-05 RX ORDER — METOCLOPRAMIDE 10 MG/1
5 TABLET ORAL 2 TIMES DAILY
Status: DISCONTINUED | OUTPATIENT
Start: 2025-02-05 | End: 2025-02-07 | Stop reason: HOSPADM

## 2025-02-05 RX ORDER — IOPAMIDOL 755 MG/ML
75 INJECTION, SOLUTION INTRAVASCULAR
Status: COMPLETED | OUTPATIENT
Start: 2025-02-05 | End: 2025-02-05

## 2025-02-05 RX ORDER — TRIAMTERENE AND HYDROCHLOROTHIAZIDE 37.5; 25 MG/1; MG/1
1 CAPSULE ORAL DAILY
Status: DISCONTINUED | OUTPATIENT
Start: 2025-02-05 | End: 2025-02-05

## 2025-02-05 RX ORDER — ERGOCALCIFEROL 1.25 MG/1
50000 CAPSULE, LIQUID FILLED ORAL
Status: DISCONTINUED | OUTPATIENT
Start: 2025-02-06 | End: 2025-02-07 | Stop reason: HOSPADM

## 2025-02-05 RX ORDER — MORPHINE SULFATE 2 MG/ML
2 INJECTION, SOLUTION INTRAMUSCULAR; INTRAVENOUS ONCE
Status: COMPLETED | OUTPATIENT
Start: 2025-02-05 | End: 2025-02-05

## 2025-02-05 RX ORDER — ACETAMINOPHEN 650 MG/1
650 SUPPOSITORY RECTAL EVERY 6 HOURS PRN
Status: DISCONTINUED | OUTPATIENT
Start: 2025-02-05 | End: 2025-02-07 | Stop reason: HOSPADM

## 2025-02-05 RX ORDER — ENOXAPARIN SODIUM 100 MG/ML
40 INJECTION SUBCUTANEOUS DAILY
Status: DISCONTINUED | OUTPATIENT
Start: 2025-02-05 | End: 2025-02-07 | Stop reason: HOSPADM

## 2025-02-05 RX ORDER — VANCOMYCIN 1.25 G/250ML
25 INJECTION, SOLUTION INTRAVENOUS ONCE
Status: COMPLETED | OUTPATIENT
Start: 2025-02-05 | End: 2025-02-05

## 2025-02-05 RX ORDER — GUAIFENESIN 600 MG/1
600 TABLET, EXTENDED RELEASE ORAL 2 TIMES DAILY
Status: DISCONTINUED | OUTPATIENT
Start: 2025-02-05 | End: 2025-02-05 | Stop reason: SDUPTHER

## 2025-02-05 RX ORDER — ALLOPURINOL 300 MG/1
300 TABLET ORAL DAILY
Status: DISCONTINUED | OUTPATIENT
Start: 2025-02-05 | End: 2025-02-07 | Stop reason: HOSPADM

## 2025-02-05 RX ORDER — SODIUM CHLORIDE 9 MG/ML
INJECTION, SOLUTION INTRAVENOUS PRN
Status: DISCONTINUED | OUTPATIENT
Start: 2025-02-05 | End: 2025-02-07 | Stop reason: HOSPADM

## 2025-02-05 RX ORDER — DICYCLOMINE HCL 20 MG
20 TABLET ORAL DAILY
Status: DISCONTINUED | OUTPATIENT
Start: 2025-02-05 | End: 2025-02-07 | Stop reason: HOSPADM

## 2025-02-05 RX ORDER — ACETAMINOPHEN 325 MG/1
650 TABLET ORAL EVERY 6 HOURS PRN
Status: DISCONTINUED | OUTPATIENT
Start: 2025-02-05 | End: 2025-02-07 | Stop reason: HOSPADM

## 2025-02-05 RX ORDER — MULTIVITAMIN WITH IRON
1000 TABLET ORAL DAILY
Status: DISCONTINUED | OUTPATIENT
Start: 2025-02-05 | End: 2025-02-07 | Stop reason: HOSPADM

## 2025-02-05 RX ORDER — ONDANSETRON 2 MG/ML
4 INJECTION INTRAMUSCULAR; INTRAVENOUS ONCE
Status: COMPLETED | OUTPATIENT
Start: 2025-02-05 | End: 2025-02-05

## 2025-02-05 RX ADMIN — INSULIN LISPRO 2 UNITS: 100 INJECTION, SOLUTION INTRAVENOUS; SUBCUTANEOUS at 20:43

## 2025-02-05 RX ADMIN — VANCOMYCIN 1750 MG: 1.25 INJECTION, SOLUTION INTRAVENOUS at 19:36

## 2025-02-05 RX ADMIN — MORPHINE SULFATE 2 MG: 2 INJECTION, SOLUTION INTRAMUSCULAR; INTRAVENOUS at 14:29

## 2025-02-05 RX ADMIN — POTASSIUM CHLORIDE 20 MEQ: 1500 TABLET, EXTENDED RELEASE ORAL at 20:36

## 2025-02-05 RX ADMIN — ENOXAPARIN SODIUM 40 MG: 100 INJECTION SUBCUTANEOUS at 20:37

## 2025-02-05 RX ADMIN — IOPAMIDOL 75 ML: 755 INJECTION, SOLUTION INTRAVENOUS at 14:09

## 2025-02-05 RX ADMIN — AZITHROMYCIN MONOHYDRATE 500 MG: 500 INJECTION, POWDER, LYOPHILIZED, FOR SOLUTION INTRAVENOUS at 18:13

## 2025-02-05 RX ADMIN — CEFEPIME 2000 MG: 2 INJECTION, POWDER, FOR SOLUTION INTRAVENOUS at 22:34

## 2025-02-05 RX ADMIN — PANCRELIPASE LIPASE, PANCRELIPASE PROTEASE, PANCRELIPASE AMYLASE 60000 UNITS: 20000; 63000; 84000 CAPSULE, DELAYED RELEASE ORAL at 17:33

## 2025-02-05 RX ADMIN — ONDANSETRON 4 MG: 2 INJECTION INTRAMUSCULAR; INTRAVENOUS at 14:29

## 2025-02-05 RX ADMIN — METOCLOPRAMIDE 5 MG: 10 TABLET ORAL at 20:36

## 2025-02-05 RX ADMIN — GUAIFENESIN 600 MG: 600 TABLET, EXTENDED RELEASE ORAL at 20:36

## 2025-02-05 RX ADMIN — POTASSIUM CHLORIDE 20 MEQ: 1500 TABLET, EXTENDED RELEASE ORAL at 16:52

## 2025-02-05 RX ADMIN — Medication 10 ML: at 22:36

## 2025-02-05 RX ADMIN — PIPERACILLIN AND TAZOBACTAM 4500 MG: 4; .5 INJECTION, POWDER, LYOPHILIZED, FOR SOLUTION INTRAVENOUS at 17:39

## 2025-02-05 RX ADMIN — Medication 400 MG: at 16:52

## 2025-02-05 RX ADMIN — FAMOTIDINE 20 MG: 20 TABLET ORAL at 20:36

## 2025-02-05 ASSESSMENT — PAIN DESCRIPTION - PAIN TYPE: TYPE: CHRONIC PAIN

## 2025-02-05 ASSESSMENT — PAIN DESCRIPTION - DESCRIPTORS: DESCRIPTORS: ACHING

## 2025-02-05 ASSESSMENT — PAIN - FUNCTIONAL ASSESSMENT
PAIN_FUNCTIONAL_ASSESSMENT: NONE - DENIES PAIN
PAIN_FUNCTIONAL_ASSESSMENT: 0-10

## 2025-02-05 ASSESSMENT — ENCOUNTER SYMPTOMS
NAUSEA: 0
DIARRHEA: 0
VOMITING: 0
COUGH: 1
SHORTNESS OF BREATH: 0
ABDOMINAL PAIN: 1

## 2025-02-05 ASSESSMENT — PAIN SCALES - GENERAL
PAINLEVEL_OUTOF10: 0
PAINLEVEL_OUTOF10: 1

## 2025-02-05 ASSESSMENT — PAIN DESCRIPTION - LOCATION: LOCATION: RIB CAGE

## 2025-02-05 ASSESSMENT — PAIN DESCRIPTION - ORIENTATION: ORIENTATION: RIGHT

## 2025-02-05 NOTE — CARE COORDINATION
Case Management Assessment  Initial Evaluation    Date/Time of Evaluation: 2/5/2025 3:54 PM  Assessment Completed by: ROYAL Ferguson    If patient is discharged prior to next notation, then this note serves as note for discharge by case management.    Patient Name: Sharath Ceja                   YOB: 1950  Diagnosis: No admission diagnoses are documented for this encounter.                   Date / Time: 2/5/2025 11:51 AM    Patient Admission Status: Emergency   Readmission Risk (Low < 19, Mod (19-27), High > 27): Readmission Risk Score: 23.5    Current PCP: Real Whitehead MD  PCP verified by CM? Yes    Chart Reviewed: Yes      History Provided by: Patient, Medical Record  Patient Orientation: Alert and Oriented    Patient Cognition: Alert    Hospitalization in the last 30 days (Readmission):  No    If yes, Readmission Assessment in  Navigator will be completed.    Advance Directives:      Code Status: Prior   Patient's Primary Decision Maker is: Legal Next of Kin    Primary Decision Maker: Natasha Ceja R - Spouse - [phone]    Discharge Planning:    Patient lives with: Spouse/Significant Other Type of Home: House  Primary Care Giver: Self  Patient Support Systems include: Spouse/Significant Other, Children, Family Members   Current Financial resources: Medicare  Current community resources: None  Current services prior to admission: Durable Medical Equipment            Current DME: Cane, Walker            Type of Home Care services:  None    ADLS  Prior functional level: Independent in ADLs/IADLs  Current functional level: Independent in ADLs/IADLs    PT AM-PAC:   /24  OT AM-PAC:   /24    Family can provide assistance at DC: Yes  Would you like Case Management to discuss the discharge plan with any other family members/significant others, and if so, who? Yes (Wife)  Plans to Return to Present Housing: Yes  Other Identified Issues/Barriers to RETURNING to current housing: Post surgery  issue  Potential Assistance needed at discharge: N/A            Potential DME:    Patient expects to discharge to: House  Plan for transportation at discharge: Family    Financial    Payor: MEDICARE / Plan: MEDICARE PART A AND B / Product Type: *No Product type* /     Does insurance require precert for SNF: No    Potential assistance Purchasing Medications: No  Meds-to-Beds request:        [facility] - [place], [place] - [address] - P [phone] - F [phone]  [address]  [facility] [address]  Phone: [phone] Fax: [phone]    [facility] [place] [address] - P [phone] - F [phone]  [address]   [place] [address]  Phone: [phone] Fax: [phone]      Notes:    Factors facilitating achievement of predicted outcomes: Family support, Cooperative, and Pleasant    Barriers to discharge: Post surgery issue    Additional Case Management Notes: KATTY met with pt at bedside in ED. Pt stated he lives at home with his wife. Pt stated he uses a walker and cane PRN. Pt stated he has no prior home health services.   KATTY discussed DC plan with pt. Pt stated he plans to DC back home and will be transported by his wife.   ZACHERYK discussed whether pt had any further CM needs at this time to which pt stated they did not have any further needs.       The Plan for Transition of Care is related to the following treatment goals of No admission diagnoses are documented for this encounter.    IF APPLICABLE: The Patient and/or patient representative Sharath and his family were provided with a choice of provider and agrees with the discharge plan. Freedom of choice list with basic dialogue that supports the patient's individualized plan of care/goals and shares the quality data associated with the providers was provided to:     Patient Representative Name:       The Patient and/or Patient Representative Agree with the Discharge Plan?      Ali

## 2025-02-05 NOTE — TELEPHONE ENCOUNTER
Patient states being on his way to [facility] to have drain unclogged. States the drain in his chest has been clogged for two day's now. States drain in his side was clogged but is now unclogged. Patient states having a stopcock to flush drains but was told by [facility] a Doctor has to flush the drain's. Was not provided any flushes. Patient also endorses an on and off temp of 100.0 that is relieved with Tylenol. Continues with abdominal pain that is controlled bu Oxycodone. RN will inform Dr. Mcdonald and LENORE Gerard.

## 2025-02-05 NOTE — ED NOTES
1524 Called [facility], waiting for a call back  1528 [facility] called back, speaking with Mary Padilla NP  1539 Called [facility], speaking with Mary Padilla NP

## 2025-02-05 NOTE — PLAN OF CARE
Pneumonia.  Cholangiocarcinoma with 2 external liver drains, not draining appropriately.  ED spoke with IR and GI, will take pt tomorrow for flush.    -cover for HCAP with vanc and cefepime.  -respiratory studies ordered.  -IR consulted.  -GI consulted.  -home meds ordered per home med rec.    Doretha Holcomb PA-C 2/5/2025 5:01 PM

## 2025-02-05 NOTE — ED PROVIDER NOTES
[facility] EMERGENCY DEPARTMENT  EMERGENCY DEPARTMENT ENCOUNTER        Pt Name: Sharath Ceja  MRN: [id number]  Birthdate 1950  Date of evaluation: 2/5/2025  Provider: ADILENE Barahona - CNP  PCP: Real Whitehead MD    This patient was seen and evaluated by the attending physician Dr. Gibson.    I have evaluated this patient. My supervising physician was available for consultation.      CHIEF COMPLAINT       Chief Complaint   Patient presents with    Post-op Problem     Pt reports his drains into liver are \"stopped up\" and stopped draining approx 2 days ago. Pt reports his doctor requested him to go to ED.        HISTORY OF PRESENT ILLNESS   (Location/Symptom, Timing/Onset, Context/Setting, Quality, Duration, Modifying Factors, Severity)  Note limiting factors.     History from : Patient  Sharath Ceja is a 74 y.o. male who presents via private car with complaints of abdominal pain, fever, cough, dyspnea on exertion and states his diver drains have been blocked. He has history of cholangiocarcinoma that is metastatic.  He had a Whipple 2 years ago finished chemo in March 2024 and then his cancer has returned after finding it on biopsy in January 2025.  Patient does have 2 external drainage devices for his liver and states 1 has completely stopped draining and the other has had diminished drainage from it.  He is having some right sided abdominal pain since this has happened.  He denies chest pain, palpitations or swelling in his extremities.  He does endorse some shortness of breath and states that he is short of breath with activity.  He endorses some dry coughing as well as a fever Tmax 100.  He denies any nausea, vomiting, diarrhea or constipation and states he has been having frequent bowel movements.  He denies any urinary symptoms including dysuria, urgency, frequency, hematuria or flank pain.  He states he spoke with his GI team today and they advised that he come to the ER.   diagnosis of sepsis.    Must meet 2:    [] Temperature > 100.9 F (38.3 C)        or < 96.8 F (36 C)  [x] HR > 90  [] RR > 20  [] WBC > 12 or < 4 or 10% bands      AND:      [] Infection Confirmed or        Suspected.     Must meet 1:    [] Lactate > 2       or   [] Signs of Organ Dysfunction:    - SBP < 90 or MAP < 65  - Altered mental status  - Creatinine > 2 or increased from      baseline  - Urine Output < 0.5 ml/kg/hr  - Bilirubin > 2  - INR > 1.5 (not anticoagulated)  - Platelets < 100,000  - Acute Respiratory Failure as     evidenced by new need for NIPPV     or mechanical ventilation      [] No criteria met for Severe Sepsis.   Must meet 1:    [] Lactate > 4        or   [] SBP < 90 or MAP < 65 for at        least two readings in the first        hour after fluid bolus        administration      [] Vasopressors initiated (if hypotension persists after fluid resuscitation)        [] No criteria met for Septic Shock.   Patient Vitals for the past 6 hrs:   BP Temp Pulse Resp SpO2 Height Weight Weight Method Percent Weight Change   02/05/25 1200 122/78 98.4 °F (36.9 °C) 91 18 97 % -- 68.5 kg (151 lb) Stated 0   02/05/25 1350 (!) 116/90 -- 81 19 97 % -- -- -- --   02/05/25 1356 -- -- -- -- -- 1.778 m (5' 10\") 68.5 kg (151 lb 1.6 oz) Actual 0.07   02/05/25 1530 117/69 -- 77 18 97 % -- -- -- --      Recent Labs     02/05/25  1256 02/05/25  1257   WBC  --  5.4   CREATININE 0.4*  --    BILITOT 0.4  --    PLT  --  186             Discharge Time out:  CC Reviewed Yes   Test Results Yes     Vitals:    02/05/25 1530   BP: 117/69   Pulse: 77   Resp: 18   Temp:    SpO2: 97%              FINAL IMPRESSION      1. Pneumonia due to infectious organism, unspecified laterality, unspecified part of lung    2. History of cholangiocarcinoma    3. Anemia, unspecified type    4. Hypokalemia    5. Hypomagnesemia    6. Lung nodule seen on imaging study          DISPOSITION/PLAN   DISPOSITION Admitted 02/05/2025 04:59:47 PM

## 2025-02-05 NOTE — ED PROVIDER NOTES
Emergency Department Attending Provider Note  Location: [facility] ED      Sharath Ceja was evaluated in the Emergency Department. Although initial history and physical exam information was obtained by Mary Padilla (who also dictated a record of this visit), I personally saw the patient and made/approved the management plan and take responsibility for the patient management.     Patient seen and evaluated.  Relevant records reviewed. Chronic medical conditions reviewed.    HPI: 74-year-old male with history of adenocarcinoma, CLL, diabetes, hypertension, and asthma presents with concerns that his liver drains are blocked.  He has had a dry cough with low-grade fever as well.     Physical Exam: On exam vital signs were normal.  Patient talking in full sentences.  No respiratory distress.     I independently interpreted the following diagnostic test and studies which show normal WBC.  Hemoglobin 8.7.  Baseline 9.8.  Sodium 135.  Magnesium 3.3.  COVID, flu, RSV negative.  CT PE showed pleural effusion and likely pneumonia.  Labs Reviewed   CBC WITH AUTO DIFFERENTIAL - Abnormal; Notable for the following components:       Result Value    RBC 2.77 (*)     Hemoglobin 8.7 (*)     Hematocrit 25.6 (*)     Lymphocytes Absolute 0.5 (*)     All other components within normal limits   COMPREHENSIVE METABOLIC PANEL W/ REFLEX TO MG FOR LOW K - Abnormal; Notable for the following components:    Sodium 135 (*)     Potassium reflex Magnesium 3.3 (*)     Glucose 210 (*)     BUN 6 (*)     Creatinine 0.4 (*)     Albumin 2.8 (*)     Albumin/Globulin Ratio 0.8 (*)     Alkaline Phosphatase 275 (*)     All other components within normal limits   BLOOD GAS, VENOUS - Abnormal; Notable for the following components:    pH, Murray 7.498 (*)     pCO2, Murray 31.3 (*)     PO2, Murray 68.7 (*)     Carboxyhemoglobin 2.6 (*)     All other components within normal limits   MAGNESIUM - Abnormal; Notable for the following components:     Magnesium 1.73 (*)     All other components within normal limits   POCT GLUCOSE - Abnormal; Notable for the following components:    POC Glucose 137 (*)     All other components within normal limits   COVID-19, FLU A/B, AND RSV COMBO   CULTURE, BLOOD 1   CULTURE, BLOOD 2   LIPASE   LACTATE, SEPSIS   TROPONIN   TROPONIN   BRAIN NATRIURETIC PEPTIDE   URINALYSIS WITH REFLEX TO CULTURE     CT ABDOMEN PELVIS W IV CONTRAST Additional Contrast? None   Preliminary Result   1. Postsurgical changes from Whipple procedure with persistent ill-defined   soft tissue in the agustin hepatis similar to prior exam.  Based on today's   measurements this may be slightly decreased in size.   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   2. Internal external biliary drains are grossly stable in positioning.  There   is stable mild intrahepatic biliary duct dilation.   3. Increasing size of a dominant 5.5 cm low-attenuation lesion in segment 6   of the liver in keeping with known malignancy/metastatic disease.   4. Mild splenomegaly with nonspecific mesenteric and retroperitoneal   stranding and trace ascites.  Findings may be sequela of portal hypertension.   5. New small partially loculated right pleural effusion with adjacent   atelectasis versus pneumonia.   6. Mild wall thickening and stranding along the proximal duodenum,   nonspecific.         CT CHEST PULMONARY EMBOLISM W CONTRAST   Final Result   1. No evidence of pulmonary embolism.   2. Loculated right pleural effusion with adjacent atelectasis, new from prior   study on 01/17/2025.  Clinical and imaging follow-up to resolution   recommended.   3. Subsolid pulmonary nodules in the right upper lobe measuring up to 12 mm.   Attention on follow-up or PET-CT.      RECOMMENDATIONS:   Pathology: Right part-solid pulmonary nodule within the upper lobe measuring   12 mm.  Fleischner criteria are not applicable for immunocompromised patients   or patients with

## 2025-02-06 ENCOUNTER — APPOINTMENT (OUTPATIENT)
Dept: INTERVENTIONAL RADIOLOGY/VASCULAR | Age: 75
End: 2025-02-06
Payer: MEDICARE

## 2025-02-06 PROBLEM — J90 LOCULATED PLEURAL EFFUSION: Status: ACTIVE | Noted: 2025-02-06

## 2025-02-06 PROBLEM — D64.9 ANEMIA: Status: ACTIVE | Noted: 2024-12-30

## 2025-02-06 LAB
ALBUMIN SERPL-MCNC: 2.7 G/DL (ref 3.4–5)
ALP SERPL-CCNC: 265 U/L (ref 40–129)
ALT SERPL-CCNC: 13 U/L (ref 10–40)
ANION GAP SERPL CALCULATED.3IONS-SCNC: 8 MMOL/L (ref 3–16)
AST SERPL-CCNC: 19 U/L (ref 15–37)
BASOPHILS # BLD: 0 K/UL (ref 0–0.2)
BASOPHILS NFR BLD: 0.4 %
BILIRUB DIRECT SERPL-MCNC: 0.3 MG/DL (ref 0–0.3)
BILIRUB INDIRECT SERPL-MCNC: 0.2 MG/DL (ref 0–1)
BILIRUB SERPL-MCNC: 0.5 MG/DL (ref 0–1)
BUN SERPL-MCNC: 5 MG/DL (ref 7–20)
CALCIUM SERPL-MCNC: 8.4 MG/DL (ref 8.3–10.6)
CHLORIDE SERPL-SCNC: 101 MMOL/L (ref 99–110)
CO2 SERPL-SCNC: 27 MMOL/L (ref 21–32)
CREAT SERPL-MCNC: 0.4 MG/DL (ref 0.8–1.3)
DEPRECATED RDW RBC AUTO: 15.4 % (ref 12.4–15.4)
EKG ATRIAL RATE: 76 BPM
EKG DIAGNOSIS: NORMAL
EKG P AXIS: 58 DEGREES
EKG P-R INTERVAL: 212 MS
EKG Q-T INTERVAL: 414 MS
EKG QRS DURATION: 90 MS
EKG QTC CALCULATION (BAZETT): 465 MS
EKG R AXIS: 55 DEGREES
EKG T AXIS: 49 DEGREES
EKG VENTRICULAR RATE: 76 BPM
EOSINOPHIL # BLD: 0.1 K/UL (ref 0–0.6)
EOSINOPHIL NFR BLD: 1.2 %
EST. AVERAGE GLUCOSE BLD GHB EST-MCNC: 128.4 MG/DL
GFR SERPLBLD CREATININE-BSD FMLA CKD-EPI: >90 ML/MIN/{1.73_M2}
GLUCOSE BLD-MCNC: 112 MG/DL (ref 70–99)
GLUCOSE BLD-MCNC: 117 MG/DL (ref 70–99)
GLUCOSE BLD-MCNC: 130 MG/DL (ref 70–99)
GLUCOSE BLD-MCNC: 166 MG/DL (ref 70–99)
GLUCOSE SERPL-MCNC: 107 MG/DL (ref 70–99)
HBA1C MFR BLD: 6.1 %
HCT VFR BLD AUTO: 27 % (ref 40.5–52.5)
HGB BLD-MCNC: 9.1 G/DL (ref 13.5–17.5)
LEGIONELLA AG UR QL: NORMAL
LYMPHOCYTES # BLD: 0.5 K/UL (ref 1–5.1)
LYMPHOCYTES NFR BLD: 10.3 %
MCH RBC QN AUTO: 31.2 PG (ref 26–34)
MCHC RBC AUTO-ENTMCNC: 33.8 G/DL (ref 31–36)
MCV RBC AUTO: 92.4 FL (ref 80–100)
MONOCYTES # BLD: 0.2 K/UL (ref 0–1.3)
MONOCYTES NFR BLD: 5.5 %
NEUTROPHILS # BLD: 3.6 K/UL (ref 1.7–7.7)
NEUTROPHILS NFR BLD: 82.6 %
PERFORMED ON: ABNORMAL
PLATELET # BLD AUTO: 192 K/UL (ref 135–450)
PMV BLD AUTO: 7.2 FL (ref 5–10.5)
POTASSIUM SERPL-SCNC: 3.8 MMOL/L (ref 3.5–5.1)
PROT SERPL-MCNC: 7.3 G/DL (ref 6.4–8.2)
RBC # BLD AUTO: 2.92 M/UL (ref 4.2–5.9)
S PNEUM AG UR QL: NORMAL
SODIUM SERPL-SCNC: 136 MMOL/L (ref 136–145)
WBC # BLD AUTO: 4.4 K/UL (ref 4–11)

## 2025-02-06 PROCEDURE — 6370000000 HC RX 637 (ALT 250 FOR IP): Performed by: INTERNAL MEDICINE

## 2025-02-06 PROCEDURE — BF101ZZ FLUOROSCOPY OF BILE DUCTS USING LOW OSMOLAR CONTRAST: ICD-10-PCS | Performed by: RADIOLOGY

## 2025-02-06 PROCEDURE — 2580000003 HC RX 258: Performed by: INTERNAL MEDICINE

## 2025-02-06 PROCEDURE — 99222 1ST HOSP IP/OBS MODERATE 55: CPT | Performed by: INTERNAL MEDICINE

## 2025-02-06 PROCEDURE — 93010 ELECTROCARDIOGRAM REPORT: CPT | Performed by: INTERNAL MEDICINE

## 2025-02-06 PROCEDURE — 6360000002 HC RX W HCPCS

## 2025-02-06 PROCEDURE — 36415 COLL VENOUS BLD VENIPUNCTURE: CPT

## 2025-02-06 PROCEDURE — 6360000002 HC RX W HCPCS: Performed by: INTERNAL MEDICINE

## 2025-02-06 PROCEDURE — 47531 INJECTION FOR CHOLANGIOGRAM: CPT

## 2025-02-06 PROCEDURE — 85025 COMPLETE CBC W/AUTO DIFF WBC: CPT

## 2025-02-06 PROCEDURE — 1200000000 HC SEMI PRIVATE

## 2025-02-06 PROCEDURE — 83036 HEMOGLOBIN GLYCOSYLATED A1C: CPT

## 2025-02-06 PROCEDURE — C1769 GUIDE WIRE: HCPCS

## 2025-02-06 PROCEDURE — 80048 BASIC METABOLIC PNL TOTAL CA: CPT

## 2025-02-06 PROCEDURE — 2580000003 HC RX 258

## 2025-02-06 PROCEDURE — 6370000000 HC RX 637 (ALT 250 FOR IP)

## 2025-02-06 PROCEDURE — 80076 HEPATIC FUNCTION PANEL: CPT

## 2025-02-06 PROCEDURE — 99223 1ST HOSP IP/OBS HIGH 75: CPT | Performed by: INTERNAL MEDICINE

## 2025-02-06 PROCEDURE — 6360000004 HC RX CONTRAST MEDICATION: Performed by: INTERNAL MEDICINE

## 2025-02-06 RX ORDER — OXYCODONE HYDROCHLORIDE 5 MG/1
5 TABLET ORAL EVERY 12 HOURS PRN
Status: DISCONTINUED | OUTPATIENT
Start: 2025-02-06 | End: 2025-02-07 | Stop reason: HOSPADM

## 2025-02-06 RX ORDER — OXYCODONE HYDROCHLORIDE 5 MG/1
5 TABLET ORAL EVERY 12 HOURS PRN
COMMUNITY

## 2025-02-06 RX ADMIN — TRIAMTERENE AND HYDROCHLOROTHIAZIDE 1 TABLET: 37.5; 25 TABLET ORAL at 00:09

## 2025-02-06 RX ADMIN — METOCLOPRAMIDE 5 MG: 10 TABLET ORAL at 19:28

## 2025-02-06 RX ADMIN — FAMOTIDINE 20 MG: 20 TABLET ORAL at 19:28

## 2025-02-06 RX ADMIN — CEFEPIME 2000 MG: 2 INJECTION, POWDER, FOR SOLUTION INTRAVENOUS at 19:30

## 2025-02-06 RX ADMIN — PANTOPRAZOLE SODIUM 40 MG: 40 TABLET, DELAYED RELEASE ORAL at 06:20

## 2025-02-06 RX ADMIN — ALLOPURINOL 300 MG: 300 TABLET ORAL at 00:09

## 2025-02-06 RX ADMIN — GUAIFENESIN 600 MG: 600 TABLET, EXTENDED RELEASE ORAL at 11:38

## 2025-02-06 RX ADMIN — INSULIN HUMAN 20 UNITS: 100 INJECTION, SUSPENSION SUBCUTANEOUS at 16:31

## 2025-02-06 RX ADMIN — METOCLOPRAMIDE 5 MG: 10 TABLET ORAL at 11:38

## 2025-02-06 RX ADMIN — ACETAMINOPHEN 650 MG: 325 TABLET ORAL at 00:10

## 2025-02-06 RX ADMIN — POTASSIUM CHLORIDE 20 MEQ: 1500 TABLET, EXTENDED RELEASE ORAL at 19:28

## 2025-02-06 RX ADMIN — Medication 1000 MCG: at 11:39

## 2025-02-06 RX ADMIN — POTASSIUM CHLORIDE 20 MEQ: 1500 TABLET, EXTENDED RELEASE ORAL at 11:38

## 2025-02-06 RX ADMIN — ENOXAPARIN SODIUM 40 MG: 100 INJECTION SUBCUTANEOUS at 11:40

## 2025-02-06 RX ADMIN — PANCRELIPASE LIPASE, PANCRELIPASE PROTEASE, PANCRELIPASE AMYLASE 60000 UNITS: 20000; 63000; 84000 CAPSULE, DELAYED RELEASE ORAL at 16:31

## 2025-02-06 RX ADMIN — PANCRELIPASE LIPASE, PANCRELIPASE PROTEASE, PANCRELIPASE AMYLASE 60000 UNITS: 20000; 63000; 84000 CAPSULE, DELAYED RELEASE ORAL at 00:11

## 2025-02-06 RX ADMIN — VANCOMYCIN HYDROCHLORIDE 750 MG: 750 INJECTION, POWDER, LYOPHILIZED, FOR SOLUTION INTRAVENOUS at 18:00

## 2025-02-06 RX ADMIN — DICYCLOMINE HYDROCHLORIDE 20 MG: 20 TABLET ORAL at 00:10

## 2025-02-06 RX ADMIN — CEFEPIME 2000 MG: 2 INJECTION, POWDER, FOR SOLUTION INTRAVENOUS at 03:35

## 2025-02-06 RX ADMIN — OXYCODONE HYDROCHLORIDE 5 MG: 5 TABLET ORAL at 16:30

## 2025-02-06 RX ADMIN — Medication 1000 MCG: at 00:09

## 2025-02-06 RX ADMIN — ALLOPURINOL 300 MG: 300 TABLET ORAL at 11:39

## 2025-02-06 RX ADMIN — TRIAMTERENE AND HYDROCHLOROTHIAZIDE 1 TABLET: 37.5; 25 TABLET ORAL at 11:38

## 2025-02-06 RX ADMIN — FAMOTIDINE 20 MG: 20 TABLET ORAL at 11:38

## 2025-02-06 RX ADMIN — IOHEXOL 50 ML: 240 INJECTION, SOLUTION INTRATHECAL; INTRAVASCULAR; INTRAVENOUS; ORAL at 10:55

## 2025-02-06 RX ADMIN — VANCOMYCIN HYDROCHLORIDE 750 MG: 750 INJECTION, POWDER, LYOPHILIZED, FOR SOLUTION INTRAVENOUS at 06:22

## 2025-02-06 RX ADMIN — CALCIUM POLYCARBOPHIL 625 MG: 625 TABLET, FILM COATED ORAL at 11:43

## 2025-02-06 RX ADMIN — CEFEPIME 2000 MG: 2 INJECTION, POWDER, FOR SOLUTION INTRAVENOUS at 12:49

## 2025-02-06 RX ADMIN — PANCRELIPASE LIPASE, PANCRELIPASE PROTEASE, PANCRELIPASE AMYLASE 60000 UNITS: 20000; 63000; 84000 CAPSULE, DELAYED RELEASE ORAL at 11:44

## 2025-02-06 RX ADMIN — GUAIFENESIN 600 MG: 600 TABLET, EXTENDED RELEASE ORAL at 19:28

## 2025-02-06 ASSESSMENT — PAIN - FUNCTIONAL ASSESSMENT: PAIN_FUNCTIONAL_ASSESSMENT: PREVENTS OR INTERFERES SOME ACTIVE ACTIVITIES AND ADLS

## 2025-02-06 ASSESSMENT — PAIN DESCRIPTION - DESCRIPTORS
DESCRIPTORS: ACHING;DISCOMFORT
DESCRIPTORS: ACHING

## 2025-02-06 ASSESSMENT — PAIN SCALES - GENERAL
PAINLEVEL_OUTOF10: 0
PAINLEVEL_OUTOF10: 4
PAINLEVEL_OUTOF10: 3
PAINLEVEL_OUTOF10: 3

## 2025-02-06 ASSESSMENT — PAIN DESCRIPTION - ORIENTATION
ORIENTATION: MID
ORIENTATION: RIGHT

## 2025-02-06 ASSESSMENT — PAIN DESCRIPTION - LOCATION
LOCATION: ABDOMEN
LOCATION: ARM;ABDOMEN;BACK

## 2025-02-06 NOTE — FLOWSHEET NOTE
02/05/25 2229   Vital Signs   Temp 99 °F (37.2 °C)   Temp Source Oral   Pulse 76   Heart Rate Source Monitor   Respirations 18   /74   MAP (Calculated) 86   BP Location Right upper arm   BP Method Automatic   Patient Position Semi fowvance   Opioid-Induced Sedation   POSS Score 1   RASS   Leiva Agitation Sedation Scale (RASS) 0   Oxygen Therapy   SpO2 97 %   Pulse Oximetry Type Intermittent   Pulse Oximeter Device Mode Intermittent   Pulse Oximeter Device Location Left;Finger   O2 Device None (Room air)     Patient arrived to floor from ED. Patient A+Ox4, vitals and assessments done at this time. Bed in lowest position, call light within reach.

## 2025-02-06 NOTE — CONSULTS
Pharmacy to dose IV Vanco- HCAP x7 days  Patient received a 1,750mg dose at 1936 to provide Gram+ organism coverage to include MRSA.  Will be conservative on dosing given SCr 0.4 will likely overestimate actual drug clearance.  Dosing at 750mg IV Q12hrs.  Trough check 2/7 at 0500.    David Langford RPH PharmD 2/5/2025 7:51 PM

## 2025-02-06 NOTE — OR NURSING
Image guided biliary drain flush completed by Dr. Murguia. Pt tolerated procedure without any signs or symptoms of distress. Vital signs stable. Each tube was able to flush and draw with ease and wire passed with ease bilaterally. Report given  to Melba MORRISON. Pt transported back to  in stable condition via stretcher.       Vital Signs  Vitals:    02/06/25 1041   BP: 139/75   Pulse: 70   Resp: 14   Temp:    SpO2: 98%    (vital signs in table format)    Post Elenita  2 - Able to move 4 extremities voluntarily on command  2 - BP+/- 20mmHg of normal  2 - Fully awake  2 - Able to maintain oxygen saturation >92% on room air  2 - Able to breathe deeply and cough freely

## 2025-02-06 NOTE — H&P
MD Maryuri   magnesium oxide (MAG-OX) 400 MG tablet Take 1 tablet by mouth daily  Patient not taking: Reported on 1/23/2025 1/1/25   Maryuri Burnette MD   polyethylene glycol (GLYCOLAX) 17 g packet Take 1 packet by mouth daily as needed for Constipation  Patient not taking: Reported on 2/5/2025    ProviderRicki MD       Allergies:  Codeine and Amoxicillin-pot clavulanate    Social History:      TOBACCO:   reports that he has quit smoking. He has never used smokeless tobacco.  ETOH:   reports no history of alcohol use.      Family History:   Positive as follows:    No family history on file.    REVIEW OF SYSTEMS:       Constitutional: Negative for fever   HENT: Negative for sore throat   Eyes: Negative for redness   Respiratory: Negative  for dyspnea, cough   Cardiovascular: Negative for chest pain   Gastrointestinal: Negative for vomiting, diarrhea   Genitourinary: Negative for hematuria   Musculoskeletal: Negative for arthralgias   Skin: Negative for rash   Neurological: Negative for syncope   Hematological: Negative for adenopathy     PHYSICAL EXAM:     /64   Pulse 72   Temp 98.1 °F (36.7 °C) (Oral)   Resp 18   Ht 1.778 m (5' 10\")   Wt 72.2 kg (159 lb 1.6 oz)   SpO2 95%   BMI 22.83 kg/m²     Gen: No distress. Alert.   Eyes: PERRL. No sclera icterus. No conjunctival injection.   ENT: No discharge. Pharynx clear.   Neck: No JVD.  No Carotid Bruit. Trachea midline.  Resp: No accessory muscle use. No crackles. No wheezes. No rhonchi.   CV: Regular rate. Regular rhythm. No murmur.  No rub. No edema.   GI: Non-tender. Non-distended. No masses. No organomegaly. Normal bowel sounds. No hernia.   2 drains +  Skin: Warm and dry. No nodule on exposed extremities. No rash on exposed extremities.   M/S: No cyanosis. No joint deformity. No clubbing.   Neuro: Awake. Grossly nonfocal. Cranial nerves II through XII intact.   Psych: Oriented x 3. No anxiety or agitation.     CBC:   Recent Labs

## 2025-02-06 NOTE — CARE COORDINATION
Reviewed chart and met with pt and wife at bedside. Plan is to return home with wife at dc  Anticipate no needs but will follow.

## 2025-02-06 NOTE — CONSULTS
Reason for referral and CC: pleural effusion    HISTORY OF PRESENT ILLNESS: 73 yo male with a history of cholangiocarcinoma status post Whipple with drains in place presented with a blockage of his biliary tree.  He also had a right lower pleuritic quadrant pain.  His scan showed a small loculated pleural effusion.  Part of it extended into the interlobular space.      Past Medical History:   Diagnosis Date    Adenocarcinoma (HCC) 06/06/2023    8 + LYPMH NODES, WHIPPLE    Asthma     Blood circulation, collateral     Carpal tunnel syndrome     CLL (chronic lymphocytic leukemia) (HCC)     DENIES    Crohn's colitis 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    Hypertension     Liver abscess     DRAINED    Pneumonia     pneumonia,asthma    prostate     infection    Seasonal allergies     Wears glasses      Past Surgical History:   Procedure Laterality Date    CARDIAC CATHETERIZATION      1980S, DR ROWELL, NO ISSUES SINCE, HAS APT 8/14/23    CHOLECYSTECTOMY      CT LIVER ABSCESS ASPIRATION      PT STATES DRAINED LIVER ABSCESS    CT NEEDLE BIOPSY LIVER PERCUTANEOUS  1/23/2025    CT NEEDLE BIOPSY LIVER PERCUTANEOUS 1/23/2025 [facility] CT SCAN    CYST REMOVAL      KNEE & CLOSE TO RECTUM    ERCP N/A 01/26/2022    ERCP BIOPSY performed by Tab Pandey MD at [facility] ENDOSCOPY    ERCP  01/26/2022    ERCP SPHINCTER/PAPILLOTOMY performed by Tab Pandey MD at [facility] ENDOSCOPY    ERCP  01/26/2022    ERCP STENT INSERTION performed by Tab Pandey MD at [facility] ENDOSCOPY    ERCP N/A 03/11/2022    ERCP STENT REMOVAL performed by Tab Pandey MD at [facility] ENDOSCOPY    ERCP  03/11/2022    ERCP STENT INSERTION performed by Tab Pandey MD at [facility] ENDOSCOPY     Negative   UROBILINOGEN 0.2   BILIRUBINUR Negative   BLOODU Negative   GLUCOSEU Negative     No results for input(s): \"PHART\", \"ZAT4EOX\", \"PO2ART\" in the last 72 hours.    Chest imaging was reviewed by me and showed   CT CHEST PULMONARY EMBOLISM W CONTRAST   Final Result   1. No evidence of pulmonary embolism.   2. Loculated right pleural effusion with adjacent atelectasis, new from prior   study on 01/17/2025.  Clinical and imaging follow-up to resolution   recommended.   3. Subsolid pulmonary nodules in the right upper lobe measuring up to 12 mm.   Attention on follow-up or PET-CT.     ProCal within normal limits    ASSESSMENT:  Small right lower lobe loculated pleural effusion.  The largest area is located within the interlobular fissure.  It is not large enough to place a chest tube.  Scattered right side pulmonary nodules.  Etiology indeterminate  The patient follows with Dr. Kumari for oncology and with Dr. Watkins for surgical oncology    PLAN:  The patient should have close follow-up based on clinical symptoms and imaging.  The patient does have follow-up with both the surgeon and his oncologist scheduled.  I recommended if there is any increase in symptoms he should have imaging sooner and otherwise a chest CT in 2 to 3 months to make sure this area resolves with the antibiotics he is on.  The subsolid right side pulmonary nodules can be followed up on repeat CAT scan as well with his oncologist.  They are currently not large enough to biopsy.  The patient acknowledged understanding  No objection to d/c from a pulmonary standpoint.  I would consider p.o. Levaquin for 14 days.    Thank you Mikaela Birmingham DO for this consult

## 2025-02-06 NOTE — PRE SEDATION
MD   potassium chloride (KLOR-CON M) 20 MEQ extended release tablet Take 1 tablet by mouth 2 times daily 1/1/25  Yes Maryuri Burnette MD   allopurinol (ZYLOPRIM) 300 MG tablet Take 1 tablet by mouth once daily 12/30/24  Yes Real Whitehead MD   albuterol sulfate HFA (PROVENTIL;VENTOLIN;PROAIR) 108 (90 Base) MCG/ACT inhaler INHALE 2 PUFFS BY MOUTH 4 TIMES DAILY AS NEEDED FOR WHEEZING FOR SHORTNESS OF BREATH 12/5/24  Yes Real Whitehead MD   dicyclomine (BENTYL) 20 MG tablet Take 1 tablet by mouth daily 12/4/24  Yes Real Whitehead MD   insulin NPH (HUMULIN N;NOVOLIN N) 100 UNIT/ML injection vial Inject 20 Units into the skin 2 times daily (before meals) 12/4/24  Yes Real Whitehead MD   Cyanocobalamin (B-12) 500 MCG SUBL Take 1,000 mg by mouth daily 12/4/24  Yes Real Whitehead MD   Pancrelipase, Lip-Prot-Amyl, (ZENPEP) 47789-454951 units CPEP Take 1 tablet by mouth 3 times daily (before meals)   Yes ProviderRicki MD   famotidine (PEPCID) 20 MG tablet Take 1 tablet by mouth 2 times daily   Yes ProviderRicki MD   metFORMIN (GLUCOPHAGE-XR) 500 MG extended release tablet Take 1 tablet by mouth 2 times daily 9/10/24  Yes Jesus Manuel Fabian MD   vitamin D (ERGOCALCIFEROL) 1.25 MG (04693 UT) CAPS capsule Take 1 capsule by mouth once a week 4/10/24  Yes Chago Craft, APRN - CNP   guaiFENesin (MUCINEX) 600 MG extended release tablet Take 1 tablet by mouth 2 times daily 7/3/23  Yes Guy Link DO   polycarbophil (FIBERCON) 625 MG tablet Take 1 tablet by mouth daily 7/3/23  Yes Guy Link DO   finasteride (PROSCAR) 5 MG tablet Take 1 tablet by mouth every evening   Yes ProviderRicki MD   ciprofloxacin (CIPRO) 500 MG tablet Take 1 tablet by mouth every 12 hours  Patient not taking: Reported on 1/23/2025 1/1/25   Maryuri Burnette MD   metroNIDAZOLE (FLAGYL) 500 MG tablet Take 1 tablet by mouth every 8 hours  Patient not taking: Reported on 1/23/2025 1/1/25   Maryuri Burnette MD   magnesium oxide  (MAG-OX) 400 MG tablet Take 1 tablet by mouth daily  Patient not taking: Reported on 1/23/2025 1/1/25   Maryuri Burnette MD   polyethylene glycol (GLYCOLAX) 17 g packet Take 1 packet by mouth daily as needed for Constipation  Patient not taking: Reported on 2/5/2025    ProviderRicki MD     Coumadin Use Last 7 Days:  no  Antiplatelet drug therapy use last 7 days: no  Other anticoagulant use last 7 days: no  Additional Medication Information:        Pre-Sedation Documentation and Exam:   I have reviewed the patient's history and review of systems.    Mallampati Airway Assessment:  normal neck range of motion    Prior History of Anesthesia Complications:   none    ASA Classification:  Class 2 - A normal healthy patient with mild systemic disease    Sedation/ Anesthesia Plan:   intravenous sedation    Medications Planned:   midazolam (Versed) intravenously and fentanyl intravenously    Patient is an appropriate candidate for plan of sedation: yes    Electronically signed by Allan Murguia MD on 2/6/2025 at 10:33 AM

## 2025-02-07 VITALS
SYSTOLIC BLOOD PRESSURE: 118 MMHG | RESPIRATION RATE: 16 BRPM | BODY MASS INDEX: 22.78 KG/M2 | WEIGHT: 159.1 LBS | DIASTOLIC BLOOD PRESSURE: 70 MMHG | OXYGEN SATURATION: 98 % | TEMPERATURE: 98 F | HEART RATE: 68 BPM | HEIGHT: 70 IN

## 2025-02-07 LAB
ANION GAP SERPL CALCULATED.3IONS-SCNC: 10 MMOL/L (ref 3–16)
BASOPHILS # BLD: 0 K/UL (ref 0–0.2)
BASOPHILS NFR BLD: 0.4 %
BUN SERPL-MCNC: 6 MG/DL (ref 7–20)
CALCIUM SERPL-MCNC: 8.2 MG/DL (ref 8.3–10.6)
CHLORIDE SERPL-SCNC: 99 MMOL/L (ref 99–110)
CO2 SERPL-SCNC: 25 MMOL/L (ref 21–32)
CREAT SERPL-MCNC: 0.5 MG/DL (ref 0.8–1.3)
DEPRECATED RDW RBC AUTO: 15.3 % (ref 12.4–15.4)
EOSINOPHIL # BLD: 0.1 K/UL (ref 0–0.6)
EOSINOPHIL NFR BLD: 1.1 %
GFR SERPLBLD CREATININE-BSD FMLA CKD-EPI: >90 ML/MIN/{1.73_M2}
GLUCOSE BLD-MCNC: 146 MG/DL (ref 70–99)
GLUCOSE BLD-MCNC: 165 MG/DL (ref 70–99)
GLUCOSE SERPL-MCNC: 138 MG/DL (ref 70–99)
HCT VFR BLD AUTO: 26.5 % (ref 40.5–52.5)
HGB BLD-MCNC: 8.9 G/DL (ref 13.5–17.5)
LYMPHOCYTES # BLD: 0.4 K/UL (ref 1–5.1)
LYMPHOCYTES NFR BLD: 8.6 %
MCH RBC QN AUTO: 30.2 PG (ref 26–34)
MCHC RBC AUTO-ENTMCNC: 33.4 G/DL (ref 31–36)
MCV RBC AUTO: 90.5 FL (ref 80–100)
MONOCYTES # BLD: 0.3 K/UL (ref 0–1.3)
MONOCYTES NFR BLD: 6.1 %
NEUTROPHILS # BLD: 4.3 K/UL (ref 1.7–7.7)
NEUTROPHILS NFR BLD: 83.8 %
PERFORMED ON: ABNORMAL
PERFORMED ON: ABNORMAL
PLATELET # BLD AUTO: 219 K/UL (ref 135–450)
PMV BLD AUTO: 6.9 FL (ref 5–10.5)
POTASSIUM SERPL-SCNC: 4 MMOL/L (ref 3.5–5.1)
RBC # BLD AUTO: 2.93 M/UL (ref 4.2–5.9)
SODIUM SERPL-SCNC: 134 MMOL/L (ref 136–145)
VANCOMYCIN TROUGH SERPL-MCNC: <4 UG/ML (ref 10–20)
WBC # BLD AUTO: 5.1 K/UL (ref 4–11)

## 2025-02-07 PROCEDURE — 6370000000 HC RX 637 (ALT 250 FOR IP): Performed by: INTERNAL MEDICINE

## 2025-02-07 PROCEDURE — 2580000003 HC RX 258: Performed by: INTERNAL MEDICINE

## 2025-02-07 PROCEDURE — 85025 COMPLETE CBC W/AUTO DIFF WBC: CPT

## 2025-02-07 PROCEDURE — 99238 HOSP IP/OBS DSCHRG MGMT 30/<: CPT | Performed by: INTERNAL MEDICINE

## 2025-02-07 PROCEDURE — 80048 BASIC METABOLIC PNL TOTAL CA: CPT

## 2025-02-07 PROCEDURE — 2580000003 HC RX 258

## 2025-02-07 PROCEDURE — 6370000000 HC RX 637 (ALT 250 FOR IP)

## 2025-02-07 PROCEDURE — 6360000002 HC RX W HCPCS

## 2025-02-07 PROCEDURE — 6360000002 HC RX W HCPCS: Performed by: INTERNAL MEDICINE

## 2025-02-07 PROCEDURE — 36415 COLL VENOUS BLD VENIPUNCTURE: CPT

## 2025-02-07 PROCEDURE — 80202 ASSAY OF VANCOMYCIN: CPT

## 2025-02-07 RX ORDER — VANCOMYCIN 1 G/200ML
1000 INJECTION, SOLUTION INTRAVENOUS EVERY 8 HOURS
Status: DISCONTINUED | OUTPATIENT
Start: 2025-02-07 | End: 2025-02-07 | Stop reason: HOSPADM

## 2025-02-07 RX ORDER — LEVOFLOXACIN 750 MG/1
750 TABLET, FILM COATED ORAL DAILY
Qty: 14 TABLET | Refills: 0 | Status: SHIPPED | OUTPATIENT
Start: 2025-02-07 | End: 2025-02-21

## 2025-02-07 RX ADMIN — CEFEPIME 2000 MG: 2 INJECTION, POWDER, FOR SOLUTION INTRAVENOUS at 05:43

## 2025-02-07 RX ADMIN — PANTOPRAZOLE SODIUM 40 MG: 40 TABLET, DELAYED RELEASE ORAL at 07:44

## 2025-02-07 RX ADMIN — PANCRELIPASE LIPASE, PANCRELIPASE PROTEASE, PANCRELIPASE AMYLASE 60000 UNITS: 20000; 63000; 84000 CAPSULE, DELAYED RELEASE ORAL at 07:45

## 2025-02-07 RX ADMIN — FAMOTIDINE 20 MG: 20 TABLET ORAL at 08:59

## 2025-02-07 RX ADMIN — ENOXAPARIN SODIUM 40 MG: 100 INJECTION SUBCUTANEOUS at 09:03

## 2025-02-07 RX ADMIN — TRIAMTERENE AND HYDROCHLOROTHIAZIDE 1 TABLET: 37.5; 25 TABLET ORAL at 08:59

## 2025-02-07 RX ADMIN — POTASSIUM CHLORIDE 20 MEQ: 1500 TABLET, EXTENDED RELEASE ORAL at 08:58

## 2025-02-07 RX ADMIN — Medication 1000 MCG: at 08:58

## 2025-02-07 RX ADMIN — METOCLOPRAMIDE 5 MG: 10 TABLET ORAL at 08:58

## 2025-02-07 RX ADMIN — GUAIFENESIN 600 MG: 600 TABLET, EXTENDED RELEASE ORAL at 08:59

## 2025-02-07 RX ADMIN — ALLOPURINOL 300 MG: 300 TABLET ORAL at 08:58

## 2025-02-07 RX ADMIN — OXYCODONE HYDROCHLORIDE 5 MG: 5 TABLET ORAL at 06:04

## 2025-02-07 RX ADMIN — INSULIN HUMAN 20 UNITS: 100 INJECTION, SUSPENSION SUBCUTANEOUS at 09:04

## 2025-02-07 RX ADMIN — CALCIUM POLYCARBOPHIL 625 MG: 625 TABLET, FILM COATED ORAL at 08:59

## 2025-02-07 RX ADMIN — VANCOMYCIN HYDROCHLORIDE 750 MG: 750 INJECTION, POWDER, LYOPHILIZED, FOR SOLUTION INTRAVENOUS at 05:57

## 2025-02-07 RX ADMIN — DICYCLOMINE HYDROCHLORIDE 20 MG: 20 TABLET ORAL at 08:58

## 2025-02-07 ASSESSMENT — PAIN DESCRIPTION - LOCATION: LOCATION: ABDOMEN

## 2025-02-07 ASSESSMENT — PAIN SCALES - GENERAL
PAINLEVEL_OUTOF10: 5
PAINLEVEL_OUTOF10: 0

## 2025-02-07 ASSESSMENT — PAIN - FUNCTIONAL ASSESSMENT: PAIN_FUNCTIONAL_ASSESSMENT: ACTIVITIES ARE NOT PREVENTED

## 2025-02-07 ASSESSMENT — PAIN DESCRIPTION - DESCRIPTORS: DESCRIPTORS: DULL

## 2025-02-07 NOTE — PLAN OF CARE
Problem: Safety - Adult  Goal: Free from fall injury  Outcome: Adequate for Discharge     Problem: Chronic Conditions and Co-morbidities  Goal: Patient's chronic conditions and co-morbidity symptoms are monitored and maintained or improved  Outcome: Adequate for Discharge     Problem: Discharge Planning  Goal: Discharge to home or other facility with appropriate resources  Outcome: Adequate for Discharge     Problem: Nutrition Deficit:  Goal: Optimize nutritional status  Outcome: Adequate for Discharge     Problem: Pain  Goal: Verbalizes/displays adequate comfort level or baseline comfort level  Outcome: Adequate for Discharge

## 2025-02-07 NOTE — CARE COORDINATION
Order or dc noted. Pt left prior to mtg with pt.   Noted pt is IPTA and plan return home with wife. Chart reviewed and no dc needs identified.

## 2025-02-07 NOTE — PLAN OF CARE
Problem: Safety - Adult  Goal: Free from fall injury  2/6/2025 1010 by Melba Hurtado RN  Outcome: Progressing     Problem: Chronic Conditions and Co-morbidities  Goal: Patient's chronic conditions and co-morbidity symptoms are monitored and maintained or improved  2/6/2025 1904 by Mat Clayton, RN  Outcome: Progressing  2/6/2025 1010 by Melba Hurtado RN  Outcome: Progressing     Problem: Discharge Planning  Goal: Discharge to home or other facility with appropriate resources  2/6/2025 1904 by Mat Clayton RN  Outcome: Progressing  2/6/2025 1010 by Melba Hurtado RN  Outcome: Progressing     Problem: Nutrition Deficit:  Goal: Optimize nutritional status  Outcome: Progressing

## 2025-02-07 NOTE — PLAN OF CARE
Problem: Safety - Adult  Goal: Free from fall injury  2/7/2025 1143 by Melba Hurtado RN  Outcome: Adequate for Discharge     Problem: Chronic Conditions and Co-morbidities  Goal: Patient's chronic conditions and co-morbidity symptoms are monitored and maintained or improved  2/7/2025 1143 by Melba Hurtado RN  Outcome: Adequate for Discharge     Problem: Discharge Planning  Goal: Discharge to home or other facility with appropriate resources  2/7/2025 1143 by Melba Hurtado RN  Outcome: Adequate for Discharge     Problem: Nutrition Deficit:  Goal: Optimize nutritional status  2/7/2025 1143 by Melba Hurtado RN  Outcome: Adequate for Discharge     Problem: Pain  Goal: Verbalizes/displays adequate comfort level or baseline comfort level  2/7/2025 1143 by Melba Hurtado RN  Outcome: Adequate for Discharge

## 2025-02-07 NOTE — DISCHARGE SUMMARY
Name:  Sharath Ceja  Room:  0216/0216-01  MRN:    [id number]    Discharge Summary      This discharge summary is in conjunction with a complete physical exam done on the day of discharge.      Discharging Physician: ARBEN MORENO MD      Admit: 2/5/2025  Discharge:  2/7/2025     Diagnoses this Admission    Principal Problem:    Biliary obstruction  Active Problems:    Anemia    Loculated pleural effusion  Resolved Problems:    * No resolved hospital problems. *          Procedures (Please Review Full Report for Details)      Consults    IP CONSULT TO INTERVENTIONAL RADIOLOGY  IP CONSULT TO PHARMACY  IP CONSULT TO PULMONOLOGY      HPI:    The patient is a 74 y.o. male with a PMHx of cholangiocarcinoma s/p Whipple procedure in June 2023, BPH, gout, GERD, T2DM with long-term use of insulin, Crohn's disease, hypertension  who presents to [facility] with biliary drain blockage. History obtained from the patient and review of EMR.  Denies abd pain,nausea,vomiting   N ofever      Physical Exam at Discharge:  /74   Pulse 67   Temp 98.4 °F (36.9 °C) (Oral)   Resp 16   Ht 1.778 m (5' 10\")   Wt 72.2 kg (159 lb 1.6 oz)   SpO2 97%   BMI 22.83 kg/m²       Gen: No distress. Alert.   Eyes: PERRL. No sclera icterus. No conjunctival injection.   ENT: No discharge. Pharynx clear.   Neck: No JVD.  No Carotid Bruit. Trachea midline.  Resp: No accessory muscle use. No crackles. No wheezes. No rhonchi.   CV: Regular rate. Regular rhythm. No murmur.  No rub. No edema.   GI: Non-tender. Non-distended. No masses. No organomegaly. Normal bowel sounds. No hernia.   2 drains +  Skin: Warm and dry. No nodule on exposed extremities. No rash on exposed extremities.   M/S: No cyanosis. No joint deformity. No clubbing.   Neuro: Awake. Grossly nonfocal. Cranial nerves II through XII intact.   Psych: Oriented x 3. No anxiety or agitation.     Hospital Course        Metastatic cholangiocarcinoma s/p whipple and

## 2025-02-07 NOTE — DISCHARGE INSTRUCTIONS
Your information:  Name: Sharath Ceja  : 1950    Your instructions:    Follow up with family doctor in 1 week. Follow up with Oncologist for chemo treatments.    What to do after you leave the hospital:    Recommended diet: diabetic diet    Recommended activity: activity as tolerated        The following personal items were collected during your admission and were returned to you:    Belongings  Dental Appliances: None  Vision - Corrective Lenses: None  Hearing Aid: None  Clothing: Shirt, Pants, Jacket/Coat  Jewelry: None  Body Piercings Removed: N/A  Electronic Devices: Cell Phone  Weapons (Notify Protective Services/Security): None  Other Valuables: Other (Comment)  Home Medications: None  Valuables Given To: Patient  Provide Name(s) of Who Valuable(s) Were Given To: patient    Information obtained by:  By signing below, I understand that if any problems occur once I leave the hospital I am to contact family doctor.  I understand and acknowledge receipt of the instructions indicated above.

## 2025-02-09 LAB
BACTERIA BLD CULT ORG #2: NORMAL
BACTERIA BLD CULT: NORMAL

## 2025-02-10 ENCOUNTER — TELEPHONE (OUTPATIENT)
Dept: FAMILY MEDICINE CLINIC | Age: 75
End: 2025-02-10

## 2025-02-10 NOTE — TELEPHONE ENCOUNTER
Care Transitions Initial Follow Up Call    Outreach made within 2 business days of discharge: Yes    Patient: Jo Ceja Patient : 1950   MRN: [id number]  Reason for Admission: BILIARY OBSTRUCTIVE   Discharge Date: 25       Spoke with: JO    Discharge department/facility: [facility] Interactive Patient Contact:  Was patient able to fill all prescriptions: Yes  Was patient instructed to bring all medications to the follow-up visit: Yes  Is patient taking all medications as directed in the discharge summary? Yes  Does patient understand their discharge instructions: Yes  Does patient have questions or concerns that need addressed prior to 7-14 day follow up office visit: no    Additional needs identified to be addressed with provider  No needs identified             Scheduled appointment with PCP within 7-14 days    Follow Up  Future Appointments   Date Time Provider Department Center   2025  1:00 PM Real Whitehead MD [facility] DEP   3/5/2025  4:00 PM Real Whitehead MD [facility] DEP       So Foster MA

## 2025-02-10 NOTE — PROGRESS NOTES
Physician Progress Note      PATIENT:               JO IVERSON  CSN #:                  [id number]  :                       1950  ADMIT DATE:       2025 11:51 AM  DISCH DATE:        2025 1:36 PM  RESPONDING  PROVIDER #:        Melissa Toribio MD          QUERY TEXT:    Pt with documentation per dc summary: possible HCAP, treated with   Cefepime/Vancomycin and discharged on Levaquin x 2 weeks. Patient with   Metastatic cholangiocarcinoma and DM.   If possible, please document in the   progress notes and discharge summary if you are evaluating and/or treating any   of the following:    Note: CAP and HCAP indicate where the pneumonia was acquired, not a specific   type.    The medical record reflects the following:  Risk Factors: age, DM, pleural effusion, Metastatic cholangiocarcinoma s/p   whipple and hepaticojejunostomy  Clinical Indicators: possible HCAP, CT PE showing loculated right pleural   effusion with adjacent atelectasis  Treatment: CT, sputum culture, mucinex, Cefepime/Vancomycin, Levaquin x 2   weeks  Options provided:  -- Possible Gram negative pneumonia  -- Other - I will add my own diagnosis  -- Disagree - Not applicable / Not valid  -- Disagree - Clinically unable to determine / Unknown  -- Refer to Clinical Documentation Reviewer    PROVIDER RESPONSE TEXT:    This patient has possible gram negative pneumonia.    Query created by: Rubina Kebede on 2/10/2025 7:45 AM      QUERY TEXT:    Pt admitted with concern for biliary obstruction due to clogged biliary drain.   IR-Patent right and left internal external biliary drains.  After study,   please clarify the following:    The medical record reflects the following:  Risk Factors: Metastatic cholangiocarcinoma s/p whipple and   hepaticojejunostomy c/b anastamotic stricture, spyglass cholangioscopy on   24.  Clinical Indicators: biliary obstruction, Concern for clogged biliary drain.    IR-Patent right and left 
/81   Pulse 78   Temp 98.8 °F (37.1 °C) (Oral)   Resp 16   Ht 1.778 m (5' 10\")   Wt 72.2 kg (159 lb 1.6 oz)   SpO2 97%   BMI 22.83 kg/m²     Assessment complete. Meds passed. Pt denies needs at this time. Ambulating back and forth to bed and bathroom, doing well. Alert and pleasant. Doing much better after PRN pain medication earlier today. Biliary drains with bile in bags.         Bedside Mobility Assessment Tool (BMAT):     Assessment Level 1- Sit and Shake    1. From a semi-reclined position, ask patient to sit up and rotate to a seated position at the side of the bed. Can use the bedrail.    2. Ask patient to reach out and grab your hand and shake making sure patient reaches across his/her midline.   Pass- Patient is able to come to a seated position, maintain core strength. Maintains seated balance while reaching across midline. Move on to Assessment Level 2.     Assessment Level 2- Stretch and Point   1. With patient in seated position at the side of the bed, have patient place both feet on the floor (or stool) with knees no higher than hips.    2. Ask patient to stretch one leg and straighten the knee, then bend the ankle/flex and point the toes. If appropriate, repeat with the other leg.   Pass- Patient is able to demonstrate appropriate quad strength on intended weight bearing limb(s). Move onto Assessment Level 3.     Assessment Level 3- Stand   1. Ask patient to elevate off the bed or chair (seated to standing) using an assistive device (cane, bedrail).    2. Patient should be able to raise buttocks off be and hold for a count of five. May repeat once.   Pass- Patient maintains standing stability for at least 5 seconds, proceed to assessment level 4.    Assessment Level 4- Walk   1. Ask patient to march in place at bedside.    2. Then ask patient to advance step and return each foot. Some medical conditions may render a patient from stepping backwards, use your best clinical judgement. 
2/7  Vanc trough = < 4 at 0519.  Patient has not missed any doses.  Will increase vancomycin to 1000 mg q8h.  Recheck vanc trough tomorrow (2/8 at 0500).  Blaise Young, PharmD  2/7/2025 7:01 AM  
4 Eyes Skin Assessment     NAME:  Sharath Ceja  YOB: 1950  MEDICAL RECORD NUMBER:  [id number]    The patient is being assessed for  Admission    I agree that at least one RN has performed a thorough Head to Toe Skin Assessment on the patient. ALL assessment sites listed below have been assessed.      Areas assessed by both nurses:    Head, Face, Ears, Shoulders, Back, Chest, Arms, Elbows, Hands, Sacrum. Buttock, Coccyx, Ischium, and Legs. Feet and Heels    Patient has Left and right biliary drains, scattered bruising. No non healing wounds at this time.        Does the Patient have a Wound? No noted wound(s)       Mickey Prevention initiated by RN: No  Wound Care Orders initiated by RN: No    Pressure Injury (Stage 3,4, Unstageable, DTI, NWPT, and Complex wounds) if present, place Wound referral order by RN under : Yes    New Ostomies, if present place, Ostomy referral order under : Yes     Nurse 1 eSignature: Electronically signed by Mayte Schmidt RN on 2/6/25 at 4:44 AM EST    **SHARE this note so that the co-signing nurse can place an eSignature**    Nurse 2 eSignature: Electronically signed by Halley Sterling RN on 2/6/25 at 7:25 AM EST  
Bedside Mobility Assessment Tool (BMAT):     Assessment Level 1- Sit and Shake    1. From a semi-reclined position, ask patient to sit up and rotate to a seated position at the side of the bed. Can use the bedrail.    2. Ask patient to reach out and grab your hand and shake making sure patient reaches across his/her midline.   Pass- Patient is able to come to a seated position, maintain core strength. Maintains seated balance while reaching across midline. Move on to Assessment Level 2.     Assessment Level 2- Stretch and Point   1. With patient in seated position at the side of the bed, have patient place both feet on the floor (or stool) with knees no higher than hips.    2. Ask patient to stretch one leg and straighten the knee, then bend the ankle/flex and point the toes. If appropriate, repeat with the other leg.   Pass- Patient is able to demonstrate appropriate quad strength on intended weight bearing limb(s). Move onto Assessment Level 3.     Assessment Level 3- Stand   1. Ask patient to elevate off the bed or chair (seated to standing) using an assistive device (cane, bedrail).    2. Patient should be able to raise buttocks off be and hold for a count of five. May repeat once.   Pass- Patient maintains standing stability for at least 5 seconds, proceed to assessment level 4.    Assessment Level 4- Walk   1. Ask patient to march in place at bedside.    2. Then ask patient to advance step and return each foot. Some medical conditions may render a patient from stepping backwards, use your best clinical judgement.   Pass- Patient demonstrates balance while shifting weight and ability to step, takes independent steps, does not use assistive device patient is MOBILITY LEVEL 4.      Mobility Level- 4   
Bedside Mobility Assessment Tool (BMAT):     Assessment Level 1- Sit and Shake    1. From a semi-reclined position, ask patient to sit up and rotate to a seated position at the side of the bed. Can use the bedrail.    2. Ask patient to reach out and grab your hand and shake making sure patient reaches across his/her midline.   Pass- Patient is able to come to a seated position, maintain core strength. Maintains seated balance while reaching across midline. Move on to Assessment Level 2.     Assessment Level 2- Stretch and Point   1. With patient in seated position at the side of the bed, have patient place both feet on the floor (or stool) with knees no higher than hips.    2. Ask patient to stretch one leg and straighten the knee, then bend the ankle/flex and point the toes. If appropriate, repeat with the other leg.   Pass- Patient is able to demonstrate appropriate quad strength on intended weight bearing limb(s). Move onto Assessment Level 3.     Assessment Level 3- Stand   1. Ask patient to elevate off the bed or chair (seated to standing) using an assistive device (cane, bedrail).    2. Patient should be able to raise buttocks off be and hold for a count of five. May repeat once.   Pass- Patient maintains standing stability for at least 5 seconds, proceed to assessment level 4.    Assessment Level 4- Walk   1. Ask patient to march in place at bedside.    2. Then ask patient to advance step and return each foot. Some medical conditions may render a patient from stepping backwards, use your best clinical judgement.   Pass- Patient demonstrates balance while shifting weight and ability to step, takes independent steps, does not use assistive device patient is MOBILITY LEVEL 4.      Mobility Level- 4   
Comprehensive Nutrition Assessment    Type and Reason for Visit:  Initial, Positive nutrition screen (+ screen for MST = 2)    Nutrition Recommendations/Plan:   Continue ADULT DIET; Regular; 4 carb choices per meal diet order.   Monitor appetite and po intake.   Monitor nutrition-related labs, bowel function + GI status, and weight trends.      Malnutrition Assessment:  Malnutrition Status:  At risk for malnutrition (02/06/25 0288)    Context:  Chronic Illness     Findings of the 6 clinical characteristics of malnutrition:  Energy Intake:  Mild decrease in energy intake  Weight Loss:  No weight loss     Body Fat Loss:  Unable to assess (procedure)     Muscle Mass Loss:  Unable to assess (procedure)    Fluid Accumulation:  No fluid accumulation     Strength:  Not Performed    Nutrition Assessment:    patient is nutritionally compromised - inadequate oral intake r/t inadequate protein-energy intake, altered GI function, and catabolic illness/increased demand for energy/nutrients AEB poor po intake PTA, GI abnormality, and hx of cholangiocarcinoma that is metastatic; patient is at risk for further compromise d/t no po intake data documented, liver drain was blocked, and altered nutrition-related labs; will continue ADULT DIET; Regular; 4 carb choices per meal diet order    Nutrition Related Findings:    patient is A & O; patient presented with c/o abdominal pain, fever, cough, dyspnea on exertion and stated his liver drains have been blocked PTA; per ED note, he has a history of cholangiocarcinoma that is metastatic, he had a Whipple 2 years ago, finished chemo in March 2024, and then his cancer has returned after finding it on biopsy in January 2025; patient does have 2 external drainage devices for his liver and states 1 has completely stopped draining and the other has had diminished drainage from it; he is having some right sided abdominal pain since this has happened; + image guided biliary drain flush and 
Consult called into Pulmonology on 2/6/2025 at 0954.    JYaneth Almonte   
Discharge instructions given to pt verbalize understanding,transport called pt discharged home.  
Handoff report and transfer of care given at bedside to  Melba RN.  Patient in stable condition, denies needs/concerns at this time.  Call light within reach.     
Pt called to [facility].  
Pt returned to unit.  
Received call regarding IR consult from ER regarding \"liver drains not draining\". Dr. Murguia made aware.   
Took over care of this pt at this time. Call light within reach. Pt denies any needs, in stable condition. Bed in low locked position. Bed alarm on.  
Protocol order mode.        4-6 - enter or revise RT Bronchodilator order(s) to two equivalent RT bronchodilator orders with one order with BID Frequency and one order with Frequency of every 4 hours PRN wheezing or increased work of breathing using Per Protocol order mode.        7-10 - enter or revise RT Bronchodilator order(s) to two equivalent RT bronchodilator orders with one order with TID Frequency and one order with Frequency of every 4 hours PRN wheezing or increased work of breathing using Per Protocol order mode.       11-13 - enter or revise RT Bronchodilator order(s) to one equivalent RT bronchodilator order with QID Frequency and an Albuterol order with Frequency of every 4 hours PRN wheezing or increased work of breathing using Per Protocol order mode.      Greater than 13 - enter or revise RT Bronchodilator order(s) to one equivalent RT bronchodilator order with every 4 hours Frequency and an Albuterol order with Frequency of every 2 hours PRN wheezing or increased work of breathing using Per Protocol order mode.     Electronically signed by Jnenifer Cheatham RCP on 2/5/2025 at 10:08 PM

## 2025-02-12 ENCOUNTER — OFFICE VISIT (OUTPATIENT)
Dept: FAMILY MEDICINE CLINIC | Age: 75
End: 2025-02-12

## 2025-02-12 VITALS
OXYGEN SATURATION: 98 % | BODY MASS INDEX: 22.62 KG/M2 | HEIGHT: 70 IN | WEIGHT: 158 LBS | SYSTOLIC BLOOD PRESSURE: 118 MMHG | DIASTOLIC BLOOD PRESSURE: 68 MMHG | HEART RATE: 87 BPM

## 2025-02-12 DIAGNOSIS — N18.2 TYPE 2 DIABETES MELLITUS WITH STAGE 2 CHRONIC KIDNEY DISEASE, WITH LONG-TERM CURRENT USE OF INSULIN (HCC): ICD-10-CM

## 2025-02-12 DIAGNOSIS — E11.9 TYPE 2 DIABETES MELLITUS WITHOUT COMPLICATION, WITH LONG-TERM CURRENT USE OF INSULIN (HCC): ICD-10-CM

## 2025-02-12 DIAGNOSIS — C22.1 CHOLANGIOCARCINOMA (HCC): Primary | ICD-10-CM

## 2025-02-12 DIAGNOSIS — E11.22 TYPE 2 DIABETES MELLITUS WITH STAGE 2 CHRONIC KIDNEY DISEASE, WITH LONG-TERM CURRENT USE OF INSULIN (HCC): ICD-10-CM

## 2025-02-12 DIAGNOSIS — Z79.4 TYPE 2 DIABETES MELLITUS WITHOUT COMPLICATION, WITH LONG-TERM CURRENT USE OF INSULIN (HCC): ICD-10-CM

## 2025-02-12 DIAGNOSIS — K50.919 CROHN'S DISEASE WITH COMPLICATION, UNSPECIFIED GASTROINTESTINAL TRACT LOCATION (HCC): ICD-10-CM

## 2025-02-12 DIAGNOSIS — Z09 HOSPITAL DISCHARGE FOLLOW-UP: ICD-10-CM

## 2025-02-12 DIAGNOSIS — Z79.4 TYPE 2 DIABETES MELLITUS WITH STAGE 2 CHRONIC KIDNEY DISEASE, WITH LONG-TERM CURRENT USE OF INSULIN (HCC): ICD-10-CM

## 2025-02-12 ASSESSMENT — PATIENT HEALTH QUESTIONNAIRE - PHQ9
1. LITTLE INTEREST OR PLEASURE IN DOING THINGS: NOT AT ALL
SUM OF ALL RESPONSES TO PHQ QUESTIONS 1-9: 0
SUM OF ALL RESPONSES TO PHQ9 QUESTIONS 1 & 2: 0
2. FEELING DOWN, DEPRESSED OR HOPELESS: NOT AT ALL
SUM OF ALL RESPONSES TO PHQ QUESTIONS 1-9: 0

## 2025-02-12 NOTE — PROGRESS NOTES
Sharath Ceja (:  1950) is a 74 y.o. male,Established patient, here for evaluation of the following chief complaint(s):  No chief complaint on file.         Assessment & Plan  Cholangiocarcinoma (HCC)  Continue with oncology ongoing therapy.         Type 2 diabetes mellitus without complication, with long-term current use of insulin (HCC)  Stable continue current treatment metformin and insulin         Crohn's disease with complication, unspecified gastrointestinal tract location (HCC)    Stable continue current treatment.         Type 2 diabetes mellitus with stage 2 chronic kidney disease, with long-term current use of insulin (HCC)    See above         Hospital discharge follow-up    Reviewed medications.    Orders:    [place] DISCHARGE MEDS RECONCILED W/ CURRENT OUTPATIENT MED LIST      No follow-ups on file.       Subjective   HPI  74-year-old male returns for follow-up after hospitalization.  His percutaneous drainage tubes had become clogged.  He he had had evaluation including CT of the chest and abdomen which shows a loculated effusion in the right pleura.  Continues ongoing treatment for his cancer.  Indicates the Zenpep pancreatic enzymes are difficult for him to swallow he is opening the capsule and putting it on food which is caused oral irritation.  But he believes he is improving.  Review of Systems   No headache chest pain shortness of breath    Objective   Physical Exam  HENT:      Head: Normocephalic and atraumatic.      Mouth/Throat:      Pharynx: Posterior oropharyngeal erythema present.   Eyes:      Extraocular Movements: Extraocular movements intact.   Cardiovascular:      Rate and Rhythm: Normal rate and regular rhythm.   Pulmonary:      Effort: Pulmonary effort is normal.      Breath sounds: No wheezing, rhonchi or rales.   Musculoskeletal:      Cervical back: Neck supple.   Neurological:      Mental Status: He is alert and oriented to person, place, and time.                  An

## 2025-02-15 ENCOUNTER — APPOINTMENT (OUTPATIENT)
Dept: CT IMAGING | Age: 75
End: 2025-02-15
Payer: MEDICARE

## 2025-02-15 ENCOUNTER — HOSPITAL ENCOUNTER (EMERGENCY)
Age: 75
Discharge: HOME | End: 2025-02-15
Attending: STUDENT IN AN ORGANIZED HEALTH CARE EDUCATION/TRAINING PROGRAM
Payer: MEDICARE

## 2025-02-15 ENCOUNTER — APPOINTMENT (OUTPATIENT)
Dept: INTERVENTIONAL RADIOLOGY/VASCULAR | Age: 75
End: 2025-02-15
Payer: MEDICARE

## 2025-02-15 VITALS
HEART RATE: 81 BPM | SYSTOLIC BLOOD PRESSURE: 117 MMHG | HEIGHT: 70 IN | OXYGEN SATURATION: 98 % | WEIGHT: 158 LBS | TEMPERATURE: 98.4 F | RESPIRATION RATE: 17 BRPM | DIASTOLIC BLOOD PRESSURE: 68 MMHG | BODY MASS INDEX: 22.62 KG/M2

## 2025-02-15 DIAGNOSIS — T85.590A OBSTRUCTION OF PERCUTANEOUS TRANSHEPATIC BILIARY DRAINAGE CATHETER: Primary | ICD-10-CM

## 2025-02-15 LAB
ALBUMIN SERPL-MCNC: 2.9 G/DL (ref 3.4–5)
ALBUMIN/GLOB SERPL: 0.8 {RATIO} (ref 1.1–2.2)
ALP SERPL-CCNC: 170 U/L (ref 40–129)
ALT SERPL-CCNC: 20 U/L (ref 10–40)
ANION GAP SERPL CALCULATED.3IONS-SCNC: 11 MMOL/L (ref 3–16)
AST SERPL-CCNC: 19 U/L (ref 15–37)
BASOPHILS # BLD: 0 K/UL (ref 0–0.2)
BASOPHILS NFR BLD: 0 %
BILIRUB SERPL-MCNC: 1.2 MG/DL (ref 0–1)
BUN SERPL-MCNC: 7 MG/DL (ref 7–20)
CALCIUM SERPL-MCNC: 8 MG/DL (ref 8.3–10.6)
CHLORIDE SERPL-SCNC: 96 MMOL/L (ref 99–110)
CO2 SERPL-SCNC: 25 MMOL/L (ref 21–32)
CREAT SERPL-MCNC: 0.4 MG/DL (ref 0.8–1.3)
DEPRECATED RDW RBC AUTO: 15.7 % (ref 12.4–15.4)
EKG ATRIAL RATE: 85 BPM
EKG DIAGNOSIS: NORMAL
EKG P AXIS: 61 DEGREES
EKG P-R INTERVAL: 196 MS
EKG Q-T INTERVAL: 366 MS
EKG QRS DURATION: 72 MS
EKG QTC CALCULATION (BAZETT): 435 MS
EKG R AXIS: 57 DEGREES
EKG T AXIS: 56 DEGREES
EKG VENTRICULAR RATE: 85 BPM
EOSINOPHIL # BLD: 0 K/UL (ref 0–0.6)
EOSINOPHIL NFR BLD: 0.2 %
FLUAV RNA RESP QL NAA+PROBE: NOT DETECTED
FLUBV RNA RESP QL NAA+PROBE: NOT DETECTED
GFR SERPLBLD CREATININE-BSD FMLA CKD-EPI: >90 ML/MIN/{1.73_M2}
GLUCOSE BLD-MCNC: 78 MG/DL (ref 70–99)
GLUCOSE SERPL-MCNC: 167 MG/DL (ref 70–99)
HCT VFR BLD AUTO: 26.3 % (ref 40.5–52.5)
HGB BLD-MCNC: 8.9 G/DL (ref 13.5–17.5)
INR PPP: 1.36 (ref 0.85–1.15)
LIPASE SERPL-CCNC: 25 U/L (ref 13–60)
LYMPHOCYTES # BLD: 0.2 K/UL (ref 1–5.1)
LYMPHOCYTES NFR BLD: 5.2 %
MAGNESIUM SERPL-MCNC: 1.64 MG/DL (ref 1.8–2.4)
MCH RBC QN AUTO: 30.3 PG (ref 26–34)
MCHC RBC AUTO-ENTMCNC: 33.8 G/DL (ref 31–36)
MCV RBC AUTO: 89.6 FL (ref 80–100)
MONOCYTES # BLD: 0 K/UL (ref 0–1.3)
MONOCYTES NFR BLD: 0.8 %
NEUTROPHILS # BLD: 4.3 K/UL (ref 1.7–7.7)
NEUTROPHILS NFR BLD: 93.8 %
PERFORMED ON: NORMAL
PLATELET # BLD AUTO: 140 K/UL (ref 135–450)
PMV BLD AUTO: 7 FL (ref 5–10.5)
POTASSIUM SERPL-SCNC: 3.2 MMOL/L (ref 3.5–5.1)
PROT SERPL-MCNC: 6.5 G/DL (ref 6.4–8.2)
PROTHROMBIN TIME: 16.9 SEC (ref 11.9–14.9)
RBC # BLD AUTO: 2.94 M/UL (ref 4.2–5.9)
SARS-COV-2 RNA RESP QL NAA+PROBE: NOT DETECTED
SODIUM SERPL-SCNC: 132 MMOL/L (ref 136–145)
TROPONIN, HIGH SENSITIVITY: 13 NG/L (ref 0–22)
TROPONIN, HIGH SENSITIVITY: 14 NG/L (ref 0–22)
WBC # BLD AUTO: 4.5 K/UL (ref 4–11)

## 2025-02-15 PROCEDURE — 99152 MOD SED SAME PHYS/QHP 5/>YRS: CPT

## 2025-02-15 PROCEDURE — 36415 COLL VENOUS BLD VENIPUNCTURE: CPT

## 2025-02-15 PROCEDURE — 85025 COMPLETE CBC W/AUTO DIFF WBC: CPT

## 2025-02-15 PROCEDURE — 83735 ASSAY OF MAGNESIUM: CPT

## 2025-02-15 PROCEDURE — 93010 ELECTROCARDIOGRAM REPORT: CPT | Performed by: INTERNAL MEDICINE

## 2025-02-15 PROCEDURE — 83690 ASSAY OF LIPASE: CPT

## 2025-02-15 PROCEDURE — 74177 CT ABD & PELVIS W/CONTRAST: CPT

## 2025-02-15 PROCEDURE — 84484 ASSAY OF TROPONIN QUANT: CPT

## 2025-02-15 PROCEDURE — 93005 ELECTROCARDIOGRAM TRACING: CPT | Performed by: STUDENT IN AN ORGANIZED HEALTH CARE EDUCATION/TRAINING PROGRAM

## 2025-02-15 PROCEDURE — 6360000002 HC RX W HCPCS: Performed by: STUDENT IN AN ORGANIZED HEALTH CARE EDUCATION/TRAINING PROGRAM

## 2025-02-15 PROCEDURE — 2580000003 HC RX 258: Performed by: STUDENT IN AN ORGANIZED HEALTH CARE EDUCATION/TRAINING PROGRAM

## 2025-02-15 PROCEDURE — 6360000004 HC RX CONTRAST MEDICATION: Performed by: STUDENT IN AN ORGANIZED HEALTH CARE EDUCATION/TRAINING PROGRAM

## 2025-02-15 PROCEDURE — 47536 EXCHANGE BILIARY DRG CATH: CPT

## 2025-02-15 PROCEDURE — 96366 THER/PROPH/DIAG IV INF ADDON: CPT

## 2025-02-15 PROCEDURE — 85610 PROTHROMBIN TIME: CPT

## 2025-02-15 PROCEDURE — 96365 THER/PROPH/DIAG IV INF INIT: CPT

## 2025-02-15 PROCEDURE — 87636 SARSCOV2 & INF A&B AMP PRB: CPT

## 2025-02-15 PROCEDURE — 80053 COMPREHEN METABOLIC PANEL: CPT

## 2025-02-15 PROCEDURE — 99285 EMERGENCY DEPT VISIT HI MDM: CPT

## 2025-02-15 PROCEDURE — C1729 CATH, DRAINAGE: HCPCS

## 2025-02-15 RX ORDER — IOPAMIDOL 755 MG/ML
75 INJECTION, SOLUTION INTRAVASCULAR
Status: COMPLETED | OUTPATIENT
Start: 2025-02-15 | End: 2025-02-15

## 2025-02-15 RX ORDER — MIDAZOLAM HYDROCHLORIDE 5 MG/ML
INJECTION, SOLUTION INTRAMUSCULAR; INTRAVENOUS PRN
Status: COMPLETED | OUTPATIENT
Start: 2025-02-15 | End: 2025-02-15

## 2025-02-15 RX ORDER — FENTANYL CITRATE 50 UG/ML
INJECTION, SOLUTION INTRAMUSCULAR; INTRAVENOUS PRN
Status: COMPLETED | OUTPATIENT
Start: 2025-02-15 | End: 2025-02-15

## 2025-02-15 RX ORDER — MAGNESIUM SULFATE IN WATER 40 MG/ML
2000 INJECTION, SOLUTION INTRAVENOUS ONCE
Status: COMPLETED | OUTPATIENT
Start: 2025-02-15 | End: 2025-02-15

## 2025-02-15 RX ADMIN — MIDAZOLAM HYDROCHLORIDE 0.5 MG: 5 INJECTION, SOLUTION INTRAMUSCULAR; INTRAVENOUS at 10:54

## 2025-02-15 RX ADMIN — MIDAZOLAM HYDROCHLORIDE 1 MG: 5 INJECTION, SOLUTION INTRAMUSCULAR; INTRAVENOUS at 10:51

## 2025-02-15 RX ADMIN — MIDAZOLAM HYDROCHLORIDE 0.5 MG: 5 INJECTION, SOLUTION INTRAMUSCULAR; INTRAVENOUS at 11:04

## 2025-02-15 RX ADMIN — MAGNESIUM SULFATE HEPTAHYDRATE 2000 MG: 2 INJECTION, SOLUTION INTRAVENOUS at 05:07

## 2025-02-15 RX ADMIN — IOPAMIDOL 75 ML: 755 INJECTION, SOLUTION INTRAVENOUS at 03:52

## 2025-02-15 RX ADMIN — CEFTRIAXONE SODIUM 2000 MG: 1 INJECTION, POWDER, FOR SOLUTION INTRAMUSCULAR; INTRAVENOUS at 10:50

## 2025-02-15 RX ADMIN — FENTANYL CITRATE 50 MCG: 50 INJECTION INTRAMUSCULAR; INTRAVENOUS at 10:51

## 2025-02-15 RX ADMIN — FENTANYL CITRATE 25 MCG: 50 INJECTION INTRAMUSCULAR; INTRAVENOUS at 11:03

## 2025-02-15 RX ADMIN — FENTANYL CITRATE 25 MCG: 50 INJECTION INTRAMUSCULAR; INTRAVENOUS at 10:54

## 2025-02-15 ASSESSMENT — LIFESTYLE VARIABLES
HOW OFTEN DO YOU HAVE A DRINK CONTAINING ALCOHOL: NEVER
HOW MANY STANDARD DRINKS CONTAINING ALCOHOL DO YOU HAVE ON A TYPICAL DAY: PATIENT DOES NOT DRINK

## 2025-02-15 NOTE — PRE SEDATION
Sedation Pre-Procedure Note    Patient Name: Sharath Ceja   YOB: 1950  Room/Bed: 12/12  Medical Record Number: [id number]  Date: 2/15/2025   Time: 10:34 AM       Indication:  Dysfunctional biliary drains in the setting of biliary obstruction. Plan for biliary drain exchange x2.     Consent: I have discussed with the patient and/or the patient representative the indication, alternatives, and the possible risks and/or complications of the planned procedure and the anesthesia methods. The patient and/or patient representative appear to understand and agree to proceed.    Vital Signs:   Vitals:    02/15/25 0802   BP: 110/65   Pulse: 83   Resp:    Temp:    SpO2: 95%       Past Medical History:   has a past medical history of Adenocarcinoma (HCC), Asthma, Blood circulation, collateral, Carpal tunnel syndrome, CLL (chronic lymphocytic leukemia) (HCC), Crohn's colitis (HCC), Dental crowns present, Diabetes mellitus (HCC), emboli, GERD (gastroesophageal reflux disease), Gout, Hx of blood clots, Hypertension, Liver abscess, Pneumonia, prostate, Seasonal allergies, and Wears glasses.    Past Surgical History:   has a past surgical history that includes Tonsillectomy (1986); other surgical history (01/26/2022); ERCP (N/A, 01/26/2022); ERCP (01/26/2022); ERCP (01/26/2022); Upper gastrointestinal endoscopy (N/A, 03/11/2022); ERCP (N/A, 03/11/2022); ERCP (03/11/2022); ERCP (03/11/2022); cyst removal; ERCP (N/A, 04/13/2022); ERCP (04/13/2022); other surgical history (06/13/2022); ERCP (N/A, 06/13/2022); ERCP (06/13/2022); ERCP (N/A, 03/28/2023); ERCP (03/28/2023); ERCP (03/28/2023); other surgical history (05/24/2023); ERCP (N/A, 05/24/2023); ERCP (05/24/2023); ERCP (05/24/2023); ERCP (05/24/2023); Cholecystectomy; Pancreatectomy (N/A, 06/19/2023); knee surgery; Cardiac catheterization; Whipple procedure w/ laparoscopy; CT LIVER ABSCESS ASPIRATION; Port Surgery (Right, 08/16/2023); other surgical history

## 2025-02-15 NOTE — ED NOTES
Discharge instructions reviewed with patient and family and they verbalize understanding. PIV removed without complications. Patient wheeled out to private car in a wheelchair by this RN.

## 2025-02-15 NOTE — DISCHARGE INSTRUCTIONS
Return the nearest ED if develop severe pain, nausea and vomiting, or if your drain is not draining.

## 2025-02-15 NOTE — PLAN OF CARE
Contacted by Dr Granados from the ED regarding issues with leaking from the internal/external biliary drains. Patient currently has 12-F drains in place via the left and right biliary system.    Will plan for biliary drain exchange x2 this morning once IR team has arrived. Please keep patient NPO. Will plan on returning patient to the ED after the drains are exchanged to be discharged home. Discussed with Dr Granados.     Jesus Manuel Patterson DO  2/15/2025, 9:02 AM  Interventional Radiology  [phone])

## 2025-02-15 NOTE — ED NOTES
Patient arrives back to ED room from IR procedure. Patient is alert and oriented x 4. Given report from Benita MORRISON. No needs at this time.

## 2025-02-15 NOTE — OR NURSING
Image guided BILIARY DRAIN insertion right completed. Dr. Patterson placed 10.2 Estonian 40 cm BILIARY DRAINAGE CATHETER LOT # [id number] EXP 6/1/26 in the right. DRAIN SECURED with SUTURES.  Drain/tube dressing clean, dry, and intact. Pt tolerated procedure without any signs or symptoms of distress. Vital signs stable. Report called to ED RN. Pt transported back to ED in stable condition via bed by IR TEAM  .

## 2025-02-15 NOTE — ED NOTES
Bilateral Bili drain dressings removed, ckeansed with NS and sterile gauze then redressed per sterile technique.  Split gauze then 4x4 that applied and covered with suresite window dressing 6x8.  Pt tolerated well.  Gia hernandez

## 2025-02-15 NOTE — ED NOTES
Pt has two liver drains and left anterior drain dressing is saturated and leaking .  20 ml noted in bag and right drain has slight drainage noted and 60 ml noted inside bag.  Gia hernandez

## 2025-02-15 NOTE — PROGRESS NOTES
Patient has received IV contrast. Metformin will be held for 48 hours and restarted if serum creatinine is within FDA approved guidelines.     If patient is discharged prior to 48 hours, MetFORMIN will need to be addressed as part of the med reconciliation process.    Cyndi Sage PharmD, [facility], 2/15/2025 3:24 AM

## 2025-02-15 NOTE — BRIEF OP NOTE
Interventional Radiology  Brief Postoperative Note    Patient: Sharath Ceja  YOB: 1950  MRN: [id number]      Pre-operative Diagnosis: Dysfunctional biliary drains    Post-operative Diagnosis: Same    Procedure: Cholangiogram with biliary drain exchange x2    Anesthesia: Local and Moderate Sedation    Surgeons/Assistants: Jesus Manuel Patterson DO    Estimated Blood Loss: less than 50     Complications: None    Infection Present At Time Of Surgery (PATOS) (choose all levels that have infection present):  No infection present    Specimens: Was Not Obtained    Findings:   Both biliary drains were clogged along the distal aspects of the catheters within the bowel.  Successful biliary drain exchange x2.  The left biliary drain was exchange for another 12-F catheter. The right biliary drain was exchange for a 10.2-F catheter as there were no more 12-F catheters readily available.   Both drains left to external drainage.     Patient to recover in ED and discharge. Patient can be discharged at noon from an IR perspective.     Recommend patient follow up with GI and Surgical oncology. Recommend outpatient IR order for biliary exchange in 6-8 weeks for routine exchange or sooner if drain dysfunction reoccurs.       Jesus Manuel Patterson DO  2/15/2025, 11:13 AM  Interventional Radiology  [phone] ([phone])

## 2025-02-15 NOTE — ED PROVIDER NOTES
Haven't seen patient in more than 1 year. An appointment is needed prior to refill.    [facility] EMERGENCY DEPARTMENT      CHIEF COMPLAINT  Drainage from Incision (Liver drainage tubes leaking, patient states they started leaking around noon )       HISTORY OF PRESENT ILLNESS  Sharath Ceja is a 74 y.o. male  who presents to the ED complaining of drainage from his percutaneous biliary drain tubes.  Patient has a history of metastatic cholangiocarcinoma.  Has 2 biliary tube in the anterior abdomen.  States that these were replaced about 2 weeks ago and have been draining well until earlier this evening when he started noticing significant leaking with saturation of his dressing this with a bile.  Denies any significant pain, nausea, vomiting, fevers.    No other complaints, modifying factors or associated symptoms.     I have reviewed the following from the nursing documentation.    Past Medical History:   Diagnosis Date    Adenocarcinoma (HCC) 06/06/2023    8 + LYPMH NODES, WHIPPLE    Asthma     Blood circulation, collateral     Carpal tunnel syndrome     CLL (chronic lymphocytic leukemia) (HCC)     DENIES    Crohn's colitis 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    Hypertension     Liver abscess     DRAINED    Pneumonia     pneumonia,asthma    prostate     infection    Seasonal allergies     Wears glasses      Past Surgical History:   Procedure Laterality Date    CARDIAC CATHETERIZATION      1980S, DR ROWELL, NO ISSUES SINCE, HAS APT 8/14/23    CHOLECYSTECTOMY      CT LIVER ABSCESS ASPIRATION      PT STATES DRAINED LIVER ABSCESS    CT NEEDLE BIOPSY LIVER PERCUTANEOUS  1/23/2025    CT NEEDLE BIOPSY LIVER PERCUTANEOUS 1/23/2025 [facility] CT SCAN    CYST REMOVAL      KNEE & CLOSE TO RECTUM    ERCP N/A 01/26/2022    [facility]

## 2025-02-15 NOTE — OR NURSING
Pt arrived for image guided BILIARY DRAIN X 2 insertion bilateral . Procedure explained including the risk and benefits of the procedure. All questions answered. Pt verbalizes understanding of the procedure and states no more questions. Consent confirmed. Vital signs stable. Labs, allergies, medications, and code status reviewed. No contraindications noted.     Vital Signs  Vitals:    02/15/25 0802   BP: 110/65   Pulse: 83   Resp:    Temp:    SpO2: 95%    (vital signs in table format)    Pre Elenita Score  2 - Able to move 4 extremities voluntarily on command  2 - BP+/- 20mmHg of normal  2 - Fully awake  2 - Able to maintain oxygen saturation >92% on room air  2 - Able to breathe deeply and cough freely    Allergies  Codeine and Amoxicillin-pot clavulanate (allergies)    Labs  Lab Results   Component Value Date    INR 1.36 (H) 02/15/2025    PROTIME 16.9 (H) 02/15/2025     Lab Results   Component Value Date    CREATININE 0.4 (L) 02/15/2025    BUN 7 02/15/2025     (L) 02/15/2025    K 3.2 (L) 02/15/2025    CL 96 (L) 02/15/2025    CO2 25 02/15/2025     Lab Results   Component Value Date    WBC 4.5 02/15/2025    HGB 8.9 (L) 02/15/2025    HCT 26.3 (L) 02/15/2025    MCV 89.6 02/15/2025     02/15/2025

## 2025-02-15 NOTE — ED PROVIDER NOTES
SIGN OUT ED ATTENDING NOTE:     Signout from the outgoing ED physician; I have resumed care of this patient at this time, however please see a primary physician H&P, documentation, and evaluation to this point.    Chief Complaint   Patient presents with    Drainage from Incision     Liver drainage tubes leaking, patient states they started leaking around noon        Vitals:    02/15/25 0506 02/15/25 0600 02/15/25 0700 02/15/25 0802   BP: 115/64 111/66 118/67 110/65   Pulse: 83 86 81 83   Resp:  16     Temp:       TempSrc:       SpO2: 98% 97% 98% 95%   Weight:       Height:           10:14 AM EST  This is a sign out from Dr. Granados.  At this time, patient is awaiting to go to IR to get biliary drain replaced; IR already saw patient here in the emergency department.  He is stable at this time.  Will go to the IR suite, and come back to the ER for ultimate disposition.  Remains stable at this time.       Ultimately, patient went to IR suite, came back, feels fine, and successful placement.  Discharged home in stable condition after about 35 minutes of monitoring status postprocedure.  Strict return precautions provided at length.        Medications   iopamidol (ISOVUE-370) 76 % injection 75 mL (75 mLs IntraVENous Given 2/15/25 0352)   magnesium sulfate 2000 mg in 50 mL IVPB premix (0 mg IntraVENous Stopped 2/15/25 0707)     Labs Reviewed   CBC WITH AUTO DIFFERENTIAL - Abnormal; Notable for the following components:       Result Value    RBC 2.94 (*)     Hemoglobin 8.9 (*)     Hematocrit 26.3 (*)     RDW 15.7 (*)     Lymphocytes Absolute 0.2 (*)     All other components within normal limits   COMPREHENSIVE METABOLIC PANEL W/ REFLEX TO MG FOR LOW K - Abnormal; Notable for the following components:    Sodium 132 (*)     Potassium reflex Magnesium 3.2 (*)     Chloride 96 (*)     Glucose 167 (*)     Creatinine 0.4 (*)     Calcium 8.0 (*)     Albumin 2.9 (*)     Albumin/Globulin Ratio 0.8 (*)     Total Bilirubin 1.2 (*)

## 2025-02-15 NOTE — OR NURSING
Image guided BILIARY DRAIN insertion left completed. Dr. Patterson placed 12 [place]ian 40 cm BILIARY DRAINAGE CATHETER LOT # [id number] EXP 10/21/27 in the left. Drain/tube secured  with SUTURES.  Drain/tube dressing clean, dry, and intact. Pt tolerated procedure without any signs or symptoms of distress. Vital signs stable. Report called to ED RN. Pt transported back to ED in stable condition via bed by IR TEAM.     Medications  Versed: 2 mg  Fentanyl: 100 mcg    Vital Signs  Vitals:    02/15/25 1104   BP: 115/66   Pulse: 78   Resp: 17   Temp:    SpO2: 99%    (vital signs in table format)    Post Elenita  2 - Able to move 4 extremities voluntarily on command  2 - BP+/- 20mmHg of normal  2 - Fully awake  2 - Able to maintain oxygen saturation >92% on room air  2 - Able to breathe deeply and cough freely

## 2025-02-15 NOTE — ED NOTES
4103 Placed call to [facility] for stat consult to IR. Writer is currently on while IR is being paged. Expecting a call from Dr. Patterson     9140 Writer connected Dr. Patterson to Dr. Granados via phone call.     4706 Placed call to [facility] for follow up consult with IR Dr. Patterson. Connected DR. Patterson with Dr. Granados.     6012 Placed call to [facility] for follow up consult with Dr. Patterson

## 2025-02-17 ENCOUNTER — TELEPHONE (OUTPATIENT)
Dept: CASE MANAGEMENT | Age: 75
End: 2025-02-17

## 2025-02-17 LAB
Lab: NORMAL
REPORT: NORMAL
THIS TEST SENT TO: NORMAL

## 2025-02-17 NOTE — TELEPHONE ENCOUNTER
CT Chest 2/5/25     Loculated right pleural effusion with adjacent atelectasis, new from prior  study on 01/17/2025.  Clinical and imaging follow-up to resolution  recommended.   Subsolid pulmonary nodules in the right upper lobe measuring up to 12 mm. Attention on follow-up or PET-CT.    Jena ACKERMAN(R)  Patient Navigator  Incidentals/Lung Navigation  [email or web address]

## 2025-03-03 DIAGNOSIS — K50.80 CROHN'S DISEASE OF BOTH SMALL AND LARGE INTESTINE WITHOUT COMPLICATIONS (HCC): ICD-10-CM

## 2025-03-03 RX ORDER — DICYCLOMINE HCL 20 MG
20 TABLET ORAL DAILY
Qty: 90 TABLET | Refills: 3 | Status: SHIPPED | OUTPATIENT
Start: 2025-03-03

## 2025-03-05 ENCOUNTER — TELEPHONE (OUTPATIENT)
Dept: CASE MANAGEMENT | Age: 75
End: 2025-03-05

## 2025-03-05 NOTE — TELEPHONE ENCOUNTER
Reminder      CT Chest 2/5/25      Loculated right pleural effusion with adjacent atelectasis, new from prior study on 01/17/2025.  Clinical and imaging follow-up to resolution recommended.   Subsolid pulmonary nodules in the right upper lobe measuring up to 12 mm. Attention on follow-up or PET-CT.     Jena ACKERMAN(R)  Patient Navigator  Incidentals/Lung Navigation  [email or web address]

## 2025-03-10 RX ORDER — ALBUTEROL SULFATE 90 UG/1
INHALANT RESPIRATORY (INHALATION)
Qty: 9 G | Refills: 3 | Status: SHIPPED | OUTPATIENT
Start: 2025-03-10

## 2025-03-11 DIAGNOSIS — K50.80 CROHN'S DISEASE OF BOTH SMALL AND LARGE INTESTINE WITHOUT COMPLICATIONS (HCC): ICD-10-CM

## 2025-03-11 DIAGNOSIS — K21.9 GASTROESOPHAGEAL REFLUX DISEASE, UNSPECIFIED WHETHER ESOPHAGITIS PRESENT: ICD-10-CM

## 2025-03-11 DIAGNOSIS — C22.1 CHOLANGIOCARCINOMA (HCC): ICD-10-CM

## 2025-03-11 RX ORDER — METOCLOPRAMIDE 5 MG/1
TABLET ORAL
Qty: 30 TABLET | Refills: 0 | Status: SHIPPED | OUTPATIENT
Start: 2025-03-11

## 2025-03-13 ENCOUNTER — HOSPITAL ENCOUNTER (EMERGENCY)
Age: 75
Discharge: LEFT BEFORE DISCHARGE | End: 2025-03-13

## 2025-03-13 ENCOUNTER — TELEPHONE (OUTPATIENT)
Dept: SURGERY | Age: 75
End: 2025-03-13

## 2025-03-13 NOTE — ED NOTES
Patient family requesting to speak with someone about their wait in the lobby. Went out to speak with patient and family myself. Patient states he is only here for someone to look at his wound and \"that is it\". Explained volume of ER and acuity of critical patients, including violent patients requiring physical and chemical restraint. While both patient and family voiced understanding of this, they report they simply cannot wait any longer to be seen. Patient's family reports he is likely to pass out if he has to wait any longer because his hips are numb from sitting in the wheelchair so long. Did advise them that he should likely be seen if he felt ill or symptoms of passing out. Patient advised he would rather go home and lay in his own bed. Explained that patient was going to be seen as soon as possible, but patient had already decided they would like to leave and come back a different time. Patient waited approximately an hour and 10 minutes prior to departure. Did apologize for patient's delay and advised they are welcome to come back any time.

## 2025-03-13 NOTE — TELEPHONE ENCOUNTER
Jennifer Morales [facility] contacted our office with concerns of patient's biliary drain insertion site is infected. States [facility] did not send orders to care for site until recently. So patient has not had home care in over a month. Spouse states last time dressing was changed was on 2/15/25 when drain was last exchanged. Spouse states when patient goes to appointment's Physicians do not look at insertion site. Patient denies fevers and nausea. Does endorse pain at the insertion site. Jennifer guillaume area is red with green drainage. This RN provided verbal orders for wound care. Jennifer states she is instructing patient to go to the ER to have wound evaluated.

## 2025-03-14 ENCOUNTER — TELEPHONE (OUTPATIENT)
Dept: INTERVENTIONAL RADIOLOGY/VASCULAR | Age: 75
End: 2025-03-14

## 2025-03-14 NOTE — TELEPHONE ENCOUNTER
Call from Prachi at Dr. Pandey's office reference patient. States it is reported by patient the tube sites are infected and patient has difficulty obtaining home health care. Advised Prachi if was emergent need they would need to have a doc to doc conversation with on call IR MD.     After reviewing patient's chart, pt was at [facility] ED on 03/13/25, but left without being seen.     Received second call from Prachi, advising doc to doc call not needed at this time, patient is being advised to go to ED for evaluation of infection at sites.

## 2025-03-15 ENCOUNTER — HOSPITAL ENCOUNTER (EMERGENCY)
Age: 75
Discharge: HOME | End: 2025-03-15
Attending: EMERGENCY MEDICINE
Payer: MEDICARE

## 2025-03-15 ENCOUNTER — APPOINTMENT (OUTPATIENT)
Dept: CT IMAGING | Age: 75
End: 2025-03-15
Payer: MEDICARE

## 2025-03-15 VITALS
TEMPERATURE: 98.5 F | BODY MASS INDEX: 22.67 KG/M2 | OXYGEN SATURATION: 98 % | SYSTOLIC BLOOD PRESSURE: 112 MMHG | DIASTOLIC BLOOD PRESSURE: 69 MMHG | RESPIRATION RATE: 16 BRPM | HEIGHT: 70 IN | HEART RATE: 74 BPM

## 2025-03-15 DIAGNOSIS — K83.9 BILE LEAK: Primary | ICD-10-CM

## 2025-03-15 LAB
ALBUMIN SERPL-MCNC: 2.9 G/DL (ref 3.4–5)
ALP SERPL-CCNC: 219 U/L (ref 40–129)
ALT SERPL-CCNC: 22 U/L (ref 10–40)
ANION GAP SERPL CALCULATED.3IONS-SCNC: 11 MMOL/L (ref 3–16)
AST SERPL-CCNC: 25 U/L (ref 15–37)
BASOPHILS # BLD: 0 K/UL (ref 0–0.2)
BASOPHILS NFR BLD: 0.5 %
BILIRUB DIRECT SERPL-MCNC: 0.3 MG/DL (ref 0–0.3)
BILIRUB INDIRECT SERPL-MCNC: 0.2 MG/DL (ref 0–1)
BILIRUB SERPL-MCNC: 0.5 MG/DL (ref 0–1)
BUN SERPL-MCNC: 7 MG/DL (ref 7–20)
CALCIUM SERPL-MCNC: 8.2 MG/DL (ref 8.3–10.6)
CHLORIDE SERPL-SCNC: 99 MMOL/L (ref 99–110)
CO2 SERPL-SCNC: 26 MMOL/L (ref 21–32)
CREAT SERPL-MCNC: 0.4 MG/DL (ref 0.8–1.3)
DEPRECATED RDW RBC AUTO: 19.3 % (ref 12.4–15.4)
EOSINOPHIL # BLD: 0 K/UL (ref 0–0.6)
EOSINOPHIL NFR BLD: 0.2 %
GFR SERPLBLD CREATININE-BSD FMLA CKD-EPI: >90 ML/MIN/{1.73_M2}
GLUCOSE SERPL-MCNC: 100 MG/DL (ref 70–99)
HCT VFR BLD AUTO: 25.6 % (ref 40.5–52.5)
HGB BLD-MCNC: 8.5 G/DL (ref 13.5–17.5)
INR PPP: 1.4 (ref 0.85–1.15)
LYMPHOCYTES # BLD: 0.3 K/UL (ref 1–5.1)
LYMPHOCYTES NFR BLD: 7.9 %
MAGNESIUM SERPL-MCNC: 2.23 MG/DL (ref 1.8–2.4)
MCH RBC QN AUTO: 29.7 PG (ref 26–34)
MCHC RBC AUTO-ENTMCNC: 33.1 G/DL (ref 31–36)
MCV RBC AUTO: 89.9 FL (ref 80–100)
MONOCYTES # BLD: 0.1 K/UL (ref 0–1.3)
MONOCYTES NFR BLD: 1.4 %
NEUTROPHILS # BLD: 3.6 K/UL (ref 1.7–7.7)
NEUTROPHILS NFR BLD: 90 %
PLATELET # BLD AUTO: 241 K/UL (ref 135–450)
PMV BLD AUTO: 7.4 FL (ref 5–10.5)
POTASSIUM SERPL-SCNC: 3.4 MMOL/L (ref 3.5–5.1)
PROT SERPL-MCNC: 6.1 G/DL (ref 6.4–8.2)
PROTHROMBIN TIME: 17.3 SEC (ref 11.9–14.9)
RBC # BLD AUTO: 2.85 M/UL (ref 4.2–5.9)
SODIUM SERPL-SCNC: 136 MMOL/L (ref 136–145)
WBC # BLD AUTO: 4 K/UL (ref 4–11)

## 2025-03-15 PROCEDURE — 36415 COLL VENOUS BLD VENIPUNCTURE: CPT

## 2025-03-15 PROCEDURE — 80048 BASIC METABOLIC PNL TOTAL CA: CPT

## 2025-03-15 PROCEDURE — 6360000004 HC RX CONTRAST MEDICATION

## 2025-03-15 PROCEDURE — 85610 PROTHROMBIN TIME: CPT

## 2025-03-15 PROCEDURE — 99285 EMERGENCY DEPT VISIT HI MDM: CPT

## 2025-03-15 PROCEDURE — 83735 ASSAY OF MAGNESIUM: CPT

## 2025-03-15 PROCEDURE — 85025 COMPLETE CBC W/AUTO DIFF WBC: CPT

## 2025-03-15 PROCEDURE — 80076 HEPATIC FUNCTION PANEL: CPT

## 2025-03-15 PROCEDURE — 74177 CT ABD & PELVIS W/CONTRAST: CPT

## 2025-03-15 RX ORDER — IOPAMIDOL 755 MG/ML
75 INJECTION, SOLUTION INTRAVASCULAR
Status: COMPLETED | OUTPATIENT
Start: 2025-03-15 | End: 2025-03-15

## 2025-03-15 RX ADMIN — IOPAMIDOL 75 ML: 755 INJECTION, SOLUTION INTRAVENOUS at 14:24

## 2025-03-15 ASSESSMENT — PAIN - FUNCTIONAL ASSESSMENT: PAIN_FUNCTIONAL_ASSESSMENT: NONE - DENIES PAIN

## 2025-03-15 NOTE — ED TRIAGE NOTES
Patient presents to the ED from home with c/o dressing change, wound check to his right side catheter. Patient reports green, brown, puss drainage since Thursday. Patient reporting that he came to the ER Thursday night but was not seen. Liver CA patient. Denies fever.

## 2025-03-15 NOTE — ED PROVIDER NOTES
[facility] EMERGENCY DEPARTMENT  EMERGENCY DEPARTMENT ENCOUNTER        Pt Name: Sharath Ceja  MRN: [id number]  Birthdate 1950  Date of evaluation: 3/15/2025  Provider: MARCOS Casas  PCP: Real Whitehead MD  Note Started: 11:47 AM EDT 3/15/25       I have seen and evaluated this patient with my supervising physician Dr. Nolasco       CHIEF COMPLAINT       Chief Complaint   Patient presents with    Dressing Change    Wound Check       HISTORY OF PRESENT ILLNESS: 1 or more Elements     History From: Patient    Limitations to history : None    Social Determinants Significantly Affecting Health : None    Chief Complaint: Wound check    Sharath Ceja is a 74 y.o. male who presents to the emergency department for a wound check.  He has a history of cholangiocarcinoma and he has a biliary drain in 4.  States that the last time the had of this replaced he did not change the dressing for a few weeks when the home health care nurse took it off she was concerned that it looked infected.  They came to the emergency department on Thursday however the patient had recently had chemo and due to the long wait times he decided to leave.  Called Dr. Pandey office his GI provider yesterday who told him to come into the emergency room to be evaluated.  States that from their understanding they wanted it to be checked for concerns of infection and if not to call interventional radiology to have the tube replaced.  He denies fevers, chills, chest pain, shortness of breath, abdominal pain, nausea, vomiting.    Nursing Notes were all reviewed and agreed with or any disagreements were addressed in the HPI.    REVIEW OF SYSTEMS :      Review of Systems    Positives and Pertinent negatives as per HPI.     SURGICAL HISTORY     Past Surgical History:   Procedure Laterality Date    CARDIAC CATHETERIZATION      1980S, DR ROWELL, NO ISSUES SINCE, HAS APT 8/14/23    CHOLECYSTECTOMY      CT LIVER ABSCESS ASPIRATION       PT STATES DRAINED LIVER ABSCESS    CT NEEDLE BIOPSY LIVER PERCUTANEOUS  1/23/2025    CT NEEDLE BIOPSY LIVER PERCUTANEOUS 1/23/2025 [facility] CT SCAN    CYST REMOVAL      KNEE & CLOSE TO RECTUM    ERCP N/A 01/26/2022    ERCP BIOPSY performed by Tab Pandey MD at [facility] ENDOSCOPY    ERCP  01/26/2022    ERCP SPHINCTER/PAPILLOTOMY performed by Tab Pandey MD at [facility] ENDOSCOPY    ERCP  01/26/2022    ERCP STENT INSERTION performed by Tab Pandey MD at [facility] ENDOSCOPY    ERCP N/A 03/11/2022    ERCP STENT REMOVAL performed by Tab Pandey MD at [facility] ENDOSCOPY    ERCP  03/11/2022    ERCP STENT INSERTION performed by Tab Pandey MD at [facility] ENDOSCOPY    ERCP  03/11/2022    ERCP BIOPSY performed by Tab Pandey MD at [facility] ENDOSCOPY    ERCP N/A 04/13/2022    ERCP STENT INSERTION performed by Tab Pandey MD at [facility] ENDOSCOPY    ERCP  04/13/2022    ERCP ENDOSCOPIC RETROGRADE CHOLANGIOPANCREATOGRAPHY DIRECT VISUALIZATION performed by Tab Pandey MD at [facility] ENDOSCOPY    ERCP N/A 06/13/2022    ERCP BILIARY BRUSHING performed by Tab Pandey MD at [facility] ENDOSCOPY    ERCP  06/13/2022    ERCP STENT INSERTION performed by Tab Pandey MD at [facility] ENDOSCOPY    ERCP N/A 03/28/2023    ERCP ENDOSCOPIC RETROGRADE CHOLANGIOPANCREATOGRAPHY performed by Juan Luis Peñaloza MD at [facility] ENDOSCOPY    ERCP  03/28/2023    ERCP BILIARY BRUSHING performed by Juan Luis Peñaloza MD at [facility] ENDOSCOPY    ERCP  03/28/2023    ERCP STENT INSERTION performed by Juan Luis Peñaloza MD at [facility] ENDOSCOPY    ERCP N/A 05/24/2023    ERCP STENT REMOVAL/EXCHANGE performed by Tab Pandey MD at [facility] ENDOSCOPY    ERCP  05/24/2023    ERCP DILATION BALLOON performed by Tab Pandey MD at [facility] ENDOSCOPY    ERCP  05/24/2023    ERCP BILIARY BRUSHING performed by Tab

## 2025-03-15 NOTE — PROGRESS NOTES
IR On Call Note    I received a phone call from the emergency department PA Nimco Mason regarding patient having leakage of bile around his 2 internal/external biliary drains.  I recommended a CT of the abdomen and pelvis with contrast to assess the positioning of the drains.  On CT, his drains are appropriately positioned with all sideholes within the biliary tree and small bowel.  Additionally, the degree of biliary ductal dilatation is slightly improved compared to the prior exam in February.  After discussion with Nimco, patient has leakage of bile around his tubes but no sign of infection at the skin no sign of systemic infection such as fever, tachycardia, hypotension, or elevated white blood cell count.    Patient currently has his biliary drains attached to external drainage with bile in both bags.  I recommend that the patient maintain external drainage to bag and to monitor for signs of systemic infection including fever, malaise, or chills.  We will arrange for the patient to be seen as early as possible on Monday, 3/17/2025 for exchange of his biliary drains given that there is no emergent indication for exchange.  Please tell the patient to take nothing by mouth after midnight on Sunday evening so he received sedation on Monday.    I will communicate with the IR [facility] team for Monday so he is contacted in a timely fashion.    Laith Meek MD  3/15/2025, 2:53 PM  Interventional Radiology  [phone])

## 2025-03-15 NOTE — DISCHARGE INSTRUCTIONS
Please do not eat or drink anything after midnight on Sunday.  Someone should give you a call to schedule an appointment with IR to have your drain evaluated on Monday.  Please return to this department for any new or worsening symptoms including fevers, chills, nausea, vomiting, diarrhea, pus draining, spreading redness or pain.  Please follow-up with your GI provider.

## 2025-03-15 NOTE — ED NOTES
The patient has been discharged in stable condition at this time. The patient left with his wife and both of them verbalized understanding to the discharge instructions and had no further questions for me at this time. The patients vitals signs upon discharge are as follows:     Vitals:    03/15/25 1523   BP: 112/69   Pulse: 74   Resp: 16   Temp: 98.5 °F (36.9 °C)   SpO2: 98%

## 2025-03-15 NOTE — ED NOTES
The patients wound care has been completed. Mi RODRÍGUEZ is aware and said that the patient can be discharged at this time.

## 2025-03-17 ENCOUNTER — HOSPITAL ENCOUNTER (OUTPATIENT)
Dept: INTERVENTIONAL RADIOLOGY/VASCULAR | Age: 75
Discharge: HOME | End: 2025-03-17
Payer: MEDICARE

## 2025-03-17 ENCOUNTER — TELEPHONE (OUTPATIENT)
Dept: INTERVENTIONAL RADIOLOGY/VASCULAR | Age: 75
End: 2025-03-17

## 2025-03-17 VITALS
OXYGEN SATURATION: 97 % | SYSTOLIC BLOOD PRESSURE: 117 MMHG | DIASTOLIC BLOOD PRESSURE: 69 MMHG | BODY MASS INDEX: 20.47 KG/M2 | HEIGHT: 70 IN | RESPIRATION RATE: 14 BRPM | HEART RATE: 104 BPM | TEMPERATURE: 98.9 F | WEIGHT: 143 LBS

## 2025-03-17 DIAGNOSIS — K83.9 BILE DUCT DISEASE: ICD-10-CM

## 2025-03-17 LAB
GLUCOSE BLD-MCNC: 134 MG/DL (ref 70–99)
GLUCOSE BLD-MCNC: 219 MG/DL (ref 70–99)
GLUCOSE BLD-MCNC: 51 MG/DL (ref 70–99)
GLUCOSE BLD-MCNC: 55 MG/DL (ref 70–99)
GLUCOSE BLD-MCNC: 83 MG/DL (ref 70–99)
PERFORMED ON: ABNORMAL
PERFORMED ON: NORMAL

## 2025-03-17 PROCEDURE — 6360000004 HC RX CONTRAST MEDICATION: Performed by: INTERNAL MEDICINE

## 2025-03-17 PROCEDURE — 2500000003 HC RX 250 WO HCPCS: Performed by: STUDENT IN AN ORGANIZED HEALTH CARE EDUCATION/TRAINING PROGRAM

## 2025-03-17 PROCEDURE — 6360000002 HC RX W HCPCS: Performed by: STUDENT IN AN ORGANIZED HEALTH CARE EDUCATION/TRAINING PROGRAM

## 2025-03-17 PROCEDURE — 47536 EXCHANGE BILIARY DRG CATH: CPT

## 2025-03-17 PROCEDURE — 2580000003 HC RX 258: Performed by: STUDENT IN AN ORGANIZED HEALTH CARE EDUCATION/TRAINING PROGRAM

## 2025-03-17 PROCEDURE — C1729 CATH, DRAINAGE: HCPCS

## 2025-03-17 PROCEDURE — 99152 MOD SED SAME PHYS/QHP 5/>YRS: CPT

## 2025-03-17 RX ORDER — MIDAZOLAM HYDROCHLORIDE 5 MG/ML
INJECTION, SOLUTION INTRAMUSCULAR; INTRAVENOUS PRN
Status: COMPLETED | OUTPATIENT
Start: 2025-03-17 | End: 2025-03-17

## 2025-03-17 RX ORDER — SODIUM CHLORIDE 9 MG/ML
INJECTION, SOLUTION INTRAVENOUS PRN
OUTPATIENT
Start: 2025-03-17

## 2025-03-17 RX ORDER — SODIUM CHLORIDE 0.9 % (FLUSH) 0.9 %
5-40 SYRINGE (ML) INJECTION EVERY 12 HOURS SCHEDULED
OUTPATIENT
Start: 2025-03-17

## 2025-03-17 RX ORDER — IOPAMIDOL 755 MG/ML
40 INJECTION, SOLUTION INTRAVASCULAR
Status: COMPLETED | OUTPATIENT
Start: 2025-03-17 | End: 2025-03-17

## 2025-03-17 RX ORDER — ONDANSETRON 2 MG/ML
4 INJECTION INTRAMUSCULAR; INTRAVENOUS EVERY 8 HOURS PRN
Status: DISCONTINUED | OUTPATIENT
Start: 2025-03-17 | End: 2025-03-18 | Stop reason: HOSPADM

## 2025-03-17 RX ORDER — FENTANYL CITRATE 50 UG/ML
INJECTION, SOLUTION INTRAMUSCULAR; INTRAVENOUS PRN
Status: COMPLETED | OUTPATIENT
Start: 2025-03-17 | End: 2025-03-17

## 2025-03-17 RX ORDER — SODIUM CHLORIDE 0.9 % (FLUSH) 0.9 %
5-40 SYRINGE (ML) INJECTION PRN
OUTPATIENT
Start: 2025-03-17

## 2025-03-17 RX ORDER — DEXTROSE MONOHYDRATE 25 G/50ML
25 INJECTION, SOLUTION INTRAVENOUS PRN
Status: DISCONTINUED | OUTPATIENT
Start: 2025-03-17 | End: 2025-03-18 | Stop reason: HOSPADM

## 2025-03-17 RX ADMIN — FENTANYL CITRATE 25 MCG: 50 INJECTION INTRAMUSCULAR; INTRAVENOUS at 13:43

## 2025-03-17 RX ADMIN — DEXTROSE MONOHYDRATE 25 G: 25 INJECTION, SOLUTION INTRAVENOUS at 15:24

## 2025-03-17 RX ADMIN — CEFTRIAXONE SODIUM 2000 MG: 1 INJECTION, POWDER, FOR SOLUTION INTRAMUSCULAR; INTRAVENOUS at 13:43

## 2025-03-17 RX ADMIN — IOPAMIDOL 40 ML: 755 INJECTION, SOLUTION INTRAVENOUS at 14:10

## 2025-03-17 RX ADMIN — MIDAZOLAM HYDROCHLORIDE 0.5 MG: 5 INJECTION, SOLUTION INTRAMUSCULAR; INTRAVENOUS at 13:43

## 2025-03-17 RX ADMIN — FENTANYL CITRATE 25 MCG: 50 INJECTION INTRAMUSCULAR; INTRAVENOUS at 13:47

## 2025-03-17 RX ADMIN — MIDAZOLAM HYDROCHLORIDE 0.5 MG: 5 INJECTION, SOLUTION INTRAMUSCULAR; INTRAVENOUS at 13:48

## 2025-03-17 NOTE — PRE SEDATION
Provider, MD Ricki   famotidine (PEPCID) 20 MG tablet Take 1 tablet by mouth 2 times daily    Ricki Hudson MD   metFORMIN (GLUCOPHAGE-XR) 500 MG extended release tablet Take 1 tablet by mouth 2 times daily 9/10/24   Jesus Manuel Fabian MD   vitamin D (ERGOCALCIFEROL) 1.25 MG (78902 UT) CAPS capsule Take 1 capsule by mouth once a week 4/10/24   Chago Craft, APRN - CNP   guaiFENesin (MUCINEX) 600 MG extended release tablet Take 1 tablet by mouth 2 times daily 7/3/23   Guy Link DO   polycarbophil (FIBERCON) 625 MG tablet Take 1 tablet by mouth daily 7/3/23   Guy Link DO   finasteride (PROSCAR) 5 MG tablet Take 1 tablet by mouth every evening    ProviderRicki MD     Coumadin Use Last 7 Days:  no  Antiplatelet drug therapy use last 7 days: no  Other anticoagulant use last 7 days: no  Additional Medication Information:  N/A      Pre-Sedation Documentation and Exam:   I have reviewed the patient's history and review of systems.    Mallampati Airway Assessment:  normal, Mallampati Class II - (soft palate, fauces & uvula are visible)    Prior History of Anesthesia Complications:   none    ASA Classification:  Class 2 - A normal healthy patient with mild systemic disease    Sedation/ Anesthesia Plan:   intravenous sedation    Medications Planned:   midazolam (Versed) intravenously and fentanyl intravenously    Patient is an appropriate candidate for plan of sedation: yes    Electronically signed by Jesus Manuel Patterson DO on 3/17/2025 at 1:28 PM

## 2025-03-17 NOTE — OR NURSING
Image guided bilateral biliary drain  insertion bilateral completed. Dr. Patterson placed 12 Chinese 40 cm [place] Biliary Drainage Catheter LOT # [id number] EXP 12/27/2027 in the bilateral. Bile colored withdrawn immediately.  Drain/tube dressing clean, dry, and intact. Pt tolerated procedure without any signs or symptoms of distress. Vital signs stable. Report called to Thao MORRISON. Pt transported back to  in stable condition via bed by transport.     Medications  Versed: 1 mg  Fentanyl: 50 mcg    Vital Signs  Vitals:    03/17/25 1402   BP: (!) 111/57   Pulse: 74   Resp: 11   SpO2: 100%    (vital signs in table format)    Post Elenita  2 - Able to move 4 extremities voluntarily on command  2 - BP+/- 20mmHg of normal  2 - Fully awake  2 - Able to maintain oxygen saturation >92% on room air  2 - Able to breathe deeply and cough freely

## 2025-03-17 NOTE — TELEPHONE ENCOUNTER
Spoke to Prachi at [facility] getting an order for weekly dressing changed for the patient to be done by home health provider. She will forward concern to Dr. Pandey for possible orders.

## 2025-03-17 NOTE — OR NURSING
Pt arrived for image guided biliary drain  insertion bilateral . Procedure explained including the risk and benefits of the procedure. All questions answered. Pt verbalizes understanding of the procedure and states no more questions. Consent confirmed. Vital signs stable. Labs, allergies, medications, and code status reviewed. No contraindications noted.     Vital Signs  Vitals:    03/17/25 1336   BP: 124/67   Pulse: 76   Resp: 17    (vital signs in table format)    Pre Elenita Score  2 - Able to move 4 extremities voluntarily on command  2 - BP+/- 20mmHg of normal  2 - Fully awake  2 - Able to maintain oxygen saturation >92% on room air  2 - Able to breathe deeply and cough freely    Allergies  Codeine and Amoxicillin-pot clavulanate (allergies)    Labs  Lab Results   Component Value Date    INR 1.40 (H) 03/15/2025    PROTIME 17.3 (H) 03/15/2025     Lab Results   Component Value Date    CREATININE 0.4 (L) 03/15/2025    BUN 7 03/15/2025     03/15/2025    K 3.4 (L) 03/15/2025    CL 99 03/15/2025    CO2 26 03/15/2025     Lab Results   Component Value Date    WBC 4.0 03/15/2025    HGB 8.5 (L) 03/15/2025    HCT 25.6 (L) 03/15/2025    MCV 89.9 03/15/2025     03/15/2025

## 2025-03-17 NOTE — PROGRESS NOTES
Pt was feeling shaky and felt his BS dropping, pt tested 55, given 2 orange juice to drink and pt was eating his own peanut butter and cheese crackers.

## 2025-03-17 NOTE — BRIEF OP NOTE
Interventional Radiology  Brief Postoperative Note    Patient: Sharath Ceja  YOB: 1950  MRN: [id number]      Pre-operative Diagnosis: Biliary obstruction    Post-operative Diagnosis: Same    Procedure: Biliary drain exchange x2    Anesthesia: Local and Moderate Sedation    Surgeons/Assistants: Jesus Manuel Patterson DO    Estimated Blood Loss: less than 50     Complications: None    Infection Present At Time Of Surgery (PATOS) (choose all levels that have infection present):  No infection present    Specimens: Was Not Obtained    Findings:   Successful bilateral biliary drain exchange.  Patient currently has 12-F internal/external biliary drainage catheters via the left and right hepatic lobes, placed to gravity drainage.     Recommend follow up for routine exchange every 6-8 weeks, or sooner if there are issues.     Home health care recommendations for catheter care:  Dressing changes as needed to keep sites clean and dry.   Flush catheters with 10 mL saline daily or PRN to help maintain patency. DO NOT ASPIRATE catheters.  Maintain catheters to gravity drainage.       Jesus Manuel Patterson DO  3/17/2025, 2:06 PM  Interventional Radiology  [phone])

## 2025-03-17 NOTE — PROGRESS NOTES
Blood sugar rechecked after patient at snacks and drank orange juice, FSBS 51, emergent order for D50 obtained from Dr. Patterson. Pt medicated and responded well to dextrose and continued to eat peanut butter and cheese crackers.

## 2025-03-24 ENCOUNTER — APPOINTMENT (OUTPATIENT)
Dept: GENERAL RADIOLOGY | Age: 75
DRG: 907 | End: 2025-03-24
Payer: MEDICARE

## 2025-03-24 ENCOUNTER — HOSPITAL ENCOUNTER (INPATIENT)
Age: 75
LOS: 11 days | Discharge: HOME | DRG: 907 | End: 2025-04-04
Attending: EMERGENCY MEDICINE | Admitting: INTERNAL MEDICINE
Payer: MEDICARE

## 2025-03-24 ENCOUNTER — APPOINTMENT (OUTPATIENT)
Dept: CT IMAGING | Age: 75
DRG: 907 | End: 2025-03-24
Payer: MEDICARE

## 2025-03-24 DIAGNOSIS — Z79.4 TYPE 2 DIABETES MELLITUS WITHOUT COMPLICATION, WITH LONG-TERM CURRENT USE OF INSULIN: ICD-10-CM

## 2025-03-24 DIAGNOSIS — C22.7 OTHER SPECIFIED CARCINOMAS OF LIVER: Primary | ICD-10-CM

## 2025-03-24 DIAGNOSIS — R10.11 ABDOMINAL PAIN, RIGHT UPPER QUADRANT: ICD-10-CM

## 2025-03-24 DIAGNOSIS — E11.9 TYPE 2 DIABETES MELLITUS WITHOUT COMPLICATION, WITH LONG-TERM CURRENT USE OF INSULIN: ICD-10-CM

## 2025-03-24 DIAGNOSIS — Z46.82 ENCOUNTER FOR BILIARY DRAINAGE TUBE PLACEMENT: ICD-10-CM

## 2025-03-24 PROBLEM — R58 INTRA ABDOMINAL HEMORRHAGE: Status: ACTIVE | Noted: 2025-03-24

## 2025-03-24 LAB
ABO/RH: NORMAL
ALBUMIN SERPL-MCNC: 3.2 G/DL (ref 3.4–5)
ALBUMIN/GLOB SERPL: 1.2 {RATIO} (ref 1.1–2.2)
ALP SERPL-CCNC: 218 U/L (ref 40–129)
ALT SERPL-CCNC: 26 U/L (ref 10–40)
ANION GAP SERPL CALCULATED.3IONS-SCNC: 8 MMOL/L (ref 3–16)
ANTIBODY SCREEN: NORMAL
AST SERPL-CCNC: 27 U/L (ref 15–37)
BASOPHILS # BLD: 0 K/UL (ref 0–0.2)
BASOPHILS NFR BLD: 0.5 %
BILIRUB SERPL-MCNC: 0.8 MG/DL (ref 0–1)
BILIRUB UR QL STRIP.AUTO: NEGATIVE
BUN SERPL-MCNC: 7 MG/DL (ref 7–20)
CALCIUM SERPL-MCNC: 8.3 MG/DL (ref 8.3–10.6)
CHLORIDE SERPL-SCNC: 97 MMOL/L (ref 99–110)
CLARITY UR: CLEAR
CO2 SERPL-SCNC: 27 MMOL/L (ref 21–32)
COLOR UR: YELLOW
CREAT SERPL-MCNC: 0.3 MG/DL (ref 0.8–1.3)
DEPRECATED RDW RBC AUTO: 22.5 % (ref 12.4–15.4)
EOSINOPHIL # BLD: 0 K/UL (ref 0–0.6)
EOSINOPHIL NFR BLD: 0.6 %
FLUAV RNA RESP QL NAA+PROBE: NOT DETECTED
FLUBV RNA RESP QL NAA+PROBE: NOT DETECTED
GFR SERPLBLD CREATININE-BSD FMLA CKD-EPI: >90 ML/MIN/{1.73_M2}
GLUCOSE BLD-MCNC: 214 MG/DL (ref 70–99)
GLUCOSE BLD-MCNC: 82 MG/DL (ref 70–99)
GLUCOSE BLD-MCNC: 89 MG/DL (ref 70–99)
GLUCOSE SERPL-MCNC: 85 MG/DL (ref 70–99)
GLUCOSE UR STRIP.AUTO-MCNC: NEGATIVE MG/DL
HCT VFR BLD AUTO: 24.9 % (ref 40.5–52.5)
HCT VFR BLD AUTO: 26.9 % (ref 40.5–52.5)
HGB BLD-MCNC: 8.2 G/DL (ref 13.5–17.5)
HGB BLD-MCNC: 8.9 G/DL (ref 13.5–17.5)
HGB UR QL STRIP.AUTO: NEGATIVE
KETONES UR STRIP.AUTO-MCNC: NEGATIVE MG/DL
LEUKOCYTE ESTERASE UR QL STRIP.AUTO: NEGATIVE
LIPASE SERPL-CCNC: 807 U/L (ref 13–60)
LYMPHOCYTES # BLD: 0.5 K/UL (ref 1–5.1)
LYMPHOCYTES NFR BLD: 6.9 %
MAGNESIUM SERPL-MCNC: 1.72 MG/DL (ref 1.8–2.4)
MCH RBC QN AUTO: 30.2 PG (ref 26–34)
MCHC RBC AUTO-ENTMCNC: 33.1 G/DL (ref 31–36)
MCV RBC AUTO: 91.4 FL (ref 80–100)
MONOCYTES # BLD: 0.7 K/UL (ref 0–1.3)
MONOCYTES NFR BLD: 9.9 %
NEUTROPHILS # BLD: 6.2 K/UL (ref 1.7–7.7)
NEUTROPHILS NFR BLD: 82.1 %
NITRITE UR QL STRIP.AUTO: NEGATIVE
PATH INTERP BLD-IMP: NORMAL
PERFORMED ON: ABNORMAL
PERFORMED ON: NORMAL
PERFORMED ON: NORMAL
PH UR STRIP.AUTO: 6.5 [PH] (ref 5–8)
PLATELET # BLD AUTO: 130 K/UL (ref 135–450)
PMV BLD AUTO: 7.6 FL (ref 5–10.5)
POTASSIUM SERPL-SCNC: 3.2 MMOL/L (ref 3.5–5.1)
PROT SERPL-MCNC: 5.9 G/DL (ref 6.4–8.2)
PROT UR STRIP.AUTO-MCNC: NEGATIVE MG/DL
RBC # BLD AUTO: 2.94 M/UL (ref 4.2–5.9)
SARS-COV-2 RNA RESP QL NAA+PROBE: NOT DETECTED
SODIUM SERPL-SCNC: 132 MMOL/L (ref 136–145)
SP GR UR STRIP.AUTO: 1.01 (ref 1–1.03)
TROPONIN, HIGH SENSITIVITY: 13 NG/L (ref 0–22)
UA COMPLETE W REFLEX CULTURE PNL UR: NORMAL
UA DIPSTICK W REFLEX MICRO PNL UR: NORMAL
URN SPEC COLLECT METH UR: NORMAL
UROBILINOGEN UR STRIP-ACNC: 0.2 E.U./DL
WBC # BLD AUTO: 7.5 K/UL (ref 4–11)

## 2025-03-24 PROCEDURE — 96376 TX/PRO/DX INJ SAME DRUG ADON: CPT

## 2025-03-24 PROCEDURE — 85018 HEMOGLOBIN: CPT

## 2025-03-24 PROCEDURE — 85014 HEMATOCRIT: CPT

## 2025-03-24 PROCEDURE — 80053 COMPREHEN METABOLIC PANEL: CPT

## 2025-03-24 PROCEDURE — 6370000000 HC RX 637 (ALT 250 FOR IP): Performed by: INTERNAL MEDICINE

## 2025-03-24 PROCEDURE — 86923 COMPATIBILITY TEST ELECTRIC: CPT

## 2025-03-24 PROCEDURE — 99285 EMERGENCY DEPT VISIT HI MDM: CPT

## 2025-03-24 PROCEDURE — 84484 ASSAY OF TROPONIN QUANT: CPT

## 2025-03-24 PROCEDURE — 6360000002 HC RX W HCPCS: Performed by: PHYSICIAN ASSISTANT

## 2025-03-24 PROCEDURE — 74177 CT ABD & PELVIS W/CONTRAST: CPT

## 2025-03-24 PROCEDURE — 1200000000 HC SEMI PRIVATE

## 2025-03-24 PROCEDURE — 96374 THER/PROPH/DIAG INJ IV PUSH: CPT

## 2025-03-24 PROCEDURE — 85025 COMPLETE CBC W/AUTO DIFF WBC: CPT

## 2025-03-24 PROCEDURE — 30233K1 TRANSFUSION OF NONAUTOLOGOUS FROZEN PLASMA INTO PERIPHERAL VEIN, PERCUTANEOUS APPROACH: ICD-10-PCS | Performed by: INTERNAL MEDICINE

## 2025-03-24 PROCEDURE — 83735 ASSAY OF MAGNESIUM: CPT

## 2025-03-24 PROCEDURE — 36415 COLL VENOUS BLD VENIPUNCTURE: CPT

## 2025-03-24 PROCEDURE — 86900 BLOOD TYPING SEROLOGIC ABO: CPT

## 2025-03-24 PROCEDURE — P9016 RBC LEUKOCYTES REDUCED: HCPCS

## 2025-03-24 PROCEDURE — 86850 RBC ANTIBODY SCREEN: CPT

## 2025-03-24 PROCEDURE — 81003 URINALYSIS AUTO W/O SCOPE: CPT

## 2025-03-24 PROCEDURE — 86901 BLOOD TYPING SEROLOGIC RH(D): CPT

## 2025-03-24 PROCEDURE — 2580000003 HC RX 258: Performed by: PHYSICIAN ASSISTANT

## 2025-03-24 PROCEDURE — 87636 SARSCOV2 & INF A&B AMP PRB: CPT

## 2025-03-24 PROCEDURE — P9017 PLASMA 1 DONOR FRZ W/IN 8 HR: HCPCS

## 2025-03-24 PROCEDURE — 83036 HEMOGLOBIN GLYCOSYLATED A1C: CPT

## 2025-03-24 PROCEDURE — 71045 X-RAY EXAM CHEST 1 VIEW: CPT

## 2025-03-24 PROCEDURE — 93005 ELECTROCARDIOGRAM TRACING: CPT | Performed by: INTERNAL MEDICINE

## 2025-03-24 PROCEDURE — 83690 ASSAY OF LIPASE: CPT

## 2025-03-24 PROCEDURE — 6360000004 HC RX CONTRAST MEDICATION: Performed by: PHYSICIAN ASSISTANT

## 2025-03-24 RX ORDER — POTASSIUM CHLORIDE 7.45 MG/ML
10 INJECTION INTRAVENOUS PRN
Status: DISCONTINUED | OUTPATIENT
Start: 2025-03-24 | End: 2025-04-04 | Stop reason: HOSPADM

## 2025-03-24 RX ORDER — OXYCODONE HYDROCHLORIDE 5 MG/1
5 TABLET ORAL EVERY 4 HOURS PRN
Status: DISCONTINUED | OUTPATIENT
Start: 2025-03-24 | End: 2025-04-04 | Stop reason: HOSPADM

## 2025-03-24 RX ORDER — ONDANSETRON 4 MG/1
4 TABLET, ORALLY DISINTEGRATING ORAL EVERY 8 HOURS PRN
Status: DISCONTINUED | OUTPATIENT
Start: 2025-03-24 | End: 2025-04-04 | Stop reason: HOSPADM

## 2025-03-24 RX ORDER — POTASSIUM CHLORIDE 750 MG/1
40 TABLET, EXTENDED RELEASE ORAL ONCE
Status: COMPLETED | OUTPATIENT
Start: 2025-03-24 | End: 2025-03-25

## 2025-03-24 RX ORDER — ONDANSETRON 2 MG/ML
4 INJECTION INTRAMUSCULAR; INTRAVENOUS EVERY 6 HOURS PRN
Status: DISCONTINUED | OUTPATIENT
Start: 2025-03-24 | End: 2025-04-04 | Stop reason: HOSPADM

## 2025-03-24 RX ORDER — ACETAMINOPHEN 650 MG/1
650 SUPPOSITORY RECTAL EVERY 6 HOURS PRN
Status: DISCONTINUED | OUTPATIENT
Start: 2025-03-24 | End: 2025-04-04 | Stop reason: HOSPADM

## 2025-03-24 RX ORDER — ALBUTEROL SULFATE 90 UG/1
2 INHALANT RESPIRATORY (INHALATION) EVERY 4 HOURS PRN
Status: DISCONTINUED | OUTPATIENT
Start: 2025-03-24 | End: 2025-04-04 | Stop reason: HOSPADM

## 2025-03-24 RX ORDER — FINASTERIDE 5 MG/1
5 TABLET, FILM COATED ORAL EVERY EVENING
Status: DISCONTINUED | OUTPATIENT
Start: 2025-03-24 | End: 2025-04-04 | Stop reason: HOSPADM

## 2025-03-24 RX ORDER — MAGNESIUM SULFATE IN WATER 40 MG/ML
2000 INJECTION, SOLUTION INTRAVENOUS ONCE
Status: COMPLETED | OUTPATIENT
Start: 2025-03-24 | End: 2025-03-25

## 2025-03-24 RX ORDER — 0.9 % SODIUM CHLORIDE 0.9 %
1000 INTRAVENOUS SOLUTION INTRAVENOUS ONCE
Status: COMPLETED | OUTPATIENT
Start: 2025-03-24 | End: 2025-03-25

## 2025-03-24 RX ORDER — POTASSIUM CHLORIDE 1500 MG/1
20 TABLET, EXTENDED RELEASE ORAL 2 TIMES DAILY
Status: DISCONTINUED | OUTPATIENT
Start: 2025-03-25 | End: 2025-04-03

## 2025-03-24 RX ORDER — DICYCLOMINE HCL 20 MG
20 TABLET ORAL DAILY
Status: DISCONTINUED | OUTPATIENT
Start: 2025-03-24 | End: 2025-04-04 | Stop reason: HOSPADM

## 2025-03-24 RX ORDER — SODIUM CHLORIDE, SODIUM LACTATE, POTASSIUM CHLORIDE, CALCIUM CHLORIDE 600; 310; 30; 20 MG/100ML; MG/100ML; MG/100ML; MG/100ML
INJECTION, SOLUTION INTRAVENOUS CONTINUOUS
Status: DISCONTINUED | OUTPATIENT
Start: 2025-03-24 | End: 2025-03-24

## 2025-03-24 RX ORDER — SODIUM CHLORIDE 9 MG/ML
INJECTION, SOLUTION INTRAVENOUS CONTINUOUS
Status: CANCELLED | OUTPATIENT
Start: 2025-03-24 | End: 2025-03-25

## 2025-03-24 RX ORDER — DEXTROSE MONOHYDRATE 100 MG/ML
INJECTION, SOLUTION INTRAVENOUS CONTINUOUS PRN
Status: DISCONTINUED | OUTPATIENT
Start: 2025-03-24 | End: 2025-04-04 | Stop reason: HOSPADM

## 2025-03-24 RX ORDER — POLYETHYLENE GLYCOL 3350 17 G/17G
17 POWDER, FOR SOLUTION ORAL DAILY PRN
Status: DISCONTINUED | OUTPATIENT
Start: 2025-03-24 | End: 2025-04-04 | Stop reason: HOSPADM

## 2025-03-24 RX ORDER — SODIUM CHLORIDE 0.9 % (FLUSH) 0.9 %
5-40 SYRINGE (ML) INJECTION EVERY 12 HOURS SCHEDULED
Status: DISCONTINUED | OUTPATIENT
Start: 2025-03-24 | End: 2025-04-04 | Stop reason: HOSPADM

## 2025-03-24 RX ORDER — POTASSIUM CHLORIDE 1500 MG/1
40 TABLET, EXTENDED RELEASE ORAL PRN
Status: DISCONTINUED | OUTPATIENT
Start: 2025-03-24 | End: 2025-04-04 | Stop reason: HOSPADM

## 2025-03-24 RX ORDER — LANOLIN ALCOHOL/MO/W.PET/CERES
400 CREAM (GRAM) TOPICAL ONCE
Status: DISCONTINUED | OUTPATIENT
Start: 2025-03-24 | End: 2025-03-31

## 2025-03-24 RX ORDER — ACETAMINOPHEN 325 MG/1
650 TABLET ORAL EVERY 6 HOURS PRN
Status: DISCONTINUED | OUTPATIENT
Start: 2025-03-24 | End: 2025-04-04 | Stop reason: HOSPADM

## 2025-03-24 RX ORDER — IOPAMIDOL 755 MG/ML
75 INJECTION, SOLUTION INTRAVASCULAR
Status: COMPLETED | OUTPATIENT
Start: 2025-03-24 | End: 2025-03-24

## 2025-03-24 RX ORDER — GLUCAGON 1 MG/ML
1 KIT INJECTION PRN
Status: DISCONTINUED | OUTPATIENT
Start: 2025-03-24 | End: 2025-04-04 | Stop reason: HOSPADM

## 2025-03-24 RX ORDER — SODIUM CHLORIDE 9 MG/ML
INJECTION, SOLUTION INTRAVENOUS PRN
Status: DISCONTINUED | OUTPATIENT
Start: 2025-03-24 | End: 2025-04-04 | Stop reason: HOSPADM

## 2025-03-24 RX ORDER — INSULIN LISPRO 100 [IU]/ML
0-4 INJECTION, SOLUTION INTRAVENOUS; SUBCUTANEOUS
Status: DISCONTINUED | OUTPATIENT
Start: 2025-03-24 | End: 2025-04-04 | Stop reason: HOSPADM

## 2025-03-24 RX ORDER — MAGNESIUM SULFATE IN WATER 40 MG/ML
2000 INJECTION, SOLUTION INTRAVENOUS PRN
Status: DISCONTINUED | OUTPATIENT
Start: 2025-03-24 | End: 2025-04-04 | Stop reason: HOSPADM

## 2025-03-24 RX ORDER — FAMOTIDINE 20 MG/1
20 TABLET, FILM COATED ORAL 2 TIMES DAILY
Status: DISCONTINUED | OUTPATIENT
Start: 2025-03-24 | End: 2025-04-04 | Stop reason: HOSPADM

## 2025-03-24 RX ORDER — SODIUM CHLORIDE 0.9 % (FLUSH) 0.9 %
5-40 SYRINGE (ML) INJECTION PRN
Status: DISCONTINUED | OUTPATIENT
Start: 2025-03-24 | End: 2025-04-04 | Stop reason: HOSPADM

## 2025-03-24 RX ORDER — PANTOPRAZOLE SODIUM 40 MG/1
40 TABLET, DELAYED RELEASE ORAL
Status: DISCONTINUED | OUTPATIENT
Start: 2025-03-25 | End: 2025-03-27

## 2025-03-24 RX ADMIN — IOPAMIDOL 75 ML: 755 INJECTION, SOLUTION INTRAVENOUS at 14:08

## 2025-03-24 RX ADMIN — HYDROMORPHONE HYDROCHLORIDE 0.5 MG: 1 INJECTION, SOLUTION INTRAMUSCULAR; INTRAVENOUS; SUBCUTANEOUS at 13:06

## 2025-03-24 RX ADMIN — ACETAMINOPHEN 650 MG: 325 TABLET ORAL at 21:00

## 2025-03-24 RX ADMIN — HYDROMORPHONE HYDROCHLORIDE 0.5 MG: 1 INJECTION, SOLUTION INTRAMUSCULAR; INTRAVENOUS; SUBCUTANEOUS at 16:53

## 2025-03-24 RX ADMIN — SODIUM CHLORIDE 1000 ML: 0.9 INJECTION, SOLUTION INTRAVENOUS at 16:54

## 2025-03-24 ASSESSMENT — PAIN - FUNCTIONAL ASSESSMENT: PAIN_FUNCTIONAL_ASSESSMENT: NONE - DENIES PAIN

## 2025-03-24 ASSESSMENT — PAIN SCALES - GENERAL: PAINLEVEL_OUTOF10: 1

## 2025-03-24 NOTE — ED NOTES
The patient was complaining that he hasn't ate and was worried about his blood sugar. I spoke to Alyse RODRÍGUEZ and she said to get his blood sugar. The patients blood sugar is 82 at this time and Alyse RODRÍGUEZ is aware.

## 2025-03-24 NOTE — ED PROVIDER NOTES
[facility] EMERGENCY DEPARTMENT  EMERGENCY DEPARTMENT ENCOUNTER        Pt Name: Sharath Ceja  MRN: [id number]  Birthdate 1950  Date of evaluation: 3/24/2025  Provider: Alyse Castro PA-C  PCP: Real Whitehead MD  Note Started: 5:55 PM EDT 3/24/25       I have seen and evaluated this patient with my supervising physician Amy Pennington MD.      CHIEF COMPLAINT       Chief Complaint   Patient presents with    Post-op Problem     Patient had two drains placed in his liver last Monday, patient was running errands today and noticed blood coming from the drain on his right side.       HISTORY OF PRESENT ILLNESS: 1 or more Elements     History From: Patient            Chief Complaint: Right upper quadrant pain    Sharath Ceja is a 74 y.o. male who presents he tells me he has cancer of his liver, he is on chemotherapy.  He has 2 drains in his abdomen.  He follows with oncology and GI.  He states interventional radiology put his drains in and just changed them last week without complication.  He states he has had a Whipple in the past at [facility].  He states over the past couple days he has had a constant dull pain in his right upper quadrant.  [place] he noticed that his right sided drain filled blood in the bag which was abnormal for him.  He denies fever.  He has had a mild cough over the past couple of days.  No significant shortness of breath vomiting diarrhea.    Nursing Notes were all reviewed and agreed with or any disagreements were addressed in the HPI.    REVIEW OF SYSTEMS :      Review of Systems    Positives and Pertinent negatives as per HPI.     SURGICAL HISTORY     Past Surgical History:   Procedure Laterality Date    CARDIAC CATHETERIZATION      1980S, DR ROWELL, NO ISSUES SINCE, HAS APT 8/14/23    CHOLECYSTECTOMY      CT LIVER ABSCESS ASPIRATION      PT STATES DRAINED LIVER ABSCESS    CT NEEDLE BIOPSY LIVER PERCUTANEOUS  1/23/2025    CT  normal.         Thought Content: Thought content normal.         Judgment: Judgment normal.           DIAGNOSTIC RESULTS   LABS:    Labs Reviewed   CBC WITH AUTO DIFFERENTIAL - Abnormal; Notable for the following components:       Result Value    RBC 2.94 (*)     Hemoglobin 8.9 (*)     Hematocrit 26.9 (*)     RDW 22.5 (*)     Platelets 130 (*)     Lymphocytes Absolute 0.5 (*)     All other components within normal limits   COMPREHENSIVE METABOLIC PANEL W/ REFLEX TO MG FOR LOW K - Abnormal; Notable for the following components:    Sodium 132 (*)     Potassium reflex Magnesium 3.2 (*)     Chloride 97 (*)     Creatinine 0.3 (*)     Total Protein 5.9 (*)     Albumin 3.2 (*)     Alkaline Phosphatase 218 (*)     All other components within normal limits   LIPASE - Abnormal; Notable for the following components:    Lipase 807.0 (*)     All other components within normal limits   MAGNESIUM - Abnormal; Notable for the following components:    Magnesium 1.72 (*)     All other components within normal limits   COVID-19 & INFLUENZA COMBO   URINALYSIS WITH REFLEX TO CULTURE   POCT GLUCOSE   POCT GLUCOSE       When ordered only abnormal lab results are displayed. All other labs were within normal range or not returned as of this dictation.    EKG: When ordered, EKG's are interpreted by the Emergency Department Physician in the absence of a cardiologist.  Please see their note for interpretation of EKG.    RADIOLOGY:   Non-plain film images such as CT, Ultrasound and MRI are read by the radiologist.     Per my interpretation there is a lumen in the right upper quadrant distended with hyperattenuating blood products on CT abdomen pelvis    Interpretation per the Radiologist below, if available at the time of this note:    CT ABDOMEN PELVIS W IV CONTRAST Additional Contrast? None   Final Result   1.  Pulled up jejunal lumen in the right upper quadrant is newly mildly   distended with hyperattenuating material compatible with blood

## 2025-03-24 NOTE — PROGRESS NOTES
Biliary drains were evaluated in the ED. Each drain was flushed with sterile saline. The left biliary drain demonstrated patency with immediate return of bilious fluid. The right drain flushed well, however did not demonstrate immediate return of fluid.    Discussed with patient, patient's wife, and Alyse Castro from the ED. It would be reasonable to observe overnight and see if the right drain resumes external drainage.    Will follow up tomorrow and assess needs for any IR intervention.       Jesus Manuel Patterson DO  3/24/2025, 5:01 PM  Interventional Radiology  [phone])

## 2025-03-24 NOTE — ED NOTES
I have placed the patient on the bedside cardiac monitor with alarms on and audible. The patient has been given a urinal and educated that we need a urine specimen.

## 2025-03-24 NOTE — PLAN OF CARE
CT Result (most recent):  CT ABDOMEN PELVIS W IV CONTRAST 03/24/2025    Narrative  EXAMINATION:  CT OF THE ABDOMEN AND PELVIS WITH CONTRAST 3/24/2025 2:09 pm    TECHNIQUE:  CT of the abdomen and pelvis was performed with the administration of  intravenous contrast. Multiplanar reformatted images are provided for review.  Automated exposure control, iterative reconstruction, and/or weight based  adjustment of the mA/kV was utilized to reduce the radiation dose to as low  as reasonably achievable.    COMPARISON:  March 15, 2025    HISTORY:  ORDERING SYSTEM PROVIDED HISTORY: ruq pain has drain that is bleeding  TECHNOLOGIST PROVIDED HISTORY:  Additional Contrast?->None  Reason for exam:->ruq pain has drain that is bleeding  Decision Support Exception - unselect if not a suspected or confirmed  emergency medical condition->Emergency Medical Condition (MA)  Reason for Exam: ruq pain has drain that is bleeding    FINDINGS:  Lower Chest: Lung bases are normal.The heart is normal size.    Organs:    *Liver: Posterior right lobe 2.4 cm low attenuating lesion unchanged.  *Spleen: Normal  *Kidneys: Normal  *Adrenal Glands: Normal  *Pancreas: There has been prior Whipple surgery.  The pulled up jejunal limb  is mildly dilated with hyperattenuating material new since 03/15 series 2,  image 69.  Pancreaticojejunostomy is unremarkable.  *Gallbladder and bile ducts: Right and left hepatic lobe internal external  biliary drains are unchanged compared to the prior study.  Moderate  intrahepatic bile duct dilation is unchanged.  No extrahepatic bile duct  dilation.  Gallbladder is absent.  *GI/Bowel: Distal stomach has been resected.  Gastrojejunostomy is  unremarkable.  No dilated small or large bowel.There has been prior  appendectomy.  *Genitourinary: Normal  *Urinary Bladder: Normal  Vessels: Abrupt narrowing of the main portal vein and agustin hepaticus in the  region or increased soft tissue attenuation on image 61  unchanged.    Peritoneum: Normal    Retroperitoneum: Normal    Lymph nodes: Normal    Abdominal wall: Normal    Bones: Mild degenerative changes in the lower thoracic and lumbar spine.    Impression  1.  Pulled up jejunal lumen in the right upper quadrant is newly mildly  distended with hyperattenuating material compatible with blood products.    2.  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    3.  Hepatic 2.4 cm lesion unchanged.          -> Patient with history of liver cancer on chemotherapy.   Has two hepatic drains.  Presenting with bloody drainage from hepatic drain.  CT abdomen as above.  Seen by interventional radiology.  Possible obstruction in one of the drains.   IR will reevaluate in a.m. for changing the hepatic drains.  Prn dilaudid given for pain.  Hemoglobin stable at 8.9 continue to monitor  .    Hypokalemia hypomagnesemia repleted.    Diabetes mellitus on NPH and sliding scale insulin

## 2025-03-25 ENCOUNTER — APPOINTMENT (OUTPATIENT)
Dept: INTERVENTIONAL RADIOLOGY/VASCULAR | Age: 75
DRG: 907 | End: 2025-03-25
Payer: MEDICARE

## 2025-03-25 ENCOUNTER — APPOINTMENT (OUTPATIENT)
Dept: CT IMAGING | Age: 75
DRG: 907 | End: 2025-03-25
Payer: MEDICARE

## 2025-03-25 PROBLEM — I95.1 ORTHOSTASIS: Status: ACTIVE | Noted: 2025-03-25

## 2025-03-25 PROBLEM — D62 ABLA (ACUTE BLOOD LOSS ANEMIA): Status: ACTIVE | Noted: 2025-03-25

## 2025-03-25 PROBLEM — C22.7: Status: ACTIVE | Noted: 2025-03-25

## 2025-03-25 LAB
ALBUMIN SERPL-MCNC: 2.4 G/DL (ref 3.4–5)
ALBUMIN/GLOB SERPL: 1 {RATIO} (ref 1.1–2.2)
ALP SERPL-CCNC: 178 U/L (ref 40–129)
ALT SERPL-CCNC: 27 U/L (ref 10–40)
AMMONIA PLAS-SCNC: 89 UMOL/L (ref 16–60)
ANION GAP SERPL CALCULATED.3IONS-SCNC: 8 MMOL/L (ref 3–16)
AST SERPL-CCNC: 31 U/L (ref 15–37)
BASOPHILS # BLD: 0 K/UL (ref 0–0.2)
BASOPHILS NFR BLD: 0.2 %
BILIRUB SERPL-MCNC: 2.7 MG/DL (ref 0–1)
BLOOD BANK DISPENSE STATUS: NORMAL
BLOOD BANK DISPENSE STATUS: NORMAL
BLOOD BANK PRODUCT CODE: NORMAL
BLOOD BANK PRODUCT CODE: NORMAL
BPU ID: NORMAL
BPU ID: NORMAL
BUN SERPL-MCNC: 10 MG/DL (ref 7–20)
CALCIUM SERPL-MCNC: 7.3 MG/DL (ref 8.3–10.6)
CHLORIDE SERPL-SCNC: 101 MMOL/L (ref 99–110)
CO2 SERPL-SCNC: 23 MMOL/L (ref 21–32)
CREAT SERPL-MCNC: 0.4 MG/DL (ref 0.8–1.3)
DEPRECATED RDW RBC AUTO: 23.3 % (ref 12.4–15.4)
DESCRIPTION BLOOD BANK: NORMAL
DESCRIPTION BLOOD BANK: NORMAL
EKG ATRIAL RATE: 95 BPM
EKG DIAGNOSIS: NORMAL
EKG P AXIS: 50 DEGREES
EKG P-R INTERVAL: 200 MS
EKG Q-T INTERVAL: 348 MS
EKG QRS DURATION: 84 MS
EKG QTC CALCULATION (BAZETT): 437 MS
EKG R AXIS: 57 DEGREES
EKG T AXIS: 36 DEGREES
EKG VENTRICULAR RATE: 95 BPM
EOSINOPHIL # BLD: 0 K/UL (ref 0–0.6)
EOSINOPHIL NFR BLD: 0 %
GFR SERPLBLD CREATININE-BSD FMLA CKD-EPI: >90 ML/MIN/{1.73_M2}
GLUCOSE BLD-MCNC: 113 MG/DL (ref 70–99)
GLUCOSE BLD-MCNC: 140 MG/DL (ref 70–99)
GLUCOSE BLD-MCNC: 153 MG/DL (ref 70–99)
GLUCOSE BLD-MCNC: 163 MG/DL (ref 70–99)
GLUCOSE BLD-MCNC: 170 MG/DL (ref 70–99)
GLUCOSE BLD-MCNC: 197 MG/DL (ref 70–99)
GLUCOSE SERPL-MCNC: 133 MG/DL (ref 70–99)
HCT VFR BLD AUTO: 22.9 % (ref 40.5–52.5)
HCT VFR BLD AUTO: 24.8 % (ref 40.5–52.5)
HCT VFR BLD AUTO: 26.2 % (ref 40.5–52.5)
HCT VFR BLD AUTO: 26.3 % (ref 40.5–52.5)
HCT VFR BLD AUTO: 26.5 % (ref 40.5–52.5)
HGB BLD-MCNC: 7.6 G/DL (ref 13.5–17.5)
HGB BLD-MCNC: 8.3 G/DL (ref 13.5–17.5)
HGB BLD-MCNC: 8.7 G/DL (ref 13.5–17.5)
INR PPP: 1.78 (ref 0.85–1.15)
LACTATE BLDV-SCNC: 1.6 MMOL/L (ref 0.4–2)
LYMPHOCYTES # BLD: 0.3 K/UL (ref 1–5.1)
LYMPHOCYTES NFR BLD: 4.2 %
MCH RBC QN AUTO: 28.4 PG (ref 26–34)
MCHC RBC AUTO-ENTMCNC: 33 G/DL (ref 31–36)
MCV RBC AUTO: 86.2 FL (ref 80–100)
MONOCYTES # BLD: 0.5 K/UL (ref 0–1.3)
MONOCYTES NFR BLD: 8.1 %
NEUTROPHILS # BLD: 5.7 K/UL (ref 1.7–7.7)
NEUTROPHILS NFR BLD: 87.5 %
PATH INTERP BLD-IMP: NORMAL
PERFORMED ON: ABNORMAL
PLATELET # BLD AUTO: 126 K/UL (ref 135–450)
PLATELET # BLD AUTO: 131 K/UL (ref 135–450)
PMV BLD AUTO: 7.4 FL (ref 5–10.5)
POTASSIUM SERPL-SCNC: 3.8 MMOL/L (ref 3.5–5.1)
PROCALCITONIN SERPL IA-MCNC: 1 NG/ML (ref 0–0.15)
PROT SERPL-MCNC: 4.7 G/DL (ref 6.4–8.2)
PROTHROMBIN TIME: 20.8 SEC (ref 11.9–14.9)
RBC # BLD AUTO: 3.07 M/UL (ref 4.2–5.9)
SODIUM SERPL-SCNC: 132 MMOL/L (ref 136–145)
WBC # BLD AUTO: 6.5 K/UL (ref 4–11)

## 2025-03-25 PROCEDURE — 85610 PROTHROMBIN TIME: CPT

## 2025-03-25 PROCEDURE — 2500000003 HC RX 250 WO HCPCS: Performed by: INTERNAL MEDICINE

## 2025-03-25 PROCEDURE — 82140 ASSAY OF AMMONIA: CPT

## 2025-03-25 PROCEDURE — 70450 CT HEAD/BRAIN W/O DYE: CPT

## 2025-03-25 PROCEDURE — 93010 ELECTROCARDIOGRAM REPORT: CPT | Performed by: INTERNAL MEDICINE

## 2025-03-25 PROCEDURE — 85018 HEMOGLOBIN: CPT

## 2025-03-25 PROCEDURE — 6360000002 HC RX W HCPCS: Performed by: INTERNAL MEDICINE

## 2025-03-25 PROCEDURE — 47531 INJECTION FOR CHOLANGIOGRAM: CPT

## 2025-03-25 PROCEDURE — 0FPB30Z REMOVAL OF DRAINAGE DEVICE FROM HEPATOBILIARY DUCT, PERCUTANEOUS APPROACH: ICD-10-PCS | Performed by: STUDENT IN AN ORGANIZED HEALTH CARE EDUCATION/TRAINING PROGRAM

## 2025-03-25 PROCEDURE — 1200000000 HC SEMI PRIVATE

## 2025-03-25 PROCEDURE — 6370000000 HC RX 637 (ALT 250 FOR IP): Performed by: INTERNAL MEDICINE

## 2025-03-25 PROCEDURE — 2580000003 HC RX 258: Performed by: INTERNAL MEDICINE

## 2025-03-25 PROCEDURE — 85025 COMPLETE CBC W/AUTO DIFF WBC: CPT

## 2025-03-25 PROCEDURE — 84145 PROCALCITONIN (PCT): CPT

## 2025-03-25 PROCEDURE — BF101ZZ FLUOROSCOPY OF BILE DUCTS USING LOW OSMOLAR CONTRAST: ICD-10-PCS | Performed by: STUDENT IN AN ORGANIZED HEALTH CARE EDUCATION/TRAINING PROGRAM

## 2025-03-25 PROCEDURE — 0F9930Z DRAINAGE OF COMMON BILE DUCT WITH DRAINAGE DEVICE, PERCUTANEOUS APPROACH: ICD-10-PCS | Performed by: STUDENT IN AN ORGANIZED HEALTH CARE EDUCATION/TRAINING PROGRAM

## 2025-03-25 PROCEDURE — C1769 GUIDE WIRE: HCPCS

## 2025-03-25 PROCEDURE — 94761 N-INVAS EAR/PLS OXIMETRY MLT: CPT

## 2025-03-25 PROCEDURE — 80053 COMPREHEN METABOLIC PANEL: CPT

## 2025-03-25 PROCEDURE — 83605 ASSAY OF LACTIC ACID: CPT

## 2025-03-25 PROCEDURE — 36415 COLL VENOUS BLD VENIPUNCTURE: CPT

## 2025-03-25 PROCEDURE — 87040 BLOOD CULTURE FOR BACTERIA: CPT

## 2025-03-25 PROCEDURE — 99233 SBSQ HOSP IP/OBS HIGH 50: CPT | Performed by: INTERNAL MEDICINE

## 2025-03-25 PROCEDURE — 36430 TRANSFUSION BLD/BLD COMPNT: CPT

## 2025-03-25 PROCEDURE — 85014 HEMATOCRIT: CPT

## 2025-03-25 PROCEDURE — 6360000004 HC RX CONTRAST MEDICATION: Performed by: INTERNAL MEDICINE

## 2025-03-25 PROCEDURE — 94640 AIRWAY INHALATION TREATMENT: CPT

## 2025-03-25 RX ORDER — SODIUM CHLORIDE 9 MG/ML
INJECTION, SOLUTION INTRAVENOUS PRN
Status: DISCONTINUED | OUTPATIENT
Start: 2025-03-25 | End: 2025-03-26

## 2025-03-25 RX ORDER — ALBUTEROL SULFATE 90 UG/1
2 INHALANT RESPIRATORY (INHALATION)
Status: DISCONTINUED | OUTPATIENT
Start: 2025-03-26 | End: 2025-04-04 | Stop reason: HOSPADM

## 2025-03-25 RX ORDER — IOPAMIDOL 755 MG/ML
45 INJECTION, SOLUTION INTRAVASCULAR
Status: COMPLETED | OUTPATIENT
Start: 2025-03-25 | End: 2025-03-25

## 2025-03-25 RX ORDER — METRONIDAZOLE 500 MG/100ML
500 INJECTION, SOLUTION INTRAVENOUS EVERY 8 HOURS
Status: COMPLETED | OUTPATIENT
Start: 2025-03-25 | End: 2025-03-29

## 2025-03-25 RX ADMIN — POTASSIUM CHLORIDE 40 MEQ: 750 TABLET, EXTENDED RELEASE ORAL at 00:15

## 2025-03-25 RX ADMIN — PANCRELIPASE LIPASE, PANCRELIPASE PROTEASE, PANCRELIPASE AMYLASE 75000 UNITS: 20000; 63000; 84000 CAPSULE, DELAYED RELEASE ORAL at 15:25

## 2025-03-25 RX ADMIN — METRONIDAZOLE 500 MG: 500 INJECTION, SOLUTION INTRAVENOUS at 11:40

## 2025-03-25 RX ADMIN — SODIUM CHLORIDE, PRESERVATIVE FREE 10 ML: 5 INJECTION INTRAVENOUS at 00:30

## 2025-03-25 RX ADMIN — FINASTERIDE 5 MG: 5 TABLET, FILM COATED ORAL at 00:30

## 2025-03-25 RX ADMIN — ALBUTEROL SULFATE 2 PUFF: 90 AEROSOL, METERED RESPIRATORY (INHALATION) at 15:49

## 2025-03-25 RX ADMIN — SODIUM CHLORIDE, PRESERVATIVE FREE 10 ML: 5 INJECTION INTRAVENOUS at 20:53

## 2025-03-25 RX ADMIN — PANCRELIPASE LIPASE, PANCRELIPASE PROTEASE, PANCRELIPASE AMYLASE 75000 UNITS: 20000; 63000; 84000 CAPSULE, DELAYED RELEASE ORAL at 20:53

## 2025-03-25 RX ADMIN — METRONIDAZOLE 500 MG: 500 INJECTION, SOLUTION INTRAVENOUS at 05:05

## 2025-03-25 RX ADMIN — INSULIN LISPRO 1 UNITS: 100 INJECTION, SOLUTION INTRAVENOUS; SUBCUTANEOUS at 20:52

## 2025-03-25 RX ADMIN — FAMOTIDINE 20 MG: 20 TABLET, FILM COATED ORAL at 20:52

## 2025-03-25 RX ADMIN — FINASTERIDE 5 MG: 5 TABLET, FILM COATED ORAL at 17:58

## 2025-03-25 RX ADMIN — FAMOTIDINE 20 MG: 20 TABLET, FILM COATED ORAL at 00:14

## 2025-03-25 RX ADMIN — IOPAMIDOL 45 ML: 755 INJECTION, SOLUTION INTRAVENOUS at 14:11

## 2025-03-25 RX ADMIN — MAGNESIUM SULFATE HEPTAHYDRATE 2000 MG: 40 INJECTION, SOLUTION INTRAVENOUS at 00:29

## 2025-03-25 RX ADMIN — DICYCLOMINE HYDROCHLORIDE 20 MG: 20 TABLET ORAL at 15:25

## 2025-03-25 RX ADMIN — INSULIN HUMAN 20 UNITS: 100 INJECTION, SUSPENSION SUBCUTANEOUS at 16:20

## 2025-03-25 RX ADMIN — DICYCLOMINE HYDROCHLORIDE 20 MG: 20 TABLET ORAL at 00:15

## 2025-03-25 RX ADMIN — METRONIDAZOLE 500 MG: 500 INJECTION, SOLUTION INTRAVENOUS at 20:58

## 2025-03-25 RX ADMIN — SODIUM CHLORIDE 1000 ML: 0.9 INJECTION, SOLUTION INTRAVENOUS at 00:05

## 2025-03-25 RX ADMIN — ALBUTEROL SULFATE 2 PUFF: 90 AEROSOL, METERED RESPIRATORY (INHALATION) at 23:37

## 2025-03-25 RX ADMIN — SODIUM CHLORIDE 1000 MG: 9 INJECTION, SOLUTION INTRAVENOUS at 05:04

## 2025-03-25 NOTE — BRIEF OP NOTE
Interventional Radiology  Brief Postoperative Note    Patient: Sharath Ceja  YOB: 1950  MRN: [id number]      Pre-operative Diagnosis: Biliary obstruction    Post-operative Diagnosis: Same    Procedure: Biliary drain irrigation x2    Anesthesia: None    Surgeons/Assistants: Jesus Manuel Patterson DO    Estimated Blood Loss: less than 50     Complications: None    Infection Present At Time Of Surgery (PATOS) (choose all levels that have infection present):  No infection present    Specimens: Was Not Obtained    Findings:   Contrast administration via each biliary drain demonstrated partial obstruction from intraluminal debris.  Each drain was copiously irrigated with saline using a kumpe catheter.  Post irrigation contrast administration demonstrated patency.    Discussed with the patient this issue is likely to reoccur given the nature of his disease. He may be prone to frequent catheter maintenance and/or exchanges.     Discussed with him at detail how to triage catheter related issues so that he is not over utilizing the ED if it can be safely addressed as an outpatient.       Jesus Manuel Patterson DO  3/25/2025, 2:34 PM  Interventional Radiology  [phone])

## 2025-03-25 NOTE — PROGRESS NOTES
Prior to receiving transfusion of blood products, patient educated on the following:    Blood product transfusion process  Benefits of receiving blood product transfusion  Risks associated with receiving the transfusion  Signs and symptoms of complications associated with blood product transfusion  Vague, uneasy feeling  Onset of pain (especially at the IV site, back, or chest)  Chills  Flushing  Fever  Nausea  Dizziness  Rash  Itching  Dark or red urine  Instructed patient to notify RN immediately if any sign or symptom occurs

## 2025-03-25 NOTE — PROGRESS NOTES
4 Eyes Skin Assessment     NAME:  Sharath Ceja  YOB: 1950  MEDICAL RECORD NUMBER:  [id number]    The patient is being assessed for  Admission    I agree that at least one RN has performed a thorough Head to Toe Skin Assessment on the patient. ALL assessment sites listed below have been assessed.      Pt has 2 hepatic drains with intact dressings.  There is blanchable redness on sacrum, foam preventative applied.  There are older scars/scabs on spine, foam dressing applied for preventative.    Areas assessed by both nurses:    Head, Face, Ears, Shoulders, Back, Chest, Arms, Elbows, Hands, Sacrum. Buttock, Coccyx, Ischium, and Legs. Feet and Heels        Does the Patient have a Wound? No noted wound(s)       Mickey Prevention initiated by RN: Yes  Wound Care Orders initiated by RN: No    Pressure Injury (Stage 3,4, Unstageable, DTI, NWPT, and Complex wounds) if present, place Wound referral order by RN under : No    New Ostomies, if present place, Ostomy referral order under : No     Nurse 1 eSignature: Electronically signed by Marv Nobles RN on 3/25/25 at 2:32 AM EDT    **SHARE this note so that the co-signing nurse can place an eSignature**    Nurse 2 eSignature: {Esignature:[id number]}

## 2025-03-25 NOTE — PROGRESS NOTES
/63   Pulse 85   Temp 99 °F (37.2 °C) (Oral)   Resp 16   Ht 1.778 m (5' 10\")   Wt 65.5 kg (144 lb 6.4 oz)   SpO2 95%   BMI 20.72 kg/m²       Prior to receiving transfusion of blood products, patient educated on the following:    Blood product transfusion process  Benefits of receiving blood product transfusion  Risks associated with receiving the transfusion  Signs and symptoms of complications associated with blood product transfusion  Vague, uneasy feeling  Onset of pain (especially at the IV site, back, or chest)  Chills  Flushing  Fever  Nausea  Dizziness  Rash  Itching  Dark or red urine  Instructed patient to notify RN immediately if any sign or symptom occurs    Blood transfusion consent has been verified.     Vitals as shown above. Patient educated to notify RN if any S&S of a blood transfusion reaction occurs or if any new symptoms appear. Blood reach vein at 0829. Initial blood transfusion rate of 2nd unit started at 60ml/hr.

## 2025-03-25 NOTE — PROGRESS NOTES
Progress Note    Admit Date:  3/24/2025    Admitted for bloody drainage in hepatic drain.  Patient has history of liver cancer getting chemotherapy from [facility] - Dr. Aldrich .  He got his hepatic drain placed here at IR last week missed his chemo dose last week; was due again for chemo yesterday but then started having bloody drainage from his hepatic drain and hence came back to [place].  Patient was evaluated by IR physician at the ER yesterday.      Drop in hemoglobin overnight to 7.6; patient symptomatic dizzy and orthostatic with systolic blood pressures dropping to 60 when he stood up.   PRBC transfusion started    Subjective:  Mr. Ceja seen .   second unit of PRBC completed now  Repeat labs-shows improvement in hemoglobin.  Patient not dizzy now    Did have some bloody drainage from the hepatic drain - IR to reevaluate today.    He was febrile overnight afebrile now    Objective:   Patient Vitals for the past 4 hrs:   BP Temp Temp src Pulse Resp SpO2   03/25/25 1141 109/66 98 °F (36.7 °C) Oral 79 16 95 %   03/25/25 1041 104/64 98.3 °F (36.8 °C) Oral 79 18 93 %   03/25/25 0844 103/61 98.7 °F (37.1 °C) Oral 81 16 99 %   03/25/25 0824 109/63 99 °F (37.2 °C) Oral 85 16 95 %          Intake/Output Summary (Last 24 hours) at 3/25/2025 1214  Last data filed at 3/25/2025 1041  Gross per 24 hour   Intake 660 ml   Output 950 ml   Net -290 ml       Physical Exam:  Gen: No distress. Alert.  Awake and well-oriented  Eyes: PERRL. No sclera icterus. No conjunctival injection.   ENT: No discharge. Pharynx clear.   Neck: No JVD.  Trachea midline.  Resp: No accessory muscle use. No crackles. No wheezes. No rhonchi.   CV: Regular rate. Regular rhythm. No murmur.  No rub. No edema.   Capillary Refill: Brisk,< 3 seconds   Peripheral Pulses: +2 palpable, equal bilaterally   GI: Non-tender. Non-distended.  Normal bowel sounds.  Patient has 2 drains in the right upper quadrant, (1 placed anteriorly and one  placed on the lateral aspect ) -currently with dressing.  No tenderness  Skin: Warm and dry. No nodule on exposed extremities. No rash on exposed extremities.   M/S: No cyanosis. No joint deformity. No clubbing.   Neuro: Awake. Grossly nonfocal    Psych: Oriented x 3. No anxiety or agitation.      Scheduled Meds:   cefTRIAXone (ROCEPHIN) IV  1,000 mg IntraVENous Q24H    metroNIDAZOLE  500 mg IntraVENous Q8H    dicyclomine  20 mg Oral Daily    famotidine  20 mg Oral BID    finasteride  5 mg Oral QPM    pantoprazole  40 mg Oral QAM AC    potassium chloride  20 mEq Oral BID    sodium chloride flush  5-40 mL IntraVENous 2 times per day    magnesium oxide  400 mg Oral Once    insulin lispro  0-4 Units SubCUTAneous 4x Daily AC & HS    insulin NPH  5 Units SubCUTAneous Q6H    lipase-protease-amylase  75,000 Units Oral TID WC       Continuous Infusions:   sodium chloride      sodium chloride      dextrose         PRN Meds:  sodium chloride, albuterol sulfate HFA, oxyCODONE, sodium chloride flush, sodium chloride, potassium chloride **OR** potassium alternative oral replacement **OR** potassium chloride, magnesium sulfate, ondansetron **OR** ondansetron, polyethylene glycol, acetaminophen **OR** acetaminophen, glucose, dextrose bolus **OR** dextrose bolus, glucagon (rDNA), dextrose, HYDROmorphone, lipase-protease-amylase      Data:  CBC:   Recent Labs     03/24/25  1254 03/24/25 2014 03/25/25  0135 03/25/25  1148   WBC 7.5  --   --  6.5   HGB 8.9* 8.2* 7.6* 8.7*  8.7*  8.7*   HCT 26.9* 24.9* 22.9* 26.5*  26.2*  26.3*   MCV 91.4  --   --  86.2   *  --   --  131*  126*     BMP:   Recent Labs     03/24/25  1254   *   K 3.2*   CL 97*   CO2 27   BUN 7   CREATININE 0.3*     LIVER PROFILE:   Recent Labs     03/24/25  1254   AST 27   ALT 26   LIPASE 807.0*   BILITOT 0.8   ALKPHOS 218*     PT/INR:   Recent Labs     03/25/25  1148   PROTIME 20.8*   INR 1.78*       CULTURES  Results       Procedure Component Value

## 2025-03-25 NOTE — PROGRESS NOTES
Blood documentation time frame issues were related to obtaining another IV access for fluids and medications.

## 2025-03-25 NOTE — PROGRESS NOTES
/63   Pulse 85   Temp 99 °F (37.2 °C) (Oral)   Resp 16   Ht 1.778 m (5' 10\")   Wt 65.5 kg (144 lb 6.4 oz)   SpO2 95%   BMI 20.72 kg/m²     Assessment complete. PO meds held d/t NPO. Pt denies needs at this time. States some nausea but is sleeping during most of 15 minute observation. Pleasant. Denies pain.    Mid abd biliary drain dressing with drainage, small bruising noted within and to superior umbilicus. BP stable after 15 minutes of observation during second unit. Plan to draw blood after transfusion complete. Awake and alert. Good pulses DP/PT. No rhonci or stridor noted, clear lung sounds. S1,s2. Stood to [facility] with x2 pivot with GB and RW for safety, denies dizziness while moving around unlike the orthostatic event that occurred overnight.         Bedside Mobility Assessment Tool (BMAT):     Assessment Level 1- Sit and Shake    1. From a semi-reclined position, ask patient to sit up and rotate to a seated position at the side of the bed. Can use the bedrail.    2. Ask patient to reach out and grab your hand and shake making sure patient reaches across his/her midline.   Pass- Patient is able to come to a seated position, maintain core strength. Maintains seated balance while reaching across midline. Move on to Assessment Level 2.     Assessment Level 2- Stretch and Point   1. With patient in seated position at the side of the bed, have patient place both feet on the floor (or stool) with knees no higher than hips.    2. Ask patient to stretch one leg and straighten the knee, then bend the ankle/flex and point the toes. If appropriate, repeat with the other leg.   Pass- Patient is able to demonstrate appropriate quad strength on intended weight bearing limb(s). Move onto Assessment Level 3.     Assessment Level 3- Stand   1. Ask patient to elevate off the bed or chair (seated to standing) using an assistive device (cane, bedrail).    2. Patient should be able to raise buttocks off be and hold for a

## 2025-03-25 NOTE — SIGNIFICANT EVENT
Contacted for patient getting out of bed becoming orthostatic,    Orthostatic resulted positive.   VSS stable while in bed resting,    2 units of RBC ordered stat. Noted febrile earlier added bcx, lactic acid, and empiric abx to cover intraabdominal infection.

## 2025-03-25 NOTE — OR NURSING
Pt arrived for image guided bilateral biliary drain flush with possible exchange. Procedure explained including the risk and benefits of the procedure. All questions answered. Pt verbalizes understanding of the of procedure and states no more questions. Consent signed. Vital signs stable, labs, allergies, medications, and code status reviewed. No contraindications noted.     Vitals:    03/25/25 1141   BP: 109/66   Pulse: 79   Resp: 16   Temp: 98 °F (36.7 °C)   SpO2: 95%    (vital signs in table format)    Elenita Score  2 - Able to move 4 extremities voluntarily on command  2 - BP+/- 20mmHg of normal  2 - Fully awake  2 - Able to maintain oxygen saturation >92% on room air  2 - Able to breathe deeply and cough freely    Allergies  Codeine and Amoxicillin-pot clavulanate    Labs  Lab Results   Component Value Date    INR 1.78 (H) 03/25/2025    PROTIME 20.8 (H) 03/25/2025       Lab Results   Component Value Date    CREATININE 0.4 (L) 03/25/2025    BUN 10 03/25/2025     (L) 03/25/2025    K 3.8 03/25/2025     03/25/2025    CO2 23 03/25/2025       Lab Results   Component Value Date    WBC 6.5 03/25/2025    HGB 8.7 (L) 03/25/2025    HGB 8.7 (L) 03/25/2025    HGB 8.7 (L) 03/25/2025    HCT 26.5 (L) 03/25/2025    HCT 26.2 (L) 03/25/2025    HCT 26.3 (L) 03/25/2025    MCV 86.2 03/25/2025     (L) 03/25/2025     (L) 03/25/2025

## 2025-03-25 NOTE — CARE COORDINATION
Case Management Assessment  Initial Evaluation    Date/Time of Evaluation: 3/25/2025 10:21 AM  Assessment Completed by: Eulalio Thomas RN    If patient is discharged prior to next notation, then this note serves as note for discharge by case management.    Patient Name: Sharath Ceja                   YOB: 1950  Diagnosis: Abdominal pain, right upper quadrant [R10.11]  Intra abdominal hemorrhage [R58]  Encounter for biliary drainage tube placement [Z46.82]  Other specified carcinomas of liver [C22.7]                   Date / Time: 3/24/2025 11:26 AM    Patient Admission Status: Inpatient   Readmission Risk (Low < 19, Mod (19-27), High > 27): Readmission Risk Score: 27.7    Current PCP: Real Whitehead MD  PCP verified by CM? Yes    Chart Reviewed: Yes      History Provided by: Patient  Patient Orientation: Alert and Oriented    Patient Cognition: Alert    Hospitalization in the last 30 days (Readmission):  No    If yes, Readmission Assessment in  Navigator will be completed.    Advance Directives:      Code Status: Full Code   Patient's Primary Decision Maker is: Legal Next of Kin    Primary Decision Maker: Natasha Ceja R - Spouse - [phone]    Discharge Planning:    Patient lives with: Spouse/Significant Other Type of Home: Trailer/Mobile Home  Primary Care Giver: Self  Patient Support Systems include: Spouse/Significant Other   Current Financial resources: Medicare  Current community resources: ECF/Home Care ([facility])  Current services prior to admission: Durable Medical Equipment            Current DME: Walker, Cane            Type of Home Care services:  None    ADLS  Prior functional level: Assistance with the following:, Shopping, Housework, Cooking, Mobility, Bathing  Current functional level: Assistance with the following:, Cooking, Housework, Mobility, Shopping, Bathing    PT AM-PAC:   /24  OT AM-PAC:   /24    Family can provide assistance at DC: Yes  Would you like Case

## 2025-03-25 NOTE — PROGRESS NOTES
RT Inhaler-Nebulizer Bronchodilator Protocol Note    There is a bronchodilator order in the chart from a provider indicating to follow the RT Bronchodilator Protocol and there is an “Initiate RT Inhaler-Nebulizer Bronchodilator Protocol” order as well (see protocol at bottom of note).    CXR Findings:  XR CHEST PORTABLE  Result Date: 3/24/2025  No acute process.       The findings from the last RT Protocol Assessment were as follows:   History Pulmonary Disease: (P) Smoker 15 pack years or more  Respiratory Pattern: (P) Regular pattern and RR 12-20 bpm  Breath Sounds: (P) Slightly diminished and/or crackles  Cough: (P) Strong, spontaneous, non-productive  Indication for Bronchodilator Therapy: (P) Decreased or absent breath sounds  Bronchodilator Assessment Score: (P) 3    Aerosolized bronchodilator medication orders have been revised according to the RT Inhaler-Nebulizer Bronchodilator Protocol below.    Respiratory Therapist to perform RT Therapy Protocol Assessment initially then follow the protocol.  Repeat RT Therapy Protocol Assessment PRN for score 0-3 or on second treatment, BID, and PRN for scores above 3.    No Indications - adjust the frequency to every 6 hours PRN wheezing or bronchospasm, if no treatments needed after 48 hours then discontinue using Per Protocol order mode.     If indication present, adjust the RT bronchodilator orders based on the Bronchodilator Assessment Score as indicated below.  Use Inhaler orders unless patient has one or more of the following: on home nebulizer, not able to hold breath for 10 seconds, is not alert and oriented, cannot activate and use MDI correctly, or respiratory rate 25 breaths per minute or more, then use the equivalent nebulizer order(s) with same Frequency and PRN reasons based on the score.  If a patient is on this medication at home then do not decrease Frequency below that used at home.    0-3 - enter or revise RT bronchodilator order(s) to equivalent RT

## 2025-03-25 NOTE — PROGRESS NOTES
Shift report given to Mat at bedside. Patient is stable. All end of shift needs have been met. No further assistance needed at this time.

## 2025-03-25 NOTE — CONSENT
Informed Consent for Blood Component Transfusion Note    I have discussed with the patient the rationale for blood component transfusion; its benefits in treating or preventing fatigue, organ damage, or death; and its risk which includes mild transfusion reactions, rare risk of blood borne infection, or more serious but rare reactions. I have discussed the alternatives to transfusion, including the risk and consequences of not receiving transfusion. The patient had an opportunity to ask questions and had agreed to proceed with transfusion of blood components.    Electronically signed by Colby Pelayo DO on 3/25/25 at 3:51 AM EDT

## 2025-03-25 NOTE — PROGRESS NOTES
Pt called out to use the BSC for a BM, RN entered room with PCA and found that the RLQ drain dressing was saturated with blood.  Pt became nervous and slightly anxious.  Saturated dressing removed and replaced with gauze and clear film dressing.  Pt then tried to stand to use BSC and became dizzy.  Charge nurse notified and came to the room.  Manual blood pressures and orthostatics were performed and showed positive orthostatic hypotension.  Clinical called and notified of situation, in house provider was notified by clinical.  Orders for H/H and units of blood have been ordered.

## 2025-03-25 NOTE — PLAN OF CARE
Problem: Chronic Conditions and Co-morbidities  Goal: Patient's chronic conditions and co-morbidity symptoms are monitored and maintained or improved  3/25/2025 0226 by Marv Nobles RN  Outcome: Progressing     Problem: Discharge Planning  Goal: Discharge to home or other facility with appropriate resources  3/25/2025 0226 by Marv Nobles RN  Outcome: Progressing     Problem: Safety - Adult  Goal: Free from fall injury  3/25/2025 0226 by Marv Nobles RN  Outcome: Progressing     Problem: ABCDS Injury Assessment  Goal: Absence of physical injury  3/25/2025 0226 by Marv Nobles RN  Outcome: Progressing     Problem: Metabolic/Fluid and Electrolytes - Adult  Goal: Electrolytes maintained within normal limits  Outcome: Progressing  Goal: Hemodynamic stability and optimal renal function maintained  Outcome: Progressing  Goal: Glucose maintained within prescribed range  Outcome: Progressing     Problem: Hematologic - Adult  Goal: Maintains hematologic stability  Outcome: Progressing

## 2025-03-25 NOTE — H&P
Hospital Medicine History & Physical      PCP: Real Whitehead MD    Date of Admission: 3/24/2025    Date of Service: Pt seen/examined on 3/24/2025    Pt seen/examined face to face on and admitted as inpatient with expected LOS to be two days but can change depending on diagnostic work up and treatment response.     Chief Complaint:    Chief Complaint   Patient presents with    Post-op Problem     Patient had two drains placed in his liver last Monday, patient was running errands today and noticed blood coming from the drain on his right side.            ASSESSMENT AND PLAN:    Active Hospital Problems    Diagnosis Date Noted    Intra abdominal hemorrhage [R58] 03/24/2025     Postop biliary drain with suspected intra-abdominal hemorrhage with acute blood loss anemia:  Trend H&H  IR consulted in the ED, will evaluate the patient tomorrow  CT abdomen showing a pulled up tigilanol lumen in the right upper quadrant with mild distention showing compatible blood products in the center of breath main portal vein truncation in the portal hepatic acid region that is unchanged and hepatic 2.4 cm lesion  Hemodynamically stable    Hyponatremia: IVF and recheck    Hypokalemia:Replaced  Hypomagnesemia: Replaced    Pancreatic cancer status post whipple surgery,  Continued oral pancreatic enzyme    Intermittent encephalopathy: CT head ordered  If negative defer to primary for Mri brain to rule out mass    Type 1 diabetes: Reviewed patient's medications. Plan is to  continued with reduced home dose of long acting and Insulin sliding scale  GERD: Continue home medication  BPH - Controlled without active acute obstructive Uropathy,Continued on home medications.         .Due to the above diagnosis makes the patient higher risk for morbidity and mortality requiring testing and treatment      Discussion with the primary ER physician in regards to symptoms, history, physical exam, diagnosis and treatment, collaborative decision was to

## 2025-03-25 NOTE — ACP (ADVANCE CARE PLANNING)
Advance Care Planning     General Advance Care Planning (ACP) Conversation    Date of Conversation: 3/25/2025  Conducted with: Patient with Decision Making Capacity  Other persons present: None    Healthcare Decision Maker:   Primary Decision Maker: Natasha Ceja R - Spouse - [phone]       Content/Action Overview:  DECLINED ACP Conversation - will revisit periodically  Reviewed DNR/DNI and patient elects Full Code (Attempt Resuscitation)        Length of Voluntary ACP Conversation in minutes:  <16 minutes (Non-Billable)    Eulalio Thomas RN            yes

## 2025-03-25 NOTE — OR NURSING
Bilateral biliary drains flushed by Dr. Patterson in IR. Pt appears to have tolerated this well. No sedation given. Pt alert and oriented, no apparent distress. Sent to CT from IR, report called to Mat MORRISON.

## 2025-03-26 LAB
ALBUMIN SERPL-MCNC: 2.2 G/DL (ref 3.4–5)
ALBUMIN/GLOB SERPL: 1 {RATIO} (ref 1.1–2.2)
ALP SERPL-CCNC: 156 U/L (ref 40–129)
ALT SERPL-CCNC: 24 U/L (ref 10–40)
ANION GAP SERPL CALCULATED.3IONS-SCNC: 8 MMOL/L (ref 3–16)
AST SERPL-CCNC: 26 U/L (ref 15–37)
BILIRUB SERPL-MCNC: 1.5 MG/DL (ref 0–1)
BUN SERPL-MCNC: 10 MG/DL (ref 7–20)
CALCIUM SERPL-MCNC: 7.2 MG/DL (ref 8.3–10.6)
CHLORIDE SERPL-SCNC: 105 MMOL/L (ref 99–110)
CO2 SERPL-SCNC: 23 MMOL/L (ref 21–32)
CREAT SERPL-MCNC: 0.3 MG/DL (ref 0.8–1.3)
EST. AVERAGE GLUCOSE BLD GHB EST-MCNC: 131.2 MG/DL
GFR SERPLBLD CREATININE-BSD FMLA CKD-EPI: >90 ML/MIN/{1.73_M2}
GLUCOSE BLD-MCNC: 111 MG/DL (ref 70–99)
GLUCOSE BLD-MCNC: 134 MG/DL (ref 70–99)
GLUCOSE BLD-MCNC: 141 MG/DL (ref 70–99)
GLUCOSE BLD-MCNC: 204 MG/DL (ref 70–99)
GLUCOSE BLD-MCNC: 207 MG/DL (ref 70–99)
GLUCOSE SERPL-MCNC: 89 MG/DL (ref 70–99)
HBA1C MFR BLD: 6.2 %
HCT VFR BLD AUTO: 22.6 % (ref 40.5–52.5)
HCT VFR BLD AUTO: 22.8 % (ref 40.5–52.5)
HCT VFR BLD AUTO: 22.9 % (ref 40.5–52.5)
HCT VFR BLD AUTO: 23 % (ref 40.5–52.5)
HEMOCCULT STL QL: ABNORMAL
HGB BLD-MCNC: 7.5 G/DL (ref 13.5–17.5)
HGB BLD-MCNC: 7.5 G/DL (ref 13.5–17.5)
HGB BLD-MCNC: 7.6 G/DL (ref 13.5–17.5)
HGB BLD-MCNC: 7.7 G/DL (ref 13.5–17.5)
PERFORMED ON: ABNORMAL
POTASSIUM SERPL-SCNC: 3.8 MMOL/L (ref 3.5–5.1)
PROT SERPL-MCNC: 4.5 G/DL (ref 6.4–8.2)
SODIUM SERPL-SCNC: 136 MMOL/L (ref 136–145)

## 2025-03-26 PROCEDURE — 6370000000 HC RX 637 (ALT 250 FOR IP): Performed by: INTERNAL MEDICINE

## 2025-03-26 PROCEDURE — 1200000000 HC SEMI PRIVATE

## 2025-03-26 PROCEDURE — 2580000003 HC RX 258: Performed by: INTERNAL MEDICINE

## 2025-03-26 PROCEDURE — 36415 COLL VENOUS BLD VENIPUNCTURE: CPT

## 2025-03-26 PROCEDURE — 82270 OCCULT BLOOD FECES: CPT

## 2025-03-26 PROCEDURE — 94761 N-INVAS EAR/PLS OXIMETRY MLT: CPT

## 2025-03-26 PROCEDURE — 2500000003 HC RX 250 WO HCPCS: Performed by: INTERNAL MEDICINE

## 2025-03-26 PROCEDURE — 6360000002 HC RX W HCPCS: Performed by: INTERNAL MEDICINE

## 2025-03-26 PROCEDURE — 85014 HEMATOCRIT: CPT

## 2025-03-26 PROCEDURE — 80053 COMPREHEN METABOLIC PANEL: CPT

## 2025-03-26 PROCEDURE — 94640 AIRWAY INHALATION TREATMENT: CPT

## 2025-03-26 PROCEDURE — 99232 SBSQ HOSP IP/OBS MODERATE 35: CPT | Performed by: INTERNAL MEDICINE

## 2025-03-26 PROCEDURE — 85018 HEMOGLOBIN: CPT

## 2025-03-26 RX ADMIN — INSULIN LISPRO 1 UNITS: 100 INJECTION, SOLUTION INTRAVENOUS; SUBCUTANEOUS at 20:38

## 2025-03-26 RX ADMIN — PANTOPRAZOLE SODIUM 40 MG: 40 TABLET, DELAYED RELEASE ORAL at 07:37

## 2025-03-26 RX ADMIN — METRONIDAZOLE 500 MG: 500 INJECTION, SOLUTION INTRAVENOUS at 20:38

## 2025-03-26 RX ADMIN — INSULIN LISPRO 1 UNITS: 100 INJECTION, SOLUTION INTRAVENOUS; SUBCUTANEOUS at 12:26

## 2025-03-26 RX ADMIN — ALBUTEROL SULFATE 2 PUFF: 90 AEROSOL, METERED RESPIRATORY (INHALATION) at 08:48

## 2025-03-26 RX ADMIN — METRONIDAZOLE 500 MG: 500 INJECTION, SOLUTION INTRAVENOUS at 12:28

## 2025-03-26 RX ADMIN — ALBUTEROL SULFATE 2 PUFF: 90 AEROSOL, METERED RESPIRATORY (INHALATION) at 19:18

## 2025-03-26 RX ADMIN — PANCRELIPASE LIPASE, PANCRELIPASE PROTEASE, PANCRELIPASE AMYLASE 75000 UNITS: 20000; 63000; 84000 CAPSULE, DELAYED RELEASE ORAL at 12:26

## 2025-03-26 RX ADMIN — SODIUM CHLORIDE, PRESERVATIVE FREE 10 ML: 5 INJECTION INTRAVENOUS at 10:08

## 2025-03-26 RX ADMIN — PANCRELIPASE LIPASE, PANCRELIPASE PROTEASE, PANCRELIPASE AMYLASE 75000 UNITS: 20000; 63000; 84000 CAPSULE, DELAYED RELEASE ORAL at 17:55

## 2025-03-26 RX ADMIN — FAMOTIDINE 20 MG: 20 TABLET, FILM COATED ORAL at 20:40

## 2025-03-26 RX ADMIN — FINASTERIDE 5 MG: 5 TABLET, FILM COATED ORAL at 17:55

## 2025-03-26 RX ADMIN — SODIUM CHLORIDE 1000 MG: 9 INJECTION, SOLUTION INTRAVENOUS at 04:36

## 2025-03-26 RX ADMIN — SODIUM CHLORIDE, PRESERVATIVE FREE 10 ML: 5 INJECTION INTRAVENOUS at 20:40

## 2025-03-26 RX ADMIN — FAMOTIDINE 20 MG: 20 TABLET, FILM COATED ORAL at 10:07

## 2025-03-26 RX ADMIN — PANCRELIPASE LIPASE, PANCRELIPASE PROTEASE, PANCRELIPASE AMYLASE 75000 UNITS: 20000; 63000; 84000 CAPSULE, DELAYED RELEASE ORAL at 10:07

## 2025-03-26 RX ADMIN — DICYCLOMINE HYDROCHLORIDE 20 MG: 20 TABLET ORAL at 10:07

## 2025-03-26 RX ADMIN — METRONIDAZOLE 500 MG: 500 INJECTION, SOLUTION INTRAVENOUS at 04:29

## 2025-03-26 NOTE — PROGRESS NOTES
Shift assessment complete. See flow sheet. Scheduled meds given. See MAR.  Patients head-toe complete, VS are logged, and active bowel sound noted in all four quadrants. Biliary rains x2 in place and draining bile.     Patient on room air, RR easy and unlabored. No s/s of distress. A/O x4. Denies pain or SOB.      Patient sitting in bed, denies further needs at this time. Call light and bedside table are within reach. The bed is locked and is in the lowest position.       Vitals:    03/26/25 0945   BP: (!) 101/56   Pulse: 75   Resp: 16   Temp: 99.3 °F (37.4 °C)   SpO2: 98%

## 2025-03-26 NOTE — PROGRESS NOTES
RT Inhaler-Nebulizer Bronchodilator Protocol Note    There is a bronchodilator order in the chart from a provider indicating to follow the RT Bronchodilator Protocol and there is an “Initiate RT Inhaler-Nebulizer Bronchodilator Protocol” order as well (see protocol at bottom of note).    CXR Findings:  XR CHEST PORTABLE  Result Date: 3/24/2025  No acute process.       The findings from the last RT Protocol Assessment were as follows:   History Pulmonary Disease: Chronic pulmonary disease  Respiratory Pattern: Regular pattern and RR 12-20 bpm  Breath Sounds: Slightly diminished and/or crackles  Cough: Strong, spontaneous, non-productive  Indication for Bronchodilator Therapy: Decreased or absent breath sounds  Bronchodilator Assessment Score: 4    Aerosolized bronchodilator medication orders have been revised according to the RT Inhaler-Nebulizer Bronchodilator Protocol below.    Respiratory Therapist to perform RT Therapy Protocol Assessment initially then follow the protocol.  Repeat RT Therapy Protocol Assessment PRN for score 0-3 or on second treatment, BID, and PRN for scores above 3.    No Indications - adjust the frequency to every 6 hours PRN wheezing or bronchospasm, if no treatments needed after 48 hours then discontinue using Per Protocol order mode.     If indication present, adjust the RT bronchodilator orders based on the Bronchodilator Assessment Score as indicated below.  Use Inhaler orders unless patient has one or more of the following: on home nebulizer, not able to hold breath for 10 seconds, is not alert and oriented, cannot activate and use MDI correctly, or respiratory rate 25 breaths per minute or more, then use the equivalent nebulizer order(s) with same Frequency and PRN reasons based on the score.  If a patient is on this medication at home then do not decrease Frequency below that used at home.    0-3 - enter or revise RT bronchodilator order(s) to equivalent RT Bronchodilator order with

## 2025-03-26 NOTE — PROGRESS NOTES
7 10 10   CREATININE 0.3* 0.4* 0.3*     LIVER PROFILE:   Recent Labs     03/24/25  1254 03/25/25  1148 03/26/25  0641   AST 27 31 26   ALT 26 27 24   LIPASE 807.0*  --   --    BILITOT 0.8 2.7* 1.5*   ALKPHOS 218* 178* 156*     PT/INR:   Recent Labs     03/25/25  1148   PROTIME 20.8*   INR 1.78*       CULTURES  Results       Procedure Component Value Units Date/Time    Culture, Blood 1 [[id number]] Collected: 03/25/25 1148    Order Status: Completed Specimen: Blood Updated: 03/26/25 1215     Blood Culture, Routine No Growth to date.  Any change in status will be called.    Narrative:      ORDER#: [id number]                          ORDERED BY: TERRANCE OWENS  SOURCE: Blood                              COLLECTED:  03/25/25 11:48  ANTIBIOTICS AT SONYA.:                      RECEIVED :  03/25/25 11:57  If child <=2 yrs old please draw pediatric bottle.~Blood Culture 1    Culture, Blood 2 [[id number]] Collected: 03/25/25 1148    Order Status: Completed Specimen: Blood Updated: 03/26/25 1215     Culture, Blood 2 No Growth to date.  Any change in status will be called.    Narrative:      ORDER#: [id number]                          ORDERED BY: TERRANCE OWENS  SOURCE: Blood                              COLLECTED:  03/25/25 11:48  ANTIBIOTICS AT SONYA.:                      RECEIVED :  03/25/25 11:56  If child <=2 yrs old please draw pediatric bottle.~Blood Culture #2    COVID-19 & Influenza Combo [[id number]] Collected: 03/24/25 1254    Order Status: Completed Specimen: Nasopharyngeal Swab Updated: 03/24/25 1341     SARS-CoV-2 RNA, RT PCR NOT DETECTED     Comment: Not Detected results do not preclude SARS-CoV-2 infection and  should not be used as the sole basis for patient management  decisions.  Results must be combined with clinical observations,  patient history, and epidemiological information.  Testing was performed using NAOMI FLORIDALMA SARS-CoV-2, Influenza A/B  and Respiratory Syncytial Virus nucleic acid assay. This  test is  mortality requiring testing and treatment.      DVT Prophylaxis: scd  Diet: ADULT DIET; Regular; 4 carb choices (60 gm/meal)  Code Status: Full Code    Plan of care discussed in detail with patient and patient's wife    Maryuri Burnette MD   3/26/2025

## 2025-03-26 NOTE — PROGRESS NOTES
Consult has been called to Dr. nettles on 38/26/25. Spoke with sosa. 4:45 PM    Kaila Hill  3/26/2025

## 2025-03-26 NOTE — PROGRESS NOTES
03/26/25 0853   RT Protocol   History Pulmonary Disease 2   Respiratory pattern 0   Breath sounds 2   Cough 1   Indications for Bronchodilator Therapy On home bronchodilators   Bronchodilator Assessment Score 5

## 2025-03-27 ENCOUNTER — APPOINTMENT (OUTPATIENT)
Dept: CT IMAGING | Age: 75
DRG: 907 | End: 2025-03-27
Payer: MEDICARE

## 2025-03-27 LAB
ABO/RH: NORMAL
ALBUMIN SERPL-MCNC: 2.6 G/DL (ref 3.4–5)
ALBUMIN/GLOB SERPL: 0.9 {RATIO} (ref 1.1–2.2)
ALP SERPL-CCNC: 289 U/L (ref 40–129)
ALT SERPL-CCNC: 27 U/L (ref 10–40)
ANION GAP SERPL CALCULATED.3IONS-SCNC: 9 MMOL/L (ref 3–16)
ANTIBODY SCREEN: NORMAL
AST SERPL-CCNC: 27 U/L (ref 15–37)
BILIRUB SERPL-MCNC: 0.8 MG/DL (ref 0–1)
BLOOD BANK DISPENSE STATUS: NORMAL
BLOOD BANK DISPENSE STATUS: NORMAL
BLOOD BANK PRODUCT CODE: NORMAL
BLOOD BANK PRODUCT CODE: NORMAL
BPU ID: NORMAL
BPU ID: NORMAL
BUN SERPL-MCNC: 7 MG/DL (ref 7–20)
CALCIUM SERPL-MCNC: 7.8 MG/DL (ref 8.3–10.6)
CHLORIDE SERPL-SCNC: 104 MMOL/L (ref 99–110)
CO2 SERPL-SCNC: 22 MMOL/L (ref 21–32)
CREAT SERPL-MCNC: 0.3 MG/DL (ref 0.8–1.3)
DESCRIPTION BLOOD BANK: NORMAL
DESCRIPTION BLOOD BANK: NORMAL
GFR SERPLBLD CREATININE-BSD FMLA CKD-EPI: >90 ML/MIN/{1.73_M2}
GLUCOSE BLD-MCNC: 174 MG/DL (ref 70–99)
GLUCOSE BLD-MCNC: 209 MG/DL (ref 70–99)
GLUCOSE BLD-MCNC: 226 MG/DL (ref 70–99)
GLUCOSE SERPL-MCNC: 122 MG/DL (ref 70–99)
HCT VFR BLD AUTO: 20.7 % (ref 40.5–52.5)
HCT VFR BLD AUTO: 21.7 % (ref 40.5–52.5)
HCT VFR BLD AUTO: 23.6 % (ref 40.5–52.5)
HCT VFR BLD AUTO: 24.6 % (ref 40.5–52.5)
HCT VFR BLD AUTO: 25.7 % (ref 40.5–52.5)
HCT VFR BLD AUTO: 26.6 % (ref 40.5–52.5)
HGB BLD-MCNC: 6.8 G/DL (ref 13.5–17.5)
HGB BLD-MCNC: 7.2 G/DL (ref 13.5–17.5)
HGB BLD-MCNC: 7.7 G/DL (ref 13.5–17.5)
HGB BLD-MCNC: 7.9 G/DL (ref 13.5–17.5)
HGB BLD-MCNC: 8.5 G/DL (ref 13.5–17.5)
HGB BLD-MCNC: 8.8 G/DL (ref 13.5–17.5)
INR PPP: 1.41 (ref 0.85–1.15)
INR PPP: 1.6 (ref 0.85–1.15)
PERFORMED ON: ABNORMAL
POTASSIUM SERPL-SCNC: 3.9 MMOL/L (ref 3.5–5.1)
PROT SERPL-MCNC: 5.4 G/DL (ref 6.4–8.2)
PROTHROMBIN TIME: 17.5 SEC (ref 11.9–14.9)
PROTHROMBIN TIME: 19.2 SEC (ref 11.9–14.9)
SODIUM SERPL-SCNC: 135 MMOL/L (ref 136–145)

## 2025-03-27 PROCEDURE — 30233L1 TRANSFUSION OF NONAUTOLOGOUS FRESH PLASMA INTO PERIPHERAL VEIN, PERCUTANEOUS APPROACH: ICD-10-PCS | Performed by: INTERNAL MEDICINE

## 2025-03-27 PROCEDURE — 1200000000 HC SEMI PRIVATE

## 2025-03-27 PROCEDURE — 86901 BLOOD TYPING SEROLOGIC RH(D): CPT

## 2025-03-27 PROCEDURE — 85014 HEMATOCRIT: CPT

## 2025-03-27 PROCEDURE — 94761 N-INVAS EAR/PLS OXIMETRY MLT: CPT

## 2025-03-27 PROCEDURE — 6360000004 HC RX CONTRAST MEDICATION: Performed by: INTERNAL MEDICINE

## 2025-03-27 PROCEDURE — 99233 SBSQ HOSP IP/OBS HIGH 50: CPT | Performed by: INTERNAL MEDICINE

## 2025-03-27 PROCEDURE — 94640 AIRWAY INHALATION TREATMENT: CPT

## 2025-03-27 PROCEDURE — 6360000002 HC RX W HCPCS

## 2025-03-27 PROCEDURE — 2580000003 HC RX 258: Performed by: INTERNAL MEDICINE

## 2025-03-27 PROCEDURE — 6370000000 HC RX 637 (ALT 250 FOR IP): Performed by: INTERNAL MEDICINE

## 2025-03-27 PROCEDURE — 36415 COLL VENOUS BLD VENIPUNCTURE: CPT

## 2025-03-27 PROCEDURE — 86900 BLOOD TYPING SEROLOGIC ABO: CPT

## 2025-03-27 PROCEDURE — 6360000002 HC RX W HCPCS: Performed by: INTERNAL MEDICINE

## 2025-03-27 PROCEDURE — 2580000003 HC RX 258

## 2025-03-27 PROCEDURE — 30233N1 TRANSFUSION OF NONAUTOLOGOUS RED BLOOD CELLS INTO PERIPHERAL VEIN, PERCUTANEOUS APPROACH: ICD-10-PCS | Performed by: INTERNAL MEDICINE

## 2025-03-27 PROCEDURE — 85610 PROTHROMBIN TIME: CPT

## 2025-03-27 PROCEDURE — 74174 CTA ABD&PLVS W/CONTRAST: CPT

## 2025-03-27 PROCEDURE — 86850 RBC ANTIBODY SCREEN: CPT

## 2025-03-27 PROCEDURE — P9016 RBC LEUKOCYTES REDUCED: HCPCS

## 2025-03-27 PROCEDURE — 86923 COMPATIBILITY TEST ELECTRIC: CPT

## 2025-03-27 PROCEDURE — 2500000003 HC RX 250 WO HCPCS: Performed by: INTERNAL MEDICINE

## 2025-03-27 PROCEDURE — 85018 HEMOGLOBIN: CPT

## 2025-03-27 PROCEDURE — 80053 COMPREHEN METABOLIC PANEL: CPT

## 2025-03-27 PROCEDURE — 36430 TRANSFUSION BLD/BLD COMPNT: CPT

## 2025-03-27 RX ORDER — SODIUM CHLORIDE 9 MG/ML
INJECTION, SOLUTION INTRAVENOUS PRN
Status: DISCONTINUED | OUTPATIENT
Start: 2025-03-27 | End: 2025-03-29

## 2025-03-27 RX ORDER — SODIUM CHLORIDE 0.9 % (FLUSH) 0.9 %
5-40 SYRINGE (ML) INJECTION EVERY 12 HOURS SCHEDULED
Status: DISCONTINUED | OUTPATIENT
Start: 2025-03-27 | End: 2025-03-29

## 2025-03-27 RX ORDER — IOPAMIDOL 755 MG/ML
85 INJECTION, SOLUTION INTRAVASCULAR
Status: COMPLETED | OUTPATIENT
Start: 2025-03-27 | End: 2025-03-27

## 2025-03-27 RX ORDER — SODIUM CHLORIDE 0.9 % (FLUSH) 0.9 %
5-40 SYRINGE (ML) INJECTION PRN
Status: DISCONTINUED | OUTPATIENT
Start: 2025-03-27 | End: 2025-03-29

## 2025-03-27 RX ADMIN — FAMOTIDINE 20 MG: 20 TABLET, FILM COATED ORAL at 21:52

## 2025-03-27 RX ADMIN — METRONIDAZOLE 500 MG: 500 INJECTION, SOLUTION INTRAVENOUS at 12:05

## 2025-03-27 RX ADMIN — ALBUTEROL SULFATE 2 PUFF: 90 AEROSOL, METERED RESPIRATORY (INHALATION) at 19:14

## 2025-03-27 RX ADMIN — INSULIN LISPRO 1 UNITS: 100 INJECTION, SOLUTION INTRAVENOUS; SUBCUTANEOUS at 12:03

## 2025-03-27 RX ADMIN — PANTOPRAZOLE SODIUM 40 MG: 40 TABLET, DELAYED RELEASE ORAL at 07:05

## 2025-03-27 RX ADMIN — IOPAMIDOL 85 ML: 755 INJECTION, SOLUTION INTRAVENOUS at 14:01

## 2025-03-27 RX ADMIN — PANCRELIPASE LIPASE, PANCRELIPASE PROTEASE, PANCRELIPASE AMYLASE 75000 UNITS: 20000; 63000; 84000 CAPSULE, DELAYED RELEASE ORAL at 08:16

## 2025-03-27 RX ADMIN — PANCRELIPASE LIPASE, PANCRELIPASE PROTEASE, PANCRELIPASE AMYLASE 75000 UNITS: 20000; 63000; 84000 CAPSULE, DELAYED RELEASE ORAL at 17:44

## 2025-03-27 RX ADMIN — PANTOPRAZOLE SODIUM 40 MG: 40 INJECTION, POWDER, LYOPHILIZED, FOR SOLUTION INTRAVENOUS at 10:24

## 2025-03-27 RX ADMIN — METRONIDAZOLE 500 MG: 500 INJECTION, SOLUTION INTRAVENOUS at 04:43

## 2025-03-27 RX ADMIN — SODIUM CHLORIDE 1000 MG: 9 INJECTION, SOLUTION INTRAVENOUS at 04:45

## 2025-03-27 RX ADMIN — ONDANSETRON 4 MG: 4 TABLET, ORALLY DISINTEGRATING ORAL at 21:32

## 2025-03-27 RX ADMIN — DICYCLOMINE HYDROCHLORIDE 20 MG: 20 TABLET ORAL at 08:18

## 2025-03-27 RX ADMIN — ALBUTEROL SULFATE 2 PUFF: 90 AEROSOL, METERED RESPIRATORY (INHALATION) at 07:41

## 2025-03-27 RX ADMIN — SODIUM CHLORIDE, PRESERVATIVE FREE 10 ML: 5 INJECTION INTRAVENOUS at 08:16

## 2025-03-27 RX ADMIN — ONDANSETRON 4 MG: 2 INJECTION, SOLUTION INTRAMUSCULAR; INTRAVENOUS at 14:24

## 2025-03-27 RX ADMIN — FINASTERIDE 5 MG: 5 TABLET, FILM COATED ORAL at 17:44

## 2025-03-27 RX ADMIN — PHYTONADIONE 10 MG: 10 INJECTION, EMULSION INTRAMUSCULAR; INTRAVENOUS; SUBCUTANEOUS at 15:23

## 2025-03-27 RX ADMIN — PANTOPRAZOLE SODIUM 40 MG: 40 INJECTION, POWDER, LYOPHILIZED, FOR SOLUTION INTRAVENOUS at 21:32

## 2025-03-27 RX ADMIN — PANCRELIPASE LIPASE, PANCRELIPASE PROTEASE, PANCRELIPASE AMYLASE 75000 UNITS: 20000; 63000; 84000 CAPSULE, DELAYED RELEASE ORAL at 12:02

## 2025-03-27 RX ADMIN — FAMOTIDINE 20 MG: 20 TABLET, FILM COATED ORAL at 08:18

## 2025-03-27 RX ADMIN — METRONIDAZOLE 500 MG: 500 INJECTION, SOLUTION INTRAVENOUS at 21:56

## 2025-03-27 NOTE — PROGRESS NOTES

## 2025-03-27 NOTE — CONSULTS
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    Hypertension     Liver abscess     DRAINED    Pneumonia     pneumonia,asthma    prostate     infection    Seasonal allergies     Wears glasses      Past Surgical History:   Procedure Laterality Date    CARDIAC CATHETERIZATION      1980S, DR ROWELL, NO ISSUES SINCE, HAS APT 8/14/23    CHOLECYSTECTOMY      CT LIVER ABSCESS ASPIRATION      PT STATES DRAINED LIVER ABSCESS    CT NEEDLE BIOPSY LIVER PERCUTANEOUS  1/23/2025    CT NEEDLE BIOPSY LIVER PERCUTANEOUS 1/23/2025 [facility]J CT SCAN    CYST REMOVAL      KNEE & CLOSE TO RECTUM    ERCP N/A 01/26/2022    ERCP BIOPSY performed by Tab Pandey MD at [facility] ENDOSCOPY    ERCP  01/26/2022    ERCP SPHINCTER/PAPILLOTOMY performed by Tab Pandey MD at [facility] ENDOSCOPY    ERCP  01/26/2022    ERCP STENT INSERTION performed by Tab Pandey MD at [facility] ENDOSCOPY    ERCP N/A 03/11/2022    ERCP STENT REMOVAL performed by Tab Pandey MD at [facility] ENDOSCOPY    ERCP  03/11/2022    ERCP STENT INSERTION performed by Tab Pandey MD at [facility] ENDOSCOPY    ERCP  03/11/2022    ERCP BIOPSY performed by Tab Pandey MD at [facility] ENDOSCOPY    ERCP N/A 04/13/2022    ERCP STENT INSERTION performed by Tab Pandey MD at [facility] ENDOSCOPY    ERCP  04/13/2022    ERCP ENDOSCOPIC RETROGRADE CHOLANGIOPANCREATOGRAPHY DIRECT VISUALIZATION performed by Tab Pandey MD at [facility] ENDOSCOPY    ERCP N/A 06/13/2022    ERCP BILIARY BRUSHING performed by Tab Pandey MD at [facility] ENDOSCOPY    ERCP  06/13/2022    ERCP STENT INSERTION performed by Tab Pandey MD at [facility] ENDOSCOPY    ERCP N/A 03/28/2023    ERCP  [phone]  (O) [phone]

## 2025-03-27 NOTE — PLAN OF CARE
Problem: Chronic Conditions and Co-morbidities  Goal: Patient's chronic conditions and co-morbidity symptoms are monitored and maintained or improved  Outcome: Progressing     Problem: Discharge Planning  Goal: Discharge to home or other facility with appropriate resources  Outcome: Progressing     Problem: Safety - Adult  Goal: Free from fall injury  Outcome: Progressing     Problem: ABCDS Injury Assessment  Goal: Absence of physical injury  Outcome: Progressing     Problem: Metabolic/Fluid and Electrolytes - Adult  Goal: Electrolytes maintained within normal limits  Outcome: Progressing

## 2025-03-27 NOTE — PROGRESS NOTES
Recent Labs     03/25/25  1148 03/26/25  0641 03/27/25  0652   AST 31 26 27   ALT 27 24 27   BILITOT 2.7* 1.5* 0.8   ALKPHOS 178* 156* 289*     PT/INR:   Recent Labs     03/25/25  1148   PROTIME 20.8*   INR 1.78*       CULTURES  Results       Procedure Component Value Units Date/Time    Culture, Blood 1 [[id number]] Collected: 03/25/25 1148    Order Status: Completed Specimen: Blood Updated: 03/26/25 1215     Blood Culture, Routine No Growth to date.  Any change in status will be called.    Narrative:      ORDER#: [id number]                          ORDERED BY: TERRANCE OWENS  SOURCE: Blood                              COLLECTED:  03/25/25 11:48  ANTIBIOTICS AT SONYA.:                      RECEIVED :  03/25/25 11:57  If child <=2 yrs old please draw pediatric bottle.~Blood Culture 1    Culture, Blood 2 [[id number]] Collected: 03/25/25 1148    Order Status: Completed Specimen: Blood Updated: 03/26/25 1215     Culture, Blood 2 No Growth to date.  Any change in status will be called.    Narrative:      ORDER#: [id number]                          ORDERED BY: TERRANCE OWENS  SOURCE: Blood                              COLLECTED:  03/25/25 11:48  ANTIBIOTICS AT SONYA.:                      RECEIVED :  03/25/25 11:56  If child <=2 yrs old please draw pediatric bottle.~Blood Culture #2    COVID-19 & Influenza Combo [[id number]] Collected: 03/24/25 1254    Order Status: Completed Specimen: Nasopharyngeal Swab Updated: 03/24/25 1341     SARS-CoV-2 RNA, RT PCR NOT DETECTED     Comment: Not Detected results do not preclude SARS-CoV-2 infection and  should not be used as the sole basis for patient management  decisions.  Results must be combined with clinical observations,  patient history, and epidemiological information.  Testing was performed using NAOMI FLORIDALMA SARS-CoV-2, Influenza A/B  and Respiratory Syncytial Virus nucleic acid assay. This  test is a multiplex Real-Time Reverse Transcriptase Polymerase Chain  Reaction  (RT-PCR)-based in vitro diagnostic test intended for the  qualitative detection of nucleic acids from SARS-CoV-2,  influenza A,influenza B and Respiratory Syncytial Virus  in nasopharyngeal and nasal swab specimens.    Patient Fact Sheet:  https://www.fda.gov/media/679387/download  Provider Fact Sheet: https://www.fda.gov/media/050615/download  EUA: https://www.fda.gov/media/542924/download  IFU: https://www.fda.gov/media/820185/download    Methodology:  RT-PCR          Influenza A NOT DETECTED     Influenza B NOT DETECTED            RADIOLOGY    CT HEAD WO CONTRAST   Final Result   No acute intracranial abnormality. Mild age-appropriate atrophy and   small-vessel disease ischemic changes.         IR BILIARY DRAIN EXCHANGE   Final Result   Successful biliary drainage catheter irrigation x2 with widely patent   catheters after irrigation.      Patient currently has 12 Faro[place] biliary drainage via the left and right   hepatic lobes, set to gravity drainage.         CT ABDOMEN PELVIS W IV CONTRAST Additional Contrast? None   Final Result   1.  Pulled up jejunal lumen in the right upper quadrant is newly mildly   distended with hyperattenuating material compatible with blood products.      2.  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      3.  Hepatic 2.4 cm lesion unchanged.         XR CHEST PORTABLE   Final Result   No acute process.         CTA ABDOMEN PELVIS W WO CONTRAST    (Results Pending)        Assessment/Plan:    Patient is s/p Whipple's procedure in 2022 for bile duct and pancreatic cancer subsequently completing chemotherapy.   Diagnosed with liver cancer about 6 months ago in 2024.  Ongoing chemotherapy at [facility].  Has hepatic drains in place, and these have needed to be replaced repeatedly due to blockage.   Most recently hepatic drains were replaced at our IR department at [facility] on

## 2025-03-27 NOTE — PLAN OF CARE
Problem: Chronic Conditions and Co-morbidities  Goal: Patient's chronic conditions and co-morbidity symptoms are monitored and maintained or improved  3/27/2025 1252 by Jill Orr RN  Outcome: Progressing  3/27/2025 0306 by Bia Rodriguez RN  Outcome: Progressing     Problem: Safety - Adult  Goal: Free from fall injury  3/27/2025 1252 by Jill Orr RN  Outcome: Progressing  3/27/2025 0306 by Bia Rodriguez RN  Outcome: Progressing

## 2025-03-27 NOTE — PLAN OF CARE
IR asked to evaluate biliary drains in the setting of persistent blood products clogging the drains. The drains were last exchanged 10 days ago and irrigated 2 days ago. They are 12-F, the largest caliber drain we have available.    CTA was performed today and my initial review demonstrates no active bleeding or culprit vessel to account for the recurrent blood products in the drains.    Case discussed with Dr Pandey via telephone. Will plan for biliary drain exchange x2 tomorrow with moderate sedation.     I have discussed with the patient earlier this week that this issue is likely to persist as a consequence of his cholangiocarcinoma. There is no empiric embolization I can perform to stop the persistent hematobilia with subsequent dysfunction of the drains.    I will readdress this situation with the patient tomorrow prior to biliary drain exchange.       Jesus Manuel Patterson DO  3/27/2025, 2:42 PM  Interventional Radiology  [phone] ([phone])

## 2025-03-27 NOTE — PROGRESS NOTES
Shift assessment complete. Scheduled meds given. Patients head-toe complete, VS are logged, and active bowel sound noted in all four quadrants. Hepatic drains x2 in place, R drain appears clogged with large blot clot. Attempted to contact IR with no success, will try again.     Patient on room air, RR easy and unlabored. No s/s of distress. A/O x4. Denies pain or SOB. 4 blood BM's reported last night, GI following.      Patient sitting in bed, denies further needs at this time. Call light and bedside table are within reach. The bed is locked and is in the lowest position.       Vitals:    03/27/25 0749   BP: 109/62   Pulse: 73   Resp: 16   Temp: 98.1 °F (36.7 °C)   SpO2: 96%

## 2025-03-27 NOTE — PROGRESS NOTES
RT Inhaler-Nebulizer Bronchodilator Protocol Note    There is a bronchodilator order in the chart from a provider indicating to follow the RT Bronchodilator Protocol and there is an “Initiate RT Inhaler-Nebulizer Bronchodilator Protocol” order as well (see protocol at bottom of note).    CXR Findings:  No results found.    The findings from the last RT Protocol Assessment were as follows:   History Pulmonary Disease: Chronic pulmonary disease  Respiratory Pattern: Regular pattern and RR 12-20 bpm  Breath Sounds: Slightly diminished and/or crackles  Cough: Strong, spontaneous, non-productive  Indication for Bronchodilator Therapy: Decreased or absent breath sounds  Bronchodilator Assessment Score: 4    Aerosolized bronchodilator medication orders have been revised according to the RT Inhaler-Nebulizer Bronchodilator Protocol below.    Respiratory Therapist to perform RT Therapy Protocol Assessment initially then follow the protocol.  Repeat RT Therapy Protocol Assessment PRN for score 0-3 or on second treatment, BID, and PRN for scores above 3.    No Indications - adjust the frequency to every 6 hours PRN wheezing or bronchospasm, if no treatments needed after 48 hours then discontinue using Per Protocol order mode.     If indication present, adjust the RT bronchodilator orders based on the Bronchodilator Assessment Score as indicated below.  Use Inhaler orders unless patient has one or more of the following: on home nebulizer, not able to hold breath for 10 seconds, is not alert and oriented, cannot activate and use MDI correctly, or respiratory rate 25 breaths per minute or more, then use the equivalent nebulizer order(s) with same Frequency and PRN reasons based on the score.  If a patient is on this medication at home then do not decrease Frequency below that used at home.    0-3 - enter or revise RT bronchodilator order(s) to equivalent RT Bronchodilator order with Frequency of every 4 hours PRN for wheezing or  increased work of breathing using Per Protocol order mode.        4-6 - enter or revise RT Bronchodilator order(s) to two equivalent RT bronchodilator orders with one order with BID Frequency and one order with Frequency of every 4 hours PRN wheezing or increased work of breathing using Per Protocol order mode.        7-10 - enter or revise RT Bronchodilator order(s) to two equivalent RT bronchodilator orders with one order with TID Frequency and one order with Frequency of every 4 hours PRN wheezing or increased work of breathing using Per Protocol order mode.       11-13 - enter or revise RT Bronchodilator order(s) to one equivalent RT bronchodilator order with QID Frequency and an Albuterol order with Frequency of every 4 hours PRN wheezing or increased work of breathing using Per Protocol order mode.      Greater than 13 - enter or revise RT Bronchodilator order(s) to one equivalent RT bronchodilator order with every 4 hours Frequency and an Albuterol order with Frequency of every 2 hours PRN wheezing or increased work of breathing using Per Protocol order mode.         Electronically signed by Palmira Graves RCP on 3/27/2025 at 7:18 PM

## 2025-03-27 NOTE — PROGRESS NOTES
Consult has been called to Dr. johnson on 3/27/25. P.s  dr johnson. 3:06 PM    Kaila Hill  3/27/2025

## 2025-03-27 NOTE — PROGRESS NOTES
Spoke with Jill Primary RN. Per Dr. Patterson, having GI get CTA would be beneficial to IR.  Dr. Patterson has also not spoken with GI about patient today.

## 2025-03-27 NOTE — CONSULTS
Family History:  No family history on file.    REVIEW OF SYSTEMS:      Constitutional: Denies fever, sweats, weight loss  Eyes: No visual changes or diplopia. No scleral icterus.  Ent: No headaches, hearing loss or vertigo.  No mouth sores or sore throat.  Cardiovascular: No chest pain, dyspnea on exertion, palpitations or loss of consciousness.  Respiratory: No cough or wheezing, no sputum production.  No hemoptysis.  Gastrointestinal: No abdominal pain, appetite loss, blood in stools.  No change in bowel habits.  Genitourinary: No dysuria, trouble voiding, or hematuria.  Musculoskeletal: No generalized weakness.  No joint complaints.  Integumentary: No rash or pruritus.  Neurological: No headache, diplopia.  No change in gait, balance, or coordination.  No paresthesias.  Endocrine: No temperature intolerance.  No excessive thirst, fluid intake, urination.  Hematologic/lymphatic: No abnormal bruising or ecchymosis, blood clots or swollen lymph nodes.  Allergic/immunologic: No nasal congestion or hives.        PHYSICAL EXAM:      Vitals:  Patient Vitals for the past 24 hrs:   BP Temp Temp src Pulse Resp SpO2 Height Weight   03/28/25 0808 104/61 97.9 °F (36.6 °C) Oral 77 15 97 % -- --   03/28/25 0545 -- -- -- -- -- -- 1.778 m (5' 10\") 68.4 kg (150 lb 14.4 oz)   03/28/25 0200 115/68 98.9 °F (37.2 °C) Oral 78 16 95 % -- --   03/27/25 2259 120/73 98.5 °F (36.9 °C) Oral 78 16 97 % -- --   03/27/25 1946 115/65 98.5 °F (36.9 °C) Oral 78 16 95 % -- --   03/27/25 1917 112/62 98.5 °F (36.9 °C) Oral 78 18 95 % -- --   03/27/25 1916 -- -- -- 82 18 95 % -- --   03/27/25 1907 118/64 98.5 °F (36.9 °C) Oral 81 18 96 % -- --   03/27/25 1614 107/65 98.1 °F (36.7 °C) Oral 75 16 97 % -- --   03/27/25 1545 125/67 98 °F (36.7 °C) Oral 71 16 98 % -- --   03/27/25 1330 116/69 98.1 °F (36.7 °C) Oral 73 16 96 % -- --       Date 03/28/25 0000 - 03/28/25 2359   Shift 3247-1408 1602-4106 2334-6251 24 Hour Total   INTAKE  would be to resume chemotherapy but keeps getting delayed due to biliary drain dysfunction  - also has test supposedly pending (? Caris) to see if he is an immunotherapy candidate  - discussed if unable to continue treatment or if he feels he has been through too much, we could consider Hospice consult  - he seems to hope to continue treatment at this point  - FU with Dr. Beltran after discharge    2. Anemia    - blood loss and recent chemotherapy  - check iron studies and B12/folate  - GI following  - IR unable to perform procedure to stop bleeding  - transfuse if Hgb < 7    3. Blood in biliary drains    - biliary drain exchange planned 3/28 with IR      Thank you for asking me to see the patient.       Abner Harrell MD  Please Contact Through [employer]

## 2025-03-27 NOTE — PROGRESS NOTES
03/27/25 0747   RT Protocol   History Pulmonary Disease 2   Respiratory pattern 0   Breath sounds 2   Cough 0   Indications for Bronchodilator Therapy On home bronchodilators   Bronchodilator Assessment Score 4

## 2025-03-27 NOTE — PROGRESS NOTES
Spoke with Primary RN Jill,  Patient on schedule for Biliary Drain exchange Friday 3/28 at 0900. Patient needs to be NPO past midnight, PT/INR and CBC drawn Friday morning and This RN placed informed consent order. Jill Verbalized understanding of the above

## 2025-03-27 NOTE — PROGRESS NOTES
Consult has been called to Dr. bragg on 3/27/25. Spoke with alberto. 10:16 AM    Kaila Hill  3/27/2025

## 2025-03-28 ENCOUNTER — HOSPITAL ENCOUNTER (INPATIENT)
Dept: INTERVENTIONAL RADIOLOGY/VASCULAR | Age: 75
Discharge: HOME | DRG: 907 | End: 2025-03-28
Payer: MEDICARE

## 2025-03-28 VITALS
RESPIRATION RATE: 16 BRPM | HEART RATE: 74 BPM | SYSTOLIC BLOOD PRESSURE: 104 MMHG | OXYGEN SATURATION: 97 % | DIASTOLIC BLOOD PRESSURE: 63 MMHG

## 2025-03-28 LAB
ANION GAP SERPL CALCULATED.3IONS-SCNC: 9 MMOL/L (ref 3–16)
BASOPHILS # BLD: 0 K/UL (ref 0–0.2)
BASOPHILS NFR BLD: 0.3 %
BUN SERPL-MCNC: 10 MG/DL (ref 7–20)
CALCIUM SERPL-MCNC: 7.3 MG/DL (ref 8.3–10.6)
CHLORIDE SERPL-SCNC: 105 MMOL/L (ref 99–110)
CO2 SERPL-SCNC: 23 MMOL/L (ref 21–32)
CREAT SERPL-MCNC: 0.3 MG/DL (ref 0.8–1.3)
DEPRECATED RDW RBC AUTO: 24.3 % (ref 12.4–15.4)
EOSINOPHIL # BLD: 0 K/UL (ref 0–0.6)
EOSINOPHIL NFR BLD: 0.1 %
GFR SERPLBLD CREATININE-BSD FMLA CKD-EPI: >90 ML/MIN/{1.73_M2}
GLUCOSE BLD-MCNC: 178 MG/DL (ref 70–99)
GLUCOSE BLD-MCNC: 182 MG/DL (ref 70–99)
GLUCOSE BLD-MCNC: 204 MG/DL (ref 70–99)
GLUCOSE BLD-MCNC: 204 MG/DL (ref 70–99)
GLUCOSE BLD-MCNC: 287 MG/DL (ref 70–99)
GLUCOSE SERPL-MCNC: 153 MG/DL (ref 70–99)
HCT VFR BLD AUTO: 21.5 % (ref 40.5–52.5)
HGB BLD-MCNC: 7.2 G/DL (ref 13.5–17.5)
INR PPP: 1.21 (ref 0.85–1.15)
LYMPHOCYTES # BLD: 0.3 K/UL (ref 1–5.1)
LYMPHOCYTES NFR BLD: 7 %
MCH RBC QN AUTO: 27.9 PG (ref 26–34)
MCHC RBC AUTO-ENTMCNC: 33.2 G/DL (ref 31–36)
MCV RBC AUTO: 84 FL (ref 80–100)
MONOCYTES # BLD: 0.3 K/UL (ref 0–1.3)
MONOCYTES NFR BLD: 6.3 %
NEUTROPHILS # BLD: 4.1 K/UL (ref 1.7–7.7)
NEUTROPHILS NFR BLD: 86.3 %
PERFORMED ON: ABNORMAL
PLATELET # BLD AUTO: 184 K/UL (ref 135–450)
PMV BLD AUTO: 7.3 FL (ref 5–10.5)
POTASSIUM SERPL-SCNC: 4 MMOL/L (ref 3.5–5.1)
PROTHROMBIN TIME: 15.5 SEC (ref 11.9–14.9)
RBC # BLD AUTO: 2.56 M/UL (ref 4.2–5.9)
SODIUM SERPL-SCNC: 137 MMOL/L (ref 136–145)
WBC # BLD AUTO: 4.7 K/UL (ref 4–11)

## 2025-03-28 PROCEDURE — 99152 MOD SED SAME PHYS/QHP 5/>YRS: CPT

## 2025-03-28 PROCEDURE — 6370000000 HC RX 637 (ALT 250 FOR IP): Performed by: INTERNAL MEDICINE

## 2025-03-28 PROCEDURE — 1200000000 HC SEMI PRIVATE

## 2025-03-28 PROCEDURE — 2500000003 HC RX 250 WO HCPCS: Performed by: INTERNAL MEDICINE

## 2025-03-28 PROCEDURE — C1729 CATH, DRAINAGE: HCPCS

## 2025-03-28 PROCEDURE — 94761 N-INVAS EAR/PLS OXIMETRY MLT: CPT

## 2025-03-28 PROCEDURE — 99233 SBSQ HOSP IP/OBS HIGH 50: CPT | Performed by: INTERNAL MEDICINE

## 2025-03-28 PROCEDURE — 2500000003 HC RX 250 WO HCPCS: Performed by: STUDENT IN AN ORGANIZED HEALTH CARE EDUCATION/TRAINING PROGRAM

## 2025-03-28 PROCEDURE — 6360000002 HC RX W HCPCS: Performed by: INTERNAL MEDICINE

## 2025-03-28 PROCEDURE — 6360000002 HC RX W HCPCS: Performed by: STUDENT IN AN ORGANIZED HEALTH CARE EDUCATION/TRAINING PROGRAM

## 2025-03-28 PROCEDURE — 85610 PROTHROMBIN TIME: CPT

## 2025-03-28 PROCEDURE — 80048 BASIC METABOLIC PNL TOTAL CA: CPT

## 2025-03-28 PROCEDURE — 6360000002 HC RX W HCPCS

## 2025-03-28 PROCEDURE — 85025 COMPLETE CBC W/AUTO DIFF WBC: CPT

## 2025-03-28 PROCEDURE — 94640 AIRWAY INHALATION TREATMENT: CPT

## 2025-03-28 PROCEDURE — 2580000003 HC RX 258

## 2025-03-28 PROCEDURE — 47536 EXCHANGE BILIARY DRG CATH: CPT

## 2025-03-28 PROCEDURE — 6360000004 HC RX CONTRAST MEDICATION: Performed by: INTERNAL MEDICINE

## 2025-03-28 PROCEDURE — C1769 GUIDE WIRE: HCPCS

## 2025-03-28 PROCEDURE — 2580000003 HC RX 258: Performed by: INTERNAL MEDICINE

## 2025-03-28 RX ORDER — MIDAZOLAM HYDROCHLORIDE 5 MG/ML
INJECTION, SOLUTION INTRAMUSCULAR; INTRAVENOUS PRN
Status: COMPLETED | OUTPATIENT
Start: 2025-03-28 | End: 2025-03-28

## 2025-03-28 RX ORDER — FENTANYL CITRATE 50 UG/ML
INJECTION, SOLUTION INTRAMUSCULAR; INTRAVENOUS PRN
Status: COMPLETED | OUTPATIENT
Start: 2025-03-28 | End: 2025-03-28

## 2025-03-28 RX ORDER — PHYTONADIONE 5 MG/1
5 TABLET ORAL ONCE
Status: COMPLETED | OUTPATIENT
Start: 2025-03-28 | End: 2025-03-28

## 2025-03-28 RX ADMIN — SODIUM CHLORIDE 1000 MG: 9 INJECTION, SOLUTION INTRAVENOUS at 06:01

## 2025-03-28 RX ADMIN — FENTANYL CITRATE 25 MCG: 50 INJECTION INTRAMUSCULAR; INTRAVENOUS at 09:13

## 2025-03-28 RX ADMIN — PANCRELIPASE LIPASE, PANCRELIPASE PROTEASE, PANCRELIPASE AMYLASE 75000 UNITS: 20000; 63000; 84000 CAPSULE, DELAYED RELEASE ORAL at 12:04

## 2025-03-28 RX ADMIN — METRONIDAZOLE 500 MG: 500 INJECTION, SOLUTION INTRAVENOUS at 05:58

## 2025-03-28 RX ADMIN — PANTOPRAZOLE SODIUM 40 MG: 40 INJECTION, POWDER, LYOPHILIZED, FOR SOLUTION INTRAVENOUS at 21:23

## 2025-03-28 RX ADMIN — FENTANYL CITRATE 25 MCG: 50 INJECTION INTRAMUSCULAR; INTRAVENOUS at 09:20

## 2025-03-28 RX ADMIN — INSULIN LISPRO 1 UNITS: 100 INJECTION, SOLUTION INTRAVENOUS; SUBCUTANEOUS at 12:04

## 2025-03-28 RX ADMIN — ALBUTEROL SULFATE 2 PUFF: 90 AEROSOL, METERED RESPIRATORY (INHALATION) at 14:13

## 2025-03-28 RX ADMIN — PANCRELIPASE LIPASE, PANCRELIPASE PROTEASE, PANCRELIPASE AMYLASE 75000 UNITS: 20000; 63000; 84000 CAPSULE, DELAYED RELEASE ORAL at 17:06

## 2025-03-28 RX ADMIN — Medication 10 ML: at 08:25

## 2025-03-28 RX ADMIN — HYDROMORPHONE HYDROCHLORIDE 0.5 MG: 1 INJECTION, SOLUTION INTRAMUSCULAR; INTRAVENOUS; SUBCUTANEOUS at 13:55

## 2025-03-28 RX ADMIN — METRONIDAZOLE 500 MG: 500 INJECTION, SOLUTION INTRAVENOUS at 12:14

## 2025-03-28 RX ADMIN — PANTOPRAZOLE SODIUM 40 MG: 40 INJECTION, POWDER, LYOPHILIZED, FOR SOLUTION INTRAVENOUS at 08:23

## 2025-03-28 RX ADMIN — MIDAZOLAM HYDROCHLORIDE 0.5 MG: 5 INJECTION, SOLUTION INTRAMUSCULAR; INTRAVENOUS at 09:20

## 2025-03-28 RX ADMIN — IOHEXOL 30 ML: 240 INJECTION, SOLUTION INTRATHECAL; INTRAVASCULAR; INTRAVENOUS; ORAL at 09:32

## 2025-03-28 RX ADMIN — INSULIN LISPRO 1 UNITS: 100 INJECTION, SOLUTION INTRAVENOUS; SUBCUTANEOUS at 21:25

## 2025-03-28 RX ADMIN — PHYTONADIONE 5 MG: 5 TABLET ORAL at 14:28

## 2025-03-28 RX ADMIN — FAMOTIDINE 20 MG: 20 TABLET, FILM COATED ORAL at 21:23

## 2025-03-28 RX ADMIN — ALBUTEROL SULFATE 2 PUFF: 90 AEROSOL, METERED RESPIRATORY (INHALATION) at 08:06

## 2025-03-28 RX ADMIN — ALBUTEROL SULFATE 2 PUFF: 90 AEROSOL, METERED RESPIRATORY (INHALATION) at 19:55

## 2025-03-28 RX ADMIN — FINASTERIDE 5 MG: 5 TABLET, FILM COATED ORAL at 17:06

## 2025-03-28 RX ADMIN — MIDAZOLAM HYDROCHLORIDE 0.5 MG: 5 INJECTION, SOLUTION INTRAMUSCULAR; INTRAVENOUS at 09:14

## 2025-03-28 RX ADMIN — INSULIN LISPRO 2 UNITS: 100 INJECTION, SOLUTION INTRAVENOUS; SUBCUTANEOUS at 17:06

## 2025-03-28 RX ADMIN — ONDANSETRON 4 MG: 4 TABLET, ORALLY DISINTEGRATING ORAL at 05:38

## 2025-03-28 RX ADMIN — Medication 10 ML: at 21:24

## 2025-03-28 RX ADMIN — METRONIDAZOLE 500 MG: 500 INJECTION, SOLUTION INTRAVENOUS at 21:26

## 2025-03-28 RX ADMIN — SODIUM CHLORIDE, PRESERVATIVE FREE 10 ML: 5 INJECTION INTRAVENOUS at 08:25

## 2025-03-28 RX ADMIN — INSULIN HUMAN 20 UNITS: 100 INJECTION, SUSPENSION SUBCUTANEOUS at 17:05

## 2025-03-28 ASSESSMENT — PAIN DESCRIPTION - PAIN TYPE: TYPE: ACUTE PAIN

## 2025-03-28 ASSESSMENT — PAIN DESCRIPTION - ORIENTATION: ORIENTATION: RIGHT

## 2025-03-28 ASSESSMENT — PAIN DESCRIPTION - FREQUENCY: FREQUENCY: INTERMITTENT

## 2025-03-28 ASSESSMENT — PAIN SCALES - GENERAL
PAINLEVEL_OUTOF10: 7
PAINLEVEL_OUTOF10: 1

## 2025-03-28 ASSESSMENT — PAIN - FUNCTIONAL ASSESSMENT: PAIN_FUNCTIONAL_ASSESSMENT: ACTIVITIES ARE NOT PREVENTED

## 2025-03-28 ASSESSMENT — PAIN DESCRIPTION - ONSET: ONSET: ON-GOING

## 2025-03-28 ASSESSMENT — PAIN DESCRIPTION - LOCATION: LOCATION: ABDOMEN

## 2025-03-28 ASSESSMENT — PAIN DESCRIPTION - DESCRIPTORS: DESCRIPTORS: ACHING;SHARP

## 2025-03-28 NOTE — CARE COORDINATION
Chart reviewed. Plan for pt to return home with C at [place]. Pt will need daily flushes to biliary drains. Updated [facility].CM following.

## 2025-03-28 NOTE — ACP (ADVANCE CARE PLANNING)
[facility]  Palliative Medicine Consultation Note      Date Of Admission:3/24/2025  Date of consult: 03/28/25  Seen by PC in the past:  No    Recommendations:      Introduced palliative care and had a voluntary discussion about advance care planning. Palliative care consultation ordered for a goals of care discussion. Patient currently off the floor for a procedure but his spouse-Natasha and daughter are present in the room. Discussed the patient's diagnosis and prognosis with the family. Natasha states that the patient has said he wants to continue fighting and doing chemotherapy at this time. State that they are agreeable for [facility] to follow along in the care and understand that he will eventually need Hospice care. State that they will elect [facility] at time of referral due to them living in [place], [place] and there being an Inpatient Center in [place]    1. Goals of Care/Advanced Care planning/Code status: Full  2. Pain: UTO  3. SOB: UTO  4. Disposition: Home    Reason for Consult:         [x]  Goals of Care  []  Code Status Discussion/Advanced Care Planning   []  Psychosocial/Family Support  []  Symptom Management  []  Other (Specify)    Requesting Physician: Dr. Burnette    CHIEF COMPLAINT:  Post-op problem    History Obtained From:  spouse-Natasha, FLACO    History of Present Illness:         Sharath Ceja is a 74 y.o. male with PMH of Crohn's disease, history of blood clots, liver abscess, pneumonia, GERD, type 1 diabetes  who presented to [facility] ED with complaint of bloody drainage in drains. Patient reported that the drainage was placed on Monday, on chart review placed on 03/17/2025, patient reported he was tolerating the procedure very well eating and drinking with no complication reported that today he woke up and around 9 AM noted that the drainage has become more bloody came here for further evaluation.  Patient denied having any cough phlegm production nausea vomiting abdominal pain or  (before meals)  famotidine (PEPCID) 20 MG tablet, Take 1 tablet by mouth 2 times daily  metFORMIN (GLUCOPHAGE-XR) 500 MG extended release tablet, Take 1 tablet by mouth 2 times daily  vitamin D (ERGOCALCIFEROL) 1.25 MG (45556 UT) CAPS capsule, Take 1 capsule by mouth once a week  guaiFENesin (MUCINEX) 600 MG extended release tablet, Take 1 tablet by mouth 2 times daily  polycarbophil (FIBERCON) 625 MG tablet, Take 1 tablet by mouth daily  finasteride (PROSCAR) 5 MG tablet, Take 1 tablet by mouth every evening  Lancets (ONETOUCH DELICA PLUS [id number]) MISC, USE 1 TWICE DAILY  ONETOUCH ULTRA strip, USE 1 STRIP TO CHECK GLUCOSE TWICE DAILY    Allergies:  Codeine and Amoxicillin-pot clavulanate    Social History:    TOBACCO: reports that he has quit smoking. He has never been exposed to tobacco smoke. He has never used smokeless tobacco.  ETOH:   reports no history of alcohol use.  Patient currently lives with family     Review of Systems -   Review of Systems: A 10 point review of systems was conducted, significant findings as notedin HPI.    Objective:          Physical Exam     Palliative Performance Scale:  [] 60% Ambulation reduced; Significant disease; Can't do hobbies/housework; intake normal or reduced; occasional assist; LOC full/confusion  [] 50% Mainly sit/lie; Extensive disease; Can't do any work; Considerable assist; intake normal  Or reduced; LOC full/confusion  [] 40% Mainly in bed; Extensive disease; Mainly assist; intake normal or reduced; occasional assist; LOC full/confusion  [] 30% Bed Bound; Extensive disease; Total care; intake reduced; LOC full/confusion  [] 20% Bed Bound; Extensive disease; Total care; intake minimal; Drowsy/coma  [] 10% Bed Bound; Extensive disease; Total care; Mouth care only; Drowsy/coma  [] 0% Death    PPS:     Vitals:    /61   Pulse 77   Temp 97.9 °F (36.6 °C) (Oral)   Resp 15   Ht 1.778 m (5' 10\")   Wt 68.4 kg (150 lb 14.4 oz)   SpO2 97%   BMI 21.65 kg/m²

## 2025-03-28 NOTE — OR NURSING
Image guided biliary drain  insertion bilateral completed. Dr. Patterson placed 12 Czech 40 cm Cook Biliary drain catheter LOT # [id number] EXP 03/04/2028 and LOT# [id number] EXP 12/27/2027 in the bilateral. Drains/tubes secured with sutures.  Drains/tubes dressings clean, dry, and intact. Pt tolerated procedure without any signs or symptoms of distress. Vital signs stable. Report called to Kaila MORRISON. Pt transported back to [facility] in stable condition via bed by transport.     Medications  Versed: 1 mg  Fentanyl: 50 mcg    Vital Signs  Vitals:    03/28/25 0934   BP: 104/63   Pulse: 74   Resp: 16   SpO2: 97%    (vital signs in table format)    Post Eleinta  2 - Able to move 4 extremities voluntarily on command  2 - BP+/- 20mmHg of normal  2 - Fully awake  2 - Able to maintain oxygen saturation >92% on room air  2 - Able to breathe deeply and cough freely

## 2025-03-28 NOTE — OR NURSING
Pt arrived for image guided biliary drain  exchange. Procedure explained including the risk and benefits of the procedure. All questions answered. Pt verbalizes understanding of the procedure and states no more questions. Consent confirmed. Vital signs stable. Labs, allergies, medications, and code status reviewed. No contraindications noted.     Vital Signs  There were no vitals filed for this visit. (vital signs in table format)    Pre Elenita Score  2 - Able to move 4 extremities voluntarily on command  2 - BP+/- 20mmHg of normal  2 - Fully awake  2 - Able to maintain oxygen saturation >92% on room air  2 - Able to breathe deeply and cough freely    Allergies  Codeine and Amoxicillin-pot clavulanate (allergies)    Labs  Lab Results   Component Value Date    INR 1.21 (H) 03/28/2025    PROTIME 15.5 (H) 03/28/2025     Lab Results   Component Value Date    CREATININE 0.3 (L) 03/28/2025    BUN 10 03/28/2025     03/28/2025    K 4.0 03/28/2025     03/28/2025    CO2 23 03/28/2025     Lab Results   Component Value Date    WBC 4.7 03/28/2025    HGB 7.2 (L) 03/28/2025    HCT 21.5 (L) 03/28/2025    MCV 84.0 03/28/2025     03/28/2025

## 2025-03-28 NOTE — PLAN OF CARE
Problem: Chronic Conditions and Co-morbidities  Goal: Patient's chronic conditions and co-morbidity symptoms are monitored and maintained or improved  Outcome: Progressing     Problem: Discharge Planning  Goal: Discharge to home or other facility with appropriate resources  Outcome: Progressing     Problem: Safety - Adult  Goal: Free from fall injury  Outcome: Progressing     Problem: ABCDS Injury Assessment  Goal: Absence of physical injury  Outcome: Progressing     Problem: Metabolic/Fluid and Electrolytes - Adult  Goal: Electrolytes maintained within normal limits  Outcome: Progressing  Goal: Hemodynamic stability and optimal renal function maintained  Outcome: Progressing  Goal: Glucose maintained within prescribed range  Outcome: Progressing     Problem: Hematologic - Adult  Goal: Maintains hematologic stability  Outcome: Progressing

## 2025-03-28 NOTE — PROGRESS NOTES
midline.  Resp: No accessory muscle use. No crackles. No wheezes. No rhonchi.   CV: Regular rate. Regular rhythm. No murmur.  No rub. No edema.   Capillary Refill: Brisk,< 3 seconds   Peripheral Pulses: +2 palpable, equal bilaterally   GI: Non-tender. Non-distended.  Normal bowel sounds.  Patient has 2 drains in the right upper quadrant.  Patient had 2 largest size drains placed today- 3/28-  good biliary drainage   Skin: Warm and dry. No nodule on exposed extremities. No rash on exposed extremities.   M/S: No cyanosis. No joint deformity. No clubbing.   Neuro: Awake. Grossly nonfocal    Psych: Oriented x 3. No anxiety or agitation.      Scheduled Meds:   pantoprazole (PROTONIX) 40 mg in sodium chloride (PF) 0.9 % 10 mL injection  40 mg IntraVENous Q12H    sodium chloride flush  5-40 mL IntraVENous 2 times per day    cefTRIAXone (ROCEPHIN) IV  1,000 mg IntraVENous Q24H    metroNIDAZOLE  500 mg IntraVENous Q8H    insulin NPH  20 Units SubCUTAneous BID AC    albuterol sulfate HFA  2 puff Inhalation BID RT    dicyclomine  20 mg Oral Daily    famotidine  20 mg Oral BID    finasteride  5 mg Oral QPM    potassium chloride  20 mEq Oral BID    sodium chloride flush  5-40 mL IntraVENous 2 times per day    magnesium oxide  400 mg Oral Once    insulin lispro  0-4 Units SubCUTAneous 4x Daily AC & HS    lipase-protease-amylase  75,000 Units Oral TID WC       Continuous Infusions:   sodium chloride      sodium chloride      sodium chloride      sodium chloride      dextrose         PRN Meds:  sodium chloride, sodium chloride, sodium chloride flush, sodium chloride, albuterol sulfate HFA, oxyCODONE, sodium chloride flush, sodium chloride, potassium chloride **OR** potassium alternative oral replacement **OR** potassium chloride, magnesium sulfate, ondansetron **OR** ondansetron, polyethylene glycol, acetaminophen **OR** acetaminophen, glucose, dextrose bolus **OR** dextrose bolus, glucagon (rDNA), dextrose, HYDROmorphone,  lipase-protease-amylase      Data:  CBC:   Recent Labs     03/27/25  1830 03/27/25  2350 03/28/25  0611   WBC  --   --  4.7   HGB 6.8* 7.2* 7.2*   HCT 20.7* 21.7* 21.5*   MCV  --   --  84.0   PLT  --   --  184     BMP:   Recent Labs     03/26/25  0641 03/27/25  0652 03/28/25  0611    135* 137   K 3.8 3.9 4.0    104 105   CO2 23 22 23   BUN 10 7 10   CREATININE 0.3* 0.3* 0.3*     LIVER PROFILE:   Recent Labs     03/26/25  0641 03/27/25  0652   AST 26 27   ALT 24 27   BILITOT 1.5* 0.8   ALKPHOS 156* 289*     PT/INR:   Recent Labs     03/27/25  1542 03/27/25  1830 03/28/25  0611   PROTIME 19.2* 17.5* 15.5*   INR 1.60* 1.41* 1.21*       CULTURES  Results       Procedure Component Value Units Date/Time    GI Bacterial Pathogens By PCR [[id number]]     Order Status: No result Specimen: Stool     Clostridium difficile toxin/antigen [[id number]]     Order Status: No result Specimen: Stool     Culture, Blood 1 [[id number]] Collected: 03/25/25 1148    Order Status: Completed Specimen: Blood Updated: 03/26/25 1215     Blood Culture, Routine No Growth to date.  Any change in status will be called.    Narrative:      ORDER#: [id number]                          ORDERED BY: TERRANCE OWENS  SOURCE: Blood                              COLLECTED:  03/25/25 11:48  ANTIBIOTICS AT SONYA.:                      RECEIVED :  03/25/25 11:57  If child <=2 yrs old please draw pediatric bottle.~Blood Culture 1    Culture, Blood 2 [[id number]] Collected: 03/25/25 1148    Order Status: Completed Specimen: Blood Updated: 03/26/25 1215     Culture, Blood 2 No Growth to date.  Any change in status will be called.    Narrative:      ORDER#: [id number]                          ORDERED BY: TERRANCE OWENS  SOURCE: Blood                              COLLECTED:  03/25/25 11:48  ANTIBIOTICS AT SONYA.:                      RECEIVED :  03/25/25 11:56  If child <=2 yrs old please draw pediatric bottle.~Blood Culture #2    COVID-19 & Influenza Combo

## 2025-03-28 NOTE — PROGRESS NOTES
03/28/25 1900   RT Protocol   History Pulmonary Disease 2   Respiratory pattern 0   Breath sounds 2   Cough 0   Indications for Bronchodilator Therapy Decreased or absent breath sounds   Bronchodilator Assessment Score 4     RT Inhaler-Nebulizer Bronchodilator Protocol Note    There is a bronchodilator order in the chart from a provider indicating to follow the RT Bronchodilator Protocol and there is an “Initiate RT Inhaler-Nebulizer Bronchodilator Protocol” order as well (see protocol at bottom of note).    CXR Findings:  No results found.    The findings from the last RT Protocol Assessment were as follows:   History Pulmonary Disease: Chronic pulmonary disease  Respiratory Pattern: Regular pattern and RR 12-20 bpm  Breath Sounds: Slightly diminished and/or crackles  Cough: Strong, spontaneous, non-productive  Indication for Bronchodilator Therapy: Decreased or absent breath sounds  Bronchodilator Assessment Score: 4    Aerosolized bronchodilator medication orders have been revised according to the RT Inhaler-Nebulizer Bronchodilator Protocol below.    Respiratory Therapist to perform RT Therapy Protocol Assessment initially then follow the protocol.  Repeat RT Therapy Protocol Assessment PRN for score 0-3 or on second treatment, BID, and PRN for scores above 3.    No Indications - adjust the frequency to every 6 hours PRN wheezing or bronchospasm, if no treatments needed after 48 hours then discontinue using Per Protocol order mode.     If indication present, adjust the RT bronchodilator orders based on the Bronchodilator Assessment Score as indicated below.  Use Inhaler orders unless patient has one or more of the following: on home nebulizer, not able to hold breath for 10 seconds, is not alert and oriented, cannot activate and use MDI correctly, or respiratory rate 25 breaths per minute or more, then use the equivalent nebulizer order(s) with same Frequency and PRN reasons based on the score.  If a patient  is on this medication at home then do not decrease Frequency below that used at home.    0-3 - enter or revise RT bronchodilator order(s) to equivalent RT Bronchodilator order with Frequency of every 4 hours PRN for wheezing or increased work of breathing using Per Protocol order mode.        4-6 - enter or revise RT Bronchodilator order(s) to two equivalent RT bronchodilator orders with one order with BID Frequency and one order with Frequency of every 4 hours PRN wheezing or increased work of breathing using Per Protocol order mode.        7-10 - enter or revise RT Bronchodilator order(s) to two equivalent RT bronchodilator orders with one order with TID Frequency and one order with Frequency of every 4 hours PRN wheezing or increased work of breathing using Per Protocol order mode.       11-13 - enter or revise RT Bronchodilator order(s) to one equivalent RT bronchodilator order with QID Frequency and an Albuterol order with Frequency of every 4 hours PRN wheezing or increased work of breathing using Per Protocol order mode.      Greater than 13 - enter or revise RT Bronchodilator order(s) to one equivalent RT bronchodilator order with every 4 hours Frequency and an Albuterol order with Frequency of every 2 hours PRN wheezing or increased work of breathing using Per Protocol order mode.       Electronically signed by Blaise Harmon RCP on 3/28/2025 at 7:58 PM

## 2025-03-28 NOTE — PROGRESS NOTES
Handoff report and transfer of care given at bedside to Kaila RN.  Patient in stable condition, denies needs/concerns at this time.  Call light within reach.

## 2025-03-28 NOTE — PRE SEDATION
Sedation Pre-Procedure Note    Patient Name: Sharath Ceja   YOB: 1950  Room/Bed: Room/bed info not found  Medical Record Number: [id number]  Date: 3/28/2025   Time: 8:56 AM       Indication:  Recurrent bleeding into biliary drains causing clogging. CTA yesterday demonstrated no active bleeding or culprit vessels. Case discussed with GI and IM in addition to IR colleagues. Will plan for drain exchange today to maintain the biliary obstruction.     Consent: I have discussed with the patient and/or the patient representative the indication, alternatives, and the possible risks and/or complications of the planned procedure and the anesthesia methods. The patient and/or patient representative appear to understand and agree to proceed.    Vital Signs:   There were no vitals filed for this visit.    Past Medical History:   has a past medical history of Adenocarcinoma (HCC), Asthma, Blood circulation, collateral, Carpal tunnel syndrome, CLL (chronic lymphocytic leukemia) (HCC), Crohn's colitis (HCC), Dental crowns present, Diabetes mellitus (HCC), emboli, GERD (gastroesophageal reflux disease), Gout, Hx of blood clots, Hypertension, Liver abscess, Pneumonia, prostate, Seasonal allergies, and Wears glasses.    Past Surgical History:   has a past surgical history that includes Tonsillectomy (1986); other surgical history (01/26/2022); ERCP (N/A, 01/26/2022); ERCP (01/26/2022); ERCP (01/26/2022); Upper gastrointestinal endoscopy (N/A, 03/11/2022); ERCP (N/A, 03/11/2022); ERCP (03/11/2022); ERCP (03/11/2022); cyst removal; ERCP (N/A, 04/13/2022); ERCP (04/13/2022); other surgical history (06/13/2022); ERCP (N/A, 06/13/2022); ERCP (06/13/2022); ERCP (N/A, 03/28/2023); ERCP (03/28/2023); ERCP (03/28/2023); other surgical history (05/24/2023); ERCP (N/A, 05/24/2023); ERCP (05/24/2023); ERCP (05/24/2023); ERCP (05/24/2023); Cholecystectomy; Pancreatectomy (N/A, 06/19/2023); knee surgery; Cardiac catheterization;

## 2025-03-28 NOTE — PROGRESS NOTES
Shift assessment complete; see flow sheet.  Scheduled medications administered; See MAR.  IV infusing without difficulty. Pt denies pain at this time. Pt denies any needs at this time. Pt educated on use of call light and to call out with needs, verbalized understanding, bed in low locked position for pt safety

## 2025-03-28 NOTE — BRIEF OP NOTE
Interventional Radiology  Brief Postoperative Note    Patient: Sharath Ceja  YOB: 1950  MRN: [id number]      Pre-operative Diagnosis: Hematobilia    Post-operative Diagnosis: Same    Procedure: Biliary drain exchange x2    Anesthesia: Local and Moderate Sedation    Surgeons/Assistants: Jesus Manuel Patterson DO    Estimated Blood Loss: less than 50     Complications: None    Infection Present At Time Of Surgery (PATOS) (choose all levels that have infection present):  No infection present    Specimens: Was Not Obtained    Findings:   Partially clogged biliary drains x2.  Cholangiogram demonstrates the presence of filling defects throughout the biliary system, consistent with blood products.  Successful biliary drain exchange x2. Patient currently has 12-F biliary drains set to internal and external drainage.  Each catheter was flushed thoroughly with saline.    Blood products in the draining bile and from the bowel is expected. Hopeful this will improve after receiving blood products yesterday.    Will order for 10 mL saline flushes through each drain daily. Can increase to BID or TID PRN if issues with patency persist. Would recommend that this continue after discharge with home health care.       Jesus Manuel Patterson DO  3/28/2025, 9:46 AM  Interventional Radiology  [phone])

## 2025-03-28 NOTE — PROGRESS NOTES
PROGRESS NOTE  S:74 yrs Patient  admitted on 3/24/2025 with Abdominal pain, right upper quadrant [R10.11]  Intra abdominal hemorrhage [R58]  Encounter for biliary drainage tube placement [Z46.82]  Other specified carcinomas of liver [C22.7] .  Today, he denies any complaints. He continues to have bloody output from biliary drains. He denies BM today.     Exam:   Vitals:    03/28/25 0808   BP: 104/61   Pulse: 77   Resp: 15   Temp: 97.9 °F (36.6 °C)   SpO2: 97%   Generalized: alert, no acute distress  HEENT: sclera clear, anicteric  Neck: supple, trachea midline  Heart: NSR  Abdomen: soft, + biliary drain x 2  Extremities: no edema     Medications: Reviewed    Labs:  CBC:   Recent Labs     03/25/25  1148 03/25/25  1829 03/27/25  1830 03/27/25  2350 03/28/25  0611   WBC 6.5  --   --   --  4.7   HGB 8.7*  8.7*  8.7*   < > 6.8* 7.2* 7.2*   HCT 26.5*  26.2*  26.3*   < > 20.7* 21.7* 21.5*   MCV 86.2  --   --   --  84.0   *  126*  --   --   --  184    < > = values in this interval not displayed.     BMP:   Recent Labs     03/26/25  0641 03/27/25  0652 03/28/25  0611    135* 137   K 3.8 3.9 4.0    104 105   CO2 23 22 23   BUN 10 7 10   CREATININE 0.3* 0.3* 0.3*     PT/INR:   Recent Labs     03/27/25  1542 03/27/25  1830 03/28/25  0611   INR 1.60* 1.41* 1.21*     Attending Supervising Physician's Attestation Statement  The patient is a 74 y.o. male. I have performed a history and physical examination of the patient. I discussed the case with LENORE Laughlin    I reviewed the patient's Past Medical History, Past Surgical History, Medications, and Allergies.     Physical Exam:  Vitals:    03/28/25 0808 03/28/25 1230 03/28/25 1413 03/28/25 1425   BP: 104/61 100/63     Pulse: 77 88 81    Resp: 15 18 16 16   Temp: 97.9 °F (36.6 °C) 97.8 °F (36.6 °C)     TempSrc: Oral Oral     SpO2: 97% 98% 96%    Weight:       Height:           Physical Examination:   General -  cachectic and chronically ill appearing  Mental status - alert, oriented to person, place, and time  Eyes - sclera icteric  Neck - supple, no significant adenopathy  Heart - normal rate and regular rhythm  Abdomen - soft, NT, ND, biliary drain w/blood  Extremities - no pedal edema         Assessment  73 yo male with pmx of DM, HTN, and 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 Biliary drains exchanged today in IR.      Plan  Continue supportive care  Trend H&H  Monitor for signs of bleeding  PPI BID  Check stool tests  Oncology and Palliative care following  Poor prognosis     Flory Laguhlin, APRN - CNP  11:09 AM 3/28/2025                      74 year old male with history of DM, HTN, crohn's disease, stage IV cholangicarcinoma s/p whipple c/b recurrent malignant obstruction s/p perc drain and currently undergoing palliative chemotherapy admitted with bleeding per biliary drain. New onset diarrhea and rectal bleeding likely related to colitis.     Continue supportive care. Await stool tests. Monitor Hgb. Observe for signs of bleeding. CTA negative. Oncology consulted. IR exchanged drains. Discussed poor prognosis with family. Palliative care consulted. Patient prefers to pursue further treatment. OK to d/c from GI standpoint with close f/u with [facility] oncology. May benefit from regular vit K dose    Tab Pandey MD          (O) [phone]  (O) [phone]

## 2025-03-29 LAB
BACTERIA BLD CULT ORG #2: NORMAL
BACTERIA BLD CULT: NORMAL
BASOPHILS # BLD: 0 K/UL (ref 0–0.2)
BASOPHILS NFR BLD: 0.3 %
BLOOD BANK DISPENSE STATUS: NORMAL
BLOOD BANK PRODUCT CODE: NORMAL
BPU ID: NORMAL
DEPRECATED RDW RBC AUTO: 24.7 % (ref 12.4–15.4)
DESCRIPTION BLOOD BANK: NORMAL
EOSINOPHIL # BLD: 0 K/UL (ref 0–0.6)
EOSINOPHIL NFR BLD: 0 %
GLUCOSE BLD-MCNC: 117 MG/DL (ref 70–99)
GLUCOSE BLD-MCNC: 118 MG/DL (ref 70–99)
GLUCOSE BLD-MCNC: 188 MG/DL (ref 70–99)
GLUCOSE BLD-MCNC: 222 MG/DL (ref 70–99)
GLUCOSE BLD-MCNC: 259 MG/DL (ref 70–99)
HCT VFR BLD AUTO: 20 % (ref 40.5–52.5)
HCT VFR BLD AUTO: 22.2 % (ref 40.5–52.5)
HGB BLD-MCNC: 6.7 G/DL (ref 13.5–17.5)
HGB BLD-MCNC: 7.6 G/DL (ref 13.5–17.5)
LYMPHOCYTES # BLD: 0.4 K/UL (ref 1–5.1)
LYMPHOCYTES NFR BLD: 11.1 %
MCH RBC QN AUTO: 28.8 PG (ref 26–34)
MCHC RBC AUTO-ENTMCNC: 33.5 G/DL (ref 31–36)
MCV RBC AUTO: 85.9 FL (ref 80–100)
MONOCYTES # BLD: 0.3 K/UL (ref 0–1.3)
MONOCYTES NFR BLD: 8 %
NEUTROPHILS # BLD: 3 K/UL (ref 1.7–7.7)
NEUTROPHILS NFR BLD: 80.6 %
PERFORMED ON: ABNORMAL
PLATELET # BLD AUTO: 172 K/UL (ref 135–450)
PMV BLD AUTO: 7 FL (ref 5–10.5)
RBC # BLD AUTO: 2.33 M/UL (ref 4.2–5.9)
WBC # BLD AUTO: 3.7 K/UL (ref 4–11)

## 2025-03-29 PROCEDURE — 36430 TRANSFUSION BLD/BLD COMPNT: CPT

## 2025-03-29 PROCEDURE — 6370000000 HC RX 637 (ALT 250 FOR IP): Performed by: INTERNAL MEDICINE

## 2025-03-29 PROCEDURE — 85025 COMPLETE CBC W/AUTO DIFF WBC: CPT

## 2025-03-29 PROCEDURE — 85018 HEMOGLOBIN: CPT

## 2025-03-29 PROCEDURE — 6360000002 HC RX W HCPCS

## 2025-03-29 PROCEDURE — 87506 IADNA-DNA/RNA PROBE TQ 6-11: CPT

## 2025-03-29 PROCEDURE — 2500000003 HC RX 250 WO HCPCS: Performed by: INTERNAL MEDICINE

## 2025-03-29 PROCEDURE — 94640 AIRWAY INHALATION TREATMENT: CPT

## 2025-03-29 PROCEDURE — 1200000000 HC SEMI PRIVATE

## 2025-03-29 PROCEDURE — 94761 N-INVAS EAR/PLS OXIMETRY MLT: CPT

## 2025-03-29 PROCEDURE — 85014 HEMATOCRIT: CPT

## 2025-03-29 PROCEDURE — 2580000003 HC RX 258

## 2025-03-29 PROCEDURE — 6360000002 HC RX W HCPCS: Performed by: INTERNAL MEDICINE

## 2025-03-29 PROCEDURE — 2580000003 HC RX 258: Performed by: INTERNAL MEDICINE

## 2025-03-29 RX ORDER — SODIUM CHLORIDE 9 MG/ML
INJECTION, SOLUTION INTRAVENOUS PRN
Status: DISCONTINUED | OUTPATIENT
Start: 2025-03-29 | End: 2025-03-29

## 2025-03-29 RX ADMIN — METRONIDAZOLE 500 MG: 500 INJECTION, SOLUTION INTRAVENOUS at 21:23

## 2025-03-29 RX ADMIN — INSULIN LISPRO 1 UNITS: 100 INJECTION, SOLUTION INTRAVENOUS; SUBCUTANEOUS at 11:52

## 2025-03-29 RX ADMIN — ALBUTEROL SULFATE 2 PUFF: 90 AEROSOL, METERED RESPIRATORY (INHALATION) at 20:44

## 2025-03-29 RX ADMIN — SODIUM CHLORIDE, PRESERVATIVE FREE 10 ML: 5 INJECTION INTRAVENOUS at 08:58

## 2025-03-29 RX ADMIN — PANTOPRAZOLE SODIUM 40 MG: 40 INJECTION, POWDER, LYOPHILIZED, FOR SOLUTION INTRAVENOUS at 08:58

## 2025-03-29 RX ADMIN — METRONIDAZOLE 500 MG: 500 INJECTION, SOLUTION INTRAVENOUS at 05:40

## 2025-03-29 RX ADMIN — SODIUM CHLORIDE 1000 MG: 9 INJECTION, SOLUTION INTRAVENOUS at 04:55

## 2025-03-29 RX ADMIN — ALBUTEROL SULFATE 2 PUFF: 90 AEROSOL, METERED RESPIRATORY (INHALATION) at 08:07

## 2025-03-29 RX ADMIN — INSULIN LISPRO 1 UNITS: 100 INJECTION, SOLUTION INTRAVENOUS; SUBCUTANEOUS at 21:17

## 2025-03-29 RX ADMIN — PANCRELIPASE LIPASE, PANCRELIPASE PROTEASE, PANCRELIPASE AMYLASE 75000 UNITS: 20000; 63000; 84000 CAPSULE, DELAYED RELEASE ORAL at 11:52

## 2025-03-29 RX ADMIN — FAMOTIDINE 20 MG: 20 TABLET, FILM COATED ORAL at 08:54

## 2025-03-29 RX ADMIN — METRONIDAZOLE 500 MG: 500 INJECTION, SOLUTION INTRAVENOUS at 15:27

## 2025-03-29 RX ADMIN — PANCRELIPASE LIPASE, PANCRELIPASE PROTEASE, PANCRELIPASE AMYLASE 75000 UNITS: 20000; 63000; 84000 CAPSULE, DELAYED RELEASE ORAL at 16:56

## 2025-03-29 RX ADMIN — INSULIN HUMAN 20 UNITS: 100 INJECTION, SUSPENSION SUBCUTANEOUS at 16:56

## 2025-03-29 RX ADMIN — OXYCODONE HYDROCHLORIDE 5 MG: 5 TABLET ORAL at 22:35

## 2025-03-29 RX ADMIN — FINASTERIDE 5 MG: 5 TABLET, FILM COATED ORAL at 16:56

## 2025-03-29 RX ADMIN — FAMOTIDINE 20 MG: 20 TABLET, FILM COATED ORAL at 21:16

## 2025-03-29 RX ADMIN — DICYCLOMINE HYDROCHLORIDE 20 MG: 20 TABLET ORAL at 08:54

## 2025-03-29 RX ADMIN — PANTOPRAZOLE SODIUM 40 MG: 40 INJECTION, POWDER, LYOPHILIZED, FOR SOLUTION INTRAVENOUS at 21:17

## 2025-03-29 RX ADMIN — PANCRELIPASE LIPASE, PANCRELIPASE PROTEASE, PANCRELIPASE AMYLASE 75000 UNITS: 20000; 63000; 84000 CAPSULE, DELAYED RELEASE ORAL at 08:53

## 2025-03-29 RX ADMIN — INSULIN LISPRO 2 UNITS: 100 INJECTION, SOLUTION INTRAVENOUS; SUBCUTANEOUS at 16:56

## 2025-03-29 ASSESSMENT — PAIN DESCRIPTION - DESCRIPTORS: DESCRIPTORS: ACHING;DISCOMFORT

## 2025-03-29 ASSESSMENT — PAIN DESCRIPTION - ORIENTATION: ORIENTATION: RIGHT

## 2025-03-29 ASSESSMENT — PAIN SCALES - GENERAL
PAINLEVEL_OUTOF10: 5
PAINLEVEL_OUTOF10: 3
PAINLEVEL_OUTOF10: 0
PAINLEVEL_OUTOF10: 0

## 2025-03-29 ASSESSMENT — PAIN DESCRIPTION - LOCATION: LOCATION: LEG;OTHER (COMMENT)

## 2025-03-29 ASSESSMENT — PAIN DESCRIPTION - PAIN TYPE: TYPE: CHRONIC PAIN

## 2025-03-29 ASSESSMENT — PAIN - FUNCTIONAL ASSESSMENT: PAIN_FUNCTIONAL_ASSESSMENT: ACTIVITIES ARE NOT PREVENTED

## 2025-03-29 NOTE — PROGRESS NOTES
Previous order for 1 unit PRBC placed at 0630 showing canceled and unable to be released. Charge RN and MD notified. New order placed at this time for 1 unit PRBC.

## 2025-03-29 NOTE — PROGRESS NOTES
IM Progress Note    Admit Date:  3/24/2025    Admitted for bloody drainage in hepatic drain.    Patient has history of liver cancer getting chemotherapy from [facility] - Dr. Aldrich .  He got his hepatic drain replaced here at IR last week missed his chemo dose last week; was due again for chemo yesterday but then started having bloody drainage from his hepatic drain and hence came back to [place].  Patient was evaluated by IR physician at the ER .  Seen by IR again and hepatic drains irrigated - Not replaced this time    S/p 2 units of PRBC transfusion on admission, for symptomatic anemia with dizziness and orthostasis.    3/27  Patient started having recurrent bleeding from the hepatic drains.   2 more units of PRBC transfused and 1 unit of fresh frozen plasma given.    3/28 back to IR-s/p replacement of both hepatic drains     3/29-drop in hemoglobin 1 unit of PRBC ordered      Subjective:  Mr. Ceja seen.  Still s/p exchange of both hepatic drains yesterday 3/28.  They are large bore drains now, draining large amount of bile,  no bloody drainage noted today.  He is awake and oriented and in no distress.  He is pleasant with a good mood.  He is hoping to get better soon and get home to resume chemo.    No abdominal pain.  No nausea  No fevers, vital signs stable      -I have discussed with patient and family regarding current guarded prognosis on 3/28.     Objective:     Vitals:    03/29/25 1130 03/29/25 1135 03/29/25 1140 03/29/25 1145   BP: 115/66 (!) 113/56 109/66 111/65   Pulse: 75 74 77 73   Resp: 16 16 16 16   Temp: 98.1 °F (36.7 °C) 98.3 °F (36.8 °C) 98.3 °F (36.8 °C) 98.2 °F (36.8 °C)   TempSrc: Oral Oral Oral Oral   SpO2: 95% 96% 95% 96%   Weight:       Height:                Intake/Output Summary (Last 24 hours) at 3/29/2025 1303  Last data filed at 3/29/2025 1145  Gross per 24 hour   Intake 1794.26 ml   Output 1175 ml   Net 619.26 ml       Physical Exam:  Gen: No distress. Alert.   status    Note above makes patient higher risk for morbidity and mortality requiring testing and treatment.      DVT Prophylaxis: scd  Diet: ADULT DIET; Regular; 4 carb choices (60 gm/meal)  Code Status: Full Code    Plan of care discussed in detail with patient and his family his wife and daughter at bedside and with patient's nurse.  I  had palliative care discussions with patient and patient's family.    S/p hepatic drain replacement  3/28.    Continue to monitor for any repeat bleeding.  Current plan is for discharge over the weekend.   Patient to follow-up with his oncologist at [facility] on Monday / Tuesday .      Maryuri Burnette MD   3/29/2025

## 2025-03-29 NOTE — PROGRESS NOTES
PROGRESS NOTE  S:74 yrs Patient  admitted on 3/24/2025 with Abdominal pain, right upper quadrant [R10.11]  Intra abdominal hemorrhage [R58]  Encounter for biliary drainage tube placement [Z46.82]  Other specified carcinomas of liver [C22.7] .    Reports another episode of blood in stool. Denies any blood in drain output today. Hemodynamically stable.     Current Hospital Schedued Meds   pantoprazole (PROTONIX) 40 mg in sodium chloride (PF) 0.9 % 10 mL injection  40 mg IntraVENous Q12H    cefTRIAXone (ROCEPHIN) IV  1,000 mg IntraVENous Q24H    metroNIDAZOLE  500 mg IntraVENous Q8H    insulin NPH  20 Units SubCUTAneous BID AC    albuterol sulfate HFA  2 puff Inhalation BID RT    dicyclomine  20 mg Oral Daily    famotidine  20 mg Oral BID    finasteride  5 mg Oral QPM    potassium chloride  20 mEq Oral BID    sodium chloride flush  5-40 mL IntraVENous 2 times per day    magnesium oxide  400 mg Oral Once    insulin lispro  0-4 Units SubCUTAneous 4x Daily AC & HS    lipase-protease-amylase  75,000 Units Oral TID      Current Hospital IV Meds   sodium chloride      dextrose       Current Hospital PRN Meds  albuterol sulfate HFA, oxyCODONE, sodium chloride flush, sodium chloride, potassium chloride **OR** potassium alternative oral replacement **OR** potassium chloride, magnesium sulfate, ondansetron **OR** ondansetron, polyethylene glycol, acetaminophen **OR** acetaminophen, glucose, dextrose bolus **OR** dextrose bolus, glucagon (rDNA), dextrose, HYDROmorphone, lipase-protease-amylase    Exam:   Vitals:    03/29/25 1500   BP: 108/62   Pulse: 74   Resp: 16   Temp: 98.2 °F (36.8 °C)   SpO2: 96%     I/O last 3 completed shifts:  In: 2198.3 [P.O.:720; Blood:164; IV Piggyback:1314.3]  Out: 1275 [Emesis/NG output:1275]  A medically necessary and appropriate physical exam was performed.     Labs:  CBC:   Recent Labs     03/27/25  2350 03/28/25  0611 03/29/25  0540   WBC  --  4.7 3.7*   HGB  colitis.    Recommendation:  Continue supportive care  Trend H&H  Monitor for signs of bleeding  PPI BID  Follow stool tests  Oncology and Palliative care following  Poor prognosis     Sylvia More MD  3:30 PM 3/29/2025            [place] Office   [address] [place] [address]     Phone: [phone]     Fax: [phone]

## 2025-03-29 NOTE — PLAN OF CARE
Problem: Chronic Conditions and Co-morbidities  Goal: Patient's chronic conditions and co-morbidity symptoms are monitored and maintained or improved  3/28/2025 2307 by Joselyn Martinez RN  Outcome: Progressing  3/28/2025 1121 by Kaila Rasmussen RN  Outcome: Progressing     Problem: Discharge Planning  Goal: Discharge to home or other facility with appropriate resources  3/28/2025 2307 by Joselyn Martinez RN  Outcome: Progressing  3/28/2025 1121 by Kaila Rasmussen RN  Outcome: Progressing     Problem: Safety - Adult  Goal: Free from fall injury  3/28/2025 2307 by Joselyn Martinez RN  Outcome: Progressing  3/28/2025 1121 by Kaila Rasmussen RN  Outcome: Progressing

## 2025-03-29 NOTE — PROGRESS NOTES
/65   Pulse 73   Temp 98.2 °F (36.8 °C) (Oral)   Resp 16   Ht 1.778 m (5' 10\")   Wt 68 kg (150 lb)   SpO2 96%   BMI 21.52 kg/m²     Assessment complete. Meds passed. Pt denies needs at this time.

## 2025-03-30 LAB
GI PATHOGENS PNL STL NAA+PROBE: NORMAL
GLUCOSE BLD-MCNC: 109 MG/DL (ref 70–99)
GLUCOSE BLD-MCNC: 214 MG/DL (ref 70–99)
GLUCOSE BLD-MCNC: 311 MG/DL (ref 70–99)
GLUCOSE BLD-MCNC: 320 MG/DL (ref 70–99)
HCT VFR BLD AUTO: 22.4 % (ref 40.5–52.5)
HCT VFR BLD AUTO: 23.5 % (ref 40.5–52.5)
HGB BLD-MCNC: 7.6 G/DL (ref 13.5–17.5)
HGB BLD-MCNC: 7.8 G/DL (ref 13.5–17.5)
PERFORMED ON: ABNORMAL

## 2025-03-30 PROCEDURE — 94761 N-INVAS EAR/PLS OXIMETRY MLT: CPT

## 2025-03-30 PROCEDURE — 6370000000 HC RX 637 (ALT 250 FOR IP): Performed by: INTERNAL MEDICINE

## 2025-03-30 PROCEDURE — 6360000002 HC RX W HCPCS: Performed by: INTERNAL MEDICINE

## 2025-03-30 PROCEDURE — 2500000003 HC RX 250 WO HCPCS: Performed by: INTERNAL MEDICINE

## 2025-03-30 PROCEDURE — 1200000000 HC SEMI PRIVATE

## 2025-03-30 PROCEDURE — 2580000003 HC RX 258

## 2025-03-30 PROCEDURE — 85018 HEMOGLOBIN: CPT

## 2025-03-30 PROCEDURE — 2580000003 HC RX 258: Performed by: INTERNAL MEDICINE

## 2025-03-30 PROCEDURE — 6360000002 HC RX W HCPCS

## 2025-03-30 PROCEDURE — 94640 AIRWAY INHALATION TREATMENT: CPT

## 2025-03-30 PROCEDURE — 85014 HEMATOCRIT: CPT

## 2025-03-30 RX ADMIN — INSULIN LISPRO 3 UNITS: 100 INJECTION, SOLUTION INTRAVENOUS; SUBCUTANEOUS at 21:12

## 2025-03-30 RX ADMIN — PANTOPRAZOLE SODIUM 40 MG: 40 INJECTION, POWDER, LYOPHILIZED, FOR SOLUTION INTRAVENOUS at 08:52

## 2025-03-30 RX ADMIN — ALBUTEROL SULFATE 2 PUFF: 90 AEROSOL, METERED RESPIRATORY (INHALATION) at 07:39

## 2025-03-30 RX ADMIN — SODIUM CHLORIDE 1000 MG: 9 INJECTION, SOLUTION INTRAVENOUS at 05:04

## 2025-03-30 RX ADMIN — FAMOTIDINE 20 MG: 20 TABLET, FILM COATED ORAL at 08:52

## 2025-03-30 RX ADMIN — INSULIN LISPRO 3 UNITS: 100 INJECTION, SOLUTION INTRAVENOUS; SUBCUTANEOUS at 16:35

## 2025-03-30 RX ADMIN — FAMOTIDINE 20 MG: 20 TABLET, FILM COATED ORAL at 21:13

## 2025-03-30 RX ADMIN — PANTOPRAZOLE SODIUM 40 MG: 40 INJECTION, POWDER, LYOPHILIZED, FOR SOLUTION INTRAVENOUS at 21:13

## 2025-03-30 RX ADMIN — ALBUTEROL SULFATE 2 PUFF: 90 AEROSOL, METERED RESPIRATORY (INHALATION) at 11:39

## 2025-03-30 RX ADMIN — INSULIN LISPRO 1 UNITS: 100 INJECTION, SOLUTION INTRAVENOUS; SUBCUTANEOUS at 12:09

## 2025-03-30 RX ADMIN — DICYCLOMINE HYDROCHLORIDE 20 MG: 20 TABLET ORAL at 08:52

## 2025-03-30 RX ADMIN — FINASTERIDE 5 MG: 5 TABLET, FILM COATED ORAL at 16:35

## 2025-03-30 RX ADMIN — INSULIN HUMAN 20 UNITS: 100 INJECTION, SUSPENSION SUBCUTANEOUS at 16:35

## 2025-03-30 RX ADMIN — PANCRELIPASE LIPASE, PANCRELIPASE PROTEASE, PANCRELIPASE AMYLASE 75000 UNITS: 20000; 63000; 84000 CAPSULE, DELAYED RELEASE ORAL at 08:52

## 2025-03-30 RX ADMIN — SODIUM CHLORIDE, PRESERVATIVE FREE 10 ML: 5 INJECTION INTRAVENOUS at 08:53

## 2025-03-30 RX ADMIN — ALBUTEROL SULFATE 2 PUFF: 90 AEROSOL, METERED RESPIRATORY (INHALATION) at 20:34

## 2025-03-30 RX ADMIN — PANCRELIPASE LIPASE, PANCRELIPASE PROTEASE, PANCRELIPASE AMYLASE 75000 UNITS: 20000; 63000; 84000 CAPSULE, DELAYED RELEASE ORAL at 16:35

## 2025-03-30 RX ADMIN — PANCRELIPASE LIPASE, PANCRELIPASE PROTEASE, PANCRELIPASE AMYLASE 75000 UNITS: 20000; 63000; 84000 CAPSULE, DELAYED RELEASE ORAL at 12:09

## 2025-03-30 RX ADMIN — SODIUM CHLORIDE, PRESERVATIVE FREE 10 ML: 5 INJECTION INTRAVENOUS at 21:14

## 2025-03-30 ASSESSMENT — PAIN SCALES - GENERAL
PAINLEVEL_OUTOF10: 0
PAINLEVEL_OUTOF10: 0
PAINLEVEL_OUTOF10: 3

## 2025-03-30 NOTE — PROGRESS NOTES
BP (!) 101/55   Pulse 71   Temp 98.1 °F (36.7 °C) (Oral)   Resp 16   Ht 1.778 m (5' 10\")   Wt 69.8 kg (153 lb 14.4 oz)   SpO2 96%   BMI 22.08 kg/m²     Pt alert and orientedX4. Pt denies pain at this time. Assessment complete. Meds passed. Pt denies needs at this time.        Bedside Mobility Assessment Tool (BMAT):     Assessment Level 1- Sit and Shake    1. From a semi-reclined position, ask patient to sit up and rotate to a seated position at the side of the bed. Can use the bedrail.    2. Ask patient to reach out and grab your hand and shake making sure patient reaches across his/her midline.   Pass- Patient is able to come to a seated position, maintain core strength. Maintains seated balance while reaching across midline. Move on to Assessment Level 2.     Assessment Level 2- Stretch and Point   1. With patient in seated position at the side of the bed, have patient place both feet on the floor (or stool) with knees no higher than hips.    2. Ask patient to stretch one leg and straighten the knee, then bend the ankle/flex and point the toes. If appropriate, repeat with the other leg.   Pass- Patient is able to demonstrate appropriate quad strength on intended weight bearing limb(s). Move onto Assessment Level 3.     Assessment Level 3- Stand   1. Ask patient to elevate off the bed or chair (seated to standing) using an assistive device (cane, bedrail).    2. Patient should be able to raise buttocks off be and hold for a count of five. May repeat once.   Pass- Patient maintains standing stability for at least 5 seconds, proceed to assessment level 4.    Assessment Level 4- Walk   1. Ask patient to march in place at bedside.    2. Then ask patient to advance step and return each foot. Some medical conditions may render a patient from stepping backwards, use your best clinical judgement.   Fail- Patient not able to complete tasks OR requires use of assistive device. Patient is MOBILITY LEVEL 3.        Mobility Level- 3

## 2025-03-30 NOTE — PROGRESS NOTES
PROGRESS NOTE  S:74 yrs Patient  admitted on 3/24/2025 with Abdominal pain, right upper quadrant [R10.11]  Intra abdominal hemorrhage [R58]  Encounter for biliary drainage tube placement [Z46.82]  Other specified carcinomas of liver [C22.7] .    Denies any further blood in stool or in drain output.     Current Hospital Schedued Meds   pantoprazole (PROTONIX) 40 mg in sodium chloride (PF) 0.9 % 10 mL injection  40 mg IntraVENous Q12H    cefTRIAXone (ROCEPHIN) IV  1,000 mg IntraVENous Q24H    insulin NPH  20 Units SubCUTAneous BID AC    albuterol sulfate HFA  2 puff Inhalation BID RT    dicyclomine  20 mg Oral Daily    famotidine  20 mg Oral BID    finasteride  5 mg Oral QPM    potassium chloride  20 mEq Oral BID    sodium chloride flush  5-40 mL IntraVENous 2 times per day    magnesium oxide  400 mg Oral Once    insulin lispro  0-4 Units SubCUTAneous 4x Daily AC & HS    lipase-protease-amylase  75,000 Units Oral TID      Current Hospital IV Meds   sodium chloride      dextrose       Current Hospital PRN Meds  albuterol sulfate HFA, oxyCODONE, sodium chloride flush, sodium chloride, potassium chloride **OR** potassium alternative oral replacement **OR** potassium chloride, magnesium sulfate, ondansetron **OR** ondansetron, polyethylene glycol, acetaminophen **OR** acetaminophen, glucose, dextrose bolus **OR** dextrose bolus, glucagon (rDNA), dextrose, HYDROmorphone, lipase-protease-amylase    Exam:   Vitals:    03/30/25 1638   BP: 111/65   Pulse: 74   Resp: 16   Temp: 98.1 °F (36.7 °C)   SpO2: 97%     I/O last 3 completed shifts:  In: 2390.2 [P.O.:480; Blood:405.4; IV Piggyback:1504.7]  Out: 1671 [Urine:1; Emesis/NG output:1670]  A medically necessary and appropriate physical exam was performed. Chronically ill appearing male.     Labs:  CBC:   Recent Labs     03/28/25  0611 03/29/25  0540 03/29/25  1630 03/30/25  0601   WBC 4.7 3.7*  --   --    HGB 7.2* 6.7* 7.6* 7.6*   HCT  21.5* 20.0* 22.2* 22.4*   MCV 84.0 85.9  --   --     172  --   --      BMP:   Recent Labs     03/28/25  0611      K 4.0      CO2 23   BUN 10   CREATININE 0.3*     LIVER PROFILE: No results for input(s): \"AST\", \"ALT\", \"LIPASE\", \"AMYLASE\", \"BILIDIR\", \"BILITOT\", \"ALKPHOS\" in the last 72 hours.    Invalid input(s): \"PROT\", \"ALB\"  PT/INR:   Recent Labs     03/27/25  1830 03/28/25  0611   INR 1.41* 1.21*       IMAGING:  No results found.     Hospital Problems           Last Modified POA    * (Principal) Intra abdominal hemorrhage 3/24/2025 Yes    Other specified carcinomas of liver (HCC) 3/25/2025 Yes    ABLA (acute blood loss anemia) 3/25/2025 Yes    Orthostasis 3/25/2025 Yes      Impression:    74 year old male with history of DM, HTN, crohn's disease, stage IV cholangicarcinoma s/p whipple c/b recurrent malignant obstruction s/p perc drain and currently undergoing palliative chemotherapy admitted with bleeding per biliary drain. New onset diarrhea and rectal bleeding likely related to colitis.     Recommendation:  Continue supportive care  Trend H&H  Monitor for signs of bleeding  PPI BID  Follow stool tests  Oncology and Palliative care following  Poor prognosis   No contraindication to discharge from GI. Patient says he wants to stay for at least one more day.     Sylvia More MD  4:43 PM 3/30/2025            [place] Office   [address] [place] [address]    [address] [place] [address]     Phone: [phone]     Fax: [phone]

## 2025-03-30 NOTE — PROGRESS NOTES

## 2025-03-30 NOTE — PLAN OF CARE
Problem: Chronic Conditions and Co-morbidities  Goal: Patient's chronic conditions and co-morbidity symptoms are monitored and maintained or improved  3/30/2025 1144 by Sinai Diaz RN  Outcome: Progressing  3/30/2025 0255 by Joselyn Martinez RN  Outcome: Progressing     Problem: Discharge Planning  Goal: Discharge to home or other facility with appropriate resources  3/30/2025 1144 by Sinai Diaz RN  Outcome: Progressing  3/30/2025 0255 by Joselyn Martinez RN  Outcome: Progressing     Problem: Safety - Adult  Goal: Free from fall injury  3/30/2025 1144 by Sinai Diaz RN  Outcome: Progressing  3/30/2025 0255 by Joselyn Martinez RN  Outcome: Progressing     Problem: ABCDS Injury Assessment  Goal: Absence of physical injury  3/30/2025 1144 by Sinai Diaz RN  Outcome: Progressing  3/30/2025 0255 by Joselyn Martinez RN  Outcome: Progressing     Problem: Metabolic/Fluid and Electrolytes - Adult  Goal: Electrolytes maintained within normal limits  3/30/2025 1144 by Sinai Diaz RN  Outcome: Progressing  3/30/2025 0255 by Joselyn Martinez RN  Outcome: Progressing  Goal: Hemodynamic stability and optimal renal function maintained  3/30/2025 1144 by Sinai Diaz RN  Outcome: Progressing  3/30/2025 0255 by Joselyn Martinez RN  Outcome: Progressing  Goal: Glucose maintained within prescribed range  3/30/2025 1144 by Sinai Diaz RN  Outcome: Progressing  3/30/2025 0255 by Joselyn Martinez RN  Outcome: Progressing     Problem: Hematologic - Adult  Goal: Maintains hematologic stability  3/30/2025 1144 by Sinai Diaz RN  Outcome: Progressing  3/30/2025 0255 by Joselyn Martinez RN  Outcome: Progressing     Problem: Pain  Goal: Verbalizes/displays adequate comfort level or baseline comfort level  3/30/2025 1144 by Sinai Diaz RN  Outcome: Progressing  3/30/2025 0255 by Joselyn Martinez RN  Outcome: Progressing     Problem: Skin/Tissue Integrity - Adult  Goal: Skin  integrity remains intact  3/30/2025 1144 by Sinai Diaz RN  Outcome: Progressing  3/30/2025 0255 by Joselyn Martinez RN  Outcome: Progressing  Goal: Incisions, wounds, or drain sites healing without S/S of infection  3/30/2025 1144 by Sinai Diaz RN  Outcome: Progressing  3/30/2025 0255 by Joselyn Martinez RN  Outcome: Progressing     Problem: Musculoskeletal - Adult  Goal: Return mobility to safest level of function  3/30/2025 1144 by Siani Diaz RN  Outcome: Progressing  3/30/2025 0255 by Joselyn Martinez RN  Outcome: Progressing     Problem: Gastrointestinal - Adult  Goal: Minimal or absence of nausea and vomiting  3/30/2025 1144 by Sinai Diaz RN  Outcome: Progressing  3/30/2025 0255 by Joselyn Martinez RN  Outcome: Progressing  Goal: Maintains adequate nutritional intake  3/30/2025 1144 by Sinai Diaz RN  Outcome: Progressing  3/30/2025 0255 by Joselyn Martinez RN  Outcome: Progressing     Problem: Infection - Adult  Goal: Absence of infection at discharge  3/30/2025 1144 by Sinai Diaz RN  Outcome: Progressing  3/30/2025 0255 by Joselyn Martinez RN  Outcome: Progressing

## 2025-03-30 NOTE — PROGRESS NOTES
Pt awake in bed. Pt resp even and unlabored. Pt shows no s/s of distress. Pt denies needs at this time. Pt bed is in the lowest position, bed alarm is on, and call light is within reach.

## 2025-03-30 NOTE — PROGRESS NOTES
RT Inhaler-Nebulizer Bronchodilator Protocol Note    There is a bronchodilator order in the chart from a provider indicating to follow the RT Bronchodilator Protocol and there is an “Initiate RT Inhaler-Nebulizer Bronchodilator Protocol” order as well (see protocol at bottom of note).    CXR Findings:  No results found.    The findings from the last RT Protocol Assessment were as follows:   History Pulmonary Disease: (P) Chronic pulmonary disease  Respiratory Pattern: (P) Regular pattern and RR 12-20 bpm  Breath Sounds: (P) Slightly diminished and/or crackles  Cough: (P) Strong, spontaneous, non-productive  Indication for Bronchodilator Therapy: (P) Decreased or absent breath sounds  Bronchodilator Assessment Score: (P) 4    Aerosolized bronchodilator medication orders have been revised according to the RT Inhaler-Nebulizer Bronchodilator Protocol below.    Respiratory Therapist to perform RT Therapy Protocol Assessment initially then follow the protocol.  Repeat RT Therapy Protocol Assessment PRN for score 0-3 or on second treatment, BID, and PRN for scores above 3.    No Indications - adjust the frequency to every 6 hours PRN wheezing or bronchospasm, if no treatments needed after 48 hours then discontinue using Per Protocol order mode.     If indication present, adjust the RT bronchodilator orders based on the Bronchodilator Assessment Score as indicated below.  Use Inhaler orders unless patient has one or more of the following: on home nebulizer, not able to hold breath for 10 seconds, is not alert and oriented, cannot activate and use MDI correctly, or respiratory rate 25 breaths per minute or more, then use the equivalent nebulizer order(s) with same Frequency and PRN reasons based on the score.  If a patient is on this medication at home then do not decrease Frequency below that used at home.    0-3 - enter or revise RT bronchodilator order(s) to equivalent RT Bronchodilator order with Frequency of every 4  hours PRN for wheezing or increased work of breathing using Per Protocol order mode.        4-6 - enter or revise RT Bronchodilator order(s) to two equivalent RT bronchodilator orders with one order with BID Frequency and one order with Frequency of every 4 hours PRN wheezing or increased work of breathing using Per Protocol order mode.        7-10 - enter or revise RT Bronchodilator order(s) to two equivalent RT bronchodilator orders with one order with TID Frequency and one order with Frequency of every 4 hours PRN wheezing or increased work of breathing using Per Protocol order mode.       11-13 - enter or revise RT Bronchodilator order(s) to one equivalent RT bronchodilator order with QID Frequency and an Albuterol order with Frequency of every 4 hours PRN wheezing or increased work of breathing using Per Protocol order mode.      Greater than 13 - enter or revise RT Bronchodilator order(s) to one equivalent RT bronchodilator order with every 4 hours Frequency and an Albuterol order with Frequency of every 2 hours PRN wheezing or increased work of breathing using Per Protocol order mode.     RT to enter RT Home Evaluation for COPD & MDI Assessment order using Per Protocol order mode.    Electronically signed by Андрей Mariano RCP on 3/30/2025 at 7:44 AM

## 2025-03-30 NOTE — PROGRESS NOTES
IM Progress Note    Admit Date:  3/24/2025    Admitted for bloody drainage in hepatic drain.    Patient has history of liver cancer getting chemotherapy from [facility] - Dr. Aldrich .  He got his hepatic drain replaced here at IR last week missed his chemo dose last week; was due again for chemo yesterday but then started having bloody drainage from his hepatic drain and hence came back to [place].  Patient was evaluated by IR physician at the ER .  Seen by IR again and hepatic drains irrigated - Not replaced this time    S/p 2 units of PRBC transfusion on admission, for symptomatic anemia with dizziness and orthostasis.    3/27  Patient started having recurrent bleeding from the hepatic drains.   2 more units of PRBC transfused and 1 unit of fresh frozen plasma given.    3/28 back to IR-s/p replacement of both hepatic drains     3/29-drop in hemoglobin again , 1  more unit of PRBC transfused     3/30-hemoglobin stable today.   No more bleeding from hepatic drains.   Did have rectal bleeding again earlier today.  Continue to monitor    Subjective:  Mr. [place] seen.  Still s/p exchange of both hepatic drains  3/28.  They are large bore drains now, draining large amount of bile,  no bloody drainage noted today.  He is awake and oriented and in no distress.  He is pleasant with a good mood.  He is hoping to get better soon and get home to resume chemo.  He is up and ambulatory in the room  No abdominal pain.  No nausea  No fevers, vital signs stable      -I have discussed with patient and family regarding current guarded prognosis on 3/28.     Objective:     Vitals:    03/30/25 0626 03/30/25 0739 03/30/25 0759 03/30/25 1139   BP:   (!) 101/55    Pulse:   71    Resp:   16    Temp:   98.1 °F (36.7 °C)    TempSrc:   Oral    SpO2:  90% 96% 96%   Weight: 69.8 kg (153 lb 14.4 oz)      Height:                Intake/Output Summary (Last 24 hours) at 3/30/2025 1221  Last data filed at 3/30/2025 0819  Gross

## 2025-03-31 LAB
ALBUMIN SERPL-MCNC: 2.5 G/DL (ref 3.4–5)
ALBUMIN/GLOB SERPL: 1.1 {RATIO} (ref 1.1–2.2)
ALP SERPL-CCNC: 329 U/L (ref 40–129)
ALT SERPL-CCNC: 21 U/L (ref 10–40)
ANION GAP SERPL CALCULATED.3IONS-SCNC: 8 MMOL/L (ref 3–16)
AST SERPL-CCNC: 20 U/L (ref 15–37)
BILIRUB SERPL-MCNC: 0.7 MG/DL (ref 0–1)
BUN SERPL-MCNC: 7 MG/DL (ref 7–20)
CALCIUM SERPL-MCNC: 7.5 MG/DL (ref 8.3–10.6)
CHLORIDE SERPL-SCNC: 105 MMOL/L (ref 99–110)
CO2 SERPL-SCNC: 24 MMOL/L (ref 21–32)
CREAT SERPL-MCNC: 0.3 MG/DL (ref 0.8–1.3)
GFR SERPLBLD CREATININE-BSD FMLA CKD-EPI: >90 ML/MIN/{1.73_M2}
GLUCOSE BLD-MCNC: 100 MG/DL (ref 70–99)
GLUCOSE BLD-MCNC: 193 MG/DL (ref 70–99)
GLUCOSE BLD-MCNC: 205 MG/DL (ref 70–99)
GLUCOSE BLD-MCNC: 244 MG/DL (ref 70–99)
GLUCOSE SERPL-MCNC: 98 MG/DL (ref 70–99)
HCT VFR BLD AUTO: 22.9 % (ref 40.5–52.5)
HCT VFR BLD AUTO: 23.9 % (ref 40.5–52.5)
HCT VFR BLD AUTO: 24.4 % (ref 40.5–52.5)
HCT VFR BLD AUTO: 26.9 % (ref 40.5–52.5)
HGB BLD-MCNC: 7.7 G/DL (ref 13.5–17.5)
HGB BLD-MCNC: 8.1 G/DL (ref 13.5–17.5)
HGB BLD-MCNC: 8.2 G/DL (ref 13.5–17.5)
HGB BLD-MCNC: 9 G/DL (ref 13.5–17.5)
INR PPP: 1.14 (ref 0.85–1.15)
MAGNESIUM SERPL-MCNC: 1.68 MG/DL (ref 1.8–2.4)
PERFORMED ON: ABNORMAL
POTASSIUM SERPL-SCNC: 3.1 MMOL/L (ref 3.5–5.1)
PROT SERPL-MCNC: 4.7 G/DL (ref 6.4–8.2)
PROTHROMBIN TIME: 14.8 SEC (ref 11.9–14.9)
SODIUM SERPL-SCNC: 137 MMOL/L (ref 136–145)

## 2025-03-31 PROCEDURE — 1200000000 HC SEMI PRIVATE

## 2025-03-31 PROCEDURE — 2500000003 HC RX 250 WO HCPCS: Performed by: INTERNAL MEDICINE

## 2025-03-31 PROCEDURE — 6360000002 HC RX W HCPCS

## 2025-03-31 PROCEDURE — 99232 SBSQ HOSP IP/OBS MODERATE 35: CPT | Performed by: INTERNAL MEDICINE

## 2025-03-31 PROCEDURE — 6370000000 HC RX 637 (ALT 250 FOR IP): Performed by: INTERNAL MEDICINE

## 2025-03-31 PROCEDURE — 2580000003 HC RX 258: Performed by: INTERNAL MEDICINE

## 2025-03-31 PROCEDURE — 83735 ASSAY OF MAGNESIUM: CPT

## 2025-03-31 PROCEDURE — 94761 N-INVAS EAR/PLS OXIMETRY MLT: CPT

## 2025-03-31 PROCEDURE — 6360000002 HC RX W HCPCS: Performed by: INTERNAL MEDICINE

## 2025-03-31 PROCEDURE — 85018 HEMOGLOBIN: CPT

## 2025-03-31 PROCEDURE — 2580000003 HC RX 258

## 2025-03-31 PROCEDURE — 94640 AIRWAY INHALATION TREATMENT: CPT

## 2025-03-31 PROCEDURE — 80053 COMPREHEN METABOLIC PANEL: CPT

## 2025-03-31 PROCEDURE — 85610 PROTHROMBIN TIME: CPT

## 2025-03-31 PROCEDURE — 85014 HEMATOCRIT: CPT

## 2025-03-31 RX ORDER — LANOLIN ALCOHOL/MO/W.PET/CERES
400 CREAM (GRAM) TOPICAL DAILY
Status: DISCONTINUED | OUTPATIENT
Start: 2025-04-01 | End: 2025-04-04 | Stop reason: HOSPADM

## 2025-03-31 RX ADMIN — ONDANSETRON 4 MG: 4 TABLET, ORALLY DISINTEGRATING ORAL at 13:11

## 2025-03-31 RX ADMIN — PANTOPRAZOLE SODIUM 40 MG: 40 INJECTION, POWDER, LYOPHILIZED, FOR SOLUTION INTRAVENOUS at 20:42

## 2025-03-31 RX ADMIN — FAMOTIDINE 20 MG: 20 TABLET, FILM COATED ORAL at 20:43

## 2025-03-31 RX ADMIN — INSULIN LISPRO 1 UNITS: 100 INJECTION, SOLUTION INTRAVENOUS; SUBCUTANEOUS at 20:43

## 2025-03-31 RX ADMIN — INSULIN HUMAN 20 UNITS: 100 INJECTION, SUSPENSION SUBCUTANEOUS at 09:06

## 2025-03-31 RX ADMIN — DICYCLOMINE HYDROCHLORIDE 20 MG: 20 TABLET ORAL at 09:04

## 2025-03-31 RX ADMIN — FINASTERIDE 5 MG: 5 TABLET, FILM COATED ORAL at 16:17

## 2025-03-31 RX ADMIN — PANCRELIPASE LIPASE, PANCRELIPASE PROTEASE, PANCRELIPASE AMYLASE 75000 UNITS: 20000; 63000; 84000 CAPSULE, DELAYED RELEASE ORAL at 16:18

## 2025-03-31 RX ADMIN — POTASSIUM BICARBONATE 40 MEQ: 782 TABLET, EFFERVESCENT ORAL at 11:51

## 2025-03-31 RX ADMIN — SODIUM CHLORIDE 1000 MG: 9 INJECTION, SOLUTION INTRAVENOUS at 05:26

## 2025-03-31 RX ADMIN — INSULIN HUMAN 20 UNITS: 100 INJECTION, SUSPENSION SUBCUTANEOUS at 16:17

## 2025-03-31 RX ADMIN — PANCRELIPASE LIPASE, PANCRELIPASE PROTEASE, PANCRELIPASE AMYLASE 75000 UNITS: 20000; 63000; 84000 CAPSULE, DELAYED RELEASE ORAL at 11:50

## 2025-03-31 RX ADMIN — HYDROMORPHONE HYDROCHLORIDE 0.5 MG: 1 INJECTION, SOLUTION INTRAMUSCULAR; INTRAVENOUS; SUBCUTANEOUS at 22:53

## 2025-03-31 RX ADMIN — PANTOPRAZOLE SODIUM 40 MG: 40 INJECTION, POWDER, LYOPHILIZED, FOR SOLUTION INTRAVENOUS at 09:08

## 2025-03-31 RX ADMIN — ALBUTEROL SULFATE 2 PUFF: 90 AEROSOL, METERED RESPIRATORY (INHALATION) at 08:41

## 2025-03-31 RX ADMIN — POTASSIUM CHLORIDE 20 MEQ: 1500 TABLET, EXTENDED RELEASE ORAL at 20:55

## 2025-03-31 RX ADMIN — ALBUTEROL SULFATE 2 PUFF: 90 AEROSOL, METERED RESPIRATORY (INHALATION) at 19:45

## 2025-03-31 RX ADMIN — PANCRELIPASE LIPASE, PANCRELIPASE PROTEASE, PANCRELIPASE AMYLASE 75000 UNITS: 20000; 63000; 84000 CAPSULE, DELAYED RELEASE ORAL at 09:09

## 2025-03-31 RX ADMIN — SODIUM CHLORIDE, PRESERVATIVE FREE 20 ML: 5 INJECTION INTRAVENOUS at 20:55

## 2025-03-31 RX ADMIN — FAMOTIDINE 20 MG: 20 TABLET, FILM COATED ORAL at 09:05

## 2025-03-31 RX ADMIN — INSULIN LISPRO 1 UNITS: 100 INJECTION, SOLUTION INTRAVENOUS; SUBCUTANEOUS at 11:51

## 2025-03-31 RX ADMIN — INSULIN LISPRO 1 UNITS: 100 INJECTION, SOLUTION INTRAVENOUS; SUBCUTANEOUS at 16:17

## 2025-03-31 RX ADMIN — SODIUM CHLORIDE, PRESERVATIVE FREE 10 ML: 5 INJECTION INTRAVENOUS at 09:10

## 2025-03-31 ASSESSMENT — PAIN DESCRIPTION - FREQUENCY
FREQUENCY: CONTINUOUS
FREQUENCY: CONTINUOUS

## 2025-03-31 ASSESSMENT — PAIN DESCRIPTION - ORIENTATION
ORIENTATION: RIGHT;LEFT;MID
ORIENTATION: RIGHT;MID;LEFT

## 2025-03-31 ASSESSMENT — PAIN SCALES - GENERAL
PAINLEVEL_OUTOF10: 8
PAINLEVEL_OUTOF10: 3
PAINLEVEL_OUTOF10: 3

## 2025-03-31 ASSESSMENT — PAIN DESCRIPTION - ONSET
ONSET: PROGRESSIVE
ONSET: PROGRESSIVE

## 2025-03-31 ASSESSMENT — PAIN DESCRIPTION - PAIN TYPE
TYPE: CHRONIC PAIN
TYPE: CHRONIC PAIN

## 2025-03-31 ASSESSMENT — PAIN DESCRIPTION - LOCATION
LOCATION: ABDOMEN
LOCATION: ABDOMEN

## 2025-03-31 ASSESSMENT — PAIN DESCRIPTION - DESCRIPTORS
DESCRIPTORS: ACHING;CRAMPING
DESCRIPTORS: CRAMPING

## 2025-03-31 NOTE — PROGRESS NOTES
IM Progress Note    Admit Date:  3/24/2025    Admitted for bloody drainage in hepatic drain.    Patient has history of liver cancer getting chemotherapy from [facility] - Dr. Aldrich .  He got his hepatic drain replaced here at IR last week missed his chemo dose last week; was due again for chemo yesterday but then started having bloody drainage from his hepatic drain and hence came back to [place].  Patient was evaluated by IR physician at the ER .  Seen by IR again and hepatic drains irrigated - Not replaced this time    S/p 2 units of PRBC transfusion on admission, for symptomatic anemia with dizziness and orthostasis.    3/27  Patient started having recurrent bleeding from the hepatic drains.   2 more units of PRBC transfused and 1 unit of fresh frozen plasma given.    3/28 back to IR-s/p replacement of both hepatic drains    3/29-drop in hemoglobin again , 1  more unit of PRBC transfused    3/30-hemoglobin stable today.   No more bleeding from hepatic drains.   Did have rectal bleeding again earlier today.  Continue to monitor     3/31- minimal bloody drainage from hepatic drain    Subjective:  Mr. [place] seen.  Still s/p exchange of both hepatic drains  3/28.  They are large bore drains now, draining large amount of bile,  no bloody drainage noted today.  He is awake and oriented and in no distress.  He is pleasant with a good mood.  He is hoping to get better soon and get home to resume chemo.  He is up and ambulatory in the room  No abdominal pain.  No nausea  No fevers, vital signs stable      -I have discussed with patient and family regarding current guarded prognosis on 3/28.     Objective:     Vitals:    03/31/25 0830 03/31/25 0844 03/31/25 0915 03/31/25 1245   BP: 124/73  125/77 128/75   Pulse: 89  84 88   Resp: 16  16 18   Temp: 98.1 °F (36.7 °C)  97.9 °F (36.6 °C) 97.8 °F (36.6 °C)   TempSrc: Oral   Oral   SpO2: 97% 97% 97% 100%   Weight:       Height:                Intake/Output  Rocephin 7/7 and Flagyl 5/5.  - lactic acid normal, Procalcitonin was elevated at 1.0  - continue creon     Coagulopathy  -With recurrent bleeding issues, secondary to liver carcinomatosis  -Fresh frozen plasma given on 3/27/2025  -IV vitamin K given  -Started on daily vitamin K Po    GI bleed  -Patient now having bloody BM  -He last had colonoscopy per GI Dr. Pandey a few years ago.  -Seen in consultation by GI.  They recommended continued supportive care only as patient was advanced CA  -Continue to monitor H&H  - CTA abd / pelvis-no active bleeding    Hyponatremia  - Sodium 132-> 136  - IVF given  -Monitor BMP    Hypokalemia   Hypomagnesemia   - replaced  - monitor BMP    DM type 1  - resume home regimen  - monitor glucose  - adjust as needed    GERD  - Continue PPI      Intermittent confusion  -Noted per admitting MD. Mental status is clear now , no encephalopathy .  - CT head showed no acute abnormalities   -Ammonia level slightly elevated to 89.  Continue to monitor mental status    Note above makes patient higher risk for morbidity and mortality requiring testing and treatment.      DVT Prophylaxis: scd  Diet: ADULT DIET; Regular; 4 carb choices (60 gm/meal)  Code Status: Full Code    Plan of care discussed in detail with patient and his family his wife and daughter at bedside and with patient's nurse.  I  had palliative care discussions with patient and patient's family.  S/p hepatic drain replacement  3/28.    Continue to monitor for any repeat bleeding.    Will monitor for any more recurrent bleeding; if hemoglobin remains stable; plan is for discharge with follow-up with oncology  Patient to follow-up with his oncologist at [facility] ; he is planning on starting oral chemo medication .prescription was already called in  .      Continue to monitor in house for the next 1 to 2 days to ensure patient is stable with no recurrent bleeding prior to discharge    Maryuri Burnette MD   3/31/2025

## 2025-03-31 NOTE — PROGRESS NOTES
Call from primary RN stating that drain bags contain clots and aren't draining. This RN to floor to see pt. Bilateral drains flushed, bags flushed, family educated as well as nursing staff. Dressing to right drain changed and family educated on that as well. Additional supplies provided to primary RN. IR number to call with any questions. [phone]

## 2025-03-31 NOTE — PROGRESS NOTES
PROGRESS NOTE  S:74 yrs Patient  admitted on 3/24/2025 with Abdominal pain, right upper quadrant [R10.11]  Intra abdominal hemorrhage [R58]  Encounter for biliary drainage tube placement [Z46.82]  Other specified carcinomas of liver [C22.7] .  Today he had recurrent bleeding per biliary drain which resolved after irrigation. Tolerating diet. Hgb is stable. No further rectal bleeding.    Exam:   Vitals:    03/31/25 1315   BP: 130/84   Pulse: 85   Resp:    Temp:    SpO2: 99%     General - cachectic and chronically ill appearing  Mental status - alert, oriented to person, place, and time  Eyes - sclera icteric  Neck - supple, no significant adenopathy  Heart - normal rate and regular rhythm  Abdomen - soft, NT, ND, biliary drain w/blood  Extremities - no pedal edema    Medications: Reviewed    Labs:  CBC:   Recent Labs     03/29/25  0540 03/29/25  1630 03/31/25  0618 03/31/25  1243 03/31/25  1733   WBC 3.7*  --   --   --   --    HGB 6.7*   < > 7.7* 9.0* 8.2*   HCT 20.0*   < > 22.9* 26.9* 24.4*   MCV 85.9  --   --   --   --      --   --   --   --     < > = values in this interval not displayed.     BMP:   Recent Labs     03/31/25  0618      K 3.1*      CO2 24   BUN 7   CREATININE 0.3*     LIVER PROFILE:   Recent Labs     03/31/25  0618   AST 20   ALT 21   BILITOT 0.7   ALKPHOS 329*     PT/INR:   Recent Labs     03/31/25  1638   INR 1.14       Impression: 74 year old male with history of DM, HTN, crohn's disease, stage IV cholangicarcinoma s/p whipple c/b recurrent malignant obstruction s/p perc drain and currently undergoing palliative chemotherapy admitted with bleeding per biliary drain. New onset diarrhea and rectal bleeding likely related to colitis     Recommendation:  Continue supportive care  Check INR today and daily  Vitamin K if INR >1.5  Encourage nutrition  Monitor biliary drain output  Outpatient chemotherapy per oncology      Tab Moyer

## 2025-03-31 NOTE — PROGRESS NOTES
Family requesting to see Dr. Harrell with questions regarding hemoglobin and discharge, such as, with his circumstances what is the goal hemoglobin for discharge? what is the at home method for maintaining the goal hemoglobin? if his hemoglobin drops, like in days or weeks after discharge, what is the recommended protocol to get it back up     Noon hemoglobin resulted at 9.0  Patient got up to use the bathroom.    Right bili bag was changed for clots. Patient complained of being nauseas and dizzy. PO zofran per patient request.     Drainage at insertion site.

## 2025-03-31 NOTE — PLAN OF CARE
Problem: Chronic Conditions and Co-morbidities  Goal: Patient's chronic conditions and co-morbidity symptoms are monitored and maintained or improved  3/30/2025 2328 by Joselyn Martinez RN  Outcome: Progressing  3/30/2025 1144 by Sinai Diaz RN  Outcome: Progressing     Problem: Discharge Planning  Goal: Discharge to home or other facility with appropriate resources  3/30/2025 2328 by Joselyn Martinez RN  Outcome: Progressing  3/30/2025 1144 by Sinai Diaz RN  Outcome: Progressing     Problem: Safety - Adult  Goal: Free from fall injury  3/30/2025 2328 by Joselyn Martinez RN  Outcome: Progressing  3/30/2025 1144 by Sinai Diaz RN  Outcome: Progressing

## 2025-03-31 NOTE — PROGRESS NOTES

## 2025-03-31 NOTE — PLAN OF CARE
Problem: Chronic Conditions and Co-morbidities  Goal: Patient's chronic conditions and co-morbidity symptoms are monitored and maintained or improved  Outcome: Progressing     Problem: Discharge Planning  Goal: Discharge to home or other facility with appropriate resources  Outcome: Progressing     Problem: Safety - Adult  Goal: Free from fall injury  Outcome: Progressing     Problem: ABCDS Injury Assessment  Goal: Absence of physical injury  Outcome: Progressing     Problem: Metabolic/Fluid and Electrolytes - Adult  Goal: Electrolytes maintained within normal limits  Outcome: Progressing  Goal: Hemodynamic stability and optimal renal function maintained  Outcome: Progressing  Goal: Glucose maintained within prescribed range  Outcome: Progressing     Problem: Hematologic - Adult  Goal: Maintains hematologic stability  Outcome: Progressing     Problem: Pain  Goal: Verbalizes/displays adequate comfort level or baseline comfort level  Outcome: Progressing     Problem: Skin/Tissue Integrity - Adult  Goal: Skin integrity remains intact  Outcome: Progressing  Goal: Incisions, wounds, or drain sites healing without S/S of infection  Outcome: Progressing     Problem: Musculoskeletal - Adult  Goal: Return mobility to safest level of function  Outcome: Progressing     Problem: Gastrointestinal - Adult  Goal: Minimal or absence of nausea and vomiting  Outcome: Progressing  Goal: Maintains adequate nutritional intake  Outcome: Progressing     Problem: Infection - Adult  Goal: Absence of infection at discharge  Outcome: Progressing

## 2025-03-31 NOTE — PROGRESS NOTES
Handoff report and transfer of care given at bedside to neli .  Patient in stable condition, denies needs/concerns at this time.  Call light within reach.

## 2025-03-31 NOTE — PROGRESS NOTES
Right biliary drain with moderate sized clot in bag and in tubing. Flushed tube with 10 ml of NS per order. Left biliary drain with no clots, flushed tube with 10 ml of NS per order. Pt tolerated well.  Dressings to both drain sites are dry/intact with no drainage or blood noted. Joselyn Martinez RN

## 2025-03-31 NOTE — PROGRESS NOTES
03/31/25 0800   RT Protocol   History Pulmonary Disease 2   Respiratory pattern 0   Breath sounds 2   Cough 0   Indications for Bronchodilator Therapy Decreased or absent breath sounds   Bronchodilator Assessment Score 4

## 2025-03-31 NOTE — PROGRESS NOTES
Spiritual Health History and Assessment/Progress Note  [facility]     ,  ,  ,      Name: Sharath Ceja MRN: [id number]    Age: 74 y.o.     Sex: male   Language: English   Restoration: [employer] [place]   Intra abdominal hemorrhage     Date: 3/31/2025                           Spiritual Assessment began in [address] MEDICAL-SURGICAL                    [place], Belief, Meaning:   Patient identifies as spiritual, is connected with a aparna tradition or spiritual practice, and has beliefs or practices that help with coping during difficult times  Family/Friends identify as spiritual and are connected with a aparna tradition or spiritual practice      Importance and Influence:  Patient has no beliefs influential to healthcare decision-making identified during this visit  Family/Friends:  Family attends [employer] in [place].  They state the ministers have been supportive and have visited patient in the hospital    Community:  Patient is connected with a spiritual community and feels well-supported. Support system includes: Spouse/Partner, [place] Community, and Extended family  Family/Friends are connected with a spiritual community: and feel well-supported. Support system includes: [employer] and Extended family    Assessment and Plan of Care:     Patient Interventions include: Facilitated expression of thoughts and feelings.   offered prayer as requested.  Family/Friends Interventions include: Facilitated expression of thoughts and feelings and Affirmed coping skills/support systems    Patient Plan of Care: Spiritual Care available upon further referral.  Patient and family are well supported by community clergy.  No spiritual needs presently.  Family/Friends Plan of Care: No future visits per patient/family request    Electronically signed by RITO Bergman on 3/31/2025 at 11:14 AM

## 2025-04-01 ENCOUNTER — APPOINTMENT (OUTPATIENT)
Dept: CT IMAGING | Age: 75
DRG: 907 | End: 2025-04-01
Payer: MEDICARE

## 2025-04-01 ENCOUNTER — APPOINTMENT (OUTPATIENT)
Dept: GENERAL RADIOLOGY | Age: 75
DRG: 907 | End: 2025-04-01
Payer: MEDICARE

## 2025-04-01 LAB
ALBUMIN SERPL-MCNC: 2.7 G/DL (ref 3.4–5)
ALBUMIN/GLOB SERPL: 0.9 {RATIO} (ref 1.1–2.2)
ALP SERPL-CCNC: 374 U/L (ref 40–129)
ALT SERPL-CCNC: 24 U/L (ref 10–40)
ANION GAP SERPL CALCULATED.3IONS-SCNC: 11 MMOL/L (ref 3–16)
AST SERPL-CCNC: 26 U/L (ref 15–37)
BASOPHILS # BLD: 0 K/UL (ref 0–0.2)
BASOPHILS NFR BLD: 0.2 %
BILIRUB SERPL-MCNC: 0.9 MG/DL (ref 0–1)
BUN SERPL-MCNC: 9 MG/DL (ref 7–20)
CALCIUM SERPL-MCNC: 8.5 MG/DL (ref 8.3–10.6)
CHLORIDE SERPL-SCNC: 100 MMOL/L (ref 99–110)
CO2 SERPL-SCNC: 23 MMOL/L (ref 21–32)
CREAT SERPL-MCNC: 0.4 MG/DL (ref 0.8–1.3)
DEPRECATED RDW RBC AUTO: 24.8 % (ref 12.4–15.4)
EOSINOPHIL # BLD: 0 K/UL (ref 0–0.6)
EOSINOPHIL NFR BLD: 0.1 %
FERRITIN SERPL IA-MCNC: 264 NG/ML (ref 30–400)
GFR SERPLBLD CREATININE-BSD FMLA CKD-EPI: >90 ML/MIN/{1.73_M2}
GLUCOSE BLD-MCNC: 124 MG/DL (ref 70–99)
GLUCOSE BLD-MCNC: 141 MG/DL (ref 70–99)
GLUCOSE BLD-MCNC: 169 MG/DL (ref 70–99)
GLUCOSE BLD-MCNC: 191 MG/DL (ref 70–99)
GLUCOSE SERPL-MCNC: 180 MG/DL (ref 70–99)
HCT VFR BLD AUTO: 26.9 % (ref 40.5–52.5)
HCT VFR BLD AUTO: 27.1 % (ref 40.5–52.5)
HCT VFR BLD AUTO: 28 % (ref 40.5–52.5)
HCT VFR BLD AUTO: 28.4 % (ref 40.5–52.5)
HGB BLD-MCNC: 9 G/DL (ref 13.5–17.5)
HGB BLD-MCNC: 9 G/DL (ref 13.5–17.5)
HGB BLD-MCNC: 9.3 G/DL (ref 13.5–17.5)
HGB BLD-MCNC: 9.3 G/DL (ref 13.5–17.5)
INR PPP: 1.14 (ref 0.85–1.15)
IRON SATN MFR SERPL: 11 % (ref 20–50)
IRON SERPL-MCNC: 27 UG/DL (ref 59–158)
LYMPHOCYTES # BLD: 0.4 K/UL (ref 1–5.1)
LYMPHOCYTES NFR BLD: 3.4 %
MCH RBC QN AUTO: 29.6 PG (ref 26–34)
MCHC RBC AUTO-ENTMCNC: 33.6 G/DL (ref 31–36)
MCV RBC AUTO: 88.2 FL (ref 80–100)
MONOCYTES # BLD: 0.8 K/UL (ref 0–1.3)
MONOCYTES NFR BLD: 6.9 %
NEUTROPHILS # BLD: 9.8 K/UL (ref 1.7–7.7)
NEUTROPHILS NFR BLD: 89.4 %
PERFORMED ON: ABNORMAL
PLATELET # BLD AUTO: 297 K/UL (ref 135–450)
PMV BLD AUTO: 6.6 FL (ref 5–10.5)
POTASSIUM SERPL-SCNC: 4.1 MMOL/L (ref 3.5–5.1)
PROT SERPL-MCNC: 5.7 G/DL (ref 6.4–8.2)
PROTHROMBIN TIME: 14.8 SEC (ref 11.9–14.9)
RBC # BLD AUTO: 3.05 M/UL (ref 4.2–5.9)
SODIUM SERPL-SCNC: 134 MMOL/L (ref 136–145)
TIBC SERPL-MCNC: 241 UG/DL (ref 260–445)
WBC # BLD AUTO: 11 K/UL (ref 4–11)

## 2025-04-01 PROCEDURE — 6360000002 HC RX W HCPCS

## 2025-04-01 PROCEDURE — 80053 COMPREHEN METABOLIC PANEL: CPT

## 2025-04-01 PROCEDURE — 74178 CT ABD&PLV WO CNTR FLWD CNTR: CPT

## 2025-04-01 PROCEDURE — 94761 N-INVAS EAR/PLS OXIMETRY MLT: CPT

## 2025-04-01 PROCEDURE — 89051 BODY FLUID CELL COUNT: CPT

## 2025-04-01 PROCEDURE — 85018 HEMOGLOBIN: CPT

## 2025-04-01 PROCEDURE — 84238 ASSAY NONENDOCRINE RECEPTOR: CPT

## 2025-04-01 PROCEDURE — 6370000000 HC RX 637 (ALT 250 FOR IP): Performed by: INTERNAL MEDICINE

## 2025-04-01 PROCEDURE — 2580000003 HC RX 258: Performed by: INTERNAL MEDICINE

## 2025-04-01 PROCEDURE — 99232 SBSQ HOSP IP/OBS MODERATE 35: CPT | Performed by: INTERNAL MEDICINE

## 2025-04-01 PROCEDURE — 6360000002 HC RX W HCPCS: Performed by: INTERNAL MEDICINE

## 2025-04-01 PROCEDURE — 6360000004 HC RX CONTRAST MEDICATION

## 2025-04-01 PROCEDURE — 1200000000 HC SEMI PRIVATE

## 2025-04-01 PROCEDURE — 83540 ASSAY OF IRON: CPT

## 2025-04-01 PROCEDURE — 74018 RADEX ABDOMEN 1 VIEW: CPT

## 2025-04-01 PROCEDURE — 2580000003 HC RX 258

## 2025-04-01 PROCEDURE — 85014 HEMATOCRIT: CPT

## 2025-04-01 PROCEDURE — 85025 COMPLETE CBC W/AUTO DIFF WBC: CPT

## 2025-04-01 PROCEDURE — 83550 IRON BINDING TEST: CPT

## 2025-04-01 PROCEDURE — 94640 AIRWAY INHALATION TREATMENT: CPT

## 2025-04-01 PROCEDURE — 82728 ASSAY OF FERRITIN: CPT

## 2025-04-01 PROCEDURE — 2500000003 HC RX 250 WO HCPCS: Performed by: INTERNAL MEDICINE

## 2025-04-01 PROCEDURE — 85610 PROTHROMBIN TIME: CPT

## 2025-04-01 RX ORDER — IOPAMIDOL 755 MG/ML
75 INJECTION, SOLUTION INTRAVASCULAR
Status: COMPLETED | OUTPATIENT
Start: 2025-04-01 | End: 2025-04-01

## 2025-04-01 RX ADMIN — SODIUM CHLORIDE, PRESERVATIVE FREE 10 ML: 5 INJECTION INTRAVENOUS at 20:36

## 2025-04-01 RX ADMIN — IRON SUCROSE 200 MG: 20 INJECTION, SOLUTION INTRAVENOUS at 09:46

## 2025-04-01 RX ADMIN — POTASSIUM CHLORIDE 20 MEQ: 1500 TABLET, EXTENDED RELEASE ORAL at 20:36

## 2025-04-01 RX ADMIN — IOPAMIDOL 75 ML: 755 INJECTION, SOLUTION INTRAVENOUS at 10:38

## 2025-04-01 RX ADMIN — FINASTERIDE 5 MG: 5 TABLET, FILM COATED ORAL at 17:11

## 2025-04-01 RX ADMIN — HYDROMORPHONE HYDROCHLORIDE 0.5 MG: 1 INJECTION, SOLUTION INTRAMUSCULAR; INTRAVENOUS; SUBCUTANEOUS at 03:29

## 2025-04-01 RX ADMIN — HYDROMORPHONE HYDROCHLORIDE 0.5 MG: 1 INJECTION, SOLUTION INTRAMUSCULAR; INTRAVENOUS; SUBCUTANEOUS at 11:09

## 2025-04-01 RX ADMIN — HYDROMORPHONE HYDROCHLORIDE 0.5 MG: 1 INJECTION, SOLUTION INTRAMUSCULAR; INTRAVENOUS; SUBCUTANEOUS at 14:48

## 2025-04-01 RX ADMIN — PANCRELIPASE LIPASE, PANCRELIPASE PROTEASE, PANCRELIPASE AMYLASE 75000 UNITS: 20000; 63000; 84000 CAPSULE, DELAYED RELEASE ORAL at 17:06

## 2025-04-01 RX ADMIN — PANCRELIPASE LIPASE, PANCRELIPASE PROTEASE, PANCRELIPASE AMYLASE 75000 UNITS: 20000; 63000; 84000 CAPSULE, DELAYED RELEASE ORAL at 08:18

## 2025-04-01 RX ADMIN — Medication 400 MG: at 08:18

## 2025-04-01 RX ADMIN — PANTOPRAZOLE SODIUM 40 MG: 40 INJECTION, POWDER, LYOPHILIZED, FOR SOLUTION INTRAVENOUS at 08:11

## 2025-04-01 RX ADMIN — INSULIN HUMAN 20 UNITS: 100 INJECTION, SUSPENSION SUBCUTANEOUS at 08:19

## 2025-04-01 RX ADMIN — HYDROMORPHONE HYDROCHLORIDE 0.5 MG: 1 INJECTION, SOLUTION INTRAMUSCULAR; INTRAVENOUS; SUBCUTANEOUS at 07:22

## 2025-04-01 RX ADMIN — ONDANSETRON 4 MG: 2 INJECTION, SOLUTION INTRAMUSCULAR; INTRAVENOUS at 17:18

## 2025-04-01 RX ADMIN — INSULIN HUMAN 20 UNITS: 100 INJECTION, SUSPENSION SUBCUTANEOUS at 15:53

## 2025-04-01 RX ADMIN — PANTOPRAZOLE SODIUM 40 MG: 40 INJECTION, POWDER, LYOPHILIZED, FOR SOLUTION INTRAVENOUS at 20:36

## 2025-04-01 RX ADMIN — FAMOTIDINE 20 MG: 20 TABLET, FILM COATED ORAL at 08:18

## 2025-04-01 RX ADMIN — POTASSIUM CHLORIDE 20 MEQ: 1500 TABLET, EXTENDED RELEASE ORAL at 08:19

## 2025-04-01 RX ADMIN — SODIUM CHLORIDE, PRESERVATIVE FREE 10 ML: 5 INJECTION INTRAVENOUS at 08:28

## 2025-04-01 RX ADMIN — DICYCLOMINE HYDROCHLORIDE 20 MG: 20 TABLET ORAL at 08:19

## 2025-04-01 RX ADMIN — ALBUTEROL SULFATE 2 PUFF: 90 AEROSOL, METERED RESPIRATORY (INHALATION) at 08:39

## 2025-04-01 RX ADMIN — FAMOTIDINE 20 MG: 20 TABLET, FILM COATED ORAL at 20:36

## 2025-04-01 RX ADMIN — ALBUTEROL SULFATE 2 PUFF: 90 AEROSOL, METERED RESPIRATORY (INHALATION) at 18:07

## 2025-04-01 RX ADMIN — PANCRELIPASE LIPASE, PANCRELIPASE PROTEASE, PANCRELIPASE AMYLASE 75000 UNITS: 20000; 63000; 84000 CAPSULE, DELAYED RELEASE ORAL at 12:03

## 2025-04-01 RX ADMIN — OXYCODONE HYDROCHLORIDE 5 MG: 5 TABLET ORAL at 09:47

## 2025-04-01 RX ADMIN — ALBUTEROL SULFATE 2 PUFF: 90 AEROSOL, METERED RESPIRATORY (INHALATION) at 20:37

## 2025-04-01 ASSESSMENT — PAIN SCALES - GENERAL
PAINLEVEL_OUTOF10: 8
PAINLEVEL_OUTOF10: 8
PAINLEVEL_OUTOF10: 6
PAINLEVEL_OUTOF10: 8
PAINLEVEL_OUTOF10: 5
PAINLEVEL_OUTOF10: 8
PAINLEVEL_OUTOF10: 5
PAINLEVEL_OUTOF10: 7
PAINLEVEL_OUTOF10: 8
PAINLEVEL_OUTOF10: 0

## 2025-04-01 ASSESSMENT — PAIN DESCRIPTION - LOCATION
LOCATION: ABDOMEN

## 2025-04-01 ASSESSMENT — PAIN DESCRIPTION - FREQUENCY
FREQUENCY: CONTINUOUS
FREQUENCY: CONTINUOUS

## 2025-04-01 ASSESSMENT — PAIN DESCRIPTION - DESCRIPTORS
DESCRIPTORS: ACHING;CRAMPING
DESCRIPTORS: ACHING
DESCRIPTORS: ACHING
DESCRIPTORS: ACHING;CRAMPING

## 2025-04-01 ASSESSMENT — PAIN DESCRIPTION - PAIN TYPE
TYPE: CHRONIC PAIN
TYPE: CHRONIC PAIN

## 2025-04-01 ASSESSMENT — PAIN DESCRIPTION - ONSET
ONSET: PROGRESSIVE
ONSET: PROGRESSIVE

## 2025-04-01 ASSESSMENT — PAIN DESCRIPTION - ORIENTATION
ORIENTATION: RIGHT;LEFT
ORIENTATION: RIGHT;LEFT
ORIENTATION: RIGHT;LEFT;MID
ORIENTATION: RIGHT;LEFT

## 2025-04-01 NOTE — PROGRESS NOTES
Good morning! Is this EIC appropriate for resident surgery clinic? It's about 1.2 cm. Thanks! Patient went to the bathroom to urinate and patient had some hematuria- bright red blood  . Let MD know. RN assessed rectum- he has hemmrhoids  but they are not bleeding.

## 2025-04-01 NOTE — PROGRESS NOTES
Pt c/o cramping abdominal pain level 8-9, pt requesting for pain medication. 0.5mg Inj Dilaudid given IV.

## 2025-04-01 NOTE — PLAN OF CARE
Problem: Chronic Conditions and Co-morbidities  Goal: Patient's chronic conditions and co-morbidity symptoms are monitored and maintained or improved  4/1/2025 1014 by Lyudmila Engel RN  Outcome: Progressing  4/1/2025 0446 by Minal Beauchamp RN  Outcome: Progressing     Problem: Discharge Planning  Goal: Discharge to home or other facility with appropriate resources  4/1/2025 1014 by Lyudmila Engel RN  Outcome: Progressing  Flowsheets (Taken 4/1/2025 0728)  Discharge to home or other facility with appropriate resources: Identify barriers to discharge with patient and caregiver  4/1/2025 0446 by Minal Beauchamp RN  Outcome: Progressing  Flowsheets (Taken 3/31/2025 2058)  Discharge to home or other facility with appropriate resources: Identify barriers to discharge with patient and caregiver     Problem: Safety - Adult  Goal: Free from fall injury  4/1/2025 1014 by Lyudmila Engel RN  Outcome: Progressing  4/1/2025 0446 by Minal Beauchamp RN  Outcome: Progressing     Problem: ABCDS Injury Assessment  Goal: Absence of physical injury  4/1/2025 1014 by Lyudmila Engel RN  Outcome: Progressing  4/1/2025 0446 by Minal Beauchamp RN  Outcome: Progressing     Problem: Metabolic/Fluid and Electrolytes - Adult  Goal: Electrolytes maintained within normal limits  Outcome: Progressing  Flowsheets (Taken 4/1/2025 0728)  Electrolytes maintained within normal limits: Monitor labs and assess patient for signs and symptoms of electrolyte imbalances  Goal: Hemodynamic stability and optimal renal function maintained  Outcome: Progressing  Goal: Glucose maintained within prescribed range  Outcome: Progressing     Problem: Hematologic - Adult  Goal: Maintains hematologic stability  Outcome: Progressing     Problem: Pain  Goal: Verbalizes/displays adequate comfort level or baseline comfort level  Outcome: Progressing     Problem: Skin/Tissue Integrity - Adult  Goal: Skin integrity remains intact  Outcome:  Progressing  Flowsheets (Taken 4/1/2025 0728)  Skin Integrity Remains Intact: Monitor for areas of redness and/or skin breakdown  Goal: Incisions, wounds, or drain sites healing without S/S of infection  Outcome: Progressing     Problem: Musculoskeletal - Adult  Goal: Return mobility to safest level of function  Outcome: Progressing  Flowsheets (Taken 4/1/2025 0728)  Return Mobility to Safest Level of Function: Assess patient stability and activity tolerance for standing, transferring and ambulating with or without assistive devices     Problem: Gastrointestinal - Adult  Goal: Minimal or absence of nausea and vomiting  Outcome: Progressing  Goal: Maintains adequate nutritional intake  Outcome: Progressing     Problem: Infection - Adult  Goal: Absence of infection at discharge  Outcome: Progressing  Flowsheets (Taken 4/1/2025 0728)  Absence of infection at discharge: Assess and monitor for signs and symptoms of infection

## 2025-04-01 NOTE — PROGRESS NOTES
ONCOLOGY HEMATOLOGY CARE PROGRESS NOTE      SUBJECTIVE:    Remains admitted. Family at bedside.     ROS:     Constitutional:  No weight loss, No fever, No chills, No night sweats.  Energy level good.  Eyes:  No impairment or change in vision  ENT / Mouth:  No pain, abnormal ulceration, bleeding, nasal drip or change in voice or hearing  Cardiovascular:  No chest pain, palpitations, new edema, or calf discomfort  Respiratory:  No pain, hemoptysis, change to breathing  Breast:  No pain, discharge, change in appearance or texture  Gastrointestinal:  No pain, cramping, jaundice, change to eating and bowel habits  Urinary:  No pain, bleeding or change in continence  Genitalia: No pain, bleeding or discharge  Musculoskeletal:  No redness, pain, edema or weakness  Skin:  No pruritus, rash, change to nodules or lesions  Neurologic:  No discomfort, change in mental status, speech, sensory or motor activity  Psychiatric:  No change in concentration or change to affect or mood  Endocrine:  No hot flashes, increased thirst, or change to urine production  Hematologic: No petechiae, ecchymosis or bleeding  Lymphatic:  No lymphadenopathy or lymphedema  Allergy / Immunologic:  No eczema, hives, frequent or recurrent infections    OBJECTIVE        Physical    VITALS:  Patient Vitals for the past 24 hrs:   BP Temp Temp src Pulse Resp SpO2   04/01/25 0751 (!) 152/88 97.9 °F (36.6 °C) Oral 94 16 95 %   04/01/25 0715 (!) 152/88 97.5 °F (36.4 °C) Oral 90 18 95 %   04/01/25 0315 (!) 146/82 97.6 °F (36.4 °C) Oral 76 18 --   04/01/25 0028 -- -- -- -- -- 97 %   03/31/25 2245 134/75 98 °F (36.7 °C) Oral 85 18 97 %   03/31/25 2015 122/68 97 °F (36.1 °C) Oral 82 18 96 %   03/31/25 1948 -- -- -- 66 18 91 %   03/31/25 1315 130/84 -- -- 85 -- 99 %   03/31/25 1245 128/75 97.8 °F (36.6 °C) Oral 88 18 100 %   03/31/25 0915 125/77 97.9 °F (36.6 °C) -- 84 16 97 %   03/31/25 0844 -- -- -- -- -- 97 %   03/31/25 0830  reportedly getting Gemzar/Abraxane  - CT this admission showed no disease progression  - palliative care consult pending  - discussed aggressive nature of disease  - intention at this point would be to resume chemotherapy but keeps getting delayed due to biliary drain dysfunction  - also has test supposedly pending (? Caris) to see if he is an immunotherapy candidate  - discussed if unable to continue treatment or if he feels he has been through too much, we could consider Hospice consult  - he seems to hope to continue treatment at this point although does seem quite debilitated  - they did ask about Tarceva, which we discussed as a targeted therapy but without access to NGS results, its hard to know if he is a candidate  - we did discuss prognosis with and without treatment  - FU with Dr. Beltran after discharge     2. Anemia     - blood loss and recent chemotherapy  - checking iron studies   - GI following  - transfuse if Hgb < 7 - has had 5 units this admission  - start IV iron with pending iron studies and known blood loss  - control coagulopathy - responding to FFP and Vit K     3. Blood in biliary drains     - biliary drain exchanged 3/28 with IR          ONCOLOGIC DISPOSITION: per IM and GI. Oncology follow up at [place]       Abner Harrell MD  Please contact through [employer]

## 2025-04-01 NOTE — PROGRESS NOTES
Patient down for CT scan right now then will draw cbc and cmp. Biliary drains emptied and flushed this AM. Draining well. Pain to ab 7-8/10 and medicated per MAR. Patient very weak, ab distended but soft, poor appetite, patient stated he had a small bm yesterday- lab needs cdiff sample if he goes again. Will monitor.

## 2025-04-01 NOTE — PLAN OF CARE
Problem: Chronic Conditions and Co-morbidities  Goal: Patient's chronic conditions and co-morbidity symptoms are monitored and maintained or improved  4/1/2025 0446 by Minal Beauchamp RN  Outcome: Progressing     Problem: Discharge Planning  Goal: Discharge to home or other facility with appropriate resources  4/1/2025 0446 by Minal Beauchamp RN  Outcome: Progressing  Flowsheets (Taken 3/31/2025 2058)  Discharge to home or other facility with appropriate resources: Identify barriers to discharge with patient and caregiver     Problem: Safety - Adult  Goal: Free from fall injury  4/1/2025 0446 by Minal Beauchamp RN  Outcome: Progressing     Problem: ABCDS Injury Assessment  Goal: Absence of physical injury  4/1/2025 0446 by Minal Beauchamp RN  Outcome: Progressing

## 2025-04-01 NOTE — PROGRESS NOTES
PROGRESS NOTE  S:74 yrs Patient  admitted on 3/24/2025 with Abdominal pain, right upper quadrant [R10.11]  Intra abdominal hemorrhage [R58]  Encounter for biliary drainage tube placement [Z46.82]  Other specified carcinomas of liver [C22.7] .  Today, he is lethargic after receiving pain medication. He reports worsened abdominal pain and distention. He reports moderate sized BM yesterday. Nursing reports bloody urine.     Exam:   Vitals:    04/01/25 0947   BP: (!) 147/81   Pulse: 90   Resp:    Temp:    SpO2:    Generalized: lethargic, no acute distress  HEENT: sclera clear, anicteric  Neck: supple, trachea midline  Heart: NSR  Abdomen: soft, + biliary drains w/ bloody output, distended  Extremities: no edema     Medications: Reviewed    Labs:  CBC:   Recent Labs     03/31/25  1733 04/01/25  0003 04/01/25  0600   HGB 8.2* 9.0* 9.3*   HCT 24.4* 27.1* 28.0*     PT/INR:   Recent Labs     03/31/25  1638 04/01/25  0600   INR 1.14 1.14     Imaging  KUB, 4/1  Stable drainage tubes.   Nonspecific bowel gas pattern.     CT abd/pelvis w wo con, 4/1  Improved intrahepatic biliary dilation compared to prior examination.  Percutaneous biliary drainage catheters are appropriate position.   Progressive fluid-filled distension of jejunal limb associated with the pancreaticojejunostomy and hepaticojejunostomy with hyperdense material consistent with blood products, likely related to known biliary bleeding.  No active bleeding. Progressive ascites, now small in size. Body wall edema with trace bilateral pleural effusions and associated  atelectasis.     Attending Supervising Physician's Attestation Statement  The patient is a 74 y.o. male. I have performed a history and physical examination of the patient. I discussed the case with LENORE Laughlin    I reviewed the patient's Past Medical History, Past Surgical History, Medications, and Allergies.     Physical Exam:  Vitals:    04/01/25 0751  04/01/25 0844 04/01/25 0947 04/01/25 1315   BP: (!) 152/88  (!) 147/81 129/77   Pulse: 94  90 91   Resp: 16   16   Temp: 97.9 °F (36.6 °C)   98.2 °F (36.8 °C)   TempSrc: Oral   Oral   SpO2: 95% 95%  96%   Weight:       Height:           Physical Examination:   General - cachectic and chronically ill appearing  Mental status - alert, oriented to person, place, and time  Eyes - sclera icteric  Neck - supple, no significant adenopathy  Heart - normal rate and regular rhythm  Abdomen - soft, NT, distended, biliary drain w/o blood  Extremities - no pedal edema       Assessment  73 yo male with pmx of DM, HTN, and 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 Hematochezia likely secondary to colitis or hemorrhoids. Biliary drains exchanged 3/28. Hospital course c/b worsening abdominal pain and distention.      Plan  Continue supportive care  Trend H&H and INR daily   Monitor for signs of bleeding  PPI BID  Continue IV iron  Will need to follow up with primary Oncologist upon discharge    Flory Laughlin, APRN - CNP  9:49 AM 4/1/2025                      74 year old male with history of DM, HTN, crohn's disease, stage IV cholangicarcinoma s/p whipple c/b recurrent malignant obstruction s/p perc drain and currently undergoing palliative chemotherapy admitted with bleeding per biliary drain. Diarrhea has resolved but worsening abd distention and pain are concerning for ascites vs constipation    Continue supportive care. CT abd/pelvis showed dilated fluid filled jejunal loop consistent with biliary bleed. PPI BID. Monitor INR and give Vit K if needed. Schedule paracentesis. May consider transfer care to surgical oncologist at [facility]    Tab Pandey MD          (O) [phone]  (O) [phone]

## 2025-04-01 NOTE — PROGRESS NOTES
Transplant Surgery Consult Note    Medical record number: [id number]  YOB: 1957,   Consult requested by the patient for evaluation of kidney donation candidacy.    Assessment and Recommendations: Ms. Perez appears to be a good candidate for kidney donation at this point in the evaluation. The following issues will need to be addressed prior to formal review:    CT abdomen and pelvis with contrast to be ordered for assessment vascular anatomy: Yes  Dietician consult ordered: Yes  Social work consult ordered: Yes  CXR and EKG ordered:  Yes  Transplant donor labs ordered to include HLA, ABOx2, Cr, Iohexol GFR or Cr clearance, virology etc.    The majority of our visit today was spent in counseling regarding the medical and surgical risks of kidney donation; the typical joyce-and post-operative experience and recovery/return to work pattern; restrictions related to the surgery (driving; lifting; exercise).         We also talked about post-op visits and longer term health care maintenance, as well as the implications of having one remaining kidney. This discussion included, but was not limited to rates of complications such as bleeding, infection, need for transfusion, reoperation, pneumonia, other organ injury, future bowel obstruction, incisional hernia, port site pain, varicocele, venous thrombosis, pulmonary embolism, renal failure, and death (3 per 10,000).            We discussed long-term risks in detail.  I discussed the articles suggesting a small increase in ESKD in donors and their limitations.        We discussed recovery, including length of hospital stay; no new meds other than pain meds on discharge; limitations after surgery (no driving for a couple of weeks; no lifting over 10 lbs or exercise stretching abdominal muscles for 6 weeks; return to work [he lifts as part of his job]; and fatigue for the first few weeks postdonation.  I informed him/her of our donor survey results on donors  feeling ready to drive, and on return to feeling back to normal.  We also discussed the need for maintaining a healthy lifestyle long-term after donation    We discussed the national paired system and the possibility of participating, if not a match for the intended recipient.  I explained how the system worked.          We discussed the national paired system and the possibility of participating, if not a match for the intended recipient.  I explained how the system worked.        We discussed the increased risk for venous thrombosis for the 1st two postoperative risks.  We discussed that if the family were to drive home in the 1st 2 weeks, the  should stop approximately every 40 minutes and she/he should get out of the car and walk around for a couple of minutes.                At the conclusion of the visit, all questions had been answered.  I explained that her candidacy for donation would be reviewed at our selection Committee next week, and that she would subsequently be contacted by her coordinator.  I recommended that he/she call his/her coordinator if there any additional questions at the end of the evaluation process; and that we would be glad to spend time discussing any concerns.  T  40 min spent on the date of the encounter in chart review, patient visit,  documentation and/or discussion with other providers about the issues documented above.        Carlos Eduardo Marks MD  Surgical Professor, Kidney Transplantation                                                                                                    ---------------------------------------------------------------------------------------------------    HPI: Shaniqua Perez is a 67 year old year old female who presents for a kidney donor evaluation.        Personal history of:   No    Yes  Cancer:    []      []  Comment:     Diabetes   []      []  Comment:    [place]   []      []  Comment:       Hepatitis   []      []  Comment:     Tuberculosis   []      []  Comment:   Back or neck pain:  []      []  Comment:      Kidney stones   []      []  Comment:                  Kidney infections  []      []  Comment:           Urinary retention  []      []    Comment:   Regular NSAID use:  []      []     Comment:      Constipation:   []      []        Comment:      [employer]  []      []       Comment:      Other:    []      []       Comment:         Past Medical History:   Diagnosis Date    Arthritis     Fibromyalgia     Kidney stone     Neuropathy      Past Surgical History:   Procedure Laterality Date    ANKLE SURGERY      SHOULDER SURGERY       Family History   Problem Relation Age of Onset    Hypertension Mother     Heart Failure Mother     Kidney Disease Mother     Cancer Mother     Hypertension Father     Hypertension Brother     Kidney Disease Brother      Social History     Socioeconomic History    Marital status:      Spouse name: Not on file    Number of children: Not on file    Years of education: Not on file    Highest education level: Not on file   Occupational History    Not on file   Tobacco Use    Smoking status: Never    Smokeless tobacco: Never   Substance and Sexual Activity    Alcohol use: Yes     Alcohol/week: 7.0 standard drinks of alcohol     Types: 7 Standard drinks or equivalent per week     Comment: 5-10 glasses    Drug use: Never    Sexual activity: Not on file   Other Topics Concern    Not on file   Social History Narrative    Not on file     Social Drivers of Health     Financial Resource Strain: Not on file   Food Insecurity: Not on file   Transportation Needs: Not on file   Physical Activity: Not on file   Stress: Not on file   Social Connections: Not on file   Interpersonal Safety: Not on file   Housing Stability: Not on file       ROS:   CONSTITUTIONAL:  No fevers or chills  EYES: negative for icterus  ENT:  negative for hearing loss, tinnitus and sore throat  RESPIRATORY:  negative for cough, sputum,  dyspnea  CARDIOVASCULAR:  negative for chest pain  GASTROINTESTINAL:  negative for nausea, vomiting, diarrhea or constipation  GENITOURINARY:  negative for incontinence, dysuria, bladder emptying problems  HEME:  No easy bruising  INTEGUMENT:  negative for rash and pruritus  NEURO:  Negative for headache, seizure disorder    Allergies:   No Known Allergies    Medications:  Prescription Medications as of 2/4/2025         Rx Number Disp Refills Start End Last Dispensed Date Next Fill Date Owning Pharmacy    butalbital-acetaminophen-caffeine (ESGIC) -40 MG tablet  -- --  --       Sig: Take 1 tablet by mouth.    Class: Historical    Route: Oral    docusate sodium (COLACE) 50 MG capsule  -- --  --       Sig: Take 50 mg by mouth.    Class: Historical    Route: Oral    eletriptan (RELPAX) 20 MG tablet  -- --  --       Sig: Take 20 mg by mouth.    Class: Historical    Route: Oral    estradiol (ESTRACE) 0.1 MG/GM vaginal cream  -- --  --       Sig: _insert 0.5 mg vaginally Vaginal Twice a week. for 30 days    Class: Historical    ezetimibe (ZETIA) 10 MG tablet  -- --  --       Sig: Take 10 mg by mouth.    Class: Historical    Route: Oral    FLUoxetine (PROZAC) 40 MG capsule  -- --  --       Sig: Take 40 mg by mouth.    Class: Historical    Route: Oral    fluticasone furoate 27.5 MCG/SPRAY nasal spray  -- --  --       Sig: Spray 2 sprays in nostril.    Class: Historical    Route: Nasal    levothyroxine (SYNTHROID/LEVOTHROID) 112 MCG tablet  -- --  --       Class: Historical    POTASSIUM CITRATE ER PO  -- --  --       Class: Historical    promethazine (PHENERGAN) 25 MG tablet  -- --  --       Sig: Take 25 mg by mouth.    Class: Historical    Route: Oral    rosuvastatin (CRESTOR) 10 MG tablet  -- --  --       Class: Historical    triamcinolone (KENALOG) 0.1 % external cream  -- --  --       Sig: Apply topically.    Class: Historical    Route: Topical    varenicline (TYRVAYA) 0.03 MG/ACT nasal spray  -- -- 1/25/2024 --        Sig: Spray 1 Application in nostril.    Class: Historical    Route: Nasal    zolpidem (AMBIEN) 10 MG tablet  -- --  --       Sig: Take 10 mg by mouth.    Class: Historical    Route: Oral            Exam:      Body mass index is 22.67 kg/m .  Constitutional - A&O in NAD.   Eyes - no redness or discharge.  Sclera anicteric  Respiratory - no cough, no labored breathing  Musculoskeletal - range of motion normal  Skin - no discoloration, no jaundice  Neurological - no tremors.  No facial droop or dysarthria  Psychiatric - normal mood and affect  The rest of a comprehensive physical examination is deferred due to PHE (public health emergency) video visit restrictions     Diagnostics:   Recent Results (from the past 2 weeks)   West Nile Virus Antibody IgG IgM    Collection Time: 01/30/25  7:23 AM   Result Value Ref Range    West Nile IgG Serum 0.17 <=1.29 IV    West Nile IgM Serum 0.00 <=0.89 IV   Treponema Abs w Reflex to RPR and Titer    Collection Time: 01/30/25  7:23 AM   Result Value Ref Range    Treponema Antibody Total Nonreactive Nonreactive   HIV Antigen Antibody Combo Pretransplant Cascade    Collection Time: 01/30/25  7:23 AM   Result Value Ref Range    HIV Antigen Antibody Combo Pretransplant Nonreactive Nonreactive   Hepatitis C antibody    Collection Time: 01/30/25  7:23 AM   Result Value Ref Range    Hepatitis C Antibody Nonreactive Nonreactive   Hepatitis B surface antigen    Collection Time: 01/30/25  7:23 AM   Result Value Ref Range    Hepatitis B Surface Antigen Nonreactive Nonreactive   Hepatitis B Surface Antibody    Collection Time: 01/30/25  7:23 AM   Result Value Ref Range    Hepatitis B Surface Antibody Reactive     Hepatitis B Surface Antibody Instrument Value 29.00 <8.5 m[IU]/mL   Hepatitis B core antibody    Collection Time: 01/30/25  7:23 AM   Result Value Ref Range    Hepatitis B Core Antibody Total Nonreactive Nonreactive   Albumin Random Urine Quantitative with Creat Ratio    Collection  Time: 01/30/25  7:23 AM   Result Value Ref Range    Creatinine Urine mg/dL 65.0 mg/dL    Albumin Urine mg/L <12.0 mg/L    Albumin Urine mg/g Cr     Protein  random urine    Collection Time: 01/30/25  7:23 AM   Result Value Ref Range    Total Protein Urine mg/dL 6.2   mg/dL    Total Protein Urine mg/mg Creat 0.09 0.00 - 0.20 mg/mg Cr    Creatinine Urine mg/dL 66.6 mg/dL   Routine UA with microscopic    Collection Time: 01/30/25  7:23 AM   Result Value Ref Range    Color Urine Light Yellow Colorless, Straw, Light Yellow, Yellow    Appearance Urine Clear Clear    Glucose Urine Negative Negative mg/dL    Bilirubin Urine Negative Negative    Ketones Urine Negative Negative mg/dL    Specific Gravity Urine 1.009 1.003 - 1.035    Blood Urine Negative Negative    pH Urine 6.5 5.0 - 7.0    Protein Albumin Urine Negative Negative mg/dL    Urobilinogen Urine Normal Normal, 2.0 mg/dL    Nitrite Urine Negative Negative    Leukocyte Esterase Urine Large (A) Negative    RBC Urine 5 (H) <=2 /HPF    WBC Urine 4 <=5 /HPF    Squamous Epithelials Urine 7 (H) <=1 /HPF    Transitional Epithelials Urine <1 <=1 /HPF   CBC with platelets    Collection Time: 01/30/25  7:23 AM   Result Value Ref Range    WBC Count 5.7 4.0 - 11.0 10e3/uL    RBC Count 4.36 3.80 - 5.20 10e6/uL    Hemoglobin 13.9 11.7 - 15.7 g/dL    Hematocrit 41.1 35.0 - 47.0 %    MCV 94 78 - 100 fL    MCH 31.9 26.5 - 33.0 pg    MCHC 33.8 31.5 - 36.5 g/dL    RDW 13.9 10.0 - 15.0 %    Platelet Count 312 150 - 450 10e3/uL   Partial thromboplastin time    Collection Time: 01/30/25  7:23 AM   Result Value Ref Range    aPTT 26 22 - 38 Seconds   INR    Collection Time: 01/30/25  7:23 AM   Result Value Ref Range    INR 0.96 0.85 - 1.15   Cystatin C with GFR    Collection Time: 01/30/25  7:23 AM   Result Value Ref Range    Cystatin C 0.9 0.6 - 1.0 mg/L    GFR Calculated with Cystatin C 81 >=60 mL/min/1.73m2   Hemoglobin A1c    Collection Time: 01/30/25  7:23 AM   Result Value Ref Range     Estimated Average Glucose 120 (H) <117 mg/dL    Hemoglobin A1C 5.8 (H) <5.7 %   Phosphorus    Collection Time: 01/30/25  7:23 AM   Result Value Ref Range    Phosphorus 3.7 2.5 - 4.5 mg/dL   Uric acid    Collection Time: 01/30/25  7:23 AM   Result Value Ref Range    Uric Acid 5.2 2.4 - 5.7 mg/dL   Lipid Profile    Collection Time: 01/30/25  7:23 AM   Result Value Ref Range    Cholesterol 179 <200 mg/dL    Triglycerides 83 <150 mg/dL    Direct Measure HDL 97 >=50 mg/dL    LDL Cholesterol Calculated 65 <100 mg/dL    Non HDL Cholesterol 82 <130 mg/dL    Patient Fasting > 8hrs? Yes    Comprehensive metabolic panel    Collection Time: 01/30/25  7:23 AM   Result Value Ref Range    Sodium 135 135 - 145 mmol/L    Potassium 3.9 3.4 - 5.3 mmol/L    Carbon Dioxide (CO2) 27 22 - 29 mmol/L    Anion Gap 8 7 - 15 mmol/L    Urea Nitrogen 9.6 8.0 - 23.0 mg/dL    Creatinine 0.71 0.51 - 0.95 mg/dL    GFR Estimate >90 >60 mL/min/1.73m2    Calcium 9.3 8.8 - 10.4 mg/dL    Chloride 100 98 - 107 mmol/L    Glucose 94 70 - 99 mg/dL    Alkaline Phosphatase 89 40 - 150 U/L    AST 33 0 - 45 U/L    ALT 22 0 - 50 U/L    Protein Total 7.2 6.4 - 8.3 g/dL    Albumin 4.5 3.5 - 5.2 g/dL    Bilirubin Total 0.4 <=1.2 mg/dL    Patient Fasting > 8hrs? Yes    Quantiferon TB Gold Plus Grey Tube    Collection Time: 01/30/25  7:23 AM    Specimen: Peripheral IV; Blood   Result Value Ref Range    Quantiferon Nil Tube 0.04 IU/mL   Quantiferon TB Gold Plus Green Tube    Collection Time: 01/30/25  7:23 AM    Specimen: Peripheral IV; Blood   Result Value Ref Range    Quantiferon TB1 Tube 0.03 IU/mL   Quantiferon TB Gold Plus Yellow Tube    Collection Time: 01/30/25  7:23 AM    Specimen: Peripheral IV; Blood   Result Value Ref Range    Quantiferon TB2 Tube 0.03    Quantiferon TB Gold Plus Purple Tube    Collection Time: 01/30/25  7:23 AM    Specimen: Peripheral IV; Blood   Result Value Ref Range    Quantiferon Mitogen 10.00 IU/mL   Adult Type and Screen     again on  3/27-> 2 more units of PRBC transfused .  Hemoglobin down to 6.7  on 3/29->  1 more unit of PRBC  transfused.     S/p 5 units PRBC transfusion so far ; hemoglobin currently stable at 9.0.   Getting IV iron  Patient feels better and he is not dizzy or orthostatic now.  He is up and ambulatory in the room  cont to monitor h/h, transfuse for Hb<7 .    - IR consulted ; they did initially evaluate the patient in the ED and reevaluated the patient on 3/25.  Both hepatic drains were irrigated ; did not need replacement. Patient understands and expects that there is going to be issues with hepatic drain getting clogged or bleed repeatedly due to the nature of his cancer .  Recurrent issues with bleeding from the hepatic drains on 3/27.  IR reconsulted, hepatic drains were replaced 3/28.  These are the largest space drains available.  Continue to flush the drains daily    - PRN pain control   -Patient had low-grade fevers on admission -afebrile now . blood cultures ordered and negative so far; started on empiric antibiotics  - completed course of Rocephin 7/7 and Flagyl 5/5.  - lactic acid normal, Procalcitonin was elevated at 1.0  - continue creon   - CT abd 4/1- biliary drains in place  - IV iron ordered      Coagulopathy  -With recurrent bleeding issues, secondary to liver carcinomatosis  -Fresh frozen plasma given on 3/27/2025  -IV vitamin K given  -Started on daily vitamin K Po    GI bleed  -Patient now having bloody BM  -He last had colonoscopy per GI Dr. Pandey a few years ago.  -Seen in consultation by GI.  They recommended continued supportive care only as patient was advanced CA  -Continue to monitor H&H  - CTA abd / pelvis-no active bleeding.   CT abd repeated today 4/1. No active bleeding    Hyponatremia  - Sodium 132-> 136  - IVF given  -Monitor BMP    Hypokalemia   Hypomagnesemia   - replaced  - monitor BMP    DM type 1  - resume home regimen  - monitor glucose  - adjust as needed    GERD  -  Collection Time: 01/30/25  7:23 AM   Result Value Ref Range    ABO/RH(D) O POS     Antibody Screen Negative Negative    SPECIMEN EXPIRATION DATE [id number]    TSH with free T4 reflex    Collection Time: 01/30/25  7:23 AM   Result Value Ref Range    TSH 0.70 0.30 - 4.20 uIU/mL   Quantiferon TB Gold Plus    Collection Time: 01/30/25  7:23 AM    Specimen: Peripheral IV; Blood   Result Value Ref Range    Quantiferon-TB Gold Plus Negative Negative    TB1 Ag minus Nil Value -0.01 IU/mL    TB2 Ag minus Nil Value -0.01 IU/mL    Mitogen minus Nil Result 9.96 IU/mL    Nil Result 0.04 IU/mL   ABO and Rh 2nd type and screen required    Collection Time: 01/30/25  7:46 AM   Result Value Ref Range    ABO/RH(D) O POS     SPECIMEN EXPIRATION DATE [id number]    EKG 12-lead complete w/read - Clinics    Collection Time: 01/30/25 12:59 PM   Result Value Ref Range    Systolic Blood Pressure  mmHg    Diastolic Blood Pressure  mmHg    Ventricular Rate 65 BPM    Atrial Rate 65 BPM    OK Interval 148 ms    QRS Duration 90 ms     ms    QTc 445 ms    P Axis 84 degrees    R AXIS 90 degrees    T Axis 68 degrees    Interpretation ECG       Sinus rhythm  Possible Left atrial enlargement  Rightward axis  Borderline ECG  No previous ECGs available  Confirmed by fellow Hector Acuna ([id number]) on 1/31/2025 11:42:04 AM  Confirmed by MD SNOW HENRI ([id number]) on 2/1/2025 1:47:55 PM     UA with Microscopic    Collection Time: 01/30/25  1:03 PM   Result Value Ref Range    Color Urine Straw Colorless, Straw, Light Yellow, Yellow    Appearance Urine Clear Clear    Glucose Urine Negative Negative mg/dL    Bilirubin Urine Negative Negative    Ketones Urine Negative Negative mg/dL    Specific Gravity Urine 1.005 1.003 - 1.035    Blood Urine Negative Negative    pH Urine 7.0 5.0 - 7.0    Protein Albumin Urine Negative Negative mg/dL    Urobilinogen Urine Normal Normal, 2.0 mg/dL    Nitrite Urine Negative Negative    Leukocyte Esterase Urine  Continue PPI      Intermittent confusion  -Noted per admitting MD. Mental status is clear now , no encephalopathy .  - CT head showed no acute abnormalities   -Ammonia level slightly elevated to 89.  Continue to monitor mental status    Note above makes patient higher risk for morbidity and mortality requiring testing and treatment.      DVT Prophylaxis: scd  Diet: ADULT DIET; Regular; 4 carb choices (60 gm/meal)  Code Status: Full Code    Plan of care discussed in detail with patient and his family his wife and daughter at bedside and with patient's nurse.  I  had palliative care discussions with patient and patient's family.  S/p hepatic drain replacement  3/28.    Continue to monitor for any repeat bleeding.    Will monitor for any more recurrent bleeding; if hemoglobin remains stable; plan is for discharge with follow-up with oncology  Patient to follow-up with his oncologist at [facility] ; he is planning on starting oral chemo medication .prescription was already called in  .      Continue to monitor in house for the next 1 to 2 days to ensure patient is stable with no recurrent bleeding prior to discharge.   GI and oncology following     Maryuri Burnette MD   4/1/2025           Negative Negative    RBC Urine <1 <=2 /HPF    WBC Urine 1 <=5 /HPF    Squamous Epithelials Urine <1 <=1 /HPF    Transitional Epithelials Urine <1 <=1 /HPF

## 2025-04-02 ENCOUNTER — APPOINTMENT (OUTPATIENT)
Dept: ULTRASOUND IMAGING | Age: 75
DRG: 907 | End: 2025-04-02
Payer: MEDICARE

## 2025-04-02 ENCOUNTER — APPOINTMENT (OUTPATIENT)
Dept: GENERAL RADIOLOGY | Age: 75
DRG: 907 | End: 2025-04-02
Payer: MEDICARE

## 2025-04-02 PROBLEM — R18.8 OTHER ASCITES: Status: ACTIVE | Noted: 2025-04-02

## 2025-04-02 LAB
ALBUMIN FLD-MCNC: 0.6 G/DL
APPEARANCE FLUID: CLEAR
BDY FLUID QUALITY: NORMAL
CELL COUNT FLUID TYPE: NORMAL
COLOR FLUID: NORMAL
GLUCOSE BLD-MCNC: 104 MG/DL (ref 70–99)
GLUCOSE BLD-MCNC: 199 MG/DL (ref 70–99)
GLUCOSE BLD-MCNC: 213 MG/DL (ref 70–99)
GLUCOSE BLD-MCNC: 56 MG/DL (ref 70–99)
GLUCOSE BLD-MCNC: 82 MG/DL (ref 70–99)
HCT VFR BLD AUTO: 21.7 % (ref 40.5–52.5)
HCT VFR BLD AUTO: 22.6 % (ref 40.5–52.5)
HCT VFR BLD AUTO: 23.3 % (ref 40.5–52.5)
HGB BLD-MCNC: 7.3 G/DL (ref 13.5–17.5)
HGB BLD-MCNC: 7.5 G/DL (ref 13.5–17.5)
HGB BLD-MCNC: 7.7 G/DL (ref 13.5–17.5)
INR PPP: 1.18 (ref 0.85–1.15)
LYMPHOCYTES NFR FLD: 2 %
MACROPHAGES # FLD: 11 %
MONOCYTES NFR FLD: 32 %
NEUTROPHIL, FLUID: 55 %
NUC CELL # FLD: 329 /CUMM
PERFORMED ON: ABNORMAL
PERFORMED ON: NORMAL
PROT FLD-MCNC: 1 G/DL
PROTHROMBIN TIME: 15.2 SEC (ref 11.9–14.9)
RBC FLUID: 600 /CUMM
SPECIMEN SOURCE FLD: NORMAL
STFR SERPL-SCNC: 4.5 MG/L (ref 2.2–5)
TOTAL CELLS COUNTED FLD: 100
TRIGL FLD-MCNC: 11 MG/DL

## 2025-04-02 PROCEDURE — 49083 ABD PARACENTESIS W/IMAGING: CPT

## 2025-04-02 PROCEDURE — 82042 OTHER SOURCE ALBUMIN QUAN EA: CPT

## 2025-04-02 PROCEDURE — 1200000000 HC SEMI PRIVATE

## 2025-04-02 PROCEDURE — 87070 CULTURE OTHR SPECIMN AEROBIC: CPT

## 2025-04-02 PROCEDURE — 88305 TISSUE EXAM BY PATHOLOGIST: CPT

## 2025-04-02 PROCEDURE — 94640 AIRWAY INHALATION TREATMENT: CPT

## 2025-04-02 PROCEDURE — 6360000002 HC RX W HCPCS

## 2025-04-02 PROCEDURE — 2580000003 HC RX 258

## 2025-04-02 PROCEDURE — 2580000003 HC RX 258: Performed by: INTERNAL MEDICINE

## 2025-04-02 PROCEDURE — 6360000002 HC RX W HCPCS: Performed by: INTERNAL MEDICINE

## 2025-04-02 PROCEDURE — 85018 HEMOGLOBIN: CPT

## 2025-04-02 PROCEDURE — 94761 N-INVAS EAR/PLS OXIMETRY MLT: CPT

## 2025-04-02 PROCEDURE — 99232 SBSQ HOSP IP/OBS MODERATE 35: CPT | Performed by: INTERNAL MEDICINE

## 2025-04-02 PROCEDURE — 6370000000 HC RX 637 (ALT 250 FOR IP): Performed by: INTERNAL MEDICINE

## 2025-04-02 PROCEDURE — 88112 CYTOPATH CELL ENHANCE TECH: CPT

## 2025-04-02 PROCEDURE — 87205 SMEAR GRAM STAIN: CPT

## 2025-04-02 PROCEDURE — 84478 ASSAY OF TRIGLYCERIDES: CPT

## 2025-04-02 PROCEDURE — 85610 PROTHROMBIN TIME: CPT

## 2025-04-02 PROCEDURE — 74018 RADEX ABDOMEN 1 VIEW: CPT

## 2025-04-02 PROCEDURE — 85014 HEMATOCRIT: CPT

## 2025-04-02 PROCEDURE — 2500000003 HC RX 250 WO HCPCS: Performed by: INTERNAL MEDICINE

## 2025-04-02 PROCEDURE — 84157 ASSAY OF PROTEIN OTHER: CPT

## 2025-04-02 PROCEDURE — 0W9G3ZZ DRAINAGE OF PERITONEAL CAVITY, PERCUTANEOUS APPROACH: ICD-10-PCS | Performed by: INTERNAL MEDICINE

## 2025-04-02 RX ADMIN — FINASTERIDE 5 MG: 5 TABLET, FILM COATED ORAL at 18:56

## 2025-04-02 RX ADMIN — FAMOTIDINE 20 MG: 20 TABLET, FILM COATED ORAL at 23:28

## 2025-04-02 RX ADMIN — INSULIN LISPRO 1 UNITS: 100 INJECTION, SOLUTION INTRAVENOUS; SUBCUTANEOUS at 13:45

## 2025-04-02 RX ADMIN — Medication 400 MG: at 09:02

## 2025-04-02 RX ADMIN — FAMOTIDINE 20 MG: 20 TABLET, FILM COATED ORAL at 08:55

## 2025-04-02 RX ADMIN — DICYCLOMINE HYDROCHLORIDE 20 MG: 20 TABLET ORAL at 08:55

## 2025-04-02 RX ADMIN — ALBUTEROL SULFATE 2 PUFF: 90 AEROSOL, METERED RESPIRATORY (INHALATION) at 08:11

## 2025-04-02 RX ADMIN — POTASSIUM CHLORIDE 20 MEQ: 1500 TABLET, EXTENDED RELEASE ORAL at 08:55

## 2025-04-02 RX ADMIN — ALBUTEROL SULFATE 2 PUFF: 90 AEROSOL, METERED RESPIRATORY (INHALATION) at 20:08

## 2025-04-02 RX ADMIN — PANCRELIPASE LIPASE, PANCRELIPASE PROTEASE, PANCRELIPASE AMYLASE 75000 UNITS: 20000; 63000; 84000 CAPSULE, DELAYED RELEASE ORAL at 19:21

## 2025-04-02 RX ADMIN — PANTOPRAZOLE SODIUM 40 MG: 40 INJECTION, POWDER, LYOPHILIZED, FOR SOLUTION INTRAVENOUS at 08:55

## 2025-04-02 RX ADMIN — SODIUM CHLORIDE, PRESERVATIVE FREE 10 ML: 5 INJECTION INTRAVENOUS at 13:46

## 2025-04-02 RX ADMIN — PANCRELIPASE LIPASE, PANCRELIPASE PROTEASE, PANCRELIPASE AMYLASE 75000 UNITS: 20000; 63000; 84000 CAPSULE, DELAYED RELEASE ORAL at 08:58

## 2025-04-02 RX ADMIN — PANCRELIPASE LIPASE, PANCRELIPASE PROTEASE, PANCRELIPASE AMYLASE 75000 UNITS: 20000; 63000; 84000 CAPSULE, DELAYED RELEASE ORAL at 13:39

## 2025-04-02 RX ADMIN — SODIUM CHLORIDE, PRESERVATIVE FREE 10 ML: 5 INJECTION INTRAVENOUS at 23:29

## 2025-04-02 RX ADMIN — PANTOPRAZOLE SODIUM 40 MG: 40 INJECTION, POWDER, LYOPHILIZED, FOR SOLUTION INTRAVENOUS at 23:29

## 2025-04-02 RX ADMIN — POTASSIUM CHLORIDE 20 MEQ: 1500 TABLET, EXTENDED RELEASE ORAL at 23:28

## 2025-04-02 RX ADMIN — IRON SUCROSE 200 MG: 20 INJECTION, SOLUTION INTRAVENOUS at 08:51

## 2025-04-02 NOTE — PROGRESS NOTES
Patient's BS was 56 this AM. Patient given juice and up to 88 and eating breakfast. Patient's VSS, soft bp. He is to have a paracentesis today. Drains flushed and emptied- see flowsheets. Draining brown with clots. Next H&H draw is at 1200. Will monitor.

## 2025-04-02 NOTE — PROGRESS NOTES
Pt arrived for image guided Paracentesis. Dr. Patterson explained the procedure including the risk and benefits of the procedure. All questions answered. Pt verbalizes understanding of the procedure and states no more questions. Consent confirmed. Vital signs stable. Labs, allergies, medications, and code status reviewed. No contraindications noted. Time out completed prior to procedure.    Vital Signs  Vitals:    04/02/25 1055   BP: (!) 105/56   Pulse: 74   Resp: 16   Temp: 98.1 °F (36.7 °C)   SpO2: 94%    (vital signs in table format)      Allergies  Codeine and Amoxicillin-pot clavulanate (allergies)    Labs  Lab Results   Component Value Date    INR 1.18 (H) 04/02/2025    PROTIME 15.2 (H) 04/02/2025     Lab Results   Component Value Date    CREATININE 0.4 (L) 04/01/2025    BUN 9 04/01/2025     (L) 04/01/2025    K 4.1 04/01/2025     04/01/2025    CO2 23 04/01/2025     Lab Results   Component Value Date    WBC 11.0 04/01/2025    HGB 7.5 (L) 04/02/2025    HCT 22.6 (L) 04/02/2025    MCV 88.2 04/01/2025     04/01/2025

## 2025-04-02 NOTE — PLAN OF CARE
Problem: Chronic Conditions and Co-morbidities  Goal: Patient's chronic conditions and co-morbidity symptoms are monitored and maintained or improved  4/1/2025 2029 by Patsy Rosario RN  Outcome: Progressing       Problem: Safety - Adult  Goal: Free from fall injury  4/1/2025 2029 by Patsy Rosario RN  Outcome: Progressing       Problem: Metabolic/Fluid and Electrolytes - Adult  Goal: Electrolytes maintained within normal limits  Recent Flowsheet Documentation  Taken 4/1/2025 2023 by Patsy Rosario RN  Outcome: Progressing       Problem: Metabolic/Fluid and Electrolytes - Adult  Goal: Hemodynamic stability and optimal renal function maintained  4/1/2025 1014 by Lyudmila Engel RN  Outcome: Progressing     Problem: Pain  Goal: Verbalizes/displays adequate comfort level or baseline comfort level  4/1/2025 2029 by Patsy Rosario RN  Outcome: Progressing

## 2025-04-02 NOTE — PROGRESS NOTES
RT Inhaler-Nebulizer Bronchodilator Protocol Note    There is a bronchodilator order in the chart from a provider indicating to follow the RT Bronchodilator Protocol and there is an “Initiate RT Inhaler-Nebulizer Bronchodilator Protocol” order as well (see protocol at bottom of note).    CXR Findings:  No results found.    The findings from the last RT Protocol Assessment were as follows:   History Pulmonary Disease: Chronic pulmonary disease  Respiratory Pattern: Regular pattern and RR 12-20 bpm  Breath Sounds: Intermittent or unilateral wheezes  Cough: Strong, spontaneous, non-productive  Indication for Bronchodilator Therapy: Decreased or absent breath sounds  Bronchodilator Assessment Score: 6    Aerosolized bronchodilator medication orders have been revised according to the RT Inhaler-Nebulizer Bronchodilator Protocol below.    Respiratory Therapist to perform RT Therapy Protocol Assessment initially then follow the protocol.  Repeat RT Therapy Protocol Assessment PRN for score 0-3 or on second treatment, BID, and PRN for scores above 3.    No Indications - adjust the frequency to every 6 hours PRN wheezing or bronchospasm, if no treatments needed after 48 hours then discontinue using Per Protocol order mode.     If indication present, adjust the RT bronchodilator orders based on the Bronchodilator Assessment Score as indicated below.  Use Inhaler orders unless patient has one or more of the following: on home nebulizer, not able to hold breath for 10 seconds, is not alert and oriented, cannot activate and use MDI correctly, or respiratory rate 25 breaths per minute or more, then use the equivalent nebulizer order(s) with same Frequency and PRN reasons based on the score.  If a patient is on this medication at home then do not decrease Frequency below that used at home.    0-3 - enter or revise RT bronchodilator order(s) to equivalent RT Bronchodilator order with Frequency of every 4 hours PRN for wheezing or  increased work of breathing using Per Protocol order mode.        4-6 - enter or revise RT Bronchodilator order(s) to two equivalent RT bronchodilator orders with one order with BID Frequency and one order with Frequency of every 4 hours PRN wheezing or increased work of breathing using Per Protocol order mode.        7-10 - enter or revise RT Bronchodilator order(s) to two equivalent RT bronchodilator orders with one order with TID Frequency and one order with Frequency of every 4 hours PRN wheezing or increased work of breathing using Per Protocol order mode.       11-13 - enter or revise RT Bronchodilator order(s) to one equivalent RT bronchodilator order with QID Frequency and an Albuterol order with Frequency of every 4 hours PRN wheezing or increased work of breathing using Per Protocol order mode.      Greater than 13 - enter or revise RT Bronchodilator order(s) to one equivalent RT bronchodilator order with every 4 hours Frequency and an Albuterol order with Frequency of every 2 hours PRN wheezing or increased work of breathing using Per Protocol order mode.         Electronically signed by Palmira Graves RCP on 4/1/2025 at 8:40 PM

## 2025-04-02 NOTE — PROGRESS NOTES
ONCOLOGY HEMATOLOGY CARE PROGRESS NOTE      SUBJECTIVE:    Remains admitted. No fever. No bleeding. Started iron.      ROS:     Constitutional:  No weight loss, No fever, No chills, No night sweats.  Energy level good.  Eyes:  No impairment or change in vision  ENT / Mouth:  No pain, abnormal ulceration, bleeding, nasal drip or change in voice or hearing  Cardiovascular:  No chest pain, palpitations, new edema, or calf discomfort  Respiratory:  No pain, hemoptysis, change to breathing  Breast:  No pain, discharge, change in appearance or texture  Gastrointestinal:  No pain, cramping, jaundice, change to eating and bowel habits  Urinary:  No pain, bleeding or change in continence  Genitalia: No pain, bleeding or discharge  Musculoskeletal:  No redness, pain, edema or weakness  Skin:  No pruritus, rash, change to nodules or lesions  Neurologic:  No discomfort, change in mental status, speech, sensory or motor activity  Psychiatric:  No change in concentration or change to affect or mood  Endocrine:  No hot flashes, increased thirst, or change to urine production  Hematologic: No petechiae, ecchymosis or bleeding  Lymphatic:  No lymphadenopathy or lymphedema  Allergy / Immunologic:  No eczema, hives, frequent or recurrent infections    OBJECTIVE        Physical    VITALS:  Patient Vitals for the past 24 hrs:   BP Temp Temp src Pulse Resp SpO2 Weight   04/02/25 0750 108/64 97.8 °F (36.6 °C) Oral 76 18 94 % --   04/02/25 0401 111/60 98 °F (36.7 °C) Oral 75 18 96 % 69.7 kg (153 lb 11.2 oz)   04/01/25 2039 -- -- -- 88 18 94 % --   04/01/25 2020 101/63 -- -- 83 -- -- --   04/01/25 1936 98/64 98 °F (36.7 °C) Oral 79 18 93 % --   04/01/25 1808 -- -- -- 89 18 94 % --   04/01/25 1545 108/72 97.9 °F (36.6 °C) Oral 91 16 94 % --   04/01/25 1315 129/77 98.2 °F (36.8 °C) Oral 91 16 96 % --   04/01/25 0947 (!) 147/81 -- -- 90 -- -- --   04/01/25 0844 -- -- -- -- -- 95 % --       24HR  with IR          ONCOLOGIC DISPOSITION: per IM and GI. Oncology follow up at [place]       Abner Harrell MD  Please contact through [employer]

## 2025-04-02 NOTE — PROGRESS NOTES
Image guided Paracentesis completed.  1.4 liters of dark yellow colored withdrawn.  Pt tolerated procedure without any signs or symptoms of distress. Vital signs stable.     DISCHARGED:  ADMITTED: Pt transferred back to room 232. Report called to nurse.     SPECIMEN SENT:  Yes    Vital Signs  Vitals:    04/02/25 1055   BP: (!) 105/56   Pulse: 74   Resp: 16   Temp: 98.1 °F (36.7 °C)   SpO2: 94%    (vital signs in table format)

## 2025-04-02 NOTE — PLAN OF CARE
Problem: Chronic Conditions and Co-morbidities  Goal: Patient's chronic conditions and co-morbidity symptoms are monitored and maintained or improved  4/2/2025 0958 by Lyudmila Engel RN  Outcome: Progressing  4/1/2025 2029 by Patsy Rosario RN  Outcome: Progressing  Flowsheets (Taken 4/1/2025 2023)  Care Plan - Patient's Chronic Conditions and Co-Morbidity Symptoms are Monitored and Maintained or Improved: Monitor and assess patient's chronic conditions and comorbid symptoms for stability, deterioration, or improvement     Problem: Discharge Planning  Goal: Discharge to home or other facility with appropriate resources  Outcome: Progressing  Flowsheets (Taken 4/1/2025 2023 by Patsy Rosario RN)  Discharge to home or other facility with appropriate resources: Identify barriers to discharge with patient and caregiver     Problem: Safety - Adult  Goal: Free from fall injury  4/2/2025 0958 by Lyudmila Engel RN  Outcome: Progressing  Flowsheets (Taken 4/1/2025 2030 by Patsy Rosario RN)  Free From Fall Injury: Instruct family/caregiver on patient safety  4/1/2025 2029 by Patsy Rosario RN  Outcome: Progressing     Problem: ABCDS Injury Assessment  Goal: Absence of physical injury  Outcome: Progressing  Flowsheets (Taken 4/1/2025 2030 by Patsy Rosario RN)  Absence of Physical Injury: Implement safety measures based on patient assessment     Problem: Metabolic/Fluid and Electrolytes - Adult  Goal: Electrolytes maintained within normal limits  Outcome: Progressing  Flowsheets (Taken 4/1/2025 2023 by Patsy Rosario RN)  Electrolytes maintained within normal limits: Monitor labs and assess patient for signs and symptoms of electrolyte imbalances  Goal: Hemodynamic stability and optimal renal function maintained  Outcome: Progressing  Goal: Glucose maintained within prescribed range  Outcome: Progressing     Problem: Hematologic - Adult  Goal: Maintains hematologic stability  4/2/2025 0958 by Lyudmila Engel RN  Outcome:

## 2025-04-02 NOTE — PROGRESS NOTES
PROGRESS NOTE  S:74 yrs Patient  admitted on 3/24/2025 with Abdominal pain, right upper quadrant [R10.11]  Intra abdominal hemorrhage [R58]  Encounter for biliary drainage tube placement [Z46.82]  Other specified carcinomas of liver [C22.7] .  Today, he is more alert. He reports vomiting black emesis yesterday which relieved abdominal pain. His biliary drains have bilious output today without blood.     Exam:   Vitals:    04/02/25 0811   BP:    Pulse: 76   Resp: 18   Temp:    SpO2: 94%   Generalized: alert, no acute distress  HEENT: sclera clear, anicteric  Neck: supple, trachea midline  Heart: NSR  Abdomen: soft, + biliary drains w/ bilious output, distended  Extremities: no edema     Medications: Reviewed    Labs:  CBC:   Recent Labs     04/01/25  1106 04/01/25  1715 04/02/25  0015 04/02/25  0615   WBC 11.0  --   --   --    HGB 9.0* 9.3* 7.7* 7.5*   HCT 26.9* 28.4* 23.3* 22.6*   MCV 88.2  --   --   --      --   --   --        PT/INR:   Recent Labs     03/31/25  1638 04/01/25  0600 04/02/25  0615   INR 1.14 1.14 1.18*     Attending Supervising Physician's Attestation Statement  The patient is a 74 y.o. male. I have performed a history and physical examination of the patient. I discussed the case with LENORE Laughlin    I reviewed the patient's Past Medical History, Past Surgical History, Medications, and Allergies.     Physical Exam:  Vitals:    04/02/25 0750 04/02/25 0811 04/02/25 1055 04/02/25 1650   BP: 108/64  (!) 105/56 (!) 101/54   Pulse: 76 76 74 74   Resp: 18 18 16 16   Temp: 97.8 °F (36.6 °C)  98.1 °F (36.7 °C) 98.4 °F (36.9 °C)   TempSrc: Oral  Oral Oral   SpO2: 94% 94% 94% 94%   Weight:       Height:           Physical Examination:   General - cachectic and chronically ill appearing  Mental status - alert, oriented to person, place, and time  Eyes - sclera icteric  Neck - supple, no significant adenopathy  Heart - normal rate and regular rhythm  Abdomen -  soft, NT, distended, biliary drain w/o blood  Extremities - no pedal edema       Assessment  75 yo male with pmx of DM, HTN, and 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 Hematochezia likely secondary to colitis or hemorrhoids. Biliary drains exchanged 3/28. Hospital course c/b worsening abdominal pain and distention w/ repeat CT imaging revealing progressive distention of jejunal limb and gastric distention.      Plan  Continue supportive care  US guided paracentesis today  Will check KUB   Trend H&H and INR daily   Monitor for signs of bleeding  PPI BID  Continue IV Venofer   Follow up with primary Oncologist upon discharge    Flory Laughlin, APRN - CNP  9:14 AM 4/2/2025                      74 year old male with history of DM, HTN, crohn's disease, stage IV cholangicarcinoma s/p whipple c/b recurrent malignant obstruction s/p perc drain and currently undergoing palliative chemotherapy admitted with bleeding per biliary drain. Diarrhea has resolved. Abd distention improved after emesis and paracentesis.     Continue supportive care. PPI BID. Monitor INR and give Vit K if needed. Monitor Hgb. Observe for signs of bleeding.    Tab Pandey MD          (O) [phone]  (O) [phone]

## 2025-04-02 NOTE — PROGRESS NOTES
IM Progress Note    Admit Date:  3/24/2025    Admitted for bloody drainage in hepatic drain.    Patient has history of liver cancer getting chemotherapy from [facility] - Dr. Aldrich .  He got his hepatic drain replaced here at IR last week missed his chemo dose last week; was due again for chemo yesterday but then started having bloody drainage from his hepatic drain and hence came back to [place].  Patient was evaluated by IR physician at the ER .  Seen by IR again and hepatic drains irrigated - Not replaced this time    S/p 2 units of PRBC transfusion on admission, for symptomatic anemia with dizziness and orthostasis.    3/27  Patient started having recurrent bleeding from the hepatic drains.   2 more units of PRBC transfused and 1 unit of fresh frozen plasma given.    3/28 back to IR-s/p replacement of both hepatic drains    3/29-drop in hemoglobin again , 1  more unit of PRBC transfused    3/30-hemoglobin stable today.   No more bleeding from hepatic drains.   Did have rectal bleeding again earlier today.  Continue to monitor     3/31- had bloody drainage from hepatic drain gain      4/1-hepatic drain draining bile no bleeding today.  But having hematuria.  Complains of some abdominal distention, passing gas.   CT abdomen and abdominal x-ray completed.-Showed ascites  Hemoglobin stable     4/2  Subjective:  Mr. Ceja seen.  Plan is for paracentesis today  Still s/p exchange of both hepatic drains  3/28.  They are large bore drains now, draining large amount of bile,  no bloody drainage noted today.  He is awake and oriented and in no distress.  Abdomen mildly distended with tenderness, passing flatus. No nausea   Had hematuria.     Continue current management    He is hoping to get better soon and get home to resume chemo.  He is up and ambulatory in the room  No fevers, vital signs stable      -I have discussed with patient and family regarding current guarded prognosis on 3/28.      Objective:     Vitals:    04/02/25 0401 04/02/25 0750 04/02/25 0811 04/02/25 1055   BP: 111/60 108/64  (!) 105/56   Pulse: 75 76 76 74   Resp: 18 18 18 16   Temp: 98 °F (36.7 °C) 97.8 °F (36.6 °C)  98.1 °F (36.7 °C)   TempSrc: Oral Oral  Oral   SpO2: 96% 94% 94% 94%   Weight: 69.7 kg (153 lb 11.2 oz)      Height:                Intake/Output Summary (Last 24 hours) at 4/2/2025 1207  Last data filed at 4/2/2025 1121  Gross per 24 hour   Intake 200 ml   Output 735 ml   Net -535 ml       Physical Exam:  Gen: No distress. Alert.  Awake and well-oriented.   Looks ill, fatigued.  He is mood is optimistic  Eyes: PERRL. No sclera icterus. No conjunctival injection.   ENT: No discharge. Pharynx clear.   Neck: No JVD.  Trachea midline.  Resp: No accessory muscle use. No crackles. No wheezes. No rhonchi.   CV: Regular rate. Regular rhythm. No murmur.  No rub. No edema.   Capillary Refill: Brisk,< 3 seconds   Peripheral Pulses: +2 palpable, equal bilaterally   GI: mildly distended.  Mildly tender . normal bowel sounds.  Patient has 2 drains in the right upper quadrant.  Patient had 2 largest size drains placed - 3/28-  good biliary drainage   Skin: Warm and dry. No nodule on exposed extremities. No rash on exposed extremities.   M/S: No cyanosis. No joint deformity. No clubbing.   Neuro: Awake. Grossly nonfocal    Psych: Oriented x 3. No anxiety or agitation.        Scheduled Meds:   iron sucrose (VENOFER) 200 mg in sodium chloride 0.9 % 100 mL IVPB  200 mg IntraVENous Q24H    magnesium oxide  400 mg Oral Daily    pantoprazole (PROTONIX) 40 mg in sodium chloride (PF) 0.9 % 10 mL injection  40 mg IntraVENous Q12H    insulin NPH  20 Units SubCUTAneous BID AC    albuterol sulfate HFA  2 puff Inhalation BID RT    dicyclomine  20 mg Oral Daily    famotidine  20 mg Oral BID    finasteride  5 mg Oral QPM    potassium chloride  20 mEq Oral BID    sodium chloride flush  5-40 mL IntraVENous 2 times per day    insulin lispro  0-4

## 2025-04-02 NOTE — PROGRESS NOTES
Pt A/Ox4, awake in bed on RA, wife at bedside, pt denies any abdominal pain or nausea at this time. Pt has 2 biliary drains in place draining bile, both drains flushed with 10 ml saline per order. PM assessment completed, PM medications given in applesauce, vss, bed locked, bed alarm on, call light in reach.

## 2025-04-03 LAB
ANION GAP SERPL CALCULATED.3IONS-SCNC: 8 MMOL/L (ref 3–16)
BASOPHILS # BLD: 0 K/UL (ref 0–0.2)
BASOPHILS NFR BLD: 0.5 %
BUN SERPL-MCNC: 8 MG/DL (ref 7–20)
CALCIUM SERPL-MCNC: 8 MG/DL (ref 8.3–10.6)
CHLORIDE SERPL-SCNC: 103 MMOL/L (ref 99–110)
CO2 SERPL-SCNC: 23 MMOL/L (ref 21–32)
CREAT SERPL-MCNC: 0.3 MG/DL (ref 0.8–1.3)
DEPRECATED RDW RBC AUTO: 26.4 % (ref 12.4–15.4)
EOSINOPHIL # BLD: 0 K/UL (ref 0–0.6)
EOSINOPHIL NFR BLD: 0.9 %
GFR SERPLBLD CREATININE-BSD FMLA CKD-EPI: >90 ML/MIN/{1.73_M2}
GLUCOSE BLD-MCNC: 139 MG/DL (ref 70–99)
GLUCOSE BLD-MCNC: 170 MG/DL (ref 70–99)
GLUCOSE BLD-MCNC: 175 MG/DL (ref 70–99)
GLUCOSE BLD-MCNC: 203 MG/DL (ref 70–99)
GLUCOSE SERPL-MCNC: 114 MG/DL (ref 70–99)
HCT VFR BLD AUTO: 21.7 % (ref 40.5–52.5)
HGB BLD-MCNC: 7.1 G/DL (ref 13.5–17.5)
INR PPP: 1.11 (ref 0.85–1.15)
LYMPHOCYTES # BLD: 0.3 K/UL (ref 1–5.1)
LYMPHOCYTES NFR BLD: 9 %
MCH RBC QN AUTO: 29.4 PG (ref 26–34)
MCHC RBC AUTO-ENTMCNC: 32.9 G/DL (ref 31–36)
MCV RBC AUTO: 89.6 FL (ref 80–100)
MONOCYTES # BLD: 0.4 K/UL (ref 0–1.3)
MONOCYTES NFR BLD: 9.4 %
NEUTROPHILS # BLD: 3 K/UL (ref 1.7–7.7)
NEUTROPHILS NFR BLD: 80.2 %
PERFORMED ON: ABNORMAL
PLATELET # BLD AUTO: 173 K/UL (ref 135–450)
PMV BLD AUTO: 6.8 FL (ref 5–10.5)
POTASSIUM SERPL-SCNC: 3.7 MMOL/L (ref 3.5–5.1)
PROTHROMBIN TIME: 14.5 SEC (ref 11.9–14.9)
RBC # BLD AUTO: 2.42 M/UL (ref 4.2–5.9)
SODIUM SERPL-SCNC: 134 MMOL/L (ref 136–145)
WBC # BLD AUTO: 3.8 K/UL (ref 4–11)

## 2025-04-03 PROCEDURE — 2580000003 HC RX 258: Performed by: INTERNAL MEDICINE

## 2025-04-03 PROCEDURE — 2500000003 HC RX 250 WO HCPCS: Performed by: INTERNAL MEDICINE

## 2025-04-03 PROCEDURE — 2580000003 HC RX 258

## 2025-04-03 PROCEDURE — 80048 BASIC METABOLIC PNL TOTAL CA: CPT

## 2025-04-03 PROCEDURE — 6370000000 HC RX 637 (ALT 250 FOR IP): Performed by: INTERNAL MEDICINE

## 2025-04-03 PROCEDURE — 85610 PROTHROMBIN TIME: CPT

## 2025-04-03 PROCEDURE — 1200000000 HC SEMI PRIVATE

## 2025-04-03 PROCEDURE — 6360000002 HC RX W HCPCS

## 2025-04-03 PROCEDURE — 94640 AIRWAY INHALATION TREATMENT: CPT

## 2025-04-03 PROCEDURE — 99232 SBSQ HOSP IP/OBS MODERATE 35: CPT | Performed by: INTERNAL MEDICINE

## 2025-04-03 PROCEDURE — 6360000002 HC RX W HCPCS: Performed by: INTERNAL MEDICINE

## 2025-04-03 PROCEDURE — 85025 COMPLETE CBC W/AUTO DIFF WBC: CPT

## 2025-04-03 PROCEDURE — 94761 N-INVAS EAR/PLS OXIMETRY MLT: CPT

## 2025-04-03 RX ADMIN — POTASSIUM BICARBONATE 20 MEQ: 782 TABLET, EFFERVESCENT ORAL at 08:20

## 2025-04-03 RX ADMIN — ALBUTEROL SULFATE 2 PUFF: 90 AEROSOL, METERED RESPIRATORY (INHALATION) at 20:10

## 2025-04-03 RX ADMIN — INSULIN HUMAN 20 UNITS: 100 INJECTION, SUSPENSION SUBCUTANEOUS at 08:29

## 2025-04-03 RX ADMIN — FINASTERIDE 5 MG: 5 TABLET, FILM COATED ORAL at 17:27

## 2025-04-03 RX ADMIN — SODIUM CHLORIDE, PRESERVATIVE FREE 10 ML: 5 INJECTION INTRAVENOUS at 20:54

## 2025-04-03 RX ADMIN — PANCRELIPASE LIPASE, PANCRELIPASE PROTEASE, PANCRELIPASE AMYLASE 75000 UNITS: 20000; 63000; 84000 CAPSULE, DELAYED RELEASE ORAL at 12:19

## 2025-04-03 RX ADMIN — ALBUTEROL SULFATE 2 PUFF: 90 AEROSOL, METERED RESPIRATORY (INHALATION) at 13:39

## 2025-04-03 RX ADMIN — SODIUM CHLORIDE 25 ML: 0.9 INJECTION, SOLUTION INTRAVENOUS at 10:32

## 2025-04-03 RX ADMIN — ALBUTEROL SULFATE 2 PUFF: 90 AEROSOL, METERED RESPIRATORY (INHALATION) at 08:46

## 2025-04-03 RX ADMIN — PANCRELIPASE LIPASE, PANCRELIPASE PROTEASE, PANCRELIPASE AMYLASE 75000 UNITS: 20000; 63000; 84000 CAPSULE, DELAYED RELEASE ORAL at 17:27

## 2025-04-03 RX ADMIN — PANTOPRAZOLE SODIUM 40 MG: 40 INJECTION, POWDER, LYOPHILIZED, FOR SOLUTION INTRAVENOUS at 20:51

## 2025-04-03 RX ADMIN — INSULIN HUMAN 20 UNITS: 100 INJECTION, SUSPENSION SUBCUTANEOUS at 17:27

## 2025-04-03 RX ADMIN — DICYCLOMINE HYDROCHLORIDE 20 MG: 20 TABLET ORAL at 08:20

## 2025-04-03 RX ADMIN — INSULIN LISPRO 2 UNITS: 100 INJECTION, SOLUTION INTRAVENOUS; SUBCUTANEOUS at 20:51

## 2025-04-03 RX ADMIN — PANCRELIPASE LIPASE, PANCRELIPASE PROTEASE, PANCRELIPASE AMYLASE 75000 UNITS: 20000; 63000; 84000 CAPSULE, DELAYED RELEASE ORAL at 08:20

## 2025-04-03 RX ADMIN — IRON SUCROSE 200 MG: 20 INJECTION, SOLUTION INTRAVENOUS at 10:36

## 2025-04-03 RX ADMIN — PANTOPRAZOLE SODIUM 40 MG: 40 INJECTION, POWDER, LYOPHILIZED, FOR SOLUTION INTRAVENOUS at 08:21

## 2025-04-03 RX ADMIN — FAMOTIDINE 20 MG: 20 TABLET, FILM COATED ORAL at 08:20

## 2025-04-03 RX ADMIN — SODIUM CHLORIDE, PRESERVATIVE FREE 10 ML: 5 INJECTION INTRAVENOUS at 08:34

## 2025-04-03 RX ADMIN — Medication 400 MG: at 08:20

## 2025-04-03 RX ADMIN — FAMOTIDINE 20 MG: 20 TABLET, FILM COATED ORAL at 20:51

## 2025-04-03 ASSESSMENT — PAIN SCALES - GENERAL
PAINLEVEL_OUTOF10: 0
PAINLEVEL_OUTOF10: 0

## 2025-04-03 NOTE — PROGRESS NOTES
PROGRESS NOTE  S:74 yrs Patient  admitted on 3/24/2025 with Abdominal pain, right upper quadrant [R10.11]  Intra abdominal hemorrhage [R58]  Encounter for biliary drainage tube placement [Z46.82]  Other specified carcinomas of liver [C22.7] .  Today, he reports feeling well. He denies pain and nausea. He reports tolerating diet. He had brown BM today. Biliary drains with bilious output.     Exam:   Vitals:    04/03/25 1130   BP: 114/69   Pulse: 74   Resp: 18   Temp: 98.4 °F (36.9 °C)   SpO2: 96%   Generalized: alert, no acute distress  HEENT: sclera clear, anicteric  Neck: supple, trachea midline  Heart: NSR  Abdomen: soft, + biliary drains w/ bilious output, NT  Extremities: no edema     Medications: Reviewed    Labs:  CBC:   Recent Labs     04/01/25  1106 04/01/25  1715 04/02/25  0615 04/02/25  1240 04/03/25  0558   WBC 11.0  --   --   --  3.8*   HGB 9.0*   < > 7.5* 7.3* 7.1*   HCT 26.9*   < > 22.6* 21.7* 21.7*   MCV 88.2  --   --   --  89.6     --   --   --  173    < > = values in this interval not displayed.     BMP:   Recent Labs     04/01/25  1106 04/03/25  0558   * 134*   K 4.1 3.7    103   CO2 23 23   BUN 9 8   CREATININE 0.4* 0.3*       PT/INR:   Recent Labs     04/01/25  0600 04/02/25  0615 04/03/25  0558   INR 1.14 1.18* 1.11     Imaging  KUB, 4/2  Nonobstructive bowel gas pattern.    Attending Supervising Physician's Attestation Statement  The patient is a 74 y.o. male. I have performed a history and physical examination of the patient. I discussed the case with LENORE Laughlin    I reviewed the patient's Past Medical History, Past Surgical History, Medications, and Allergies.     Physical Exam:  Vitals:    04/03/25 0848 04/03/25 1130 04/03/25 1342 04/03/25 1615   BP:  114/69  106/66   Pulse:  74  79   Resp:  18  18   Temp:  98.4 °F (36.9 °C)  98 °F (36.7 °C)   TempSrc:  Oral  Oral   SpO2: 96% 96% 96% 97%   Weight:       Height:            Physical Examination:   General - cachectic and chronically ill appearing  Mental status - alert, oriented to person, place, and time  Eyes - sclera icteric  Neck - supple, no significant adenopathy  Heart - normal rate and regular rhythm  Abdomen - soft, NT, distended, biliary drain w/o blood  Extremities - no pedal edema       Assessment  73 yo male with pmx of DM, HTN, and 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 Hematochezia likely secondary to colitis or hemorrhoids. Biliary drains exchanged 3/28. Hospital course c/b worsening abdominal pain and distention relieved following bout of emesis and paracentesis w/ 1.4 L removed.      Plan  Continue supportive care  Trend H&H   Monitor biliary drain output   Monitor and correct coagulopathy  PPI BID  Continue IV Venofer   Will need to follow up with primary Oncologist upon discharge    Flory Laughlin, APRN - CNP  12:55 PM 4/3/2025                      74 year old male with history of DM, HTN, crohn's disease, stage IV cholangicarcinoma s/p whipple c/b recurrent malignant obstruction s/p perc drain and currently undergoing palliative chemotherapy admitted with bleeding per biliary drain, resolved with IR irrigation and correction of INR.     Continue supportive care. PPI BID. Monitor Hgb and INR. PPI daily. IV iron therapy. Observe for signs of bleeding.    Tab Pandey MD          (O) [phone]  (O) [phone]

## 2025-04-03 NOTE — PROGRESS NOTES
Status: Canceled Specimen: Stool     GI Bacterial Pathogens By PCR [[id number]] Collected: 03/29/25 0750    Order Status: Completed Specimen: Stool Updated: 03/30/25 2251     GI Bacterial Pathogens By PCR --     No Shigella spp/EIEC DNA detected  No Shiga toxin-producing gene(s) detected  No Campylobacter spp. (jejuni and coli)DNA detected  No Salmonella spp. DNA detected  No Vibrio vulnificus/parahaemolyticus/cholerae DNA detected  No Plesiomonas shigelloides DNA detected  No Enterotoxigenic E. coli (ETEC) DNA detected  No Yersinia enterocolitica DNA detected  Normal Range:  None detected      Narrative:      ORDER#: [id number]                          ORDERED BY: GURWINDER CABALLERO  SOURCE: Stool                              COLLECTED:  03/29/25 07:50  ANTIBIOTICS AT [facility].:                      RECEIVED :  03/29/25 18:50    GI Bacterial Pathogens By PCR [[id number]]     Order Status: Canceled Specimen: Stool     Clostridium difficile toxin/antigen [[id number]]     Order Status: Canceled Specimen: Stool     Culture, Blood 1 [[id number]] Collected: 03/25/25 1148    Order Status: Completed Specimen: Blood Updated: 03/29/25 1215     Blood Culture, Routine No Growth after 4 days of incubation.    Narrative:      ORDER#: [id number]                          ORDERED BY: TERRANCE OWENS  SOURCE: Blood                              COLLECTED:  03/25/25 11:48  ANTIBIOTICS AT [facility].:                      RECEIVED :  03/25/25 11:57  If child <=2 yrs old please draw pediatric bottle.~Blood Culture 1    Culture, Blood 2 [[id number]] Collected: 03/25/25 1148    Order Status: Completed Specimen: Blood Updated: 03/29/25 1215     Culture, Blood 2 No Growth after 4 days of incubation.    Narrative:      ORDER#: [id number]                          ORDERED BY: TERRANCE OWENS  SOURCE: Blood                              COLLECTED:  03/25/25 11:48  ANTIBIOTICS AT [facility].:                      RECEIVED :  03/25/25 11:56  If child <=2 yrs old please  oncology  Patient to follow-up with his oncologist at [facility] ; he is planning on starting oral chemo medication ; prescription was already called in  .      Continue to monitor in house today to ensure patient is stable with no recurrent bleeding prior to discharge.   GI and oncology following     Plan of care discussed in detail with patient and his family his wife  Discussed with patient's nurse.    Maryuri Burnette MD   4/3/2025

## 2025-04-03 NOTE — PLAN OF CARE
Problem: Chronic Conditions and Co-morbidities  Goal: Patient's chronic conditions and co-morbidity symptoms are monitored and maintained or improved  4/3/2025 1810 by Lizbeth Whyte RN  Outcome: Progressing     Problem: Discharge Planning  Goal: Discharge to home or other facility with appropriate resources  4/3/2025 1810 by Lizbeth Whyte RN  Outcome: Progressing     Problem: Safety - Adult  Goal: Free from fall injury  4/3/2025 1810 by Lizbeth Whyte RN  Outcome: Progressing     Problem: ABCDS Injury Assessment  Goal: Absence of physical injury  4/3/2025 1810 by Lizbeth Whyte RN  Outcome: Progressing

## 2025-04-03 NOTE — PROGRESS NOTES
ONCOLOGY HEMATOLOGY CARE PROGRESS NOTE      SUBJECTIVE:    Remains admitted. No fever. No bleeding. Completes iron today.      ROS:     Constitutional:  No weight loss, No fever, No chills, No night sweats.  Energy level good.  Eyes:  No impairment or change in vision  ENT / Mouth:  No pain, abnormal ulceration, bleeding, nasal drip or change in voice or hearing  Cardiovascular:  No chest pain, palpitations, new edema, or calf discomfort  Respiratory:  No pain, hemoptysis, change to breathing  Breast:  No pain, discharge, change in appearance or texture  Gastrointestinal:  No pain, cramping, jaundice, change to eating and bowel habits  Urinary:  No pain, bleeding or change in continence  Genitalia: No pain, bleeding or discharge  Musculoskeletal:  No redness, pain, edema or weakness  Skin:  No pruritus, rash, change to nodules or lesions  Neurologic:  No discomfort, change in mental status, speech, sensory or motor activity  Psychiatric:  No change in concentration or change to affect or mood  Endocrine:  No hot flashes, increased thirst, or change to urine production  Hematologic: No petechiae, ecchymosis or bleeding  Lymphatic:  No lymphadenopathy or lymphedema  Allergy / Immunologic:  No eczema, hives, frequent or recurrent infections    OBJECTIVE        Physical    VITALS:  Patient Vitals for the past 24 hrs:   BP Temp Temp src Pulse Resp SpO2 Weight   04/03/25 0444 -- -- -- -- -- -- 68.2 kg (150 lb 6.4 oz)   04/03/25 0204 110/62 98.4 °F (36.9 °C) Oral 82 18 95 % --   04/02/25 2012 -- -- -- 72 18 92 % --   04/02/25 1943 (!) 100/47 98.5 °F (36.9 °C) Oral 84 16 94 % --   04/02/25 1650 (!) 101/54 98.4 °F (36.9 °C) Oral 74 16 94 % --   04/02/25 1055 (!) 105/56 98.1 °F (36.7 °C) Oral 74 16 94 % --   04/02/25 0811 -- -- -- 76 18 94 % --       24HR INTAKE/OUTPUT:    Intake/Output Summary (Last 24 hours) at 4/3/2025 0757  Last data filed at 4/3/2025 0444  Gross per 24 hour   Intake  Labs     04/03/25  0558 04/01/25  1106 03/31/25  0618 03/28/25  0611 03/27/25  0652 03/26/25  0641 03/25/25  1148 03/24/25  1254 03/15/25  1234   * 134* 137 137 135* 136   < > 132* 136   K 3.7 4.1 3.1* 4.0 3.9 3.8   < > 3.2* 3.4*    100 105 105 104 105   < > 97* 99   CO2 23 23 24 23 22 23   < > 27 26   GLUCOSE 114* 180* 98 153* 122* 89   < > 85 100*   BUN 8 9 7 10 7 10   < > 7 7   CREATININE 0.3* 0.4* 0.3* 0.3* 0.3* 0.3*   < > 0.3* 0.4*   LABGLOM >90 >90 >90 >90 >90 >90   < > >90 >90   CALCIUM 8.0* 8.5 7.5* 7.3* 7.8* 7.2*   < > 8.3 8.2*   AGRATIO  --  0.9* 1.1  --  0.9* 1.0*   < > 1.2  --    BILITOT  --  0.9 0.7  --  0.8 1.5*   < > 0.8 0.5   ALKPHOS  --  374* 329*  --  289* 156*   < > 218* 219*   ALT  --  24 21  --  27 24   < > 26 22   AST  --  26 20  --  27 26   < > 27 25   MG  --   --  1.68*  --   --   --   --  1.72* 2.23    < > = values in this interval not displayed.       Lab Results   Component Value Date    CALCIUM 8.0 (L) 04/03/2025    PHOS 4.0 07/03/2023       LDH:No results for input(s): \"LDH\" in the last 720 hours.    Radiology Review:  XR ABDOMEN (KUB) (SINGLE AP VIEW)  Narrative: EXAMINATION:  ONE SUPINE XRAY VIEW(S) OF THE ABDOMEN    4/2/2025 4:28 pm    COMPARISON:  None.    HISTORY:  ORDERING SYSTEM PROVIDED HISTORY: abdominal distention, reassess gastric and  jejunal distention  TECHNOLOGIST PROVIDED HISTORY:  Reason for exam:->abdominal distention, reassess gastric and jejunal  distention  Reason for Exam: abdominal distention, reassess gastric and jejunal distention    FINDINGS:  Stomach does not appear to be distended.  2 drains are in the upper abdomen.  Gas in the small bowel but no small bowel dilation.  No displacement of the  bowel loops.  Impression: Nonobstructive bowel gas pattern.  US GUIDED PARACENTESIS  Narrative: PROCEDURE:  PARACENTESIS WITH IMAGE GUIDANCE    US ABDOMEN LIMITED    4/2/2025    HISTORY:  ORDERING SYSTEM PROVIDED HISTORY: ascites, abdominal

## 2025-04-03 NOTE — PROGRESS NOTES
04/03/25 0800   RT Protocol   History Pulmonary Disease 2   Respiratory pattern 0   Breath sounds 2   Cough 0   Indications for Bronchodilator Therapy Decreased or absent breath sounds   Bronchodilator Assessment Score 4

## 2025-04-03 NOTE — PROGRESS NOTES
increased work of breathing using Per Protocol order mode.        4-6 - enter or revise RT Bronchodilator order(s) to two equivalent RT bronchodilator orders with one order with BID Frequency and one order with Frequency of every 4 hours PRN wheezing or increased work of breathing using Per Protocol order mode.        7-10 - enter or revise RT Bronchodilator order(s) to two equivalent RT bronchodilator orders with one order with TID Frequency and one order with Frequency of every 4 hours PRN wheezing or increased work of breathing using Per Protocol order mode.       11-13 - enter or revise RT Bronchodilator order(s) to one equivalent RT bronchodilator order with QID Frequency and an Albuterol order with Frequency of every 4 hours PRN wheezing or increased work of breathing using Per Protocol order mode.      Greater than 13 - enter or revise RT Bronchodilator order(s) to one equivalent RT bronchodilator order with every 4 hours Frequency and an Albuterol order with Frequency of every 2 hours PRN wheezing or increased work of breathing using Per Protocol order mode.         Electronically signed by Palmira Graves RCP on 4/2/2025 at 8:13 PM

## 2025-04-03 NOTE — PROGRESS NOTES
/62   Pulse 82   Temp 98.4 °F (36.9 °C) (Oral)   Resp 18   Ht 1.778 m (5' 10\")   Wt 68.2 kg (150 lb 6.4 oz)   SpO2 95%   BMI 21.58 kg/m²   Pt resting in bed, no s/s of distress. Pt denies pain. Bed locked and in lowest position. Alarm on for safety. Bedside table and call light within reach. All needs met, pt denies needs at this time.

## 2025-04-04 VITALS
HEIGHT: 70 IN | SYSTOLIC BLOOD PRESSURE: 121 MMHG | HEART RATE: 75 BPM | DIASTOLIC BLOOD PRESSURE: 69 MMHG | WEIGHT: 150.4 LBS | RESPIRATION RATE: 18 BRPM | TEMPERATURE: 97.6 F | OXYGEN SATURATION: 98 % | BODY MASS INDEX: 21.53 KG/M2

## 2025-04-04 LAB
ANION GAP SERPL CALCULATED.3IONS-SCNC: 9 MMOL/L (ref 3–16)
BASOPHILS # BLD: 0 K/UL (ref 0–0.2)
BASOPHILS NFR BLD: 1.3 %
BUN SERPL-MCNC: 6 MG/DL (ref 7–20)
CALCIUM SERPL-MCNC: 7.5 MG/DL (ref 8.3–10.6)
CHLORIDE SERPL-SCNC: 107 MMOL/L (ref 99–110)
CO2 SERPL-SCNC: 23 MMOL/L (ref 21–32)
CREAT SERPL-MCNC: 0.4 MG/DL (ref 0.8–1.3)
DEPRECATED RDW RBC AUTO: 26.2 % (ref 12.4–15.4)
EOSINOPHIL # BLD: 0 K/UL (ref 0–0.6)
EOSINOPHIL NFR BLD: 0.9 %
GFR SERPLBLD CREATININE-BSD FMLA CKD-EPI: >90 ML/MIN/{1.73_M2}
GLUCOSE BLD-MCNC: 193 MG/DL (ref 70–99)
GLUCOSE BLD-MCNC: 58 MG/DL (ref 70–99)
GLUCOSE BLD-MCNC: 60 MG/DL (ref 70–99)
GLUCOSE BLD-MCNC: 90 MG/DL (ref 70–99)
GLUCOSE SERPL-MCNC: 82 MG/DL (ref 70–99)
HCT VFR BLD AUTO: 22 % (ref 40.5–52.5)
HGB BLD-MCNC: 7.2 G/DL (ref 13.5–17.5)
LYMPHOCYTES # BLD: 0.4 K/UL (ref 1–5.1)
LYMPHOCYTES NFR BLD: 11.6 %
MAGNESIUM SERPL-MCNC: 1.81 MG/DL (ref 1.8–2.4)
MCH RBC QN AUTO: 29.7 PG (ref 26–34)
MCHC RBC AUTO-ENTMCNC: 32.9 G/DL (ref 31–36)
MCV RBC AUTO: 90.3 FL (ref 80–100)
MONOCYTES # BLD: 0.3 K/UL (ref 0–1.3)
MONOCYTES NFR BLD: 9.1 %
NEUTROPHILS # BLD: 2.5 K/UL (ref 1.7–7.7)
NEUTROPHILS NFR BLD: 77.1 %
PERFORMED ON: ABNORMAL
PERFORMED ON: NORMAL
PLATELET # BLD AUTO: 163 K/UL (ref 135–450)
PMV BLD AUTO: 6.7 FL (ref 5–10.5)
POTASSIUM SERPL-SCNC: 3.3 MMOL/L (ref 3.5–5.1)
RBC # BLD AUTO: 2.43 M/UL (ref 4.2–5.9)
SODIUM SERPL-SCNC: 139 MMOL/L (ref 136–145)
WBC # BLD AUTO: 3.3 K/UL (ref 4–11)

## 2025-04-04 PROCEDURE — 85025 COMPLETE CBC W/AUTO DIFF WBC: CPT

## 2025-04-04 PROCEDURE — 94761 N-INVAS EAR/PLS OXIMETRY MLT: CPT

## 2025-04-04 PROCEDURE — 6370000000 HC RX 637 (ALT 250 FOR IP): Performed by: INTERNAL MEDICINE

## 2025-04-04 PROCEDURE — 2500000003 HC RX 250 WO HCPCS: Performed by: INTERNAL MEDICINE

## 2025-04-04 PROCEDURE — 94640 AIRWAY INHALATION TREATMENT: CPT

## 2025-04-04 PROCEDURE — 2580000003 HC RX 258

## 2025-04-04 PROCEDURE — 83735 ASSAY OF MAGNESIUM: CPT

## 2025-04-04 PROCEDURE — 80048 BASIC METABOLIC PNL TOTAL CA: CPT

## 2025-04-04 PROCEDURE — 6360000002 HC RX W HCPCS

## 2025-04-04 RX ORDER — ONDANSETRON 4 MG/1
4 TABLET, ORALLY DISINTEGRATING ORAL EVERY 8 HOURS PRN
Qty: 30 TABLET | Refills: 0 | Status: SHIPPED | OUTPATIENT
Start: 2025-04-04

## 2025-04-04 RX ORDER — PHYTONADIONE 5 MG/1
2.5 TABLET ORAL DAILY
Qty: 15 TABLET | Refills: 0 | Status: SHIPPED | OUTPATIENT
Start: 2025-04-04

## 2025-04-04 RX ORDER — FERROUS SULFATE 325(65) MG
325 TABLET ORAL 2 TIMES DAILY
Qty: 60 TABLET | Refills: 1 | Status: SHIPPED | OUTPATIENT
Start: 2025-04-04

## 2025-04-04 RX ADMIN — ALBUTEROL SULFATE 2 PUFF: 90 AEROSOL, METERED RESPIRATORY (INHALATION) at 08:42

## 2025-04-04 RX ADMIN — DICYCLOMINE HYDROCHLORIDE 20 MG: 20 TABLET ORAL at 09:52

## 2025-04-04 RX ADMIN — INSULIN HUMAN 20 UNITS: 100 INJECTION, SUSPENSION SUBCUTANEOUS at 06:04

## 2025-04-04 RX ADMIN — PANCRELIPASE LIPASE, PANCRELIPASE PROTEASE, PANCRELIPASE AMYLASE 75000 UNITS: 20000; 63000; 84000 CAPSULE, DELAYED RELEASE ORAL at 09:52

## 2025-04-04 RX ADMIN — Medication 400 MG: at 09:52

## 2025-04-04 RX ADMIN — SODIUM CHLORIDE, PRESERVATIVE FREE 10 ML: 5 INJECTION INTRAVENOUS at 09:54

## 2025-04-04 RX ADMIN — FAMOTIDINE 20 MG: 20 TABLET, FILM COATED ORAL at 09:52

## 2025-04-04 RX ADMIN — PANCRELIPASE LIPASE, PANCRELIPASE PROTEASE, PANCRELIPASE AMYLASE 75000 UNITS: 20000; 63000; 84000 CAPSULE, DELAYED RELEASE ORAL at 12:48

## 2025-04-04 RX ADMIN — POTASSIUM BICARBONATE 20 MEQ: 782 TABLET, EFFERVESCENT ORAL at 09:52

## 2025-04-04 RX ADMIN — PANTOPRAZOLE SODIUM 40 MG: 40 INJECTION, POWDER, LYOPHILIZED, FOR SOLUTION INTRAVENOUS at 09:52

## 2025-04-04 ASSESSMENT — PAIN SCALES - GENERAL: PAINLEVEL_OUTOF10: 0

## 2025-04-04 NOTE — PROGRESS NOTES
Ordered blood draw through accessed port, sterile technique, patient tolerated well. Sent to lab, area clean dry and intact.

## 2025-04-04 NOTE — PROGRESS NOTES
Pt given written and verbal discharge instructions. Pt indicated understanding of home medication and care instructions. Pt packed own belongings. Pt taken down to family car via wheelchair.

## 2025-04-04 NOTE — PROGRESS NOTES
Transferred care to PRINCE Nieves. Face to face bedside report given, no need voiced at this time. Pt awake in bed. No signs of distress noted. Call light within reach. Denies needs.

## 2025-04-04 NOTE — PLAN OF CARE
Problem: Chronic Conditions and Co-morbidities  Goal: Patient's chronic conditions and co-morbidity symptoms are monitored and maintained or improved  Outcome: Adequate for Discharge     Problem: Discharge Planning  Goal: Discharge to home or other facility with appropriate resources  Outcome: Adequate for Discharge     Problem: Safety - Adult  Goal: Free from fall injury  Outcome: Adequate for Discharge     Problem: ABCDS Injury Assessment  Goal: Absence of physical injury  Outcome: Adequate for Discharge     Problem: Metabolic/Fluid and Electrolytes - Adult  Goal: Electrolytes maintained within normal limits  Outcome: Adequate for Discharge  Goal: Hemodynamic stability and optimal renal function maintained  Outcome: Adequate for Discharge  Goal: Glucose maintained within prescribed range  Outcome: Adequate for Discharge     Problem: Hematologic - Adult  Goal: Maintains hematologic stability  Outcome: Adequate for Discharge     Problem: Skin/Tissue Integrity - Adult  Goal: Skin integrity remains intact  Outcome: Adequate for Discharge  Goal: Incisions, wounds, or drain sites healing without S/S of infection  Outcome: Adequate for Discharge     Problem: Musculoskeletal - Adult  Goal: Return mobility to safest level of function  Outcome: Adequate for Discharge     Problem: Gastrointestinal - Adult  Goal: Minimal or absence of nausea and vomiting  Outcome: Adequate for Discharge  Goal: Maintains adequate nutritional intake  Outcome: Adequate for Discharge

## 2025-04-04 NOTE — FLOWSHEET NOTE
03/28/25 0808   Vital Signs   Temp 97.9 °F (36.6 °C)   Temp Source Oral   Pulse 77   Heart Rate Source Monitor   Respirations 15   /61   MAP (Calculated) 75   BP Location Right upper arm   BP Method Automatic   Patient Position Semi swetalers   Pain Assessment   Pain Assessment None - Denies Pain   Opioid-Induced Sedation   POSS Score 1   RASS   Leiva Agitation Sedation Scale (RASS) 0   Oxygen Therapy   SpO2 97 %   O2 Device None (Room air)     Alert and oriented. Skin w/d. Resp ee unlabored. IV Meds given. Nrsg asmt completed. See flow sheet and Mar. No ss distress noted. Call light in easy reach. Patient NPO for procedure.  Telemetry in place per order. Family at bedside. No ss distress noted.   
   03/28/25 2054   Vital Signs   Temp 98 °F (36.7 °C)   Temp Source Oral   Pulse 77   Heart Rate Source Monitor   Respirations 16   BP (!) 108/53   MAP (Calculated) 71   BP Location Left upper arm   BP Method Automatic   Patient Position Semi fowlers   Biliary Tube 03/28/25 Cook slip coat 12 fr LUQ   Placement Date/Time: 03/28/25 0926   Present on Admission/Arrival: No  Description (optional): Exchange 12 Fr  Inserted by: Leonardo SHARMA  Tube Type: Cook slip coat  Tube Size (fr): 12 fr  Location: LUQ   Drainage Color Other (Comment)  (brown/red)   Drain Status Other (comment)  (leg bag)   Output (mL) 75 ml   Biliary Tube 03/28/25 Cook slip coat 12 fr RUQ   Placement Date/Time: 03/28/25 0925   Present on Admission/Arrival: No  Description (optional): Exchange 12 Fr  Inserted by: Jesus Manuel Patterson DO  Tube Type: Cook slip coat  Tube Size (fr): 12 fr  Location: RUQ   Drainage Color Other (Comment)  (browm/red)   Drain Status Other (comment)  (leg bag)   Output (mL) 125 ml     Pt awake/alert and oriented times 4. Biliary tubes bilat flushed with NS per order. Pt denies pain. Joselyn Martinez RN    
   03/29/25 0157   Vital Signs   Temp 97.9 °F (36.6 °C)   Temp Source Oral   Pulse 76   Heart Rate Source Monitor   Respirations 16   /67   MAP (Calculated) 81   BP Location Left upper arm   BP Method Automatic   Patient Position Semi fowlers   Pain Assessment   Pain Assessment None - Denies Pain   Opioid-Induced Sedation   POSS Score 1   Oxygen Therapy   SpO2 97 %   O2 Device None (Room air)     FSBS 118, pt awake/alert. Denies pain. Joselyn Martinez, RN    
   03/29/25 1930   Vital Signs   Temp 98.2 °F (36.8 °C)   Temp Source Oral   Pulse 76   Heart Rate Source Monitor   Respirations 16   BP (!) 109/56   MAP (Calculated) 74   BP Location Left upper arm   BP Method Automatic   Patient Position Semi fowlers   Oxygen Therapy   SpO2 96 %   O2 Device None (Room air)     Pt c/o abd pain at drain sites and right leg pain. Oxycodone 1 po given. Joselyn Martinez RN    
   03/30/25 0140   Vital Signs   Temp 97.8 °F (36.6 °C)   Temp Source Oral   Pulse 68   Heart Rate Source Monitor   Respirations 16   BP (!) 108/54   MAP (Calculated) 72   BP Location Left upper arm   BP Method Automatic   Patient Position Semi fowlers   Pain Assessment   Pain Assessment None - Denies Pain   Opioid-Induced Sedation   POSS Score 1   Oxygen Therapy   SpO2 98 %   O2 Device None (Room air)     Pt resting in bed, no acute distress. Joselyn Martinez, RN    
   03/30/25 1638   Vital Signs   Temp 98.1 °F (36.7 °C)   Temp Source Oral   Pulse 74   Heart Rate Source Monitor   Respirations 16   /65   MAP (Calculated) 80   BP Location Left upper arm   BP Method Automatic   Patient Position Semi wlers   Pain Assessment   Pain Assessment None - Denies Pain   Pain Level 0   Opioid-Induced Sedation   POSS Score 1   Oxygen Therapy   SpO2 97 %   O2 Device None (Room air)     Pt VS as shown above.  
   03/30/25 2015   Vital Signs   Temp 98 °F (36.7 °C)   Temp Source Oral   Pulse 79   Heart Rate Source Monitor   Respirations 17   /67   MAP (Calculated) 83   BP Location Left upper arm   BP Method Automatic   Patient Position Sitting   Pain Assessment   Pain Assessment None - Denies Pain   Non-Pharmaceutical Pain Intervention(s) Elevation;Repositioned   Opioid-Induced Sedation   POSS Score 1   Oxygen Therapy   SpO2 97 %   O2 Device None (Room air)     Pt awake/alert and oriented times 4. Denies pain. Bili drains times 2 in place and draining well. No blood noted. Joselyn Martinez, RN    
   03/31/25 0409   Vital Signs   Temp 98.2 °F (36.8 °C)   Temp Source Oral   Pulse 71   Heart Rate Source Monitor   Respirations 17   /71   MAP (Calculated) 85   MAP (mmHg) 84   BP Location Left upper arm   Patient Position Supine   Oxygen Therapy   SpO2 98 %   O2 Device None (Room air)     Pt resting in bed, no acute distress. Joselyn Martinez, RN    
   03/31/25 0830   Vital Signs   Temp 98.1 °F (36.7 °C)   Temp Source Oral   Pulse 89   Heart Rate Source Monitor   Respirations 16   /73   MAP (Calculated) 90   BP Location Left upper arm   BP Method Automatic   Patient Position Sitting   Pain Assessment   Pain Assessment None - Denies Pain   Opioid-Induced Sedation   POSS Score 1   RASS   Leiva Agitation Sedation Scale (RASS) 0   Oxygen Therapy   SpO2 97 %   O2 Device None (Room air)     Pt a/o. Am assessment completed see flow sheet. Pt denies any pain/ needs at this time. Call light within reach. Will continue to monitor.     Right biliary tube clogged with clot  
   03/31/25 2015   Vital Signs   Temp 97 °F (36.1 °C)   Temp Source Oral   Pulse 82   Heart Rate Source Monitor   Respirations 18   /68   MAP (Calculated) 86   BP Location Left upper arm   BP Method Automatic   Patient Position High fowlers   Pain Assessment   Pain Assessment 0-10   Pain Level 5   Patient's Stated Pain Goal 0 - No pain   Pain Location Abdomen   Pain Orientation Right;Mid;Left   Pain Descriptors Cramping   Functional Pain Assessment Activities are not prevented   Pain Type Chronic pain   Pain Frequency Continuous   Pain Onset Progressive   Non-Pharmaceutical Pain Intervention(s) Repositioned   Opioid-Induced Sedation   POSS Score 1   Oxygen Therapy   SpO2 96 %   O2 Device None (Room air)     Shift assessment is complete. The pt is A&O x 4, The pt denies any further needs at this time. The pt call light and personal items are with in reach. The bed alarm is on. Will continue to monitor.   
   04/03/25 0830   Vital Signs   Temp 98.2 °F (36.8 °C)   Temp Source Oral   Pulse 81   Heart Rate Source Monitor   Respirations 18   /72   MAP (Calculated) 90   BP Location Left upper arm   BP Method Automatic   Patient Position Semi surinderwlers   Pain Assessment   Pain Assessment 0-10   Pain Level 0   Opioid-Induced Sedation   POSS Score 1   Oxygen Therapy   SpO2 96 %   O2 Device None (Room air)     AM assessment completed. No complaints of pain or discomfort voiced. No signs or symptoms of distress noted. Patient tolerated AM medications well. Respirations easy and even. Bed in lowest position, bed alarm in place and functioning properly, bed rails x2 up,  Call light within reach.     Bedside Mobility Assessment Tool (BMAT):     Assessment Level 1- Sit and Shake    1. From a semi-reclined position, ask patient to sit up and rotate to a seated position at the side of the bed. Can use the bedrail.    2. Ask patient to reach out and grab your hand and shake making sure patient reaches across his/her midline.   Pass- Patient is able to come to a seated position, maintain core strength. Maintains seated balance while reaching across midline. Move on to Assessment Level 2.     Assessment Level 2- Stretch and Point   1. With patient in seated position at the side of the bed, have patient place both feet on the floor (or stool) with knees no higher than hips.    2. Ask patient to stretch one leg and straighten the knee, then bend the ankle/flex and point the toes. If appropriate, repeat with the other leg.   Pass- Patient is able to demonstrate appropriate quad strength on intended weight bearing limb(s). Move onto Assessment Level 3.     Assessment Level 3- Stand   1. Ask patient to elevate off the bed or chair (seated to standing) using an assistive device (cane, bedrail).    2. Patient should be able to raise buttocks off be and hold for a count of five. May repeat once.   Pass- Patient maintains standing stability 
   04/03/25 1928   Vital Signs   Temp 97.3 °F (36.3 °C)   Temp Source Oral   Pulse 78   Heart Rate Source Monitor   Respirations 18   /65   MAP (Calculated) 81   BP Location Left upper arm   BP Method Automatic   Patient Position Semi fowlers   Oxygen Therapy   SpO2 98 %   O2 Device None (Room air)     PM assessment obtained, Patient lying in bed, resting, a/o x 4, verbal, speech clear, able to engage in conversation with logical responses. Denies any complaints, All medications given and taken without difficulty, right side chest implantable port checked, flushed, clean/dry/intact surrounding skin race appropriate. Bilat biliary tubes pat/draining appropriately. Surrounding skin w/d/I. Patient states may go home tomorrow if HgB stable, nurse advised lab draw in am. No further issues. Call light within reach.   
   04/04/25 0204   Vital Signs   Temp 97.8 °F (36.6 °C)   Temp Source Oral   Pulse 81   Heart Rate Source Monitor   Respirations 18   /67   MAP (Calculated) 84   BP Location Left upper arm   BP Method Automatic   Patient Position Semi fowlers   Oxygen Therapy   SpO2 97 %   O2 Device None (Room air)     Second PM assessment obtained, patient resting in bed with eyes closed, respirations even/easy, 16, call light with in reach and bedside table near.  
  Pass- Patient maintains standing stability for at least 5 seconds, proceed to assessment level 4.    Assessment Level 4- Walk   1. Ask patient to march in place at bedside.    2. Then ask patient to advance step and return each foot. Some medical conditions may render a patient from stepping backwards, use your best clinical judgement.   Fail- Patient not able to complete tasks OR requires use of assistive device. Patient is MOBILITY LEVEL 3.       Mobility Level- 3

## 2025-04-04 NOTE — PLAN OF CARE
Problem: Chronic Conditions and Co-morbidities  Goal: Patient's chronic conditions and co-morbidity symptoms are monitored and maintained or improved  4/3/2025 2333 by Tracy Mo RN  Outcome: Progressing  4/3/2025 1810 by Lizbeth Whyte RN  Outcome: Progressing     Problem: Discharge Planning  Goal: Discharge to home or other facility with appropriate resources  4/3/2025 2333 by Tracy Mo RN  Outcome: Progressing  4/3/2025 1810 by Lizbeth Whyte RN  Outcome: Progressing     Problem: Safety - Adult  Goal: Free from fall injury  4/3/2025 2333 by Tracy Mo RN  Outcome: Progressing  4/3/2025 1810 by Lizbeth Whyte RN  Outcome: Progressing     Problem: ABCDS Injury Assessment  Goal: Absence of physical injury  4/3/2025 2333 by Tracy Mo RN  Outcome: Progressing  4/3/2025 1810 by Lizbeth Whyte RN  Outcome: Progressing     Problem: Metabolic/Fluid and Electrolytes - Adult  Goal: Electrolytes maintained within normal limits  Outcome: Progressing  Goal: Hemodynamic stability and optimal renal function maintained  Outcome: Progressing  Goal: Glucose maintained within prescribed range  Outcome: Progressing     Problem: Hematologic - Adult  Goal: Maintains hematologic stability  Outcome: Progressing     Problem: Pain  Goal: Verbalizes/displays adequate comfort level or baseline comfort level  Outcome: Progressing     Problem: Skin/Tissue Integrity - Adult  Goal: Skin integrity remains intact  Outcome: Progressing  Goal: Incisions, wounds, or drain sites healing without S/S of infection  Outcome: Progressing     Problem: Musculoskeletal - Adult  Goal: Return mobility to safest level of function  Outcome: Progressing     Problem: Gastrointestinal - Adult  Goal: Minimal or absence of nausea and vomiting  Outcome: Progressing  Goal: Maintains adequate nutritional intake  Outcome: Progressing     Problem: Infection - Adult  Goal: Absence of infection at discharge  Outcome: Progressing

## 2025-04-04 NOTE — PROGRESS NOTES
RT Inhaler-Nebulizer Bronchodilator Protocol Note    There is a bronchodilator order in the chart from a provider indicating to follow the RT Bronchodilator Protocol and there is an “Initiate RT Inhaler-Nebulizer Bronchodilator Protocol” order as well (see protocol at bottom of note).    CXR Findings:  No results found.    The findings from the last RT Protocol Assessment were as follows:   History Pulmonary Disease: Chronic pulmonary disease  Respiratory Pattern: Regular pattern and RR 12-20 bpm  Breath Sounds: Slightly diminished and/or crackles  Cough: Strong, spontaneous, non-productive  Indication for Bronchodilator Therapy: Decreased or absent breath sounds  Bronchodilator Assessment Score: 4    Aerosolized bronchodilator medication orders have been revised according to the RT Inhaler-Nebulizer Bronchodilator Protocol below.    Respiratory Therapist to perform RT Therapy Protocol Assessment initially then follow the protocol.  Repeat RT Therapy Protocol Assessment PRN for score 0-3 or on second treatment, BID, and PRN for scores above 3.    No Indications - adjust the frequency to every 6 hours PRN wheezing or bronchospasm, if no treatments needed after 48 hours then discontinue using Per Protocol order mode.     If indication present, adjust the RT bronchodilator orders based on the Bronchodilator Assessment Score as indicated below.  Use Inhaler orders unless patient has one or more of the following: on home nebulizer, not able to hold breath for 10 seconds, is not alert and oriented, cannot activate and use MDI correctly, or respiratory rate 25 breaths per minute or more, then use the equivalent nebulizer order(s) with same Frequency and PRN reasons based on the score.  If a patient is on this medication at home then do not decrease Frequency below that used at home.    0-3 - enter or revise RT bronchodilator order(s) to equivalent RT Bronchodilator order with Frequency of every 4 hours PRN for wheezing or  increased work of breathing using Per Protocol order mode.        4-6 - enter or revise RT Bronchodilator order(s) to two equivalent RT bronchodilator orders with one order with BID Frequency and one order with Frequency of every 4 hours PRN wheezing or increased work of breathing using Per Protocol order mode.        7-10 - enter or revise RT Bronchodilator order(s) to two equivalent RT bronchodilator orders with one order with TID Frequency and one order with Frequency of every 4 hours PRN wheezing or increased work of breathing using Per Protocol order mode.       11-13 - enter or revise RT Bronchodilator order(s) to one equivalent RT bronchodilator order with QID Frequency and an Albuterol order with Frequency of every 4 hours PRN wheezing or increased work of breathing using Per Protocol order mode.      Greater than 13 - enter or revise RT Bronchodilator order(s) to one equivalent RT bronchodilator order with every 4 hours Frequency and an Albuterol order with Frequency of every 2 hours PRN wheezing or increased work of breathing using Per Protocol order mode.       Electronically signed by Jennifer Cheatham RCP on 4/3/2025 at 8:16 PM

## 2025-04-04 NOTE — CARE COORDINATION
Met with pt at bedside. Pt to dc home today. Family to transport pt. Spouse at bedside. Called and updated [facility] for [facility]. Updated on need to order supplies for pt biliary drain care. Staff to call pt and schedule MAXI date and time. No further DC or DME needs identified.     IMM- 4/4    O2- 99% RA

## 2025-04-04 NOTE — PROGRESS NOTES
04/04/25 0800   RT Protocol   History Pulmonary Disease 2   Respiratory pattern 0   Breath sounds 2   Cough 0   Indications for Bronchodilator Therapy Decreased or absent breath sounds   Bronchodilator Assessment Score 4

## 2025-04-04 NOTE — PROGRESS NOTES
IM Progress Note    Admit Date:  3/24/2025    Admitted for bloody drainage in hepatic drain.    Patient has history of liver cancer getting chemotherapy from [facility] - Dr. Aldrich .  He got his hepatic drain replaced here at IR last week missed his chemo dose last week; was due again for chemo yesterday but then started having bloody drainage from his hepatic drain and hence came back to [place].  Patient was evaluated by IR physician at the ER .  Seen by IR again and hepatic drains irrigated - Not replaced this time    S/p 2 units of PRBC transfusion on admission, for symptomatic anemia with dizziness and orthostasis.    3/27  Patient started having recurrent bleeding from the hepatic drains.   2 more units of PRBC transfused and 1 unit of fresh frozen plasma given.    3/28 back to IR-s/p replacement of both hepatic drains    3/29-drop in hemoglobin again , 1  more unit of PRBC transfused    3/30-hemoglobin stable today.   No more bleeding from hepatic drains.   Did have rectal bleeding again earlier today.  Continue to monitor     3/31- had bloody drainage from hepatic drain gain      4/1-hepatic drain draining bile no bleeding today.  But having hematuria.  Complains of some abdominal distention, passing gas.   CT abdomen and abdominal x-ray completed.-Showed ascites  Hemoglobin stable     4/2- s/p paracentesis . 1.7 L of ascitic fluid drained     4/3, 4/4   Subjective:  Mr. Ceja seen.    Still s/p exchange of both hepatic drains  3/28.  They are large bore drains now, draining large amount of bile,  no bloody drainage noted today.  He is awake and oriented and in no distress.  Abdomen is less distended since his paracentesis yesterday, not tender.  Passing flatus and having BM; no nausea or vomiting.  he did have hematuria yesterday - improved today    Getting IV iron, hemoglobin stable    Continue current management    He is hoping to get better soon and get home to resume chemo.  He is  Status: Canceled Specimen: Stool     GI Bacterial Pathogens By PCR [[id number]] Collected: 03/29/25 0750    Order Status: Completed Specimen: Stool Updated: 03/30/25 2251     GI Bacterial Pathogens By PCR --     No Shigella spp/EIEC DNA detected  No Shiga toxin-producing gene(s) detected  No Campylobacter spp. (jejuni and coli)DNA detected  No Salmonella spp. DNA detected  No Vibrio vulnificus/parahaemolyticus/cholerae DNA detected  No Plesiomonas shigelloides DNA detected  No Enterotoxigenic E. coli (ETEC) DNA detected  No Yersinia enterocolitica DNA detected  Normal Range:  None detected      Narrative:      ORDER#: [id number]                          ORDERED BY: GURWINDER CABALLERO  SOURCE: Stool                              COLLECTED:  03/29/25 07:50  ANTIBIOTICS AT [facility].:                      RECEIVED :  03/29/25 18:50    GI Bacterial Pathogens By PCR [[id number]]     Order Status: Canceled Specimen: Stool     Clostridium difficile toxin/antigen [[id number]]     Order Status: Canceled Specimen: Stool     Culture, Blood 1 [[id number]] Collected: 03/25/25 1148    Order Status: Completed Specimen: Blood Updated: 03/29/25 1215     Blood Culture, Routine No Growth after 4 days of incubation.    Narrative:      ORDER#: [id number]                          ORDERED BY: TERRANCE OWENS  SOURCE: Blood                              COLLECTED:  03/25/25 11:48  ANTIBIOTICS AT [facility].:                      RECEIVED :  03/25/25 11:57  If child <=2 yrs old please draw pediatric bottle.~Blood Culture 1    Culture, Blood 2 [[id number]] Collected: 03/25/25 1148    Order Status: Completed Specimen: Blood Updated: 03/29/25 1215     Culture, Blood 2 No Growth after 4 days of incubation.    Narrative:      ORDER#: [id number]                          ORDERED BY: TERRANCE OWENS  SOURCE: Blood                              COLLECTED:  03/25/25 11:48  ANTIBIOTICS AT [facility].:                      RECEIVED :  03/25/25 11:56  If child <=2 yrs old please

## 2025-04-05 LAB — BACTERIA FLD AEROBE CULT: NORMAL

## 2025-04-07 ENCOUNTER — TELEPHONE (OUTPATIENT)
Dept: FAMILY MEDICINE CLINIC | Age: 75
End: 2025-04-07

## 2025-04-07 NOTE — DISCHARGE SUMMARY
Name:  Sharath Ceja  Room:  0232/0232-01  MRN:    [id number]    Discharge Summary      This discharge summary is in conjunction with a complete physical exam done on the day of discharge.    Discharging Physician: Dr. Burnette       Admit: 3/24/2025  Discharge:  4/4/2025    HPI taken from admission H&P:    74 y.o. male who presented to [facility] with past medical history of Crohn's disease, history of blood clots, liver abscess, pneumonia, GERD, type 1 diabetes presented to the ED with chief complaint of bloody drainage     Patient reported that the drainage was placed on Monday, on chart review placed on 03/17/2025, patient reported he was tolerating the procedure very well eating and drinking with no complication reported that today he woke up and around 9 AM noted that the drainage has become more bloody came here for further evaluation.  Patient denied having any cough phlegm production nausea vomiting abdominal pain or dysuria.  Patient does report having some fogginess that is intermittent no history of trauma falls numbness or weakness.  Patient was mass cancer is on chemotherapy follows up with oncology and GI.  In the ER,  IR was consulted with recommendation to observe overnight and BSS tomorrow by IR    Diagnoses this Admission and Hospital Course   Patient is s/p Whipple's procedure in 2022 for bile duct and pancreatic cancer subsequently completing chemotherapy.   Diagnosed with liver cancer about 6 months ago in 2024.  Ongoing chemotherapy at [facility].  Has hepatic drains in place, and these have needed to be replaced repeatedly due to blockage.   Most recently hepatic drains were replaced at our IR department at [facility] on 3/17/25.  Patient presented with bloody drainage from the hepatic drains.  Evaluated by IR here.        Postop biliary drain with suspect intra-abdominal hemorrhage.  Drains were placed last week  Stage IV intrahepatic cholangiocarcinoma s/p whipple

## 2025-04-07 NOTE — TELEPHONE ENCOUNTER
Care Transitions Initial Follow Up Call    Outreach made within 2 business days of discharge: Yes    Patient: Sharath Ceja Patient : 1950   MRN: [id number]  Reason for Admission: Intra Abdominal Hemorrhage  Discharge Date: 25       Spoke with: Sharath    Discharge department/facility: [facility] Interactive Patient Contact:  Was patient able to fill all prescriptions: Yes  Was patient instructed to bring all medications to the follow-up visit: Yes  Is patient taking all medications as directed in the discharge summary? Yes  Does patient understand their discharge instructions: Yes  Does patient have questions or concerns that need addressed prior to 7-14 day follow up office visit: no    Additional needs identified to be addressed with provider  No needs identified             Scheduled appointment with PCP within 7-14 days    Follow Up  Future Appointments   Date Time Provider Department Center   2025  1:00 PM Real Whitehead MD [facility] DEP   2025  1:00 PM Real Whitehead MD [facility] DEP       So Foster MA

## 2025-04-16 ENCOUNTER — OFFICE VISIT (OUTPATIENT)
Dept: FAMILY MEDICINE CLINIC | Age: 75
End: 2025-04-16

## 2025-04-16 VITALS
WEIGHT: 155 LBS | DIASTOLIC BLOOD PRESSURE: 62 MMHG | BODY MASS INDEX: 22.19 KG/M2 | HEART RATE: 65 BPM | SYSTOLIC BLOOD PRESSURE: 109 MMHG | OXYGEN SATURATION: 98 % | HEIGHT: 70 IN

## 2025-04-16 DIAGNOSIS — Z09 HOSPITAL DISCHARGE FOLLOW-UP: Primary | ICD-10-CM

## 2025-04-16 RX ORDER — ERLOTINIB 100 MG/1
100 TABLET, FILM COATED ORAL DAILY
COMMUNITY

## 2025-04-16 NOTE — PROGRESS NOTES
kidney disease    Biliary obstruction (HCC)    Bilateral calf pain    Dark urine    History of cholangiocarcinoma    Hypomagnesemia    Generalized abdominal pain    Acquired absence of other specified parts of digestive tract    Vitamin D deficiency    Anemia    Type 2 diabetes mellitus without complications    Elevated carcinoembryonic antigen    Loculated pleural effusion    Intra abdominal hemorrhage    Other specified carcinomas of liver    ABLA (acute blood loss anemia)    Orthostasis    Other ascites       Medications listed as ordered at the time of discharge from hospital     Medication List            Accurate as of April 16, 2025  1:14 PM. If you have any questions, ask your nurse or doctor.                CONTINUE taking these medications      albuterol sulfate  (90 Base) MCG/ACT inhaler  Commonly known as: PROVENTIL;VENTOLIN;PROAIR  INHALE 2 PUFFS BY MOUTH 4 TIMES DAILY AS NEEDED FOR WHEEZING FOR SHORTNESS OF BREATH     allopurinol 300 MG tablet  Commonly known as: ZYLOPRIM  Take 1 tablet by mouth once daily     B-12 500 MCG Subl  Take 1,000 mg by mouth daily     dicyclomine 20 MG tablet  Commonly known as: BENTYL  Take 1 tablet by mouth once daily     ferrous sulfate 325 (65 Fe) MG tablet  Commonly known as: IRON 325  Take 1 tablet by mouth 2 times daily     finasteride 5 MG tablet  Commonly known as: PROSCAR     insulin  UNIT/ML injection vial  Commonly known as: HumuLIN N;NovoLIN N     ondansetron 4 MG disintegrating tablet  Commonly known as: ZOFRAN-ODT  Take 1 tablet by mouth every 8 hours as needed for Nausea or Vomiting     OneTouch Delica Plus Xyhyvv96E Misc  USE 1 TWICE DAILY     OneTouch Ultra strip  Generic drug: blood glucose test strips  USE 1 STRIP TO CHECK GLUCOSE TWICE DAILY     oxyCODONE 5 MG immediate release tablet  Commonly known as: ROXICODONE     pantoprazole 40 MG tablet  Commonly known as: PROTONIX  TAKE 1 TABLET BY MOUTH ONCE DAILY IN THE MORNING BEFORE BREAKFAST

## 2025-04-30 ENCOUNTER — OFFICE VISIT (OUTPATIENT)
Dept: FAMILY MEDICINE CLINIC | Age: 75
End: 2025-04-30
Payer: MEDICARE

## 2025-04-30 VITALS
BODY MASS INDEX: 21.67 KG/M2 | DIASTOLIC BLOOD PRESSURE: 63 MMHG | OXYGEN SATURATION: 97 % | HEART RATE: 78 BPM | WEIGHT: 151 LBS | SYSTOLIC BLOOD PRESSURE: 113 MMHG

## 2025-04-30 DIAGNOSIS — Z79.4 TYPE 2 DIABETES MELLITUS WITH STAGE 2 CHRONIC KIDNEY DISEASE, WITH LONG-TERM CURRENT USE OF INSULIN (HCC): Chronic | ICD-10-CM

## 2025-04-30 DIAGNOSIS — N18.2 TYPE 2 DIABETES MELLITUS WITH STAGE 2 CHRONIC KIDNEY DISEASE, WITH LONG-TERM CURRENT USE OF INSULIN (HCC): Chronic | ICD-10-CM

## 2025-04-30 DIAGNOSIS — E11.22 TYPE 2 DIABETES MELLITUS WITH STAGE 2 CHRONIC KIDNEY DISEASE, WITH LONG-TERM CURRENT USE OF INSULIN (HCC): Chronic | ICD-10-CM

## 2025-04-30 DIAGNOSIS — C22.1 CHOLANGIOCARCINOMA (HCC): Primary | ICD-10-CM

## 2025-04-30 DIAGNOSIS — K83.1 BILIARY OBSTRUCTION (HCC): ICD-10-CM

## 2025-04-30 PROCEDURE — 3074F SYST BP LT 130 MM HG: CPT | Performed by: FAMILY MEDICINE

## 2025-04-30 PROCEDURE — G8427 DOCREV CUR MEDS BY ELIG CLIN: HCPCS | Performed by: FAMILY MEDICINE

## 2025-04-30 PROCEDURE — 1036F TOBACCO NON-USER: CPT | Performed by: FAMILY MEDICINE

## 2025-04-30 PROCEDURE — 1123F ACP DISCUSS/DSCN MKR DOCD: CPT | Performed by: FAMILY MEDICINE

## 2025-04-30 PROCEDURE — 1111F DSCHRG MED/CURRENT MED MERGE: CPT | Performed by: FAMILY MEDICINE

## 2025-04-30 PROCEDURE — 1160F RVW MEDS BY RX/DR IN RCRD: CPT | Performed by: FAMILY MEDICINE

## 2025-04-30 PROCEDURE — 99213 OFFICE O/P EST LOW 20 MIN: CPT | Performed by: FAMILY MEDICINE

## 2025-04-30 PROCEDURE — 3044F HG A1C LEVEL LT 7.0%: CPT | Performed by: FAMILY MEDICINE

## 2025-04-30 PROCEDURE — 3017F COLORECTAL CA SCREEN DOC REV: CPT | Performed by: FAMILY MEDICINE

## 2025-04-30 PROCEDURE — 3078F DIAST BP <80 MM HG: CPT | Performed by: FAMILY MEDICINE

## 2025-04-30 PROCEDURE — G8420 CALC BMI NORM PARAMETERS: HCPCS | Performed by: FAMILY MEDICINE

## 2025-04-30 PROCEDURE — 1159F MED LIST DOCD IN RCRD: CPT | Performed by: FAMILY MEDICINE

## 2025-04-30 PROCEDURE — 2022F DILAT RTA XM EVC RTNOPTHY: CPT | Performed by: FAMILY MEDICINE

## 2025-04-30 RX ORDER — PANCRELIPASE 36000; 180000; 114000 [USP'U]/1; [USP'U]/1; [USP'U]/1
CAPSULE, DELAYED RELEASE PELLETS ORAL
COMMUNITY
Start: 2025-04-07

## 2025-04-30 NOTE — PROGRESS NOTES
Sharath Ceja (:  1950) is a 74 y.o. male,Established patient, here for evaluation of the following chief complaint(s):  Follow-up           Assessment & Plan  Cholangiocarcinoma (HCC)    Continue current treatment.  3-month follow-up.  Follow-up with oncology         Biliary obstruction (HCC)    Right same as above.         Type 2 diabetes mellitus with stage 2 chronic kidney disease, with long-term current use of insulin (HCC)    Will obtain lab work at next visit.  Continue current treatment.           Return in about 3 months (around 2025) for multiple medical conditions.       Subjective   HPI  74-year-old male here for follow-up visit.  Recent GI bleed hemoglobin is improving.  No new complaints at this time.  He has improved with home health maintaining his drainage tubes.    Review of Systems       Objective   Vitals:    25 1258   BP: 113/63   Pulse: 78   SpO2: 97%     Physical Exam  HENT:      Head: Normocephalic and atraumatic.   Eyes:      Extraocular Movements: Extraocular movements intact.   Cardiovascular:      Rate and Rhythm: Normal rate and regular rhythm.   Pulmonary:      Effort: Pulmonary effort is normal.      Breath sounds: No wheezing, rhonchi or rales.   Musculoskeletal:      Cervical back: Neck supple.   Neurological:      Mental Status: He is alert and oriented to person, place, and time.                This dictation was generated by a voice recognition computer software.  Although all attempts are made to edit the dictation for accuracy, there may be errors in the transcription that are not intended.    An electronic signature was used to authenticate this note.    --Real Whitehead MD

## 2025-05-27 DIAGNOSIS — Z79.4 TYPE 2 DIABETES MELLITUS WITHOUT COMPLICATION, WITH LONG-TERM CURRENT USE OF INSULIN (HCC): ICD-10-CM

## 2025-05-27 DIAGNOSIS — E11.9 TYPE 2 DIABETES MELLITUS WITHOUT COMPLICATION, WITH LONG-TERM CURRENT USE OF INSULIN (HCC): ICD-10-CM

## 2025-05-27 NOTE — TELEPHONE ENCOUNTER
Patient calling and asking for a refill on Humulin N.  Patient states he is taking 20 units bid and sometimes has to take more since doing chemo.

## 2025-06-16 ENCOUNTER — HOSPITAL ENCOUNTER (INPATIENT)
Age: 75
LOS: 2 days | Discharge: HOME | DRG: 436 | End: 2025-06-18
Attending: STUDENT IN AN ORGANIZED HEALTH CARE EDUCATION/TRAINING PROGRAM | Admitting: INTERNAL MEDICINE
Payer: MEDICARE

## 2025-06-16 ENCOUNTER — APPOINTMENT (OUTPATIENT)
Dept: CT IMAGING | Age: 75
DRG: 436 | End: 2025-06-16
Payer: MEDICARE

## 2025-06-16 ENCOUNTER — APPOINTMENT (OUTPATIENT)
Dept: GENERAL RADIOLOGY | Age: 75
DRG: 436 | End: 2025-06-16
Payer: MEDICARE

## 2025-06-16 DIAGNOSIS — K92.2 ACUTE GI BLEEDING: Primary | ICD-10-CM

## 2025-06-16 DIAGNOSIS — K21.9 GASTROESOPHAGEAL REFLUX DISEASE WITHOUT ESOPHAGITIS: ICD-10-CM

## 2025-06-16 LAB
ABO/RH: NORMAL
ALBUMIN SERPL-MCNC: 3 G/DL (ref 3.4–5)
ALBUMIN/GLOB SERPL: 1 {RATIO} (ref 1.1–2.2)
ALP SERPL-CCNC: 218 U/L (ref 40–129)
ALT SERPL-CCNC: 22 U/L (ref 10–40)
ANION GAP SERPL CALCULATED.3IONS-SCNC: 10 MMOL/L (ref 3–16)
ANTIBODY SCREEN: NORMAL
AST SERPL-CCNC: 24 U/L (ref 15–37)
BASE EXCESS BLDV CALC-SCNC: 3.2 MMOL/L (ref -3–3)
BASOPHILS # BLD: 0 K/UL (ref 0–0.2)
BASOPHILS NFR BLD: 0.4 %
BILIRUB SERPL-MCNC: 0.7 MG/DL (ref 0–1)
BILIRUB UR QL STRIP.AUTO: NEGATIVE
BUN SERPL-MCNC: 8 MG/DL (ref 7–20)
CALCIUM SERPL-MCNC: 8.3 MG/DL (ref 8.3–10.6)
CHLORIDE SERPL-SCNC: 101 MMOL/L (ref 99–110)
CLARITY UR: ABNORMAL
CO2 BLDV-SCNC: 28 MMOL/L
CO2 SERPL-SCNC: 24 MMOL/L (ref 21–32)
COHGB MFR BLDV: 2.5 % (ref 0–1.5)
COLOR UR: ABNORMAL
CREAT SERPL-MCNC: 0.4 MG/DL (ref 0.8–1.3)
DEPRECATED RDW RBC AUTO: 17 % (ref 12.4–15.4)
EKG ATRIAL RATE: 71 BPM
EKG DIAGNOSIS: NORMAL
EKG P AXIS: 42 DEGREES
EKG P-R INTERVAL: 186 MS
EKG Q-T INTERVAL: 400 MS
EKG QRS DURATION: 72 MS
EKG QTC CALCULATION (BAZETT): 434 MS
EKG R AXIS: 45 DEGREES
EKG T AXIS: 44 DEGREES
EKG VENTRICULAR RATE: 71 BPM
EOSINOPHIL # BLD: 0.1 K/UL (ref 0–0.6)
EOSINOPHIL NFR BLD: 1.5 %
GFR SERPLBLD CREATININE-BSD FMLA CKD-EPI: >90 ML/MIN/{1.73_M2}
GLUCOSE BLD-MCNC: 203 MG/DL (ref 70–99)
GLUCOSE BLD-MCNC: 78 MG/DL (ref 70–99)
GLUCOSE SERPL-MCNC: 104 MG/DL (ref 70–99)
GLUCOSE UR STRIP.AUTO-MCNC: NEGATIVE MG/DL
HCO3 BLDV-SCNC: 26.7 MMOL/L (ref 23–29)
HCT VFR BLD AUTO: 22.7 % (ref 40.5–52.5)
HEMOCCULT STL QL: NORMAL
HGB BLD-MCNC: 7.4 G/DL (ref 13.5–17.5)
HGB UR QL STRIP.AUTO: NEGATIVE
INR PPP: 1.41 (ref 0.86–1.14)
KETONES UR STRIP.AUTO-MCNC: NEGATIVE MG/DL
LACTATE BLDV-SCNC: 0.8 MMOL/L (ref 0.4–1.9)
LEUKOCYTE ESTERASE UR QL STRIP.AUTO: NEGATIVE
LIPASE SERPL-CCNC: 34 U/L (ref 13–60)
LYMPHOCYTES # BLD: 0.4 K/UL (ref 1–5.1)
LYMPHOCYTES NFR BLD: 10.6 %
MAGNESIUM SERPL-MCNC: 1.96 MG/DL (ref 1.8–2.4)
MCH RBC QN AUTO: 28.9 PG (ref 26–34)
MCHC RBC AUTO-ENTMCNC: 32.7 G/DL (ref 31–36)
MCV RBC AUTO: 88.6 FL (ref 80–100)
METHGB MFR BLDV: 0.3 %
MONOCYTES # BLD: 0.2 K/UL (ref 0–1.3)
MONOCYTES NFR BLD: 6.6 %
NEUTROPHILS # BLD: 2.7 K/UL (ref 1.7–7.7)
NEUTROPHILS NFR BLD: 80.9 %
NITRITE UR QL STRIP.AUTO: NEGATIVE
O2 CT VFR BLDV CALC: 9 VOL %
O2 THERAPY: ABNORMAL
PCO2 BLDV: 35.7 MMHG (ref 40–50)
PERFORMED ON: ABNORMAL
PERFORMED ON: NORMAL
PH BLDV: 7.49 [PH] (ref 7.35–7.45)
PH UR STRIP.AUTO: 7 [PH] (ref 5–8)
PLATELET # BLD AUTO: 135 K/UL (ref 135–450)
PMV BLD AUTO: 8 FL (ref 5–10.5)
PO2 BLDV: 50.5 MMHG (ref 25–40)
POTASSIUM SERPL-SCNC: 3.4 MMOL/L (ref 3.5–5.1)
PROT SERPL-MCNC: 6.1 G/DL (ref 6.4–8.2)
PROT UR STRIP.AUTO-MCNC: NEGATIVE MG/DL
PROTHROMBIN TIME: 17.4 SEC (ref 12.1–14.9)
RBC # BLD AUTO: 2.56 M/UL (ref 4.2–5.9)
SAO2 % BLDV: 89 %
SODIUM SERPL-SCNC: 135 MMOL/L (ref 136–145)
SP GR UR STRIP.AUTO: 1.01 (ref 1–1.03)
UA COMPLETE W REFLEX CULTURE PNL UR: ABNORMAL
UA DIPSTICK W REFLEX MICRO PNL UR: ABNORMAL
URN SPEC COLLECT METH UR: ABNORMAL
UROBILINOGEN UR STRIP-ACNC: 0.2 E.U./DL
WBC # BLD AUTO: 3.4 K/UL (ref 4–11)

## 2025-06-16 PROCEDURE — P9016 RBC LEUKOCYTES REDUCED: HCPCS

## 2025-06-16 PROCEDURE — 1200000000 HC SEMI PRIVATE

## 2025-06-16 PROCEDURE — 99285 EMERGENCY DEPT VISIT HI MDM: CPT

## 2025-06-16 PROCEDURE — 82803 BLOOD GASES ANY COMBINATION: CPT

## 2025-06-16 PROCEDURE — 85610 PROTHROMBIN TIME: CPT

## 2025-06-16 PROCEDURE — 86901 BLOOD TYPING SEROLOGIC RH(D): CPT

## 2025-06-16 PROCEDURE — 96374 THER/PROPH/DIAG INJ IV PUSH: CPT

## 2025-06-16 PROCEDURE — 93005 ELECTROCARDIOGRAM TRACING: CPT | Performed by: NURSE PRACTITIONER

## 2025-06-16 PROCEDURE — 74174 CTA ABD&PLVS W/CONTRAST: CPT

## 2025-06-16 PROCEDURE — 93010 ELECTROCARDIOGRAM REPORT: CPT | Performed by: INTERNAL MEDICINE

## 2025-06-16 PROCEDURE — 2500000003 HC RX 250 WO HCPCS

## 2025-06-16 PROCEDURE — 6360000002 HC RX W HCPCS: Performed by: NURSE PRACTITIONER

## 2025-06-16 PROCEDURE — 6360000002 HC RX W HCPCS

## 2025-06-16 PROCEDURE — 86900 BLOOD TYPING SEROLOGIC ABO: CPT

## 2025-06-16 PROCEDURE — 85025 COMPLETE CBC W/AUTO DIFF WBC: CPT

## 2025-06-16 PROCEDURE — 36415 COLL VENOUS BLD VENIPUNCTURE: CPT

## 2025-06-16 PROCEDURE — 86923 COMPATIBILITY TEST ELECTRIC: CPT

## 2025-06-16 PROCEDURE — 96375 TX/PRO/DX INJ NEW DRUG ADDON: CPT

## 2025-06-16 PROCEDURE — 82270 OCCULT BLOOD FECES: CPT

## 2025-06-16 PROCEDURE — 81003 URINALYSIS AUTO W/O SCOPE: CPT

## 2025-06-16 PROCEDURE — 83690 ASSAY OF LIPASE: CPT

## 2025-06-16 PROCEDURE — 6370000000 HC RX 637 (ALT 250 FOR IP): Performed by: STUDENT IN AN ORGANIZED HEALTH CARE EDUCATION/TRAINING PROGRAM

## 2025-06-16 PROCEDURE — 83605 ASSAY OF LACTIC ACID: CPT

## 2025-06-16 PROCEDURE — 86850 RBC ANTIBODY SCREEN: CPT

## 2025-06-16 PROCEDURE — 83735 ASSAY OF MAGNESIUM: CPT

## 2025-06-16 PROCEDURE — 30233N1 TRANSFUSION OF NONAUTOLOGOUS RED BLOOD CELLS INTO PERIPHERAL VEIN, PERCUTANEOUS APPROACH: ICD-10-PCS | Performed by: INTERNAL MEDICINE

## 2025-06-16 PROCEDURE — 80053 COMPREHEN METABOLIC PANEL: CPT

## 2025-06-16 PROCEDURE — 6360000004 HC RX CONTRAST MEDICATION: Performed by: NURSE PRACTITIONER

## 2025-06-16 PROCEDURE — 71045 X-RAY EXAM CHEST 1 VIEW: CPT

## 2025-06-16 RX ORDER — SODIUM CHLORIDE 0.9 % (FLUSH) 0.9 %
5-40 SYRINGE (ML) INJECTION PRN
Status: DISCONTINUED | OUTPATIENT
Start: 2025-06-16 | End: 2025-06-18 | Stop reason: HOSPADM

## 2025-06-16 RX ORDER — SODIUM CHLORIDE 9 MG/ML
INJECTION, SOLUTION INTRAVENOUS PRN
Status: DISCONTINUED | OUTPATIENT
Start: 2025-06-16 | End: 2025-06-18 | Stop reason: HOSPADM

## 2025-06-16 RX ORDER — ONDANSETRON 4 MG/1
4 TABLET, ORALLY DISINTEGRATING ORAL EVERY 8 HOURS PRN
Status: DISCONTINUED | OUTPATIENT
Start: 2025-06-16 | End: 2025-06-18 | Stop reason: HOSPADM

## 2025-06-16 RX ORDER — DEXTROSE MONOHYDRATE 100 MG/ML
INJECTION, SOLUTION INTRAVENOUS CONTINUOUS PRN
Status: DISCONTINUED | OUTPATIENT
Start: 2025-06-16 | End: 2025-06-18 | Stop reason: HOSPADM

## 2025-06-16 RX ORDER — IOPAMIDOL 755 MG/ML
85 INJECTION, SOLUTION INTRAVASCULAR
Status: COMPLETED | OUTPATIENT
Start: 2025-06-16 | End: 2025-06-16

## 2025-06-16 RX ORDER — ACETAMINOPHEN 650 MG/1
650 SUPPOSITORY RECTAL EVERY 6 HOURS PRN
Status: DISCONTINUED | OUTPATIENT
Start: 2025-06-16 | End: 2025-06-18 | Stop reason: HOSPADM

## 2025-06-16 RX ORDER — FINASTERIDE 5 MG/1
5 TABLET, FILM COATED ORAL EVERY EVENING
Status: DISCONTINUED | OUTPATIENT
Start: 2025-06-16 | End: 2025-06-18 | Stop reason: HOSPADM

## 2025-06-16 RX ORDER — PANTOPRAZOLE SODIUM 40 MG/10ML
40 INJECTION, POWDER, LYOPHILIZED, FOR SOLUTION INTRAVENOUS ONCE
Status: COMPLETED | OUTPATIENT
Start: 2025-06-16 | End: 2025-06-16

## 2025-06-16 RX ORDER — SODIUM CHLORIDE 0.9 % (FLUSH) 0.9 %
5-40 SYRINGE (ML) INJECTION EVERY 12 HOURS SCHEDULED
Status: DISCONTINUED | OUTPATIENT
Start: 2025-06-16 | End: 2025-06-18 | Stop reason: HOSPADM

## 2025-06-16 RX ORDER — MULTIVITAMIN WITH IRON
1000 TABLET ORAL DAILY
Status: DISCONTINUED | OUTPATIENT
Start: 2025-06-17 | End: 2025-06-18 | Stop reason: HOSPADM

## 2025-06-16 RX ORDER — ACETAMINOPHEN 325 MG/1
650 TABLET ORAL EVERY 6 HOURS PRN
Status: DISCONTINUED | OUTPATIENT
Start: 2025-06-16 | End: 2025-06-18 | Stop reason: HOSPADM

## 2025-06-16 RX ORDER — OXYCODONE HYDROCHLORIDE 5 MG/1
5 TABLET ORAL EVERY 6 HOURS PRN
Refills: 0 | Status: DISCONTINUED | OUTPATIENT
Start: 2025-06-16 | End: 2025-06-18 | Stop reason: HOSPADM

## 2025-06-16 RX ORDER — DICYCLOMINE HCL 20 MG
20 TABLET ORAL DAILY
Status: DISCONTINUED | OUTPATIENT
Start: 2025-06-17 | End: 2025-06-18 | Stop reason: HOSPADM

## 2025-06-16 RX ORDER — INSULIN LISPRO 100 [IU]/ML
0-4 INJECTION, SOLUTION INTRAVENOUS; SUBCUTANEOUS
Status: DISCONTINUED | OUTPATIENT
Start: 2025-06-16 | End: 2025-06-18 | Stop reason: HOSPADM

## 2025-06-16 RX ORDER — ONDANSETRON 2 MG/ML
4 INJECTION INTRAMUSCULAR; INTRAVENOUS EVERY 6 HOURS PRN
Status: DISCONTINUED | OUTPATIENT
Start: 2025-06-16 | End: 2025-06-18 | Stop reason: HOSPADM

## 2025-06-16 RX ORDER — MORPHINE SULFATE 2 MG/ML
2 INJECTION, SOLUTION INTRAMUSCULAR; INTRAVENOUS ONCE
Status: COMPLETED | OUTPATIENT
Start: 2025-06-16 | End: 2025-06-16

## 2025-06-16 RX ORDER — ALLOPURINOL 300 MG/1
300 TABLET ORAL DAILY
Status: DISCONTINUED | OUTPATIENT
Start: 2025-06-17 | End: 2025-06-18 | Stop reason: HOSPADM

## 2025-06-16 RX ORDER — PHYTONADIONE 5 MG/1
2.5 TABLET ORAL DAILY
Status: DISCONTINUED | OUTPATIENT
Start: 2025-06-17 | End: 2025-06-18 | Stop reason: HOSPADM

## 2025-06-16 RX ORDER — ERLOTINIB 100 MG/1
100 TABLET, FILM COATED ORAL DAILY
Status: DISCONTINUED | OUTPATIENT
Start: 2025-06-17 | End: 2025-06-18 | Stop reason: HOSPADM

## 2025-06-16 RX ORDER — GLUCAGON 1 MG/ML
1 KIT INJECTION PRN
Status: DISCONTINUED | OUTPATIENT
Start: 2025-06-16 | End: 2025-06-18 | Stop reason: HOSPADM

## 2025-06-16 RX ADMIN — IOPAMIDOL 85 ML: 755 INJECTION, SOLUTION INTRAVENOUS at 15:05

## 2025-06-16 RX ADMIN — MORPHINE SULFATE 2 MG: 2 INJECTION, SOLUTION INTRAMUSCULAR; INTRAVENOUS at 16:03

## 2025-06-16 RX ADMIN — PANTOPRAZOLE SODIUM 40 MG: 40 INJECTION, POWDER, FOR SOLUTION INTRAVENOUS at 14:36

## 2025-06-16 RX ADMIN — OXYCODONE 5 MG: 5 TABLET ORAL at 22:39

## 2025-06-16 RX ADMIN — Medication 10 ML: at 22:41

## 2025-06-16 RX ADMIN — FINASTERIDE 5 MG: 5 TABLET, FILM COATED ORAL at 22:40

## 2025-06-16 RX ADMIN — SODIUM CHLORIDE 40 MG: 9 INJECTION INTRAMUSCULAR; INTRAVENOUS; SUBCUTANEOUS at 22:40

## 2025-06-16 ASSESSMENT — PAIN DESCRIPTION - ORIENTATION
ORIENTATION: RIGHT;LEFT
ORIENTATION: RIGHT;LEFT
ORIENTATION: RIGHT

## 2025-06-16 ASSESSMENT — PAIN DESCRIPTION - DESCRIPTORS
DESCRIPTORS: ACHING
DESCRIPTORS: SPASM;ACHING

## 2025-06-16 ASSESSMENT — PAIN DESCRIPTION - LOCATION
LOCATION: ABDOMEN
LOCATION: LEG
LOCATION: LEG

## 2025-06-16 ASSESSMENT — PAIN SCALES - GENERAL
PAINLEVEL_OUTOF10: 3
PAINLEVEL_OUTOF10: 6
PAINLEVEL_OUTOF10: 7
PAINLEVEL_OUTOF10: 3

## 2025-06-16 ASSESSMENT — PAIN - FUNCTIONAL ASSESSMENT
PAIN_FUNCTIONAL_ASSESSMENT: 0-10
PAIN_FUNCTIONAL_ASSESSMENT: ACTIVITIES ARE NOT PREVENTED
PAIN_FUNCTIONAL_ASSESSMENT: ACTIVITIES ARE NOT PREVENTED

## 2025-06-16 NOTE — ED NOTES
Sharath Ceja is a 74 y.o. male admitted for  Principal Problem:    GI bleed  Resolved Problems:    * No resolved hospital problems. *  .   Patient Home via family with   Chief Complaint   Patient presents with    Abdominal Pain    Rectal Bleeding     Right sided abdominal pain, bloody stool, has 2 liver drains    .  Patient is alert and Person, Place, Time, and Situation  Patient's baseline mobility: Baseline Mobility: Walker  Code Status: Prior   Cardiac Rhythm:       Is patient on baseline Oxygen: no how many LitersNA:   Abnormal Assessment Findings: Dressing  to abdomen and bilat drains    Isolation: None      NIH Score:    C-SSRS: Risk of Suicide: No Risk  Bedside swallow:        Active LDA's:  Right chest port - accessed  Patient admitted with a copeland: no - no copeland - pt has bilateral hepatic biliary drains  Patient admitted with Central Line:  Port .   Reason for Central line: Chemotherapy  Was central line Inserted from an outside facility: yes (I think so), but accessed here today      Family/Caregiver Present yes Any Concerns: no   Restraints no  Sitter no         Vitals: MEWS Score: 1    Vitals:    06/16/25 1600 06/16/25 1603 06/16/25 1630 06/16/25 1700   BP: 119/66 119/66 119/61 115/65   Pulse: 72 74 72 75   Resp: 12 13 12 10   Temp:       TempSrc:       SpO2: 94% 94% 96% 97%   Weight:       Height:           Last documented pain score (0-10 scale) Pain Level: 7  Pain medication administered Yes- see MAR.    Pertinent or High Risk Medications/Drips: No.    Pending Blood Product Administration: no    Abnormal labs:   Abnormal Labs Reviewed   CBC WITH AUTO DIFFERENTIAL - Abnormal; Notable for the following components:       Result Value    WBC 3.4 (*)     RBC 2.56 (*)     Hemoglobin 7.4 (*)     Hematocrit 22.7 (*)     RDW 17.0 (*)     Lymphocytes Absolute 0.4 (*)     All other components within normal limits   COMPREHENSIVE METABOLIC PANEL W/ REFLEX TO MG FOR LOW K - Abnormal; Notable for the

## 2025-06-16 NOTE — H&P
Hospital Medicine History & Physical      Date of Admission: 6/16/2025    Date of Service:  Pt seen/examined on 6/16/2025    [x]Admitted to Inpatient with expected LOS greater than two midnights due to medical therapy.  []Placed in Observation status.    Chief Admission Complaint: Rectal bleeding    Presenting Admission History:      74 y.o. male who presented to [facility] with 1 week history of rectal bleeding and maroon-colored stools as well as abdominal pain.  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      Notably, patient was recently admitted on 3/24/2025 with GI bleeding and GI was consulted at that time and recommend supportive care only as patient had advanced cancer and no intervention recommended.  Patient had his biliary drains replaced on 3/28 during that admission.    Patient is at bedside with wife present.  Noted that he has been having worsening frequency and volume of bloody stools over the past 1 week.  He intermittently has episodes of GI bleed in the setting of his cancer but noted that he has been having 4-5 bowel movements a day of very soft and sticky stools.  He also had an episode of nausea and vomiting a couple days prior to presentation which has resolved.  Wife noted that every time he had a bowel movement, noted be weak, lightheaded and needed time to recover.  Every time at about movement, this recurred.  Denied any chest pain or shortness of breath.  Denies any fevers or chills.  Does complain of right side abdominal pain which is chronic but has been slightly worsening over the last week as well.  Otherwise, no other acute complaints.    In the ED, vital signs stable.  On room air.  BMP showing sodium 135, potassium 3.4.  Albumin of 3.0, .

## 2025-06-16 NOTE — ED PROVIDER NOTES
12 lead EKG:  Normal Sinus Rhythm 71  Axis is   Normal  QTc is  normal  There is no specific ST-T wave changes appreciated.  There is no clear evidence of acute ischemia or infarction.  It was compared against an EKG from 3/24/25.       Syeda Hwang MD  06/16/25 6850    
protocol was negative for acute active bleeding.  He has a history of pancreatic cancer status post Whipple.  He has 2 biliary drains placed that is in place since November.  He is followed with Dr. Carnes. I have a call out to him now. Not anticoagulated. Hemoglobin base line between 9-8.5. Today 7.5. BNU and Creatinine Normal.  Plan for admission.  Patient agrees to treatment plan and admission.    Disposition Considerations (tests considered but not done, Admit vs D/C, Shared Decision Making, Pt Expectation of Test or Tx.):      The patient tolerated their visit well.  The patient and / or the family were informed of the results of any tests, a time was given to answer questions, a plan was proposed and they agreed with plan.    I am the Primary Clinician of Record.  FINAL IMPRESSION      1. Acute GI bleeding          DISPOSITION/PLAN     DISPOSITION  06/16/2025 04:16:21 PM               PATIENT REFERRED TO:  No follow-up provider specified.    DISCHARGE MEDICATIONS:  New Prescriptions    No medications on file       DISCONTINUED MEDICATIONS:  Discontinued Medications    No medications on file              (Please note that portions of this note were completed with a voice recognition program.  Efforts were made to edit the dictations but occasionally words are mis-transcribed.)    ADILENE Pack CNP (electronically signed)      Jesus Manuel Marcelo APRN - CNP  06/17/25 7874

## 2025-06-16 NOTE — CONSULTS
Surescript refill history.  The following list of meds may assist in determining home medications taken.  Confirmation/ verification of how or if the patient is taking is needed.  List may not be accurate or complete depending on when or how the patient was instructed to take and where the patient receives their medications from.            Albuterol inhaler as directed  Allopurinol 300mg daily filled 4/1/25 for 90 days  B-12 500mcg daily filled 5/14/25 for 90 days  Dicyclomine 20mg daily filled 5/29/25  Vitamin D 50,000 units, 2 days/ week or so 84 day supply is 36 caps clartify days/ freq  Erlotinib 100mg daily filled 5/29/25 for 90 days  Finasteride 5mg daily filled 4/29/25 for 90 days  Diabetic testing supplies  Humulin NPH insulin as directed, clarify doses/ freq  Metformin ER 500mg BID, last filled 3/5/25 for 90 days  Reglan 5mg BID, last filled 3/17/25 for 30 days, still taking?  Oxycodone 5mg BID PRN last filled 5/30/25 for 30 days  Creon 36,000 filled 5/29/25 30 day supply is 200 caps, clarify directions  Protonix 40mg daily, last filled for 90 days 1/20/25, still taking?  KCL 20mEq BID filled 6/9/25 for 30 days    Certain dispense information may not be available or accurate in this list, including items that the patient asked not be disclosed due to patient privacy concerns, over-the-counter medications, low cost prescriptions, prescriptions paid for by the patient or non-participating sources, or errors in insurance claims information. The provider should independently verify medication history with the patient.

## 2025-06-16 NOTE — PLAN OF CARE
Rectal bleeding x1 week, hemoglobin is stable at 7.4.    Hx intrahepatic bile duct carcinoma with recurrent rectal bleeding.  Currently undergoing chemo at [facility].    -admit 2W.  -H&H q6 hours.  -transfuse hemoglobin <7.  -NPO.   -suspect will be managed conservatively given underlying malignancy.   -GI consulted.  -nursing communication placed for home med rec.    Doretha oHlcomb PA-C 6/16/2025 5:48 PM

## 2025-06-17 PROBLEM — K92.2 ACUTE GI BLEEDING: Status: ACTIVE | Noted: 2025-06-17

## 2025-06-17 PROBLEM — D68.9 COAGULOPATHY: Status: ACTIVE | Noted: 2025-06-17

## 2025-06-17 LAB
ALBUMIN SERPL-MCNC: 2.7 G/DL (ref 3.4–5)
ALBUMIN/GLOB SERPL: 0.8 {RATIO} (ref 1.1–2.2)
ALP SERPL-CCNC: 210 U/L (ref 40–129)
ALT SERPL-CCNC: 22 U/L (ref 10–40)
ANION GAP SERPL CALCULATED.3IONS-SCNC: 11 MMOL/L (ref 3–16)
AST SERPL-CCNC: 27 U/L (ref 15–37)
BASOPHILS # BLD: 0 K/UL (ref 0–0.2)
BASOPHILS NFR BLD: 0.2 %
BILIRUB SERPL-MCNC: 1.2 MG/DL (ref 0–1)
BLOOD BANK DISPENSE STATUS: NORMAL
BLOOD BANK PRODUCT CODE: NORMAL
BPU ID: NORMAL
BUN SERPL-MCNC: 7 MG/DL (ref 7–20)
CALCIUM SERPL-MCNC: 7.9 MG/DL (ref 8.3–10.6)
CHLORIDE SERPL-SCNC: 102 MMOL/L (ref 99–110)
CO2 SERPL-SCNC: 23 MMOL/L (ref 21–32)
CREAT SERPL-MCNC: 0.3 MG/DL (ref 0.8–1.3)
DEPRECATED RDW RBC AUTO: 16.4 % (ref 12.4–15.4)
DESCRIPTION BLOOD BANK: NORMAL
EOSINOPHIL # BLD: 0.1 K/UL (ref 0–0.6)
EOSINOPHIL NFR BLD: 2.8 %
EST. AVERAGE GLUCOSE BLD GHB EST-MCNC: 131.2 MG/DL
GFR SERPLBLD CREATININE-BSD FMLA CKD-EPI: >90 ML/MIN/{1.73_M2}
GLUCOSE BLD-MCNC: 104 MG/DL (ref 70–99)
GLUCOSE BLD-MCNC: 118 MG/DL (ref 70–99)
GLUCOSE BLD-MCNC: 126 MG/DL (ref 70–99)
GLUCOSE BLD-MCNC: 208 MG/DL (ref 70–99)
GLUCOSE SERPL-MCNC: 101 MG/DL (ref 70–99)
HBA1C MFR BLD: 6.2 %
HCT VFR BLD AUTO: 20.8 % (ref 40.5–52.5)
HCT VFR BLD AUTO: 23.3 % (ref 40.5–52.5)
HCT VFR BLD AUTO: 24.1 % (ref 40.5–52.5)
HCT VFR BLD AUTO: 25.7 % (ref 40.5–52.5)
HGB BLD-MCNC: 6.8 G/DL (ref 13.5–17.5)
HGB BLD-MCNC: 7.9 G/DL (ref 13.5–17.5)
HGB BLD-MCNC: 8 G/DL (ref 13.5–17.5)
HGB BLD-MCNC: 8.6 G/DL (ref 13.5–17.5)
IRON SATN MFR SERPL: 22 % (ref 20–50)
IRON SERPL-MCNC: 54 UG/DL (ref 59–158)
LYMPHOCYTES # BLD: 0.2 K/UL (ref 1–5.1)
LYMPHOCYTES NFR BLD: 7.1 %
MCH RBC QN AUTO: 29.5 PG (ref 26–34)
MCHC RBC AUTO-ENTMCNC: 33.3 G/DL (ref 31–36)
MCV RBC AUTO: 88.7 FL (ref 80–100)
MONOCYTES # BLD: 0.2 K/UL (ref 0–1.3)
MONOCYTES NFR BLD: 6.5 %
NEUTROPHILS # BLD: 2.6 K/UL (ref 1.7–7.7)
NEUTROPHILS NFR BLD: 83.4 %
PERFORMED ON: ABNORMAL
PLATELET # BLD AUTO: 115 K/UL (ref 135–450)
PMV BLD AUTO: 8.4 FL (ref 5–10.5)
POTASSIUM SERPL-SCNC: 3.6 MMOL/L (ref 3.5–5.1)
PROT SERPL-MCNC: 6.1 G/DL (ref 6.4–8.2)
RBC # BLD AUTO: 2.66 M/UL (ref 4.2–5.9)
SODIUM SERPL-SCNC: 136 MMOL/L (ref 136–145)
TIBC SERPL-MCNC: 246 UG/DL (ref 260–445)
WBC # BLD AUTO: 3.1 K/UL (ref 4–11)

## 2025-06-17 PROCEDURE — 97535 SELF CARE MNGMENT TRAINING: CPT

## 2025-06-17 PROCEDURE — 6360000002 HC RX W HCPCS: Performed by: STUDENT IN AN ORGANIZED HEALTH CARE EDUCATION/TRAINING PROGRAM

## 2025-06-17 PROCEDURE — 99231 SBSQ HOSP IP/OBS SF/LOW 25: CPT | Performed by: NURSE PRACTITIONER

## 2025-06-17 PROCEDURE — 36591 DRAW BLOOD OFF VENOUS DEVICE: CPT

## 2025-06-17 PROCEDURE — 85014 HEMATOCRIT: CPT

## 2025-06-17 PROCEDURE — 97530 THERAPEUTIC ACTIVITIES: CPT

## 2025-06-17 PROCEDURE — 6370000000 HC RX 637 (ALT 250 FOR IP): Performed by: INTERNAL MEDICINE

## 2025-06-17 PROCEDURE — 85025 COMPLETE CBC W/AUTO DIFF WBC: CPT

## 2025-06-17 PROCEDURE — 6360000002 HC RX W HCPCS

## 2025-06-17 PROCEDURE — 1200000000 HC SEMI PRIVATE

## 2025-06-17 PROCEDURE — 80053 COMPREHEN METABOLIC PANEL: CPT

## 2025-06-17 PROCEDURE — 83540 ASSAY OF IRON: CPT

## 2025-06-17 PROCEDURE — 97162 PT EVAL MOD COMPLEX 30 MIN: CPT

## 2025-06-17 PROCEDURE — 6370000000 HC RX 637 (ALT 250 FOR IP): Performed by: STUDENT IN AN ORGANIZED HEALTH CARE EDUCATION/TRAINING PROGRAM

## 2025-06-17 PROCEDURE — 2500000003 HC RX 250 WO HCPCS

## 2025-06-17 PROCEDURE — 36430 TRANSFUSION BLD/BLD COMPNT: CPT

## 2025-06-17 PROCEDURE — 83036 HEMOGLOBIN GLYCOSYLATED A1C: CPT

## 2025-06-17 PROCEDURE — 97166 OT EVAL MOD COMPLEX 45 MIN: CPT

## 2025-06-17 PROCEDURE — 83550 IRON BINDING TEST: CPT

## 2025-06-17 PROCEDURE — 6370000000 HC RX 637 (ALT 250 FOR IP)

## 2025-06-17 PROCEDURE — 85018 HEMOGLOBIN: CPT

## 2025-06-17 RX ORDER — POLYETHYLENE GLYCOL 3350 17 G/17G
17 POWDER, FOR SOLUTION ORAL DAILY
Status: DISCONTINUED | OUTPATIENT
Start: 2025-06-17 | End: 2025-06-18 | Stop reason: HOSPADM

## 2025-06-17 RX ORDER — MORPHINE SULFATE 2 MG/ML
2 INJECTION, SOLUTION INTRAMUSCULAR; INTRAVENOUS EVERY 6 HOURS PRN
Status: DISCONTINUED | OUTPATIENT
Start: 2025-06-17 | End: 2025-06-18 | Stop reason: HOSPADM

## 2025-06-17 RX ORDER — SENNOSIDES 8.6 MG/1
1 TABLET ORAL NIGHTLY
Status: DISCONTINUED | OUTPATIENT
Start: 2025-06-17 | End: 2025-06-18 | Stop reason: HOSPADM

## 2025-06-17 RX ORDER — SODIUM CHLORIDE 9 MG/ML
INJECTION, SOLUTION INTRAVENOUS PRN
Status: DISCONTINUED | OUTPATIENT
Start: 2025-06-17 | End: 2025-06-18 | Stop reason: HOSPADM

## 2025-06-17 RX ADMIN — CALCIUM POLYCARBOPHIL 625 MG: 625 TABLET, FILM COATED ORAL at 09:45

## 2025-06-17 RX ADMIN — MORPHINE SULFATE 2 MG: 2 INJECTION, SOLUTION INTRAMUSCULAR; INTRAVENOUS at 06:53

## 2025-06-17 RX ADMIN — ALLOPURINOL 300 MG: 300 TABLET ORAL at 09:42

## 2025-06-17 RX ADMIN — INSULIN LISPRO 1 UNITS: 100 INJECTION, SOLUTION INTRAVENOUS; SUBCUTANEOUS at 20:11

## 2025-06-17 RX ADMIN — SODIUM CHLORIDE 40 MG: 9 INJECTION INTRAMUSCULAR; INTRAVENOUS; SUBCUTANEOUS at 09:42

## 2025-06-17 RX ADMIN — FINASTERIDE 5 MG: 5 TABLET, FILM COATED ORAL at 17:01

## 2025-06-17 RX ADMIN — ERLOTINIB 100 MG: 100 TABLET, FILM COATED ORAL at 09:42

## 2025-06-17 RX ADMIN — PANCRELIPASE LIPASE, PANCRELIPASE PROTEASE, PANCRELIPASE AMYLASE 15000 UNITS: 15000; 47000; 63000 CAPSULE, DELAYED RELEASE ORAL at 17:02

## 2025-06-17 RX ADMIN — SODIUM CHLORIDE 40 MG: 9 INJECTION INTRAMUSCULAR; INTRAVENOUS; SUBCUTANEOUS at 20:12

## 2025-06-17 RX ADMIN — Medication 10 ML: at 09:44

## 2025-06-17 RX ADMIN — SENNOSIDES 8.6 MG: 8.6 TABLET, FILM COATED ORAL at 20:12

## 2025-06-17 RX ADMIN — OXYCODONE 5 MG: 5 TABLET ORAL at 20:11

## 2025-06-17 RX ADMIN — DICYCLOMINE HYDROCHLORIDE 20 MG: 20 TABLET ORAL at 09:42

## 2025-06-17 RX ADMIN — PANCRELIPASE LIPASE, PANCRELIPASE PROTEASE, PANCRELIPASE AMYLASE 60000 UNITS: 20000; 63000; 84000 CAPSULE, DELAYED RELEASE ORAL at 17:02

## 2025-06-17 RX ADMIN — Medication 1000 MCG: at 09:42

## 2025-06-17 RX ADMIN — Medication 10 ML: at 20:13

## 2025-06-17 ASSESSMENT — PAIN SCALES - GENERAL
PAINLEVEL_OUTOF10: 0
PAINLEVEL_OUTOF10: 5
PAINLEVEL_OUTOF10: 3
PAINLEVEL_OUTOF10: 5

## 2025-06-17 ASSESSMENT — PAIN DESCRIPTION - DESCRIPTORS
DESCRIPTORS: SPASM
DESCRIPTORS: SPASM;ACHING;DISCOMFORT

## 2025-06-17 ASSESSMENT — PAIN DESCRIPTION - ORIENTATION
ORIENTATION: LEFT;RIGHT
ORIENTATION: RIGHT;LEFT

## 2025-06-17 ASSESSMENT — PAIN DESCRIPTION - LOCATION
LOCATION: LEG;RIB CAGE
LOCATION: LEG

## 2025-06-17 NOTE — CONSULTS
[facility]  Palliative Medicine Consultation Note      Date Of Admission:6/16/2025  Date of consult: 06/17/25  Seen by PC in the past:  No    Recommendations:        Writer met with the pt at bedside to introduce palliative/hospice options and had a voluntary discussion related to advance care planning. Palliative care consulted for goals of care and code status discussion. Code status discussed with the pt and pt elects Full code at this time. Pt requests for writer to come back when spouse arrives to discuss Palliative care options. Pt not in hospice mindset at this time. Awaiting call from spouse to discuss goals of care further.      1. Goals of Care/Advanced Care planning/Code status: Full code  2. Pain: denies  3. SOB: denies  4. Disposition: home with spouse    Reason for Consult:         [x]  Goals of Care  [x]  Code Status Discussion/Advanced Care Planning   []  Psychosocial/Family Support  []  Symptom Management  []  Other (Specify)    Requesting Physician: Dr. Lei Odonnell    CHIEF COMPLAINT:  GI Bleed    History Obtained From:  patient    History of Present Illness:         Sharath Ceja is a 74 y.o. male with PMH of (see below list) who presented with abdominal pain and rectal bleeding. Pt presented to the ED with right sided abdominal pain, bloody stool and has bilateral liver drains in place. Pt reports staff at [facility] told the pt to go immediately to the hospital in order to get blood transfusion. Hgb now stable at 7.9 after one unit of blood given last night.    Subjective:         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    8 + LYPMH NODES, WHIPPLE    Asthma     Blood circulation, collateral     Carpal tunnel syndrome     CLL (chronic lymphocytic leukemia) (HCC)     DENIES    Crohn's colitis (HCC)     Dental crowns present     AND FALSE TOOTH    Diabetes mellitus (HCC)     emboli     pulmonary emboli in 1980    GERD

## 2025-06-17 NOTE — CONSENT
Informed Consent for Blood Component Transfusion Note    I have discussed with the patient and his wife the rationale for blood component transfusion; its benefits in treating or preventing fatigue, organ damage, or death; and its risk which includes mild transfusion reactions, rare risk of blood borne infection, or more serious but rare reactions. I have discussed the alternatives to transfusion, including the risk and consequences of not receiving transfusion. The patient and spouse had an opportunity to ask questions and had agreed to proceed with transfusion of blood components.    Electronically signed by Lei Odonnell MD on 6/17/25 at 12:29 AM EDT

## 2025-06-17 NOTE — CONSULTS
Gastroenterology Consult Note    Patient:   Sharath Ceja   :    1950   Facility:   [facility]   Referring/PCP: Real Whitehead MD  Date:     2025  Consultant:   ADILENE Otto - CNP      Chief Complaint   Patient presents with    Abdominal Pain    Rectal Bleeding     Right sided abdominal pain, bloody stool, has 2 liver drains         History of Present illness   Patient is a 75 yo male with pmx of DM, HTN, Crohn's disease, GERD, BPH, and stage IV cholangiocarcinoma s/p Whipple c/b anastomotic stricture s/p biliary drains who presented to ED  with c/o rectal bleeding. He reports bright red blood in stool for two weeks. He reports increased amount of blood in the last two days. He reports having brown bowel movement today. He reports loose stool recently, but reports taking miralax for constipation. He reports some straining with bowel movements. He reports some generalized abdominal pain. He reports chills. He reports nausea and vomiting one week ago. He denies hematemesis. He denies bloody output from biliary drains. He was last hospitalized 3/25 for blood in biliary drains. He had drains exchanged in IR. He also reported rectal bleeding at that time, suspected to be from colitis. He reports flushing his biliary drains twice daily. He denies any complications. His oncologist is Dr. Beltran. He reports CT scan scheduled Friday. He reports taking Tarceva. His last colonoscopy was  with polyps removed and internal hemorrhoids.     Past Medical History:   Diagnosis Date    Adenocarcinoma (HCC) 2023    8 + LYPMH NODES, WHIPPLE    Asthma     Blood circulation, collateral     Carpal tunnel syndrome     CLL (chronic lymphocytic leukemia) (HCC)     DENIES    Crohn's colitis (HCC)     Dental crowns present     AND FALSE TOOTH    Diabetes mellitus (HCC)     emboli     pulmonary emboli in     GERD (gastroesophageal reflux disease)     Chrons disease    Gout     Hx

## 2025-06-17 NOTE — PLAN OF CARE
Problem: Chronic Conditions and Co-morbidities  Goal: Patient's chronic conditions and co-morbidity symptoms are monitored and maintained or improved  6/17/2025 1112 by Rain Stephenson RN  Outcome: Progressing  6/16/2025 2301 by Marv Nobles RN  Outcome: Progressing     Problem: Discharge Planning  Goal: Discharge to home or other facility with appropriate resources  6/17/2025 1112 by Rain Stephenson RN  Outcome: Progressing  6/16/2025 2301 by Marv Nobles RN  Outcome: Progressing     Problem: Safety - Adult  Goal: Free from fall injury  6/17/2025 1112 by Rain Stephenson RN  Outcome: Progressing  6/16/2025 2301 by Marv Nobles RN  Outcome: Progressing     Problem: ABCDS Injury Assessment  Goal: Absence of physical injury  6/17/2025 1112 by Rain Stephenson RN  Outcome: Progressing  6/16/2025 2301 by Marv Nobles RN  Outcome: Progressing     Problem: Pain  Goal: Verbalizes/displays adequate comfort level or baseline comfort level  6/17/2025 1112 by Rain Stephenson RN  Outcome: Progressing  6/16/2025 2301 by Marv Nobles RN  Outcome: Progressing

## 2025-06-17 NOTE — CARE COORDINATION
Case Management Assessment  Initial Evaluation    Date/Time of Evaluation: 6/17/2025 9:27 AM  Assessment Completed by: Oriana Larose RN    If patient is discharged prior to next notation, then this note serves as note for discharge by case management.    Patient Name: Sharath Ceja                   YOB: 1950  Diagnosis: GI bleed [K92.2]  Acute GI bleeding [K92.2]                   Date / Time: 6/16/2025 12:29 PM    Patient Admission Status: Inpatient   Readmission Risk (Low < 19, Mod (19-27), High > 27): Readmission Risk Score: 29.1    Current PCP: Real Whitehead MD  PCP verified by CM? Yes    Chart Reviewed: Yes      History Provided by: Patient  Patient Orientation: Alert and Oriented    Patient Cognition: Alert    Hospitalization in the last 30 days (Readmission):  No    If yes, Readmission Assessment in  Navigator will be completed.    Advance Directives:      Code Status: Full Code   Patient's Primary Decision Maker is: Legal Next of Kin    Primary Decision Maker: Natasha Ceja R - Spouse - [phone]    Discharge Planning:    Patient lives with: Spouse/Significant Other Type of Home: Trailer/Mobile Home  Primary Care Giver: Self  Patient Support Systems include: Spouse/Significant Other   Current Financial resources: Medicare  Current community resources: ECF/Home Care  Current services prior to admission: Home Care            Current DME:              Type of Home Care services:  Nursing Services    ADLS  Prior functional level: Assistance with the following:, Cooking, Housework, Shopping, Mobility  Current functional level: Assistance with the following:, Cooking, Housework, Shopping, Mobility    PT AM-PAC:   /24  OT AM-PAC:   /24    Family can provide assistance at DC: Yes  Would you like Case Management to discuss the discharge plan with any other family members/significant others, and if so, who? Yes (spouse and dtr,Priya)  Plans to Return to Present Housing: Yes  Other

## 2025-06-18 VITALS
HEART RATE: 94 BPM | HEIGHT: 70 IN | TEMPERATURE: 98.1 F | WEIGHT: 165.4 LBS | DIASTOLIC BLOOD PRESSURE: 75 MMHG | SYSTOLIC BLOOD PRESSURE: 121 MMHG | OXYGEN SATURATION: 98 % | BODY MASS INDEX: 23.68 KG/M2 | RESPIRATION RATE: 16 BRPM

## 2025-06-18 LAB
ALBUMIN SERPL-MCNC: 2.7 G/DL (ref 3.4–5)
ALBUMIN/GLOB SERPL: 0.9 {RATIO} (ref 1.1–2.2)
ALP SERPL-CCNC: 223 U/L (ref 40–129)
ALT SERPL-CCNC: 21 U/L (ref 10–40)
ANION GAP SERPL CALCULATED.3IONS-SCNC: 10 MMOL/L (ref 3–16)
AST SERPL-CCNC: 25 U/L (ref 15–37)
BASOPHILS # BLD: 0 K/UL (ref 0–0.2)
BASOPHILS NFR BLD: 0.2 %
BILIRUB SERPL-MCNC: 0.7 MG/DL (ref 0–1)
BUN SERPL-MCNC: 7 MG/DL (ref 7–20)
CALCIUM SERPL-MCNC: 7.9 MG/DL (ref 8.3–10.6)
CHLORIDE SERPL-SCNC: 103 MMOL/L (ref 99–110)
CO2 SERPL-SCNC: 23 MMOL/L (ref 21–32)
CREAT SERPL-MCNC: 0.4 MG/DL (ref 0.8–1.3)
DEPRECATED RDW RBC AUTO: 16.4 % (ref 12.4–15.4)
EOSINOPHIL # BLD: 0.1 K/UL (ref 0–0.6)
EOSINOPHIL NFR BLD: 2.3 %
GFR SERPLBLD CREATININE-BSD FMLA CKD-EPI: >90 ML/MIN/{1.73_M2}
GLUCOSE BLD-MCNC: 145 MG/DL (ref 70–99)
GLUCOSE BLD-MCNC: 183 MG/DL (ref 70–99)
GLUCOSE SERPL-MCNC: 132 MG/DL (ref 70–99)
HCT VFR BLD AUTO: 23.1 % (ref 40.5–52.5)
HCT VFR BLD AUTO: 23.8 % (ref 40.5–52.5)
HGB BLD-MCNC: 7.6 G/DL (ref 13.5–17.5)
HGB BLD-MCNC: 7.9 G/DL (ref 13.5–17.5)
LYMPHOCYTES # BLD: 0.3 K/UL (ref 1–5.1)
LYMPHOCYTES NFR BLD: 9.6 %
MAGNESIUM SERPL-MCNC: 1.91 MG/DL (ref 1.8–2.4)
MCH RBC QN AUTO: 29.1 PG (ref 26–34)
MCHC RBC AUTO-ENTMCNC: 33.2 G/DL (ref 31–36)
MCV RBC AUTO: 87.5 FL (ref 80–100)
MONOCYTES # BLD: 0.2 K/UL (ref 0–1.3)
MONOCYTES NFR BLD: 7 %
NEUTROPHILS # BLD: 2.2 K/UL (ref 1.7–7.7)
NEUTROPHILS NFR BLD: 80.9 %
PERFORMED ON: ABNORMAL
PERFORMED ON: ABNORMAL
PLATELET # BLD AUTO: 125 K/UL (ref 135–450)
PMV BLD AUTO: 7.7 FL (ref 5–10.5)
POTASSIUM SERPL-SCNC: 3.4 MMOL/L (ref 3.5–5.1)
PROT SERPL-MCNC: 5.6 G/DL (ref 6.4–8.2)
RBC # BLD AUTO: 2.72 M/UL (ref 4.2–5.9)
SODIUM SERPL-SCNC: 136 MMOL/L (ref 136–145)
WBC # BLD AUTO: 2.7 K/UL (ref 4–11)

## 2025-06-18 PROCEDURE — 6360000002 HC RX W HCPCS

## 2025-06-18 PROCEDURE — 83735 ASSAY OF MAGNESIUM: CPT

## 2025-06-18 PROCEDURE — 80053 COMPREHEN METABOLIC PANEL: CPT

## 2025-06-18 PROCEDURE — 85018 HEMOGLOBIN: CPT

## 2025-06-18 PROCEDURE — 6370000000 HC RX 637 (ALT 250 FOR IP): Performed by: STUDENT IN AN ORGANIZED HEALTH CARE EDUCATION/TRAINING PROGRAM

## 2025-06-18 PROCEDURE — 6360000002 HC RX W HCPCS: Performed by: INTERNAL MEDICINE

## 2025-06-18 PROCEDURE — 2500000003 HC RX 250 WO HCPCS

## 2025-06-18 PROCEDURE — 6370000000 HC RX 637 (ALT 250 FOR IP)

## 2025-06-18 PROCEDURE — 85014 HEMATOCRIT: CPT

## 2025-06-18 PROCEDURE — 6370000000 HC RX 637 (ALT 250 FOR IP): Performed by: INTERNAL MEDICINE

## 2025-06-18 PROCEDURE — 85025 COMPLETE CBC W/AUTO DIFF WBC: CPT

## 2025-06-18 PROCEDURE — 2580000003 HC RX 258: Performed by: INTERNAL MEDICINE

## 2025-06-18 RX ORDER — POLYETHYLENE GLYCOL 3350 17 G/17G
17 POWDER, FOR SOLUTION ORAL DAILY
COMMUNITY
Start: 2025-06-19 | End: 2025-07-19

## 2025-06-18 RX ORDER — PHYTONADIONE 5 MG/1
10 TABLET ORAL
Qty: 4 TABLET | Refills: 0 | Status: SHIPPED | OUTPATIENT
Start: 2025-06-19 | End: 2025-06-21

## 2025-06-18 RX ORDER — ALBUTEROL SULFATE 90 UG/1
INHALANT RESPIRATORY (INHALATION)
Qty: 9 G | Refills: 1 | Status: SHIPPED | OUTPATIENT
Start: 2025-06-18

## 2025-06-18 RX ORDER — PHYTONADIONE 5 MG/1
2.5 TABLET ORAL DAILY
Qty: 15 TABLET | Refills: 0 | Status: SHIPPED
Start: 2025-06-21

## 2025-06-18 RX ORDER — SENNOSIDES 8.6 MG/1
1 TABLET ORAL NIGHTLY PRN
Qty: 30 TABLET | Refills: 0 | Status: SHIPPED
Start: 2025-06-18 | End: 2025-07-18

## 2025-06-18 RX ORDER — PANTOPRAZOLE SODIUM 40 MG/1
40 TABLET, DELAYED RELEASE ORAL 2 TIMES DAILY
Qty: 60 TABLET | Refills: 0 | Status: SHIPPED | OUTPATIENT
Start: 2025-06-18 | End: 2025-07-18

## 2025-06-18 RX ORDER — POTASSIUM CHLORIDE 1500 MG/1
20 TABLET, EXTENDED RELEASE ORAL 2 TIMES DAILY WITH MEALS
Status: DISCONTINUED | OUTPATIENT
Start: 2025-06-18 | End: 2025-06-18 | Stop reason: HOSPADM

## 2025-06-18 RX ADMIN — PANCRELIPASE LIPASE, PANCRELIPASE PROTEASE, PANCRELIPASE AMYLASE 15000 UNITS: 15000; 47000; 63000 CAPSULE, DELAYED RELEASE ORAL at 11:59

## 2025-06-18 RX ADMIN — ALLOPURINOL 300 MG: 300 TABLET ORAL at 09:15

## 2025-06-18 RX ADMIN — ERLOTINIB 100 MG: 100 TABLET, FILM COATED ORAL at 10:39

## 2025-06-18 RX ADMIN — PANCRELIPASE LIPASE, PANCRELIPASE PROTEASE, PANCRELIPASE AMYLASE 15000 UNITS: 15000; 47000; 63000 CAPSULE, DELAYED RELEASE ORAL at 07:54

## 2025-06-18 RX ADMIN — POLYETHYLENE GLYCOL (3350) 17 G: 17 POWDER, FOR SOLUTION ORAL at 09:15

## 2025-06-18 RX ADMIN — INSULIN LISPRO 1 UNITS: 100 INJECTION, SOLUTION INTRAVENOUS; SUBCUTANEOUS at 11:59

## 2025-06-18 RX ADMIN — PANCRELIPASE LIPASE, PANCRELIPASE PROTEASE, PANCRELIPASE AMYLASE 60000 UNITS: 20000; 63000; 84000 CAPSULE, DELAYED RELEASE ORAL at 07:54

## 2025-06-18 RX ADMIN — Medication 1000 MCG: at 09:15

## 2025-06-18 RX ADMIN — Medication 10 ML: at 09:16

## 2025-06-18 RX ADMIN — SODIUM CHLORIDE 40 MG: 9 INJECTION INTRAMUSCULAR; INTRAVENOUS; SUBCUTANEOUS at 09:15

## 2025-06-18 RX ADMIN — PANCRELIPASE LIPASE, PANCRELIPASE PROTEASE, PANCRELIPASE AMYLASE 60000 UNITS: 20000; 63000; 84000 CAPSULE, DELAYED RELEASE ORAL at 12:00

## 2025-06-18 RX ADMIN — DICYCLOMINE HYDROCHLORIDE 20 MG: 20 TABLET ORAL at 09:15

## 2025-06-18 RX ADMIN — PHYTONADIONE 10 MG: 10 INJECTION, EMULSION INTRAMUSCULAR; INTRAVENOUS; SUBCUTANEOUS at 10:44

## 2025-06-18 RX ADMIN — POTASSIUM CHLORIDE 20 MEQ: 1500 TABLET, EXTENDED RELEASE ORAL at 10:45

## 2025-06-18 RX ADMIN — CALCIUM POLYCARBOPHIL 625 MG: 625 TABLET, FILM COATED ORAL at 09:15

## 2025-06-18 RX ADMIN — INSULIN HUMAN 12 UNITS: 100 INJECTION, SUSPENSION SUBCUTANEOUS at 07:58

## 2025-06-18 ASSESSMENT — ENCOUNTER SYMPTOMS
SHORTNESS OF BREATH: 0
EYE PAIN: 0
SINUS PRESSURE: 0
VOMITING: 0
CONSTIPATION: 0
COUGH: 0
DIARRHEA: 0
ABDOMINAL PAIN: 1
EYE REDNESS: 0
SINUS PAIN: 0
NAUSEA: 0
ABDOMINAL DISTENTION: 1

## 2025-06-18 ASSESSMENT — PAIN SCALES - GENERAL: PAINLEVEL_OUTOF10: 0

## 2025-06-18 NOTE — PLAN OF CARE
Problem: Chronic Conditions and Co-morbidities  Goal: Patient's chronic conditions and co-morbidity symptoms are monitored and maintained or improved  6/18/2025 1436 by Rain Stephenson RN  Outcome: Adequate for Discharge  6/18/2025 0957 by Rain Stephenson RN  Outcome: Progressing     Problem: Discharge Planning  Goal: Discharge to home or other facility with appropriate resources  6/18/2025 1436 by Rain Stephenson RN  Outcome: Adequate for Discharge  6/18/2025 0957 by Rain Stephenson RN  Outcome: Progressing     Problem: Safety - Adult  Goal: Free from fall injury  6/18/2025 1436 by Rain Stephenson RN  Outcome: Adequate for Discharge  6/18/2025 0957 by Rain Stephenson RN  Outcome: Progressing     Problem: ABCDS Injury Assessment  Goal: Absence of physical injury  6/18/2025 1436 by Rain Stephenson RN  Outcome: Adequate for Discharge  6/18/2025 0957 by Rain Stephenson RN  Outcome: Progressing     Problem: Pain  Goal: Verbalizes/displays adequate comfort level or baseline comfort level  6/18/2025 1436 by Rain Stephenson RN  Outcome: Adequate for Discharge  6/18/2025 0957 by Rain Stephenson RN  Outcome: Progressing

## 2025-06-18 NOTE — DISCHARGE SUMMARY
Hospital Medicine Discharge Summary    Patient: Sharath Ceja     Gender: male  : 1950   Age: 74 y.o.  MRN: [id number]    Admitting Physician: Lei Odonnell MD  Discharge Physician: Mikaela Birmingham DO    Code Status: Full Code     Admit Date: 2025   Discharge Date: 2025      Discharge Diagnoses:    Active Hospital Problems    Diagnosis Date Noted    Coagulopathy [D68.9] 2025    Acute GI bleeding [K92.2] 2025    GI bleed [K92.2] 2025    ABLA (acute blood loss anemia) [D62] 2025    Type 2 diabetes mellitus without complications (HCC) [E11.9] 2024    Cholangiocarcinoma (HCC) [C22.1] 2023    Hypokalemia [E87.6] 2022     Condition at Discharge: Stable    Hospital Course:     Acute GI bleed in the setting of stage IV intrahepatic cholangiocarcinoma:  Acute on chronic anemia  - CT as above   - Hemoglobin stable, transfuse hemoglobin less than 7  - H&H every 6  - s/p 1 units of PRBC  - Monitor CBC and transfuse as indicated  - GI was consulted  - Palliative care consult-discussed with family and agreeable to consult.    - continue the 10 mg Vit K supplementation another 2 days then resume home dose  - continue bowel regimen, avoid constipation.     Type 2 diabetes with hyperglycemia  - Sliding scale insulin  - Resume home insulin NPH twice daily  - monitor and adjust as needed     Hypokalemia  - Replace as needed  - resume home oral potassium.     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 - will defer to GI  - Continue Zenpep    Additional findings or notes to primary provider:  None at this time    Discharge Medications:   Current Discharge Medication List        START taking

## 2025-06-18 NOTE — PLAN OF CARE
Problem: Chronic Conditions and Co-morbidities  Goal: Patient's chronic conditions and co-morbidity symptoms are monitored and maintained or improved  6/18/2025 0957 by Rain Stephenson RN  Outcome: Progressing  6/17/2025 2209 by Marv Nobles RN  Outcome: Progressing     Problem: Discharge Planning  Goal: Discharge to home or other facility with appropriate resources  6/18/2025 0957 by Rain Stephenson RN  Outcome: Progressing  6/17/2025 2209 by Marv Nobles RN  Outcome: Progressing     Problem: Safety - Adult  Goal: Free from fall injury  6/18/2025 0957 by Rain Stephenson RN  Outcome: Progressing  6/17/2025 2209 by Marv Nobles RN  Outcome: Progressing     Problem: ABCDS Injury Assessment  Goal: Absence of physical injury  6/18/2025 0957 by Rain Stephenson RN  Outcome: Progressing  6/17/2025 2209 by Marv Nobles RN  Outcome: Progressing     Problem: Pain  Goal: Verbalizes/displays adequate comfort level or baseline comfort level  6/18/2025 0957 by Rain Stephenson RN  Outcome: Progressing  6/17/2025 2209 by Marv Nobles RN  Outcome: Progressing

## 2025-06-18 NOTE — PROGRESS NOTES
PROGRESS NOTE  S:74 yrs Patient  admitted on 6/16/2025 with GI bleed [K92.2]  Acute GI bleeding [K92.2] .  Today, he reports having brown bowel movement today. No further signs of bleeding noted. He denies pain. He reports tolerating regular diet.     Exam:   Vitals:    06/18/25 0740   BP: 107/67   Pulse: 73   Resp: 18   Temp: 98 °F (36.7 °C)   SpO2: 96%   Generalized: alert, no acute distress  HEENT: sclera clear, anicteric  Neck: supple, trachea midline  Heart NSR  Abdomen: soft, NT +biliary drains  Extremities: no edema     Medications: Reviewed    Labs:  CBC:   Recent Labs     06/16/25  1335 06/17/25  0012 06/17/25  0555 06/17/25  1200 06/17/25  1800 06/18/25  0128 06/18/25  0625   WBC 3.4*  --  3.1*  --   --   --  2.7*   HGB 7.4*   < > 7.9*   < > 8.6* 7.6* 7.9*   HCT 22.7*   < > 23.3*   < > 25.7* 23.1* 23.8*   MCV 88.6  --  88.7  --   --   --  87.5     --  115*  --   --   --  125*    < > = values in this interval not displayed.     BMP:   Recent Labs     06/16/25  1335 06/17/25  0555 06/18/25  0625   * 136 136   K 3.4* 3.6 3.4*    102 103   CO2 24 23 23   BUN 8 7 7   CREATININE 0.4* 0.3* 0.4*     LIVER PROFILE:   Recent Labs     06/16/25  1335 06/17/25  0555 06/18/25  0625   AST 24 27 25   ALT 22 22 21   LIPASE 34.0  --   --    BILITOT 0.7 1.2* 0.7   ALKPHOS 218* 210* 223*     Attending Supervising Physician's Attestation Statement  The patient is a 74 y.o. male. I have performed a history and physical examination of the patient. I discussed the case with LENORE Laughlin    I reviewed the patient's Past Medical History, Past Surgical History, Medications, and Allergies.     Physical Exam:  Vitals:    06/17/25 2041 06/18/25 0426 06/18/25 0740 06/18/25 1330   BP:  118/64 107/67 121/75   Pulse:  69 73 94   Resp: 16 16 18 16   Temp:  99.1 °F (37.3 °C) 98 °F (36.7 °C) 98.1 °F (36.7 °C)   TempSrc:  Oral Oral Oral   SpO2:  97% 96% 98%   Weight:     
  Physician Progress Note      PATIENT:               JO IVERSON  CSN #:                  [id number]  :                       1950  ADMIT DATE:       2025 12:29 PM  DISCH DATE:  RESPONDING  PROVIDER #:        Maryuri Burnette MD          QUERY TEXT:    Anemia is documented in the medical record. Please specify the type:    The clinical indicators include:  H&P--\" #Acute GI bleed in the setting of stage IV intrahepatic   cholangiocarcinoma: #Acute on chronic anemia. - Hemoglobin stable, transfuse   hemoglobin less than 7.\"    hgb trend-- --hgb 8.2, on admission 7.4, 6.8, transfused 1uprbc--7.9    1uprbc, labs, gi consult, essential home meds, supportive care  Options provided:  -- Related to acute on chronic blood loss  -- Other - I will add my own diagnosis  -- Disagree - Not applicable / Not valid  -- Disagree - Clinically unable to determine / Unknown  -- Refer to Clinical Documentation Reviewer    PROVIDER RESPONSE TEXT:    The patients anemia is related to acute on chronic blood loss.    Query created by: Varun Nguyễn on 2025 10:18 AM      QUERY TEXT:    CKD is documented in the medical record . Please specify the disease stage:    The clinical indicators include:  H&P--\"  Rectal bleeding--#Acute GI bleed in the setting of stage IV   intrahepatic cholangiocarcinoma: #Acute on chronic anemia--PMH--CKD.\"    CR--0.4, 0.3    Blood transfusion, labs, ua, essential home meds, supportive care  Options provided:  -- Chronic kidney disease Stage 1  -- Chronic kidney disease Stage 2  -- Chronic kidney disease Stage 3, unspecified  -- Chronic kidney disease Stage 3a  -- Chronic kidney disease Stage 3b  -- Other - I will add my own diagnosis  -- Disagree - Not applicable / Not valid  -- Disagree - Clinically unable to determine / Unknown  -- Refer to Clinical Documentation Reviewer    PROVIDER RESPONSE TEXT:    Provider is clinically unable to determine a response to this query.    Query created 
4 Eyes Skin Assessment     NAME:  Sharath Ceja  YOB: 1950  MEDICAL RECORD NUMBER:  [id number]    The patient is being assessed for  Admission    I agree that at least one RN has performed a thorough Head to Toe Skin Assessment on the patient. ALL assessment sites listed below have been assessed.      Areas assessed by both nurses:    Head, Face, Ears, Shoulders, Back, Chest, Arms, Elbows, Hands, Sacrum. Buttock, Coccyx, Ischium, and Legs. Feet and Heels        Does the Patient have a Wound? No noted wound(s)       Mickey Prevention initiated by RN: Yes  Wound Care Orders initiated by RN: No    Pressure Injury (Stage 3,4, Unstageable, DTI, NWPT, and Complex wounds) if present, place Wound referral order by RN under : No    New Ostomies, if present place, Ostomy referral order under : Yes     Nurse 1 eSignature: Electronically signed by Marv Nobles RN on 6/16/25 at 11:02 PM EDT    **SHARE this note so that the co-signing nurse can place an eSignature**    Nurse 2 eSignature: Electronically signed by Mayte Schmidt RN on 6/17/25 at 12:40 AM EDT   
Bedside Mobility Assessment Tool (BMAT):     Assessment Level 1- Sit and Shake    1. From a semi-reclined position, ask patient to sit up and rotate to a seated position at the side of the bed. Can use the bedrail.    2. Ask patient to reach out and grab your hand and shake making sure patient reaches across his/her midline.   Pass- Patient is able to come to a seated position, maintain core strength. Maintains seated balance while reaching across midline. Move on to Assessment Level 2.     Assessment Level 2- Stretch and Point   1. With patient in seated position at the side of the bed, have patient place both feet on the floor (or stool) with knees no higher than hips.    2. Ask patient to stretch one leg and straighten the knee, then bend the ankle/flex and point the toes. If appropriate, repeat with the other leg.   Pass- Patient is able to demonstrate appropriate quad strength on intended weight bearing limb(s). Move onto Assessment Level 3.     Assessment Level 3- Stand   1. Ask patient to elevate off the bed or chair (seated to standing) using an assistive device (cane, bedrail).    2. Patient should be able to raise buttocks off be and hold for a count of five. May repeat once.   Pass- Patient maintains standing stability for at least 5 seconds, proceed to assessment level 4.    Assessment Level 4- Walk   1. Ask patient to march in place at bedside.    2. Then ask patient to advance step and return each foot. Some medical conditions may render a patient from stepping backwards, use your best clinical judgement.   Pass- Patient demonstrates balance while shifting weight and ability to step, takes independent steps, does not use assistive device patient is MOBILITY LEVEL 4.      Mobility Level- 4   
Bedside Mobility Assessment Tool (BMAT):     Assessment Level 1- Sit and Shake    1. From a semi-reclined position, ask patient to sit up and rotate to a seated position at the side of the bed. Can use the bedrail.    2. Ask patient to reach out and grab your hand and shake making sure patient reaches across his/her midline.   Pass- Patient is able to come to a seated position, maintain core strength. Maintains seated balance while reaching across midline. Move on to Assessment Level 2.     Assessment Level 2- Stretch and Point   1. With patient in seated position at the side of the bed, have patient place both feet on the floor (or stool) with knees no higher than hips.    2. Ask patient to stretch one leg and straighten the knee, then bend the ankle/flex and point the toes. If appropriate, repeat with the other leg.   Pass- Patient is able to demonstrate appropriate quad strength on intended weight bearing limb(s). Move onto Assessment Level 3.     Assessment Level 3- Stand   1. Ask patient to elevate off the bed or chair (seated to standing) using an assistive device (cane, bedrail).    2. Patient should be able to raise buttocks off be and hold for a count of five. May repeat once.   Pass- Patient maintains standing stability for at least 5 seconds, proceed to assessment level 4.    Assessment Level 4- Walk   1. Ask patient to march in place at bedside.    2. Then ask patient to advance step and return each foot. Some medical conditions may render a patient from stepping backwards, use your best clinical judgement.   Pass- Patient demonstrates balance while shifting weight and ability to step, takes independent steps, does not use assistive device patient is MOBILITY LEVEL 4.      Mobility Level- 4    
Blood transfusion tolerated well.  Vital signs stable.  
Call made to IR for biliary drain bags, per patient request.   
Discharge instructions reviewed with pt and wife. Pt instructed to wait till meds from outpatient pharmacy arrive before leaving. Will request transport once medications arrive.   
Flowsheets show that the blood entered the vein @ 0136.  The channel malfunctioned and it had to be replaced.  The actual start of the blood administration was @ 0143.    
Inpatient Physical Therapy Evaluation & Treatment    Unit: [facility]  Date:  6/17/2025  Patient Name:    Sharath Ceja  Admitting diagnosis:  GI bleed [K92.2]  Acute GI bleeding [K92.2]  Admit Date:  6/16/2025  Precautions/Restrictions/WB Status/ Lines/ Wounds/ Oxygen: Fall risk, Bed/chair alarm, and Lines (IV and Drains (bilateral billiary drains))      Pt seen for cotreatment this date due to patient safety, patient endurance, complexity of condition, and acute illness/injury    Treatment Time:  1050 - 1123  Treatment Number:  1   Timed Code Treatment Minutes: 23 minutes  Total Treatment Minutes:  33  minutes    Patient Stated Goals for Therapy: \" to get better \"          Discharge Recommendations: Home with initial 24 hr supervision and Home PT  DME needs for discharge: Needs Met       Therapy recommendation for EMS Transport: can transport by wheelchair    Therapy recommendations for staff:   Stand by assist for ambulation with use of rolling walker (RW) to/from chair  to/from bathroom  within room    History of Present Illness:   Per admission H&P by Dr. Odonnell on 6/16/25:  \"74 y.o. male who presented to [facility] with 1 week history of rectal bleeding and maroon-colored stools as well as abdominal pain.  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       Notably, patient was recently admitted on 3/24/2025 with GI bleeding and GI was consulted at that time and recommend supportive care only as patient had advanced cancer and no intervention recommended.  Patient had his biliary drains replaced on 3/28 during that admission.     Patient is at bedside with wife present.  Noted that he has been having worsening frequency and volume of bloody stools over the past 1 week.  He 
Lab called to alert RN regarding Hgb of 6.8 and Hem of 20.8.  Perfect served provider in house.  
Living will and POA paperwork received from pt's wife. Copy made and placed in chart. Original copy given back to wife.   
Perfect serve to Dr. Pandey regarding pt's Vitamin K being ordered 2.5mg daily but his note saying 10mg Vitamin K 3 times per day. Received order for 10mg IV Vitamin K daily.   
Perfect served provider regarding IV pain coverage.  
Prior to receiving transfusion of blood products, patient educated on the following:    Blood product transfusion process  Benefits of receiving blood product transfusion  Risks associated with receiving the transfusion  Signs and symptoms of complications associated with blood product transfusion  Vague, uneasy feeling  Onset of pain (especially at the IV site, back, or chest)  Chills  Flushing  Fever  Nausea  Dizziness  Rash  Itching  Dark or red urine  Instructed patient to notify RN immediately if any sign or symptom occurs     
Progress Note    Admit Date:  6/16/2025    Admitted for GI bleed  Pt has known intrahepatic cholangiocarcinoma   S/p bilary drains    Subjective:  Mr. Ceja is resting in bed.  Stated he feels better today after blood.  Drains in place and draining well.  Reported rectal bleeding prior to arrival.     Objective:   Patient Vitals for the past 4 hrs:   BP Temp Temp src Pulse Resp SpO2   06/17/25 0800 110/68 98 °F (36.7 °C) Oral 63 18 96 %          Intake/Output Summary (Last 24 hours) at 6/17/2025 1110  Last data filed at 6/17/2025 0800  Gross per 24 hour   Intake 291 ml   Output 125 ml   Net 166 ml       Physical Exam:    Gen: No distress. Alert. Appears chronically ill, pleasant elderly male. Thin  Eyes: PERRL. No sclera icterus. No conjunctival injection.   ENT: No discharge. Pharynx clear.   Neck: No JVD.  Trachea midline.  Resp: No accessory muscle use. No crackles. No wheezes. No rhonchi.   CV: Regular rate. Regular rhythm. No murmur.  No rub. No edema.   Capillary Refill: Brisk,< 3 seconds   GI: Non-tender. Non-distended.  Normal bowel sounds.  +drains in abdomen with dressing dry and intact, draining green liquid  Skin: Warm and dry. No nodule on exposed extremities. No rash on exposed extremities.   M/S: No cyanosis. No joint deformity. No clubbing.   Neuro: Awake. Grossly nonfocal    Psych: Oriented x 3. No anxiety or agitation.      Scheduled Meds:   sodium chloride flush  5-40 mL IntraVENous 2 times per day    insulin lispro  0-4 Units SubCUTAneous 4x Daily AC & HS    pantoprazole (PROTONIX) 40 mg in sodium chloride (PF) 0.9 % 10 mL injection  40 mg IntraVENous Q12H    allopurinol  300 mg Oral Daily    vitamin B-12  1,000 mcg Oral Daily    dicyclomine  20 mg Oral Daily    erlotinib  100 mg Oral Daily    finasteride  5 mg Oral QPM    insulin NPH  12 Units SubCUTAneous BID AC    phytonadione  2.5 mg Oral Daily    polycarbophil  625 mg Oral Daily    lipase-protease-amylase  60,000 Units Oral TID WC    And 
Pt assisted with bilateral biliary drain bag change.   
Pt discharged to main entrance via wheelchair. Medication with patients wife. All belongings with pt at time of discharge.   
Pt resting in bed, awake. Pt assisted with biliary drains. No other needs at this time. AM assessment completed.     Bedside Mobility Assessment Tool (BMAT):     Assessment Level 1- Sit and Shake    1. From a semi-reclined position, ask patient to sit up and rotate to a seated position at the side of the bed. Can use the bedrail.    2. Ask patient to reach out and grab your hand and shake making sure patient reaches across his/her midline.   Pass- Patient is able to come to a seated position, maintain core strength. Maintains seated balance while reaching across midline. Move on to Assessment Level 2.     Assessment Level 2- Stretch and Point   1. With patient in seated position at the side of the bed, have patient place both feet on the floor (or stool) with knees no higher than hips.    2. Ask patient to stretch one leg and straighten the knee, then bend the ankle/flex and point the toes. If appropriate, repeat with the other leg.   Pass- Patient is able to demonstrate appropriate quad strength on intended weight bearing limb(s). Move onto Assessment Level 3.     Assessment Level 3- Stand   1. Ask patient to elevate off the bed or chair (seated to standing) using an assistive device (cane, bedrail).    2. Patient should be able to raise buttocks off be and hold for a count of five. May repeat once.   Pass- Patient maintains standing stability for at least 5 seconds, proceed to assessment level 4.    Assessment Level 4- Walk   1. Ask patient to march in place at bedside.    2. Then ask patient to advance step and return each foot. Some medical conditions may render a patient from stepping backwards, use your best clinical judgement.   Pass- Patient demonstrates balance while shifting weight and ability to step, takes independent steps, does not use assistive device patient is MOBILITY LEVEL 4.      Mobility Level- 4    
Pt sitting in bed, awake. Denies any needs at this time. AM assessment completed. Call light within reach.     Bedside Mobility Assessment Tool (BMAT):     Assessment Level 1- Sit and Shake    1. From a semi-reclined position, ask patient to sit up and rotate to a seated position at the side of the bed. Can use the bedrail.    2. Ask patient to reach out and grab your hand and shake making sure patient reaches across his/her midline.   Pass- Patient is able to come to a seated position, maintain core strength. Maintains seated balance while reaching across midline. Move on to Assessment Level 2.     Assessment Level 2- Stretch and Point   1. With patient in seated position at the side of the bed, have patient place both feet on the floor (or stool) with knees no higher than hips.    2. Ask patient to stretch one leg and straighten the knee, then bend the ankle/flex and point the toes. If appropriate, repeat with the other leg.   Pass- Patient is able to demonstrate appropriate quad strength on intended weight bearing limb(s). Move onto Assessment Level 3.     Assessment Level 3- Stand   1. Ask patient to elevate off the bed or chair (seated to standing) using an assistive device (cane, bedrail).    2. Patient should be able to raise buttocks off be and hold for a count of five. May repeat once.   Pass- Patient maintains standing stability for at least 5 seconds, proceed to assessment level 4.    Assessment Level 4- Walk   1. Ask patient to march in place at bedside.    2. Then ask patient to advance step and return each foot. Some medical conditions may render a patient from stepping backwards, use your best clinical judgement.   Pass- Patient demonstrates balance while shifting weight and ability to step, takes independent steps, does not use assistive device patient is MOBILITY LEVEL 4.      Mobility Level- 4    
Pt updated on discharge. Right chest port deaccessed. Covered with gauze and tape.   
Routine consult for GI complete . Dr. Pandey notified  
Shift assessment complete.  Patient is alert and oriented.  Vital signs are stable.  Respirations are even and easy, no signs or symptoms of distress.  Pt denies any needs at this time.  Call light within reach.   
Shift report given to Korey at bedside. Patient is stable. All end of shift needs have been met. No further assistance needed at this time.    
Shift report given to Randal at bedside. Patient is stable. All end of shift needs have been met. No further assistance needed at this time.    
There are home medications in patient drawer.  The medications are to dose and utilize here during patient stay.  Medication was brought to pharmacy and verified.  
Transport requested for pt to main entrance.   
couple days prior to presentation which has resolved.  Wife noted that every time he had a bowel movement, noted be weak, lightheaded and needed time to recover.  Every time at about movement, this recurred.  Denied any chest pain or shortness of breath.  Denies any fevers or chills.  Does complain of right side abdominal pain which is chronic but has been slightly worsening over the last week as well.  Otherwise, no other acute complaints.     In the ED, vital signs stable.  On room air.  BMP showing sodium 135, potassium 3.4.  Albumin of 3.0, .  CBC showed WBC of 3.4, hemoglobin 7.4.  PT of 17.4 and INR 1.41.  FOBT was positive.  UA was cloudy but otherwise unremarkable.  CT abdomen pelvis was performed and showed no evidence of active GI bleeding but did show evidence of constipation.  Otherwise stable CT from prior.\"    Preadmission Environment:   Pt. Lives     with spouse and 24/7 assist available   Home environment:    mobile home/trailer  Steps to enter first floor:   Ramp at patio door, ~3 IVAN at other entrances with handrail  Steps to second floor/basement: N/A  Laundry:     1st floor  Bathroom:     walk in shower, standard height toilet, and raised toilet seat w/ arms; does not shower due to drains in place--washes up at sink  Pt sleeps in a:    Adjustable bed  Equipment owned:    RW, SPC, lift chair, and reacher    Preadmission Status:  Mostly IND PTA but with wife providing intermittent assistance recently due to health problems.  Home Health Services:  RN 1x/week; Home PT just finished up    AM-PAC Score: AM-PAC Inpatient Daily Activity Raw Score: 23     Subjective:  Patient lying reclined in bed with no family present.   Pt agreeable to this OT session.     Cognition:    A&O Person, Place, Time, and Situation   Able to follow 2 step commands    Pain:   No  Pain Medicine Status: Received pain med prior to tx    Activity Tolerance:   Pt completed therapy session with no adverse

## 2025-06-18 NOTE — ACP (ADVANCE CARE PLANNING)
[facility]  Palliative Medicine Consultation Note      Date Of Admission:6/16/2025  Date of consult: 06/18/25  Seen by PC in the past:  No    Recommendations:      Introduced palliative care and had a voluntary discussion about advance care planning. Palliative care discussion ordered for a goals of care discussion. Patient reports feeling better and able to participate in a conversation. Currently under treatment at [facility] in [place], [place] and taking an oral chemo pill.  Currently utilizes home health services with [facility].  Reports they have been taking care of his biliary drains for two months now.  Discussed Hospice services which he is not ready for. Also discussed [facility] program and he reports not needing those services at this time.  Patient wishes to remain a full code.     1. Goals of Care/Advanced Care planning/Code status: Full code  2. Pain: Denies  3. SOB: Denies  4. Disposition: Home with HH    Reason for Consult:         [x]  Goals of Care  []  Code Status Discussion/Advanced Care Planning   []  Psychosocial/Family Support  []  Symptom Management  []  Other (Specify)    Requesting Physician: Dr. Burnette    CHIEF COMPLAINT:  Rectal bleeding    History Obtained From:  patient, EMR    History of Present Illness:         Sharath Ceja is a 74 y.o. male with PMH of intrahepatic cholangiocarcinoma status post Whipple in hepaticojejunostomy complicated by anatomic stricture, orthostatic hypotension, history of biliary drains since November 2024, Crohn's disease, type 2 diabetes, GERD, history of liver abscess, CKD  who presented to [facility] ED with 1 week history of rectal bleeding, maroon colored stools, and abdominal pain. Patient was recently admitted on 3/24/2025 with GI bleeding and GI was consulted at that time and recommend supportive care only as patient had advanced cancer and no intervention recommended.  Patient had his biliary drains replaced on 3/28

## 2025-06-18 NOTE — PLAN OF CARE
Problem: Chronic Conditions and Co-morbidities  Goal: Patient's chronic conditions and co-morbidity symptoms are monitored and maintained or improved  6/17/2025 2209 by Marv Nobles RN  Outcome: Progressing  6/17/2025 1112 by Rain Stephenson RN  Outcome: Progressing     Problem: Discharge Planning  Goal: Discharge to home or other facility with appropriate resources  6/17/2025 2209 by Marv Nobles RN  Outcome: Progressing  6/17/2025 1112 by Rain Stephenson RN  Outcome: Progressing     Problem: Safety - Adult  Goal: Free from fall injury  6/17/2025 2209 by Marv Nobles RN  Outcome: Progressing  6/17/2025 1112 by Rain Stephenson RN  Outcome: Progressing     Problem: ABCDS Injury Assessment  Goal: Absence of physical injury  6/17/2025 2209 by Marv Nobles RN  Outcome: Progressing  6/17/2025 1112 by Rain Stephenson RN  Outcome: Progressing     Problem: Pain  Goal: Verbalizes/displays adequate comfort level or baseline comfort level  6/17/2025 2209 by Marv Nobles RN  Outcome: Progressing  6/17/2025 1112 by Rain Stephenson RN  Outcome: Progressing

## 2025-06-18 NOTE — CARE COORDINATION
CASE MANAGEMENT DISCHARGE SUMMARY      Discharge to: home    IMM given: 6/16/2025    Transportation:      Family/car: wife    Confirmed discharge plan with:     Patient: yes     Family:  yes -wife at bedside     Facility/Agency, name: [facility]   Phone number for report to facility: [phone]     RN name: Elaine MORRISON    Note: Discharging nurse to complete GOLD, reconcile AVS, and place final copy with patient's discharge packet. RN to ensure that written prescriptions for  Level II medications are sent with patient to the facility as per protocol.

## 2025-06-19 ENCOUNTER — TELEPHONE (OUTPATIENT)
Dept: FAMILY MEDICINE CLINIC | Age: 75
End: 2025-06-19

## 2025-06-19 NOTE — TELEPHONE ENCOUNTER
Care Transitions Initial Follow Up Call    Outreach made within 2 business days of discharge: Yes    Patient: Sharath Ceja Patient : 1950   MRN: [id number]  Reason for Admission: GI Bleed  Discharge Date: 25       Spoke with: Natasha his Wife    Discharge department/facility: [facility] Patient Contact:  Was patient able to fill all prescriptions: No: Could not get Vit. K insurance wont pay for/ Cancer doctor came up with new plan  Was patient instructed to bring all medications to the follow-up visit: Yes  Is patient taking all medications as directed in the discharge summary? Yes except Protonix it interferes with chemo  Does patient understand their discharge instructions: Yes  Does patient have questions or concerns that need addressed prior to 7-14 day follow up office visit: No    Additional needs identified to be addressed with provider  No needs identified             Scheduled appointment with PCP within 7-14 days    Follow Up  Future Appointments   Date Time Provider Department Center   2025  1:00 PM Real Whitehead MD [facility] ECC DEP       So Foster MA

## 2025-07-01 DIAGNOSIS — M1A.09X0 CHRONIC GOUT OF MULTIPLE SITES, UNSPECIFIED CAUSE: ICD-10-CM

## 2025-07-01 RX ORDER — ALLOPURINOL 300 MG/1
300 TABLET ORAL DAILY
Qty: 90 TABLET | Refills: 3 | Status: ON HOLD | OUTPATIENT
Start: 2025-07-01

## 2025-07-01 NOTE — PROGRESS NOTES
Sharath Ceja (:  1950) is a 74 y.o. male,Established patient, here for evaluation of the following chief complaint(s):  Follow-Up from Hospital ([place]-GI Bleed)           Assessment & Plan  Type 2 diabetes mellitus without complication, with long-term current use of insulin (HCC)   Lab work ordered.  Sliding scale provided    Orders:    CBC with Auto Differential; Future    Comprehensive Metabolic Panel; Future    T4, Free; Future    TSH; Future    Vitamin B12; Future    Vitamin D 25 Hydroxy; Future    Vitamin K; Future    Cholangiocarcinoma (HCC)   Follow-up with oncology lab work today    Orders:    CBC with Auto Differential; Future    Comprehensive Metabolic Panel; Future    T4, Free; Future    TSH; Future    Vitamin B12; Future    Vitamin D 25 Hydroxy; Future    Vitamin K; Future    Type 2 diabetes mellitus with stage 2 chronic kidney disease, with long-term current use of insulin (HCC)   200-250 add 2 units  251-300 add 4 units  301-350 add 6 units         Gastroesophageal reflux disease without esophagitis   Continue current medication follow-up with oncology         Crohn's disease with complication, unspecified gastrointestinal tract location (HCC)   Continue current medication follow-up with GI         Crohn's disease of both small and large intestine without complications (HCC)   As above         Chronic gout of multiple sites, unspecified cause   Stable continue current treatment         Anemia, unspecified type   Lab work today    Orders:    APTT; Future    Protime-INR; Future    Obstruction of bile duct (HCC)   Follow-up with oncology    Orders:    Vitamin D 25 Hydroxy; Future    Disease of biliary tract, unspecified   Lab work today    Orders:    Protime-INR; Future    Other specified diseases of liver   Lab work today    Orders:    APTT; Future        ICD-10-CM    1. Type 2 diabetes mellitus without complication, with long-term current use of insulin (HCC)  E11.9 CBC with Auto

## 2025-07-02 ENCOUNTER — OFFICE VISIT (OUTPATIENT)
Dept: FAMILY MEDICINE CLINIC | Age: 75
End: 2025-07-02
Payer: MEDICARE

## 2025-07-02 ENCOUNTER — APPOINTMENT (OUTPATIENT)
Dept: CT IMAGING | Age: 75
DRG: 811 | End: 2025-07-02
Payer: MEDICARE

## 2025-07-02 ENCOUNTER — RESULTS FOLLOW-UP (OUTPATIENT)
Dept: FAMILY MEDICINE CLINIC | Age: 75
End: 2025-07-02

## 2025-07-02 ENCOUNTER — HOSPITAL ENCOUNTER (INPATIENT)
Age: 75
LOS: 12 days | Discharge: NURSING, REHAB OR LONG-TERM CARE | DRG: 811 | End: 2025-07-14
Attending: STUDENT IN AN ORGANIZED HEALTH CARE EDUCATION/TRAINING PROGRAM | Admitting: STUDENT IN AN ORGANIZED HEALTH CARE EDUCATION/TRAINING PROGRAM
Payer: MEDICARE

## 2025-07-02 ENCOUNTER — TELEPHONE (OUTPATIENT)
Dept: FAMILY MEDICINE CLINIC | Age: 75
End: 2025-07-02

## 2025-07-02 VITALS
SYSTOLIC BLOOD PRESSURE: 118 MMHG | HEIGHT: 70 IN | WEIGHT: 160.2 LBS | DIASTOLIC BLOOD PRESSURE: 60 MMHG | BODY MASS INDEX: 22.94 KG/M2 | OXYGEN SATURATION: 98 % | HEART RATE: 83 BPM

## 2025-07-02 DIAGNOSIS — K83.1 OBSTRUCTION OF BILE DUCT (HCC): ICD-10-CM

## 2025-07-02 DIAGNOSIS — M79.661 BILATERAL CALF PAIN: Primary | ICD-10-CM

## 2025-07-02 DIAGNOSIS — T85.520D BILIARY DRAIN DISPLACEMENT, SUBSEQUENT ENCOUNTER: ICD-10-CM

## 2025-07-02 DIAGNOSIS — N18.2 TYPE 2 DIABETES MELLITUS WITH STAGE 2 CHRONIC KIDNEY DISEASE, WITH LONG-TERM CURRENT USE OF INSULIN (HCC): ICD-10-CM

## 2025-07-02 DIAGNOSIS — Z79.4 TYPE 2 DIABETES MELLITUS WITH STAGE 2 CHRONIC KIDNEY DISEASE, WITH LONG-TERM CURRENT USE OF INSULIN (HCC): ICD-10-CM

## 2025-07-02 DIAGNOSIS — K50.80 CROHN'S DISEASE OF BOTH SMALL AND LARGE INTESTINE WITHOUT COMPLICATIONS (HCC): ICD-10-CM

## 2025-07-02 DIAGNOSIS — C22.1 CHOLANGIOCARCINOMA (HCC): ICD-10-CM

## 2025-07-02 DIAGNOSIS — K21.9 GASTROESOPHAGEAL REFLUX DISEASE WITHOUT ESOPHAGITIS: ICD-10-CM

## 2025-07-02 DIAGNOSIS — E11.22 TYPE 2 DIABETES MELLITUS WITH STAGE 2 CHRONIC KIDNEY DISEASE, WITH LONG-TERM CURRENT USE OF INSULIN (HCC): ICD-10-CM

## 2025-07-02 DIAGNOSIS — K92.2 GASTROINTESTINAL HEMORRHAGE, UNSPECIFIED GASTROINTESTINAL HEMORRHAGE TYPE: ICD-10-CM

## 2025-07-02 DIAGNOSIS — K76.89 OTHER SPECIFIED DISEASES OF LIVER: ICD-10-CM

## 2025-07-02 DIAGNOSIS — E11.9 TYPE 2 DIABETES MELLITUS WITHOUT COMPLICATION, WITH LONG-TERM CURRENT USE OF INSULIN (HCC): Primary | ICD-10-CM

## 2025-07-02 DIAGNOSIS — K83.9 DISEASE OF BILIARY TRACT, UNSPECIFIED: ICD-10-CM

## 2025-07-02 DIAGNOSIS — D64.9 ANEMIA, UNSPECIFIED TYPE: ICD-10-CM

## 2025-07-02 DIAGNOSIS — M1A.09X0 CHRONIC GOUT OF MULTIPLE SITES, UNSPECIFIED CAUSE: ICD-10-CM

## 2025-07-02 DIAGNOSIS — Z79.4 TYPE 2 DIABETES MELLITUS WITHOUT COMPLICATION, WITH LONG-TERM CURRENT USE OF INSULIN (HCC): Primary | ICD-10-CM

## 2025-07-02 DIAGNOSIS — D62 ACUTE BLOOD LOSS ANEMIA: ICD-10-CM

## 2025-07-02 DIAGNOSIS — K50.919 CROHN'S DISEASE WITH COMPLICATION, UNSPECIFIED GASTROINTESTINAL TRACT LOCATION (HCC): ICD-10-CM

## 2025-07-02 DIAGNOSIS — M79.662 BILATERAL CALF PAIN: Primary | ICD-10-CM

## 2025-07-02 LAB
A/G RATIO: 0.9 G/DL (ref 1–2.5)
ABO/RH: NORMAL
ALBUMIN SERPL-MCNC: 2.6 G/DL (ref 3.4–5)
ALBUMIN/GLOB SERPL: 0.9 {RATIO} (ref 1.1–2.2)
ALBUMIN: 2.9 G/DL (ref 3.5–5)
ALP BLD-CCNC: 313 U/L (ref 38–126)
ALP SERPL-CCNC: 312 U/L (ref 40–129)
ALT SERPL-CCNC: 21 U/L (ref 10–40)
ALT SERPL-CCNC: 26 U/L (ref 0–49)
ANION GAP SERPL CALCULATED.3IONS-SCNC: 12 MMOL/L (ref 3–16)
ANION GAP SERPL CALCULATED.3IONS-SCNC: 13.5 MMOL/L (ref 8–16)
ANTIBODY SCREEN: NORMAL
AST SERPL-CCNC: 22 U/L (ref 15–37)
AST SERPL-CCNC: 33 U/L (ref 17–59)
BASE EXCESS BLDV CALC-SCNC: 0.5 MMOL/L (ref -3–3)
BASOPHILS # BLD: 0 K/UL (ref 0–0.2)
BASOPHILS NFR BLD: 0.2 %
BILIRUB SERPL-MCNC: 0.7 MG/DL (ref 0–1)
BILIRUB SERPL-MCNC: 1 MG/DL (ref 0.2–1.3)
BLOOD BANK DISPENSE STATUS: NORMAL
BLOOD BANK PRODUCT CODE: NORMAL
BPU ID: NORMAL
BUN BLDV-MCNC: 9 MG/DL (ref 9–20)
BUN SERPL-MCNC: 9 MG/DL (ref 7–20)
CALCIUM SERPL-MCNC: 8.1 MG/DL (ref 8.3–10.6)
CALCIUM SERPL-MCNC: 8.3 MG/DL (ref 8.6–10.3)
CHLORIDE BLD-SCNC: 102 MMOL/L (ref 98–107)
CHLORIDE SERPL-SCNC: 102 MMOL/L (ref 99–110)
CO2 BLDV-SCNC: 24 MMOL/L
CO2 SERPL-SCNC: 22 MMOL/L (ref 21–32)
CO2: 23 MMOL/L (ref 22–30)
COHGB MFR BLDV: 2.4 % (ref 0–1.5)
CREAT SERPL-MCNC: 0.4 MG/DL (ref 0.66–1.25)
CREAT SERPL-MCNC: 0.4 MG/DL (ref 0.8–1.3)
DEPRECATED RDW RBC AUTO: 17.5 % (ref 12.4–15.4)
DESCRIPTION BLOOD BANK: NORMAL
EOSINOPHIL # BLD: 0.1 K/UL (ref 0–0.6)
EOSINOPHIL NFR BLD: 1.6 %
GFR SERPLBLD CREATININE-BSD FMLA CKD-EPI: >90 ML/MIN/{1.73_M2}
GFR, ESTIMATED: 210
GLOBULIN: 3.2 G/DL (ref 2–3.5)
GLUCOSE SERPL-MCNC: 176 MG/DL (ref 70–99)
GLUCOSE: 202 MG/DL (ref 74–100)
HCO3 BLDV-SCNC: 22.9 MMOL/L (ref 23–29)
HCT VFR BLD AUTO: 19.5 % (ref 40.5–52.5)
HCT VFR BLD AUTO: 22.7 % (ref 40.5–52.5)
HGB BLD-MCNC: 6.3 G/DL (ref 13.5–17.5)
HGB BLD-MCNC: 7.5 G/DL (ref 13.5–17.5)
INR BLD: 1.15 (ref 2–4)
INR PPP: 1.38 (ref 0.86–1.14)
LIPASE SERPL-CCNC: 40 U/L (ref 13–60)
LYMPHOCYTES # BLD: 0.3 K/UL (ref 1–5.1)
LYMPHOCYTES NFR BLD: 10.1 %
MAGNESIUM SERPL-MCNC: 1.7 MG/DL (ref 1.8–2.4)
MCH RBC QN AUTO: 28.2 PG (ref 26–34)
MCHC RBC AUTO-ENTMCNC: 32.5 G/DL (ref 31–36)
MCV RBC AUTO: 86.8 FL (ref 80–100)
METHGB MFR BLDV: 0.3 %
MONOCYTES # BLD: 0.3 K/UL (ref 0–1.3)
MONOCYTES NFR BLD: 8.7 %
NEUTROPHILS # BLD: 2.6 K/UL (ref 1.7–7.7)
NEUTROPHILS NFR BLD: 79.4 %
O2 CT VFR BLDV CALC: 9 VOL %
O2 THERAPY: ABNORMAL
PCO2 BLDV: 27.5 MMHG (ref 40–50)
PH BLDV: 7.54 [PH] (ref 7.35–7.45)
PLATELET # BLD AUTO: 143 K/UL (ref 135–450)
PMV BLD AUTO: 8 FL (ref 5–10.5)
PO2 BLDV: 142.5 MMHG (ref 25–40)
POTASSIUM SERPL-SCNC: 3.3 MMOL/L (ref 3.5–5.1)
POTASSIUM SERPL-SCNC: 3.5 MMOL/L (ref 3.4–5.1)
PROT SERPL-MCNC: 5.6 G/DL (ref 6.4–8.2)
PROTHROMBIN TIME: 12.6 SECS. (ref 9.4–12.2)
PROTHROMBIN TIME: 17.1 SEC (ref 12.1–14.9)
RBC # BLD AUTO: 2.24 M/UL (ref 4.2–5.9)
SAO2 % BLDV: 99 %
SODIUM BLD-SCNC: 135 MMOL/L (ref 137–145)
SODIUM SERPL-SCNC: 136 MMOL/L (ref 136–145)
TOTAL PROTEIN: 6.1 G/DL (ref 6.3–8.2)
WBC # BLD AUTO: 3.3 K/UL (ref 4–11)

## 2025-07-02 PROCEDURE — 6360000004 HC RX CONTRAST MEDICATION: Performed by: STUDENT IN AN ORGANIZED HEALTH CARE EDUCATION/TRAINING PROGRAM

## 2025-07-02 PROCEDURE — 99285 EMERGENCY DEPT VISIT HI MDM: CPT

## 2025-07-02 PROCEDURE — 2022F DILAT RTA XM EVC RTNOPTHY: CPT | Performed by: FAMILY MEDICINE

## 2025-07-02 PROCEDURE — 82803 BLOOD GASES ANY COMBINATION: CPT

## 2025-07-02 PROCEDURE — 6370000000 HC RX 637 (ALT 250 FOR IP): Performed by: STUDENT IN AN ORGANIZED HEALTH CARE EDUCATION/TRAINING PROGRAM

## 2025-07-02 PROCEDURE — 85610 PROTHROMBIN TIME: CPT

## 2025-07-02 PROCEDURE — 96375 TX/PRO/DX INJ NEW DRUG ADDON: CPT

## 2025-07-02 PROCEDURE — 3074F SYST BP LT 130 MM HG: CPT | Performed by: FAMILY MEDICINE

## 2025-07-02 PROCEDURE — G8420 CALC BMI NORM PARAMETERS: HCPCS | Performed by: FAMILY MEDICINE

## 2025-07-02 PROCEDURE — 83690 ASSAY OF LIPASE: CPT

## 2025-07-02 PROCEDURE — P9016 RBC LEUKOCYTES REDUCED: HCPCS

## 2025-07-02 PROCEDURE — 85018 HEMOGLOBIN: CPT

## 2025-07-02 PROCEDURE — G8427 DOCREV CUR MEDS BY ELIG CLIN: HCPCS | Performed by: FAMILY MEDICINE

## 2025-07-02 PROCEDURE — 36415 COLL VENOUS BLD VENIPUNCTURE: CPT

## 2025-07-02 PROCEDURE — 2060000000 HC ICU INTERMEDIATE R&B

## 2025-07-02 PROCEDURE — 86923 COMPATIBILITY TEST ELECTRIC: CPT

## 2025-07-02 PROCEDURE — 86900 BLOOD TYPING SEROLOGIC ABO: CPT

## 2025-07-02 PROCEDURE — 6360000002 HC RX W HCPCS: Performed by: STUDENT IN AN ORGANIZED HEALTH CARE EDUCATION/TRAINING PROGRAM

## 2025-07-02 PROCEDURE — 99214 OFFICE O/P EST MOD 30 MIN: CPT | Performed by: FAMILY MEDICINE

## 2025-07-02 PROCEDURE — 83735 ASSAY OF MAGNESIUM: CPT

## 2025-07-02 PROCEDURE — 74174 CTA ABD&PLVS W/CONTRAST: CPT

## 2025-07-02 PROCEDURE — 3017F COLORECTAL CA SCREEN DOC REV: CPT | Performed by: FAMILY MEDICINE

## 2025-07-02 PROCEDURE — 80053 COMPREHEN METABOLIC PANEL: CPT

## 2025-07-02 PROCEDURE — 1123F ACP DISCUSS/DSCN MKR DOCD: CPT | Performed by: FAMILY MEDICINE

## 2025-07-02 PROCEDURE — 85014 HEMATOCRIT: CPT

## 2025-07-02 PROCEDURE — 3044F HG A1C LEVEL LT 7.0%: CPT | Performed by: FAMILY MEDICINE

## 2025-07-02 PROCEDURE — 85025 COMPLETE CBC W/AUTO DIFF WBC: CPT

## 2025-07-02 PROCEDURE — 3078F DIAST BP <80 MM HG: CPT | Performed by: FAMILY MEDICINE

## 2025-07-02 PROCEDURE — 96365 THER/PROPH/DIAG IV INF INIT: CPT

## 2025-07-02 PROCEDURE — 1036F TOBACCO NON-USER: CPT | Performed by: FAMILY MEDICINE

## 2025-07-02 PROCEDURE — 2500000003 HC RX 250 WO HCPCS: Performed by: STUDENT IN AN ORGANIZED HEALTH CARE EDUCATION/TRAINING PROGRAM

## 2025-07-02 PROCEDURE — 86850 RBC ANTIBODY SCREEN: CPT

## 2025-07-02 PROCEDURE — 1159F MED LIST DOCD IN RCRD: CPT | Performed by: FAMILY MEDICINE

## 2025-07-02 PROCEDURE — 86901 BLOOD TYPING SEROLOGIC RH(D): CPT

## 2025-07-02 PROCEDURE — 1111F DSCHRG MED/CURRENT MED MERGE: CPT | Performed by: FAMILY MEDICINE

## 2025-07-02 RX ORDER — PANTOPRAZOLE SODIUM 40 MG/10ML
80 INJECTION, POWDER, LYOPHILIZED, FOR SOLUTION INTRAVENOUS ONCE
Status: COMPLETED | OUTPATIENT
Start: 2025-07-02 | End: 2025-07-02

## 2025-07-02 RX ORDER — POTASSIUM CHLORIDE 7.45 MG/ML
10 INJECTION INTRAVENOUS ONCE
Status: COMPLETED | OUTPATIENT
Start: 2025-07-02 | End: 2025-07-02

## 2025-07-02 RX ORDER — OXYCODONE HYDROCHLORIDE 5 MG/1
5 TABLET ORAL ONCE
Refills: 0 | Status: COMPLETED | OUTPATIENT
Start: 2025-07-02 | End: 2025-07-02

## 2025-07-02 RX ORDER — ONDANSETRON 4 MG/1
4 TABLET, ORALLY DISINTEGRATING ORAL EVERY 8 HOURS PRN
Status: DISCONTINUED | OUTPATIENT
Start: 2025-07-02 | End: 2025-07-14 | Stop reason: HOSPADM

## 2025-07-02 RX ORDER — SODIUM CHLORIDE 0.9 % (FLUSH) 0.9 %
5-40 SYRINGE (ML) INJECTION EVERY 12 HOURS SCHEDULED
Status: DISCONTINUED | OUTPATIENT
Start: 2025-07-02 | End: 2025-07-14 | Stop reason: HOSPADM

## 2025-07-02 RX ORDER — ACETAMINOPHEN 500 MG
500 TABLET ORAL EVERY 6 HOURS PRN
Status: DISCONTINUED | OUTPATIENT
Start: 2025-07-02 | End: 2025-07-14 | Stop reason: HOSPADM

## 2025-07-02 RX ORDER — POTASSIUM CHLORIDE 7.45 MG/ML
10 INJECTION INTRAVENOUS PRN
Status: DISCONTINUED | OUTPATIENT
Start: 2025-07-02 | End: 2025-07-14 | Stop reason: HOSPADM

## 2025-07-02 RX ORDER — POTASSIUM CHLORIDE 1500 MG/1
40 TABLET, EXTENDED RELEASE ORAL PRN
Status: DISCONTINUED | OUTPATIENT
Start: 2025-07-02 | End: 2025-07-14 | Stop reason: HOSPADM

## 2025-07-02 RX ORDER — SODIUM CHLORIDE 9 MG/ML
INJECTION, SOLUTION INTRAVENOUS PRN
Status: DISCONTINUED | OUTPATIENT
Start: 2025-07-02 | End: 2025-07-14 | Stop reason: HOSPADM

## 2025-07-02 RX ORDER — SODIUM CHLORIDE 0.9 % (FLUSH) 0.9 %
5-40 SYRINGE (ML) INJECTION PRN
Status: DISCONTINUED | OUTPATIENT
Start: 2025-07-02 | End: 2025-07-14 | Stop reason: HOSPADM

## 2025-07-02 RX ORDER — ENOXAPARIN SODIUM 100 MG/ML
40 INJECTION SUBCUTANEOUS DAILY
Status: CANCELLED | OUTPATIENT
Start: 2025-07-03

## 2025-07-02 RX ORDER — MAGNESIUM SULFATE IN WATER 40 MG/ML
2000 INJECTION, SOLUTION INTRAVENOUS PRN
Status: DISCONTINUED | OUTPATIENT
Start: 2025-07-02 | End: 2025-07-14 | Stop reason: HOSPADM

## 2025-07-02 RX ORDER — ONDANSETRON 2 MG/ML
4 INJECTION INTRAMUSCULAR; INTRAVENOUS EVERY 6 HOURS PRN
Status: DISCONTINUED | OUTPATIENT
Start: 2025-07-02 | End: 2025-07-14 | Stop reason: HOSPADM

## 2025-07-02 RX ORDER — SODIUM CHLORIDE 9 MG/ML
INJECTION, SOLUTION INTRAVENOUS PRN
Status: DISCONTINUED | OUTPATIENT
Start: 2025-07-02 | End: 2025-07-10

## 2025-07-02 RX ORDER — ACETAMINOPHEN 650 MG/1
325 SUPPOSITORY RECTAL EVERY 6 HOURS PRN
Status: DISCONTINUED | OUTPATIENT
Start: 2025-07-02 | End: 2025-07-14 | Stop reason: HOSPADM

## 2025-07-02 RX ORDER — IOPAMIDOL 755 MG/ML
85 INJECTION, SOLUTION INTRAVASCULAR
Status: COMPLETED | OUTPATIENT
Start: 2025-07-02 | End: 2025-07-02

## 2025-07-02 RX ORDER — MAGNESIUM SULFATE IN WATER 40 MG/ML
2000 INJECTION, SOLUTION INTRAVENOUS ONCE
Status: COMPLETED | OUTPATIENT
Start: 2025-07-02 | End: 2025-07-02

## 2025-07-02 RX ORDER — POTASSIUM CHLORIDE 7.45 MG/ML
10 INJECTION INTRAVENOUS
Status: DISPENSED | OUTPATIENT
Start: 2025-07-02 | End: 2025-07-02

## 2025-07-02 RX ADMIN — IOPAMIDOL 85 ML: 755 INJECTION, SOLUTION INTRAVENOUS at 19:28

## 2025-07-02 RX ADMIN — POTASSIUM CHLORIDE 10 MEQ: 7.46 INJECTION, SOLUTION INTRAVENOUS at 22:04

## 2025-07-02 RX ADMIN — MAGNESIUM SULFATE HEPTAHYDRATE 2000 MG: 40 INJECTION, SOLUTION INTRAVENOUS at 20:19

## 2025-07-02 RX ADMIN — Medication 3 MG: at 23:37

## 2025-07-02 RX ADMIN — OXYCODONE 5 MG: 5 TABLET ORAL at 19:47

## 2025-07-02 RX ADMIN — SODIUM CHLORIDE, PRESERVATIVE FREE 10 ML: 5 INJECTION INTRAVENOUS at 23:38

## 2025-07-02 RX ADMIN — PANTOPRAZOLE SODIUM 80 MG: 40 INJECTION, POWDER, LYOPHILIZED, FOR SOLUTION INTRAVENOUS at 20:20

## 2025-07-02 RX ADMIN — POTASSIUM CHLORIDE 10 MEQ: 7.46 INJECTION, SOLUTION INTRAVENOUS at 20:19

## 2025-07-02 ASSESSMENT — PAIN SCALES - GENERAL
PAINLEVEL_OUTOF10: 6
PAINLEVEL_OUTOF10: 5

## 2025-07-02 ASSESSMENT — PAIN DESCRIPTION - LOCATION
LOCATION: SHOULDER
LOCATION: LEG;NECK;SHOULDER

## 2025-07-02 ASSESSMENT — PAIN DESCRIPTION - DESCRIPTORS
DESCRIPTORS: ACHING
DESCRIPTORS: DISCOMFORT

## 2025-07-02 ASSESSMENT — PAIN DESCRIPTION - ORIENTATION
ORIENTATION: RIGHT
ORIENTATION: RIGHT;LEFT

## 2025-07-02 NOTE — TELEPHONE ENCOUNTER
Brittani from [facility] states patient had a critical Hgb of 6.6 and hematocrit of 21.0.  She is faxing the results to us.

## 2025-07-02 NOTE — ASSESSMENT & PLAN NOTE
Follow-up with oncology lab work today    Orders:    CBC with Auto Differential; Future    Comprehensive Metabolic Panel; Future    T4, Free; Future    TSH; Future    Vitamin B12; Future    Vitamin D 25 Hydroxy; Future    Vitamin K; Future

## 2025-07-02 NOTE — ASSESSMENT & PLAN NOTE
Lab work ordered.  Sliding scale provided    Orders:    CBC with Auto Differential; Future    Comprehensive Metabolic Panel; Future    T4, Free; Future    TSH; Future    Vitamin B12; Future    Vitamin D 25 Hydroxy; Future    Vitamin K; Future

## 2025-07-02 NOTE — CONSENT
Informed Consent for Blood Component Transfusion Note    I have discussed with the patient the rationale for blood component transfusion; its benefits in treating or preventing fatigue, organ damage, or death; and its risk which includes mild transfusion reactions, rare risk of blood borne infection, or more serious but rare reactions. I have discussed the alternatives to transfusion, including the risk and consequences of not receiving transfusion. The patient had an opportunity to ask questions and had agreed to proceed with transfusion of blood components.    Electronically signed by Saad Granados DO on 7/2/25 at 7:07 PM EDT

## 2025-07-03 ENCOUNTER — APPOINTMENT (OUTPATIENT)
Dept: GENERAL RADIOLOGY | Age: 75
DRG: 811 | End: 2025-07-03
Payer: MEDICARE

## 2025-07-03 ENCOUNTER — HOSPITAL ENCOUNTER (INPATIENT)
Age: 75
Discharge: HOME | DRG: 811 | End: 2025-07-05
Attending: STUDENT IN AN ORGANIZED HEALTH CARE EDUCATION/TRAINING PROGRAM
Payer: MEDICARE

## 2025-07-03 PROBLEM — T85.520A BILIARY DRAIN DISPLACEMENT: Status: ACTIVE | Noted: 2025-07-03

## 2025-07-03 LAB
ALBUMIN SERPL-MCNC: 2.6 G/DL (ref 3.4–5)
ALBUMIN/GLOB SERPL: 0.8 {RATIO} (ref 1.1–2.2)
ALP SERPL-CCNC: 320 U/L (ref 40–129)
ALT SERPL-CCNC: 20 U/L (ref 10–40)
ANION GAP SERPL CALCULATED.3IONS-SCNC: 9 MMOL/L (ref 3–16)
AST SERPL-CCNC: 23 U/L (ref 15–37)
BASOPHILS # BLD: 0 K/UL (ref 0–0.2)
BASOPHILS NFR BLD: 0.2 %
BILIRUB SERPL-MCNC: 0.9 MG/DL (ref 0–1)
BUN SERPL-MCNC: 7 MG/DL (ref 7–20)
C DIFF TOX A+B STL QL IA: NORMAL
CALCIUM SERPL-MCNC: 8.4 MG/DL (ref 8.3–10.6)
CHLORIDE SERPL-SCNC: 104 MMOL/L (ref 99–110)
CO2 SERPL-SCNC: 23 MMOL/L (ref 21–32)
CREAT SERPL-MCNC: 0.3 MG/DL (ref 0.8–1.3)
DEPRECATED RDW RBC AUTO: 17.1 % (ref 12.4–15.4)
ECHO BSA: 1.87 M2
EOSINOPHIL # BLD: 0.1 K/UL (ref 0–0.6)
EOSINOPHIL NFR BLD: 2 %
GASTROCULT GAST QL: NORMAL
GFR SERPLBLD CREATININE-BSD FMLA CKD-EPI: >90 ML/MIN/{1.73_M2}
GLUCOSE BLD-MCNC: 110 MG/DL (ref 70–99)
GLUCOSE BLD-MCNC: 114 MG/DL (ref 70–99)
GLUCOSE BLD-MCNC: 128 MG/DL (ref 70–99)
GLUCOSE BLD-MCNC: 136 MG/DL (ref 70–99)
GLUCOSE BLD-MCNC: 148 MG/DL (ref 70–99)
GLUCOSE BLD-MCNC: 207 MG/DL (ref 70–99)
GLUCOSE SERPL-MCNC: 114 MG/DL (ref 70–99)
HCT VFR BLD AUTO: 21.1 % (ref 40.5–52.5)
HCT VFR BLD AUTO: 21.3 % (ref 40.5–52.5)
HCT VFR BLD AUTO: 21.9 % (ref 40.5–52.5)
HGB BLD-MCNC: 7 G/DL (ref 13.5–17.5)
HGB BLD-MCNC: 7.1 G/DL (ref 13.5–17.5)
HGB BLD-MCNC: 7.2 G/DL (ref 13.5–17.5)
LYMPHOCYTES # BLD: 0.3 K/UL (ref 1–5.1)
LYMPHOCYTES NFR BLD: 10.8 %
MCH RBC QN AUTO: 28.2 PG (ref 26–34)
MCHC RBC AUTO-ENTMCNC: 33.2 G/DL (ref 31–36)
MCV RBC AUTO: 84.9 FL (ref 80–100)
MONOCYTES # BLD: 0.2 K/UL (ref 0–1.3)
MONOCYTES NFR BLD: 7.8 %
NEUTROPHILS # BLD: 2.5 K/UL (ref 1.7–7.7)
NEUTROPHILS NFR BLD: 79.2 %
PERFORMED ON: ABNORMAL
PH GAST: NORMAL [PH]
PLATELET # BLD AUTO: 130 K/UL (ref 135–450)
PMV BLD AUTO: 7.4 FL (ref 5–10.5)
POTASSIUM SERPL-SCNC: 3.7 MMOL/L (ref 3.5–5.1)
PROT SERPL-MCNC: 6 G/DL (ref 6.4–8.2)
RBC # BLD AUTO: 2.48 M/UL (ref 4.2–5.9)
SODIUM SERPL-SCNC: 136 MMOL/L (ref 136–145)
WBC # BLD AUTO: 3.1 K/UL (ref 4–11)

## 2025-07-03 PROCEDURE — 2060000000 HC ICU INTERMEDIATE R&B

## 2025-07-03 PROCEDURE — 87324 CLOSTRIDIUM AG IA: CPT

## 2025-07-03 PROCEDURE — 74018 RADEX ABDOMEN 1 VIEW: CPT

## 2025-07-03 PROCEDURE — 87449 NOS EACH ORGANISM AG IA: CPT

## 2025-07-03 PROCEDURE — 85025 COMPLETE CBC W/AUTO DIFF WBC: CPT

## 2025-07-03 PROCEDURE — 93970 EXTREMITY STUDY: CPT

## 2025-07-03 PROCEDURE — 6370000000 HC RX 637 (ALT 250 FOR IP)

## 2025-07-03 PROCEDURE — 6370000000 HC RX 637 (ALT 250 FOR IP): Performed by: STUDENT IN AN ORGANIZED HEALTH CARE EDUCATION/TRAINING PROGRAM

## 2025-07-03 PROCEDURE — 93970 EXTREMITY STUDY: CPT | Performed by: SURGERY

## 2025-07-03 PROCEDURE — 82271 OCCULT BLOOD OTHER SOURCES: CPT

## 2025-07-03 PROCEDURE — 99232 SBSQ HOSP IP/OBS MODERATE 35: CPT

## 2025-07-03 PROCEDURE — 2580000003 HC RX 258: Performed by: STUDENT IN AN ORGANIZED HEALTH CARE EDUCATION/TRAINING PROGRAM

## 2025-07-03 PROCEDURE — 87506 IADNA-DNA/RNA PROBE TQ 6-11: CPT

## 2025-07-03 PROCEDURE — 85018 HEMOGLOBIN: CPT

## 2025-07-03 PROCEDURE — 2500000003 HC RX 250 WO HCPCS: Performed by: STUDENT IN AN ORGANIZED HEALTH CARE EDUCATION/TRAINING PROGRAM

## 2025-07-03 PROCEDURE — 85014 HEMATOCRIT: CPT

## 2025-07-03 PROCEDURE — 6360000002 HC RX W HCPCS: Performed by: STUDENT IN AN ORGANIZED HEALTH CARE EDUCATION/TRAINING PROGRAM

## 2025-07-03 PROCEDURE — 80053 COMPREHEN METABOLIC PANEL: CPT

## 2025-07-03 PROCEDURE — 30233N1 TRANSFUSION OF NONAUTOLOGOUS RED BLOOD CELLS INTO PERIPHERAL VEIN, PERCUTANEOUS APPROACH: ICD-10-PCS | Performed by: INTERNAL MEDICINE

## 2025-07-03 RX ORDER — SENNOSIDES 8.6 MG/1
2 TABLET ORAL NIGHTLY
Status: DISCONTINUED | OUTPATIENT
Start: 2025-07-03 | End: 2025-07-07

## 2025-07-03 RX ORDER — ERGOCALCIFEROL 1.25 MG/1
50000 CAPSULE, LIQUID FILLED ORAL
Status: DISCONTINUED | OUTPATIENT
Start: 2025-07-04 | End: 2025-07-14 | Stop reason: HOSPADM

## 2025-07-03 RX ORDER — DEXTROSE MONOHYDRATE 100 MG/ML
INJECTION, SOLUTION INTRAVENOUS CONTINUOUS PRN
Status: DISCONTINUED | OUTPATIENT
Start: 2025-07-03 | End: 2025-07-14 | Stop reason: HOSPADM

## 2025-07-03 RX ORDER — ERGOCALCIFEROL 1.25 MG/1
50000 CAPSULE, LIQUID FILLED ORAL
COMMUNITY

## 2025-07-03 RX ORDER — INSULIN LISPRO 100 [IU]/ML
0-4 INJECTION, SOLUTION INTRAVENOUS; SUBCUTANEOUS EVERY 6 HOURS SCHEDULED
Status: DISCONTINUED | OUTPATIENT
Start: 2025-07-03 | End: 2025-07-03

## 2025-07-03 RX ORDER — OXYCODONE HYDROCHLORIDE 5 MG/1
5 TABLET ORAL EVERY 6 HOURS PRN
Refills: 0 | Status: DISCONTINUED | OUTPATIENT
Start: 2025-07-03 | End: 2025-07-14 | Stop reason: HOSPADM

## 2025-07-03 RX ORDER — GLUCAGON 1 MG/ML
1 KIT INJECTION PRN
Status: DISCONTINUED | OUTPATIENT
Start: 2025-07-03 | End: 2025-07-14 | Stop reason: HOSPADM

## 2025-07-03 RX ORDER — ERGOCALCIFEROL 1.25 MG/1
50000 CAPSULE, LIQUID FILLED ORAL
Status: DISCONTINUED | OUTPATIENT
Start: 2025-07-04 | End: 2025-07-03

## 2025-07-03 RX ORDER — ALLOPURINOL 300 MG/1
300 TABLET ORAL DAILY
Status: DISCONTINUED | OUTPATIENT
Start: 2025-07-03 | End: 2025-07-14 | Stop reason: HOSPADM

## 2025-07-03 RX ORDER — ERLOTINIB 100 MG/1
100 TABLET, FILM COATED ORAL DAILY
Status: DISCONTINUED | OUTPATIENT
Start: 2025-07-03 | End: 2025-07-14 | Stop reason: HOSPADM

## 2025-07-03 RX ORDER — POLYETHYLENE GLYCOL 3350 17 G/17G
17 POWDER, FOR SOLUTION ORAL DAILY
Status: DISCONTINUED | OUTPATIENT
Start: 2025-07-03 | End: 2025-07-14 | Stop reason: HOSPADM

## 2025-07-03 RX ORDER — INSULIN LISPRO 100 [IU]/ML
0-4 INJECTION, SOLUTION INTRAVENOUS; SUBCUTANEOUS EVERY 6 HOURS SCHEDULED
Status: DISCONTINUED | OUTPATIENT
Start: 2025-07-03 | End: 2025-07-14 | Stop reason: HOSPADM

## 2025-07-03 RX ORDER — SODIUM CHLORIDE, SODIUM LACTATE, POTASSIUM CHLORIDE, CALCIUM CHLORIDE 600; 310; 30; 20 MG/100ML; MG/100ML; MG/100ML; MG/100ML
INJECTION, SOLUTION INTRAVENOUS CONTINUOUS
Status: ACTIVE | OUTPATIENT
Start: 2025-07-03 | End: 2025-07-03

## 2025-07-03 RX ORDER — PHYTONADIONE 5 MG/1
10 TABLET ORAL ONCE
Status: COMPLETED | OUTPATIENT
Start: 2025-07-03 | End: 2025-07-03

## 2025-07-03 RX ORDER — ALBUTEROL SULFATE 90 UG/1
2 INHALANT RESPIRATORY (INHALATION) EVERY 6 HOURS PRN
Status: DISCONTINUED | OUTPATIENT
Start: 2025-07-03 | End: 2025-07-07

## 2025-07-03 RX ORDER — FINASTERIDE 5 MG/1
5 TABLET, FILM COATED ORAL EVERY EVENING
Status: DISCONTINUED | OUTPATIENT
Start: 2025-07-03 | End: 2025-07-14 | Stop reason: HOSPADM

## 2025-07-03 RX ORDER — ERGOCALCIFEROL 1.25 MG/1
50000 CAPSULE, LIQUID FILLED ORAL WEEKLY
Status: DISCONTINUED | OUTPATIENT
Start: 2025-07-03 | End: 2025-07-03

## 2025-07-03 RX ADMIN — Medication 3 MG: at 20:15

## 2025-07-03 RX ADMIN — SODIUM CHLORIDE, PRESERVATIVE FREE 10 ML: 5 INJECTION INTRAVENOUS at 09:23

## 2025-07-03 RX ADMIN — PANTOPRAZOLE SODIUM 40 MG: 40 INJECTION, POWDER, FOR SOLUTION INTRAVENOUS at 09:22

## 2025-07-03 RX ADMIN — POTASSIUM BICARBONATE 25 MEQ: 977.5 TABLET, EFFERVESCENT ORAL at 03:14

## 2025-07-03 RX ADMIN — PANTOPRAZOLE SODIUM 40 MG: 40 INJECTION, POWDER, FOR SOLUTION INTRAVENOUS at 20:15

## 2025-07-03 RX ADMIN — OXYCODONE 5 MG: 5 TABLET ORAL at 03:14

## 2025-07-03 RX ADMIN — OXYCODONE 5 MG: 5 TABLET ORAL at 17:58

## 2025-07-03 RX ADMIN — SODIUM CHLORIDE, SODIUM LACTATE, POTASSIUM CHLORIDE, AND CALCIUM CHLORIDE: .6; .31; .03; .02 INJECTION, SOLUTION INTRAVENOUS at 01:43

## 2025-07-03 RX ADMIN — PHYTONADIONE 10 MG: 5 TABLET ORAL at 16:00

## 2025-07-03 RX ADMIN — PANCRELIPASE LIPASE, PANCRELIPASE PROTEASE, PANCRELIPASE AMYLASE 60000 UNITS: 20000; 63000; 84000 CAPSULE, DELAYED RELEASE ORAL at 16:53

## 2025-07-03 RX ADMIN — SODIUM CHLORIDE, PRESERVATIVE FREE 10 ML: 5 INJECTION INTRAVENOUS at 20:16

## 2025-07-03 RX ADMIN — FINASTERIDE 5 MG: 5 TABLET, FILM COATED ORAL at 17:51

## 2025-07-03 RX ADMIN — PANCRELIPASE LIPASE, PANCRELIPASE PROTEASE, PANCRELIPASE AMYLASE 15000 UNITS: 15000; 47000; 63000 CAPSULE, DELAYED RELEASE ORAL at 16:54

## 2025-07-03 ASSESSMENT — PAIN DESCRIPTION - ORIENTATION
ORIENTATION: RIGHT;LEFT
ORIENTATION: RIGHT

## 2025-07-03 ASSESSMENT — PAIN - FUNCTIONAL ASSESSMENT: PAIN_FUNCTIONAL_ASSESSMENT: PREVENTS OR INTERFERES SOME ACTIVE ACTIVITIES AND ADLS

## 2025-07-03 ASSESSMENT — PAIN SCALES - GENERAL
PAINLEVEL_OUTOF10: 5
PAINLEVEL_OUTOF10: 5

## 2025-07-03 ASSESSMENT — PAIN DESCRIPTION - DESCRIPTORS
DESCRIPTORS: SHARP
DESCRIPTORS: ACHING

## 2025-07-03 ASSESSMENT — PAIN DESCRIPTION - LOCATION
LOCATION: ABDOMEN
LOCATION: LEG;SHOULDER

## 2025-07-03 NOTE — CARE COORDINATION
Advance Care Planning     General Advance Care Planning (ACP) Conversation    Date of Conversation: 7/3/2025  Conducted with: Patient with Decision Making Capacity  Other persons present: None    Healthcare Decision Maker:   Primary Decision Maker: Natasha Ceja R - Spouse - [phone]       Content/Action Overview:  Has ACP document(s) on file - reflects the patient's care preferences  Reviewed DNR/DNI and patient elects Full Code (Attempt Resuscitation)        Length of Voluntary ACP Conversation in minutes:  <16 minutes (Non-Billable)    Savannah Childress RN

## 2025-07-03 NOTE — ED NOTES
Sharath Ceja is a 74 y.o. male admitted for  Active Problems:    * No active hospital problems. *  Resolved Problems:    * No resolved hospital problems. *  .   Patient Home via family with   Chief Complaint   Patient presents with    Abnormal Lab     Patient sent over for low hgb of 6.6   .  Patient is alert and Person, Place, Time, and Situation  Patient's baseline mobility: Baseline Mobility: stby  Code Status: Prior   Cardiac Rhythm:       Is patient on baseline Oxygen: no how many Liters:   Abnormal Assessment Findings:     Isolation: None      NIH Score:    C-SSRS: Risk of Suicide: No Risk  Bedside swallow:        Active LDA's:   Peripheral IV 07/02/25 Distal;Left Forearm (Active)     Patient admitted with a copeland: no If the copeland is chronic was it exchanged:NA  Reason for copeland:   Patient admitted with Central Line:  Port . PICC line placement confirmed: YES OR NO:[id number]}   Reason for Central line: Chemotherapy  Was central line Inserted from an outside facility: no       Family/Caregiver Present yes Any Concerns: no   Restraints no  Sitter no         Vitals:      Vitals:    07/02/25 1930 07/02/25 1958 07/02/25 2032 07/02/25 2132   BP: 113/60 (!) 108/55 110/62 107/61   Pulse: 81 76 78 73   Resp: 14 16 21 19   Temp: 99.6 °F (37.6 °C) 98.9 °F (37.2 °C)     TempSrc: Oral Oral     SpO2: 97% 96% 96% 95%       Last documented pain score (0-10 scale) Pain Level: 5  Pain medication administered Yes- see MAR.    Pertinent or High Risk Medications/Drips: No.    Pending Blood Product Administration: yes    Abnormal labs:   Abnormal Labs Reviewed   CBC WITH AUTO DIFFERENTIAL - Abnormal; Notable for the following components:       Result Value    WBC 3.3 (*)     RBC 2.24 (*)     Hemoglobin 6.3 (*)     Hematocrit 19.5 (*)     RDW 17.5 (*)     Lymphocytes Absolute 0.3 (*)     All other components within normal limits    Narrative:     CALL  Lorenzo  [facility] tel. [phone],  Hematology results called to and read back by

## 2025-07-03 NOTE — ED PROVIDER NOTES
right and left-sided biliary drain catheters in place and biliary ductal dilatation in the right lobe of the liver, similar to prior study.   3. Soft tissue infiltrating the region of the agustin hepatis with occlusion of the main portal vein and multiple varices within the mesentery, similar to prior study.   4. Mild colonic wall thickening, possibly representing stercoral colitis or edematous changes related to underlying liver disease. Questionable circumferential wall thickening in the distal transverse and distal descending colon.   5. Large amount of well-formed stool again noted throughout the colon            Vascular duplex lower extremity venous bilateral    (Results Pending)     ED COURSE/MDM  Patient seen and evaluated. Old records reviewed. Labs and imaging reviewed and results discussed with patient.       DIFFERENTIAL DX  GI bleed, anemia    Lab obtained and does confirm anemia.  Ordered 1 unit PRBC.  Given 80 mg pantoprazole.  CTA abdomen pelvis obtained and does not show any acute source of bleeding.  Does show some possible stercoral colitis.  GI, Dr. Andrade consulted and agree with current management and will evaluate the patient in the hospital.  Patient discussed with hospitalist and admitted for further treatment.    Critical care    Critical care time 32 minutes exclusive from separate billable procedures that were performed. The following was considered in the determination of critical care but not limited to the level of medical decision making, intensive cardiac and/or respiratory monitoring, frequent vital sign monitoring, evaluation of laboratory studies, evaluation of radiographic studies, oxygen monitoring, and constant monitoring and speaking to family at bedside.    Is this patient to be included in the SEP-1 Core Measure due to severe sepsis or septic shock?   No     Exclusion criteria - the patient is NOT to be included for SEP-1 Core Measure due to:  Infection is not suspected    During  the patient's ED course, the patient was given:  Medications   0.9 % sodium chloride infusion (has no administration in time range)   potassium chloride 10 mEq/100 mL IVPB (Peripheral Line) (0 mEq IntraVENous Stopped 7/2/25 2210)   pantoprazole (PROTONIX) 40 mg in sodium chloride (PF) 0.9 % 10 mL injection (has no administration in time range)   sodium chloride flush 0.9 % injection 5-40 mL (10 mLs IntraVENous Given 7/2/25 2338)   sodium chloride flush 0.9 % injection 5-40 mL (has no administration in time range)   0.9 % sodium chloride infusion (has no administration in time range)   potassium chloride (KLOR-CON M) extended release tablet 40 mEq (has no administration in time range)     Or   potassium bicarb-citric acid (EFFER-K) effervescent tablet 40 mEq (has no administration in time range)     Or   potassium chloride 10 mEq/100 mL IVPB (Peripheral Line) (has no administration in time range)   magnesium sulfate 2000 mg in 50 mL IVPB premix (has no administration in time range)   ondansetron (ZOFRAN-ODT) disintegrating tablet 4 mg (has no administration in time range)     Or   ondansetron (ZOFRAN) injection 4 mg (has no administration in time range)   melatonin tablet 3 mg (3 mg Oral Given 7/2/25 2337)   acetaminophen (TYLENOL) tablet 500 mg (has no administration in time range)     Or   acetaminophen (TYLENOL) suppository 325 mg (has no administration in time range)   glucose chewable tablet 16 g (has no administration in time range)   dextrose bolus 10% 125 mL (has no administration in time range)     Or   dextrose bolus 10% 250 mL (has no administration in time range)   glucagon injection 1 mg (has no administration in time range)   dextrose 10 % infusion (has no administration in time range)   insulin lispro (HUMALOG,ADMELOG) injection vial 0-4 Units ( SubCUTAneous Not Given 7/3/25 0204)   lactated ringers infusion ( IntraVENous New Bag 7/3/25 0143)   potassium bicarbonate (EFFER-K/K-LYTE) disintegrating

## 2025-07-03 NOTE — ED NOTES
2139 - Called [facility] for consult, awaiting callback.  2145 - Dr. Andrade called back, speaking to Dr. Granados.

## 2025-07-03 NOTE — PROGRESS NOTES
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 Ceja today seen resting in bed. Wants to eat. Is having some mild abdominal discomfort  Rust colored stool at home, none here.       Objective:   Patient Vitals for the past 4 hrs:   BP Temp Temp src Pulse Resp SpO2   07/03/25 0647 110/65 97.7 °F (36.5 °C) Oral 69 18 98 %          Intake/Output Summary (Last 24 hours) at 7/3/2025 1031  Last data filed at 7/3/2025 0955  Gross per 24 hour   Intake 780 ml   Output 180 ml   Net 600 ml       Physical Exam:    Gen: No distress. Alert. +Chronically ill appearing elderly male   Eyes: PERRL. No sclera icterus. No conjunctival injection.   ENT: No discharge. Pharynx clear.   Neck: No JVD.  Trachea midline.  Resp: No accessory muscle use. No crackles. No wheezes. No rhonchi. +right chest wall port   CV: Regular rate. Regular rhythm. No murmur.  No rub. ++left lower extremity significant pitting edema   Peripheral Pulses: +2 palpable, equal bilaterally   GI:  Normal bowel sounds.+Mild distension and mid abdominal tenderness to palpation   +2 biliary drains present   Skin: Warm and dry. No nodule on exposed extremities. No rash on exposed extremities.   M/S: No cyanosis. No joint deformity. No clubbing.   Neuro: Awake. Grossly nonfocal    Psych: Oriented x 3. No anxiety or agitation.         Medications:  insulin lispro, 0-4 Units, 4 times per day  erlotinib, 100 mg, Daily  pantoprazole (PROTONIX) 40 mg in sodium chloride (PF) 0.9 % 10 mL injection, 40 mg, Q12H  sodium chloride flush, 5-40 mL, 2 times per day  melatonin, 3 mg, Nightly      PRN Medications:  glucose, 4 tablet, PRN  dextrose bolus, 125 mL, PRN   Or  dextrose bolus, 250 mL, PRN  glucagon (rDNA), 1 mg, PRN  dextrose, , Continuous PRN  oxyCODONE, 5 mg, Q6H PRN  sodium chloride, , PRN  sodium chloride flush, 5-40

## 2025-07-03 NOTE — PROGRESS NOTES
Hand off report given to  PRINCE Ladd.   Patient is stable showing no signs of distress and has no current needs at this time.   Call light is in reach and bed is in lowest position.    Care is transferred at this time.

## 2025-07-03 NOTE — PROGRESS NOTES
Speech Language Pathology  Attempt Note     Name: Sharath Ceja  : 1950  Medical Diagnosis: Acute anemia [D64.9]      Order received and acknowledged and chart reviewed for completion of eval. Kenyetta/PA spoke with SLP to inform of impending order but that pt was NPO for possible procedure. SLP spoke with Zunilda/RN who is clarifying plan. SLP speaks with pt/family who report coughing and choking on phlegm at night when sleeping and with sips of water. Pt uses a specific (generic) cup at home that allows for smaller amount of liquids. Spouse reports pt has a hard time eating slowly. SLP educates pt/spouse on benefits of oral care and physical mobility in decreasing adverse outcomes associated with aspiration, especially if po intake is resumed prior to BSE being completed. Pt/spouse voice good understanding of information discussed. SLP to re-attempt when pt is appropriate for po intake. RN aware. No charges.    Thank you,  Sarah Hoang M.A. SLP  Speech-Language Pathologist  [place] Lic #[id number]  [phone]

## 2025-07-03 NOTE — CONSULTS
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 He reports feeling increasingly fatigued. He reports rust colored stools for the past 4-5 days. He reports some lower abdominal cramping within the last week. He reports some nausea without vomiting. He denies seeing blood in biliary drains. He reports flushing them as instructed without difficulty. He reports chronic constipation, but reports having 2-3 soft bowel movements daily. He denies black, tarry stools. He reports taking miralax on occasion. He was recently hospitalized  for rectal bleeding, which self resolved. CTA was negative. His vitamin K dosage was increased to 10 mg daily. Palliative care and Hospice has met with patient several times in the past, and patient has chosen not to pursue this. He was previously hospitalized 3/25 for bloody output from biliary drains which were exchanged in IR. He also reported rectal bleeding at that time, suspected to be from colitis. He reports recently seeing Oncologist Dr. Beltran. He is currently prescribed Tarceva. His last colonoscopy was  with internal hemorrhoids and colon polyps removed. He has had multiple EUS and ERCP's in the past, but no EGD has been performed.     Past Medical History:   Diagnosis Date    Adenocarcinoma (HCC) 2023    8 + LYPMH NODES, WHIPPLE    Asthma     Blood circulation, collateral     Carpal tunnel syndrome     CLL (chronic lymphocytic leukemia) (HCC)     DENIES

## 2025-07-03 NOTE — CONSULTS
[facility]  Palliative Medicine Consultation Note      Date Of Admission:7/2/2025  Date of consult: 07/03/25  Seen by PC in the past:  Yes    Recommendations:        Writer met with the pt and pt's spouse at bedside to introduce palliative/hospice options and had a voluntary discussion related to advance care planning. Palliative care consulted for goals of care and code status discussion. Pt elects to remain Full code. Pt and spouse do not want referral to hospice or palliative care at this time. Phone number for [facility] provided to spouse if needed once home. Pt plans to follow up out-pt with Oncology.     SANDHYA Nuñez aware of above conversation.      1. Goals of Care/Advanced Care planning/Code status: Full code  2. Pain: chronic pain managed per care team  3. SOB: denies  4. Disposition: home with family and C    Reason for Consult:         [x]  Goals of Care  []  Code Status Discussion/Advanced Care Planning   []  Psychosocial/Family Support  []  Symptom Management  []  Other (Specify)    Requesting Physician: MARCOS Daniels    CHIEF COMPLAINT:  Acute anemia    History Obtained From:  patient    History of Present Illness:         Sharath Ceja is a 74 y.o. male with PMH of (see below list) who presented with low hemoglobin on outpt labs. Pt's PCP called ahead and discussed the pt. Pt is very weak and fatigued. NPO at present and family asking when he can eat. Pt with possible GI bleed in setting of stage IV intrahepatic cholangiocarcinoma. Acute on chronic anemia,recurrent.possible stercoral colitis    Subjective:         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    8 + LYPMH NODES, WHIPPLE    Asthma     Blood circulation, collateral     Carpal tunnel syndrome     CLL (chronic lymphocytic leukemia) (HCC)     DENIES    Crohn's colitis (HCC)     Dental crowns present     AND FALSE TOOTH    Diabetes mellitus (HCC)     emboli

## 2025-07-03 NOTE — PROGRESS NOTES
Patient admitted to room 316 from ED. Patient oriented to room, call light, bed rails, phone, lights and bathroom. Patient instructed about the schedule of the day including: vital sign frequency, lab draws, possible tests, frequency of MD and staff rounds, daily weights, I &O's and prescribed diet. Telemetry box in place, patient aware of placement and reason. Bed locked, in lowest position, side rails up 2/4, call light within reach.        Recliner Assessment  Patient is not able to demonstrated the ability to move from a reclining position to an upright position within the recliner. however patient is alert, oriented and able to provide informed consent       4 Eyes Skin Assessment     NAME:  Sharath Ceja  YOB: 1950  MEDICAL RECORD NUMBER:  [id number]    The patient is being assessed for  Admission    I agree that at least one RN has performed a thorough Head to Toe Skin Assessment on the patient. ALL assessment sites listed below have been assessed.      Areas assessed by both nurses:    Head, Face, Ears, Shoulders, Back, Chest, Arms, Elbows, Hands, Sacrum. Buttock, Coccyx, Ischium, and Legs. Feet and Heels        Does the Patient have a Wound? Slight discoloration noted to coccyx.        Mickey Prevention initiated by RN: No  Wound Care Orders initiated by RN: No    Pressure Injury (Stage 3,4, Unstageable, DTI, NWPT, and Complex wounds) if present, place Wound referral order by RN under : No    New Ostomies, if present place, Ostomy referral order under : No     Nurse 1 eSignature: Electronically signed by Farzana Mariscal RN on 7/3/25 at 1:23 AM EDT    **SHARE this note so that the co-signing nurse can place an eSignature**    Nurse 2 eSignature: {Esignature:[id number]}

## 2025-07-03 NOTE — CONSULTS
Pharmacy to enter home dose of \"Creon\"  Home dose of digestive enzymes is TWO caps of 36,000 units of lipase TIDCM and 1 capsule with snacks.  Sub entered for appropriate dose based on available products.  David Langford RPH PharmD 7/3/2025 4:37 PM

## 2025-07-03 NOTE — H&P
[facility] Medicine History & Physical    V 5.1    Date of Admission: 7/2/2025    Date of Service:  Pt seen/examined on 7/2/2025 at 2335    [x]Admitted to Inpatient with expected LOS greater than two midnights due to medical therapy.  []Placed in Observation status.    Assessment/Plan:        Acute on chronic normocytic anemia: Hemoglobin low at 6.3 patient reporting rust colored stool.  Had similar event 1 month ago as well.  Reporting rust colored stool.  With history of cholangiocarcinoma.  Coags not significantly elevated.  -GI called and consulted  -40 mg IV Protonix twice daily ordered  -Repeat INR and PTT ordered  -Clear liquid diet with n.p.o. at midnight  -Iron panel reviewed from last month  - B12 WNL    Hypokalemia: Potassium was low at 3.3 received some supplemental IV potassium in the ER.  -Repeat BMP ordered  -Additional 25 mEq equivalent oral disintegrating potassium ordered    Low vitamin D: Patient has severely low vitamin D less than 12.8 unclear if this is absorption issue from underlying malabsorption disorder.  -Would benefit from 50,000 vitamin D once a week at discharge    Leg swelling: Patient has bilateral lower extremity swelling which is likely secondary to hypoalbuminemia but does have increased leg swelling in the left leg which he also reports noticing recently.  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    Diabetes mellitus: Sliding scale insulin ordered while NPO.  Consider adding basal insulin once diet resumes.    Discussed management and the need for Hospitalization of the patient w/ the Emergency Department Provider: Saad Granados      Chief Admission Complaint: Rust colored stool    Presenting Admission History:      74 y.o. male who presented to [facility] with reports of unusual stool color.  Patient has medical history

## 2025-07-03 NOTE — PLAN OF CARE
Problem: Chronic Conditions and Co-morbidities  Goal: Patient's chronic conditions and co-morbidity symptoms are monitored and maintained or improved  Outcome: Progressing     Problem: Discharge Planning  Goal: Discharge to home or other facility with appropriate resources  Outcome: Progressing     Problem: Skin/Tissue Integrity  Goal: Skin integrity remains intact  Description: 1.  Monitor for areas of redness and/or skin breakdown  2.  Assess vascular access sites hourly  3.  Every 4-6 hours minimum:  Change oxygen saturation probe site  4.  Every 4-6 hours:  If on nasal continuous positive airway pressure, respiratory therapy assess nares and determine need for appliance change or resting period  Outcome: Progressing     Problem: Safety - Adult  Goal: Free from fall injury  7/3/2025 1942 by Julee Pennington, RN  Outcome: Progressing     Problem: Pain  Goal: Verbalizes/displays adequate comfort level or baseline comfort level  7/3/2025 1942 by Julee Pennington, RN  Outcome: Progressing     Problem: Skin/Tissue Integrity - Adult  Goal: Skin integrity remains intact  Description: 1.  Monitor for areas of redness and/or skin breakdown  2.  Assess vascular access sites hourly  3.  Every 4-6 hours minimum:  Change oxygen saturation probe site  4.  Every 4-6 hours:  If on nasal continuous positive airway pressure, respiratory therapy assess nares and determine need for appliance change or resting period  Outcome: Progressing     Problem: Gastrointestinal - Adult  Goal: Minimal or absence of nausea and vomiting  Outcome: Progressing

## 2025-07-03 NOTE — FLOWSHEET NOTE
07/03/25 0647   Vital Signs   Temp 97.7 °F (36.5 °C)   Temp Source Oral   Pulse 69   Heart Rate Source Monitor   Respirations 18   /65   MAP (Calculated) 80   BP Location Right upper arm   BP Method Automatic   Patient Position Semi fowlers   Oxygen Therapy   SpO2 98 %   O2 Device None (Room air)       Shift assessment completed see flow sheet. Patient in bed alert and oriented x4. Patient on RA, showing no signs of distress. Morning medications given per order. Patient has no other needs at this time.

## 2025-07-03 NOTE — CARE COORDINATION
Case Management Assessment  Initial Evaluation    Date/Time of Evaluation: 7/3/2025 2:28 PM  Assessment Completed by: Savannah Childress RN    If patient is discharged prior to next notation, then this note serves as note for discharge by case management.    Patient Name: Sharath Ceja                   YOB: 1950  Diagnosis: Acute anemia [D64.9]                   Date / Time: 7/2/2025  5:14 PM    Patient Admission Status: Inpatient   Readmission Risk (Low < 19, Mod (19-27), High > 27): Readmission Risk Score: 27.1    Current PCP: Real Whitehead MD  PCP verified by CM? Yes    Chart Reviewed: Yes      History Provided by: Patient  Patient Orientation: Alert and Oriented    Patient Cognition: Alert    Hospitalization in the last 30 days (Readmission):  Yes    If yes, Readmission Assessment in  Navigator will be completed.    Advance Directives:      Code Status: Full Code   Patient's Primary Decision Maker is: Legal Next of Kin    Primary Decision Maker: Natasha Ceja R - Spouse - [phone]    Discharge Planning:    Patient lives with: Spouse/Significant Other Type of Home: Trailer/Mobile Home  Primary Care Giver: Self  Patient Support Systems include: Spouse/Significant Other   Current Financial resources: Medicare  Current community resources: ECF/Home Care  Current services prior to admission: Home Care, Durable Medical Equipment            Current DME: Walker            Type of Home Care services:  Nursing Services    ADLS  Prior functional level: Assistance with the following:, Cooking, Housework, Shopping, Mobility  Current functional level: Cooking, Housework, Shopping, Mobility, Assistance with the following:    PT AM-PAC:   /24  OT AM-PAC:   /24    Family can provide assistance at DC: Yes  Would you like Case Management to discuss the discharge plan with any other family members/significant others, and if so, who? Yes (wife)  Plans to Return to Present Housing: Yes  Other Identified  Issues/Barriers to RETURNING to current housing: none  Potential Assistance needed at discharge: N/A            Potential DME:    Patient expects to discharge to: Trailer/mobile home  Plan for transportation at discharge: Family    Financial    Payor: MEDICARE / Plan: MEDICARE PART A AND B / Product Type: *No Product type* /     Does insurance require precert for SNF: No    Potential assistance Purchasing Medications: No  Meds-to-Beds request: Yes      [facility] - [place], [place] - [address] [address] - P [phone] - F [phone]  [address]  [facility] [address]  Phone: [phone] Fax: [phone]    [facility] - [place], [place] - [address] - P [phone] - F [phone]  [address]   [place] [address]  Phone: [phone] Fax: [phone]      Notes:    Factors facilitating achievement of predicted outcomes: Family support, Motivated, Cooperative, Pleasant, and Good insight into deficits    Barriers to discharge: Medical complications    Additional Case Management Notes: Spoke to pt at bedside and explained role of CM. Pt lives at home with wife.  Hx liver cancer - getting PO chemo.  Follows at [facility].  Has walker.  Active with [facility] for SN.     The Plan for Transition of Care is related to the following treatment goals of Acute anemia [D64.9]    IF APPLICABLE: The Patient and/or patient representative Sharath and his family were provided with a choice of provider and agrees with the discharge plan. Freedom of choice list with basic dialogue that supports the patient's individualized plan of care/goals and shares the quality data associated with the providers was provided to:     Patient Representative Name:       The Patient and/or Patient Representative Agree with the Discharge Plan?      Savannah Childress RN  Case Management Department

## 2025-07-03 NOTE — PLAN OF CARE
Problem: Chronic Conditions and Co-morbidities  Goal: Patient's chronic conditions and co-morbidity symptoms are monitored and maintained or improved  Outcome: Progressing     Problem: Discharge Planning  Goal: Discharge to home or other facility with appropriate resources  Outcome: Progressing     Problem: Skin/Tissue Integrity  Goal: Skin integrity remains intact  Description: 1.  Monitor for areas of redness and/or skin breakdown  2.  Assess vascular access sites hourly  3.  Every 4-6 hours minimum:  Change oxygen saturation probe site  4.  Every 4-6 hours:  If on nasal continuous positive airway pressure, respiratory therapy assess nares and determine need for appliance change or resting period  Outcome: Progressing     Problem: Safety - Adult  Goal: Free from fall injury  Outcome: Progressing     Problem: Pain  Goal: Verbalizes/displays adequate comfort level or baseline comfort level  Outcome: Progressing     Problem: Skin/Tissue Integrity - Adult  Goal: Skin integrity remains intact  Description: 1.  Monitor for areas of redness and/or skin breakdown  2.  Assess vascular access sites hourly  3.  Every 4-6 hours minimum:  Change oxygen saturation probe site  4.  Every 4-6 hours:  If on nasal continuous positive airway pressure, respiratory therapy assess nares and determine need for appliance change or resting period  Outcome: Progressing  Goal: Incisions, wounds, or drain sites healing without S/S of infection  Outcome: Progressing  Goal: Oral mucous membranes remain intact  Outcome: Progressing     Problem: Musculoskeletal - Adult  Goal: Return mobility to safest level of function  Outcome: Progressing  Goal: Maintain proper alignment of affected body part  Outcome: Progressing  Goal: Return ADL status to a safe level of function  Outcome: Progressing     Problem: Gastrointestinal - Adult  Goal: Minimal or absence of nausea and vomiting  Outcome: Progressing  Goal: Maintains or returns to baseline bowel

## 2025-07-04 ENCOUNTER — APPOINTMENT (OUTPATIENT)
Dept: GENERAL RADIOLOGY | Age: 75
DRG: 811 | End: 2025-07-04
Payer: MEDICARE

## 2025-07-04 LAB
ANION GAP SERPL CALCULATED.3IONS-SCNC: 12 MMOL/L (ref 3–16)
ANION GAP SERPL CALCULATED.3IONS-SCNC: 15 MMOL/L (ref 3–16)
ANISOCYTOSIS BLD QL SMEAR: ABNORMAL
BASOPHILS # BLD: 0 K/UL (ref 0–0.2)
BASOPHILS NFR BLD: 0.1 %
BUN SERPL-MCNC: 6 MG/DL (ref 7–20)
BUN SERPL-MCNC: 6 MG/DL (ref 7–20)
CALCIUM SERPL-MCNC: 8.1 MG/DL (ref 8.3–10.6)
CALCIUM SERPL-MCNC: 8.5 MG/DL (ref 8.3–10.6)
CHLORIDE SERPL-SCNC: 102 MMOL/L (ref 99–110)
CHLORIDE SERPL-SCNC: 98 MMOL/L (ref 99–110)
CO2 SERPL-SCNC: 19 MMOL/L (ref 21–32)
CO2 SERPL-SCNC: 22 MMOL/L (ref 21–32)
CREAT SERPL-MCNC: 0.4 MG/DL (ref 0.8–1.3)
CREAT SERPL-MCNC: 0.5 MG/DL (ref 0.8–1.3)
DACRYOCYTES BLD QL SMEAR: ABNORMAL
DEPRECATED RDW RBC AUTO: 17.1 % (ref 12.4–15.4)
EOSINOPHIL # BLD: 0 K/UL (ref 0–0.6)
EOSINOPHIL NFR BLD: 1.2 %
GFR SERPLBLD CREATININE-BSD FMLA CKD-EPI: >90 ML/MIN/{1.73_M2}
GFR SERPLBLD CREATININE-BSD FMLA CKD-EPI: >90 ML/MIN/{1.73_M2}
GI PATHOGENS PNL STL NAA+PROBE: NORMAL
GLUCOSE BLD-MCNC: 120 MG/DL (ref 70–99)
GLUCOSE BLD-MCNC: 145 MG/DL (ref 70–99)
GLUCOSE BLD-MCNC: 181 MG/DL (ref 70–99)
GLUCOSE BLD-MCNC: 218 MG/DL (ref 70–99)
GLUCOSE BLD-MCNC: 241 MG/DL (ref 70–99)
GLUCOSE BLD-MCNC: 282 MG/DL (ref 70–99)
GLUCOSE BLD-MCNC: 351 MG/DL (ref 70–99)
GLUCOSE SERPL-MCNC: 122 MG/DL (ref 70–99)
GLUCOSE SERPL-MCNC: 194 MG/DL (ref 70–99)
HCT VFR BLD AUTO: 21.7 % (ref 40.5–52.5)
HCT VFR BLD AUTO: 23 % (ref 40.5–52.5)
HCT VFR BLD AUTO: 26.6 % (ref 40.5–52.5)
HCT VFR BLD AUTO: 26.9 % (ref 40.5–52.5)
HGB BLD-MCNC: 7.2 G/DL (ref 13.5–17.5)
HGB BLD-MCNC: 7.6 G/DL (ref 13.5–17.5)
HGB BLD-MCNC: 8.7 G/DL (ref 13.5–17.5)
HGB BLD-MCNC: 8.7 G/DL (ref 13.5–17.5)
HYPOCHROMIA BLD QL SMEAR: ABNORMAL
INR PPP: 1.36 (ref 0.86–1.14)
LYMPHOCYTES # BLD: 0.5 K/UL (ref 1–5.1)
LYMPHOCYTES NFR BLD: 12.3 %
MACROCYTES BLD QL SMEAR: ABNORMAL
MAGNESIUM SERPL-MCNC: 1.68 MG/DL (ref 1.8–2.4)
MAGNESIUM SERPL-MCNC: 1.96 MG/DL (ref 1.8–2.4)
MCH RBC QN AUTO: 28 PG (ref 26–34)
MCHC RBC AUTO-ENTMCNC: 32.6 G/DL (ref 31–36)
MCV RBC AUTO: 85.8 FL (ref 80–100)
MONOCYTES # BLD: 0.1 K/UL (ref 0–1.3)
MONOCYTES NFR BLD: 2 %
NEUTROPHILS # BLD: 3.6 K/UL (ref 1.7–7.7)
NEUTROPHILS NFR BLD: 84.4 %
OVALOCYTES BLD QL SMEAR: ABNORMAL
PERFORMED ON: ABNORMAL
PLATELET # BLD AUTO: 188 K/UL (ref 135–450)
PLATELET BLD QL SMEAR: ABNORMAL
PMV BLD AUTO: 8.9 FL (ref 5–10.5)
POLYCHROMASIA BLD QL SMEAR: ABNORMAL
POTASSIUM SERPL-SCNC: 3.3 MMOL/L (ref 3.5–5.1)
POTASSIUM SERPL-SCNC: 4.1 MMOL/L (ref 3.5–5.1)
PROTHROMBIN TIME: 16.9 SEC (ref 12.1–14.9)
RBC # BLD AUTO: 3.11 M/UL (ref 4.2–5.9)
SLIDE REVIEW: ABNORMAL
SODIUM SERPL-SCNC: 132 MMOL/L (ref 136–145)
SODIUM SERPL-SCNC: 136 MMOL/L (ref 136–145)
TARGETS BLD QL SMEAR: ABNORMAL
TROPONIN, HIGH SENSITIVITY: 16 NG/L (ref 0–22)
WBC # BLD AUTO: 4.2 K/UL (ref 4–11)

## 2025-07-04 PROCEDURE — 85025 COMPLETE CBC W/AUTO DIFF WBC: CPT

## 2025-07-04 PROCEDURE — 92610 EVALUATE SWALLOWING FUNCTION: CPT

## 2025-07-04 PROCEDURE — 83036 HEMOGLOBIN GLYCOSYLATED A1C: CPT

## 2025-07-04 PROCEDURE — 6360000002 HC RX W HCPCS

## 2025-07-04 PROCEDURE — 85014 HEMATOCRIT: CPT

## 2025-07-04 PROCEDURE — 92526 ORAL FUNCTION THERAPY: CPT

## 2025-07-04 PROCEDURE — 99233 SBSQ HOSP IP/OBS HIGH 50: CPT | Performed by: INTERNAL MEDICINE

## 2025-07-04 PROCEDURE — 2060000000 HC ICU INTERMEDIATE R&B

## 2025-07-04 PROCEDURE — 80048 BASIC METABOLIC PNL TOTAL CA: CPT

## 2025-07-04 PROCEDURE — 84484 ASSAY OF TROPONIN QUANT: CPT

## 2025-07-04 PROCEDURE — 6370000000 HC RX 637 (ALT 250 FOR IP)

## 2025-07-04 PROCEDURE — 2700000000 HC OXYGEN THERAPY PER DAY

## 2025-07-04 PROCEDURE — 93005 ELECTROCARDIOGRAM TRACING: CPT | Performed by: INTERNAL MEDICINE

## 2025-07-04 PROCEDURE — 2500000003 HC RX 250 WO HCPCS

## 2025-07-04 PROCEDURE — 74018 RADEX ABDOMEN 1 VIEW: CPT

## 2025-07-04 PROCEDURE — 2500000003 HC RX 250 WO HCPCS: Performed by: STUDENT IN AN ORGANIZED HEALTH CARE EDUCATION/TRAINING PROGRAM

## 2025-07-04 PROCEDURE — 94640 AIRWAY INHALATION TREATMENT: CPT

## 2025-07-04 PROCEDURE — 6370000000 HC RX 637 (ALT 250 FOR IP): Performed by: STUDENT IN AN ORGANIZED HEALTH CARE EDUCATION/TRAINING PROGRAM

## 2025-07-04 PROCEDURE — 85018 HEMOGLOBIN: CPT

## 2025-07-04 PROCEDURE — 83735 ASSAY OF MAGNESIUM: CPT

## 2025-07-04 PROCEDURE — 6360000002 HC RX W HCPCS: Performed by: INTERNAL MEDICINE

## 2025-07-04 PROCEDURE — 2580000003 HC RX 258: Performed by: INTERNAL MEDICINE

## 2025-07-04 PROCEDURE — 85610 PROTHROMBIN TIME: CPT

## 2025-07-04 PROCEDURE — 94761 N-INVAS EAR/PLS OXIMETRY MLT: CPT

## 2025-07-04 PROCEDURE — 6360000002 HC RX W HCPCS: Performed by: STUDENT IN AN ORGANIZED HEALTH CARE EDUCATION/TRAINING PROGRAM

## 2025-07-04 PROCEDURE — 71045 X-RAY EXAM CHEST 1 VIEW: CPT

## 2025-07-04 RX ORDER — FUROSEMIDE 10 MG/ML
INJECTION INTRAMUSCULAR; INTRAVENOUS
Status: COMPLETED
Start: 2025-07-04 | End: 2025-07-04

## 2025-07-04 RX ORDER — INSULIN LISPRO 100 [IU]/ML
10 INJECTION, SOLUTION INTRAVENOUS; SUBCUTANEOUS ONCE
Status: COMPLETED | OUTPATIENT
Start: 2025-07-04 | End: 2025-07-04

## 2025-07-04 RX ORDER — IPRATROPIUM BROMIDE AND ALBUTEROL SULFATE 2.5; .5 MG/3ML; MG/3ML
1 SOLUTION RESPIRATORY (INHALATION)
Status: DISCONTINUED | OUTPATIENT
Start: 2025-07-04 | End: 2025-07-07

## 2025-07-04 RX ORDER — PHYTONADIONE 10 MG/ML
10 INJECTION, EMULSION INTRAMUSCULAR; INTRAVENOUS; SUBCUTANEOUS ONCE
Status: DISCONTINUED | OUTPATIENT
Start: 2025-07-04 | End: 2025-07-04

## 2025-07-04 RX ORDER — FUROSEMIDE 10 MG/ML
20 INJECTION INTRAMUSCULAR; INTRAVENOUS ONCE
Status: COMPLETED | OUTPATIENT
Start: 2025-07-04 | End: 2025-07-04

## 2025-07-04 RX ADMIN — ALLOPURINOL 300 MG: 300 TABLET ORAL at 09:23

## 2025-07-04 RX ADMIN — IPRATROPIUM BROMIDE AND ALBUTEROL SULFATE 1 DOSE: 2.5; .5 SOLUTION RESPIRATORY (INHALATION) at 20:15

## 2025-07-04 RX ADMIN — FUROSEMIDE 20 MG: 10 INJECTION, SOLUTION INTRAMUSCULAR; INTRAVENOUS at 14:45

## 2025-07-04 RX ADMIN — ALBUTEROL SULFATE 2 PUFF: 90 AEROSOL, METERED RESPIRATORY (INHALATION) at 14:14

## 2025-07-04 RX ADMIN — FINASTERIDE 5 MG: 5 TABLET, FILM COATED ORAL at 17:44

## 2025-07-04 RX ADMIN — PANCRELIPASE LIPASE, PANCRELIPASE PROTEASE, PANCRELIPASE AMYLASE 15000 UNITS: 15000; 47000; 63000 CAPSULE, DELAYED RELEASE ORAL at 09:31

## 2025-07-04 RX ADMIN — SENNOSIDES 17.2 MG: 8.6 TABLET, FILM COATED ORAL at 20:52

## 2025-07-04 RX ADMIN — PANCRELIPASE LIPASE, PANCRELIPASE PROTEASE, PANCRELIPASE AMYLASE 60000 UNITS: 20000; 63000; 84000 CAPSULE, DELAYED RELEASE ORAL at 12:50

## 2025-07-04 RX ADMIN — FUROSEMIDE 20 MG: 10 INJECTION INTRAMUSCULAR; INTRAVENOUS at 14:45

## 2025-07-04 RX ADMIN — IPRATROPIUM BROMIDE AND ALBUTEROL SULFATE 1 DOSE: 2.5; .5 SOLUTION RESPIRATORY (INHALATION) at 14:30

## 2025-07-04 RX ADMIN — INSULIN LISPRO 10 UNITS: 100 INJECTION, SOLUTION INTRAVENOUS; SUBCUTANEOUS at 20:52

## 2025-07-04 RX ADMIN — PANCRELIPASE LIPASE, PANCRELIPASE PROTEASE, PANCRELIPASE AMYLASE 15000 UNITS: 15000; 47000; 63000 CAPSULE, DELAYED RELEASE ORAL at 12:50

## 2025-07-04 RX ADMIN — WATER 125 MG: 1 INJECTION INTRAMUSCULAR; INTRAVENOUS; SUBCUTANEOUS at 14:22

## 2025-07-04 RX ADMIN — PANCRELIPASE LIPASE, PANCRELIPASE PROTEASE, PANCRELIPASE AMYLASE 60000 UNITS: 20000; 63000; 84000 CAPSULE, DELAYED RELEASE ORAL at 09:23

## 2025-07-04 RX ADMIN — SODIUM CHLORIDE, PRESERVATIVE FREE 10 ML: 5 INJECTION INTRAVENOUS at 20:54

## 2025-07-04 RX ADMIN — INSULIN LISPRO 1 UNITS: 100 INJECTION, SOLUTION INTRAVENOUS; SUBCUTANEOUS at 12:58

## 2025-07-04 RX ADMIN — PANCRELIPASE LIPASE, PANCRELIPASE PROTEASE, PANCRELIPASE AMYLASE 60000 UNITS: 20000; 63000; 84000 CAPSULE, DELAYED RELEASE ORAL at 17:43

## 2025-07-04 RX ADMIN — PHYTONADIONE 10 MG: 10 INJECTION, EMULSION INTRAMUSCULAR; INTRAVENOUS; SUBCUTANEOUS at 17:53

## 2025-07-04 RX ADMIN — ERGOCALCIFEROL 50000 UNITS: 1.25 CAPSULE ORAL at 22:13

## 2025-07-04 RX ADMIN — PANTOPRAZOLE SODIUM 40 MG: 40 INJECTION, POWDER, FOR SOLUTION INTRAVENOUS at 09:23

## 2025-07-04 RX ADMIN — PANCRELIPASE LIPASE, PANCRELIPASE PROTEASE, PANCRELIPASE AMYLASE 15000 UNITS: 15000; 47000; 63000 CAPSULE, DELAYED RELEASE ORAL at 17:43

## 2025-07-04 RX ADMIN — PANTOPRAZOLE SODIUM 40 MG: 40 INJECTION, POWDER, FOR SOLUTION INTRAVENOUS at 20:54

## 2025-07-04 RX ADMIN — Medication 3 MG: at 20:52

## 2025-07-04 ASSESSMENT — PAIN SCALES - GENERAL: PAINLEVEL_OUTOF10: 0

## 2025-07-04 NOTE — PROGRESS NOTES
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    On chemo         Subjective:  Mr. Ceja seen   Sudden increased dyspnea today.   Wheezing + tight .     Gor PRBC yesterday      No fevers now, but having chills     Is having some mild abdominal discomfort, abd distended     Rust colored stool at home, none here.       Objective:   Patient Vitals for the past 4 hrs:   BP Temp Temp src Pulse Resp SpO2   07/04/25 1115 113/73 98.2 °F (36.8 °C) Oral 67 16 100 %          Intake/Output Summary (Last 24 hours) at 7/4/2025 1423  Last data filed at 7/4/2025 0544  Gross per 24 hour   Intake 440 ml   Output 480 ml   Net -40 ml       Physical Exam:    Gen: pt in acute distress. Alert. +Chronically ill appearing;  elderly male. Dyspneic, tachypneic, awake, well oriented    Eyes: PERRL. No sclera icterus. No conjunctival injection.   ENT: No discharge. Pharynx clear.   Neck: No JVD.  Trachea midline.  Resp: + accessory muscle use. poor air entry. Stephen diffuse wheezes +  +right chest wall port   CV: Regular rate. Regular rhythm. No murmur.  No rub. ++left lower extremity significant pitting edema   Peripheral Pulses: +2 palpable, equal bilaterally   GI:  Normal bowel sounds.+Mild distension and mid abdominal tenderness to palpation   +2 biliary drains present   Skin: Warm and dry. No nodule on exposed extremities. No rash on exposed extremities.   M/S: No cyanosis. No joint deformity. No clubbing.   Neuro: Awake. Grossly nonfocal    Psych: Oriented x 3. Anxiety due to dyspnea        Medications:  ipratropium 0.5 mg-albuterol 2.5 mg, 1 Dose, 4x Daily RT  insulin lispro, 0-4 Units, 4 times per day  [Held by provider] erlotinib, 100 mg, Daily  allopurinol, 300 mg, Daily  finasteride, 5 mg, QPM  vitamin D, 50,000 Units, Once per day on Monday Wednesday Friday  polyethylene glycol, 17 g,  Daily  senna, 2 tablet, Nightly  lipase-protease-amylase, 60,000 Units, TID WC   And  lipase-protease-amylas, 15,000 Units, TID WC  pantoprazole (PROTONIX) 40 mg in sodium chloride (PF) 0.9 % 10 mL injection, 40 mg, Q12H  sodium chloride flush, 5-40 mL, 2 times per day  melatonin, 3 mg, Nightly      PRN Medications:  glucose, 4 tablet, PRN  dextrose bolus, 125 mL, PRN   Or  dextrose bolus, 250 mL, PRN  glucagon (rDNA), 1 mg, PRN  dextrose, , Continuous PRN  oxyCODONE, 5 mg, Q6H PRN  albuterol sulfate HFA, 2 puff, Q6H PRN  lipase-protease-amylase, 20,000 Units, PRN   And  lipase-protease-amylas, 15,000 Units, PRN  sodium chloride, , PRN  sodium chloride flush, 5-40 mL, PRN  sodium chloride, , PRN  potassium chloride, 40 mEq, PRN   Or  potassium alternative oral replacement, 40 mEq, PRN   Or  potassium chloride, 10 mEq, PRN  magnesium sulfate, 2,000 mg, PRN  ondansetron, 4 mg, Q8H PRN   Or  ondansetron, 4 mg, Q6H PRN  acetaminophen, 500 mg, Q6H PRN   Or  acetaminophen, 325 mg, Q6H PRN          Data:  CBC:   Recent Labs     07/02/25  1450 07/02/25  1759 07/02/25  2324 07/03/25  0540 07/03/25  1205 07/03/25  2300 07/04/25  0630   WBC 4.0* 3.3*  --  3.1*  --   --   --    HGB 6.6* 6.3*   < > 7.0* 7.1* 7.2* 7.2*   HCT 21.0* 19.5*   < > 21.1* 21.3* 21.9* 21.7*   MCV 89.7 86.8  --  84.9  --   --   --     143  --  130*  --   --   --     < > = values in this interval not displayed.     BMP:   Recent Labs     07/02/25  1759 07/03/25  0540 07/04/25  0630    136 136   K 3.3* 3.7 3.3*    104 102   CO2 22 23 22   BUN 9 7 6*   CREATININE 0.4* 0.3* 0.4*     LIVER PROFILE:   Recent Labs     07/02/25  1450 07/02/25  1759 07/03/25  0540   AST 33 22 23   ALT 26 21 20   LIPASE  --  40.0  --    BILITOT 1.0 0.7 0.9   ALKPHOS 313* 312* 320*     PT/INR:   Recent Labs     07/02/25  1450 07/02/25  1758 07/04/25  0630   PROTIME 12.6* 17.1* 16.9*   INR 1.15* 1.38* 1.36*       CULTURES  Results       Procedure Component Value

## 2025-07-04 NOTE — PLAN OF CARE
Problem: Chronic Conditions and Co-morbidities  Goal: Patient's chronic conditions and co-morbidity symptoms are monitored and maintained or improved  Outcome: Progressing     Problem: Discharge Planning  Goal: Discharge to home or other facility with appropriate resources  Outcome: Progressing     Problem: Skin/Tissue Integrity  Goal: Skin integrity remains intact  Description: 1.  Monitor for areas of redness and/or skin breakdown  2.  Assess vascular access sites hourly  3.  Every 4-6 hours minimum:  Change oxygen saturation probe site  4.  Every 4-6 hours:  If on nasal continuous positive airway pressure, respiratory therapy assess nares and determine need for appliance change or resting period  Outcome: Progressing  Flowsheets (Taken 7/4/2025 1024)  Skin Integrity Remains Intact: Monitor for areas of redness and/or skin breakdown     Problem: Safety - Adult  Goal: Free from fall injury  Outcome: Progressing     Problem: Pain  Goal: Verbalizes/displays adequate comfort level or baseline comfort level  Outcome: Progressing

## 2025-07-04 NOTE — PLAN OF CARE
Problem: SLP Adult - Impaired Swallowing  Goal: By Discharge: Advance to least restrictive diet without signs or symptoms of aspiration for planned discharge setting.  See evaluation for individualized goals.  Outcome: Progressing     SLP completed evaluation. Please refer to notes in EMR.     Irlanda Marlow M.A., CCC-SLP  Speech-Language Pathologist  [phone]

## 2025-07-04 NOTE — PROGRESS NOTES
07/04/25 1446   RT Protocol   History Pulmonary Disease 2   Respiratory pattern 4   Breath sounds 4   Cough 0   Indications for Bronchodilator Therapy On home bronchodilators   Bronchodilator Assessment Score 10

## 2025-07-04 NOTE — PROGRESS NOTES
Rapid Response called on patient.    Wife requesting nurse to lay eyes on patient  When arriving to the room, he was grayish color. Oxygen reading 85%.  4L NC patient at 95%  MD and PA at bedside.  New orders obtained and implemented.

## 2025-07-04 NOTE — FLOWSHEET NOTE
07/03/25 2257   Vital Signs   Temp 99 °F (37.2 °C)   Temp Source Oral   Pulse 72   Heart Rate Source Monitor   Respirations 16   BP (!) 108/59   MAP (Calculated) 75   Oxygen Therapy   SpO2 99 %   O2 Device None (Room air)     Resting quietly with respirations easy/even on RA.  Call in easy reach.  Bed alarm on for safety.  Continue to monitor closely.  Julee Pennington RN

## 2025-07-04 NOTE — PROGRESS NOTES
Speech Language Pathology  Clinical Bedside Swallow Assessment  Facility/Department: [facility] PCU TELEMETRY        Recommendations:  Diet recommendation: Full liquid diet per GI- upgrade as tolerated when cleared by GI; IDDSI 0 Thin Liquids; Meds PO as tolerated  Instrumentation: Not clinically indicated at this time. Will continue to monitor  Risk management: upright for all intake, stay upright for at least 30 mins after intake, small bites/sips, close supervision, oral care 2-3x/day to reduce adverse affects in the event of aspiration, slow rate of intake, general GERD precautions, and general aspiration precautions  ** Close supervision for implementation of strategies- small bites/sips and slow rate    NAME:Sharath Ceja  : 1950 (74 y.o.)   MRN: [id number]  ROOM: [place] [id number]-  ADMISSION DATE: 2025  PATIENT DIAGNOSIS(ES): Acute anemia [D64.9]  Chief Complaint   Patient presents with    Abnormal Lab     Patient sent over for low hgb of 6.6     Patient Active Problem List    Diagnosis Date Noted    Right upper quadrant abdominal pain 2022    Right upper quadrant pain 2022    Biliary drain displacement 2025    Acute anemia 2025    Coagulopathy 2025    Acute GI bleeding 2025    GI bleed 2025    Other ascites 2025    Other specified carcinomas of liver (HCC) 2025    Acute blood loss anemia 2025    Orthostasis 2025    Intra abdominal hemorrhage 2025    Loculated pleural effusion 2025    Generalized abdominal pain 01/10/2025    Anemia 2024    Hypomagnesemia 2024    Bilateral calf pain 2024    Dark urine 2024    History of cholangiocarcinoma 2024    Biliary obstruction (HCC) 2024    Acquired absence of other specified parts of digestive tract 2024    Vitamin D deficiency 2024    Type 2 diabetes mellitus without complications (HCC) 2024    Elevated carcinoembryonic antigen

## 2025-07-04 NOTE — PROGRESS NOTES
Bedside report and transfer of care given to PRINCE Kim  . Pt currently resting in bed with the call light within reach. Pt denies any other care needs at this time. Pt stable at this time.

## 2025-07-04 NOTE — CONSULTS
[facility]    Consult Note      Requesting Physician:  Dr Monroy    CHIEF COMPLAINT:  Blood in stool      HISTORY OF PRESENT ILLNESS:    Mr. Ceja  is a 74 y.o. male we are seeing in consultation for intrahepatic cholangiocarcinoma. He follows at [facility] and states his treatment is Tarceva. No records available.    ICD-10-CM    1. Bilateral calf pain  M79.661 Vascular duplex lower extremity venous bilateral    M79.662 Vascular duplex lower extremity venous bilateral      2. Gastrointestinal hemorrhage, unspecified gastrointestinal hemorrhage type  K92.2       3. Acute blood loss anemia  D62       Pt presented to [facility] with reports of unusual stool color.  Patient has medical history of cholangiocarcinoma and diabetes mellitus was presented to the hospital due to reports of rust colored stool.  Patient reports over the past 6 days he noticed change in stool color that he describes as more orange in color.  He states also noticing looser stools with about 6 bowel movements a day.  He states similar event occurring during last hospitalization 3 weeks ago.  He is otherwise denying any abdominal pain or postprandial abdominal pain.  He is otherwise denying fever, chills, shortness of breath, chest pain, nausea or emesis.       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    8 + LYPMH NODES, WHIPPLE    Asthma     Blood circulation, collateral     Carpal tunnel syndrome     CLL (chronic lymphocytic leukemia) (HCC)     DENIES    Crohn's colitis 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    Hypertension      03/28/2023    ERCP STENT INSERTION performed by Juan Luis Peñaloza MD at [facility] ENDOSCOPY    ERCP N/A 05/24/2023    ERCP STENT REMOVAL/EXCHANGE performed by Tab Pandey MD at [facility] ENDOSCOPY    ERCP  05/24/2023    ERCP DILATION BALLOON performed by Tab Pandey MD at [facility] ENDOSCOPY    ERCP  05/24/2023    ERCP BILIARY BRUSHING performed by Tab Pandey MD at [facility] ENDOSCOPY    ERCP  05/24/2023    ERCP STONE REMOVAL performed by Tab Pandey MD at [facility] ENDOSCOPY    ERCP N/A 11/06/2024    ERCP WF W/ANES. (PEDI COLON SCOPE) (12:00) performed by Tab Pandey MD at [facility] ENDOSCOPY    ERCP  11/19/2024    ERCP  11/19/2024    ENDOSCOPIC RETROGRADE CHOLANGIOPANCREATOGRAPHY BIOPSY performed by Tab Pandey MD at [facility] ENDOSCOPY    ERCP  12/11/2024    ENDOSCOPIC RETROGRADE CHOLANGIOPANCREATOGRAPHY DIRECT VISUALIZATION performed by Tab Pandey MD at [facility] ENDOSCOPY    IR BILIARY DRAIN EXTERNAL  11/07/2024    IR BILIARY DRAIN EXTERNAL 11/7/2024 [facility] SPECIAL PROCEDURES    KNEE SURGERY      KNEE STITCHED, NOT SURGERY    OTHER SURGICAL HISTORY  01/26/2022    ERCP WF W/ANES. (8:30) (N/A )     OTHER SURGICAL HISTORY  06/13/2022     ERCP WF W/ANES. (15:00) (N/A )    OTHER SURGICAL HISTORY  05/24/2023    ERCP STENT REMOVAL/EXCHANGE    OTHER SURGICAL HISTORY  11/06/2024    ERCP WF W/ANES. (PEDI COLON SCOPE)    PANCREATECTOMY N/A 06/19/2023    ROBOTIC ASSISTED  PANCREATICODUODENECTOMY CONVERTED TOOPEN performed by Omar Mcdonald MD at [facility] OR    PORT SURGERY Right 08/16/2023    RIGHT SUBCLAVIAN PORT PLACEMENT performed by Omar Mcdonald MD at [facility] OR    TONSILLECTOMY  1986    UPPER GASTROINTESTINAL ENDOSCOPY N/A 03/11/2022    UPPER EUS W/ANES. performed by Tab Pandey MD at [facility] ENDOSCOPY    WHIPPLE PROCEDURE W/ LAPAROSCOPY      6/19/23       Current Medications:  Current

## 2025-07-04 NOTE — PROGRESS NOTES
PROGRESS NOTE  S:74 yrs Patient  admitted on 7/2/2025 with Acute anemia [D64.9] .    No gross bleeding    Current Hospital Schedued Meds   ipratropium 0.5 mg-albuterol 2.5 mg  1 Dose Inhalation 4x Daily RT    insulin lispro  0-4 Units SubCUTAneous 4 times per day    [Held by provider] erlotinib  100 mg Oral Daily    allopurinol  300 mg Oral Daily    finasteride  5 mg Oral QPM    vitamin D  50,000 Units Oral Once per day on Monday Wednesday Friday    polyethylene glycol  17 g Oral Daily    senna  2 tablet Oral Nightly    lipase-protease-amylase  60,000 Units Oral TID WC    And    lipase-protease-amylas  15,000 Units Oral TID WC    pantoprazole (PROTONIX) 40 mg in sodium chloride (PF) 0.9 % 10 mL injection  40 mg IntraVENous Q12H    sodium chloride flush  5-40 mL IntraVENous 2 times per day    melatonin  3 mg Oral Nightly     Current Hospital IV Meds   dextrose      sodium chloride      sodium chloride       Current Hospital PRN Meds  glucose, dextrose bolus **OR** dextrose bolus, glucagon (rDNA), dextrose, oxyCODONE, albuterol sulfate HFA, lipase-protease-amylase **AND** lipase-protease-amylas, sodium chloride, sodium chloride flush, sodium chloride, potassium chloride **OR** potassium alternative oral replacement **OR** potassium chloride, magnesium sulfate, ondansetron **OR** ondansetron, acetaminophen **OR** acetaminophen    Exam:   Vitals:    07/04/25 1442   BP:    Pulse: (!) 128   Resp: 24   Temp:    SpO2: 96%     I/O last 3 completed shifts:  In: 1220 [P.O.:920; Blood:300]  Out: 910 [Emesis/NG output:910]   General appearance: alert, appears stated age, and cooperative  HEENT: PERRLA  Neck: no adenopathy, no carotid bruit, no JVD, supple, symmetrical, trachea midline, and thyroid not enlarged, symmetric, no tenderness/mass/nodules  Lungs: clear to auscultation bilaterally  Heart: regular rate and rhythm, S1, S2 normal, no murmur, click, rub or gallop  Abdomen: soft,  non-tender; bowel sounds normal; no masses,  no organomegaly  Extremities: extremities normal, atraumatic, no cyanosis or edema     Labs:  CBC:   Recent Labs     07/02/25  1450 07/02/25  1759 07/02/25  2324 07/03/25  0540 07/03/25  1205 07/03/25  2300 07/04/25  0630 07/04/25  1414   WBC 4.0* 3.3*  --  3.1*  --   --   --   --    HGB 6.6* 6.3*   < > 7.0*   < > 7.2* 7.2* 8.7*   HCT 21.0* 19.5*   < > 21.1*   < > 21.9* 21.7* 26.9*   MCV 89.7 86.8  --  84.9  --   --   --   --     143  --  130*  --   --   --   --     < > = values in this interval not displayed.     BMP:   Recent Labs     07/02/25 1759 07/03/25  0540 07/04/25  0630    136 136   K 3.3* 3.7 3.3*    104 102   CO2 22 23 22   BUN 9 7 6*   CREATININE 0.4* 0.3* 0.4*     LIVER PROFILE:   Recent Labs     07/02/25 1450 07/02/25 1759 07/03/25  0540   AST 33 22 23   ALT 26 21 20   LIPASE  --  40.0  --    BILITOT 1.0 0.7 0.9   ALKPHOS 313* 312* 320*     PT/INR:   Recent Labs     07/02/25 1450 07/02/25  1758 07/04/25  0630   INR 1.15* 1.38* 1.36*       IMAGING:  Vascular duplex lower extremity venous bilateral  Result Date: 7/3/2025    No evidence of deep vein thrombosis in the right lower extremity.   No evidence of deep vein thrombosis in the left lower extremity.     XR ABDOMEN (KUB) (SINGLE AP VIEW)  Result Date: 7/3/2025  EXAM: 1 VIEW XRAY OF THE ABDOMEN SUPINE 07/03/2025 09:34:57 AM COMPARISON: Comparison CT dated 07/02/2025. CLINICAL HISTORY: abdominal distention, constipation FINDINGS: BOWEL: Nonspecific bowel gas pattern. PERITONEUM AND SOFT TISSUES: Stable biliary drains BONES: Stable degenerative change in the lumbar spine No acute osseous abnormality.     1. Nonspecific bowel gas pattern. No bowel obstruction. 2. Stable biliary drains.     CTA ABDOMEN PELVIS W WO CONTRAST  Result Date: 7/2/2025  EXAM: CTA ABDOMEN AND PELVIS WITHOUT AND WITH CONTRAST 07/02/2025 07:28:03 PM TECHNIQUE: CTA images of the abdomen and pelvis without and with

## 2025-07-05 PROBLEM — R06.00 DYSPNEA: Status: ACTIVE | Noted: 2025-07-05

## 2025-07-05 LAB
EKG ATRIAL RATE: 120 BPM
EKG DIAGNOSIS: NORMAL
EKG P AXIS: 54 DEGREES
EKG P-R INTERVAL: 184 MS
EKG Q-T INTERVAL: 300 MS
EKG QRS DURATION: 70 MS
EKG QTC CALCULATION (BAZETT): 424 MS
EKG R AXIS: 61 DEGREES
EKG T AXIS: 57 DEGREES
EKG VENTRICULAR RATE: 120 BPM
EST. AVERAGE GLUCOSE BLD GHB EST-MCNC: 119.8 MG/DL
GLUCOSE BLD-MCNC: 160 MG/DL (ref 70–99)
GLUCOSE BLD-MCNC: 249 MG/DL (ref 70–99)
GLUCOSE BLD-MCNC: 254 MG/DL (ref 70–99)
GLUCOSE BLD-MCNC: 263 MG/DL (ref 70–99)
GLUCOSE BLD-MCNC: 328 MG/DL (ref 70–99)
GLUCOSE BLD-MCNC: 373 MG/DL (ref 70–99)
HBA1C MFR BLD: 5.8 %
HCT VFR BLD AUTO: 20.9 % (ref 40.5–52.5)
HCT VFR BLD AUTO: 22.6 % (ref 40.5–52.5)
HCT VFR BLD AUTO: 22.8 % (ref 40.5–52.5)
HGB BLD-MCNC: 6.9 G/DL (ref 13.5–17.5)
HGB BLD-MCNC: 7.4 G/DL (ref 13.5–17.5)
HGB BLD-MCNC: 7.5 G/DL (ref 13.5–17.5)
PERFORMED ON: ABNORMAL

## 2025-07-05 PROCEDURE — 6360000002 HC RX W HCPCS: Performed by: STUDENT IN AN ORGANIZED HEALTH CARE EDUCATION/TRAINING PROGRAM

## 2025-07-05 PROCEDURE — 94640 AIRWAY INHALATION TREATMENT: CPT

## 2025-07-05 PROCEDURE — 6370000000 HC RX 637 (ALT 250 FOR IP)

## 2025-07-05 PROCEDURE — 2060000000 HC ICU INTERMEDIATE R&B

## 2025-07-05 PROCEDURE — 99232 SBSQ HOSP IP/OBS MODERATE 35: CPT | Performed by: INTERNAL MEDICINE

## 2025-07-05 PROCEDURE — 2500000003 HC RX 250 WO HCPCS: Performed by: STUDENT IN AN ORGANIZED HEALTH CARE EDUCATION/TRAINING PROGRAM

## 2025-07-05 PROCEDURE — 94761 N-INVAS EAR/PLS OXIMETRY MLT: CPT

## 2025-07-05 PROCEDURE — 85018 HEMOGLOBIN: CPT

## 2025-07-05 PROCEDURE — 6370000000 HC RX 637 (ALT 250 FOR IP): Performed by: STUDENT IN AN ORGANIZED HEALTH CARE EDUCATION/TRAINING PROGRAM

## 2025-07-05 PROCEDURE — 85014 HEMATOCRIT: CPT

## 2025-07-05 PROCEDURE — 93010 ELECTROCARDIOGRAM REPORT: CPT | Performed by: STUDENT IN AN ORGANIZED HEALTH CARE EDUCATION/TRAINING PROGRAM

## 2025-07-05 RX ADMIN — PANCRELIPASE LIPASE, PANCRELIPASE PROTEASE, PANCRELIPASE AMYLASE 60000 UNITS: 20000; 63000; 84000 CAPSULE, DELAYED RELEASE ORAL at 11:28

## 2025-07-05 RX ADMIN — INSULIN LISPRO 3 UNITS: 100 INJECTION, SOLUTION INTRAVENOUS; SUBCUTANEOUS at 11:28

## 2025-07-05 RX ADMIN — OXYCODONE 5 MG: 5 TABLET ORAL at 13:38

## 2025-07-05 RX ADMIN — Medication 3 MG: at 21:14

## 2025-07-05 RX ADMIN — IPRATROPIUM BROMIDE AND ALBUTEROL SULFATE 1 DOSE: 2.5; .5 SOLUTION RESPIRATORY (INHALATION) at 11:38

## 2025-07-05 RX ADMIN — POLYETHYLENE GLYCOL (3350) 17 G: 17 POWDER, FOR SOLUTION ORAL at 09:37

## 2025-07-05 RX ADMIN — ALLOPURINOL 300 MG: 300 TABLET ORAL at 09:27

## 2025-07-05 RX ADMIN — FINASTERIDE 5 MG: 5 TABLET, FILM COATED ORAL at 17:26

## 2025-07-05 RX ADMIN — PANCRELIPASE LIPASE, PANCRELIPASE PROTEASE, PANCRELIPASE AMYLASE 15000 UNITS: 15000; 47000; 63000 CAPSULE, DELAYED RELEASE ORAL at 09:26

## 2025-07-05 RX ADMIN — PANTOPRAZOLE SODIUM 40 MG: 40 INJECTION, POWDER, FOR SOLUTION INTRAVENOUS at 09:27

## 2025-07-05 RX ADMIN — PANTOPRAZOLE SODIUM 40 MG: 40 INJECTION, POWDER, FOR SOLUTION INTRAVENOUS at 21:14

## 2025-07-05 RX ADMIN — PANCRELIPASE LIPASE, PANCRELIPASE PROTEASE, PANCRELIPASE AMYLASE 60000 UNITS: 20000; 63000; 84000 CAPSULE, DELAYED RELEASE ORAL at 09:26

## 2025-07-05 RX ADMIN — INSULIN LISPRO 2 UNITS: 100 INJECTION, SOLUTION INTRAVENOUS; SUBCUTANEOUS at 06:19

## 2025-07-05 RX ADMIN — SODIUM CHLORIDE, PRESERVATIVE FREE 10 ML: 5 INJECTION INTRAVENOUS at 09:28

## 2025-07-05 RX ADMIN — PANCRELIPASE LIPASE, PANCRELIPASE PROTEASE, PANCRELIPASE AMYLASE 60000 UNITS: 20000; 63000; 84000 CAPSULE, DELAYED RELEASE ORAL at 17:27

## 2025-07-05 RX ADMIN — IPRATROPIUM BROMIDE AND ALBUTEROL SULFATE 1 DOSE: 2.5; .5 SOLUTION RESPIRATORY (INHALATION) at 19:52

## 2025-07-05 RX ADMIN — IPRATROPIUM BROMIDE AND ALBUTEROL SULFATE 1 DOSE: 2.5; .5 SOLUTION RESPIRATORY (INHALATION) at 15:05

## 2025-07-05 RX ADMIN — PANCRELIPASE LIPASE, PANCRELIPASE PROTEASE, PANCRELIPASE AMYLASE 15000 UNITS: 15000; 47000; 63000 CAPSULE, DELAYED RELEASE ORAL at 17:27

## 2025-07-05 RX ADMIN — SENNOSIDES 17.2 MG: 8.6 TABLET, FILM COATED ORAL at 21:14

## 2025-07-05 RX ADMIN — SODIUM CHLORIDE, PRESERVATIVE FREE 10 ML: 5 INJECTION INTRAVENOUS at 21:14

## 2025-07-05 RX ADMIN — PANCRELIPASE LIPASE, PANCRELIPASE PROTEASE, PANCRELIPASE AMYLASE 15000 UNITS: 15000; 47000; 63000 CAPSULE, DELAYED RELEASE ORAL at 11:28

## 2025-07-05 RX ADMIN — INSULIN LISPRO 2 UNITS: 100 INJECTION, SOLUTION INTRAVENOUS; SUBCUTANEOUS at 17:26

## 2025-07-05 RX ADMIN — IPRATROPIUM BROMIDE AND ALBUTEROL SULFATE 1 DOSE: 2.5; .5 SOLUTION RESPIRATORY (INHALATION) at 07:13

## 2025-07-05 RX ADMIN — INSULIN LISPRO 2 UNITS: 100 INJECTION, SOLUTION INTRAVENOUS; SUBCUTANEOUS at 00:05

## 2025-07-05 ASSESSMENT — PAIN DESCRIPTION - LOCATION: LOCATION: BACK

## 2025-07-05 ASSESSMENT — PAIN SCALES - GENERAL: PAINLEVEL_OUTOF10: 7

## 2025-07-05 NOTE — PROGRESS NOTES
/66   Pulse 89   Temp 97.8 °F (36.6 °C) (Oral)   Resp 20   Ht 1.778 m (5' 10\")   Wt 70.9 kg (156 lb 4.8 oz)   SpO2 98%   BMI 22.43 kg/m²     Patient alert and oriented resting in bed, watching tv. With telemetry. With O2 at 4 lpm via nasal cannula. With family at the bedside. Assessment completed. Respiration easy, even and unlabored. Patient tolerated night meds well. Call light and bedside table within reach. Bed at lowest position, locked, side rails x2, bed alarm on. Pt denies needs at this time.

## 2025-07-05 NOTE — PROGRESS NOTES
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    On chemo         Subjective:  Mr. Ceja seen   Sudden increased dyspnea today.   Wheezing + tight .     Gor PRBC yesterday      No fevers now, but having chills     Is having some mild abdominal discomfort, abd distended     Rust colored stool at home, none here.      7/5    Patient is feeling much better today  No shortness of breath no more GI bleed  Will advance to reg diet        Objective:   Patient Vitals for the past 4 hrs:   BP Temp Temp src Resp SpO2   07/05/25 1142 (!) 99/55 98.5 °F (36.9 °C) Oral 15 97 %          Intake/Output Summary (Last 24 hours) at 7/5/2025 1524  Last data filed at 7/5/2025 1246  Gross per 24 hour   Intake 1132 ml   Output 1063 ml   Net 69 ml       Physical Exam:    Gen: No  distress.   Alert. +Chronically ill appearing;  elderly male.  awake, well oriented    Eyes: PERRL. No sclera icterus. No conjunctival injection.   ENT: No discharge. Pharynx clear.   Neck: No JVD.  Trachea midline.  Resp: No  accessory muscle use. No wheezes, rales or rhonchi   +right chest wall port   CV: Regular rate. Regular rhythm. No murmur.  No rub.    Leg edema - mild   Peripheral Pulses: +2 palpable, equal bilaterally   GI:  Normal bowel sounds.+Mild distension and mid abdominal tenderness to palpation   +2 biliary drains present   Skin: Warm and dry. No nodule on exposed extremities. No rash on exposed extremities.   M/S: No cyanosis. No joint deformity. No clubbing.   Neuro: Awake. Grossly nonfocal    Psych: Oriented x 3. Anxiety due to dyspnea        Medications:  ipratropium 0.5 mg-albuterol 2.5 mg, 1 Dose, 4x Daily RT  insulin lispro, 0-4 Units, 4 times per day  [Held by provider] erlotinib, 100 mg, Daily  allopurinol, 300 mg, Daily  finasteride, 5 mg, QPM  vitamin D, 50,000 Units, Once per day on  vitamin K   Portal vein occlusion   -oncology consulted - Seen by onc-> hold tarceva  -on vitamin K supplement at home, will defer to GI   -on zenpep  -on oxycodone prn     Left lower extremity edema   -venous US pending   -could be 2/2 to hypoalbuminemia     Crohns disease   -follows with GI     Hypokalemia   Hypomagnesemia  -replace     Leukopenia  -could be chemo induced   -monitor CBC     Vitamin D deficiency   -is on supplement 3x weekly     Type 2 diabetes with hyperglycemia  - Sliding scale insulin  - monitor and adjust as needed    Gout   -on allopurinol      Acute dyspnea  - possible acute pulm edema  - give IV lasix    Check CXR, AXR.  - HHN given  - solumedrol given  - check CBC, VBG, EKG, troponin    Dysnea resolved with lasix     DVT Prophylaxis: SCDs  Diet: ADULT DIET; Full Liquid  ADULT ORAL NUTRITION SUPPLEMENT; Breakfast, Lunch, Dinner; Standard High Calorie/High Protein Oral Supplement  Code Status: Full Code    Maryuri Burnette MD  07/05/25  3:24 PM

## 2025-07-05 NOTE — PROGRESS NOTES
Bedside report and transfer of care given to PRINCE Ladd. Pt currently resting in bed with the call light within reach. Pt denies any other care needs at this time. Pt stable at this time.

## 2025-07-05 NOTE — PROGRESS NOTES
Visit Vitals  /65   Pulse 72   Temp 97.3 °F (36.3 °C) (Oral)   Resp 18   Ht 1.778 m (5' 10\")   Wt 70.9 kg (156 lb 4.8 oz)   SpO2 97%   BMI 22.43 kg/m²      Patient awake, alert and oriented.  Assisted patient going to the bathroom.  Not in any forms of distress.  Morning care rendered.  Patient is sleeping on bed but easily to wake up.  Call light and bedside table within reach. Bed at lowest position, locked, side rails x2.

## 2025-07-05 NOTE — PROGRESS NOTES
PROGRESS NOTE  S:74 yrs Patient  admitted on 7/2/2025 with Acute anemia [D64.9] .    States feeling better    Current Hospital Schedued Meds   ipratropium 0.5 mg-albuterol 2.5 mg  1 Dose Inhalation 4x Daily RT    insulin lispro  0-4 Units SubCUTAneous 4 times per day    [Held by provider] erlotinib  100 mg Oral Daily    allopurinol  300 mg Oral Daily    finasteride  5 mg Oral QPM    vitamin D  50,000 Units Oral Once per day on Monday Wednesday Friday    polyethylene glycol  17 g Oral Daily    senna  2 tablet Oral Nightly    lipase-protease-amylase  60,000 Units Oral TID WC    And    lipase-protease-amylas  15,000 Units Oral TID WC    pantoprazole (PROTONIX) 40 mg in sodium chloride (PF) 0.9 % 10 mL injection  40 mg IntraVENous Q12H    sodium chloride flush  5-40 mL IntraVENous 2 times per day    melatonin  3 mg Oral Nightly     Current Hospital IV Meds   dextrose      sodium chloride      sodium chloride       Current Hospital PRN Meds  glucose, dextrose bolus **OR** dextrose bolus, glucagon (rDNA), dextrose, oxyCODONE, albuterol sulfate HFA, lipase-protease-amylase **AND** lipase-protease-amylas, sodium chloride, sodium chloride flush, sodium chloride, potassium chloride **OR** potassium alternative oral replacement **OR** potassium chloride, magnesium sulfate, ondansetron **OR** ondansetron, acetaminophen **OR** acetaminophen    Exam:   Vitals:    07/05/25 1142   BP:    Pulse:    Resp:    Temp:    SpO2: 97%     I/O last 3 completed shifts:  In: 450 [P.O.:450]  Out: 1343 [Urine:500; Emesis/NG output:843]   General appearance: alert, appears stated age, and cooperative  HEENT: PERRLA  Neck: no adenopathy, no carotid bruit, no JVD, supple, symmetrical, trachea midline, and thyroid not enlarged, symmetric, no tenderness/mass/nodules  Lungs: clear to auscultation bilaterally  Heart: regular rate and rhythm, S1, S2 normal, no murmur, click, rub or gallop  Abdomen: soft, non-tender;

## 2025-07-05 NOTE — PROGRESS NOTES
Transferred care to PRINCE Broussard. Face to face bedside report given, no need voiced at this time. Pt awake in bed.   No signs of distress noted.  Call light within reach.   Denies needs.

## 2025-07-05 NOTE — PROGRESS NOTES
RT Inhaler-Nebulizer Bronchodilator Protocol Note    There is a bronchodilator order in the chart from a provider indicating to follow the RT Bronchodilator Protocol and there is an “Initiate RT Inhaler-Nebulizer Bronchodilator Protocol” order as well (see protocol at bottom of note).    CXR Findings:  XR CHEST PORTABLE  Result Date: 7/4/2025  Mild bibasilar atelectasis otherwise no acute cardiopulmonary disease.       The findings from the last RT Protocol Assessment were as follows:   History Pulmonary Disease: (P) Chronic pulmonary disease  Respiratory Pattern: (P) Regular pattern and RR 12-20 bpm  Breath Sounds: (P) Slightly diminished and/or crackles  Cough: (P) Strong, spontaneous, non-productive  Indication for Bronchodilator Therapy: (P) Decreased or absent breath sounds  Bronchodilator Assessment Score: (P) 4    Aerosolized bronchodilator medication orders have been revised according to the RT Inhaler-Nebulizer Bronchodilator Protocol below.    Respiratory Therapist to perform RT Therapy Protocol Assessment initially then follow the protocol.  Repeat RT Therapy Protocol Assessment PRN for score 0-3 or on second treatment, BID, and PRN for scores above 3.    No Indications - adjust the frequency to every 6 hours PRN wheezing or bronchospasm, if no treatments needed after 48 hours then discontinue using Per Protocol order mode.     If indication present, adjust the RT bronchodilator orders based on the Bronchodilator Assessment Score as indicated below.  Use Inhaler orders unless patient has one or more of the following: on home nebulizer, not able to hold breath for 10 seconds, is not alert and oriented, cannot activate and use MDI correctly, or respiratory rate 25 breaths per minute or more, then use the equivalent nebulizer order(s) with same Frequency and PRN reasons based on the score.  If a patient is on this medication at home then do not decrease Frequency below that used at home.    0-3 - enter or

## 2025-07-05 NOTE — PROGRESS NOTES
RT Inhaler-Nebulizer Bronchodilator Protocol Note    There is a bronchodilator order in the chart from a provider indicating to follow the RT Bronchodilator Protocol and there is an “Initiate RT Inhaler-Nebulizer Bronchodilator Protocol” order as well (see protocol at bottom of note).    CXR Findings:  XR CHEST PORTABLE  Result Date: 7/4/2025  Mild bibasilar atelectasis otherwise no acute cardiopulmonary disease.       The findings from the last RT Protocol Assessment were as follows:   History Pulmonary Disease: Chronic pulmonary disease  Respiratory Pattern: Dyspnea on exertion or RR 21-25 bpm  Breath Sounds: Slightly diminished and/or crackles  Cough: Strong, spontaneous, non-productive  Indication for Bronchodilator Therapy: Decreased or absent breath sounds  Bronchodilator Assessment Score: 6    Aerosolized bronchodilator medication orders have been revised according to the RT Inhaler-Nebulizer Bronchodilator Protocol below.    Respiratory Therapist to perform RT Therapy Protocol Assessment initially then follow the protocol.  Repeat RT Therapy Protocol Assessment PRN for score 0-3 or on second treatment, BID, and PRN for scores above 3.    No Indications - adjust the frequency to every 6 hours PRN wheezing or bronchospasm, if no treatments needed after 48 hours then discontinue using Per Protocol order mode.     If indication present, adjust the RT bronchodilator orders based on the Bronchodilator Assessment Score as indicated below.  Use Inhaler orders unless patient has one or more of the following: on home nebulizer, not able to hold breath for 10 seconds, is not alert and oriented, cannot activate and use MDI correctly, or respiratory rate 25 breaths per minute or more, then use the equivalent nebulizer order(s) with same Frequency and PRN reasons based on the score.  If a patient is on this medication at home then do not decrease Frequency below that used at home.    0-3 - enter or revise RT  bronchodilator order(s) to equivalent RT Bronchodilator order with Frequency of every 4 hours PRN for wheezing or increased work of breathing using Per Protocol order mode.        4-6 - enter or revise RT Bronchodilator order(s) to two equivalent RT bronchodilator orders with one order with BID Frequency and one order with Frequency of every 4 hours PRN wheezing or increased work of breathing using Per Protocol order mode.        7-10 - enter or revise RT Bronchodilator order(s) to two equivalent RT bronchodilator orders with one order with TID Frequency and one order with Frequency of every 4 hours PRN wheezing or increased work of breathing using Per Protocol order mode.       11-13 - enter or revise RT Bronchodilator order(s) to one equivalent RT bronchodilator order with QID Frequency and an Albuterol order with Frequency of every 4 hours PRN wheezing or increased work of breathing using Per Protocol order mode.      Greater than 13 - enter or revise RT Bronchodilator order(s) to one equivalent RT bronchodilator order with every 4 hours Frequency and an Albuterol order with Frequency of every 2 hours PRN wheezing or increased work of breathing using Per Protocol order mode.     PATIENT WILL BENEFIT FROM TREATMENTS.     Electronically signed by Palmira Graves RCP on 7/4/2025 at 8:21 PM

## 2025-07-05 NOTE — PROGRESS NOTES
INA Alonso that patient's blood sugar is 351 from 181 at 1555. There is a scheduled sliding scale every 6 hours and it is due at 0000.  ordered 10 units of humalog 1 dose.

## 2025-07-06 LAB
ABO/RH: NORMAL
ANTIBODY SCREEN: NORMAL
BLOOD BANK DISPENSE STATUS: NORMAL
BLOOD BANK PRODUCT CODE: NORMAL
BPU ID: NORMAL
DESCRIPTION BLOOD BANK: NORMAL
GLUCOSE BLD-MCNC: 157 MG/DL (ref 70–99)
GLUCOSE BLD-MCNC: 162 MG/DL (ref 70–99)
GLUCOSE BLD-MCNC: 239 MG/DL (ref 70–99)
GLUCOSE BLD-MCNC: 246 MG/DL (ref 70–99)
GLUCOSE BLD-MCNC: 258 MG/DL (ref 70–99)
HCT VFR BLD AUTO: 21.1 % (ref 40.5–52.5)
HCT VFR BLD AUTO: 23.1 % (ref 40.5–52.5)
HCT VFR BLD AUTO: 26.4 % (ref 40.5–52.5)
HGB BLD-MCNC: 6.9 G/DL (ref 13.5–17.5)
HGB BLD-MCNC: 7.7 G/DL (ref 13.5–17.5)
HGB BLD-MCNC: 8.8 G/DL (ref 13.5–17.5)
PERFORMED ON: ABNORMAL

## 2025-07-06 PROCEDURE — 86923 COMPATIBILITY TEST ELECTRIC: CPT

## 2025-07-06 PROCEDURE — 86900 BLOOD TYPING SEROLOGIC ABO: CPT

## 2025-07-06 PROCEDURE — 94640 AIRWAY INHALATION TREATMENT: CPT

## 2025-07-06 PROCEDURE — 6370000000 HC RX 637 (ALT 250 FOR IP): Performed by: STUDENT IN AN ORGANIZED HEALTH CARE EDUCATION/TRAINING PROGRAM

## 2025-07-06 PROCEDURE — 36430 TRANSFUSION BLD/BLD COMPNT: CPT

## 2025-07-06 PROCEDURE — P9016 RBC LEUKOCYTES REDUCED: HCPCS

## 2025-07-06 PROCEDURE — 6370000000 HC RX 637 (ALT 250 FOR IP)

## 2025-07-06 PROCEDURE — 99232 SBSQ HOSP IP/OBS MODERATE 35: CPT | Performed by: INTERNAL MEDICINE

## 2025-07-06 PROCEDURE — 85014 HEMATOCRIT: CPT

## 2025-07-06 PROCEDURE — 6360000002 HC RX W HCPCS: Performed by: STUDENT IN AN ORGANIZED HEALTH CARE EDUCATION/TRAINING PROGRAM

## 2025-07-06 PROCEDURE — 6370000000 HC RX 637 (ALT 250 FOR IP): Performed by: INTERNAL MEDICINE

## 2025-07-06 PROCEDURE — 86850 RBC ANTIBODY SCREEN: CPT

## 2025-07-06 PROCEDURE — 85018 HEMOGLOBIN: CPT

## 2025-07-06 PROCEDURE — 2500000003 HC RX 250 WO HCPCS: Performed by: STUDENT IN AN ORGANIZED HEALTH CARE EDUCATION/TRAINING PROGRAM

## 2025-07-06 PROCEDURE — 94761 N-INVAS EAR/PLS OXIMETRY MLT: CPT

## 2025-07-06 PROCEDURE — 6360000002 HC RX W HCPCS: Performed by: INTERNAL MEDICINE

## 2025-07-06 PROCEDURE — 86901 BLOOD TYPING SEROLOGIC RH(D): CPT

## 2025-07-06 PROCEDURE — 2060000000 HC ICU INTERMEDIATE R&B

## 2025-07-06 RX ORDER — LOPERAMIDE HYDROCHLORIDE 2 MG/1
2 CAPSULE ORAL ONCE
Status: COMPLETED | OUTPATIENT
Start: 2025-07-06 | End: 2025-07-06

## 2025-07-06 RX ORDER — PHYTONADIONE 5 MG/1
10 TABLET ORAL DAILY
Status: DISCONTINUED | OUTPATIENT
Start: 2025-07-06 | End: 2025-07-14 | Stop reason: HOSPADM

## 2025-07-06 RX ORDER — DICYCLOMINE HCL 20 MG
20 TABLET ORAL DAILY
Status: DISCONTINUED | OUTPATIENT
Start: 2025-07-06 | End: 2025-07-07

## 2025-07-06 RX ORDER — MULTIVITAMIN WITH IRON
1000 TABLET ORAL DAILY
Status: DISCONTINUED | OUTPATIENT
Start: 2025-07-06 | End: 2025-07-14 | Stop reason: HOSPADM

## 2025-07-06 RX ORDER — SODIUM CHLORIDE 9 MG/ML
INJECTION, SOLUTION INTRAVENOUS PRN
Status: DISCONTINUED | OUTPATIENT
Start: 2025-07-06 | End: 2025-07-10

## 2025-07-06 RX ORDER — FUROSEMIDE 10 MG/ML
20 INJECTION INTRAMUSCULAR; INTRAVENOUS ONCE
Status: COMPLETED | OUTPATIENT
Start: 2025-07-06 | End: 2025-07-06

## 2025-07-06 RX ADMIN — ALBUTEROL SULFATE 2 PUFF: 90 AEROSOL, METERED RESPIRATORY (INHALATION) at 15:25

## 2025-07-06 RX ADMIN — FUROSEMIDE 20 MG: 10 INJECTION, SOLUTION INTRAMUSCULAR; INTRAVENOUS at 14:38

## 2025-07-06 RX ADMIN — ALBUTEROL SULFATE 2 PUFF: 90 AEROSOL, METERED RESPIRATORY (INHALATION) at 11:24

## 2025-07-06 RX ADMIN — PANCRELIPASE LIPASE, PANCRELIPASE PROTEASE, PANCRELIPASE AMYLASE 60000 UNITS: 20000; 63000; 84000 CAPSULE, DELAYED RELEASE ORAL at 09:35

## 2025-07-06 RX ADMIN — PANCRELIPASE LIPASE, PANCRELIPASE PROTEASE, PANCRELIPASE AMYLASE 15000 UNITS: 15000; 47000; 63000 CAPSULE, DELAYED RELEASE ORAL at 18:00

## 2025-07-06 RX ADMIN — ACETAMINOPHEN 500 MG: 500 TABLET ORAL at 02:30

## 2025-07-06 RX ADMIN — DICYCLOMINE HYDROCHLORIDE 20 MG: 20 TABLET ORAL at 14:37

## 2025-07-06 RX ADMIN — ALBUTEROL SULFATE 2 PUFF: 90 AEROSOL, METERED RESPIRATORY (INHALATION) at 07:31

## 2025-07-06 RX ADMIN — INSULIN LISPRO 1 UNITS: 100 INJECTION, SOLUTION INTRAVENOUS; SUBCUTANEOUS at 11:34

## 2025-07-06 RX ADMIN — INSULIN LISPRO 2 UNITS: 100 INJECTION, SOLUTION INTRAVENOUS; SUBCUTANEOUS at 17:59

## 2025-07-06 RX ADMIN — PANTOPRAZOLE SODIUM 40 MG: 40 INJECTION, POWDER, FOR SOLUTION INTRAVENOUS at 20:42

## 2025-07-06 RX ADMIN — ALBUTEROL SULFATE 2 PUFF: 90 AEROSOL, METERED RESPIRATORY (INHALATION) at 19:55

## 2025-07-06 RX ADMIN — LOPERAMIDE HYDROCHLORIDE 2 MG: 2 CAPSULE ORAL at 09:34

## 2025-07-06 RX ADMIN — PANTOPRAZOLE SODIUM 40 MG: 40 INJECTION, POWDER, FOR SOLUTION INTRAVENOUS at 09:38

## 2025-07-06 RX ADMIN — ACETAMINOPHEN 500 MG: 500 TABLET ORAL at 19:47

## 2025-07-06 RX ADMIN — FINASTERIDE 5 MG: 5 TABLET, FILM COATED ORAL at 18:01

## 2025-07-06 RX ADMIN — OXYCODONE 5 MG: 5 TABLET ORAL at 21:02

## 2025-07-06 RX ADMIN — Medication 1000 MCG: at 14:37

## 2025-07-06 RX ADMIN — PANCRELIPASE LIPASE, PANCRELIPASE PROTEASE, PANCRELIPASE AMYLASE 15000 UNITS: 15000; 47000; 63000 CAPSULE, DELAYED RELEASE ORAL at 11:33

## 2025-07-06 RX ADMIN — PANCRELIPASE LIPASE, PANCRELIPASE PROTEASE, PANCRELIPASE AMYLASE 15000 UNITS: 15000; 47000; 63000 CAPSULE, DELAYED RELEASE ORAL at 09:35

## 2025-07-06 RX ADMIN — PANCRELIPASE LIPASE, PANCRELIPASE PROTEASE, PANCRELIPASE AMYLASE 60000 UNITS: 20000; 63000; 84000 CAPSULE, DELAYED RELEASE ORAL at 18:00

## 2025-07-06 RX ADMIN — PHYTONADIONE 10 MG: 5 TABLET ORAL at 14:38

## 2025-07-06 RX ADMIN — PANCRELIPASE LIPASE, PANCRELIPASE PROTEASE, PANCRELIPASE AMYLASE 60000 UNITS: 20000; 63000; 84000 CAPSULE, DELAYED RELEASE ORAL at 11:33

## 2025-07-06 RX ADMIN — SODIUM CHLORIDE, PRESERVATIVE FREE 10 ML: 5 INJECTION INTRAVENOUS at 20:52

## 2025-07-06 RX ADMIN — Medication 3 MG: at 20:50

## 2025-07-06 RX ADMIN — ALLOPURINOL 300 MG: 300 TABLET ORAL at 09:37

## 2025-07-06 ASSESSMENT — PAIN - FUNCTIONAL ASSESSMENT: PAIN_FUNCTIONAL_ASSESSMENT: ACTIVITIES ARE NOT PREVENTED

## 2025-07-06 ASSESSMENT — PAIN DESCRIPTION - LOCATION
LOCATION: HEAD
LOCATION: ABDOMEN

## 2025-07-06 ASSESSMENT — PAIN DESCRIPTION - DESCRIPTORS
DESCRIPTORS: ACHING
DESCRIPTORS: ACHING

## 2025-07-06 ASSESSMENT — PAIN DESCRIPTION - PAIN TYPE: TYPE: CHRONIC PAIN;ACUTE PAIN

## 2025-07-06 ASSESSMENT — PAIN SCALES - GENERAL
PAINLEVEL_OUTOF10: 8
PAINLEVEL_OUTOF10: 0
PAINLEVEL_OUTOF10: 0
PAINLEVEL_OUTOF10: 8

## 2025-07-06 ASSESSMENT — PAIN DESCRIPTION - ONSET: ONSET: ON-GOING

## 2025-07-06 ASSESSMENT — PAIN DESCRIPTION - ORIENTATION
ORIENTATION: MID
ORIENTATION: RIGHT

## 2025-07-06 ASSESSMENT — PAIN DESCRIPTION - FREQUENCY: FREQUENCY: INTERMITTENT

## 2025-07-06 NOTE — PROGRESS NOTES
Transfusion ordered.  Consent in chart  VSS as charted.  Verified with CASANDRA Nolan. RN    Blood hit the vein at 1120.     15min Vitals as charted.  See flowsheet.    Patient resting in bed.  Tolerating well.  Denies any discomfort    Rate increased to 100ml/hr

## 2025-07-06 NOTE — PROGRESS NOTES
Speech Language Pathology  Attempt Note     Name: Sharath Ceja  : 1950  Medical Diagnosis: Acute anemia [D64.9]      SLP attempted to see pt for dysphagia tx. Tx not completed this date as pt is still on a full liquid diet per GI. SLP to re-attempt when pt is cleared for regular diet per GI to fully assess diet tolerance. RN aware. No charges.    Thank you,    Irlanda Marlow M.A., CCC-SLP  Speech-Language Pathologist  [phone]

## 2025-07-06 NOTE — PLAN OF CARE
Problem: Chronic Conditions and Co-morbidities  Goal: Patient's chronic conditions and co-morbidity symptoms are monitored and maintained or improved  7/6/2025 1029 by Aarti Toussaint RN  Outcome: Progressing     Problem: Discharge Planning  Goal: Discharge to home or other facility with appropriate resources  7/6/2025 1029 by Aarti Toussaint RN  Outcome: Progressing     Problem: Skin/Tissue Integrity  Goal: Skin integrity remains intact  Description: 1.  Monitor for areas of redness and/or skin breakdown  2.  Assess vascular access sites hourly  3.  Every 4-6 hours minimum:  Change oxygen saturation probe site  4.  Every 4-6 hours:  If on nasal continuous positive airway pressure, respiratory therapy assess nares and determine need for appliance change or resting period  7/6/2025 1029 by Aarti Toussaint RN  Outcome: Progressing     Problem: Safety - Adult  Goal: Free from fall injury  7/6/2025 1029 by Aarti Toussaint RN  Outcome: Progressing     Problem: Pain  Goal: Verbalizes/displays adequate comfort level or baseline comfort level  7/6/2025 1029 by Aarti Toussaint RN  Outcome: Progressing

## 2025-07-06 NOTE — PLAN OF CARE
Problem: Chronic Conditions and Co-morbidities  Goal: Patient's chronic conditions and co-morbidity symptoms are monitored and maintained or improved  7/5/2025 2357 by Julee Pennington RN  Outcome: Progressing     Problem: Discharge Planning  Goal: Discharge to home or other facility with appropriate resources  7/5/2025 2357 by Julee Pennington RN  Outcome: Progressing     Problem: Skin/Tissue Integrity  Goal: Skin integrity remains intact  Description: 1.  Monitor for areas of redness and/or skin breakdown  2.  Assess vascular access sites hourly  3.  Every 4-6 hours minimum:  Change oxygen saturation probe site  4.  Every 4-6 hours:  If on nasal continuous positive airway pressure, respiratory therapy assess nares and determine need for appliance change or resting period  7/5/2025 2357 by Julee Pennington RN  Outcome: Progressing     Problem: Safety - Adult  Goal: Free from fall injury  7/5/2025 2357 by Julee Pennington RN  Outcome: Progressing     Problem: Pain  Goal: Verbalizes/displays adequate comfort level or baseline comfort level  7/5/2025 2357 by Julee Pennington RN  Outcome: Progressing     Problem: Skin/Tissue Integrity - Adult  Goal: Skin integrity remains intact  Description: 1.  Monitor for areas of redness and/or skin breakdown  2.  Assess vascular access sites hourly  3.  Every 4-6 hours minimum:  Change oxygen saturation probe site  4.  Every 4-6 hours:  If on nasal continuous positive airway pressure, respiratory therapy assess nares and determine need for appliance change or resting period  7/5/2025 2357 by Julee Pennington RN  Outcome: Progressing     Problem: Musculoskeletal - Adult  Goal: Return mobility to safest level of function  7/5/2025 2357 by Julee Pennington RN  Outcome: Progressing     Problem: Gastrointestinal - Adult  Goal: Minimal or absence of nausea and vomiting  7/5/2025 2357 by Julee Pennington RN  Outcome: Progressing     Problem: Hematologic - Adult  Goal:

## 2025-07-06 NOTE — PROGRESS NOTES
Bedside report and transfer of care given to PRINCE Fam. Pt currently resting in bed with the call light within reach. Pt denies any other care needs at this time. Pt stable at this time.

## 2025-07-06 NOTE — PROGRESS NOTES

## 2025-07-06 NOTE — PROGRESS NOTES
Patient finished transfusion at 1420. Patient went to the restroom and had bright blood bowel movement in the toilet.  Page to GI and spoke with Dr. Andrade.    Patient currently in bed, resting.

## 2025-07-06 NOTE — FLOWSHEET NOTE
07/05/25 2333   Vital Signs   Temp 97.9 °F (36.6 °C)   Temp Source Oral   Pulse 81   Heart Rate Source Monitor   Respirations 16   BP (!) 116/53   MAP (Calculated) 74   BP Location Left upper arm   BP Method Automatic   Patient Position Semi fowlers   Oxygen Therapy   SpO2 95 %   O2 Device None (Room air)     Resting quietly with respirations easy/even on RA.  Call in easy reach.  Bed alarm on for safety.  Continue to monitor closely.  Julee Pennington RN

## 2025-07-06 NOTE — PROGRESS NOTES
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   On chemo     Patient received 1 unit of PRBC transfusion earlier this admission, globin down to 6.9 today and 1 more unit of PRBC ordered.  Had episode of shortness of breath the next day after PRBC improved after Lasix.  GI has signed off this admission and they are recommending outpatient follow-up for scope.    Subjective:  Mr. Ceja seen  Patient is feeling better today.  Does not have any dark or as colored stools now.  Hemoglobin was down to 6.9 today and 1 unit of PRBC ordered.  No shortness of breath.  Tolerating a diet      Objective:   Patient Vitals for the past 4 hrs:   BP Temp Temp src Pulse Resp SpO2   07/06/25 1135 (!) 108/57 98 °F (36.7 °C) Oral 73 18 97 %   07/06/25 1115 112/63 97.5 °F (36.4 °C) Oral 71 18 98 %          Intake/Output Summary (Last 24 hours) at 7/6/2025 1248  Last data filed at 7/6/2025 1025  Gross per 24 hour   Intake 1080 ml   Output 715 ml   Net 365 ml       Physical Exam:    Gen: No  distress.  Awake and well-oriented  Alert. +Chronically ill appearing;  elderly male.  Eyes: PERRL. No sclera icterus. No conjunctival injection.   ENT: No discharge. Pharynx clear.   Neck: No JVD.  Trachea midline.  Resp: No  accessory muscle use. No wheezes, rales or rhonchi   +right chest wall port   CV: Regular rate. Regular rhythm. No murmur.  No rub.    Leg edema - mild   Peripheral Pulses: +2 palpable, equal bilaterally   GI:  Normal bowel sounds.+Mild distension and mid abdominal tenderness to palpation   +2 biliary drains present   Skin: Warm and dry. No nodule on exposed extremities. No rash on exposed extremities.   M/S: No cyanosis. No joint deformity. No clubbing.   Neuro: Awake. Grossly nonfocal    Psych: Oriented x 3.        Medications:  B-12, 1,000 mg, Daily  dicyclomine, 20 mg,

## 2025-07-07 ENCOUNTER — APPOINTMENT (OUTPATIENT)
Dept: GENERAL RADIOLOGY | Age: 75
DRG: 811 | End: 2025-07-07
Payer: MEDICARE

## 2025-07-07 LAB
ALBUMIN SERPL-MCNC: 2.4 G/DL (ref 3.4–5)
ALBUMIN/GLOB SERPL: 0.9 {RATIO} (ref 1.1–2.2)
ALP SERPL-CCNC: 258 U/L (ref 40–129)
ALT SERPL-CCNC: 16 U/L (ref 10–40)
ANION GAP SERPL CALCULATED.3IONS-SCNC: 10 MMOL/L (ref 3–16)
AST SERPL-CCNC: 19 U/L (ref 15–37)
BILIRUB SERPL-MCNC: 1.6 MG/DL (ref 0–1)
BUN SERPL-MCNC: 6 MG/DL (ref 7–20)
CALCIUM SERPL-MCNC: 7.8 MG/DL (ref 8.3–10.6)
CHLORIDE SERPL-SCNC: 100 MMOL/L (ref 99–110)
CO2 SERPL-SCNC: 25 MMOL/L (ref 21–32)
CREAT SERPL-MCNC: 0.4 MG/DL (ref 0.8–1.3)
DEPRECATED RDW RBC AUTO: 17.3 % (ref 12.4–15.4)
GFR SERPLBLD CREATININE-BSD FMLA CKD-EPI: >90 ML/MIN/{1.73_M2}
GLUCOSE BLD-MCNC: 144 MG/DL (ref 70–99)
GLUCOSE BLD-MCNC: 149 MG/DL (ref 70–99)
GLUCOSE BLD-MCNC: 154 MG/DL (ref 70–99)
GLUCOSE BLD-MCNC: 155 MG/DL (ref 70–99)
GLUCOSE BLD-MCNC: 156 MG/DL (ref 70–99)
GLUCOSE BLD-MCNC: 243 MG/DL (ref 70–99)
GLUCOSE SERPL-MCNC: 135 MG/DL (ref 70–99)
HCT VFR BLD AUTO: 22.4 % (ref 40.5–52.5)
HCT VFR BLD AUTO: 22.5 % (ref 40.5–52.5)
HCT VFR BLD AUTO: 23.4 % (ref 40.5–52.5)
HCT VFR BLD AUTO: 23.6 % (ref 40.5–52.5)
HGB BLD-MCNC: 7.1 G/DL (ref 13.5–17.5)
HGB BLD-MCNC: 7.6 G/DL (ref 13.5–17.5)
HGB BLD-MCNC: 7.8 G/DL (ref 13.5–17.5)
HGB BLD-MCNC: 7.9 G/DL (ref 13.5–17.5)
INR PPP: 1.29 (ref 0.86–1.14)
MAGNESIUM SERPL-MCNC: 1.6 MG/DL (ref 1.8–2.4)
MCH RBC QN AUTO: 28.2 PG (ref 26–34)
MCHC RBC AUTO-ENTMCNC: 33.9 G/DL (ref 31–36)
MCV RBC AUTO: 83.2 FL (ref 80–100)
PERFORMED ON: ABNORMAL
PLATELET # BLD AUTO: 120 K/UL (ref 135–450)
PMV BLD AUTO: 7.3 FL (ref 5–10.5)
POTASSIUM SERPL-SCNC: 3.2 MMOL/L (ref 3.5–5.1)
PROT SERPL-MCNC: 5.2 G/DL (ref 6.4–8.2)
PROTHROMBIN TIME: 16.3 SEC (ref 12.1–14.9)
RBC # BLD AUTO: 2.69 M/UL (ref 4.2–5.9)
SODIUM SERPL-SCNC: 135 MMOL/L (ref 136–145)
WBC # BLD AUTO: 3.4 K/UL (ref 4–11)

## 2025-07-07 PROCEDURE — 94761 N-INVAS EAR/PLS OXIMETRY MLT: CPT

## 2025-07-07 PROCEDURE — 6360000002 HC RX W HCPCS: Performed by: STUDENT IN AN ORGANIZED HEALTH CARE EDUCATION/TRAINING PROGRAM

## 2025-07-07 PROCEDURE — 80053 COMPREHEN METABOLIC PANEL: CPT

## 2025-07-07 PROCEDURE — 6370000000 HC RX 637 (ALT 250 FOR IP): Performed by: STUDENT IN AN ORGANIZED HEALTH CARE EDUCATION/TRAINING PROGRAM

## 2025-07-07 PROCEDURE — 6370000000 HC RX 637 (ALT 250 FOR IP): Performed by: INTERNAL MEDICINE

## 2025-07-07 PROCEDURE — 2580000003 HC RX 258: Performed by: INTERNAL MEDICINE

## 2025-07-07 PROCEDURE — 85027 COMPLETE CBC AUTOMATED: CPT

## 2025-07-07 PROCEDURE — 6370000000 HC RX 637 (ALT 250 FOR IP)

## 2025-07-07 PROCEDURE — 85610 PROTHROMBIN TIME: CPT

## 2025-07-07 PROCEDURE — 85014 HEMATOCRIT: CPT

## 2025-07-07 PROCEDURE — 99232 SBSQ HOSP IP/OBS MODERATE 35: CPT | Performed by: INTERNAL MEDICINE

## 2025-07-07 PROCEDURE — 2500000003 HC RX 250 WO HCPCS: Performed by: STUDENT IN AN ORGANIZED HEALTH CARE EDUCATION/TRAINING PROGRAM

## 2025-07-07 PROCEDURE — 85018 HEMOGLOBIN: CPT

## 2025-07-07 PROCEDURE — 83735 ASSAY OF MAGNESIUM: CPT

## 2025-07-07 PROCEDURE — 92526 ORAL FUNCTION THERAPY: CPT

## 2025-07-07 PROCEDURE — 74018 RADEX ABDOMEN 1 VIEW: CPT

## 2025-07-07 PROCEDURE — 2060000000 HC ICU INTERMEDIATE R&B

## 2025-07-07 PROCEDURE — 36415 COLL VENOUS BLD VENIPUNCTURE: CPT

## 2025-07-07 PROCEDURE — 94640 AIRWAY INHALATION TREATMENT: CPT

## 2025-07-07 RX ORDER — ALBUTEROL SULFATE 90 UG/1
2 INHALANT RESPIRATORY (INHALATION)
Status: DISCONTINUED | OUTPATIENT
Start: 2025-07-07 | End: 2025-07-14 | Stop reason: HOSPADM

## 2025-07-07 RX ORDER — BISACODYL 10 MG
10 SUPPOSITORY, RECTAL RECTAL ONCE
Status: COMPLETED | OUTPATIENT
Start: 2025-07-07 | End: 2025-07-07

## 2025-07-07 RX ORDER — LACTULOSE 10 G/15ML
20 SOLUTION ORAL 2 TIMES DAILY
Status: DISCONTINUED | OUTPATIENT
Start: 2025-07-07 | End: 2025-07-08

## 2025-07-07 RX ORDER — SODIUM CHLORIDE 9 MG/ML
INJECTION, SOLUTION INTRAVENOUS CONTINUOUS
Status: DISCONTINUED | OUTPATIENT
Start: 2025-07-07 | End: 2025-07-08

## 2025-07-07 RX ORDER — IPRATROPIUM BROMIDE AND ALBUTEROL SULFATE 2.5; .5 MG/3ML; MG/3ML
1 SOLUTION RESPIRATORY (INHALATION) EVERY 4 HOURS PRN
Status: DISCONTINUED | OUTPATIENT
Start: 2025-07-07 | End: 2025-07-14 | Stop reason: HOSPADM

## 2025-07-07 RX ADMIN — ALBUTEROL SULFATE 2 PUFF: 90 AEROSOL, METERED RESPIRATORY (INHALATION) at 20:25

## 2025-07-07 RX ADMIN — OXYCODONE 5 MG: 5 TABLET ORAL at 16:50

## 2025-07-07 RX ADMIN — PANTOPRAZOLE SODIUM 40 MG: 40 INJECTION, POWDER, FOR SOLUTION INTRAVENOUS at 20:37

## 2025-07-07 RX ADMIN — ALBUTEROL SULFATE 2 PUFF: 90 AEROSOL, METERED RESPIRATORY (INHALATION) at 14:54

## 2025-07-07 RX ADMIN — Medication 3 MG: at 20:38

## 2025-07-07 RX ADMIN — PANCRELIPASE LIPASE, PANCRELIPASE PROTEASE, PANCRELIPASE AMYLASE 60000 UNITS: 20000; 63000; 84000 CAPSULE, DELAYED RELEASE ORAL at 12:06

## 2025-07-07 RX ADMIN — LACTULOSE 20 G: 10 SOLUTION ORAL at 20:37

## 2025-07-07 RX ADMIN — FINASTERIDE 5 MG: 5 TABLET, FILM COATED ORAL at 16:51

## 2025-07-07 RX ADMIN — PHYTONADIONE 10 MG: 5 TABLET ORAL at 09:07

## 2025-07-07 RX ADMIN — PANCRELIPASE LIPASE, PANCRELIPASE PROTEASE, PANCRELIPASE AMYLASE 15000 UNITS: 15000; 47000; 63000 CAPSULE, DELAYED RELEASE ORAL at 16:54

## 2025-07-07 RX ADMIN — PANCRELIPASE LIPASE, PANCRELIPASE PROTEASE, PANCRELIPASE AMYLASE 15000 UNITS: 15000; 47000; 63000 CAPSULE, DELAYED RELEASE ORAL at 09:08

## 2025-07-07 RX ADMIN — BISACODYL 10 MG: 10 SUPPOSITORY RECTAL at 16:50

## 2025-07-07 RX ADMIN — ACETAMINOPHEN 500 MG: 500 TABLET ORAL at 17:04

## 2025-07-07 RX ADMIN — SODIUM CHLORIDE, PRESERVATIVE FREE 10 ML: 5 INJECTION INTRAVENOUS at 21:11

## 2025-07-07 RX ADMIN — SODIUM CHLORIDE: 0.9 INJECTION, SOLUTION INTRAVENOUS at 23:22

## 2025-07-07 RX ADMIN — PANTOPRAZOLE SODIUM 40 MG: 40 INJECTION, POWDER, FOR SOLUTION INTRAVENOUS at 09:05

## 2025-07-07 RX ADMIN — MAGNESIUM SULFATE HEPTAHYDRATE 2000 MG: 40 INJECTION, SOLUTION INTRAVENOUS at 04:24

## 2025-07-07 RX ADMIN — PANCRELIPASE LIPASE, PANCRELIPASE PROTEASE, PANCRELIPASE AMYLASE 15000 UNITS: 15000; 47000; 63000 CAPSULE, DELAYED RELEASE ORAL at 12:06

## 2025-07-07 RX ADMIN — PANCRELIPASE LIPASE, PANCRELIPASE PROTEASE, PANCRELIPASE AMYLASE 60000 UNITS: 20000; 63000; 84000 CAPSULE, DELAYED RELEASE ORAL at 09:07

## 2025-07-07 RX ADMIN — Medication 1000 MCG: at 09:06

## 2025-07-07 RX ADMIN — ALBUTEROL SULFATE 2 PUFF: 90 AEROSOL, METERED RESPIRATORY (INHALATION) at 07:03

## 2025-07-07 RX ADMIN — ALBUTEROL SULFATE 2 PUFF: 90 AEROSOL, METERED RESPIRATORY (INHALATION) at 10:50

## 2025-07-07 RX ADMIN — DICYCLOMINE HYDROCHLORIDE 20 MG: 20 TABLET ORAL at 09:06

## 2025-07-07 RX ADMIN — SODIUM CHLORIDE: 0.9 INJECTION, SOLUTION INTRAVENOUS at 12:05

## 2025-07-07 RX ADMIN — PANCRELIPASE LIPASE, PANCRELIPASE PROTEASE, PANCRELIPASE AMYLASE 60000 UNITS: 20000; 63000; 84000 CAPSULE, DELAYED RELEASE ORAL at 16:55

## 2025-07-07 RX ADMIN — ERGOCALCIFEROL 50000 UNITS: 1.25 CAPSULE ORAL at 23:20

## 2025-07-07 RX ADMIN — ALLOPURINOL 300 MG: 300 TABLET ORAL at 09:06

## 2025-07-07 ASSESSMENT — PAIN DESCRIPTION - ONSET: ONSET: SUDDEN

## 2025-07-07 ASSESSMENT — PAIN SCALES - GENERAL
PAINLEVEL_OUTOF10: 5
PAINLEVEL_OUTOF10: 0

## 2025-07-07 ASSESSMENT — PAIN DESCRIPTION - ORIENTATION: ORIENTATION: RIGHT

## 2025-07-07 ASSESSMENT — PAIN DESCRIPTION - DESCRIPTORS: DESCRIPTORS: SHARP

## 2025-07-07 ASSESSMENT — PAIN DESCRIPTION - FREQUENCY: FREQUENCY: INTERMITTENT

## 2025-07-07 ASSESSMENT — PAIN - FUNCTIONAL ASSESSMENT: PAIN_FUNCTIONAL_ASSESSMENT: ACTIVITIES ARE NOT PREVENTED

## 2025-07-07 ASSESSMENT — PAIN DESCRIPTION - LOCATION: LOCATION: ABDOMEN

## 2025-07-07 NOTE — FLOWSHEET NOTE
07/07/25 1645 07/07/25 1650   Vital Signs   Temp (!) 100.9 °F (38.3 °C)  --    Temp Source Oral  --    Pulse 77  --    Heart Rate Source Monitor  --    Respirations 16  --    BP (!) 102/56  --    MAP (Calculated) 71  --    Pain Assessment   Pain Assessment  --  0-10   Pain Level  --  5   Pain Location  --  Abdomen   Pain Orientation  --  Right   Pain Descriptors  --  Sharp   Functional Pain Assessment  --  Activities are not prevented   Pain Frequency  --  Intermittent   Pain Onset  --  Sudden   Oxygen Therapy   SpO2 99 %  --    O2 Device None (Room air)  --         07/07/25 1645 07/07/25 1650   Vital Signs   Temp (!) 100.9 °F (38.3 °C)  --    Temp Source Oral  --    Pulse 77  --    Heart Rate Source Monitor  --    Respirations 16  --    BP (!) 102/56  --    MAP (Calculated) 71  --    Pain Assessment   Pain Assessment  --  0-10   Pain Level  --  5   Pain Location  --  Abdomen   Pain Orientation  --  Right   Pain Descriptors  --  Sharp   Functional Pain Assessment  --  Activities are not prevented   Pain Frequency  --  Intermittent   Pain Onset  --  Sudden   Oxygen Therapy   SpO2 99 %  --    O2 Device None (Room air)  --        Pain as shown above. PRN Oxycodone given per MAR order.  Pt. Temp of 100.9. PRN Tylenol given per MAR order. Biliary tubes flushed at this time. Per pt he has to flush tubes daily at home. Pt. denies further needs at this time. Will continue to monitor. Call light is within reach.  Farzana Ojeda RN

## 2025-07-07 NOTE — PROGRESS NOTES
Bedside report and transfer of care given to PRINCE Yanes. Pt currently resting in bed with the call light within reach. Pt denies any other care needs at this time. Pt stable at this time.

## 2025-07-07 NOTE — CARE COORDINATION
Pt receiving blood this am. Pt will DC home with spouse when medically ready. Pt is active with [facility] and will need MAXI at DC

## 2025-07-07 NOTE — FLOWSHEET NOTE
07/07/25 1806   Vital Signs   Temp 99.7 °F (37.6 °C)   Temp Source Oral       Temp improving. Will continue to monitor. Call light is within reach.  Farzana Ojeda RN

## 2025-07-07 NOTE — FLOWSHEET NOTE
07/07/25 1800   Stool Assessment   Stool Appearance Formed   Stool Color Kan;Brown   Stool Amount Small   Stool Source Rectum   Unmeasured Output   Stool Occurrence 1       Pt. Up to restroom and back to bed. Pt. Weak, but tolerated. Pt. denies further needs at this time. Call light is within reach.  Farzana Ojeda RN

## 2025-07-07 NOTE — PLAN OF CARE
Problem: Chronic Conditions and Co-morbidities  Goal: Patient's chronic conditions and co-morbidity symptoms are monitored and maintained or improved  7/6/2025 2313 by Felecia Dickinson RN  Outcome: Progressing  Flowsheets (Taken 7/6/2025 2042)  Care Plan - Patient's Chronic Conditions and Co-Morbidity Symptoms are Monitored and Maintained or Improved: Monitor and assess patient's chronic conditions and comorbid symptoms for stability, deterioration, or improvement     Problem: Discharge Planning  Goal: Discharge to home or other facility with appropriate resources  7/6/2025 2313 by Felecia Dickinson, RN  Outcome: Progressing  Flowsheets (Taken 7/6/2025 2042)  Discharge to home or other facility with appropriate resources: Identify barriers to discharge with patient and caregiver     Problem: Skin/Tissue Integrity  Goal: Skin integrity remains intact  Description: 1.  Monitor for areas of redness and/or skin breakdown  2.  Assess vascular access sites hourly  3.  Every 4-6 hours minimum:  Change oxygen saturation probe site  4.  Every 4-6 hours:  If on nasal continuous positive airway pressure, respiratory therapy assess nares and determine need for appliance change or resting period  7/6/2025 2313 by Felecia Dickinson, RN  Outcome: Progressing  Flowsheets (Taken 7/6/2025 2042)  Skin Integrity Remains Intact: Monitor for areas of redness and/or skin breakdown     Problem: Safety - Adult  Goal: Free from fall injury  7/6/2025 2313 by Felecia Dickinson RN  Outcome: Progressing     Problem: Pain  Goal: Verbalizes/displays adequate comfort level or baseline comfort level  7/6/2025 2313 by Felecia Dickinson RN  Outcome: Progressing  Flowsheets (Taken 7/6/2025 2102)  Verbalizes/displays adequate comfort level or baseline comfort level: Encourage patient to monitor pain and request assistance     Problem: Skin/Tissue Integrity - Adult  Goal: Skin integrity remains intact  Description: 1.  Monitor for areas

## 2025-07-07 NOTE — PROGRESS NOTES
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   On chemo     Patient received 1 unit of PRBC transfusion earlier this admission, globin down to 6.9 today and 1 more unit of PRBC ordered.  Had episode of shortness of breath the next day after PRBC improved after Lasix.  GI has signed off this admission and they are recommending outpatient follow-up for scope.    Subjective:  Mr. Ceja seen  Patient is feeling better today.  Had bright red blood per rectum yesterday      Objective:   Patient Vitals for the past 4 hrs:   BP Temp Temp src Pulse Resp SpO2   07/07/25 0706 102/69 98.1 °F (36.7 °C) Oral 74 18 94 %   07/07/25 0703 -- -- -- -- -- 94 %          Intake/Output Summary (Last 24 hours) at 7/7/2025 0914  Last data filed at 7/7/2025 0338  Gross per 24 hour   Intake 375 ml   Output 2330 ml   Net -1955 ml       Physical Exam:    Gen: No  distress.  Awake and well-oriented  Alert. +Chronically ill appearing;  elderly male.  Eyes: PERRL. No sclera icterus. No conjunctival injection.   ENT: No discharge. Pharynx clear.   Neck: No JVD.  Trachea midline.  Resp: No  accessory muscle use. No wheezes, rales or rhonchi   +right chest wall port   CV: Regular rate. Regular rhythm. No murmur.  No rub.    Leg edema - mild   Peripheral Pulses: +2 palpable, equal bilaterally   GI:  Normal bowel sounds.+Mild distension and mid abdominal tenderness to palpation   +2 biliary drains present   Skin: Warm and dry. No nodule on exposed extremities. No rash on exposed extremities.   M/S: No cyanosis. No joint deformity. No clubbing.   Neuro: Awake. Grossly nonfocal    Psych: Oriented x 3.        Medications:  albuterol sulfate HFA, 2 puff, 4x Daily RT  vitamin B-12, 1,000 mcg, Daily  dicyclomine, 20 mg, Daily  phytonadione, 10 mg, Daily  insulin lispro, 0-4 Units, 4 times per day  [Held

## 2025-07-07 NOTE — PROGRESS NOTES
Report given to Mayte MORRISON. Pt. denies further needs at this time. Call light is within reach.  Farzana Ojeda RN

## 2025-07-07 NOTE — PROGRESS NOTES
Speech Language Pathology  Swallowing Disorders and Dysphagia    Dysphagia Treatment/Follow-Up Note  Facility/Department: [facility]    Recommendations: SLP recommends to continue with clear liquids diet (straws OK) per MD. SLP recommends re-assessment once cleared for solid trials.   Risk Management: upright for all intake, stay upright for at least 30 mins after intake, distant supervision with intake, general GERD precautions, and general aspiration precautions     NAME:Sharath Ceja  : 1950 (74 y.o.)   MRN: [id number]  ROOM: /0316-01  ADMISSION DATE: 2025  PATIENT DIAGNOSIS(ES): Acute anemia [D64.9]  Allergies   Allergen Reactions    Codeine      Pt states he took medication without food and had a reaction, states this gave him chest pain    Amoxicillin-Pot Clavulanate Other (See Comments)     Patient did not have a rxn to Augmentin, MD called and told him to stop taking it.  Has tolerated Zosyn.    DOES FINE WITH KEFLEX       DATE ONSET: 2025    Pain: The patient does not complain of pain       Current Diet: ADULT DIET; Clear Liquid      Dysphagia Treatment and Impressions:  Subjective: Pt seen in room at bedside with RN (Aarti) permission  Noteworthy events reported/Chart Review: Pt upgraded to regular diet by MD , but then subsequently downgraded to clear liquid diet d/t bloody BM.   Patient tolerance to current diet and treatment: Pt tolerated clear liquids free of overt s/s aspiration      Respiratory Status: Pt with SPO2% of 100 on RA  with RR of 18  Oral Care Status:    Oral care WFL  Oral Care Completed?   [] Yes   [x] No  Not warranted-oral care adequate upon assessment    Liquid PO Trials:   IDDSI 0 Thin:  Assessed via tsp (jello) and straw: no anterior bolus loss , swallow timing subjectively appears timely, no clinical s/s of aspiration, and vitals stable    Solid PO Trials  IDDSI 4 Puree:   DAGO  IDDSI 7 Regular:   DAGO    Education: SLP edu pt re: Role of SLP,

## 2025-07-07 NOTE — PROGRESS NOTES
PROGRESS NOTE  S:74 yrs Patient  admitted on 7/2/2025 with Acute anemia [D64.9] .  He had large episode of bright red blood in stool yesterday afternoon. He has not had further signs of over bleeding. He denies abdominal pain. His last bowel movement was today. He has been NPO.      Exam:   Vitals:    07/07/25 1050   BP: 94/69   Pulse: 68   Resp: 18   Temp: 99.1 °F (37.3 °C)   SpO2: 95%   Generalized: alert, no acute distress  HEENT: sclera clear, anicteric  Neck supple, trachea midline  Heart RR  Abdomen: soft, NT, +biliary drains, distended  Extremities: BLE nonpitting edema    Medications: Reviewed    Labs:  CBC:   Recent Labs     07/04/25  1420 07/04/25  2220 07/06/25  2134 07/07/25  0335 07/07/25  0930   WBC 4.2  --   --  3.4*  --    HGB 8.7*   < > 7.7* 7.6* 7.9*   HCT 26.6*   < > 23.1* 22.4* 23.6*   MCV 85.8  --   --  83.2  --      --   --  120*  --     < > = values in this interval not displayed.     BMP:   Recent Labs     07/04/25  1420 07/07/25  0335   * 135*   K 4.1 3.2*   CL 98* 100   CO2 19* 25   BUN 6* 6*   CREATININE 0.5* 0.4*     LIVER PROFILE:   Recent Labs     07/07/25  0335   AST 19   ALT 16   BILITOT 1.6*   ALKPHOS 258*      Imaging:   KUB, 7/7  Nonobstructive bowel gas pattern.  Probable constipation.  Stable biliary catheter position.    Assessment   75 yo male with pmx of DM, HTN, Crohn's disease, GERD, BPH, CCY, and stage IV cholangiocarcinoma s/p Whipple c/b anastomotic stricture s/p biliary drains admitted with generalized weakness, anemia, and hematochezia. CTA suggestive of stercoral colitis.      Plan  Continue supportive care  Trend H&H  Monitor for signs of bleeding  Monitor and correct coagulopathy   PPI BID  Okay for liquid diet  Will discontinue dicyclomine and senna  Continue miralax BID  Will order dulcolax suppository x 1  Will start scheduled lactulose  Continue to hold Tarceva per Oncology   Will need outpatient colonoscopy with

## 2025-07-07 NOTE — PLAN OF CARE
Problem: Chronic Conditions and Co-morbidities  Goal: Patient's chronic conditions and co-morbidity symptoms are monitored and maintained or improved  7/7/2025 1113 by Aarti Toussaint RN  Outcome: Progressing     Problem: Discharge Planning  Goal: Discharge to home or other facility with appropriate resources  7/7/2025 1113 by Aarti Toussaint RN  Outcome: Progressing     Problem: Skin/Tissue Integrity  Goal: Skin integrity remains intact  Description: 1.  Monitor for areas of redness and/or skin breakdown  2.  Assess vascular access sites hourly  3.  Every 4-6 hours minimum:  Change oxygen saturation probe site  4.  Every 4-6 hours:  If on nasal continuous positive airway pressure, respiratory therapy assess nares and determine need for appliance change or resting period  7/7/2025 1113 by Aarti Toussaint RN  Outcome: Progressing     Problem: Safety - Adult  Goal: Free from fall injury  7/7/2025 1113 by Aarti Toussaint RN  Outcome: Progressing

## 2025-07-08 LAB
ALBUMIN SERPL-MCNC: 2.4 G/DL (ref 3.4–5)
ALBUMIN/GLOB SERPL: 0.9 {RATIO} (ref 1.1–2.2)
ALP SERPL-CCNC: 238 U/L (ref 40–129)
ALT SERPL-CCNC: 15 U/L (ref 10–40)
ANION GAP SERPL CALCULATED.3IONS-SCNC: 9 MMOL/L (ref 3–16)
AST SERPL-CCNC: 19 U/L (ref 15–37)
BASOPHILS # BLD: 0 K/UL (ref 0–0.2)
BASOPHILS NFR BLD: 0.8 %
BILIRUB DIRECT SERPL-MCNC: 1.5 MG/DL (ref 0–0.3)
BILIRUB INDIRECT SERPL-MCNC: 0.7 MG/DL (ref 0–1)
BILIRUB SERPL-MCNC: 2.2 MG/DL (ref 0–1)
BUN SERPL-MCNC: 6 MG/DL (ref 7–20)
CALCIUM SERPL-MCNC: 7.6 MG/DL (ref 8.3–10.6)
CHLORIDE SERPL-SCNC: 96 MMOL/L (ref 99–110)
CO2 SERPL-SCNC: 21 MMOL/L (ref 21–32)
CREAT SERPL-MCNC: 0.4 MG/DL (ref 0.8–1.3)
DEPRECATED RDW RBC AUTO: 17 % (ref 12.4–15.4)
EOSINOPHIL # BLD: 0 K/UL (ref 0–0.6)
EOSINOPHIL NFR BLD: 0.2 %
GFR SERPLBLD CREATININE-BSD FMLA CKD-EPI: >90 ML/MIN/{1.73_M2}
GLUCOSE BLD-MCNC: 154 MG/DL (ref 70–99)
GLUCOSE BLD-MCNC: 155 MG/DL (ref 70–99)
GLUCOSE BLD-MCNC: 170 MG/DL (ref 70–99)
GLUCOSE BLD-MCNC: 179 MG/DL (ref 70–99)
GLUCOSE BLD-MCNC: 190 MG/DL (ref 70–99)
GLUCOSE BLD-MCNC: 237 MG/DL (ref 70–99)
GLUCOSE BLD-MCNC: 238 MG/DL (ref 70–99)
GLUCOSE SERPL-MCNC: 219 MG/DL (ref 70–99)
HCT VFR BLD AUTO: 22 % (ref 40.5–52.5)
HCT VFR BLD AUTO: 22.9 % (ref 40.5–52.5)
HCT VFR BLD AUTO: 22.9 % (ref 40.5–52.5)
HCT VFR BLD AUTO: 23 % (ref 40.5–52.5)
HGB BLD-MCNC: 7.3 G/DL (ref 13.5–17.5)
HGB BLD-MCNC: 7.5 G/DL (ref 13.5–17.5)
HGB BLD-MCNC: 7.5 G/DL (ref 13.5–17.5)
HGB BLD-MCNC: 7.6 G/DL (ref 13.5–17.5)
INR PPP: 1.38 (ref 0.86–1.14)
LYMPHOCYTES # BLD: 0.1 K/UL (ref 1–5.1)
LYMPHOCYTES NFR BLD: 2.9 %
MAGNESIUM SERPL-MCNC: 1.71 MG/DL (ref 1.8–2.4)
MCH RBC QN AUTO: 28.2 PG (ref 26–34)
MCHC RBC AUTO-ENTMCNC: 32.7 G/DL (ref 31–36)
MCV RBC AUTO: 86.2 FL (ref 80–100)
MONOCYTES # BLD: 0.2 K/UL (ref 0–1.3)
MONOCYTES NFR BLD: 4.4 %
NEUTROPHILS # BLD: 3.8 K/UL (ref 1.7–7.7)
NEUTROPHILS NFR BLD: 91.7 %
PERFORMED ON: ABNORMAL
PLATELET # BLD AUTO: 105 K/UL (ref 135–450)
PMV BLD AUTO: 7.7 FL (ref 5–10.5)
POTASSIUM SERPL-SCNC: 3 MMOL/L (ref 3.5–5.1)
PROT SERPL-MCNC: 5.2 G/DL (ref 6.4–8.2)
PROTHROMBIN TIME: 17.1 SEC (ref 12.1–14.9)
RBC # BLD AUTO: 2.67 M/UL (ref 4.2–5.9)
SODIUM SERPL-SCNC: 126 MMOL/L (ref 136–145)
WBC # BLD AUTO: 4.1 K/UL (ref 4–11)

## 2025-07-08 PROCEDURE — 2500000003 HC RX 250 WO HCPCS: Performed by: STUDENT IN AN ORGANIZED HEALTH CARE EDUCATION/TRAINING PROGRAM

## 2025-07-08 PROCEDURE — 6370000000 HC RX 637 (ALT 250 FOR IP)

## 2025-07-08 PROCEDURE — 94761 N-INVAS EAR/PLS OXIMETRY MLT: CPT

## 2025-07-08 PROCEDURE — 85014 HEMATOCRIT: CPT

## 2025-07-08 PROCEDURE — 94640 AIRWAY INHALATION TREATMENT: CPT

## 2025-07-08 PROCEDURE — 6370000000 HC RX 637 (ALT 250 FOR IP): Performed by: INTERNAL MEDICINE

## 2025-07-08 PROCEDURE — 99232 SBSQ HOSP IP/OBS MODERATE 35: CPT | Performed by: INTERNAL MEDICINE

## 2025-07-08 PROCEDURE — 6370000000 HC RX 637 (ALT 250 FOR IP): Performed by: STUDENT IN AN ORGANIZED HEALTH CARE EDUCATION/TRAINING PROGRAM

## 2025-07-08 PROCEDURE — 83735 ASSAY OF MAGNESIUM: CPT

## 2025-07-08 PROCEDURE — 80053 COMPREHEN METABOLIC PANEL: CPT

## 2025-07-08 PROCEDURE — 85610 PROTHROMBIN TIME: CPT

## 2025-07-08 PROCEDURE — 2060000000 HC ICU INTERMEDIATE R&B

## 2025-07-08 PROCEDURE — 6360000002 HC RX W HCPCS: Performed by: STUDENT IN AN ORGANIZED HEALTH CARE EDUCATION/TRAINING PROGRAM

## 2025-07-08 PROCEDURE — 85018 HEMOGLOBIN: CPT

## 2025-07-08 PROCEDURE — 85025 COMPLETE CBC W/AUTO DIFF WBC: CPT

## 2025-07-08 PROCEDURE — 36415 COLL VENOUS BLD VENIPUNCTURE: CPT

## 2025-07-08 RX ORDER — LACTULOSE 10 G/15ML
30 SOLUTION ORAL 3 TIMES DAILY
Status: DISCONTINUED | OUTPATIENT
Start: 2025-07-08 | End: 2025-07-14 | Stop reason: HOSPADM

## 2025-07-08 RX ADMIN — ALBUTEROL SULFATE 2 PUFF: 90 AEROSOL, METERED RESPIRATORY (INHALATION) at 19:55

## 2025-07-08 RX ADMIN — SODIUM CHLORIDE, PRESERVATIVE FREE 10 ML: 5 INJECTION INTRAVENOUS at 21:01

## 2025-07-08 RX ADMIN — OXYCODONE 5 MG: 5 TABLET ORAL at 16:04

## 2025-07-08 RX ADMIN — PANCRELIPASE LIPASE, PANCRELIPASE PROTEASE, PANCRELIPASE AMYLASE 60000 UNITS: 20000; 63000; 84000 CAPSULE, DELAYED RELEASE ORAL at 08:04

## 2025-07-08 RX ADMIN — ALBUTEROL SULFATE 2 PUFF: 90 AEROSOL, METERED RESPIRATORY (INHALATION) at 07:22

## 2025-07-08 RX ADMIN — PANCRELIPASE LIPASE, PANCRELIPASE PROTEASE, PANCRELIPASE AMYLASE 15000 UNITS: 15000; 47000; 63000 CAPSULE, DELAYED RELEASE ORAL at 16:38

## 2025-07-08 RX ADMIN — PANTOPRAZOLE SODIUM 40 MG: 40 INJECTION, POWDER, FOR SOLUTION INTRAVENOUS at 09:13

## 2025-07-08 RX ADMIN — ALBUTEROL SULFATE 2 PUFF: 90 AEROSOL, METERED RESPIRATORY (INHALATION) at 11:08

## 2025-07-08 RX ADMIN — POTASSIUM CHLORIDE 10 MEQ: 7.45 INJECTION INTRAVENOUS at 15:58

## 2025-07-08 RX ADMIN — POTASSIUM CHLORIDE 10 MEQ: 7.45 INJECTION INTRAVENOUS at 19:56

## 2025-07-08 RX ADMIN — LACTULOSE 20 G: 10 SOLUTION ORAL at 09:09

## 2025-07-08 RX ADMIN — INSULIN LISPRO 1 UNITS: 100 INJECTION, SOLUTION INTRAVENOUS; SUBCUTANEOUS at 16:37

## 2025-07-08 RX ADMIN — POTASSIUM CHLORIDE 10 MEQ: 7.45 INJECTION INTRAVENOUS at 13:51

## 2025-07-08 RX ADMIN — PHYTONADIONE 10 MG: 5 TABLET ORAL at 09:10

## 2025-07-08 RX ADMIN — POTASSIUM CHLORIDE 10 MEQ: 7.45 INJECTION INTRAVENOUS at 18:10

## 2025-07-08 RX ADMIN — ALLOPURINOL 300 MG: 300 TABLET ORAL at 09:08

## 2025-07-08 RX ADMIN — ACETAMINOPHEN 500 MG: 500 TABLET ORAL at 03:23

## 2025-07-08 RX ADMIN — FINASTERIDE 5 MG: 5 TABLET, FILM COATED ORAL at 16:38

## 2025-07-08 RX ADMIN — POTASSIUM CHLORIDE 10 MEQ: 7.45 INJECTION INTRAVENOUS at 12:25

## 2025-07-08 RX ADMIN — IPRATROPIUM BROMIDE AND ALBUTEROL SULFATE 1 DOSE: 2.5; .5 SOLUTION RESPIRATORY (INHALATION) at 03:33

## 2025-07-08 RX ADMIN — PANCRELIPASE LIPASE, PANCRELIPASE PROTEASE, PANCRELIPASE AMYLASE 15000 UNITS: 15000; 47000; 63000 CAPSULE, DELAYED RELEASE ORAL at 12:02

## 2025-07-08 RX ADMIN — Medication 3 MG: at 19:46

## 2025-07-08 RX ADMIN — ACETAMINOPHEN 500 MG: 500 TABLET ORAL at 19:46

## 2025-07-08 RX ADMIN — LACTULOSE 30 G: 10 SOLUTION ORAL at 19:46

## 2025-07-08 RX ADMIN — POLYETHYLENE GLYCOL (3350) 17 G: 17 POWDER, FOR SOLUTION ORAL at 09:09

## 2025-07-08 RX ADMIN — PANCRELIPASE LIPASE, PANCRELIPASE PROTEASE, PANCRELIPASE AMYLASE 15000 UNITS: 15000; 47000; 63000 CAPSULE, DELAYED RELEASE ORAL at 08:05

## 2025-07-08 RX ADMIN — ALBUTEROL SULFATE 2 PUFF: 90 AEROSOL, METERED RESPIRATORY (INHALATION) at 15:30

## 2025-07-08 RX ADMIN — POTASSIUM CHLORIDE 10 MEQ: 7.45 INJECTION INTRAVENOUS at 15:02

## 2025-07-08 RX ADMIN — INSULIN LISPRO 1 UNITS: 100 INJECTION, SOLUTION INTRAVENOUS; SUBCUTANEOUS at 11:56

## 2025-07-08 RX ADMIN — PANTOPRAZOLE SODIUM 40 MG: 40 INJECTION, POWDER, FOR SOLUTION INTRAVENOUS at 19:46

## 2025-07-08 RX ADMIN — PANCRELIPASE LIPASE, PANCRELIPASE PROTEASE, PANCRELIPASE AMYLASE 60000 UNITS: 20000; 63000; 84000 CAPSULE, DELAYED RELEASE ORAL at 11:56

## 2025-07-08 RX ADMIN — SODIUM CHLORIDE, PRESERVATIVE FREE 10 ML: 5 INJECTION INTRAVENOUS at 09:16

## 2025-07-08 RX ADMIN — PANCRELIPASE LIPASE, PANCRELIPASE PROTEASE, PANCRELIPASE AMYLASE 60000 UNITS: 20000; 63000; 84000 CAPSULE, DELAYED RELEASE ORAL at 16:38

## 2025-07-08 RX ADMIN — LACTULOSE 30 G: 10 SOLUTION ORAL at 13:48

## 2025-07-08 RX ADMIN — Medication 1000 MCG: at 09:08

## 2025-07-08 ASSESSMENT — PAIN - FUNCTIONAL ASSESSMENT: PAIN_FUNCTIONAL_ASSESSMENT: PREVENTS OR INTERFERES SOME ACTIVE ACTIVITIES AND ADLS

## 2025-07-08 ASSESSMENT — PAIN DESCRIPTION - LOCATION: LOCATION: SCROTUM

## 2025-07-08 ASSESSMENT — PAIN SCALES - GENERAL
PAINLEVEL_OUTOF10: 0
PAINLEVEL_OUTOF10: 6

## 2025-07-08 ASSESSMENT — PAIN DESCRIPTION - PAIN TYPE: TYPE: CHRONIC PAIN

## 2025-07-08 ASSESSMENT — PAIN DESCRIPTION - ORIENTATION: ORIENTATION: RIGHT

## 2025-07-08 ASSESSMENT — PAIN DESCRIPTION - DESCRIPTORS: DESCRIPTORS: SHARP

## 2025-07-08 NOTE — FLOWSHEET NOTE
07/08/25 0727   Vital Signs   Temp 98.2 °F (36.8 °C)   Temp Source Oral   Pulse 76   Heart Rate Source Monitor   Respirations 17   BP (!) 105/54   MAP (Calculated) 71   BP Location Left upper arm   BP Method Automatic   Patient Position Semi fowlers   Oxygen Therapy   SpO2 98 %   O2 Device None (Room air)     Shift assessment completed see flow sheet. Patient in bed alert and oriented x4. Patient on RA, showing no signs of distress. Zenpep give this morning before breakfast, nursing students planning to give morning medications. Patient has no other needs at this time. Standard safety measures in place.

## 2025-07-08 NOTE — PROGRESS NOTES
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   On chemo     Patient received 1 unit of PRBC transfusion earlier this admission, globin down to 6.9 today and 1 more unit of PRBC ordered.  Had episode of shortness of breath the next day after PRBC improved after Lasix.  GI has signed off this admission and they are recommending outpatient follow-up for scope.    Subjective:  Mr. Ceja seen  Patient is feeling better today.  Small BM with suppository yesterday      Objective:   Patient Vitals for the past 4 hrs:   BP Temp Temp src Pulse Resp SpO2   07/08/25 0727 (!) 105/54 98.2 °F (36.8 °C) Oral 76 17 98 %   07/08/25 0722 -- -- -- -- -- 96 %   07/08/25 0600 -- 99.9 °F (37.7 °C) Oral -- -- --          Intake/Output Summary (Last 24 hours) at 7/8/2025 0843  Last data filed at 7/8/2025 0328  Gross per 24 hour   Intake --   Output 650 ml   Net -650 ml       Physical Exam:    Gen: No  distress.  Awake and well-oriented  Alert. +Chronically ill appearing;  elderly male.  Eyes: PERRL. No sclera icterus. No conjunctival injection.   ENT: No discharge. Pharynx clear.   Neck: No JVD.  Trachea midline.  Resp: No  accessory muscle use. No wheezes, rales or rhonchi   +right chest wall port   CV: Regular rate. Regular rhythm. No murmur.  No rub.    Leg edema - mild   Peripheral Pulses: +2 palpable, equal bilaterally   GI:  Normal bowel sounds.+Mild distension and mid abdominal tenderness to palpation   +2 biliary drains present   Skin: Warm and dry. No nodule on exposed extremities. No rash on exposed extremities.   M/S: No cyanosis. No joint deformity. No clubbing.   Neuro: Awake. Grossly nonfocal    Psych: Oriented x 3.        Medications:  albuterol sulfate HFA, 2 puff, 4x Daily RT  lactulose, 20 g, BID  vitamin B-12, 1,000 mcg, Daily  phytonadione, 10 mg, Daily  insulin

## 2025-07-08 NOTE — PROGRESS NOTES
PROGRESS NOTE  S:74 yrs Patient  admitted on 7/2/2025 with Acute anemia [D64.9] .  Today, he denies having bowel movement since Sunday. No further signs of bleeding noted. He denies working with therapy.     Exam:   Vitals:    07/08/25 0727   BP: (!) 105/54   Pulse: 76   Resp: 17   Temp: 98.2 °F (36.8 °C)   SpO2: 98%   Generalized: alert, no acute distress  HEENT: sclera clear, anicteric  Neck supple, trachea midline  Heart RR  Abdomen: soft, NT, +biliary drains, ND  Extremities: BLE nonpitting edema    Medications: Reviewed    Labs:  CBC:   Recent Labs     07/07/25  0335 07/07/25  0930 07/07/25  1538 07/07/25  2100 07/08/25  0540   WBC 3.4*  --   --   --   --    HGB 7.6*   < > 7.8* 7.1* 7.3*   HCT 22.4*   < > 23.4* 22.5* 22.0*   MCV 83.2  --   --   --   --    *  --   --   --   --     < > = values in this interval not displayed.     Assessment   73 yo male with pmx of DM, HTN, Crohn's disease, GERD, BPH, CCY, and stage IV cholangiocarcinoma s/p Whipple c/b anastomotic stricture s/p biliary drains admitted with anemia and hematochezia. CT imaging suggestive of stercoral colitis, likely secondary to severe constipation.      Plan  Continue supportive care  Trend H&H  Monitor for signs of bleeding  Monitor and correct coagulopathy   PPI BID  Regular, low fiber diet  Continue miralax BID and lactulose TID  Continue to hold Tarceva per Oncology  Plans to follow up with Dr. Beltran upon discharge   Will need outpatient colonoscopy with Dr. Dannie Laughlin, APRN - CNP  10:36 AM 7/8/2025

## 2025-07-08 NOTE — PROGRESS NOTES
K is low on labs, prn replacements available. Clinical instructor and student at bedside, replacement gave to instructor who states she will administer to patient.

## 2025-07-08 NOTE — PROGRESS NOTES
AM medications given, see MAR. Patient tolerated well. Bilateral biliiary drains flushed. Pt tolerated well. Pt provided with warm blanket. Bed locked and in lowest position, call light within reach and side rails up x2.

## 2025-07-08 NOTE — PROGRESS NOTES
Hand off report given to  PRINCE Hu.   Patient is stable showing no signs of distress and has no current needs at this time.   Call light is in reach and bed is in lowest position.    Care is transferred at this time.

## 2025-07-08 NOTE — PLAN OF CARE
Problem: Hematologic - Adult  Goal: Maintains hematologic stability  7/8/2025 1014 by Zunilda Jacobson, RN  Outcome: Progressing  7/7/2025 2230 by Mayte Dominguez, RN  Outcome: Progressing

## 2025-07-08 NOTE — PLAN OF CARE
Problem: Chronic Conditions and Co-morbidities  Goal: Patient's chronic conditions and co-morbidity symptoms are monitored and maintained or improved  7/7/2025 2230 by Mayte Dominguez RN  Outcome: Progressing  7/7/2025 1113 by Aarti Toussaint RN  Outcome: Progressing     Problem: Discharge Planning  Goal: Discharge to home or other facility with appropriate resources  7/7/2025 2230 by Mayte Dominguez RN  Outcome: Progressing  7/7/2025 1113 by Aarti Toussaint RN  Outcome: Progressing     Problem: Skin/Tissue Integrity  Goal: Skin integrity remains intact  Description: 1.  Monitor for areas of redness and/or skin breakdown  2.  Assess vascular access sites hourly  3.  Every 4-6 hours minimum:  Change oxygen saturation probe site  4.  Every 4-6 hours:  If on nasal continuous positive airway pressure, respiratory therapy assess nares and determine need for appliance change or resting period  7/7/2025 2230 by Mayte Dominguez RN  Outcome: Progressing  7/7/2025 1113 by Aarti Toussaint RN  Outcome: Progressing     Problem: Safety - Adult  Goal: Free from fall injury  7/7/2025 2230 by Mayte Dominguez RN  Outcome: Progressing  7/7/2025 1113 by Aarti Toussaint RN  Outcome: Progressing     Problem: Pain  Goal: Verbalizes/displays adequate comfort level or baseline comfort level  7/7/2025 2230 by Mayte Dominguez RN  Outcome: Progressing  7/7/2025 1113 by Aarti Toussaint RN  Outcome: Progressing     Problem: Skin/Tissue Integrity - Adult  Goal: Skin integrity remains intact  Description: 1.  Monitor for areas of redness and/or skin breakdown  2.  Assess vascular access sites hourly  3.  Every 4-6 hours minimum:  Change oxygen saturation probe site  4.  Every 4-6 hours:  If on nasal continuous positive airway pressure, respiratory therapy assess nares and determine need for appliance change or resting period  7/7/2025 2230 by Mayte Dominguez RN  Outcome: Progressing  7/7/2025 1113 by Aarti Toussaint

## 2025-07-08 NOTE — PROGRESS NOTES
Speech Language Pathology  Attempt Note     Name: Sharath Ceja  : 1950  Medical Diagnosis: Acute anemia [D64.9]      SLP attempted to see pt for dysphagia tx. Per chart review, pt placed on regular diet by GI earlier this date- RN verified. However, when SLP arrived for tx pt had just completed lunch meal and was reclined in bed. Pt declined further intake with SLP and declined tx in general 2/2 fatigue. Pt requesting SLP f/u 25 instead. SLP to re-attempt as pt's condition and schedule allows. RN aware. No charges.    Thank you,    Angelica Pate M.A. [facility]-SL[facility] #[id number]  Speech-Language Pathologist

## 2025-07-09 ENCOUNTER — APPOINTMENT (OUTPATIENT)
Dept: GENERAL RADIOLOGY | Age: 75
DRG: 811 | End: 2025-07-09
Payer: MEDICARE

## 2025-07-09 DIAGNOSIS — Z79.4 TYPE 2 DIABETES MELLITUS WITHOUT COMPLICATION, WITH LONG-TERM CURRENT USE OF INSULIN (HCC): ICD-10-CM

## 2025-07-09 DIAGNOSIS — E11.9 TYPE 2 DIABETES MELLITUS WITHOUT COMPLICATION, WITH LONG-TERM CURRENT USE OF INSULIN (HCC): ICD-10-CM

## 2025-07-09 PROBLEM — K52.89 STERCORAL COLITIS: Status: ACTIVE | Noted: 2025-07-09

## 2025-07-09 PROBLEM — R50.9 FEVER: Status: ACTIVE | Noted: 2025-07-09

## 2025-07-09 LAB
ALBUMIN SERPL-MCNC: 2.4 G/DL (ref 3.4–5)
ALP SERPL-CCNC: 241 U/L (ref 40–129)
ALT SERPL-CCNC: 16 U/L (ref 10–40)
ANION GAP SERPL CALCULATED.3IONS-SCNC: 13 MMOL/L (ref 3–16)
AST SERPL-CCNC: 21 U/L (ref 15–37)
BASOPHILS # BLD: 0 K/UL (ref 0–0.2)
BASOPHILS NFR BLD: 1.2 %
BILIRUB DIRECT SERPL-MCNC: 2.3 MG/DL (ref 0–0.3)
BILIRUB INDIRECT SERPL-MCNC: 0.6 MG/DL (ref 0–1)
BILIRUB SERPL-MCNC: 2.9 MG/DL (ref 0–1)
BUN SERPL-MCNC: 5 MG/DL (ref 7–20)
CALCIUM SERPL-MCNC: 7.7 MG/DL (ref 8.3–10.6)
CHLORIDE SERPL-SCNC: 99 MMOL/L (ref 99–110)
CO2 SERPL-SCNC: 20 MMOL/L (ref 21–32)
CREAT SERPL-MCNC: 0.4 MG/DL (ref 0.8–1.3)
DEPRECATED RDW RBC AUTO: 17.6 % (ref 12.4–15.4)
EOSINOPHIL # BLD: 0 K/UL (ref 0–0.6)
EOSINOPHIL NFR BLD: 0.1 %
GFR SERPLBLD CREATININE-BSD FMLA CKD-EPI: >90 ML/MIN/{1.73_M2}
GLUCOSE BLD-MCNC: 169 MG/DL (ref 70–99)
GLUCOSE BLD-MCNC: 170 MG/DL (ref 70–99)
GLUCOSE BLD-MCNC: 204 MG/DL (ref 70–99)
GLUCOSE BLD-MCNC: 208 MG/DL (ref 70–99)
GLUCOSE BLD-MCNC: 288 MG/DL (ref 70–99)
GLUCOSE SERPL-MCNC: 147 MG/DL (ref 70–99)
HCT VFR BLD AUTO: 22.6 % (ref 40.5–52.5)
HCT VFR BLD AUTO: 27.7 % (ref 40.5–52.5)
HGB BLD-MCNC: 7.5 G/DL (ref 13.5–17.5)
HGB BLD-MCNC: 9 G/DL (ref 13.5–17.5)
LACTATE BLDV-SCNC: 1.2 MMOL/L (ref 0.4–2)
LYMPHOCYTES # BLD: 0.1 K/UL (ref 1–5.1)
LYMPHOCYTES NFR BLD: 3.1 %
MCH RBC QN AUTO: 28.6 PG (ref 26–34)
MCHC RBC AUTO-ENTMCNC: 33.2 G/DL (ref 31–36)
MCV RBC AUTO: 86.2 FL (ref 80–100)
MONOCYTES # BLD: 0.1 K/UL (ref 0–1.3)
MONOCYTES NFR BLD: 3.7 %
NEUTROPHILS # BLD: 3.4 K/UL (ref 1.7–7.7)
NEUTROPHILS NFR BLD: 91.9 %
PERFORMED ON: ABNORMAL
PLATELET # BLD AUTO: 114 K/UL (ref 135–450)
PMV BLD AUTO: 7.6 FL (ref 5–10.5)
POTASSIUM SERPL-SCNC: 4.8 MMOL/L (ref 3.5–5.1)
PROCALCITONIN SERPL IA-MCNC: 0.53 NG/ML (ref 0–0.15)
PROT SERPL-MCNC: 5.3 G/DL (ref 6.4–8.2)
RBC # BLD AUTO: 2.63 M/UL (ref 4.2–5.9)
SODIUM SERPL-SCNC: 132 MMOL/L (ref 136–145)
WBC # BLD AUTO: 3.6 K/UL (ref 4–11)

## 2025-07-09 PROCEDURE — 2700000000 HC OXYGEN THERAPY PER DAY

## 2025-07-09 PROCEDURE — 36415 COLL VENOUS BLD VENIPUNCTURE: CPT

## 2025-07-09 PROCEDURE — 97530 THERAPEUTIC ACTIVITIES: CPT

## 2025-07-09 PROCEDURE — 94761 N-INVAS EAR/PLS OXIMETRY MLT: CPT

## 2025-07-09 PROCEDURE — 6370000000 HC RX 637 (ALT 250 FOR IP): Performed by: INTERNAL MEDICINE

## 2025-07-09 PROCEDURE — 6360000002 HC RX W HCPCS: Performed by: STUDENT IN AN ORGANIZED HEALTH CARE EDUCATION/TRAINING PROGRAM

## 2025-07-09 PROCEDURE — 6370000000 HC RX 637 (ALT 250 FOR IP)

## 2025-07-09 PROCEDURE — 6360000002 HC RX W HCPCS: Performed by: INTERNAL MEDICINE

## 2025-07-09 PROCEDURE — 2060000000 HC ICU INTERMEDIATE R&B

## 2025-07-09 PROCEDURE — 87186 SC STD MICRODIL/AGAR DIL: CPT

## 2025-07-09 PROCEDURE — 97535 SELF CARE MNGMENT TRAINING: CPT

## 2025-07-09 PROCEDURE — 2580000003 HC RX 258: Performed by: INTERNAL MEDICINE

## 2025-07-09 PROCEDURE — 87040 BLOOD CULTURE FOR BACTERIA: CPT

## 2025-07-09 PROCEDURE — 94640 AIRWAY INHALATION TREATMENT: CPT

## 2025-07-09 PROCEDURE — 85014 HEMATOCRIT: CPT

## 2025-07-09 PROCEDURE — 99233 SBSQ HOSP IP/OBS HIGH 50: CPT | Performed by: INTERNAL MEDICINE

## 2025-07-09 PROCEDURE — 80076 HEPATIC FUNCTION PANEL: CPT

## 2025-07-09 PROCEDURE — 84145 PROCALCITONIN (PCT): CPT

## 2025-07-09 PROCEDURE — 85025 COMPLETE CBC W/AUTO DIFF WBC: CPT

## 2025-07-09 PROCEDURE — 99223 1ST HOSP IP/OBS HIGH 75: CPT | Performed by: INTERNAL MEDICINE

## 2025-07-09 PROCEDURE — 97162 PT EVAL MOD COMPLEX 30 MIN: CPT

## 2025-07-09 PROCEDURE — 74018 RADEX ABDOMEN 1 VIEW: CPT

## 2025-07-09 PROCEDURE — 6370000000 HC RX 637 (ALT 250 FOR IP): Performed by: STUDENT IN AN ORGANIZED HEALTH CARE EDUCATION/TRAINING PROGRAM

## 2025-07-09 PROCEDURE — 2500000003 HC RX 250 WO HCPCS: Performed by: STUDENT IN AN ORGANIZED HEALTH CARE EDUCATION/TRAINING PROGRAM

## 2025-07-09 PROCEDURE — 80048 BASIC METABOLIC PNL TOTAL CA: CPT

## 2025-07-09 PROCEDURE — 92526 ORAL FUNCTION THERAPY: CPT

## 2025-07-09 PROCEDURE — 97166 OT EVAL MOD COMPLEX 45 MIN: CPT

## 2025-07-09 PROCEDURE — 83605 ASSAY OF LACTIC ACID: CPT

## 2025-07-09 PROCEDURE — 85018 HEMOGLOBIN: CPT

## 2025-07-09 PROCEDURE — 87150 DNA/RNA AMPLIFIED PROBE: CPT

## 2025-07-09 RX ORDER — INSULIN HUMAN 100 [IU]/ML
INJECTION, SUSPENSION SUBCUTANEOUS
Qty: 10 ML | Refills: 5 | Status: SHIPPED | OUTPATIENT
Start: 2025-07-09

## 2025-07-09 RX ORDER — ALBUTEROL SULFATE 90 UG/1
2 INHALANT RESPIRATORY (INHALATION) EVERY 4 HOURS PRN
Status: DISCONTINUED | OUTPATIENT
Start: 2025-07-09 | End: 2025-07-14 | Stop reason: HOSPADM

## 2025-07-09 RX ADMIN — SODIUM CHLORIDE, PRESERVATIVE FREE 10 ML: 5 INJECTION INTRAVENOUS at 20:49

## 2025-07-09 RX ADMIN — FINASTERIDE 5 MG: 5 TABLET, FILM COATED ORAL at 16:50

## 2025-07-09 RX ADMIN — LACTULOSE 30 G: 10 SOLUTION ORAL at 20:46

## 2025-07-09 RX ADMIN — ALBUTEROL SULFATE 2 PUFF: 90 AEROSOL, METERED RESPIRATORY (INHALATION) at 23:48

## 2025-07-09 RX ADMIN — Medication 1000 MCG: at 08:34

## 2025-07-09 RX ADMIN — LACTULOSE 30 G: 10 SOLUTION ORAL at 08:34

## 2025-07-09 RX ADMIN — PANTOPRAZOLE SODIUM 40 MG: 40 INJECTION, POWDER, FOR SOLUTION INTRAVENOUS at 20:46

## 2025-07-09 RX ADMIN — CEFEPIME 2000 MG: 2 INJECTION, POWDER, FOR SOLUTION INTRAVENOUS at 08:33

## 2025-07-09 RX ADMIN — PANCRELIPASE LIPASE, PANCRELIPASE PROTEASE, PANCRELIPASE AMYLASE 15000 UNITS: 15000; 47000; 63000 CAPSULE, DELAYED RELEASE ORAL at 12:13

## 2025-07-09 RX ADMIN — POLYETHYLENE GLYCOL (3350) 17 G: 17 POWDER, FOR SOLUTION ORAL at 08:34

## 2025-07-09 RX ADMIN — INSULIN LISPRO 2 UNITS: 100 INJECTION, SOLUTION INTRAVENOUS; SUBCUTANEOUS at 16:53

## 2025-07-09 RX ADMIN — ALBUTEROL SULFATE 2 PUFF: 90 AEROSOL, METERED RESPIRATORY (INHALATION) at 11:45

## 2025-07-09 RX ADMIN — INSULIN LISPRO 1 UNITS: 100 INJECTION, SOLUTION INTRAVENOUS; SUBCUTANEOUS at 12:22

## 2025-07-09 RX ADMIN — PHYTONADIONE 10 MG: 5 TABLET ORAL at 08:34

## 2025-07-09 RX ADMIN — PANCRELIPASE LIPASE, PANCRELIPASE PROTEASE, PANCRELIPASE AMYLASE 60000 UNITS: 20000; 63000; 84000 CAPSULE, DELAYED RELEASE ORAL at 08:28

## 2025-07-09 RX ADMIN — ACETAMINOPHEN 500 MG: 500 TABLET ORAL at 03:49

## 2025-07-09 RX ADMIN — Medication 3 MG: at 20:49

## 2025-07-09 RX ADMIN — ACETAMINOPHEN 500 MG: 500 TABLET ORAL at 20:49

## 2025-07-09 RX ADMIN — ALBUTEROL SULFATE 2 PUFF: 90 AEROSOL, METERED RESPIRATORY (INHALATION) at 15:19

## 2025-07-09 RX ADMIN — LACTULOSE 30 G: 10 SOLUTION ORAL at 15:17

## 2025-07-09 RX ADMIN — SODIUM CHLORIDE, PRESERVATIVE FREE 10 ML: 5 INJECTION INTRAVENOUS at 08:34

## 2025-07-09 RX ADMIN — ALLOPURINOL 300 MG: 300 TABLET ORAL at 08:34

## 2025-07-09 RX ADMIN — PANCRELIPASE LIPASE, PANCRELIPASE PROTEASE, PANCRELIPASE AMYLASE 60000 UNITS: 20000; 63000; 84000 CAPSULE, DELAYED RELEASE ORAL at 16:54

## 2025-07-09 RX ADMIN — PIPERACILLIN AND TAZOBACTAM 4500 MG: 4; .5 INJECTION, POWDER, LYOPHILIZED, FOR SOLUTION INTRAVENOUS at 12:22

## 2025-07-09 RX ADMIN — OXYCODONE 5 MG: 5 TABLET ORAL at 03:42

## 2025-07-09 RX ADMIN — ALBUTEROL SULFATE 2 PUFF: 90 AEROSOL, METERED RESPIRATORY (INHALATION) at 07:34

## 2025-07-09 RX ADMIN — INSULIN LISPRO 1 UNITS: 100 INJECTION, SOLUTION INTRAVENOUS; SUBCUTANEOUS at 23:59

## 2025-07-09 RX ADMIN — ERGOCALCIFEROL 50000 UNITS: 1.25 CAPSULE ORAL at 22:01

## 2025-07-09 RX ADMIN — PIPERACILLIN AND TAZOBACTAM 3375 MG: 3; .375 INJECTION, POWDER, LYOPHILIZED, FOR SOLUTION INTRAVENOUS at 17:37

## 2025-07-09 RX ADMIN — ALBUTEROL SULFATE 2 PUFF: 90 AEROSOL, METERED RESPIRATORY (INHALATION) at 03:43

## 2025-07-09 RX ADMIN — PANTOPRAZOLE SODIUM 40 MG: 40 INJECTION, POWDER, FOR SOLUTION INTRAVENOUS at 08:34

## 2025-07-09 RX ADMIN — OXYCODONE 5 MG: 5 TABLET ORAL at 16:50

## 2025-07-09 RX ADMIN — PANCRELIPASE LIPASE, PANCRELIPASE PROTEASE, PANCRELIPASE AMYLASE 15000 UNITS: 15000; 47000; 63000 CAPSULE, DELAYED RELEASE ORAL at 16:54

## 2025-07-09 RX ADMIN — PANCRELIPASE LIPASE, PANCRELIPASE PROTEASE, PANCRELIPASE AMYLASE 15000 UNITS: 15000; 47000; 63000 CAPSULE, DELAYED RELEASE ORAL at 08:27

## 2025-07-09 RX ADMIN — ALBUTEROL SULFATE 2 PUFF: 90 AEROSOL, METERED RESPIRATORY (INHALATION) at 20:28

## 2025-07-09 RX ADMIN — PANCRELIPASE LIPASE, PANCRELIPASE PROTEASE, PANCRELIPASE AMYLASE 60000 UNITS: 20000; 63000; 84000 CAPSULE, DELAYED RELEASE ORAL at 12:13

## 2025-07-09 ASSESSMENT — PAIN DESCRIPTION - ORIENTATION: ORIENTATION: RIGHT

## 2025-07-09 ASSESSMENT — PAIN DESCRIPTION - DESCRIPTORS: DESCRIPTORS: SHARP

## 2025-07-09 ASSESSMENT — PAIN DESCRIPTION - LOCATION
LOCATION: OTHER (COMMENT)
LOCATION: BACK;OTHER (COMMENT)

## 2025-07-09 ASSESSMENT — PAIN SCALES - GENERAL
PAINLEVEL_OUTOF10: 6
PAINLEVEL_OUTOF10: 8

## 2025-07-09 NOTE — PLAN OF CARE
Problem: Chronic Conditions and Co-morbidities  Goal: Patient's chronic conditions and co-morbidity symptoms are monitored and maintained or improved  Outcome: Progressing     Problem: Discharge Planning  Goal: Discharge to home or other facility with appropriate resources  Outcome: Progressing     Problem: Skin/Tissue Integrity  Goal: Skin integrity remains intact  Description: 1.  Monitor for areas of redness and/or skin breakdown  2.  Assess vascular access sites hourly  3.  Every 4-6 hours minimum:  Change oxygen saturation probe site  4.  Every 4-6 hours:  If on nasal continuous positive airway pressure, respiratory therapy assess nares and determine need for appliance change or resting period  7/8/2025 2109 by Mayte Dominguez, RN  Outcome: Progressing  7/8/2025 1014 by Zunilda Jacobson RN  Outcome: Progressing  Flowsheets (Taken 7/8/2025 0806)  Skin Integrity Remains Intact: Monitor for areas of redness and/or skin breakdown     Problem: Safety - Adult  Goal: Free from fall injury  Outcome: Progressing     Problem: Pain  Goal: Verbalizes/displays adequate comfort level or baseline comfort level  Outcome: Progressing     Problem: Skin/Tissue Integrity - Adult  Goal: Skin integrity remains intact  Description: 1.  Monitor for areas of redness and/or skin breakdown  2.  Assess vascular access sites hourly  3.  Every 4-6 hours minimum:  Change oxygen saturation probe site  4.  Every 4-6 hours:  If on nasal continuous positive airway pressure, respiratory therapy assess nares and determine need for appliance change or resting period  7/8/2025 2109 by Mayte Dominguez, RN  Outcome: Progressing  7/8/2025 1014 by Zunilda Jacobson RN  Outcome: Progressing  Flowsheets (Taken 7/8/2025 0806)  Skin Integrity Remains Intact: Monitor for areas of redness and/or skin breakdown  Goal: Incisions, wounds, or drain sites healing without S/S of infection  Outcome: Progressing  Goal: Oral mucous membranes remain

## 2025-07-09 NOTE — CONSULTS
25  0747   WBC 3.4*  --  4.1  --   --  3.6*   RBC 2.69*  --  2.67*  --   --  2.63*   HGB 7.6*   < > 7.6*  7.5* 7.5* 9.0* 7.5*   HCT 22.4*   < > 22.9*  23.0* 22.9* 27.7* 22.6*   *  --  105*  --   --  114*   MCV 83.2  --  86.2  --   --  86.2   MCH 28.2  --  28.2  --   --  28.6   MCHC 33.9  --  32.7  --   --  33.2   RDW 17.3*  --  17.0*  --   --  17.6*    < > = values in this interval not displayed.      BMP:  Recent Labs     25  0335 25  1111 25  0337   * 126* 132*   K 3.2* 3.0* 4.8    96* 99   CO2 25 21 20*   BUN 6* 6* 5*   CREATININE 0.4* 0.4* 0.4*   CALCIUM 7.8* 7.6* 7.7*   GLUCOSE 135* 219* 147*      Sed Rate, Automated   Date Value Ref Range Status   2023 25 (H) 0 - 20 mm/Hr Final   2023 33 (H) 0 - 20 mm/Hr Final     CRP   Date Value Ref Range Status   2023 72.0 (H) 0.0 - 5.1 mg/L Final     Comment:     WR-CRP Reference range:  30D-199Y    <5.1  <30D        Not established    CRP is used in the detection and evaluation of infection,  tissue injury, inflammatory disorders, and associated  disease.  Increases in CRP values are non-specific and  should not be interpreted without a complete clinical  evaluation.   reference ranges have not been  established and values should be interpreted within clinical  context and with serial measurements, if clinically  appropriate.           Cultures:   UA neg   7/3 C diff neg    GI PCR panel neg    BC x2 NGTD      Radiology Review:  All pertinent images / reports were reviewed as a part of this visit.     KUB non-specific BGP     CTA ap   1. No evidence of active GI bleeding.  2. Status post prior Whipple procedure with right and left-sided biliary drain catheters in place and biliary ductal dilatation in the right lobe of the liver, similar to prior study.  3. Soft tissue infiltrating the region of the agustin hepatis with occlusion of the main portal vein and multiple varices within the mesentery,  similar to prior study.  4. Mild colonic wall thickening, possibly representing stercoral colitis or edematous changes related to underlying liver disease. Questionable circumferential wall thickening in the distal transverse and distal descending colon.  5. Large amount of well-formed stool again noted throughout the colon    Assessment:     Patient Active Problem List   Diagnosis    Gastroesophageal reflux disease    Palpitations    Jaundice    Transaminitis    Stricture of bile duct (HCC)    Hyponatremia    Hypokalemia    Crohn's disease (HCC)    Hypertension    Acute liver failure    Hyperbilirubinemia    Cholangitis (HCC)    Cholecystitis    Right upper quadrant abdominal pain    Right upper quadrant pain    Cholestatic hepatitis    Obstruction of biliary stent    Elevation of levels of liver transaminase levels    Adenocarcinoma (HCC)    Former smoker    Diarrhea    Upper gastrointestinal hemorrhage    Cholangiocarcinoma (HCC)    Debility    H/O Whipple procedure    Right upper quadrant abdominal abscess (HCC)    Type 2 diabetes mellitus with chronic kidney disease    Biliary obstruction (HCC)    Bilateral calf pain    Dark urine    History of cholangiocarcinoma    Hypomagnesemia    Generalized abdominal pain    Acquired absence of other specified parts of digestive tract    Vitamin D deficiency    Anemia    Type 2 diabetes mellitus without complications (HCC)    Elevated carcinoembryonic antigen    Loculated pleural effusion    Intra abdominal hemorrhage    Other specified carcinomas of liver (HCC)    Acute blood loss anemia    Orthostasis    Other ascites    GI bleed    Coagulopathy    Acute GI bleeding    Acute anemia    Biliary drain displacement    Dyspnea    Fever       History of stage IV intrahepatic cholangiocarcinoma, s/p Whipple, on Tarceva   2 biliary drains in place since ~11/2024   Crohn's disease   DM, anemia, GERD, gout    Admitted 7/2 with symptomatic anemia   Initial CT on admission suggested

## 2025-07-09 NOTE — PROGRESS NOTES
Hand off report given to  PRINCE Kim.   Patient is stable showing no signs of distress and has no current needs at this time.   Call light is in reach and bed is in lowest position.    Care is transferred at this time.

## 2025-07-09 NOTE — PROGRESS NOTES
Bilateral biliary tube dressings changed, no signs of drainage noted. New dressing applied, clean dry and intact.    Port dressing changed, transparent dressing with chg gel applied.    Patient tolerated well.

## 2025-07-09 NOTE — PROGRESS NOTES
Inpatient Occupational Therapy Evaluation & Treatment    Unit: PCU  Date:  7/9/2025  Patient Name:    Sharath Ceaj  Admitting diagnosis:  Acute anemia [D64.9]  Admit Date:  7/2/2025  Precautions/Restrictions/WB Status/ Lines/ Wounds/ Oxygen: Fall risk, Bed/chair alarm, Lines (IV, Port right, and biliary drains), Telemetry, and Continuous pulse oximetry    Pt seen for cotreatment this date due to patient safety, patient endurance, acute illness/injury, and limited functional status information    Treatment Time:  8617-2921  Treatment Number:  1  Timed Code Treatment Minutes: 38 minutes  Total Treatment Minutes:  48  minutes    Patient Goals for Therapy: none stated          Discharge Recommendations: Home with PRN assist, Home with initial 24/7 supervision, and Home OT  DME needs for discharge: Defer to facility       Therapy recommendations for staff:   Assist of 1 for ambulation with use of rolling walker (RW) and gait belt to/from chair  to/from bathroom    History of Present Illness: Per Dr. Wilson H&P 7/2/25:  \"74 y.o. male who presented to [facility] with reports of unusual stool color.  Patient has medical history of cholangiocarcinoma and diabetes mellitus was presented to the hospital due to reports of rust colored stool.  Patient reports over the past 6 days he noticed change in stool color that he describes as more orange in color.  He states also noticing looser stools with about 6 bowel movements a day.  He states similar event occurring during last hospitalization 3 weeks ago.  He is otherwise denying any abdominal pain or postprandial abdominal pain.  He is otherwise denying fever, chills, shortness of breath, chest pain, nausea or emesis.\"  Has received transfusions during hospital stay.     Preadmission Environment:   Pt. Lives                                              with spouse and 24/7 assist available   Home environment:                            mobile home/trailer,

## 2025-07-09 NOTE — PROGRESS NOTES
PROGRESS NOTE  S:74 yrs Patient  admitted on 7/2/2025 with Acute anemia [D64.9] .  Today, he reports feeling tired. He reports tolerating breakfast. He had bowel movement this morning. No signs of bleeding.     Exam:   Vitals:    07/09/25 0842   BP:    Pulse:    Resp:    Temp: 99.4 °F (37.4 °C)   SpO2:    Generalized: alert, no acute distress  HEENT: sclera clear, anicteric  Neck supple, trachea midline  Heart RR  Abdomen: soft, NT, +biliary drains, ND  Extremities: BLE nonpitting edema    Medications: Reviewed    Labs:  CBC:   Recent Labs     07/07/25  0335 07/07/25  0930 07/08/25  1111 07/08/25 2008 07/09/25  0337 07/09/25  0747   WBC 3.4*  --  4.1  --   --  3.6*   HGB 7.6*   < > 7.6*  7.5* 7.5* 9.0* 7.5*   HCT 22.4*   < > 22.9*  23.0* 22.9* 27.7* 22.6*   MCV 83.2  --  86.2  --   --  86.2   *  --  105*  --   --  114*    < > = values in this interval not displayed.     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 CT imaging suggestive of stercoral colitis, likely secondary to severe constipation.      Plan  Continue supportive care  Trend H&H  Monitor for signs of bleeding  PPI daily   Regular, low fiber diet  Continue miralax BID and lactulose TID  Check KUB today  Ambulate TID  Continue to hold Tarceva per Oncology  Will need outpatient colonoscopy with Dr. Dannie Laughlin, APRN - CNP  9:22 AM 7/9/2025

## 2025-07-09 NOTE — PROGRESS NOTES
Inpatient Physical Therapy Evaluation & Treatment    Unit: PCU  Date:  7/9/2025  Patient Name:    Sharath Ceja  Admitting diagnosis:  Acute anemia [D64.9]  Admit Date:  7/2/2025  Precautions/Restrictions/WB Status/ Lines/ Wounds/ Oxygen: Fall risk, Bed/chair alarm, Lines (IV and Drains (biliary drains)), Telemetry, and Continuous pulse oximetry      Pt seen for cotreatment this date due to patient safety, patient endurance, complexity of condition, and acute illness/injury    Treatment Time:  1051 - 1139  Treatment Number:  1   Timed Code Treatment Minutes: 38 minutes  Total Treatment Minutes:  48  minutes    Patient Stated Goals for Therapy: \" to get better \"          Discharge Recommendations: Home with continued 24 hr supervision and Home PT  DME needs for discharge: Needs Met       Therapy recommendation for EMS Transport: can transport by wheelchair    Therapy recommendations for staff:   Assist of 1 for ambulation with use of rolling walker (RW) and gait belt to/from chair  to/from bathroom  within room    History of Present Illness:   Per admission H&P by Dr. Wilson on 7/2/25:  \"74 y.o. male who presented to [facility] with reports of unusual stool color.  Patient has medical history of cholangiocarcinoma and diabetes mellitus was presented to the hospital due to reports of rust colored stool.  Patient reports over the past 6 days he noticed change in stool color that he describes as more orange in color.  He states also noticing looser stools with about 6 bowel movements a day.  He states similar event occurring during last hospitalization 3 weeks ago.  He is otherwise denying any abdominal pain or postprandial abdominal pain.  He is otherwise denying fever, chills, shortness of breath, chest pain, nausea or emesis. \"    Preadmission Environment:   Pt. Lives                                              with spouse and 24/7 assist available   Home environment:

## 2025-07-09 NOTE — PROGRESS NOTES
PM assessment completed. Scheduled medications given per MAR. VSS room air, A/O x4. Temp 100.9 oral tylenol given per MAR. Patient does not appear in distress and denies any needs at this time. Call light in reach, will monitor, bed alarm on.

## 2025-07-09 NOTE — PLAN OF CARE
Problem: Safety - Adult  Goal: Free from fall injury  7/8/2025 2109 by Mayte Dominguez, RN  Outcome: Progressing     Problem: Pain  Goal: Verbalizes/displays adequate comfort level or baseline comfort level  7/9/2025 1036 by Zunilda Jacobson, RN  Outcome: Progressing  7/8/2025 2109 by Mayte Dominguez, RN  Outcome: Progressing

## 2025-07-09 NOTE — PROGRESS NOTES
Speech Language Pathology  Swallowing Disorders and Dysphagia    Dysphagia Treatment/Follow-Up Note  Facility/Department: [facility] PCU TELEMETRY    Recommendations: SLP recommends to continue with IDDSI 7 Regular Solids; IDDSI 0 Thin Liquids; Meds PO as tolerated  Risk Management: upright for all intake, stay upright for at least 30 mins after intake, small bites/sips, oral care 2-3x/day to reduce adverse affects in the event of aspiration, increase physical mobility as able, general GERD precautions, general aspiration precautions, and hold PO and contact SLP if s/s of aspiration or worsening respiratory status develop.      NAME:Sharath Ceja  : 1950 (74 y.o.)   MRN: [id number]  ROOM: /0316-01  ADMISSION DATE: 2025  PATIENT DIAGNOSIS(ES): Acute anemia [D64.9]  Allergies   Allergen Reactions    Codeine      Pt states he took medication without food and had a reaction, states this gave him chest pain    Amoxicillin-Pot Clavulanate Other (See Comments)     Patient did not have a rxn to Augmentin, MD called and told him to stop taking it.  Has tolerated Zosyn.    DOES FINE WITH KEFLEX       DATE ONSET: 2025    Pain: The patient complains of pain in side 4/10       Current Diet: ADULT DIET; Regular; 1500 ml      Dysphagia Treatment and Impressions:  Subjective: Pt seen in room at bedside with RN (Zunilda) permission  Noteworthy events reported/Chart Review: RN reports pt is running a fever and now being treated with antibiotics but not for issues related to swallowing  Patient tolerance to current diet and treatment: Pt reports he has had no difficulty swallowing solids since advanced from clear liquids      Respiratory Status: Pt with SPO2% of 96 on RA  with RR of 16  Oral Care Status:    Oral care WFL  Oral Care Completed?   [x] Yes   [] No  Completed by SLP via pasted toothbrush    Liquid PO Trials:   IDDSI 0 Thin:  Assessed via straw: no anterior bolus loss , suspect functional A-P bolus    Short-term Goals  Timeframe for Short-term Goals: (5 days-7/9/2025)  Goal 1: Goal 1: The patient will tolerate repeat BSE when able for ongoing assessment 7/9/2025 : Goal met  Goal 2: Goal 2: The patient will tolerate recommended diet with no clinical s/s of aspiration 5/5 7/9/2025 : Goal met  Goal 3: Goal 3: The patient will recall/perform recommended compensatory strategies given min cues 7/9/2025 : Goal met.         Speech/Language/Cog Goals: N/A         Plan:    No further dysphagia treatment at this time as pt has met goals per plan of care.    Discharge Recommendations: No further SLP tx warranted at discharge    If pt discharges from hospital prior to Speech/Swallowing discharge, this note serves as tx and discharge summary.     Total Treatment Time / Charges     Time in Time out Total Time / units   Cognitive Tx         Speech Tx      Dysphagia Tx 0808 0824 16 min/1 unit     Signature:  Sarah Hoang M.A. SLP  Speech-Language Pathologist  [place] Lic #[id number]  [phone]

## 2025-07-09 NOTE — PROGRESS NOTES
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   On chemo     Patient received 1 unit of PRBC transfusion earlier this admission, globin down to 6.9 today and 1 more unit of PRBC ordered.  Had episode of shortness of breath the next day after PRBC improved after Lasix.  GI has signed off this admission and they are recommending outpatient follow-up for scope.    Subjective:  Mr. Ceja seen    Feels fatigued   High grade fevers  Denies cough  Mild abd pain      Objective:   Patient Vitals for the past 4 hrs:   BP Temp Temp src Pulse Resp SpO2   07/09/25 0842 -- 99.4 °F (37.4 °C) -- -- -- --   07/09/25 0744 (!) 111/51 100.4 °F (38 °C) Oral 96 19 97 %   07/09/25 0742 -- -- -- -- -- 93 %          Intake/Output Summary (Last 24 hours) at 7/9/2025 0857  Last data filed at 7/9/2025 0341  Gross per 24 hour   Intake 580 ml   Output 450 ml   Net 130 ml       Physical Exam:    Gen: No  distress.  Awake and well-oriented  Alert. +Chronically ill appearing;  elderly male.  Eyes: PERRL. No sclera icterus. No conjunctival injection.   ENT: No discharge. Pharynx clear.   Neck: No JVD.  Trachea midline.  Resp: No  accessory muscle use. No wheezes, rales or rhonchi   +right chest wall port   CV: Regular rate. Regular rhythm. No murmur.  No rub.    Leg edema - mild   Peripheral Pulses: +2 palpable, equal bilaterally   GI:  Normal bowel sounds.+Mild distension and mid abdominal tenderness to palpation   +2 biliary drains present   Skin: Warm and dry. No nodule on exposed extremities. No rash on exposed extremities.   M/S: No cyanosis. No joint deformity. No clubbing.   Neuro: Awake. Grossly nonfocal    Psych: Oriented x 3.        Medications:  cefepime, 2,000 mg, Once  cefepime, 2,000 mg, Q12H  lactulose, 30 g, TID  albuterol sulfate HFA, 2 puff, 4x Daily RT  vitamin B-12, 1,000 mcg,

## 2025-07-09 NOTE — PROGRESS NOTES
Blood pressure (!) 111/51, pulse 96, temperature 99.4 °F (37.4 °C), resp. rate 19, height 1.778 m (5' 10\"), weight 66.6 kg (146 lb 12.8 oz), SpO2 97%.    Shift assessment completed see flow sheet. Patient in bed alert and oriented x4. Patient on RA, patient is sleepy and weak but showing no signs of distress. MD made aware of fevers over night, morning labs ordered and collected. Iv abx started.  Morning medications given per order. Patient has no other needs at this time. Standard safety measures in place.

## 2025-07-10 ENCOUNTER — APPOINTMENT (OUTPATIENT)
Dept: ULTRASOUND IMAGING | Age: 75
DRG: 811 | End: 2025-07-10
Payer: MEDICARE

## 2025-07-10 PROBLEM — R50.9 FEBRILE ILLNESS: Status: ACTIVE | Noted: 2025-07-10

## 2025-07-10 PROBLEM — E43 SEVERE PROTEIN-CALORIE MALNUTRITION: Status: ACTIVE | Noted: 2025-07-10

## 2025-07-10 LAB
ALBUMIN SERPL-MCNC: 2.3 G/DL (ref 3.4–5)
ALP SERPL-CCNC: 196 U/L (ref 40–129)
ALT SERPL-CCNC: 15 U/L (ref 10–40)
AMORPH SED URNS QL MICRO: ABNORMAL /HPF
AST SERPL-CCNC: 19 U/L (ref 15–37)
BACTERIA URNS QL MICRO: ABNORMAL /HPF
BASOPHILS # BLD: 0 K/UL (ref 0–0.2)
BASOPHILS NFR BLD: 0.2 %
BILIRUB DIRECT SERPL-MCNC: 1.4 MG/DL (ref 0–0.3)
BILIRUB INDIRECT SERPL-MCNC: 0.4 MG/DL (ref 0–1)
BILIRUB SERPL-MCNC: 1.8 MG/DL (ref 0–1)
BILIRUB UR QL STRIP.AUTO: ABNORMAL
CLARITY UR: CLEAR
COLOR UR: ABNORMAL
DEPRECATED RDW RBC AUTO: 18.1 % (ref 12.4–15.4)
EOSINOPHIL # BLD: 0 K/UL (ref 0–0.6)
EOSINOPHIL NFR BLD: 0.7 %
EPI CELLS #/AREA URNS HPF: ABNORMAL /HPF (ref 0–5)
GLUCOSE BLD-MCNC: 183 MG/DL (ref 70–99)
GLUCOSE BLD-MCNC: 188 MG/DL (ref 70–99)
GLUCOSE BLD-MCNC: 209 MG/DL (ref 70–99)
GLUCOSE BLD-MCNC: 212 MG/DL (ref 70–99)
GLUCOSE BLD-MCNC: 218 MG/DL (ref 70–99)
GLUCOSE UR STRIP.AUTO-MCNC: NEGATIVE MG/DL
HCT VFR BLD AUTO: 34.4 % (ref 40.5–52.5)
HGB BLD-MCNC: 11.1 G/DL (ref 13.5–17.5)
HGB UR QL STRIP.AUTO: NEGATIVE
KETONES UR STRIP.AUTO-MCNC: ABNORMAL MG/DL
LEUKOCYTE ESTERASE UR QL STRIP.AUTO: NEGATIVE
LYMPHOCYTES # BLD: 0.2 K/UL (ref 1–5.1)
LYMPHOCYTES NFR BLD: 7.5 %
MCH RBC QN AUTO: 28 PG (ref 26–34)
MCHC RBC AUTO-ENTMCNC: 32.3 G/DL (ref 31–36)
MCV RBC AUTO: 86.6 FL (ref 80–100)
MONOCYTES # BLD: 0.2 K/UL (ref 0–1.3)
MONOCYTES NFR BLD: 8.2 %
MUCOUS THREADS #/AREA URNS LPF: ABNORMAL /LPF
NEUTROPHILS # BLD: 1.8 K/UL (ref 1.7–7.7)
NEUTROPHILS NFR BLD: 83.4 %
NITRITE UR QL STRIP.AUTO: NEGATIVE
PERFORMED ON: ABNORMAL
PH UR STRIP.AUTO: 6 [PH] (ref 5–8)
PLATELET # BLD AUTO: 69 K/UL (ref 135–450)
PMV BLD AUTO: 7.4 FL (ref 5–10.5)
PROT SERPL-MCNC: 5 G/DL (ref 6.4–8.2)
PROT UR STRIP.AUTO-MCNC: ABNORMAL MG/DL
RBC # BLD AUTO: 3.97 M/UL (ref 4.2–5.9)
RBC #/AREA URNS HPF: ABNORMAL /HPF (ref 0–4)
REPORT: NORMAL
SP GR UR STRIP.AUTO: 1.02 (ref 1–1.03)
UA DIPSTICK W REFLEX MICRO PNL UR: YES
URN SPEC COLLECT METH UR: ABNORMAL
UROBILINOGEN UR STRIP-ACNC: 0.2 E.U./DL
WBC # BLD AUTO: 2.2 K/UL (ref 4–11)
WBC #/AREA URNS HPF: ABNORMAL /HPF (ref 0–5)

## 2025-07-10 PROCEDURE — 81001 URINALYSIS AUTO W/SCOPE: CPT

## 2025-07-10 PROCEDURE — 2580000003 HC RX 258: Performed by: INTERNAL MEDICINE

## 2025-07-10 PROCEDURE — 2060000000 HC ICU INTERMEDIATE R&B

## 2025-07-10 PROCEDURE — 2500000003 HC RX 250 WO HCPCS: Performed by: STUDENT IN AN ORGANIZED HEALTH CARE EDUCATION/TRAINING PROGRAM

## 2025-07-10 PROCEDURE — 6370000000 HC RX 637 (ALT 250 FOR IP)

## 2025-07-10 PROCEDURE — 6360000002 HC RX W HCPCS: Performed by: STUDENT IN AN ORGANIZED HEALTH CARE EDUCATION/TRAINING PROGRAM

## 2025-07-10 PROCEDURE — 99232 SBSQ HOSP IP/OBS MODERATE 35: CPT | Performed by: INTERNAL MEDICINE

## 2025-07-10 PROCEDURE — 6370000000 HC RX 637 (ALT 250 FOR IP): Performed by: STUDENT IN AN ORGANIZED HEALTH CARE EDUCATION/TRAINING PROGRAM

## 2025-07-10 PROCEDURE — 6370000000 HC RX 637 (ALT 250 FOR IP): Performed by: INTERNAL MEDICINE

## 2025-07-10 PROCEDURE — 85025 COMPLETE CBC W/AUTO DIFF WBC: CPT

## 2025-07-10 PROCEDURE — 6360000002 HC RX W HCPCS: Performed by: INTERNAL MEDICINE

## 2025-07-10 PROCEDURE — 99233 SBSQ HOSP IP/OBS HIGH 50: CPT | Performed by: INTERNAL MEDICINE

## 2025-07-10 PROCEDURE — 80076 HEPATIC FUNCTION PANEL: CPT

## 2025-07-10 PROCEDURE — 94761 N-INVAS EAR/PLS OXIMETRY MLT: CPT

## 2025-07-10 PROCEDURE — 87086 URINE CULTURE/COLONY COUNT: CPT

## 2025-07-10 PROCEDURE — 94640 AIRWAY INHALATION TREATMENT: CPT

## 2025-07-10 PROCEDURE — 76705 ECHO EXAM OF ABDOMEN: CPT

## 2025-07-10 RX ADMIN — PANCRELIPASE LIPASE, PANCRELIPASE PROTEASE, PANCRELIPASE AMYLASE 15000 UNITS: 15000; 47000; 63000 CAPSULE, DELAYED RELEASE ORAL at 16:21

## 2025-07-10 RX ADMIN — SODIUM CHLORIDE, PRESERVATIVE FREE 10 ML: 5 INJECTION INTRAVENOUS at 20:57

## 2025-07-10 RX ADMIN — FINASTERIDE 5 MG: 5 TABLET, FILM COATED ORAL at 16:21

## 2025-07-10 RX ADMIN — INSULIN LISPRO 1 UNITS: 100 INJECTION, SOLUTION INTRAVENOUS; SUBCUTANEOUS at 16:23

## 2025-07-10 RX ADMIN — ALBUTEROL SULFATE 2 PUFF: 90 AEROSOL, METERED RESPIRATORY (INHALATION) at 11:17

## 2025-07-10 RX ADMIN — OXYCODONE 5 MG: 5 TABLET ORAL at 22:32

## 2025-07-10 RX ADMIN — PANCRELIPASE LIPASE, PANCRELIPASE PROTEASE, PANCRELIPASE AMYLASE 15000 UNITS: 15000; 47000; 63000 CAPSULE, DELAYED RELEASE ORAL at 11:25

## 2025-07-10 RX ADMIN — PANCRELIPASE LIPASE, PANCRELIPASE PROTEASE, PANCRELIPASE AMYLASE 60000 UNITS: 20000; 63000; 84000 CAPSULE, DELAYED RELEASE ORAL at 16:21

## 2025-07-10 RX ADMIN — ALLOPURINOL 300 MG: 300 TABLET ORAL at 08:59

## 2025-07-10 RX ADMIN — OXYCODONE 5 MG: 5 TABLET ORAL at 16:30

## 2025-07-10 RX ADMIN — PANCRELIPASE LIPASE, PANCRELIPASE PROTEASE, PANCRELIPASE AMYLASE 15000 UNITS: 15000; 47000; 63000 CAPSULE, DELAYED RELEASE ORAL at 07:26

## 2025-07-10 RX ADMIN — Medication 3 MG: at 20:56

## 2025-07-10 RX ADMIN — PANTOPRAZOLE SODIUM 40 MG: 40 INJECTION, POWDER, FOR SOLUTION INTRAVENOUS at 20:56

## 2025-07-10 RX ADMIN — LACTULOSE 30 G: 10 SOLUTION ORAL at 13:30

## 2025-07-10 RX ADMIN — PHYTONADIONE 10 MG: 5 TABLET ORAL at 08:59

## 2025-07-10 RX ADMIN — INSULIN LISPRO 1 UNITS: 100 INJECTION, SOLUTION INTRAVENOUS; SUBCUTANEOUS at 06:40

## 2025-07-10 RX ADMIN — PIPERACILLIN AND TAZOBACTAM 3375 MG: 3; .375 INJECTION, POWDER, LYOPHILIZED, FOR SOLUTION INTRAVENOUS at 17:18

## 2025-07-10 RX ADMIN — PIPERACILLIN AND TAZOBACTAM 3375 MG: 3; .375 INJECTION, POWDER, LYOPHILIZED, FOR SOLUTION INTRAVENOUS at 02:06

## 2025-07-10 RX ADMIN — LACTULOSE 30 G: 10 SOLUTION ORAL at 20:56

## 2025-07-10 RX ADMIN — PANCRELIPASE LIPASE, PANCRELIPASE PROTEASE, PANCRELIPASE AMYLASE 60000 UNITS: 20000; 63000; 84000 CAPSULE, DELAYED RELEASE ORAL at 11:25

## 2025-07-10 RX ADMIN — PANTOPRAZOLE SODIUM 40 MG: 40 INJECTION, POWDER, FOR SOLUTION INTRAVENOUS at 08:59

## 2025-07-10 RX ADMIN — ACETAMINOPHEN 500 MG: 500 TABLET ORAL at 20:56

## 2025-07-10 RX ADMIN — ALBUTEROL SULFATE 2 PUFF: 90 AEROSOL, METERED RESPIRATORY (INHALATION) at 21:04

## 2025-07-10 RX ADMIN — POLYETHYLENE GLYCOL (3350) 17 G: 17 POWDER, FOR SOLUTION ORAL at 08:59

## 2025-07-10 RX ADMIN — LACTULOSE 30 G: 10 SOLUTION ORAL at 07:26

## 2025-07-10 RX ADMIN — ALBUTEROL SULFATE 2 PUFF: 90 AEROSOL, METERED RESPIRATORY (INHALATION) at 15:09

## 2025-07-10 RX ADMIN — SODIUM CHLORIDE, PRESERVATIVE FREE 10 ML: 5 INJECTION INTRAVENOUS at 09:00

## 2025-07-10 RX ADMIN — INSULIN LISPRO 1 UNITS: 100 INJECTION, SOLUTION INTRAVENOUS; SUBCUTANEOUS at 11:24

## 2025-07-10 RX ADMIN — ALBUTEROL SULFATE 2 PUFF: 90 AEROSOL, METERED RESPIRATORY (INHALATION) at 07:37

## 2025-07-10 RX ADMIN — Medication 1000 MCG: at 08:59

## 2025-07-10 RX ADMIN — PANCRELIPASE LIPASE, PANCRELIPASE PROTEASE, PANCRELIPASE AMYLASE 60000 UNITS: 20000; 63000; 84000 CAPSULE, DELAYED RELEASE ORAL at 07:26

## 2025-07-10 RX ADMIN — PIPERACILLIN AND TAZOBACTAM 3375 MG: 3; .375 INJECTION, POWDER, LYOPHILIZED, FOR SOLUTION INTRAVENOUS at 09:11

## 2025-07-10 RX ADMIN — OXYCODONE 5 MG: 5 TABLET ORAL at 10:02

## 2025-07-10 RX ADMIN — ONDANSETRON 4 MG: 4 TABLET, ORALLY DISINTEGRATING ORAL at 19:37

## 2025-07-10 ASSESSMENT — PAIN DESCRIPTION - ORIENTATION
ORIENTATION: RIGHT
ORIENTATION: RIGHT;LOWER
ORIENTATION: RIGHT;LOWER
ORIENTATION: MID;RIGHT

## 2025-07-10 ASSESSMENT — PAIN SCALES - WONG BAKER
WONGBAKER_NUMERICALRESPONSE: HURTS A LITTLE BIT
WONGBAKER_NUMERICALRESPONSE: NO HURT

## 2025-07-10 ASSESSMENT — PAIN - FUNCTIONAL ASSESSMENT
PAIN_FUNCTIONAL_ASSESSMENT: PREVENTS OR INTERFERES SOME ACTIVE ACTIVITIES AND ADLS

## 2025-07-10 ASSESSMENT — PAIN DESCRIPTION - DESCRIPTORS
DESCRIPTORS: ACHING
DESCRIPTORS: ACHING;STABBING
DESCRIPTORS: SHARP
DESCRIPTORS: SPASM;STABBING

## 2025-07-10 ASSESSMENT — PAIN SCALES - GENERAL
PAINLEVEL_OUTOF10: 4
PAINLEVEL_OUTOF10: 6
PAINLEVEL_OUTOF10: 0
PAINLEVEL_OUTOF10: 2
PAINLEVEL_OUTOF10: 6
PAINLEVEL_OUTOF10: 4

## 2025-07-10 ASSESSMENT — PAIN DESCRIPTION - LOCATION
LOCATION: BACK
LOCATION: BACK;ABDOMEN
LOCATION: ABDOMEN;BACK
LOCATION: OTHER (COMMENT)

## 2025-07-10 NOTE — PROGRESS NOTES
Comprehensive Nutrition Assessment    Type and Reason for Visit:  Initial, LOS    Nutrition Recommendations/Plan:   Continue Regular Diet 1/2 Portions; 1500 ml FR  Added Ensure MAX STRAWBERRY/VANILLA ONLY : Do Not Count on FR     Malnutrition Assessment:  Malnutrition Status:  Severe malnutrition (07/10/25 6454)    Context:  Chronic Illness     Findings of the 6 clinical characteristics of malnutrition:  Energy Intake:  Mild decrease in energy intake  Weight Loss:  Greater than 7.5% over 3 months     Body Fat Loss:  Severe body fat loss Orbital, Buccal region   Muscle Mass Loss:  Severe muscle mass loss Temples (temporalis), Clavicles (pectoralis & deltoids)  Fluid Accumulation:  Mild Extremities   Strength:  Not Performed    Nutrition Assessment:    Pt. severely malnourished AEB noted weight loss > 7.5% in 3 months; noted muscle wasting and body fat loss.  At risk for further nutritional compromise r/t catabolic disease state; reduced appetite; anemia and altered nutrition related labs .  Will continue reg diet with 1/2 portions and ensure max TID.     Nutrition Related Findings:    pt is A & O x 4; h/o whipple r/t stage IV cholangiocarcinoma ;s/p biliary drains;  admitted with anemia and hematochezia; 2 Units PRBS r/t low H/H; Left Lower ext Edema; intakes declining requesting smaller portions and ensure HP;  wants more water however is currently on 1500 ml fr Na remisn Low; + brown BM today no blood; Wound Type: Surgical Incision       Current Nutrition Intake & Therapies:    Average Meal Intake: 51-75%, 26-50%, %  Average Supplements Intake: None Ordered  ADULT DIET; Regular; 1500 ml; SERVE 1/2 PORTIONS  ADULT ORAL NUTRITION SUPPLEMENT; Breakfast, Lunch, Dinner; Low Calorie/High Protein Oral Supplement    Anthropometric Measures:  Height: 177.8 cm (5' 10\")  Ideal Body Weight (IBW): 166 lbs (75 kg)    Admission Body Weight: 66.2 kg (146 lb)  Current Body Weight: 66.2 kg (146 lb), 88 % IBW. Weight

## 2025-07-10 NOTE — FLOWSHEET NOTE
07/09/25 2001   Vital Signs   Temp (!) 102.7 °F (39.3 °C)   Temp Source Oral   Pulse 88   Heart Rate Source Monitor   Respirations 18   BP (!) 102/56   MAP (Calculated) 71   BP Location Right upper arm   BP Method Automatic   Patient Position Semi fowlers   Oxygen Therapy   SpO2 95 %   O2 Device None (Room air)     Informed Dr. Hensley about the patient's current status.

## 2025-07-10 NOTE — PLAN OF CARE
Problem: Chronic Conditions and Co-morbidities  Goal: Patient's chronic conditions and co-morbidity symptoms are monitored and maintained or improved  Outcome: Progressing  Flowsheets (Taken 7/10/2025 0134)  Care Plan - Patient's Chronic Conditions and Co-Morbidity Symptoms are Monitored and Maintained or Improved:   Monitor and assess patient's chronic conditions and comorbid symptoms for stability, deterioration, or improvement   Collaborate with multidisciplinary team to address chronic and comorbid conditions and prevent exacerbation or deterioration     Problem: Discharge Planning  Goal: Discharge to home or other facility with appropriate resources  Outcome: Progressing  Flowsheets (Taken 7/10/2025 0134)  Discharge to home or other facility with appropriate resources:   Identify barriers to discharge with patient and caregiver   Arrange for needed discharge resources and transportation as appropriate     Problem: Skin/Tissue Integrity  Goal: Skin integrity remains intact  Description: 1.  Monitor for areas of redness and/or skin breakdown  2.  Assess vascular access sites hourly  3.  Every 4-6 hours minimum:  Change oxygen saturation probe site  4.  Every 4-6 hours:  If on nasal continuous positive airway pressure, respiratory therapy assess nares and determine need for appliance change or resting period  Outcome: Progressing  Flowsheets (Taken 7/10/2025 0134)  Skin Integrity Remains Intact:   Monitor for areas of redness and/or skin breakdown   Turn and reposition as indicated   Check visual cues for pain     Problem: Safety - Adult  Goal: Free from fall injury  Outcome: Progressing  Flowsheets (Taken 7/10/2025 0134)  Free From Fall Injury: Instruct family/caregiver on patient safety     Problem: Pain  Goal: Verbalizes/displays adequate comfort level or baseline comfort level  Outcome: Progressing  Flowsheets (Taken 7/10/2025 0134)  Verbalizes/displays adequate comfort level or baseline comfort level:

## 2025-07-10 NOTE — FLOWSHEET NOTE
07/10/25 1000   Vital Signs   Pulse 75   /74   MAP (Calculated) 84   Pain Assessment   Pain Assessment 0-10   Pain Level 6   Pain Location Back   Pain Orientation Mid;Right   Pain Descriptors Sharp   Functional Pain Assessment Prevents or interferes some active activities and ADLs     Prn pain meds given per mar fo c/o side-back pain. Spine has blanchable redness, foam/mepelex added.

## 2025-07-10 NOTE — PROGRESS NOTES
PROGRESS NOTE  S:74 yrs Patient  admitted on 7/2/2025 with Acute anemia [D64.9] .  Today, he reports feeling better today. He denies pain and nausea. He had brown bowel movement today. He had 2 soft bowel movements yesterday per nursing. No signs of bleeding. No fevers today.     Exam:   Vitals:    07/10/25 1123   BP:    Pulse:    Resp:    Temp:    SpO2: 95%   Generalized: alert, no acute distress  HEENT: sclera clear, anicteric  Neck supple, trachea midline  Heart RR  Abdomen: soft, NT, +biliary drains, ND  Extremities: BLE nonpitting edema    Medications: Reviewed    Labs:  CBC:   Recent Labs     07/08/25 1111 07/08/25 2008 07/09/25  0337 07/09/25  0747 07/10/25  0620   WBC 4.1  --   --  3.6* 2.2*   HGB 7.6*  7.5*   < > 9.0* 7.5* 11.1*   HCT 22.9*  23.0*   < > 27.7* 22.6* 34.4*   MCV 86.2  --   --  86.2 86.6   *  --   --  114* 69*    < > = values in this interval not displayed.     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 CT imaging suggestive of stercoral colitis, likely secondary to severe constipation. Hospital course c/b E.coli bacteremia.      Plan  Continue supportive care  Trend H&H  Monitor for signs of bleeding  PPI daily   Regular, low fiber diet  Miralax BID and lactulose TID  Continue Zosyn  Await further cultures   PT/OT  Continue to hold Tarceva per Oncology  ID following  Will need outpatient colonoscopy with Dr. Dannie Laughlin, ADILENE -

## 2025-07-10 NOTE — PROGRESS NOTES
INA Hensley that the patient BP at this time is 96/55, Hr at 92. Temp decreased to 99.9. He said to let him know if vitals get any worse.

## 2025-07-10 NOTE — PROGRESS NOTES
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   On chemo     Patient received 1 unit of PRBC transfusion earlier this admission, globin down to 6.9 today and 1 more unit of PRBC ordered.  Had episode of shortness of breath the next day after PRBC improved after Lasix.  GI has signed off this admission and they are recommending outpatient follow-up for scope.    Subjective:  Mr. Ceja seen    Fever last night   C/o RUQ pain      Objective:   Patient Vitals for the past 4 hrs:   BP Temp Temp src Pulse Resp SpO2   07/10/25 0745 -- -- -- -- -- 96 %   07/10/25 0711 (!) 105/58 98.2 °F (36.8 °C) Oral 72 18 98 %          Intake/Output Summary (Last 24 hours) at 7/10/2025 0900  Last data filed at 7/10/2025 0839  Gross per 24 hour   Intake 750 ml   Output 445 ml   Net 305 ml       Physical Exam:    Gen: No  distress.  Awake and well-oriented  Alert. +Chronically ill appearing;  elderly male.  Eyes: PERRL. No sclera icterus. No conjunctival injection.   ENT: No discharge. Pharynx clear.   Neck: No JVD.  Trachea midline.  Resp: No  accessory muscle use. No wheezes, rales or rhonchi   +right chest wall port   CV: Regular rate. Regular rhythm. No murmur.  No rub.    Leg edema - mild   Peripheral Pulses: +2 palpable, equal bilaterally   GI:  Normal bowel sounds.+Mild distension and mid abdominal tenderness to palpation   +2 biliary drains present   Skin: Warm and dry. No nodule on exposed extremities. No rash on exposed extremities.   M/S: No cyanosis. No joint deformity. No clubbing.   Neuro: Awake. Grossly nonfocal    Psych: Oriented x 3.        Medications:  piperacillin-tazobactam, 3,375 mg, Q8H  lactulose, 30 g, TID  albuterol sulfate HFA, 2 puff, 4x Daily RT  vitamin B-12, 1,000 mcg, Daily  phytonadione, 10 mg, Daily  insulin lispro, 0-4 Units, 4 times per day  [Held by  office with no answer, family is in agreement to hold chemo until oncology evaluation tomorrow . Seen by onc-> hold tarceva  - GI was consulted  -> plan .  Transfuse PRBC.  Diet advanced.  Monitor H&H.  Continue vitamin K.  Keep on PPI while in the hospital  If no recurrent bleed and hemoglobin stable-patient can be discharged with outpatient GI follow-up.  Will need to hold PPI on discharge as patient is planning on resuming Tarceva after discussing with his oncologist.  Continue miralax   Dulcolax suppository - had a small BM  Lactulose     Stage IV intrahepatic cholangiocarcinoma status post Whipple procedure in hepaticojejunostomy complicated by anatomic stricture  Status post biliary drains  Coagulopathy is on daily vitamin K   Portal vein occlusion   -oncology consulted - Seen by onc-> hold tarceva  -on vitamin K supplement at home, resumed  -on zenpep  -on oxycodone prn   - Plan patient to follow-up with his primary oncologist, Dr. Beltran on discharge from here.  Plan currently is for patient to resume Tarceva on discharge.  Need to monitor closely as there is always concern for GI bleed with this   RUQ US ordered     Fevers  E.coli bacteremia  Procal mildly elevated  Lactic normal.   Order Blood cultures- E. Coli   Start Cefepime   Check u/a,culture   ID consult   Abx changed to zosyn day # 2   Right UQ US     Left lower extremity edema   -venous US negative for DVT  -could be 2/2 to hypoalbuminemia     Crohns disease   -follows with GI   Leukopenia  -could be chemo induced   -monitor CBC     Vitamin D deficiency   -is on supplement 3x weekly     Type 2 diabetes with hyperglycemia  - Sliding scale insulin  - monitor and adjust as needed    Gout   -on allopurinol       DVT Prophylaxis: SCDs  Diet: ADULT DIET; Regular; 1500 ml  Code Status: Full Code      ARBEN MORENO MD  07/10/25  9:00 AM

## 2025-07-10 NOTE — PROGRESS NOTES
Infectious Disease Follow up Notes    CC :  fever, GN bacteremia      Antibiotics:  Zosyn 3.375 q8     Admit Date:   7/2/2025  Hospital Day: 9    Subjective:   High grade fever to 102.7 ~8pm yesterday, today AF  On RA   Infrequent stools   He says he is feeling better   Still with some pelvic and lower abd pain   No hematochezia     Objective:     Patient Vitals for the past 8 hrs:   BP Temp Temp src Pulse Resp SpO2   07/10/25 1002 -- -- -- -- 16 --   07/10/25 1000 104/74 -- -- 75 -- --   07/10/25 0745 -- -- -- -- -- 96 %   07/10/25 0711 (!) 105/58 98.2 °F (36.8 °C) Oral 72 18 98 %   07/10/25 0404 100/60 98.1 °F (36.7 °C) Oral 64 -- 99 %       EXAM:  General:  alert, conversant, looks better today    HEENT:  NCAT, PERRL, sclera anicteric   MMM, no thrush     NECK:  Supple       LUNGS:   non-labored breathing   Upper lobes clear chuy     CV:   RRR without murmur     ABD: Tender diffusely without R/G  BC present   2 biliary drains in place   EXT: No focal rash          LINE:    Port accessed, site ok        Scheduled Meds:   piperacillin-tazobactam  3,375 mg IntraVENous Q8H    lactulose  30 g Oral TID    albuterol sulfate HFA  2 puff Inhalation 4x Daily RT    vitamin B-12  1,000 mcg Oral Daily    phytonadione  10 mg Oral Daily    insulin lispro  0-4 Units SubCUTAneous 4 times per day    [Held by provider] erlotinib  100 mg Oral Daily    allopurinol  300 mg Oral Daily    finasteride  5 mg Oral QPM    vitamin D  50,000 Units Oral Once per day on Monday Wednesday Friday    polyethylene glycol  17 g Oral Daily    lipase-protease-amylase  60,000 Units Oral TID WC    And    lipase-protease-amylas  15,000 Units Oral TID WC    pantoprazole (PROTONIX) 40 mg in sodium chloride (PF) 0.9 % 10 mL injection  40 mg IntraVENous Q12H    sodium chloride flush  5-40 mL IntraVENous 2 times per day    melatonin  3 mg Oral Nightly       Continuous Infusions:

## 2025-07-10 NOTE — FLOWSHEET NOTE
07/10/25 0711   Vital Signs   Temp 98.2 °F (36.8 °C)   Temp Source Oral   Pulse 72   Heart Rate Source Monitor   Respirations 18   BP (!) 105/58   MAP (Calculated) 74   BP Location Right upper arm   BP Method Automatic   Patient Position Semi fowlers   Oxygen Therapy   SpO2 98 %   O2 Device None (Room air)     Shift assessment completed see flow sheet. Patient in bed alert and oriented x4. Patient on RA, showing no signs of distress. Patient going to ultrasound

## 2025-07-10 NOTE — PROGRESS NOTES
Patient is alert and oriented on bed, with telemetry, on room air, with bilateral abdominal tube draining. Assessment done. Patient is asking for nausea meds- verified order and given as ordered. Will continue to monitor patient.

## 2025-07-10 NOTE — PROGRESS NOTES
Patient alert and oriented resting in bed, watching tv, with family at the bedside. With telemetry. On room air. With Iv infusion running regulated via alaris. Assessment completed. Respiration easy, even and unlabored. Patient tolerated night meds well. Removed extra blankets to promote ventilation. Call light and bedside table within reach. Bed at lowest position, locked, side rails x2, bed alarm on. Needs attended.    negative...

## 2025-07-10 NOTE — PLAN OF CARE
Problem: Skin/Tissue Integrity  Goal: Skin integrity remains intact  Description: 1.  Monitor for areas of redness and/or skin breakdown  2.  Assess vascular access sites hourly  3.  Every 4-6 hours minimum:  Change oxygen saturation probe site  4.  Every 4-6 hours:  If on nasal continuous positive airway pressure, respiratory therapy assess nares and determine need for appliance change or resting period  7/10/2025 1049 by Zunilda Jacobson, RN  Outcome: Progressing  7/10/2025 0134 by Milton Reid, RN  Outcome: Progressing  Flowsheets (Taken 7/10/2025 0134)  Skin Integrity Remains Intact:   Monitor for areas of redness and/or skin breakdown   Turn and reposition as indicated   Check visual cues for pain

## 2025-07-10 NOTE — CARE COORDINATION
Reviewed chart, remains on IV. Plan to DC home with wife and [facility] when medically stable. CM following.

## 2025-07-11 PROBLEM — R78.81 E COLI BACTEREMIA: Status: ACTIVE | Noted: 2025-07-11

## 2025-07-11 PROBLEM — B96.20 E COLI BACTEREMIA: Status: ACTIVE | Noted: 2025-07-11

## 2025-07-11 LAB
ALBUMIN SERPL-MCNC: 2.4 G/DL (ref 3.4–5)
ALBUMIN/GLOB SERPL: 0.9 {RATIO} (ref 1.1–2.2)
ALP SERPL-CCNC: 222 U/L (ref 40–129)
ALT SERPL-CCNC: 16 U/L (ref 10–40)
ANION GAP SERPL CALCULATED.3IONS-SCNC: 11 MMOL/L (ref 3–16)
AST SERPL-CCNC: 19 U/L (ref 15–37)
BACTERIA UR CULT: NORMAL
BILIRUB SERPL-MCNC: 1.6 MG/DL (ref 0–1)
BUN SERPL-MCNC: 7 MG/DL (ref 7–20)
CALCIUM SERPL-MCNC: 7.7 MG/DL (ref 8.3–10.6)
CHLORIDE SERPL-SCNC: 100 MMOL/L (ref 99–110)
CO2 SERPL-SCNC: 24 MMOL/L (ref 21–32)
CREAT SERPL-MCNC: 0.3 MG/DL (ref 0.8–1.3)
DEPRECATED RDW RBC AUTO: 17.7 % (ref 12.4–15.4)
GFR SERPLBLD CREATININE-BSD FMLA CKD-EPI: >90 ML/MIN/{1.73_M2}
GLUCOSE BLD-MCNC: 159 MG/DL (ref 70–99)
GLUCOSE BLD-MCNC: 165 MG/DL (ref 70–99)
GLUCOSE BLD-MCNC: 188 MG/DL (ref 70–99)
GLUCOSE BLD-MCNC: 201 MG/DL (ref 70–99)
GLUCOSE BLD-MCNC: 209 MG/DL (ref 70–99)
GLUCOSE SERPL-MCNC: 141 MG/DL (ref 70–99)
HCT VFR BLD AUTO: 22.5 % (ref 40.5–52.5)
HGB BLD-MCNC: 7.4 G/DL (ref 13.5–17.5)
MCH RBC QN AUTO: 28.4 PG (ref 26–34)
MCHC RBC AUTO-ENTMCNC: 33.2 G/DL (ref 31–36)
MCV RBC AUTO: 85.6 FL (ref 80–100)
PERFORMED ON: ABNORMAL
PLATELET # BLD AUTO: 124 K/UL (ref 135–450)
PMV BLD AUTO: 8.1 FL (ref 5–10.5)
POTASSIUM SERPL-SCNC: 3.6 MMOL/L (ref 3.5–5.1)
PROT SERPL-MCNC: 5.2 G/DL (ref 6.4–8.2)
RBC # BLD AUTO: 2.62 M/UL (ref 4.2–5.9)
SODIUM SERPL-SCNC: 135 MMOL/L (ref 136–145)
WBC # BLD AUTO: 3.9 K/UL (ref 4–11)

## 2025-07-11 PROCEDURE — 6370000000 HC RX 637 (ALT 250 FOR IP): Performed by: INTERNAL MEDICINE

## 2025-07-11 PROCEDURE — 6370000000 HC RX 637 (ALT 250 FOR IP)

## 2025-07-11 PROCEDURE — 6370000000 HC RX 637 (ALT 250 FOR IP): Performed by: STUDENT IN AN ORGANIZED HEALTH CARE EDUCATION/TRAINING PROGRAM

## 2025-07-11 PROCEDURE — 80053 COMPREHEN METABOLIC PANEL: CPT

## 2025-07-11 PROCEDURE — 2580000003 HC RX 258: Performed by: INTERNAL MEDICINE

## 2025-07-11 PROCEDURE — 2500000003 HC RX 250 WO HCPCS: Performed by: STUDENT IN AN ORGANIZED HEALTH CARE EDUCATION/TRAINING PROGRAM

## 2025-07-11 PROCEDURE — 97530 THERAPEUTIC ACTIVITIES: CPT

## 2025-07-11 PROCEDURE — 94761 N-INVAS EAR/PLS OXIMETRY MLT: CPT

## 2025-07-11 PROCEDURE — 85027 COMPLETE CBC AUTOMATED: CPT

## 2025-07-11 PROCEDURE — 6360000002 HC RX W HCPCS: Performed by: INTERNAL MEDICINE

## 2025-07-11 PROCEDURE — 99232 SBSQ HOSP IP/OBS MODERATE 35: CPT | Performed by: INTERNAL MEDICINE

## 2025-07-11 PROCEDURE — 2060000000 HC ICU INTERMEDIATE R&B

## 2025-07-11 PROCEDURE — 6360000002 HC RX W HCPCS: Performed by: STUDENT IN AN ORGANIZED HEALTH CARE EDUCATION/TRAINING PROGRAM

## 2025-07-11 PROCEDURE — 97116 GAIT TRAINING THERAPY: CPT

## 2025-07-11 PROCEDURE — 94640 AIRWAY INHALATION TREATMENT: CPT

## 2025-07-11 RX ADMIN — PANCRELIPASE LIPASE, PANCRELIPASE PROTEASE, PANCRELIPASE AMYLASE 15000 UNITS: 15000; 47000; 63000 CAPSULE, DELAYED RELEASE ORAL at 12:01

## 2025-07-11 RX ADMIN — ALBUTEROL SULFATE 2 PUFF: 90 AEROSOL, METERED RESPIRATORY (INHALATION) at 21:07

## 2025-07-11 RX ADMIN — FINASTERIDE 5 MG: 5 TABLET, FILM COATED ORAL at 17:37

## 2025-07-11 RX ADMIN — ALBUTEROL SULFATE 2 PUFF: 90 AEROSOL, METERED RESPIRATORY (INHALATION) at 15:47

## 2025-07-11 RX ADMIN — ALBUTEROL SULFATE 2 PUFF: 90 AEROSOL, METERED RESPIRATORY (INHALATION) at 11:29

## 2025-07-11 RX ADMIN — PANCRELIPASE LIPASE, PANCRELIPASE PROTEASE, PANCRELIPASE AMYLASE 15000 UNITS: 15000; 47000; 63000 CAPSULE, DELAYED RELEASE ORAL at 08:23

## 2025-07-11 RX ADMIN — Medication 1000 MCG: at 08:14

## 2025-07-11 RX ADMIN — INSULIN LISPRO 1 UNITS: 100 INJECTION, SOLUTION INTRAVENOUS; SUBCUTANEOUS at 12:01

## 2025-07-11 RX ADMIN — Medication 3 MG: at 20:22

## 2025-07-11 RX ADMIN — PANTOPRAZOLE SODIUM 40 MG: 40 INJECTION, POWDER, FOR SOLUTION INTRAVENOUS at 08:15

## 2025-07-11 RX ADMIN — PANCRELIPASE LIPASE, PANCRELIPASE PROTEASE, PANCRELIPASE AMYLASE 60000 UNITS: 20000; 63000; 84000 CAPSULE, DELAYED RELEASE ORAL at 17:37

## 2025-07-11 RX ADMIN — PANCRELIPASE LIPASE, PANCRELIPASE PROTEASE, PANCRELIPASE AMYLASE 15000 UNITS: 15000; 47000; 63000 CAPSULE, DELAYED RELEASE ORAL at 17:37

## 2025-07-11 RX ADMIN — ONDANSETRON 4 MG: 4 TABLET, ORALLY DISINTEGRATING ORAL at 21:34

## 2025-07-11 RX ADMIN — ERGOCALCIFEROL 50000 UNITS: 1.25 CAPSULE ORAL at 20:38

## 2025-07-11 RX ADMIN — SODIUM CHLORIDE, PRESERVATIVE FREE 10 ML: 5 INJECTION INTRAVENOUS at 08:15

## 2025-07-11 RX ADMIN — PANCRELIPASE LIPASE, PANCRELIPASE PROTEASE, PANCRELIPASE AMYLASE 60000 UNITS: 20000; 63000; 84000 CAPSULE, DELAYED RELEASE ORAL at 12:01

## 2025-07-11 RX ADMIN — LACTULOSE 30 G: 10 SOLUTION ORAL at 14:03

## 2025-07-11 RX ADMIN — ALBUTEROL SULFATE 2 PUFF: 90 AEROSOL, METERED RESPIRATORY (INHALATION) at 17:49

## 2025-07-11 RX ADMIN — SODIUM CHLORIDE, PRESERVATIVE FREE 10 ML: 5 INJECTION INTRAVENOUS at 20:23

## 2025-07-11 RX ADMIN — ALLOPURINOL 300 MG: 300 TABLET ORAL at 08:14

## 2025-07-11 RX ADMIN — LACTULOSE 30 G: 10 SOLUTION ORAL at 08:15

## 2025-07-11 RX ADMIN — PIPERACILLIN AND TAZOBACTAM 3375 MG: 3; .375 INJECTION, POWDER, LYOPHILIZED, FOR SOLUTION INTRAVENOUS at 08:31

## 2025-07-11 RX ADMIN — PHYTONADIONE 10 MG: 5 TABLET ORAL at 08:23

## 2025-07-11 RX ADMIN — LACTULOSE 30 G: 10 SOLUTION ORAL at 20:23

## 2025-07-11 RX ADMIN — PANCRELIPASE LIPASE, PANCRELIPASE PROTEASE, PANCRELIPASE AMYLASE 60000 UNITS: 20000; 63000; 84000 CAPSULE, DELAYED RELEASE ORAL at 08:14

## 2025-07-11 RX ADMIN — PIPERACILLIN AND TAZOBACTAM 3375 MG: 3; .375 INJECTION, POWDER, LYOPHILIZED, FOR SOLUTION INTRAVENOUS at 17:41

## 2025-07-11 RX ADMIN — OXYCODONE 5 MG: 5 TABLET ORAL at 19:31

## 2025-07-11 RX ADMIN — INSULIN LISPRO 1 UNITS: 100 INJECTION, SOLUTION INTRAVENOUS; SUBCUTANEOUS at 23:45

## 2025-07-11 RX ADMIN — PANTOPRAZOLE SODIUM 40 MG: 40 INJECTION, POWDER, FOR SOLUTION INTRAVENOUS at 20:22

## 2025-07-11 RX ADMIN — POLYETHYLENE GLYCOL (3350) 17 G: 17 POWDER, FOR SOLUTION ORAL at 08:14

## 2025-07-11 RX ADMIN — ALBUTEROL SULFATE 2 PUFF: 90 AEROSOL, METERED RESPIRATORY (INHALATION) at 08:05

## 2025-07-11 RX ADMIN — PIPERACILLIN AND TAZOBACTAM 3375 MG: 3; .375 INJECTION, POWDER, LYOPHILIZED, FOR SOLUTION INTRAVENOUS at 01:56

## 2025-07-11 ASSESSMENT — PAIN DESCRIPTION - DESCRIPTORS
DESCRIPTORS: DISCOMFORT
DESCRIPTORS: ACHING;DISCOMFORT

## 2025-07-11 ASSESSMENT — PAIN DESCRIPTION - ORIENTATION
ORIENTATION: RIGHT
ORIENTATION: MID

## 2025-07-11 ASSESSMENT — PAIN SCALES - GENERAL
PAINLEVEL_OUTOF10: 0
PAINLEVEL_OUTOF10: 3
PAINLEVEL_OUTOF10: 0
PAINLEVEL_OUTOF10: 0

## 2025-07-11 ASSESSMENT — PAIN DESCRIPTION - ONSET: ONSET: ON-GOING

## 2025-07-11 ASSESSMENT — PAIN DESCRIPTION - LOCATION
LOCATION: ABDOMEN
LOCATION: ABDOMEN

## 2025-07-11 ASSESSMENT — PAIN DESCRIPTION - FREQUENCY: FREQUENCY: INTERMITTENT

## 2025-07-11 ASSESSMENT — PAIN - FUNCTIONAL ASSESSMENT: PAIN_FUNCTIONAL_ASSESSMENT: ACTIVITIES ARE NOT PREVENTED

## 2025-07-11 ASSESSMENT — PAIN DESCRIPTION - PAIN TYPE: TYPE: ACUTE PAIN

## 2025-07-11 NOTE — FLOWSHEET NOTE
07/10/25 2056   Vital Signs   /72   MAP (Calculated) 83   BP Location Right upper arm   BP Method Automatic   Patient Position Semi fowlers   Pain Assessment   Pain Assessment 0-10   Pain Level 4   Patient's Stated Pain Goal 0 - No pain   Pain Location Abdomen;Back   Pain Orientation Right;Lower   Pain Descriptors Aching   Functional Pain Assessment Prevents or interferes some active activities and ADLs     Patient alert and oriented resting in bed, watching tv. With telemetry. Patient tolerated night meds well. Call light and bedside table within reach. Bed at lowest position, locked, side rails x2, bed alarm on. Needs attended.

## 2025-07-11 NOTE — PROGRESS NOTES
Patient resting in bed and denies any needs at this time. Call light within reach with bed locked in the lowest position. Patients family at bedside denies any needs at this time, and all have been updated on the current plan of care with patients permission.

## 2025-07-11 NOTE — PROGRESS NOTES
Physician Progress Note      PATIENT:               JO IVERSON  CSN #:                  [id number]  :                       1950  ADMIT DATE:       2025 5:14 PM  DISCH DATE:  RESPONDING  PROVIDER #:        Melissa Toribio MD          QUERY TEXT:    Dear Dr. Toribio,    Please clarify the patient?s nutritional status:    The clinical indicators include:  75 yo m presents with recurrent anemia/poss Gi bleeds  -hx of intrahepatic cholangiocarcinoma  -noted documentation per Dietitian consult note dated 7/10 of, 'Severe   Malnutrition' and manifested by, decrease in energy intake less than required,   weight loss greater than 7.5% over 3 months, body rika loss to orbitals,   buccal region, muscle muss loss temples, clavicles.  Dietitian consult, continue regular diet, 1/2 portions, ensure max, monitor   pertinent labs, monitor I/Os, monitor patient's response to tx    Thank you, in advance,  Sole Sweet RN BSN CRCR  Clinical   [email or web address]  Options provided:  -- Protein calorie malnutrition severe  -- Other - I will add my own diagnosis  -- Disagree - Not applicable / Not valid  -- Disagree - Clinically unable to determine / Unknown  -- Refer to Clinical Documentation Reviewer    PROVIDER RESPONSE TEXT:    This patient has severe protein calorie malnutrition.    Query created by: Sole Sweet on 7/10/2025 3:39 PM      Electronically signed by:  Melissa Toribio MD 2025 11:44 AM

## 2025-07-11 NOTE — PROGRESS NOTES
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   On chemo     Patient received 1 unit of PRBC transfusion earlier this admission, globin down to 6.9 today and 1 more unit of PRBC ordered.  Had episode of shortness of breath the next day after PRBC improved after Lasix.  GI has signed off this admission and they are recommending outpatient follow-up for scope.    Subjective:  Mr. Ceja seen    Fevers resolved   Feels very fatigued       Objective:   Patient Vitals for the past 4 hrs:   BP Temp Temp src Pulse Resp SpO2   07/11/25 0808 -- -- -- 78 18 98 %   07/11/25 0718 (!) 102/58 98.2 °F (36.8 °C) Oral 66 16 96 %          Intake/Output Summary (Last 24 hours) at 7/11/2025 0910  Last data filed at 7/11/2025 0600  Gross per 24 hour   Intake 2292.67 ml   Output 815 ml   Net 1477.67 ml       Physical Exam:    Gen: No  distress.  Awake and well-oriented  Alert. +Chronically ill appearing;  elderly male.  Eyes: PERRL. No sclera icterus. No conjunctival injection.   ENT: No discharge. Pharynx clear.   Neck: No JVD.  Trachea midline.  Resp: No  accessory muscle use. No wheezes, rales or rhonchi   +right chest wall port   CV: Regular rate. Regular rhythm. No murmur.  No rub.    Leg edema - mild   Peripheral Pulses: +2 palpable, equal bilaterally   GI:  Normal bowel sounds.+Mild distension and mid abdominal tenderness to palpation   +2 biliary drains present   Skin: Warm and dry. No nodule on exposed extremities. No rash on exposed extremities.   M/S: No cyanosis. No joint deformity. No clubbing.   Neuro: Awake. Grossly nonfocal    Psych: Oriented x 3.        Medications:  piperacillin-tazobactam, 3,375 mg, Q8H  lactulose, 30 g, TID  albuterol sulfate HFA, 2 puff, 4x Daily RT  vitamin B-12, 1,000 mcg, Daily  phytonadione, 10 mg, Daily  insulin lispro, 0-4 Units, 4 times per  AP VIEW)   Final Result   Nonobstructive bowel gas pattern.  Probable constipation.  Stable biliary   catheter position.         XR ABDOMEN (KUB) (SINGLE AP VIEW)   Final Result   No bowel obstruction or evidence of acute abnormality.         XR CHEST PORTABLE   Final Result   Mild bibasilar atelectasis otherwise no acute cardiopulmonary disease.         Vascular duplex lower extremity venous bilateral   Final Result      XR ABDOMEN (KUB) (SINGLE AP VIEW)   Final Result   1. Nonspecific bowel gas pattern. No bowel obstruction.   2. Stable biliary drains.            CTA ABDOMEN PELVIS W WO CONTRAST   Final Result   1. No evidence of active GI bleeding.   2. Status post prior Whipple procedure with right and left-sided biliary drain catheters in place and biliary ductal dilatation in the right lobe of the liver, similar to prior study.   3. Soft tissue infiltrating the region of the agustin hepatis with occlusion of the main portal vein and multiple varices within the mesentery, similar to prior study.   4. Mild colonic wall thickening, possibly representing stercoral colitis or edematous changes related to underlying liver disease. Questionable circumferential wall thickening in the distal transverse and distal descending colon.   5. Large amount of well-formed stool again noted throughout the colon                Bilateral lower extremity Doppler study   -negative for DVT        Assessment/Plan:    Possible GI bleed in the setting of stage IV intrahepatic cholangiocarcinoma   Acute on chronic anemia, recurrent   Possible stercoral colitis -patient reports multiple bowel movements   Multiple varices   Stercoral colitis   - CTA as above without evidence of active bleeding   - Hemoglobin 6.6 on presentation -> 1 unit of PRBC transfused.   Hemoglobin down to 6.9 today-> another unit of PRBC ordered.continue to monitor H&H every 6 hours   -baseline Hgb 7-8; monitor H&H and transfuse for Hgb <7.0  -Patient seen in

## 2025-07-11 NOTE — FLOWSHEET NOTE
07/10/25 2232   Vital Signs   Respirations 18   /71   MAP (Calculated) 83   BP Location Right upper arm   BP Method Automatic   Patient Position Semi fowlers   Pain Assessment   Pain Assessment 0-10   Pain Level 4   Patient's Stated Pain Goal 0 - No pain   Pain Location Back;Abdomen   Pain Orientation Right;Lower   Pain Descriptors Aching;Stabbing   Functional Pain Assessment Prevents or interferes some active activities and ADLs   Opioid-Induced Sedation   POSS Score 1     Verified order and administered pain med as ordered.

## 2025-07-11 NOTE — ACP (ADVANCE CARE PLANNING)
Palliative Care Chart Review  and Check in Note:     NAME:  Sharath Ceja  Admit Date: 7/2/2025  Hospital Day:  Hospital Day: 10   Current Code status: Full Code    Palliative care is continuing to following Mr. Ceja for symptom management,  and goals of care discussion as needed. Patient's chart reviewed today 7/11/25.        The following are the currently established goals/code status, and Symptom management.     Goals of care: Family wishes for patient to go to [facility] for short term rehab if he is able to. He has been off of his PO Chemo for 8 days now due to bleeding. Family states that he will follow up with Oncology after being discharged and could potentially be started back on the medication if they think he can tolerate it. He currently uses [facility] services. He was previously followed by [facility] and will be referred back to that program.     Code status: Full code    Discharge plan: Either [facility] for rehab or home with family and  services, [facility] to follow      ADILENE Agudelo NP, MD PGY-3  07/11/25  12:22 PM

## 2025-07-11 NOTE — PROGRESS NOTES
Infectious Disease Follow up Notes    CC :  fever, GN bacteremia      Antibiotics:  Zosyn 3.375 q8     Admit Date:   7/2/2025  Hospital Day: 10    Subjective:   Fever curve is improved, 24h Tm 99.7  Currently AF   On RA   Continued waves of pain mostly on the R side of his abd.  He cannot identify exacerbating factors.  It is not clearly positional or prandial.  Having BMs  No other acute changes     Objective:     Patient Vitals for the past 8 hrs:   BP Temp Temp src Pulse Resp SpO2   07/11/25 0808 -- -- -- 78 18 98 %   07/11/25 0718 (!) 102/58 98.2 °F (36.8 °C) Oral 66 16 96 %   07/11/25 0347 105/62 97.7 °F (36.5 °C) Oral 73 18 97 %       EXAM:  General:  alert, conversant, no acute distress  Chronically ill appearing     HEENT:  NCAT, PERRL, sclera anicteric   MMM, no thrush     NECK:  Supple       LUNGS:   non-labored breathing   Upper lobes clear chuy     CV:   RRR without murmur     ABD: Mildly tender without R/G  BS present   2 biliary drains in place   EXT: No focal rash          LINE:    Port accessed, site ok        Scheduled Meds:   piperacillin-tazobactam  3,375 mg IntraVENous Q8H    lactulose  30 g Oral TID    albuterol sulfate HFA  2 puff Inhalation 4x Daily RT    vitamin B-12  1,000 mcg Oral Daily    phytonadione  10 mg Oral Daily    insulin lispro  0-4 Units SubCUTAneous 4 times per day    [Held by provider] erlotinib  100 mg Oral Daily    allopurinol  300 mg Oral Daily    finasteride  5 mg Oral QPM    vitamin D  50,000 Units Oral Once per day on Monday Wednesday Friday    polyethylene glycol  17 g Oral Daily    lipase-protease-amylase  60,000 Units Oral TID WC    And    lipase-protease-amylas  15,000 Units Oral TID WC    pantoprazole (PROTONIX) 40 mg in sodium chloride (PF) 0.9 % 10 mL injection  40 mg IntraVENous Q12H    sodium chloride flush  5-40 mL IntraVENous 2 times per day    melatonin  3 mg Oral Nightly

## 2025-07-11 NOTE — PLAN OF CARE
Problem: Chronic Conditions and Co-morbidities  Goal: Patient's chronic conditions and co-morbidity symptoms are monitored and maintained or improved  Outcome: Progressing  Flowsheets (Taken 7/11/2025 0051)  Care Plan - Patient's Chronic Conditions and Co-Morbidity Symptoms are Monitored and Maintained or Improved:   Monitor and assess patient's chronic conditions and comorbid symptoms for stability, deterioration, or improvement   Collaborate with multidisciplinary team to address chronic and comorbid conditions and prevent exacerbation or deterioration     Problem: Discharge Planning  Goal: Discharge to home or other facility with appropriate resources  Outcome: Progressing  Flowsheets (Taken 7/11/2025 0051)  Discharge to home or other facility with appropriate resources:   Identify barriers to discharge with patient and caregiver   Arrange for needed discharge resources and transportation as appropriate     Problem: Skin/Tissue Integrity  Goal: Skin integrity remains intact  Description: 1.  Monitor for areas of redness and/or skin breakdown  2.  Assess vascular access sites hourly  3.  Every 4-6 hours minimum:  Change oxygen saturation probe site  4.  Every 4-6 hours:  If on nasal continuous positive airway pressure, respiratory therapy assess nares and determine need for appliance change or resting period  Outcome: Progressing  Flowsheets (Taken 7/11/2025 0051)  Skin Integrity Remains Intact:   Monitor for areas of redness and/or skin breakdown   Turn and reposition as indicated   Check visual cues for pain     Problem: Safety - Adult  Goal: Free from fall injury  Outcome: Progressing  Flowsheets (Taken 7/11/2025 0051)  Free From Fall Injury: Instruct family/caregiver on patient safety     Problem: Pain  Goal: Verbalizes/displays adequate comfort level or baseline comfort level  Outcome: Progressing  Flowsheets (Taken 7/11/2025 0051)  Verbalizes/displays adequate comfort level or baseline comfort level:

## 2025-07-11 NOTE — PROGRESS NOTES
Inpatient Physical Therapy Treatment    Unit: PCU  Date:  7/11/2025  Patient Name:    Sharath Ceja  Admitting diagnosis:  Acute anemia [D64.9]  Admit Date:  7/2/2025  Precautions/Restrictions/WB Status/ Lines/ Wounds/ Oxygen: Fall risk, Bed/chair alarm, Lines (IV and Drains (biliary drains)), Telemetry, and Continuous pulse oximetry       Treatment Time:  928-1008  Treatment Number:  2   Timed Code Treatment Minutes: 40 minutes  Total Treatment Minutes:  40  minutes    Patient Stated Goals for Therapy: \" to get better \"          Discharge Recommendations: Home with continued 24 hr supervision and Home PT  DME needs for discharge: Needs Met       Therapy recommendation for EMS Transport: can transport by wheelchair    Therapy recommendations for staff:   Assist of 1 for ambulation with use of rolling walker (RW) and gait belt to/from BSC  to/from chair  to/from bathroom    History of Present Illness:   Per admission H&P by Dr. Wilson on 7/2/25:  \"74 y.o. male who presented to [facility] with reports of unusual stool color.  Patient has medical history of cholangiocarcinoma and diabetes mellitus was presented to the hospital due to reports of rust colored stool.  Patient reports over the past 6 days he noticed change in stool color that he describes as more orange in color.  He states also noticing looser stools with about 6 bowel movements a day.  He states similar event occurring during last hospitalization 3 weeks ago.  He is otherwise denying any abdominal pain or postprandial abdominal pain.  He is otherwise denying fever, chills, shortness of breath, chest pain, nausea or emesis. \"    Preadmission Environment:   Pt. Lives                                              with spouse and 24/7 assist available   Home environment:                            mobile home/trailer  Steps to enter first floor:                     Ramp at patio door, ~3 IVAN at other entrances with handrail  Steps to  With RW and gait belt, With increased time, and With cues for hand placement   Comments:     Gait gait completed as indicated below  Distance:      30 ft  Deviations (firm surface/linoleum):  decreased juan jose, forward flexed posture, decreased foot clearance bilaterally, and decreased step length bilaterally  Assistive Device Used:    gait belt and rolling walker (RW)  Level of Assist:    CGA   Comment:     Stair Training deferred, pt unsafe/ not appropriate to complete stairs at this time    Therapeutic Exercises Initiated  deferred secondary to treatment focus on functional mobility    Positioning Needs   Pt up in chair, reclined in chair, and no alarm needed  Call light provided and all needs within reach  RN aware of pt position/status    Other Activities  Pt used BSC and requires total assist for pericare. Pt able to complete LB dressing with CGA    Patient/Family Education   Pt educated on role of inpatient PT, POC, importance of continued activity, DC recommendations, functional transfer/mobility safety, transfer techniques, and calling for assist with mobility.    Assessment   Pt continues to demonstrate decreased endurance with limited ambulation distance. Pt would continue to benefit from skilled PT services to promote increased strength, balance and functional activity tolerance. Recommend continued skilled PT services upon discharge.       Recommending Home 24 hr supervision and with home PT upon discharge as patient functioning below baseline level    Goals : all goals ongoing 7/11/25  To be met in 3 visits:  1). Independent with LE Ex x 10 reps  2). Sit to/from stand: SBA  3). Bed to chair: SBA     To be met in 6 visits:  1).  Supine to/from sit: Supervision  2).  Sit to/from stand: Supervision  3).  Bed to chair: Supervision  4).  Gait: Ambulate 50 ft.  with SBA and use of gait belt and rolling walker (RW)  5).  Tolerate B LE exercises 3 sets of 10-15 reps  6).  Ascend/descend 1 steps with CGA with

## 2025-07-12 LAB
ALBUMIN SERPL-MCNC: 2.3 G/DL (ref 3.4–5)
ALBUMIN/GLOB SERPL: 0.7 {RATIO} (ref 1.1–2.2)
ALP SERPL-CCNC: 293 U/L (ref 40–129)
ALT SERPL-CCNC: 14 U/L (ref 10–40)
ANION GAP SERPL CALCULATED.3IONS-SCNC: 10 MMOL/L (ref 3–16)
AST SERPL-CCNC: 19 U/L (ref 15–37)
BACTERIA BLD CULT ORG #2: ABNORMAL
BACTERIA BLD CULT: ABNORMAL
BACTERIA BLD CULT: ABNORMAL
BILIRUB SERPL-MCNC: 1.3 MG/DL (ref 0–1)
BUN SERPL-MCNC: 8 MG/DL (ref 7–20)
CALCIUM SERPL-MCNC: 7.1 MG/DL (ref 8.3–10.6)
CHLORIDE SERPL-SCNC: 97 MMOL/L (ref 99–110)
CO2 SERPL-SCNC: 24 MMOL/L (ref 21–32)
CREAT SERPL-MCNC: 0.3 MG/DL (ref 0.8–1.3)
DEPRECATED RDW RBC AUTO: 17.4 % (ref 12.4–15.4)
GFR SERPLBLD CREATININE-BSD FMLA CKD-EPI: >90 ML/MIN/{1.73_M2}
GLUCOSE BLD-MCNC: 164 MG/DL (ref 70–99)
GLUCOSE BLD-MCNC: 165 MG/DL (ref 70–99)
GLUCOSE BLD-MCNC: 188 MG/DL (ref 70–99)
GLUCOSE BLD-MCNC: 189 MG/DL (ref 70–99)
GLUCOSE BLD-MCNC: 213 MG/DL (ref 70–99)
GLUCOSE SERPL-MCNC: 137 MG/DL (ref 70–99)
HCT VFR BLD AUTO: 21.9 % (ref 40.5–52.5)
HGB BLD-MCNC: 7.2 G/DL (ref 13.5–17.5)
MCH RBC QN AUTO: 27.9 PG (ref 26–34)
MCHC RBC AUTO-ENTMCNC: 32.9 G/DL (ref 31–36)
MCV RBC AUTO: 84.6 FL (ref 80–100)
ORGANISM: ABNORMAL
PERFORMED ON: ABNORMAL
PLATELET # BLD AUTO: 133 K/UL (ref 135–450)
PMV BLD AUTO: 7.9 FL (ref 5–10.5)
POTASSIUM SERPL-SCNC: 3.6 MMOL/L (ref 3.5–5.1)
PROT SERPL-MCNC: 5.6 G/DL (ref 6.4–8.2)
RBC # BLD AUTO: 2.58 M/UL (ref 4.2–5.9)
SODIUM SERPL-SCNC: 131 MMOL/L (ref 136–145)
WBC # BLD AUTO: 3.8 K/UL (ref 4–11)

## 2025-07-12 PROCEDURE — 6370000000 HC RX 637 (ALT 250 FOR IP)

## 2025-07-12 PROCEDURE — 6370000000 HC RX 637 (ALT 250 FOR IP): Performed by: STUDENT IN AN ORGANIZED HEALTH CARE EDUCATION/TRAINING PROGRAM

## 2025-07-12 PROCEDURE — 6370000000 HC RX 637 (ALT 250 FOR IP): Performed by: INTERNAL MEDICINE

## 2025-07-12 PROCEDURE — 6360000002 HC RX W HCPCS: Performed by: INTERNAL MEDICINE

## 2025-07-12 PROCEDURE — 94761 N-INVAS EAR/PLS OXIMETRY MLT: CPT

## 2025-07-12 PROCEDURE — 85027 COMPLETE CBC AUTOMATED: CPT

## 2025-07-12 PROCEDURE — 2500000003 HC RX 250 WO HCPCS: Performed by: STUDENT IN AN ORGANIZED HEALTH CARE EDUCATION/TRAINING PROGRAM

## 2025-07-12 PROCEDURE — 2580000003 HC RX 258: Performed by: INTERNAL MEDICINE

## 2025-07-12 PROCEDURE — 99233 SBSQ HOSP IP/OBS HIGH 50: CPT | Performed by: INTERNAL MEDICINE

## 2025-07-12 PROCEDURE — 80053 COMPREHEN METABOLIC PANEL: CPT

## 2025-07-12 PROCEDURE — 94640 AIRWAY INHALATION TREATMENT: CPT

## 2025-07-12 PROCEDURE — 6360000002 HC RX W HCPCS: Performed by: STUDENT IN AN ORGANIZED HEALTH CARE EDUCATION/TRAINING PROGRAM

## 2025-07-12 PROCEDURE — 2060000000 HC ICU INTERMEDIATE R&B

## 2025-07-12 RX ADMIN — LACTULOSE 30 G: 10 SOLUTION ORAL at 07:39

## 2025-07-12 RX ADMIN — PANCRELIPASE LIPASE, PANCRELIPASE PROTEASE, PANCRELIPASE AMYLASE 15000 UNITS: 15000; 47000; 63000 CAPSULE, DELAYED RELEASE ORAL at 16:25

## 2025-07-12 RX ADMIN — LACTULOSE 30 G: 10 SOLUTION ORAL at 21:17

## 2025-07-12 RX ADMIN — PIPERACILLIN AND TAZOBACTAM 3375 MG: 3; .375 INJECTION, POWDER, LYOPHILIZED, FOR SOLUTION INTRAVENOUS at 01:30

## 2025-07-12 RX ADMIN — ALLOPURINOL 300 MG: 300 TABLET ORAL at 07:40

## 2025-07-12 RX ADMIN — ALBUTEROL SULFATE 2 PUFF: 90 AEROSOL, METERED RESPIRATORY (INHALATION) at 15:13

## 2025-07-12 RX ADMIN — PHYTONADIONE 10 MG: 5 TABLET ORAL at 07:39

## 2025-07-12 RX ADMIN — OXYCODONE 5 MG: 5 TABLET ORAL at 05:48

## 2025-07-12 RX ADMIN — ONDANSETRON 4 MG: 4 TABLET, ORALLY DISINTEGRATING ORAL at 21:17

## 2025-07-12 RX ADMIN — PANCRELIPASE LIPASE, PANCRELIPASE PROTEASE, PANCRELIPASE AMYLASE 15000 UNITS: 15000; 47000; 63000 CAPSULE, DELAYED RELEASE ORAL at 07:42

## 2025-07-12 RX ADMIN — Medication 3 MG: at 21:17

## 2025-07-12 RX ADMIN — PANCRELIPASE LIPASE, PANCRELIPASE PROTEASE, PANCRELIPASE AMYLASE 15000 UNITS: 15000; 47000; 63000 CAPSULE, DELAYED RELEASE ORAL at 07:40

## 2025-07-12 RX ADMIN — LACTULOSE 30 G: 10 SOLUTION ORAL at 14:28

## 2025-07-12 RX ADMIN — POLYETHYLENE GLYCOL (3350) 17 G: 17 POWDER, FOR SOLUTION ORAL at 07:40

## 2025-07-12 RX ADMIN — PANTOPRAZOLE SODIUM 40 MG: 40 INJECTION, POWDER, FOR SOLUTION INTRAVENOUS at 21:17

## 2025-07-12 RX ADMIN — OXYCODONE 5 MG: 5 TABLET ORAL at 16:21

## 2025-07-12 RX ADMIN — PIPERACILLIN AND TAZOBACTAM 3375 MG: 3; .375 INJECTION, POWDER, LYOPHILIZED, FOR SOLUTION INTRAVENOUS at 09:46

## 2025-07-12 RX ADMIN — SODIUM CHLORIDE, PRESERVATIVE FREE 10 ML: 5 INJECTION INTRAVENOUS at 21:26

## 2025-07-12 RX ADMIN — PANTOPRAZOLE SODIUM 40 MG: 40 INJECTION, POWDER, FOR SOLUTION INTRAVENOUS at 07:40

## 2025-07-12 RX ADMIN — PANCRELIPASE LIPASE, PANCRELIPASE PROTEASE, PANCRELIPASE AMYLASE 15000 UNITS: 15000; 47000; 63000 CAPSULE, DELAYED RELEASE ORAL at 12:02

## 2025-07-12 RX ADMIN — PANCRELIPASE LIPASE, PANCRELIPASE PROTEASE, PANCRELIPASE AMYLASE 60000 UNITS: 20000; 63000; 84000 CAPSULE, DELAYED RELEASE ORAL at 16:25

## 2025-07-12 RX ADMIN — PIPERACILLIN AND TAZOBACTAM 3375 MG: 3; .375 INJECTION, POWDER, LYOPHILIZED, FOR SOLUTION INTRAVENOUS at 18:16

## 2025-07-12 RX ADMIN — ALBUTEROL SULFATE 2 PUFF: 90 AEROSOL, METERED RESPIRATORY (INHALATION) at 07:46

## 2025-07-12 RX ADMIN — FINASTERIDE 5 MG: 5 TABLET, FILM COATED ORAL at 18:17

## 2025-07-12 RX ADMIN — Medication 1000 MCG: at 07:40

## 2025-07-12 RX ADMIN — ALBUTEROL SULFATE 2 PUFF: 90 AEROSOL, METERED RESPIRATORY (INHALATION) at 11:05

## 2025-07-12 RX ADMIN — ALBUTEROL SULFATE 2 PUFF: 90 AEROSOL, METERED RESPIRATORY (INHALATION) at 20:16

## 2025-07-12 RX ADMIN — PANCRELIPASE LIPASE, PANCRELIPASE PROTEASE, PANCRELIPASE AMYLASE 60000 UNITS: 20000; 63000; 84000 CAPSULE, DELAYED RELEASE ORAL at 07:43

## 2025-07-12 RX ADMIN — PANCRELIPASE LIPASE, PANCRELIPASE PROTEASE, PANCRELIPASE AMYLASE 60000 UNITS: 20000; 63000; 84000 CAPSULE, DELAYED RELEASE ORAL at 12:02

## 2025-07-12 ASSESSMENT — PAIN DESCRIPTION - ORIENTATION
ORIENTATION: RIGHT;LOWER
ORIENTATION: RIGHT;LOWER

## 2025-07-12 ASSESSMENT — PAIN DESCRIPTION - FREQUENCY: FREQUENCY: INTERMITTENT

## 2025-07-12 ASSESSMENT — PAIN DESCRIPTION - LOCATION
LOCATION: ABDOMEN

## 2025-07-12 ASSESSMENT — PAIN DESCRIPTION - DESCRIPTORS: DESCRIPTORS: SHARP

## 2025-07-12 ASSESSMENT — PAIN DESCRIPTION - ONSET: ONSET: ON-GOING

## 2025-07-12 ASSESSMENT — PAIN DESCRIPTION - PAIN TYPE: TYPE: ACUTE PAIN

## 2025-07-12 ASSESSMENT — PAIN - FUNCTIONAL ASSESSMENT: PAIN_FUNCTIONAL_ASSESSMENT: ACTIVITIES ARE NOT PREVENTED

## 2025-07-12 ASSESSMENT — PAIN SCALES - GENERAL
PAINLEVEL_OUTOF10: 4
PAINLEVEL_OUTOF10: 8
PAINLEVEL_OUTOF10: 0
PAINLEVEL_OUTOF10: 0
PAINLEVEL_OUTOF10: 4
PAINLEVEL_OUTOF10: 0
PAINLEVEL_OUTOF10: 4

## 2025-07-12 NOTE — PROGRESS NOTES
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   On chemo     Patient received 1 unit of PRBC transfusion earlier this admission, globin down to 6.9 today and 1 more unit of PRBC ordered.  Had episode of shortness of breath the next day after PRBC improved after Lasix.  GI has signed off this admission and they are recommending outpatient follow-up for scope.    Subjective:  Mr. Ceja seen    Fevers resolved   Feels very fatigued       Objective:   Patient Vitals for the past 4 hrs:   BP Temp Temp src Pulse Resp SpO2   07/12/25 1651 -- -- -- -- 18 --   07/12/25 1612 102/62 99.1 °F (37.3 °C) Oral 72 16 96 %   07/12/25 1515 -- -- -- -- -- 95 %          Intake/Output Summary (Last 24 hours) at 7/12/2025 1908  Last data filed at 7/12/2025 1839  Gross per 24 hour   Intake 909.53 ml   Output 605 ml   Net 304.53 ml       Physical Exam:    Gen: No  distress.  Awake and well-oriented  Alert. +Chronically ill appearing;  elderly male.  Eyes: PERRL. No sclera icterus. No conjunctival injection.   ENT: No discharge. Pharynx clear.   Neck: No JVD.  Trachea midline.  Resp: No  accessory muscle use. No wheezes, rales or rhonchi   +right chest wall port   CV: Regular rate. Regular rhythm. No murmur.  No rub.    Leg edema - mild   Peripheral Pulses: +2 palpable, equal bilaterally   GI:  Normal bowel sounds.+Mild distension and mid abdominal tenderness to palpation   +2 biliary drains present   Skin: Warm and dry. No nodule on exposed extremities. No rash on exposed extremities.   M/S: No cyanosis. No joint deformity. No clubbing.   Neuro: Awake. Grossly nonfocal    Psych: Oriented x 3.        Medications:  piperacillin-tazobactam, 3,375 mg, Q8H  lactulose, 30 g, TID  albuterol sulfate HFA, 2 puff, 4x Daily RT  vitamin B-12, 1,000 mcg, Daily  phytonadione, 10 mg, Daily  insulin  intrahepatic cholangiocarcinoma status post Whipple procedure in hepaticojejunostomy complicated by anatomic stricture  Status post biliary drains  Coagulopathy is on daily vitamin K   Portal vein occlusion   -oncology consulted - Seen by onc-> hold tarceva  -on vitamin K supplement at home, resumed  -on zenpep  -on oxycodone prn   - Plan patient to follow-up with his primary oncologist, Dr. Beltran on discharge from here.  Plan currently is for patient to resume Tarceva on discharge.  Need to monitor closely as there is always concern for GI bleed with this   RUQ US ordered - mild intrahepatic duct dilatation    Fevers  E.coli bacteremia  Procal mildly elevated  Lactic normal.   Order Blood cultures- E. Coli   Source colon versus hepatobiliary  Start Cefepime   Check u/a,culture   ID consult   Abx changed to zosyn day # 3   Right UQ US reviewed     Left lower extremity edema   -venous US negative for DVT  -could be 2/2 to hypoalbuminemia     Crohns disease   -follows with GI   Leukopenia  -could be chemo induced   -monitor CBC     Vitamin D deficiency   -is on supplement 3x weekly     Type 2 diabetes with hyperglycemia  - Sliding scale insulin  - monitor and adjust as needed    Gout   -on allopurinol       DVT Prophylaxis: SCDs  Diet: ADULT ORAL NUTRITION SUPPLEMENT; Breakfast, Lunch, Dinner; Low Calorie/High Protein Oral Supplement  ADULT DIET; Regular  Code Status: Full Code    Pt/ot   Family wants to take him home  Await flavio Murray MD  07/12/25  7:08 PM

## 2025-07-12 NOTE — PLAN OF CARE
Problem: Chronic Conditions and Co-morbidities  Goal: Patient's chronic conditions and co-morbidity symptoms are monitored and maintained or improved  7/12/2025 0826 by Juwan Hutchison RN  Outcome: Progressing  7/11/2025 2358 by Felecia Dickinson RN  Outcome: Progressing  Flowsheets (Taken 7/11/2025 2022)  Care Plan - Patient's Chronic Conditions and Co-Morbidity Symptoms are Monitored and Maintained or Improved: Monitor and assess patient's chronic conditions and comorbid symptoms for stability, deterioration, or improvement     Problem: Discharge Planning  Goal: Discharge to home or other facility with appropriate resources  7/12/2025 0826 by Juwan Hutchison RN  Outcome: Progressing  7/11/2025 2358 by Felecia Dickinson RN  Outcome: Progressing  Flowsheets (Taken 7/11/2025 2022)  Discharge to home or other facility with appropriate resources: Identify barriers to discharge with patient and caregiver     Problem: Skin/Tissue Integrity  Goal: Skin integrity remains intact  Description: 1.  Monitor for areas of redness and/or skin breakdown  2.  Assess vascular access sites hourly  3.  Every 4-6 hours minimum:  Change oxygen saturation probe site  4.  Every 4-6 hours:  If on nasal continuous positive airway pressure, respiratory therapy assess nares and determine need for appliance change or resting period  7/12/2025 0826 by Juwan Hutchison RN  Outcome: Progressing  7/11/2025 2358 by Felecia Dikcinson RN  Outcome: Progressing  Flowsheets (Taken 7/11/2025 2022)  Skin Integrity Remains Intact: Monitor for areas of redness and/or skin breakdown     Problem: Safety - Adult  Goal: Free from fall injury  7/12/2025 0826 by Juwan Hutchison RN  Outcome: Progressing  7/11/2025 2358 by Felecia Dickinson RN  Outcome: Progressing     Problem: Pain  Goal: Verbalizes/displays adequate comfort level or baseline comfort level  7/12/2025 0826 by Juwan Hutchison RN  Outcome: Progressing  7/11/2025 2358 by Felecia Dickinson  PRINCE PADILLA  Outcome: Progressing  Flowsheets (Taken 7/11/2025 1950)  Verbalizes/displays adequate comfort level or baseline comfort level: Encourage patient to monitor pain and request assistance     Problem: Skin/Tissue Integrity - Adult  Goal: Skin integrity remains intact  Description: 1.  Monitor for areas of redness and/or skin breakdown  2.  Assess vascular access sites hourly  3.  Every 4-6 hours minimum:  Change oxygen saturation probe site  4.  Every 4-6 hours:  If on nasal continuous positive airway pressure, respiratory therapy assess nares and determine need for appliance change or resting period  7/12/2025 0826 by Juwan Hutchison RN  Outcome: Progressing  7/11/2025 2358 by Felecia Dickinson RN  Outcome: Progressing  Flowsheets (Taken 7/11/2025 2022)  Skin Integrity Remains Intact: Monitor for areas of redness and/or skin breakdown  Goal: Incisions, wounds, or drain sites healing without S/S of infection  7/12/2025 0826 by Juwan Hutchison RN  Outcome: Progressing  7/11/2025 2358 by Felecia Dickinson RN  Outcome: Progressing  Goal: Oral mucous membranes remain intact  7/12/2025 0826 by Juwan Hutchison RN  Outcome: Progressing  7/11/2025 2358 by Felecia Dickinson RN  Outcome: Progressing     Problem: Musculoskeletal - Adult  Goal: Return mobility to safest level of function  7/12/2025 0826 by Juwan Hutchison RN  Outcome: Progressing  7/11/2025 2358 by Felecia Dickinson RN  Outcome: Progressing  Flowsheets (Taken 7/11/2025 2022)  Return Mobility to Safest Level of Function: Assess patient stability and activity tolerance for standing, transferring and ambulating with or without assistive devices  Goal: Maintain proper alignment of affected body part  7/12/2025 0826 by Juwan Hutchison RN  Outcome: Progressing  7/11/2025 2358 by Felecia Dickinson RN  Outcome: Progressing  Goal: Return ADL status to a safe level of function  7/12/2025 0826 by Juwan Hutchison RN  Outcome: Progressing  7/11/2025 2358

## 2025-07-12 NOTE — PLAN OF CARE
Problem: Chronic Conditions and Co-morbidities  Goal: Patient's chronic conditions and co-morbidity symptoms are monitored and maintained or improved  Outcome: Progressing  Flowsheets (Taken 7/11/2025 2022)  Care Plan - Patient's Chronic Conditions and Co-Morbidity Symptoms are Monitored and Maintained or Improved: Monitor and assess patient's chronic conditions and comorbid symptoms for stability, deterioration, or improvement     Problem: Discharge Planning  Goal: Discharge to home or other facility with appropriate resources  Outcome: Progressing  Flowsheets (Taken 7/11/2025 2022)  Discharge to home or other facility with appropriate resources: Identify barriers to discharge with patient and caregiver     Problem: Skin/Tissue Integrity  Goal: Skin integrity remains intact  Description: 1.  Monitor for areas of redness and/or skin breakdown  2.  Assess vascular access sites hourly  3.  Every 4-6 hours minimum:  Change oxygen saturation probe site  4.  Every 4-6 hours:  If on nasal continuous positive airway pressure, respiratory therapy assess nares and determine need for appliance change or resting period  Outcome: Progressing  Flowsheets (Taken 7/11/2025 2022)  Skin Integrity Remains Intact: Monitor for areas of redness and/or skin breakdown     Problem: Safety - Adult  Goal: Free from fall injury  Outcome: Progressing     Problem: Pain  Goal: Verbalizes/displays adequate comfort level or baseline comfort level  Outcome: Progressing  Flowsheets (Taken 7/11/2025 1950)  Verbalizes/displays adequate comfort level or baseline comfort level: Encourage patient to monitor pain and request assistance     Problem: Skin/Tissue Integrity - Adult  Goal: Skin integrity remains intact  Description: 1.  Monitor for areas of redness and/or skin breakdown  2.  Assess vascular access sites hourly  3.  Every 4-6 hours minimum:  Change oxygen saturation probe site  4.  Every 4-6 hours:  If on nasal continuous positive airway

## 2025-07-12 NOTE — PROGRESS NOTES
Patient assessed and AM medications given.  Requested capsules in applesauce.  No difficulty with swallowing other pills.  A/O x4.  Denies any further needs at this time.  Call light within reach.

## 2025-07-13 LAB
GLUCOSE BLD-MCNC: 160 MG/DL (ref 70–99)
GLUCOSE BLD-MCNC: 175 MG/DL (ref 70–99)
GLUCOSE BLD-MCNC: 196 MG/DL (ref 70–99)
GLUCOSE BLD-MCNC: 235 MG/DL (ref 70–99)
GLUCOSE BLD-MCNC: 239 MG/DL (ref 70–99)
PERFORMED ON: ABNORMAL

## 2025-07-13 PROCEDURE — 87040 BLOOD CULTURE FOR BACTERIA: CPT

## 2025-07-13 PROCEDURE — 97530 THERAPEUTIC ACTIVITIES: CPT

## 2025-07-13 PROCEDURE — 6370000000 HC RX 637 (ALT 250 FOR IP)

## 2025-07-13 PROCEDURE — 94640 AIRWAY INHALATION TREATMENT: CPT

## 2025-07-13 PROCEDURE — 97535 SELF CARE MNGMENT TRAINING: CPT

## 2025-07-13 PROCEDURE — 2060000000 HC ICU INTERMEDIATE R&B

## 2025-07-13 PROCEDURE — 94761 N-INVAS EAR/PLS OXIMETRY MLT: CPT

## 2025-07-13 PROCEDURE — 6360000002 HC RX W HCPCS: Performed by: INTERNAL MEDICINE

## 2025-07-13 PROCEDURE — 2580000003 HC RX 258: Performed by: INTERNAL MEDICINE

## 2025-07-13 PROCEDURE — 6370000000 HC RX 637 (ALT 250 FOR IP): Performed by: STUDENT IN AN ORGANIZED HEALTH CARE EDUCATION/TRAINING PROGRAM

## 2025-07-13 PROCEDURE — 2500000003 HC RX 250 WO HCPCS: Performed by: STUDENT IN AN ORGANIZED HEALTH CARE EDUCATION/TRAINING PROGRAM

## 2025-07-13 PROCEDURE — 6370000000 HC RX 637 (ALT 250 FOR IP): Performed by: INTERNAL MEDICINE

## 2025-07-13 PROCEDURE — 6360000002 HC RX W HCPCS: Performed by: STUDENT IN AN ORGANIZED HEALTH CARE EDUCATION/TRAINING PROGRAM

## 2025-07-13 RX ADMIN — PIPERACILLIN AND TAZOBACTAM 3375 MG: 3; .375 INJECTION, POWDER, LYOPHILIZED, FOR SOLUTION INTRAVENOUS at 02:25

## 2025-07-13 RX ADMIN — ALBUTEROL SULFATE 2 PUFF: 90 AEROSOL, METERED RESPIRATORY (INHALATION) at 20:22

## 2025-07-13 RX ADMIN — SODIUM CHLORIDE, PRESERVATIVE FREE 10 ML: 5 INJECTION INTRAVENOUS at 22:06

## 2025-07-13 RX ADMIN — PANCRELIPASE LIPASE, PANCRELIPASE PROTEASE, PANCRELIPASE AMYLASE 60000 UNITS: 20000; 63000; 84000 CAPSULE, DELAYED RELEASE ORAL at 07:43

## 2025-07-13 RX ADMIN — LACTULOSE 30 G: 10 SOLUTION ORAL at 14:47

## 2025-07-13 RX ADMIN — OXYCODONE 5 MG: 5 TABLET ORAL at 02:18

## 2025-07-13 RX ADMIN — PANCRELIPASE LIPASE, PANCRELIPASE PROTEASE, PANCRELIPASE AMYLASE 15000 UNITS: 15000; 47000; 63000 CAPSULE, DELAYED RELEASE ORAL at 07:44

## 2025-07-13 RX ADMIN — PANCRELIPASE LIPASE, PANCRELIPASE PROTEASE, PANCRELIPASE AMYLASE 15000 UNITS: 15000; 47000; 63000 CAPSULE, DELAYED RELEASE ORAL at 16:27

## 2025-07-13 RX ADMIN — PIPERACILLIN AND TAZOBACTAM 3375 MG: 3; .375 INJECTION, POWDER, LYOPHILIZED, FOR SOLUTION INTRAVENOUS at 17:50

## 2025-07-13 RX ADMIN — Medication 3 MG: at 22:00

## 2025-07-13 RX ADMIN — PANCRELIPASE LIPASE, PANCRELIPASE PROTEASE, PANCRELIPASE AMYLASE 60000 UNITS: 20000; 63000; 84000 CAPSULE, DELAYED RELEASE ORAL at 11:44

## 2025-07-13 RX ADMIN — PANTOPRAZOLE SODIUM 40 MG: 40 INJECTION, POWDER, FOR SOLUTION INTRAVENOUS at 22:00

## 2025-07-13 RX ADMIN — LACTULOSE 30 G: 10 SOLUTION ORAL at 22:00

## 2025-07-13 RX ADMIN — PANCRELIPASE LIPASE, PANCRELIPASE PROTEASE, PANCRELIPASE AMYLASE 60000 UNITS: 20000; 63000; 84000 CAPSULE, DELAYED RELEASE ORAL at 16:27

## 2025-07-13 RX ADMIN — OXYCODONE 5 MG: 5 TABLET ORAL at 22:10

## 2025-07-13 RX ADMIN — OXYCODONE 5 MG: 5 TABLET ORAL at 14:47

## 2025-07-13 RX ADMIN — POLYETHYLENE GLYCOL (3350) 17 G: 17 POWDER, FOR SOLUTION ORAL at 07:44

## 2025-07-13 RX ADMIN — PHYTONADIONE 10 MG: 5 TABLET ORAL at 07:43

## 2025-07-13 RX ADMIN — PANTOPRAZOLE SODIUM 40 MG: 40 INJECTION, POWDER, FOR SOLUTION INTRAVENOUS at 07:44

## 2025-07-13 RX ADMIN — ALBUTEROL SULFATE 2 PUFF: 90 AEROSOL, METERED RESPIRATORY (INHALATION) at 14:55

## 2025-07-13 RX ADMIN — ALBUTEROL SULFATE 2 PUFF: 90 AEROSOL, METERED RESPIRATORY (INHALATION) at 11:18

## 2025-07-13 RX ADMIN — FINASTERIDE 5 MG: 5 TABLET, FILM COATED ORAL at 17:46

## 2025-07-13 RX ADMIN — PIPERACILLIN AND TAZOBACTAM 3375 MG: 3; .375 INJECTION, POWDER, LYOPHILIZED, FOR SOLUTION INTRAVENOUS at 10:20

## 2025-07-13 RX ADMIN — ALBUTEROL SULFATE 2 PUFF: 90 AEROSOL, METERED RESPIRATORY (INHALATION) at 07:42

## 2025-07-13 RX ADMIN — Medication 1000 MCG: at 07:44

## 2025-07-13 RX ADMIN — ONDANSETRON 4 MG: 2 INJECTION, SOLUTION INTRAMUSCULAR; INTRAVENOUS at 10:31

## 2025-07-13 RX ADMIN — ONDANSETRON 4 MG: 2 INJECTION, SOLUTION INTRAMUSCULAR; INTRAVENOUS at 17:46

## 2025-07-13 RX ADMIN — PANCRELIPASE LIPASE, PANCRELIPASE PROTEASE, PANCRELIPASE AMYLASE 15000 UNITS: 15000; 47000; 63000 CAPSULE, DELAYED RELEASE ORAL at 11:44

## 2025-07-13 RX ADMIN — LACTULOSE 30 G: 10 SOLUTION ORAL at 07:44

## 2025-07-13 RX ADMIN — ALLOPURINOL 300 MG: 300 TABLET ORAL at 07:44

## 2025-07-13 ASSESSMENT — PAIN SCALES - GENERAL
PAINLEVEL_OUTOF10: 2
PAINLEVEL_OUTOF10: 8
PAINLEVEL_OUTOF10: 7
PAINLEVEL_OUTOF10: 5

## 2025-07-13 ASSESSMENT — PAIN DESCRIPTION - LOCATION
LOCATION: ABDOMEN
LOCATION: ABDOMEN

## 2025-07-13 ASSESSMENT — PAIN - FUNCTIONAL ASSESSMENT: PAIN_FUNCTIONAL_ASSESSMENT: PREVENTS OR INTERFERES SOME ACTIVE ACTIVITIES AND ADLS

## 2025-07-13 ASSESSMENT — PAIN SCALES - WONG BAKER: WONGBAKER_NUMERICALRESPONSE: NO HURT

## 2025-07-13 ASSESSMENT — PAIN DESCRIPTION - ORIENTATION: ORIENTATION: RIGHT

## 2025-07-13 ASSESSMENT — PAIN DESCRIPTION - DESCRIPTORS: DESCRIPTORS: SHARP;ACHING

## 2025-07-13 NOTE — PROGRESS NOTES
Occupational Therapy Daily Treatment Note    Unit:  PCU    Date:  7/13/2025  Patient Name:    Sharath Ceja  Admitting diagnosis:  Acute anemia [D64.9]  Admit Date:  7/2/2025  Precautions/Restrictions:  Fall risk, bed/chair alarm, Lines (IV, Port right, and biliary drains), Telemetry, and Continuous pulse oximetry     Discharge Recommendations: SNF    AM-PAC Score: 16   Home Health S4 Level: [x] NA   [] Level 1- Standard  []  Level 2- Social  [] Level 3- Safety  []  Level 4- Sick    DME needs for discharge:   defer to facility     Treatment Time:  145-1425  Treatment number:  2     Subjective:  Pt in the bed and willing to work with OT     Pain   [x]Yes  []No  Rating:min   Location:rt side   Pain Medicine Status: [] Denies need  [] Pain med requested  [] RN notified.    No pain meds requested   Bed Mobility:   Supine to Sit:CGA-min assist to manage drains  Sit to Supine:NT  Rolling:NT  Scooting:SBA    Transfer Training:   Sit to stand:CGA  Stand to sit:CGA  Bed to Chair/BSC:CGA with RW and gait belt     ADL Training: Pt donned pants over feet and standing to pull over hips with CGA-min assist. Pt having to take 2-3 rest periods to don pants     Therapeutic Exercise: NA:    Patient Education:   Instructed in safe transfers, to use call button and to not get up by himself   Positioning Needs:   In the chair with needs at reach  Family Present:  [x]Yes  []No    Assessment:   Pt was CGA to min assist for supine to sit and to manage drains. The pt was CGA for sit to stand with RW and gait belt. Pt taking 3-4 rest breaks during OT treatment.   PT was CGA for transfer to chair using RW and gait belt. Pt donned pants over feet and standing to pull over hips with CGA-min assist  Recommend SNF to increase pts strength/endurance and safety at home.  GOALS    To be met in 3 Visits:  Bed to toilet/BSC:                                                                   SBA     To be met in 5 Visits:  Supine to/from Sit in

## 2025-07-13 NOTE — PLAN OF CARE
Problem: Chronic Conditions and Co-morbidities  Goal: Patient's chronic conditions and co-morbidity symptoms are monitored and maintained or improved  7/12/2025 2154 by Juwan Humphries, RN  Outcome: Progressing  7/12/2025 0826 by Juwan Hutchison, RN  Outcome: Progressing

## 2025-07-13 NOTE — PLAN OF CARE
Problem: Chronic Conditions and Co-morbidities  Goal: Patient's chronic conditions and co-morbidity symptoms are monitored and maintained or improved  7/13/2025 0955 by Juwan Hutchison RN  Outcome: Progressing  7/12/2025 2154 by Juwan Humphries RN  Outcome: Progressing     Problem: Discharge Planning  Goal: Discharge to home or other facility with appropriate resources  7/13/2025 0955 by Juwan Hutchison RN  Outcome: Progressing  7/12/2025 2154 by Juwan Humphries RN  Outcome: Progressing     Problem: Skin/Tissue Integrity  Goal: Skin integrity remains intact  Description: 1.  Monitor for areas of redness and/or skin breakdown  2.  Assess vascular access sites hourly  3.  Every 4-6 hours minimum:  Change oxygen saturation probe site  4.  Every 4-6 hours:  If on nasal continuous positive airway pressure, respiratory therapy assess nares and determine need for appliance change or resting period  Outcome: Progressing     Problem: Safety - Adult  Goal: Free from fall injury  Outcome: Progressing     Problem: Pain  Goal: Verbalizes/displays adequate comfort level or baseline comfort level  Outcome: Progressing     Problem: Skin/Tissue Integrity - Adult  Goal: Skin integrity remains intact  Description: 1.  Monitor for areas of redness and/or skin breakdown  2.  Assess vascular access sites hourly  3.  Every 4-6 hours minimum:  Change oxygen saturation probe site  4.  Every 4-6 hours:  If on nasal continuous positive airway pressure, respiratory therapy assess nares and determine need for appliance change or resting period  Outcome: Progressing  Goal: Incisions, wounds, or drain sites healing without S/S of infection  Outcome: Progressing  Goal: Oral mucous membranes remain intact  Outcome: Progressing     Problem: Musculoskeletal - Adult  Goal: Return mobility to safest level of function  Outcome: Progressing  Goal: Maintain proper alignment of affected body part  Outcome: Progressing  Goal: Return ADL status to a safe level  of function  Outcome: Progressing     Problem: Gastrointestinal - Adult  Goal: Minimal or absence of nausea and vomiting  Outcome: Progressing  Goal: Maintains or returns to baseline bowel function  Outcome: Progressing  Goal: Maintains adequate nutritional intake  Outcome: Progressing  Goal: Establish and maintain optimal ostomy function  Outcome: Progressing     Problem: Hematologic - Adult  Goal: Maintains hematologic stability  Outcome: Progressing     Problem: Nutrition Deficit:  Goal: Optimize nutritional status  Outcome: Progressing

## 2025-07-13 NOTE — PROGRESS NOTES
Physical Therapy Attempt    Spoke with pt briefly earlier today and he wanted to walk a bit. However he's now back to bed and sleeping. Family is at bedside.     Per RN and family, they feel he will need SNF placement as pt's wife may be unable to care for him. Will re-evaluate tomorrow for possible SNF referral. OT treatment note today indicates SNF.     Marlin López, PT, DPT

## 2025-07-13 NOTE — PROGRESS NOTES
Patient assessed and AM medications given.  A/O x4.  Requesting PT/OT today. RN will contact.  Denies any further needs at this time.  Call light within reach.

## 2025-07-13 NOTE — PROGRESS NOTES
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   On chemo       Subjective:  Mr. Ceja seen    Fevers resolved   Feels very fatigued       Objective:   Patient Vitals for the past 4 hrs:   BP Temp Temp src Pulse Resp SpO2   07/13/25 0747 104/65 98.2 °F (36.8 °C) Oral 74 18 96 %          Intake/Output Summary (Last 24 hours) at 7/13/2025 1050  Last data filed at 7/13/2025 0846  Gross per 24 hour   Intake 1449.53 ml   Output 840 ml   Net 609.53 ml       Physical Exam:    Gen: No  distress.  Awake and well-oriented  Alert. +Chronically ill appearing;  elderly male.  Eyes: PERRL. No sclera icterus. No conjunctival injection.   ENT: No discharge. Pharynx clear.   Neck: No JVD.  Trachea midline.  Resp: No  accessory muscle use. No wheezes, rales or rhonchi   +right chest wall port   CV: Regular rate. Regular rhythm. No murmur.  No rub.    Leg edema - mild   Peripheral Pulses: +2 palpable, equal bilaterally   GI:  Normal bowel sounds.+Mild distension and mid abdominal tenderness to palpation   +2 biliary drains present   Skin: Warm and dry. No nodule on exposed extremities. No rash on exposed extremities.   M/S: No cyanosis. No joint deformity. No clubbing.   Neuro: Awake. Grossly nonfocal    Psych: Oriented x 3.        Medications:  piperacillin-tazobactam, 3,375 mg, Q8H  lactulose, 30 g, TID  albuterol sulfate HFA, 2 puff, 4x Daily RT  vitamin B-12, 1,000 mcg, Daily  phytonadione, 10 mg, Daily  insulin lispro, 0-4 Units, 4 times per day  [Held by provider] erlotinib, 100 mg, Daily  allopurinol, 300 mg, Daily  finasteride, 5 mg, QPM  vitamin D, 50,000 Units, Once per day on Monday Wednesday Friday  polyethylene glycol, 17 g, Daily  lipase-protease-amylase, 60,000 Units, TID WC

## 2025-07-14 VITALS
HEIGHT: 70 IN | DIASTOLIC BLOOD PRESSURE: 58 MMHG | HEART RATE: 67 BPM | RESPIRATION RATE: 20 BRPM | TEMPERATURE: 98.6 F | SYSTOLIC BLOOD PRESSURE: 102 MMHG | OXYGEN SATURATION: 95 % | WEIGHT: 154.3 LBS | BODY MASS INDEX: 22.09 KG/M2

## 2025-07-14 LAB
GLUCOSE BLD-MCNC: 176 MG/DL (ref 70–99)
GLUCOSE BLD-MCNC: 229 MG/DL (ref 70–99)
GLUCOSE BLD-MCNC: 269 MG/DL (ref 70–99)
PERFORMED ON: ABNORMAL

## 2025-07-14 PROCEDURE — 6370000000 HC RX 637 (ALT 250 FOR IP): Performed by: INTERNAL MEDICINE

## 2025-07-14 PROCEDURE — 6360000002 HC RX W HCPCS: Performed by: STUDENT IN AN ORGANIZED HEALTH CARE EDUCATION/TRAINING PROGRAM

## 2025-07-14 PROCEDURE — 2580000003 HC RX 258: Performed by: STUDENT IN AN ORGANIZED HEALTH CARE EDUCATION/TRAINING PROGRAM

## 2025-07-14 PROCEDURE — 99239 HOSP IP/OBS DSCHRG MGMT >30: CPT | Performed by: INTERNAL MEDICINE

## 2025-07-14 PROCEDURE — 2580000003 HC RX 258: Performed by: INTERNAL MEDICINE

## 2025-07-14 PROCEDURE — 94640 AIRWAY INHALATION TREATMENT: CPT

## 2025-07-14 PROCEDURE — 6370000000 HC RX 637 (ALT 250 FOR IP): Performed by: STUDENT IN AN ORGANIZED HEALTH CARE EDUCATION/TRAINING PROGRAM

## 2025-07-14 PROCEDURE — 6360000002 HC RX W HCPCS: Performed by: INTERNAL MEDICINE

## 2025-07-14 PROCEDURE — 6370000000 HC RX 637 (ALT 250 FOR IP)

## 2025-07-14 PROCEDURE — 99232 SBSQ HOSP IP/OBS MODERATE 35: CPT | Performed by: INTERNAL MEDICINE

## 2025-07-14 PROCEDURE — 2500000003 HC RX 250 WO HCPCS: Performed by: STUDENT IN AN ORGANIZED HEALTH CARE EDUCATION/TRAINING PROGRAM

## 2025-07-14 PROCEDURE — 97530 THERAPEUTIC ACTIVITIES: CPT

## 2025-07-14 PROCEDURE — 94761 N-INVAS EAR/PLS OXIMETRY MLT: CPT

## 2025-07-14 RX ORDER — IPRATROPIUM BROMIDE AND ALBUTEROL SULFATE 2.5; .5 MG/3ML; MG/3ML
3 SOLUTION RESPIRATORY (INHALATION) EVERY 4 HOURS PRN
Qty: 360 ML | Refills: 0
Start: 2025-07-14

## 2025-07-14 RX ORDER — OXYCODONE HYDROCHLORIDE 5 MG/1
5 TABLET ORAL EVERY 6 HOURS PRN
Qty: 12 TABLET | Refills: 0 | Status: SHIPPED | OUTPATIENT
Start: 2025-07-14 | End: 2025-07-17

## 2025-07-14 RX ORDER — LACTULOSE 10 G/15ML
30 SOLUTION ORAL 3 TIMES DAILY
Qty: 4050 ML | Refills: 0
Start: 2025-07-14 | End: 2025-08-13

## 2025-07-14 RX ADMIN — ALBUTEROL SULFATE 2 PUFF: 90 AEROSOL, METERED RESPIRATORY (INHALATION) at 02:41

## 2025-07-14 RX ADMIN — SODIUM CHLORIDE: 0.9 INJECTION, SOLUTION INTRAVENOUS at 10:39

## 2025-07-14 RX ADMIN — ONDANSETRON 4 MG: 2 INJECTION, SOLUTION INTRAMUSCULAR; INTRAVENOUS at 09:48

## 2025-07-14 RX ADMIN — INSULIN LISPRO 1 UNITS: 100 INJECTION, SOLUTION INTRAVENOUS; SUBCUTANEOUS at 12:21

## 2025-07-14 RX ADMIN — INSULIN LISPRO 1 UNITS: 100 INJECTION, SOLUTION INTRAVENOUS; SUBCUTANEOUS at 16:46

## 2025-07-14 RX ADMIN — PIPERACILLIN AND TAZOBACTAM 3375 MG: 3; .375 INJECTION, POWDER, LYOPHILIZED, FOR SOLUTION INTRAVENOUS at 10:40

## 2025-07-14 RX ADMIN — PIPERACILLIN AND TAZOBACTAM 3375 MG: 3; .375 INJECTION, POWDER, LYOPHILIZED, FOR SOLUTION INTRAVENOUS at 02:35

## 2025-07-14 RX ADMIN — PANCRELIPASE LIPASE, PANCRELIPASE PROTEASE, PANCRELIPASE AMYLASE 60000 UNITS: 20000; 63000; 84000 CAPSULE, DELAYED RELEASE ORAL at 17:02

## 2025-07-14 RX ADMIN — PANCRELIPASE LIPASE, PANCRELIPASE PROTEASE, PANCRELIPASE AMYLASE 15000 UNITS: 15000; 47000; 63000 CAPSULE, DELAYED RELEASE ORAL at 08:32

## 2025-07-14 RX ADMIN — OXYCODONE 5 MG: 5 TABLET ORAL at 16:47

## 2025-07-14 RX ADMIN — ALBUTEROL SULFATE 2 PUFF: 90 AEROSOL, METERED RESPIRATORY (INHALATION) at 07:41

## 2025-07-14 RX ADMIN — PANCRELIPASE LIPASE, PANCRELIPASE PROTEASE, PANCRELIPASE AMYLASE 60000 UNITS: 20000; 63000; 84000 CAPSULE, DELAYED RELEASE ORAL at 12:18

## 2025-07-14 RX ADMIN — PANTOPRAZOLE SODIUM 40 MG: 40 INJECTION, POWDER, FOR SOLUTION INTRAVENOUS at 08:59

## 2025-07-14 RX ADMIN — POLYETHYLENE GLYCOL (3350) 17 G: 17 POWDER, FOR SOLUTION ORAL at 08:30

## 2025-07-14 RX ADMIN — PHYTONADIONE 10 MG: 5 TABLET ORAL at 10:53

## 2025-07-14 RX ADMIN — PANCRELIPASE LIPASE, PANCRELIPASE PROTEASE, PANCRELIPASE AMYLASE 60000 UNITS: 20000; 63000; 84000 CAPSULE, DELAYED RELEASE ORAL at 08:36

## 2025-07-14 RX ADMIN — LACTULOSE 30 G: 10 SOLUTION ORAL at 12:16

## 2025-07-14 RX ADMIN — ALBUTEROL SULFATE 2 PUFF: 90 AEROSOL, METERED RESPIRATORY (INHALATION) at 11:00

## 2025-07-14 RX ADMIN — LACTULOSE 30 G: 10 SOLUTION ORAL at 08:29

## 2025-07-14 RX ADMIN — FINASTERIDE 5 MG: 5 TABLET, FILM COATED ORAL at 16:46

## 2025-07-14 RX ADMIN — OXYCODONE 5 MG: 5 TABLET ORAL at 08:56

## 2025-07-14 RX ADMIN — PANCRELIPASE LIPASE, PANCRELIPASE PROTEASE, PANCRELIPASE AMYLASE 15000 UNITS: 15000; 47000; 63000 CAPSULE, DELAYED RELEASE ORAL at 12:19

## 2025-07-14 RX ADMIN — ALBUTEROL SULFATE 2 PUFF: 90 AEROSOL, METERED RESPIRATORY (INHALATION) at 14:41

## 2025-07-14 RX ADMIN — ALLOPURINOL 300 MG: 300 TABLET ORAL at 08:29

## 2025-07-14 RX ADMIN — PANCRELIPASE LIPASE, PANCRELIPASE PROTEASE, PANCRELIPASE AMYLASE 15000 UNITS: 15000; 47000; 63000 CAPSULE, DELAYED RELEASE ORAL at 16:52

## 2025-07-14 RX ADMIN — Medication 1000 MCG: at 08:29

## 2025-07-14 RX ADMIN — SODIUM CHLORIDE, PRESERVATIVE FREE 10 ML: 5 INJECTION INTRAVENOUS at 08:38

## 2025-07-14 ASSESSMENT — PAIN DESCRIPTION - ORIENTATION
ORIENTATION: ANTERIOR
ORIENTATION: ANTERIOR
ORIENTATION: RIGHT

## 2025-07-14 ASSESSMENT — PAIN SCALES - GENERAL
PAINLEVEL_OUTOF10: 6
PAINLEVEL_OUTOF10: 4
PAINLEVEL_OUTOF10: 7

## 2025-07-14 ASSESSMENT — PAIN DESCRIPTION - DESCRIPTORS
DESCRIPTORS: ACHING
DESCRIPTORS: SHARP
DESCRIPTORS: ACHING

## 2025-07-14 ASSESSMENT — PAIN DESCRIPTION - LOCATION
LOCATION: ABDOMEN
LOCATION: ABDOMEN
LOCATION: BACK

## 2025-07-14 ASSESSMENT — PAIN - FUNCTIONAL ASSESSMENT
PAIN_FUNCTIONAL_ASSESSMENT: PREVENTS OR INTERFERES SOME ACTIVE ACTIVITIES AND ADLS

## 2025-07-14 NOTE — PROGRESS NOTES
Patient educated on discharge instructions as well as new medications use, dosage, administration and possible side effects.  Patient verified knowledge. IV removed without difficulty and dry dressing in place. Telemetry monitor removed and returned to [facility]. Pt left facility in stable condition to Skilled nursing facility [facility] with all of their personal belongings.

## 2025-07-14 NOTE — CARE COORDINATION
Patient noted to have a discharge order.  Pt has been medically cleared for transition to Skilled Nursing Rehab Facility    Patient discharged to   [facility]       HENS Completed:  yes  Pre-cert required/obtained:  na    Transportation scheduled for 1800  Transportation provided by [employer] faxed and agency notified:  yes  The following prescriptions sent with pt: per nursing  Family Notified:  yes    Nurse to call report to facility [phone]    Follow-Up copy of Important Message from Medicare (IMM2) has been explained to patient and/or designated healthcare decision maker (HCDM). Pt and/or HCDM aware that patient is permitted to stay an additional 4 hours prior to discharge to consider an appeal if they feel as though they are being discharged too soon. Patient may discharge as planned if chooses to do so.  Patient/HCDM voice no other concerns or questions regarding this process.

## 2025-07-14 NOTE — PROGRESS NOTES
Patient educated on discharge instructions as well as new medications use, dosage, administration and possible side effects.  Patient verified knowledge. IV removed without difficulty and dry dressing in place. Telemetry monitor removed and returned to [facility]. Pt left facility in stable condition to Skilled nursing facility with all of their personal belongings.     Called Rafi Sommer @ [phone], was advised to speak with Isabela, option 2/option 6. Provided discharge/patient information as requested.

## 2025-07-14 NOTE — CONSULTS
Discharge order noted. PC consult completed. See notes. PC phone number provided to spouse if additional questions once home from rehab stay at [facility]. [facility] palliative care does service [facility] and spouse aware to contact them if needed for symptom management. Per spouse,Natasha, does not want any referrals at this time.

## 2025-07-14 NOTE — DISCHARGE INSTR - COC
Continuity of Care Form    Patient Name: Sharath Ceja   :  1950  MRN:  [id number]    Admit date:  2025  Discharge date:  2025    Code Status Order: Full Code   Advance Directives:     Admitting Physician:  Esme Wilson DO  PCP: Real Whitehead MD    Discharging Nurse: Felicitas Griffin  Discharging Hospital Unit/Room#: /0316-01  Discharging Unit Phone Number: [phone]    Emergency Contact:   Extended Emergency Contact Information  Primary Emergency Contact: Priya Hernandez  Home Phone: [phone]  Mobile Phone: [phone]  Relation: Child   needed? No  Secondary Emergency Contact: Keith Ceja  Home Phone: [phone]  Mobile Phone: [phone]  Relation: Child   needed? No    Past Surgical History:  Past Surgical History:   Procedure Laterality Date    CARDIAC CATHETERIZATION      1980S, DR ROWELL, NO ISSUES SINCE, HAS [address]    CHOLECYSTECTOMY      CT LIVER ABSCESS ASPIRATION      PT STATES DRAINED LIVER ABSCESS    CT NEEDLE BIOPSY LIVER PERCUTANEOUS  2025    CT NEEDLE BIOPSY LIVER PERCUTANEOUS 2025 TJ CT SCAN    CYST REMOVAL      KNEE & CLOSE TO RECTUM    ERCP N/A 2022    ERCP BIOPSY performed by Tab Pandey MD at [facility] ENDOSCOPY    ERCP  2022    ERCP SPHINCTER/PAPILLOTOMY performed by Tab Pandey MD at [facility] ENDOSCOPY    ERCP  2022    ERCP STENT INSERTION performed by Tab Pandey MD at [facility] ENDOSCOPY    ERCP N/A 2022    ERCP STENT REMOVAL performed by Tab Pandey MD at [facility] ENDOSCOPY    ERCP  2022    ERCP STENT INSERTION performed by Tab Pandey MD at [facility] ENDOSCOPY    ERCP  2022    ERCP BIOPSY performed by Tab Pandey MD at [facility] ENDOSCOPY    ERCP N/A 2022    ERCP STENT INSERTION performed by Tab Pandey MD at [facility] ENDOSCOPY    ERCP  2022    ERCP ENDOSCOPIC RETROGRADE

## 2025-07-14 NOTE — CARE COORDINATION
Per therapy, pt will need SNF and his first two choices are     [id number] called to [facility]. Awaiting determination    2- [facility].     Will follow

## 2025-07-14 NOTE — PROGRESS NOTES
Infectious Disease Follow up Notes    CC :  fever, GN bacteremia      Antibiotics:  Zosyn 3.375 q8     Admit Date:   7/2/2025  Hospital Day: 13    Subjective:   He remains AF on RA   Feels well, getting stronger   No new focal complaints   He denies abd pain     Objective:     Patient Vitals for the past 8 hrs:   BP Temp Temp src Pulse Resp SpO2 Weight   07/14/25 0746 -- -- -- -- -- 95 % --   07/14/25 0737 (!) 103/59 98.1 °F (36.7 °C) Oral 66 20 98 % --   07/14/25 0300 -- -- -- -- -- -- 70 kg (154 lb 4.8 oz)   07/14/25 0237 (!) 103/56 98.6 °F (37 °C) Oral 64 20 97 % --       EXAM:  General:  alert, conversant, no acute distress  Chronically ill appearing     HEENT:  NCAT, PERRL, sclera anicteric   MMM, no thrush     NECK:  Supple       LUNGS:   non-labored breathing   Upper lobes clear chuy     CV:   RRR without murmur     ABD: Mildly tender without R/G  BS present   2 biliary drains in place   EXT: No focal rash          LINE:    Port accessed, site ok        Scheduled Meds:   piperacillin-tazobactam  3,375 mg IntraVENous Q8H    lactulose  30 g Oral TID    albuterol sulfate HFA  2 puff Inhalation 4x Daily RT    vitamin B-12  1,000 mcg Oral Daily    phytonadione  10 mg Oral Daily    insulin lispro  0-4 Units SubCUTAneous 4 times per day    [Held by provider] erlotinib  100 mg Oral Daily    allopurinol  300 mg Oral Daily    finasteride  5 mg Oral QPM    vitamin D  50,000 Units Oral Once per day on Monday Wednesday Friday    polyethylene glycol  17 g Oral Daily    lipase-protease-amylase  60,000 Units Oral TID WC    And    lipase-protease-amylas  15,000 Units Oral TID WC    pantoprazole (PROTONIX) 40 mg in sodium chloride (PF) 0.9 % 10 mL injection  40 mg IntraVENous Q12H    sodium chloride flush  5-40 mL IntraVENous 2 times per day    melatonin  3 mg Oral Nightly       Continuous Infusions:   dextrose      sodium chloride            Data  Review:    Lab Results   Component Value Date    WBC 3.8 (L) 07/12/2025    HGB 7.2 (L) 07/12/2025    HCT 21.9 (L) 07/12/2025    MCV 84.6 07/12/2025     (L) 07/12/2025     Lab Results   Component Value Date    CREATININE 0.3 (L) 07/12/2025    BUN 8 07/12/2025     (L) 07/12/2025    K 3.6 07/12/2025    CL 97 (L) 07/12/2025    CO2 24 07/12/2025       Hepatic Function Panel:   Lab Results   Component Value Date/Time    ALKPHOS 293 07/12/2025 06:43 AM    ALT 14 07/12/2025 06:43 AM    AST 19 07/12/2025 06:43 AM    BILITOT 1.3 07/12/2025 06:43 AM    BILIDIR 1.4 07/10/2025 11:36 AM    IBILI 0.4 07/10/2025 11:36 AM       Cultures:  6/16     UA neg   7/3       C diff neg               GI PCR panel neg   7/9 BC x2 Escherichia coli   Antibiotic Interpretation JANNET  Method Status    amoxicillin-clavulanate Sensitive <=8/4 mcg/mL BACTERIAL SUSCEPTIBILITY PANEL BY JANNET     ampicillin Resistant >16 mcg/mL BACTERIAL SUSCEPTIBILITY PANEL BY JANNET     ampicillin-sulbactam Intermediate 16/8 mcg/mL BACTERIAL SUSCEPTIBILITY PANEL BY JANNET     ceFAZolin Sensitive <=2 mcg/mL BACTERIAL SUSCEPTIBILITY PANEL BY JANNET     cefepime Sensitive <=2 mcg/mL BACTERIAL SUSCEPTIBILITY PANEL BY JANNET     cefOXitin Sensitive <=8 mcg/mL BACTERIAL SUSCEPTIBILITY PANEL BY JANNET     cefTRIAXone Sensitive <=1 mcg/mL BACTERIAL SUSCEPTIBILITY PANEL BY JANNET     cefuroxime Sensitive 8 mcg/mL BACTERIAL SUSCEPTIBILITY PANEL BY JANNET     ciprofloxacin Resistant >2 mcg/mL BACTERIAL SUSCEPTIBILITY PANEL BY JANNET     ertapenem Sensitive <=0.5 mcg/mL BACTERIAL SUSCEPTIBILITY PANEL BY JANNET     gentamicin Sensitive <=2 mcg/mL BACTERIAL SUSCEPTIBILITY PANEL BY JANNET     levofloxacin Resistant >4 mcg/mL BACTERIAL SUSCEPTIBILITY PANEL BY JANNET     meropenem Sensitive <=1 mcg/mL BACTERIAL SUSCEPTIBILITY PANEL BY JANNET     piperacillin-tazobactam Sensitive <=8 mcg/mL BACTERIAL SUSCEPTIBILITY PANEL BY JANNET     trimethoprim-sulfamethoxazole Sensitive <=0.5/9.5 mcg/mL BACTERIAL SUSCEPTIBILITY

## 2025-07-14 NOTE — PLAN OF CARE
Problem: Chronic Conditions and Co-morbidities  Goal: Patient's chronic conditions and co-morbidity symptoms are monitored and maintained or improved  7/14/2025 1135 by Felicitas Griffin RN  Outcome: Progressing  Flowsheets (Taken 7/14/2025 0815)  Care Plan - Patient's Chronic Conditions and Co-Morbidity Symptoms are Monitored and Maintained or Improved:   Monitor and assess patient's chronic conditions and comorbid symptoms for stability, deterioration, or improvement   Collaborate with multidisciplinary team to address chronic and comorbid conditions and prevent exacerbation or deterioration   Update acute care plan with appropriate goals if chronic or comorbid symptoms are exacerbated and prevent overall improvement and discharge  7/14/2025 0210 by Juwan Humphries RN  Outcome: Progressing     Problem: Discharge Planning  Goal: Discharge to home or other facility with appropriate resources  7/14/2025 1135 by Felicitas Griffin RN  Outcome: Progressing  7/14/2025 0210 by Juwan Humphries RN  Outcome: Progressing     Problem: Skin/Tissue Integrity  Goal: Skin integrity remains intact  Description: 1.  Monitor for areas of redness and/or skin breakdown  2.  Assess vascular access sites hourly  3.  Every 4-6 hours minimum:  Change oxygen saturation probe site  4.  Every 4-6 hours:  If on nasal continuous positive airway pressure, respiratory therapy assess nares and determine need for appliance change or resting period  Outcome: Progressing  Flowsheets (Taken 7/14/2025 0815)  Skin Integrity Remains Intact:   Monitor for areas of redness and/or skin breakdown   Assess vascular access sites hourly   Every 4-6 hours minimum:  Change oxygen saturation probe site   Every 4-6 hours:  If on nasal continuous positive airway pressure, assess nares and determine need for appliance change or resting period   Turn and reposition as indicated   Assess need for specialty bed   Positioning devices   Pressure redistribution bed/mattress (bed

## 2025-07-14 NOTE — PLAN OF CARE
Problem: Chronic Conditions and Co-morbidities  Goal: Patient's chronic conditions and co-morbidity symptoms are monitored and maintained or improved  7/14/2025 1342 by Felicitas Griffin RN  Outcome: Progressing  7/14/2025 1135 by Felicitas Griffin RN  Outcome: Progressing  Flowsheets (Taken 7/14/2025 0815)  Care Plan - Patient's Chronic Conditions and Co-Morbidity Symptoms are Monitored and Maintained or Improved:   Monitor and assess patient's chronic conditions and comorbid symptoms for stability, deterioration, or improvement   Collaborate with multidisciplinary team to address chronic and comorbid conditions and prevent exacerbation or deterioration   Update acute care plan with appropriate goals if chronic or comorbid symptoms are exacerbated and prevent overall improvement and discharge  7/14/2025 0210 by Juwan Humphries RN  Outcome: Progressing     Problem: Discharge Planning  Goal: Discharge to home or other facility with appropriate resources  7/14/2025 1342 by Felicitas Griffin RN  Outcome: Progressing  7/14/2025 1135 by Felicitas Griffin RN  Outcome: Progressing  Flowsheets (Taken 7/14/2025 0815 by Aaliyah Servin, RN)  Discharge to home or other facility with appropriate resources: Identify barriers to discharge with patient and caregiver  7/14/2025 0210 by Juwan Humphries RN  Outcome: Progressing     Problem: Skin/Tissue Integrity  Goal: Skin integrity remains intact  Description: 1.  Monitor for areas of redness and/or skin breakdown  2.  Assess vascular access sites hourly  3.  Every 4-6 hours minimum:  Change oxygen saturation probe site  4.  Every 4-6 hours:  If on nasal continuous positive airway pressure, respiratory therapy assess nares and determine need for appliance change or resting period  7/14/2025 1342 by Felicitas Griffin RN  Outcome: Progressing  7/14/2025 1135 by Felicitas Griffin RN  Outcome: Progressing  Flowsheets (Taken 7/14/2025 0815)  Skin Integrity Remains Intact:   Monitor for areas of redness  of infection  7/14/2025 1342 by Felicitas Griffin RN  Outcome: Progressing  7/14/2025 1135 by Felicitas Griffin RN  Outcome: Progressing  Flowsheets (Taken 7/14/2025 0815)  Incisions, Wounds, or Drain Sites Healing Without Sign and Symptoms of Infection:   ADMISSION and DAILY: Assess and document risk factors for pressure ulcer development   TWICE DAILY: Assess and document skin integrity   TWICE DAILY: Assess and document dressing/incision, wound bed, drain sites and surrounding tissue   Initiate isolation precautions as appropriate   Initiate pressure ulcer prevention bundle as indicated  Goal: Oral mucous membranes remain intact  7/14/2025 1342 by Felicitas Griffin RN  Outcome: Progressing  7/14/2025 1135 by Felicitas Griffin RN  Outcome: Progressing  Flowsheets (Taken 7/14/2025 0815)  Oral Mucous Membranes Remain Intact:   Assess oral mucosa and hygiene practices   Implement oral medicated treatments as ordered   Implement preventative oral hygiene regimen     Problem: Musculoskeletal - Adult  Goal: Return mobility to safest level of function  7/14/2025 1342 by Felicitas Griffin RN  Outcome: Progressing  7/14/2025 1135 by Felicitas Griffin RN  Outcome: Progressing  Flowsheets (Taken 7/14/2025 0815)  Return Mobility to Safest Level of Function:   Assess patient stability and activity tolerance for standing, transferring and ambulating with or without assistive devices   Assist with transfers and ambulation using safe patient handling equipment as needed   Ensure adequate protection for wounds/incisions during mobilization   Obtain physical therapy/occupational therapy consults as needed   Apply continuous passive motion per provider or physical therapy orders to increase flexion toward goal   Instruct patient/family in ordered activity level  Goal: Maintain proper alignment of affected body part  7/14/2025 1342 by Felicitas Griffin RN  Outcome: Progressing  7/14/2025 1135 by Felicitas Griffin RN  Outcome: Progressing  Flowsheets (Taken

## 2025-07-14 NOTE — PROGRESS NOTES
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   On chemo       Subjective:  Mr. Ceja seen    Fevers resolved   Feels very fatigued/ PT recommend SNF.      Objective:   Patient Vitals for the past 4 hrs:   BP Temp Temp src Pulse Resp SpO2   07/14/25 1145 103/66 98.1 °F (36.7 °C) Oral 64 20 --   07/14/25 1104 -- -- -- -- -- 96 %          Intake/Output Summary (Last 24 hours) at 7/14/2025 1249  Last data filed at 7/14/2025 1236  Gross per 24 hour   Intake 1923.9 ml   Output 895 ml   Net 1028.9 ml       Physical Exam:    Gen: No  distress.  Awake and well-oriented  Alert. +Chronically ill appearing;  elderly male.  Eyes: PERRL. No sclera icterus. No conjunctival injection.   ENT: No discharge. Pharynx clear.   Neck: No JVD.  Trachea midline.  Resp: No  accessory muscle use. No wheezes, rales or rhonchi   +right chest wall port   CV: Regular rate. Regular rhythm. No murmur.  No rub.    Leg edema - mild   Peripheral Pulses: +2 palpable, equal bilaterally   GI:  Normal bowel sounds.+Mild distension and mid abdominal tenderness to palpation   +2 biliary drains present   Skin: Warm and dry. No nodule on exposed extremities. No rash on exposed extremities.   M/S: No cyanosis. No joint deformity. No clubbing.   Neuro: Awake. Grossly nonfocal    Psych: Oriented x 3.        Medications:  piperacillin-tazobactam, 3,375 mg, Q8H  lactulose, 30 g, TID  albuterol sulfate HFA, 2 puff, 4x Daily RT  vitamin B-12, 1,000 mcg, Daily  phytonadione, 10 mg, Daily  insulin lispro, 0-4 Units, 4 times per day  [Held by provider] erlotinib, 100 mg, Daily  allopurinol, 300 mg, Daily  finasteride, 5 mg, QPM  vitamin D, 50,000 Units, Once per day on Monday Wednesday Friday  polyethylene glycol, 17 g,  Daily  lipase-protease-amylase, 60,000 Units, TID WC   And  lipase-protease-amylas, 15,000 Units, TID WC  pantoprazole (PROTONIX) 40 mg in sodium chloride (PF) 0.9 % 10 mL injection, 40 mg, Q12H  sodium chloride flush, 5-40 mL, 2 times per day  melatonin, 3 mg, Nightly      PRN Medications:  albuterol sulfate HFA, 2 puff, Q4H PRN  ipratropium 0.5 mg-albuterol 2.5 mg, 1 Dose, Q4H PRN  glucose, 4 tablet, PRN  dextrose bolus, 125 mL, PRN   Or  dextrose bolus, 250 mL, PRN  glucagon (rDNA), 1 mg, PRN  dextrose, , Continuous PRN  oxyCODONE, 5 mg, Q6H PRN  lipase-protease-amylase, 20,000 Units, PRN   And  lipase-protease-amylas, 15,000 Units, PRN  sodium chloride flush, 5-40 mL, PRN  sodium chloride, , PRN  potassium chloride, 40 mEq, PRN   Or  potassium alternative oral replacement, 40 mEq, PRN   Or  potassium chloride, 10 mEq, PRN  magnesium sulfate, 2,000 mg, PRN  ondansetron, 4 mg, Q8H PRN   Or  ondansetron, 4 mg, Q6H PRN  acetaminophen, 500 mg, Q6H PRN   Or  acetaminophen, 325 mg, Q6H PRN          Data:  CBC:   Recent Labs     07/12/25  0643   WBC 3.8*   HGB 7.2*   HCT 21.9*   MCV 84.6   *     BMP:   Recent Labs     07/12/25  0643   *   K 3.6   CL 97*   CO2 24   BUN 8   CREATININE 0.3*     LIVER PROFILE:   Recent Labs     07/12/25  0643   AST 19   ALT 14   BILITOT 1.3*   ALKPHOS 293*       PT/INR:   No results for input(s): \"PROTIME\", \"INR\" in the last 72 hours.        CULTURES  Results       Procedure Component Value Units Date/Time    Culture, Blood 2 [[id number]] Collected: 07/13/25 1113    Order Status: Completed Specimen: Blood Updated: 07/14/25 1115     Culture, Blood 2 No Growth to date.  Any change in status will be called.    Narrative:      ORDER#: [id number]                          ORDERED BY: GILLIAN HEWITT  SOURCE: Blood                              COLLECTED:  07/13/25 11:13  ANTIBIOTICS AT [facility].:                      RECEIVED :  07/13/25 11:13  If child <=2 yrs old please draw pediatric

## 2025-07-14 NOTE — DISCHARGE SUMMARY
GURWINDER CABALLERO  SOURCE: Stool                              COLLECTED:  07/03/25 13:32  ANTIBIOTICS AT [facility].:                      RECEIVED :  07/03/25 13:35  Collect White vial (sterile container)    GI Bacterial Pathogens By PCR [[id number]] Collected: 07/03/25 1332    Order Status: Completed Specimen: Stool Updated: 07/04/25 1341     GI Bacterial Pathogens By PCR --     No Shigella spp/EIEC DNA detected  No Shiga toxin-producing gene(s) detected  No Campylobacter spp. (jejuni and coli)DNA detected  No Salmonella spp. DNA detected  No Vibrio vulnificus/parahaemolyticus/cholerae DNA detected  No Plesiomonas shigelloides DNA detected  No Enterotoxigenic E. coli (ETEC) DNA detected  No Yersinia enterocolitica DNA detected  Normal Range:  None detected      Narrative:      ORDER#: [id number]                          ORDERED BY: GURWINDER CABALLERO  SOURCE: Stool                              COLLECTED:  07/03/25 13:32  ANTIBIOTICS AT [facility].:                      RECEIVED :  07/03/25 13:36              RADIOLOGY  US GALLBLADDER RUQ   Preliminary Result   Mild intrahepatic biliary duct dilation with intrahepatic biliary drains in   place.         XR ABDOMEN (KUB) (SINGLE AP VIEW)   Final Result   Nonobstructive bowel gas pattern. Mild stool burden.         XR ABDOMEN (KUB) (SINGLE AP VIEW)   Final Result   Nonobstructive bowel gas pattern.  Probable constipation.  Stable biliary   catheter position.         XR ABDOMEN (KUB) (SINGLE AP VIEW)   Final Result   No bowel obstruction or evidence of acute abnormality.         XR CHEST PORTABLE   Final Result   Mild bibasilar atelectasis otherwise no acute cardiopulmonary disease.         Vascular duplex lower extremity venous bilateral   Final Result      XR ABDOMEN (KUB) (SINGLE AP VIEW)   Final Result   1. Nonspecific bowel gas pattern. No bowel obstruction.   2. Stable biliary drains.            CTA ABDOMEN PELVIS W WO CONTRAST   Final Result   1. No evidence of active GI  ERGOCALCIFEROL  What changed: Another medication with the same name was removed. Continue taking this medication, and follow the directions you see here.            CONTINUE taking these medications      albuterol sulfate  (90 Base) MCG/ACT inhaler  Commonly known as: PROVENTIL;VENTOLIN;PROAIR  INHALE 2 PUFFS BY MOUTH 4 TIMES DAILY AS NEEDED FOR WHEEZING FOR SHORTNESS OF BREATH     allopurinol 300 MG tablet  Commonly known as: ZYLOPRIM  Take 1 tablet by mouth once daily     B-12 500 MCG Subl  Take 1,000 mg by mouth daily     Creon [id number] units Cpep delayed release capsule  Generic drug: lipase-protease-amylase     dicyclomine 20 MG tablet  Commonly known as: BENTYL  Take 1 tablet by mouth once daily     ferrous sulfate 325 (65 Fe) MG tablet  Commonly known as: IRON 325  Take 1 tablet by mouth 2 times daily     finasteride 5 MG tablet  Commonly known as: PROSCAR     ondansetron 4 MG disintegrating tablet  Commonly known as: ZOFRAN-ODT  Take 1 tablet by mouth every 8 hours as needed for Nausea or Vomiting     OneTouch Delica Plus [id number] Misc  USE 1 TWICE DAILY     OneTouch Ultra strip  Generic drug: blood glucose test strips  USE 1 STRIP TO CHECK GLUCOSE TWICE DAILY     pantoprazole 40 MG tablet  Commonly known as: PROTONIX  Take 1 tablet by mouth in the morning and at bedtime     phytonadione 5 MG tablet  Commonly known as: VITAMIN K  Take 0.5 tablets by mouth daily     polycarbophil 625 MG tablet  Commonly known as: FIBERCON  Take 1 tablet by mouth daily     polyethylene glycol 17 g packet  Commonly known as: GLYCOLAX  Take 1 packet by mouth daily     potassium chloride 20 MEQ extended release tablet  Commonly known as: KLOR-CON M            STOP taking these medications      senna 8.6 MG tablet  Commonly known as: SENOKOT               Where to Get Your Medications        These medications were sent to [facility] [address] [place] [address] [id number] - P [phone] - F [phone]

## 2025-07-14 NOTE — PLAN OF CARE
Problem: Chronic Conditions and Co-morbidities  Goal: Patient's chronic conditions and co-morbidity symptoms are monitored and maintained or improved  Outcome: Progressing      Pt called and stated she need a copy of her work excuse/letter from 1/8/19 and 11/15/18 for her job? Pt would like letters mailed to her address on file. Please advise.

## 2025-07-14 NOTE — PROGRESS NOTES
Inpatient Physical Therapy Treatment    Unit: PCU  Date:  7/14/2025  Patient Name:    Sharath Ceja  Admitting diagnosis:  Acute anemia [D64.9]  Admit Date:  7/2/2025  Precautions/Restrictions/WB Status/ Lines/ Wounds/ Oxygen: Fall risk, Bed/chair alarm, Lines (IV and Drains (biliary drains)), Telemetry, and Continuous pulse oximetry       Treatment Time:  1147 - 1210  Treatment Number:  3   Timed Code Treatment Minutes: 23 minutes  Total Treatment Minutes: 23 minutes    Patient Stated Goals for Therapy: \" to get better \"          Discharge Recommendations: SNF  DME needs for discharge: Defer to facility       Therapy recommendation for EMS Transport: can transport by wheelchair    Therapy recommendations for staff:   Assist of 1 for ambulation with use of rolling walker (RW) and gait belt to/from BSC  to/from chair  to/from bathroom    History of Present Illness:   Pt with increased weakness during this hospital stay, demonstrating difficulty with prolonged ambulatioon. Pt's wife also expressing concern for her ability to care for the patient while at home in his current state.    Per admission H&P by Dr. Wilson on 7/2/25:  \"74 y.o. male who presented to [facility] with reports of unusual stool color.  Patient has medical history of cholangiocarcinoma and diabetes mellitus was presented to the hospital due to reports of rust colored stool.  Patient reports over the past 6 days he noticed change in stool color that he describes as more orange in color.  He states also noticing looser stools with about 6 bowel movements a day.  He states similar event occurring during last hospitalization 3 weeks ago.  He is otherwise denying any abdominal pain or postprandial abdominal pain.  He is otherwise denying fever, chills, shortness of breath, chest pain, nausea or emesis. \"    Preadmission Environment:   Pt. Lives                                              with spouse and 24/7 assist available   Home

## 2025-07-15 ENCOUNTER — TELEPHONE (OUTPATIENT)
Dept: FAMILY MEDICINE CLINIC | Age: 75
End: 2025-07-15

## 2025-07-15 NOTE — TELEPHONE ENCOUNTER
Care Transitions Initial Follow Up Call    Outreach made within 2 business days of discharge: Yes    Patient: Sharath Ceja Patient : 1950   MRN: [id number]  Reason for Admission: ANEMIA  Discharge Date: 25       Spoke with: AVELINA    Discharge department/facility: [facility] Interactive Patient Contact:  Was patient able to fill all prescriptions: No: [facility]   Was patient instructed to bring all medications to the follow-up visit: No: [facility]   Is patient taking all medications as directed in the discharge summary? No [facility]   Does patient understand their discharge instructions: No: [facility]   Does patient have questions or concerns that need addressed prior to 7-14 day follow up office visit: NO   [facility]   Additional needs identified to be addressed with provider  [facility]              Scheduled appointment with PCP within 7-14 days    Follow Up  Future Appointments   Date Time Provider Department Center   2025  1:00 PM Real Whitehead MD [facility]       So Foster MA

## 2025-07-16 ENCOUNTER — TELEPHONE (OUTPATIENT)
Dept: FAMILY MEDICINE CLINIC | Age: 75
End: 2025-07-16

## 2025-07-16 LAB
A/G RATIO: 0.8 G/DL (ref 1–2.5)
ALBUMIN: 2.7 G/DL (ref 3.5–5)
ALP BLD-CCNC: 436 U/L (ref 38–126)
ALT SERPL-CCNC: 25 U/L (ref 0–49)
ANION GAP SERPL CALCULATED.3IONS-SCNC: 12 MMOL/L (ref 8–16)
AST SERPL-CCNC: 32 U/L (ref 17–59)
BILIRUB SERPL-MCNC: 1.6 MG/DL (ref 0.2–1.3)
BUN BLDV-MCNC: 9 MG/DL (ref 9–20)
CALCIUM SERPL-MCNC: 8.1 MG/DL (ref 8.6–10.3)
CHLORIDE BLD-SCNC: 100 MMOL/L (ref 98–107)
CO2: 25 MMOL/L (ref 22–30)
CREAT SERPL-MCNC: 0.4 MG/DL (ref 0.66–1.25)
GFR, ESTIMATED: 210
GLOBULIN: 3.3 G/DL (ref 2–3.5)
GLUCOSE: 81 MG/DL (ref 74–100)
INR BLD: 1.19 (ref 2–4)
POTASSIUM SERPL-SCNC: 4 MMOL/L (ref 3.4–5.1)
PROTHROMBIN TIME: 13 SECS. (ref 9.4–12.2)
SODIUM BLD-SCNC: 133 MMOL/L (ref 137–145)
TOTAL PROTEIN: 6 G/DL (ref 6.3–8.2)

## 2025-07-16 NOTE — TELEPHONE ENCOUNTER
Care Transitions Initial Follow Up Call    Outreach made within 2 business days of discharge: Yes    Patient: Sharath Ceja Patient : 1950   MRN: [id number]  Reason for Admission: ACUTE ANEMIA  Discharge Date: 25       Spoke with: N/A    Discharge department/facility: [facility]    [facility] Interactive Patient Contact:  Was patient able to fill all prescriptions: No: D/C [facility]  Was patient instructed to bring all medications to the follow-up visit: No: D/C [facility]  Is patient taking all medications as directed in the discharge summary? D/C [facility]  Does patient understand their discharge instructions: No: D/C [facility]  Does patient have questions or concerns that need addressed prior to 7-14 day follow up office visit: NO D/C [facility]    Additional needs identified to be addressed with provider  D/C [facility]             Scheduled appointment with PCP within 7-14 days    Follow Up  Future Appointments   Date Time Provider Department Center   2025  1:00 PM Real Whitehead MD [facility]       So Foster MA

## 2025-07-17 LAB — BACTERIA BLD CULT ORG #2: NORMAL

## 2025-07-18 ENCOUNTER — HOSPITAL ENCOUNTER (OUTPATIENT)
Dept: ULTRASOUND IMAGING | Age: 75
Discharge: HOME | End: 2025-07-18
Payer: MEDICARE

## 2025-07-18 VITALS — HEART RATE: 68 BPM | DIASTOLIC BLOOD PRESSURE: 60 MMHG | SYSTOLIC BLOOD PRESSURE: 107 MMHG | OXYGEN SATURATION: 97 %

## 2025-07-18 DIAGNOSIS — C24.0 BILE DUCT CARCINOMA (HCC): ICD-10-CM

## 2025-07-18 DIAGNOSIS — R18.8 OTHER ASCITES: ICD-10-CM

## 2025-07-18 PROCEDURE — 49083 ABD PARACENTESIS W/IMAGING: CPT

## 2025-07-18 NOTE — PROCEDURES
PROCEDURE NOTE  Date: 7/18/2025   Name: Sharath Ceja  YOB: 1950    Procedures    Image guided left Paracentesis completed.  1.0 liters of clear yellow colored withdrawn.  Pt tolerated procedure without any signs or symptoms of distress. Vital signs stable.     DISCHARGED:  HOME: Discharge instructions reviewed with patient. Verbalized understanding.     SPECIMEN SENT:  No    Vital Signs  Vitals:    07/18/25 1300   BP: 107/60   Pulse: 68   SpO2:     (vital signs in table format)

## 2025-07-18 NOTE — PROCEDURES
PROCEDURE NOTE  Date: 7/18/2025   Name: Sharath Ceja  YOB: 1950    Procedures    Pt arrived for image guided left Paracentesis. Dr. Cortez explained the procedure including the risk and benefits of the procedure. All questions answered. Pt verbalizes understanding of the procedure and states no more questions. Consent confirmed. Vital signs stable. Labs, allergies, medications, and code status reviewed. No contraindications noted. Time out completed prior to procedure.    Vital Signs  Vitals:    07/18/25 1230   BP: (!) 115/59   Pulse: 72   SpO2: 97%    (vital signs in table format)      Allergies  Codeine (allergies)    Labs  Lab Results   Component Value Date    INR 1.19 (L) 07/16/2025    PROTIME 13.0 (H) 07/16/2025     Lab Results   Component Value Date    CREATININE 0.3 (L) 07/17/2025    BUN 7 (L) 07/17/2025     (L) 07/17/2025    K 4.0 07/17/2025     07/17/2025    CO2 26 07/17/2025     Lab Results   Component Value Date    WBC 7.0 07/17/2025    HGB 7.8 (L) 07/17/2025    HCT 24.6 (L) 07/17/2025    MCV 88.2 07/17/2025     07/17/2025

## 2025-07-21 ENCOUNTER — APPOINTMENT (OUTPATIENT)
Dept: CT IMAGING | Age: 75
DRG: 435 | End: 2025-07-21
Payer: MEDICARE

## 2025-07-21 ENCOUNTER — HOSPITAL ENCOUNTER (INPATIENT)
Age: 75
LOS: 10 days | Discharge: HOME | DRG: 435 | End: 2025-08-01
Attending: EMERGENCY MEDICINE | Admitting: STUDENT IN AN ORGANIZED HEALTH CARE EDUCATION/TRAINING PROGRAM
Payer: MEDICARE

## 2025-07-21 DIAGNOSIS — E87.1 ACUTE HYPONATREMIA: Primary | ICD-10-CM

## 2025-07-21 DIAGNOSIS — R60.1 ANASARCA: ICD-10-CM

## 2025-07-21 DIAGNOSIS — C22.8 PRIMARY MALIGNANT NEOPLASM OF LIVER (HCC): ICD-10-CM

## 2025-07-21 DIAGNOSIS — Z79.4 TYPE 2 DIABETES MELLITUS WITH HYPEROSMOLARITY WITHOUT COMA, WITH LONG-TERM CURRENT USE OF INSULIN (HCC): ICD-10-CM

## 2025-07-21 DIAGNOSIS — Z79.4 TYPE 2 DIABETES MELLITUS WITHOUT COMPLICATION, WITH LONG-TERM CURRENT USE OF INSULIN (HCC): ICD-10-CM

## 2025-07-21 DIAGNOSIS — C22.1 CHOLANGIOCARCINOMA (HCC): ICD-10-CM

## 2025-07-21 DIAGNOSIS — E11.9 TYPE 2 DIABETES MELLITUS WITHOUT COMPLICATION, WITH LONG-TERM CURRENT USE OF INSULIN (HCC): ICD-10-CM

## 2025-07-21 DIAGNOSIS — R10.10 PAIN OF UPPER ABDOMEN: ICD-10-CM

## 2025-07-21 DIAGNOSIS — R17 JAUNDICE: ICD-10-CM

## 2025-07-21 DIAGNOSIS — R18.8 OTHER ASCITES: ICD-10-CM

## 2025-07-21 DIAGNOSIS — E11.00 TYPE 2 DIABETES MELLITUS WITH HYPEROSMOLARITY WITHOUT COMA, WITH LONG-TERM CURRENT USE OF INSULIN (HCC): ICD-10-CM

## 2025-07-21 LAB
ALBUMIN SERPL-MCNC: 2.4 G/DL (ref 3.4–5)
ALBUMIN/GLOB SERPL: 0.8 {RATIO} (ref 1.1–2.2)
ALP SERPL-CCNC: 302 U/L (ref 40–129)
ALT SERPL-CCNC: 18 U/L (ref 10–40)
AMMONIA PLAS-SCNC: 34 UMOL/L (ref 16–60)
ANION GAP SERPL CALCULATED.3IONS-SCNC: 7 MMOL/L (ref 3–16)
AST SERPL-CCNC: 23 U/L (ref 15–37)
BASOPHILS # BLD: 0 K/UL (ref 0–0.2)
BASOPHILS NFR BLD: 0.2 %
BILIRUB SERPL-MCNC: 1.6 MG/DL (ref 0–1)
BUN SERPL-MCNC: 6 MG/DL (ref 7–20)
CALCIUM SERPL-MCNC: 7.9 MG/DL (ref 8.3–10.6)
CHLORIDE SERPL-SCNC: 95 MMOL/L (ref 99–110)
CO2 SERPL-SCNC: 23 MMOL/L (ref 21–32)
CREAT SERPL-MCNC: 0.3 MG/DL (ref 0.8–1.3)
CRP SERPL-MCNC: 94.8 MG/L (ref 0–5.1)
DEPRECATED RDW RBC AUTO: 18.6 % (ref 12.4–15.4)
EOSINOPHIL # BLD: 0 K/UL (ref 0–0.6)
EOSINOPHIL NFR BLD: 0.4 %
ERYTHROCYTE [SEDIMENTATION RATE] IN BLOOD BY WESTERGREN METHOD: 58 MM/HR (ref 0–20)
GFR SERPLBLD CREATININE-BSD FMLA CKD-EPI: >90 ML/MIN/{1.73_M2}
GLUCOSE SERPL-MCNC: 156 MG/DL (ref 70–99)
HCT VFR BLD AUTO: 23.3 % (ref 40.5–52.5)
HGB BLD-MCNC: 7.6 G/DL (ref 13.5–17.5)
INR PPP: 1.43 (ref 0.86–1.14)
LIPASE SERPL-CCNC: 20 U/L (ref 13–60)
LYMPHOCYTES # BLD: 0.5 K/UL (ref 1–5.1)
LYMPHOCYTES NFR BLD: 6.5 %
MCH RBC QN AUTO: 27.9 PG (ref 26–34)
MCHC RBC AUTO-ENTMCNC: 32.4 G/DL (ref 31–36)
MCV RBC AUTO: 86 FL (ref 80–100)
MONOCYTES # BLD: 0.4 K/UL (ref 0–1.3)
MONOCYTES NFR BLD: 5.8 %
NEUTROPHILS # BLD: 6.4 K/UL (ref 1.7–7.7)
NEUTROPHILS NFR BLD: 87.1 %
NT-PROBNP SERPL-MCNC: 815 PG/ML (ref 0–449)
PLATELET # BLD AUTO: 175 K/UL (ref 135–450)
PMV BLD AUTO: 7.5 FL (ref 5–10.5)
POTASSIUM SERPL-SCNC: 4.6 MMOL/L (ref 3.5–5.1)
PROT SERPL-MCNC: 5.6 G/DL (ref 6.4–8.2)
PROTHROMBIN TIME: 17.6 SEC (ref 12.1–14.9)
RBC # BLD AUTO: 2.71 M/UL (ref 4.2–5.9)
SODIUM SERPL-SCNC: 125 MMOL/L (ref 136–145)
TROPONIN, HIGH SENSITIVITY: 13 NG/L (ref 0–22)
TROPONIN, HIGH SENSITIVITY: 13 NG/L (ref 0–22)
WBC # BLD AUTO: 7.3 K/UL (ref 4–11)

## 2025-07-21 PROCEDURE — 86140 C-REACTIVE PROTEIN: CPT

## 2025-07-21 PROCEDURE — 83930 ASSAY OF BLOOD OSMOLALITY: CPT

## 2025-07-21 PROCEDURE — 6360000002 HC RX W HCPCS: Performed by: EMERGENCY MEDICINE

## 2025-07-21 PROCEDURE — 85025 COMPLETE CBC W/AUTO DIFF WBC: CPT

## 2025-07-21 PROCEDURE — 36415 COLL VENOUS BLD VENIPUNCTURE: CPT

## 2025-07-21 PROCEDURE — 82140 ASSAY OF AMMONIA: CPT

## 2025-07-21 PROCEDURE — 71260 CT THORAX DX C+: CPT

## 2025-07-21 PROCEDURE — 96374 THER/PROPH/DIAG INJ IV PUSH: CPT

## 2025-07-21 PROCEDURE — 84484 ASSAY OF TROPONIN QUANT: CPT

## 2025-07-21 PROCEDURE — 6360000004 HC RX CONTRAST MEDICATION: Performed by: EMERGENCY MEDICINE

## 2025-07-21 PROCEDURE — 80053 COMPREHEN METABOLIC PANEL: CPT

## 2025-07-21 PROCEDURE — 93005 ELECTROCARDIOGRAM TRACING: CPT | Performed by: EMERGENCY MEDICINE

## 2025-07-21 PROCEDURE — 83690 ASSAY OF LIPASE: CPT

## 2025-07-21 PROCEDURE — 85652 RBC SED RATE AUTOMATED: CPT

## 2025-07-21 PROCEDURE — 99285 EMERGENCY DEPT VISIT HI MDM: CPT

## 2025-07-21 PROCEDURE — 74177 CT ABD & PELVIS W/CONTRAST: CPT

## 2025-07-21 PROCEDURE — 83880 ASSAY OF NATRIURETIC PEPTIDE: CPT

## 2025-07-21 PROCEDURE — 85610 PROTHROMBIN TIME: CPT

## 2025-07-21 PROCEDURE — 96375 TX/PRO/DX INJ NEW DRUG ADDON: CPT

## 2025-07-21 PROCEDURE — 6370000000 HC RX 637 (ALT 250 FOR IP): Performed by: EMERGENCY MEDICINE

## 2025-07-21 RX ORDER — ONDANSETRON 2 MG/ML
4 INJECTION INTRAMUSCULAR; INTRAVENOUS ONCE
Status: COMPLETED | OUTPATIENT
Start: 2025-07-21 | End: 2025-07-21

## 2025-07-21 RX ORDER — IPRATROPIUM BROMIDE AND ALBUTEROL SULFATE 2.5; .5 MG/3ML; MG/3ML
1 SOLUTION RESPIRATORY (INHALATION) ONCE
Status: COMPLETED | OUTPATIENT
Start: 2025-07-21 | End: 2025-07-21

## 2025-07-21 RX ORDER — BLOOD SUGAR DIAGNOSTIC
STRIP MISCELLANEOUS
Qty: 200 EACH | Refills: 2 | Status: ON HOLD | OUTPATIENT
Start: 2025-07-21

## 2025-07-21 RX ORDER — IOPAMIDOL 755 MG/ML
75 INJECTION, SOLUTION INTRAVASCULAR
Status: COMPLETED | OUTPATIENT
Start: 2025-07-21 | End: 2025-07-21

## 2025-07-21 RX ADMIN — IPRATROPIUM BROMIDE AND ALBUTEROL SULFATE 1 DOSE: 2.5; .5 SOLUTION RESPIRATORY (INHALATION) at 23:37

## 2025-07-21 RX ADMIN — ONDANSETRON 4 MG: 2 INJECTION, SOLUTION INTRAMUSCULAR; INTRAVENOUS at 22:42

## 2025-07-21 RX ADMIN — IOPAMIDOL 75 ML: 755 INJECTION, SOLUTION INTRAVENOUS at 23:19

## 2025-07-21 RX ADMIN — HYDROMORPHONE HYDROCHLORIDE 1 MG: 1 INJECTION, SOLUTION INTRAMUSCULAR; INTRAVENOUS; SUBCUTANEOUS at 22:45

## 2025-07-21 ASSESSMENT — PAIN DESCRIPTION - LOCATION: LOCATION: ABDOMEN

## 2025-07-21 ASSESSMENT — PAIN SCALES - GENERAL
PAINLEVEL_OUTOF10: 7
PAINLEVEL_OUTOF10: 5

## 2025-07-21 ASSESSMENT — PAIN DESCRIPTION - DESCRIPTORS: DESCRIPTORS: SHARP

## 2025-07-21 ASSESSMENT — PAIN - FUNCTIONAL ASSESSMENT: PAIN_FUNCTIONAL_ASSESSMENT: 0-10

## 2025-07-21 ASSESSMENT — PAIN DESCRIPTION - ORIENTATION: ORIENTATION: RIGHT;LEFT

## 2025-07-22 LAB
ALBUMIN SERPL-MCNC: 2.6 G/DL (ref 3.4–5)
ALBUMIN/GLOB SERPL: 0.7 {RATIO} (ref 1.1–2.2)
ALP SERPL-CCNC: 292 U/L (ref 40–129)
ALT SERPL-CCNC: 18 U/L (ref 10–40)
ANION GAP SERPL CALCULATED.3IONS-SCNC: 9 MMOL/L (ref 3–16)
AST SERPL-CCNC: 22 U/L (ref 15–37)
BASOPHILS # BLD: 0 K/UL (ref 0–0.2)
BASOPHILS NFR BLD: 0.4 %
BILIRUB SERPL-MCNC: 1.9 MG/DL (ref 0–1)
BUN SERPL-MCNC: 5 MG/DL (ref 7–20)
CALCIUM SERPL-MCNC: 7.6 MG/DL (ref 8.3–10.6)
CHLORIDE SERPL-SCNC: 95 MMOL/L (ref 99–110)
CO2 SERPL-SCNC: 27 MMOL/L (ref 21–32)
CREAT SERPL-MCNC: 0.3 MG/DL (ref 0.8–1.3)
CREAT UR-MCNC: 32.4 MG/DL (ref 39–259)
DEPRECATED RDW RBC AUTO: 18.4 % (ref 12.4–15.4)
EKG ATRIAL RATE: 71 BPM
EKG DIAGNOSIS: NORMAL
EKG P AXIS: 57 DEGREES
EKG P-R INTERVAL: 182 MS
EKG Q-T INTERVAL: 412 MS
EKG QRS DURATION: 88 MS
EKG QTC CALCULATION (BAZETT): 447 MS
EKG R AXIS: 61 DEGREES
EKG T AXIS: 48 DEGREES
EKG VENTRICULAR RATE: 71 BPM
EOSINOPHIL # BLD: 0 K/UL (ref 0–0.6)
EOSINOPHIL NFR BLD: 0.5 %
GFR SERPLBLD CREATININE-BSD FMLA CKD-EPI: >90 ML/MIN/{1.73_M2}
GLUCOSE BLD-MCNC: 126 MG/DL (ref 70–99)
GLUCOSE BLD-MCNC: 172 MG/DL (ref 70–99)
GLUCOSE BLD-MCNC: 179 MG/DL (ref 70–99)
GLUCOSE BLD-MCNC: 64 MG/DL (ref 70–99)
GLUCOSE BLD-MCNC: 94 MG/DL (ref 70–99)
GLUCOSE SERPL-MCNC: 58 MG/DL (ref 70–99)
HCT VFR BLD AUTO: 22 % (ref 40.5–52.5)
HGB BLD-MCNC: 7.2 G/DL (ref 13.5–17.5)
LYMPHOCYTES # BLD: 0.3 K/UL (ref 1–5.1)
LYMPHOCYTES NFR BLD: 5.5 %
MAGNESIUM SERPL-MCNC: 1.9 MG/DL (ref 1.8–2.4)
MCH RBC QN AUTO: 28.1 PG (ref 26–34)
MCHC RBC AUTO-ENTMCNC: 32.8 G/DL (ref 31–36)
MCV RBC AUTO: 85.7 FL (ref 80–100)
MONOCYTES # BLD: 0.4 K/UL (ref 0–1.3)
MONOCYTES NFR BLD: 5.9 %
NEUTROPHILS # BLD: 5.3 K/UL (ref 1.7–7.7)
NEUTROPHILS NFR BLD: 87.7 %
OSMOLALITY SERPL: 277 MOSM/KG (ref 280–301)
OSMOLALITY UR: 241 MOSM/KG (ref 390–1070)
PERFORMED ON: ABNORMAL
PERFORMED ON: NORMAL
PLATELET # BLD AUTO: 154 K/UL (ref 135–450)
PMV BLD AUTO: 7.8 FL (ref 5–10.5)
POTASSIUM SERPL-SCNC: 3.4 MMOL/L (ref 3.5–5.1)
PROT SERPL-MCNC: 6.4 G/DL (ref 6.4–8.2)
RBC # BLD AUTO: 2.57 M/UL (ref 4.2–5.9)
SODIUM SERPL-SCNC: 131 MMOL/L (ref 136–145)
SODIUM UR-SCNC: <20 MMOL/L
TSH SERPL DL<=0.005 MIU/L-ACNC: 2.87 UIU/ML (ref 0.27–4.2)
WBC # BLD AUTO: 6.1 K/UL (ref 4–11)

## 2025-07-22 PROCEDURE — 94640 AIRWAY INHALATION TREATMENT: CPT

## 2025-07-22 PROCEDURE — 6360000002 HC RX W HCPCS: Performed by: STUDENT IN AN ORGANIZED HEALTH CARE EDUCATION/TRAINING PROGRAM

## 2025-07-22 PROCEDURE — 6370000000 HC RX 637 (ALT 250 FOR IP)

## 2025-07-22 PROCEDURE — 2700000000 HC OXYGEN THERAPY PER DAY

## 2025-07-22 PROCEDURE — 94761 N-INVAS EAR/PLS OXIMETRY MLT: CPT

## 2025-07-22 PROCEDURE — 2580000003 HC RX 258: Performed by: STUDENT IN AN ORGANIZED HEALTH CARE EDUCATION/TRAINING PROGRAM

## 2025-07-22 PROCEDURE — 6370000000 HC RX 637 (ALT 250 FOR IP): Performed by: INTERNAL MEDICINE

## 2025-07-22 PROCEDURE — 93010 ELECTROCARDIOGRAM REPORT: CPT | Performed by: INTERNAL MEDICINE

## 2025-07-22 PROCEDURE — 80053 COMPREHEN METABOLIC PANEL: CPT

## 2025-07-22 PROCEDURE — 36591 DRAW BLOOD OFF VENOUS DEVICE: CPT

## 2025-07-22 PROCEDURE — 6370000000 HC RX 637 (ALT 250 FOR IP): Performed by: STUDENT IN AN ORGANIZED HEALTH CARE EDUCATION/TRAINING PROGRAM

## 2025-07-22 PROCEDURE — 2060000000 HC ICU INTERMEDIATE R&B

## 2025-07-22 PROCEDURE — 6360000002 HC RX W HCPCS: Performed by: INTERNAL MEDICINE

## 2025-07-22 PROCEDURE — 85025 COMPLETE CBC W/AUTO DIFF WBC: CPT

## 2025-07-22 PROCEDURE — 83735 ASSAY OF MAGNESIUM: CPT

## 2025-07-22 PROCEDURE — P9047 ALBUMIN (HUMAN), 25%, 50ML: HCPCS | Performed by: STUDENT IN AN ORGANIZED HEALTH CARE EDUCATION/TRAINING PROGRAM

## 2025-07-22 PROCEDURE — 84300 ASSAY OF URINE SODIUM: CPT

## 2025-07-22 PROCEDURE — 84443 ASSAY THYROID STIM HORMONE: CPT

## 2025-07-22 PROCEDURE — 2500000003 HC RX 250 WO HCPCS

## 2025-07-22 PROCEDURE — 82530 CORTISOL FREE: CPT

## 2025-07-22 PROCEDURE — 83935 ASSAY OF URINE OSMOLALITY: CPT

## 2025-07-22 PROCEDURE — 82570 ASSAY OF URINE CREATININE: CPT

## 2025-07-22 RX ORDER — FINASTERIDE 5 MG/1
5 TABLET, FILM COATED ORAL EVERY EVENING
Status: DISCONTINUED | OUTPATIENT
Start: 2025-07-22 | End: 2025-08-01 | Stop reason: HOSPADM

## 2025-07-22 RX ORDER — POTASSIUM CHLORIDE 1500 MG/1
40 TABLET, EXTENDED RELEASE ORAL PRN
Status: DISCONTINUED | OUTPATIENT
Start: 2025-07-22 | End: 2025-08-01 | Stop reason: HOSPADM

## 2025-07-22 RX ORDER — ACETAMINOPHEN 325 MG/1
650 TABLET ORAL EVERY 4 HOURS PRN
COMMUNITY

## 2025-07-22 RX ORDER — POTASSIUM CHLORIDE 1500 MG/1
20 TABLET, EXTENDED RELEASE ORAL 2 TIMES DAILY
Status: DISCONTINUED | OUTPATIENT
Start: 2025-07-22 | End: 2025-07-22

## 2025-07-22 RX ORDER — POLYETHYLENE GLYCOL 3350 17 G/17G
17 POWDER, FOR SOLUTION ORAL DAILY PRN
Status: DISCONTINUED | OUTPATIENT
Start: 2025-07-22 | End: 2025-08-01 | Stop reason: HOSPADM

## 2025-07-22 RX ORDER — OXYCODONE HYDROCHLORIDE 5 MG/1
5 TABLET ORAL EVERY 6 HOURS PRN
Refills: 0 | Status: DISCONTINUED | OUTPATIENT
Start: 2025-07-22 | End: 2025-07-23

## 2025-07-22 RX ORDER — PANTOPRAZOLE SODIUM 40 MG/1
40 TABLET, DELAYED RELEASE ORAL
Status: DISCONTINUED | OUTPATIENT
Start: 2025-07-22 | End: 2025-07-22

## 2025-07-22 RX ORDER — PHYTONADIONE 5 MG/1
5 TABLET ORAL
Status: DISCONTINUED | OUTPATIENT
Start: 2025-07-23 | End: 2025-08-01 | Stop reason: HOSPADM

## 2025-07-22 RX ORDER — ALBUMIN (HUMAN) 12.5 G/50ML
25 SOLUTION INTRAVENOUS 2 TIMES DAILY WITH MEALS
Status: COMPLETED | OUTPATIENT
Start: 2025-07-22 | End: 2025-07-23

## 2025-07-22 RX ORDER — POLYETHYLENE GLYCOL 3350 17 G/17G
17 POWDER, FOR SOLUTION ORAL DAILY
COMMUNITY

## 2025-07-22 RX ORDER — SODIUM CHLORIDE 0.9 % (FLUSH) 0.9 %
10 SYRINGE (ML) INJECTION 2 TIMES DAILY
Status: DISCONTINUED | OUTPATIENT
Start: 2025-07-22 | End: 2025-08-01 | Stop reason: HOSPADM

## 2025-07-22 RX ORDER — PANTOPRAZOLE SODIUM 40 MG/1
40 TABLET, DELAYED RELEASE ORAL
Status: DISCONTINUED | OUTPATIENT
Start: 2025-07-22 | End: 2025-08-01 | Stop reason: HOSPADM

## 2025-07-22 RX ORDER — LACTULOSE 10 G/15ML
30 SOLUTION ORAL 3 TIMES DAILY
Status: DISCONTINUED | OUTPATIENT
Start: 2025-07-22 | End: 2025-08-01 | Stop reason: HOSPADM

## 2025-07-22 RX ORDER — IPRATROPIUM BROMIDE AND ALBUTEROL SULFATE 2.5; .5 MG/3ML; MG/3ML
1 SOLUTION RESPIRATORY (INHALATION) EVERY 4 HOURS PRN
Status: DISCONTINUED | OUTPATIENT
Start: 2025-07-22 | End: 2025-08-01 | Stop reason: HOSPADM

## 2025-07-22 RX ORDER — ERGOCALCIFEROL 1.25 MG/1
50000 CAPSULE, LIQUID FILLED ORAL WEEKLY
Status: DISCONTINUED | OUTPATIENT
Start: 2025-07-25 | End: 2025-08-01 | Stop reason: HOSPADM

## 2025-07-22 RX ORDER — ASCORBIC ACID 125 MG
500 TABLET,CHEWABLE ORAL
Status: DISCONTINUED | OUTPATIENT
Start: 2025-07-23 | End: 2025-07-22 | Stop reason: ALTCHOICE

## 2025-07-22 RX ORDER — SODIUM CHLORIDE 9 MG/ML
INJECTION, SOLUTION INTRAVENOUS PRN
Status: DISCONTINUED | OUTPATIENT
Start: 2025-07-22 | End: 2025-08-01 | Stop reason: HOSPADM

## 2025-07-22 RX ORDER — ALBUTEROL SULFATE 90 UG/1
2 INHALANT RESPIRATORY (INHALATION) EVERY 4 HOURS PRN
Status: DISCONTINUED | OUTPATIENT
Start: 2025-07-22 | End: 2025-08-01 | Stop reason: HOSPADM

## 2025-07-22 RX ORDER — POTASSIUM CHLORIDE 7.45 MG/ML
10 INJECTION INTRAVENOUS PRN
Status: DISCONTINUED | OUTPATIENT
Start: 2025-07-22 | End: 2025-08-01 | Stop reason: HOSPADM

## 2025-07-22 RX ORDER — POTASSIUM CHLORIDE 1500 MG/1
20 TABLET, EXTENDED RELEASE ORAL
Status: DISCONTINUED | OUTPATIENT
Start: 2025-07-23 | End: 2025-07-23

## 2025-07-22 RX ORDER — CEPHALEXIN 500 MG/1
500 CAPSULE ORAL 3 TIMES DAILY
Status: DISPENSED | OUTPATIENT
Start: 2025-07-22 | End: 2025-07-27

## 2025-07-22 RX ORDER — FUROSEMIDE 10 MG/ML
20 INJECTION INTRAMUSCULAR; INTRAVENOUS 2 TIMES DAILY
Status: DISCONTINUED | OUTPATIENT
Start: 2025-07-22 | End: 2025-07-22

## 2025-07-22 RX ORDER — GLUCAGON 1 MG/ML
1 KIT INJECTION PRN
Status: DISCONTINUED | OUTPATIENT
Start: 2025-07-22 | End: 2025-08-01 | Stop reason: HOSPADM

## 2025-07-22 RX ORDER — DEXTROSE MONOHYDRATE 100 MG/ML
INJECTION, SOLUTION INTRAVENOUS CONTINUOUS PRN
Status: DISCONTINUED | OUTPATIENT
Start: 2025-07-22 | End: 2025-08-01 | Stop reason: HOSPADM

## 2025-07-22 RX ORDER — SODIUM CHLORIDE 0.9 % (FLUSH) 0.9 %
5-40 SYRINGE (ML) INJECTION EVERY 12 HOURS SCHEDULED
Status: DISCONTINUED | OUTPATIENT
Start: 2025-07-22 | End: 2025-07-22

## 2025-07-22 RX ORDER — CARBOXYMETHYLCELLULOSE SODIUM 10 MG/ML
2 GEL OPHTHALMIC EVERY 12 HOURS PRN
Status: DISCONTINUED | OUTPATIENT
Start: 2025-07-22 | End: 2025-08-01 | Stop reason: HOSPADM

## 2025-07-22 RX ORDER — ALBUMIN (HUMAN) 12.5 G/50ML
25 SOLUTION INTRAVENOUS ONCE
Status: COMPLETED | OUTPATIENT
Start: 2025-07-22 | End: 2025-07-22

## 2025-07-22 RX ORDER — SODIUM CHLORIDE 0.9 % (FLUSH) 0.9 %
10 SYRINGE (ML) INJECTION 2 TIMES DAILY
COMMUNITY

## 2025-07-22 RX ORDER — SODIUM CHLORIDE 0.9 % (FLUSH) 0.9 %
5-40 SYRINGE (ML) INJECTION PRN
Status: DISCONTINUED | OUTPATIENT
Start: 2025-07-22 | End: 2025-07-22

## 2025-07-22 RX ORDER — ALLOPURINOL 300 MG/1
300 TABLET ORAL DAILY
Status: DISCONTINUED | OUTPATIENT
Start: 2025-07-22 | End: 2025-08-01 | Stop reason: HOSPADM

## 2025-07-22 RX ORDER — CEPHALEXIN 500 MG/1
500 CAPSULE ORAL 3 TIMES DAILY
Status: ON HOLD | COMMUNITY
End: 2025-08-01 | Stop reason: HOSPADM

## 2025-07-22 RX ORDER — ONDANSETRON 2 MG/ML
4 INJECTION INTRAMUSCULAR; INTRAVENOUS EVERY 6 HOURS PRN
Status: DISCONTINUED | OUTPATIENT
Start: 2025-07-22 | End: 2025-08-01 | Stop reason: HOSPADM

## 2025-07-22 RX ORDER — ONDANSETRON 4 MG/1
4 TABLET, ORALLY DISINTEGRATING ORAL EVERY 8 HOURS PRN
Status: DISCONTINUED | OUTPATIENT
Start: 2025-07-22 | End: 2025-08-01 | Stop reason: HOSPADM

## 2025-07-22 RX ORDER — ENOXAPARIN SODIUM 100 MG/ML
40 INJECTION SUBCUTANEOUS DAILY
Status: DISCONTINUED | OUTPATIENT
Start: 2025-07-22 | End: 2025-07-22

## 2025-07-22 RX ORDER — FUROSEMIDE 10 MG/ML
60 INJECTION INTRAMUSCULAR; INTRAVENOUS ONCE
Status: COMPLETED | OUTPATIENT
Start: 2025-07-22 | End: 2025-07-22

## 2025-07-22 RX ORDER — ACETAMINOPHEN 650 MG/1
650 SUPPOSITORY RECTAL EVERY 6 HOURS PRN
Status: DISCONTINUED | OUTPATIENT
Start: 2025-07-22 | End: 2025-08-01 | Stop reason: HOSPADM

## 2025-07-22 RX ORDER — FUROSEMIDE 10 MG/ML
40 INJECTION INTRAMUSCULAR; INTRAVENOUS 2 TIMES DAILY
Status: COMPLETED | OUTPATIENT
Start: 2025-07-22 | End: 2025-07-23

## 2025-07-22 RX ORDER — MAGNESIUM SULFATE IN WATER 40 MG/ML
2000 INJECTION, SOLUTION INTRAVENOUS PRN
Status: DISCONTINUED | OUTPATIENT
Start: 2025-07-22 | End: 2025-08-01 | Stop reason: HOSPADM

## 2025-07-22 RX ORDER — ONDANSETRON 4 MG/1
8 TABLET, FILM COATED ORAL EVERY 6 HOURS PRN
COMMUNITY

## 2025-07-22 RX ORDER — OXYCODONE HYDROCHLORIDE 5 MG/1
5 TABLET ORAL EVERY 8 HOURS PRN
Status: ON HOLD | COMMUNITY
End: 2025-07-31

## 2025-07-22 RX ORDER — ASCORBIC ACID 125 MG
500 TABLET,CHEWABLE ORAL
COMMUNITY

## 2025-07-22 RX ORDER — INSULIN LISPRO 100 [IU]/ML
0-4 INJECTION, SOLUTION INTRAVENOUS; SUBCUTANEOUS
Status: DISCONTINUED | OUTPATIENT
Start: 2025-07-22 | End: 2025-08-01 | Stop reason: HOSPADM

## 2025-07-22 RX ORDER — FUROSEMIDE 10 MG/ML
60 INJECTION INTRAMUSCULAR; INTRAVENOUS ONCE
Status: DISCONTINUED | OUTPATIENT
Start: 2025-07-22 | End: 2025-07-22

## 2025-07-22 RX ORDER — ACETAMINOPHEN 325 MG/1
650 TABLET ORAL EVERY 6 HOURS PRN
Status: DISCONTINUED | OUTPATIENT
Start: 2025-07-22 | End: 2025-08-01 | Stop reason: HOSPADM

## 2025-07-22 RX ORDER — DICYCLOMINE HCL 20 MG
20 TABLET ORAL DAILY
Status: DISCONTINUED | OUTPATIENT
Start: 2025-07-22 | End: 2025-07-24

## 2025-07-22 RX ADMIN — FUROSEMIDE 60 MG: 10 INJECTION, SOLUTION INTRAMUSCULAR; INTRAVENOUS at 01:32

## 2025-07-22 RX ADMIN — FUROSEMIDE 20 MG: 10 INJECTION, SOLUTION INTRAMUSCULAR; INTRAVENOUS at 11:28

## 2025-07-22 RX ADMIN — PANCRELIPASE LIPASE, PANCRELIPASE PROTEASE, PANCRELIPASE AMYLASE 40000 UNITS: 20000; 63000; 84000 CAPSULE, DELAYED RELEASE ORAL at 16:56

## 2025-07-22 RX ADMIN — LACTULOSE 30 G: 10 SOLUTION ORAL at 20:04

## 2025-07-22 RX ADMIN — CEPHALEXIN 500 MG: 500 CAPSULE ORAL at 20:04

## 2025-07-22 RX ADMIN — PANTOPRAZOLE SODIUM 40 MG: 40 TABLET, DELAYED RELEASE ORAL at 16:48

## 2025-07-22 RX ADMIN — ALBUMIN (HUMAN) 25 G: 0.25 INJECTION, SOLUTION INTRAVENOUS at 09:28

## 2025-07-22 RX ADMIN — ALBUMIN (HUMAN) 25 G: 0.25 INJECTION, SOLUTION INTRAVENOUS at 02:26

## 2025-07-22 RX ADMIN — PANCRELIPASE LIPASE, PANCRELIPASE PROTEASE, PANCRELIPASE AMYLASE 30000 UNITS: 15000; 47000; 63000 CAPSULE, DELAYED RELEASE ORAL at 16:56

## 2025-07-22 RX ADMIN — PANCRELIPASE LIPASE, PANCRELIPASE PROTEASE, PANCRELIPASE AMYLASE 40000 UNITS: 20000; 63000; 84000 CAPSULE, DELAYED RELEASE ORAL at 12:34

## 2025-07-22 RX ADMIN — ALBUTEROL SULFATE 2 PUFF: 90 AEROSOL, METERED RESPIRATORY (INHALATION) at 11:39

## 2025-07-22 RX ADMIN — PANCRELIPASE LIPASE, PANCRELIPASE PROTEASE, PANCRELIPASE AMYLASE 20000 UNITS: 20000; 63000; 84000 CAPSULE, DELAYED RELEASE ORAL at 09:34

## 2025-07-22 RX ADMIN — PANCRELIPASE LIPASE, PANCRELIPASE PROTEASE, PANCRELIPASE AMYLASE 15000 UNITS: 15000; 47000; 63000 CAPSULE, DELAYED RELEASE ORAL at 09:29

## 2025-07-22 RX ADMIN — PANCRELIPASE LIPASE, PANCRELIPASE PROTEASE, PANCRELIPASE AMYLASE 20000 UNITS: 20000; 63000; 84000 CAPSULE, DELAYED RELEASE ORAL at 10:06

## 2025-07-22 RX ADMIN — POTASSIUM CHLORIDE 40 MEQ: 1500 TABLET, EXTENDED RELEASE ORAL at 16:48

## 2025-07-22 RX ADMIN — FINASTERIDE 5 MG: 5 TABLET, FILM COATED ORAL at 18:07

## 2025-07-22 RX ADMIN — ONDANSETRON 4 MG: 4 TABLET, ORALLY DISINTEGRATING ORAL at 20:04

## 2025-07-22 RX ADMIN — PANCRELIPASE LIPASE, PANCRELIPASE PROTEASE, PANCRELIPASE AMYLASE 15000 UNITS: 15000; 47000; 63000 CAPSULE, DELAYED RELEASE ORAL at 10:06

## 2025-07-22 RX ADMIN — SODIUM CHLORIDE, PRESERVATIVE FREE 10 ML: 5 INJECTION INTRAVENOUS at 20:05

## 2025-07-22 RX ADMIN — CEPHALEXIN 500 MG: 500 CAPSULE ORAL at 14:34

## 2025-07-22 RX ADMIN — Medication 3 MG: at 20:04

## 2025-07-22 RX ADMIN — ALLOPURINOL 300 MG: 300 TABLET ORAL at 09:31

## 2025-07-22 RX ADMIN — LACTULOSE 30 G: 10 SOLUTION ORAL at 14:34

## 2025-07-22 RX ADMIN — POTASSIUM CHLORIDE 20 MEQ: 1500 TABLET, EXTENDED RELEASE ORAL at 09:32

## 2025-07-22 RX ADMIN — OXYCODONE 5 MG: 5 TABLET ORAL at 16:47

## 2025-07-22 RX ADMIN — ALBUTEROL SULFATE 2 PUFF: 90 AEROSOL, METERED RESPIRATORY (INHALATION) at 20:29

## 2025-07-22 RX ADMIN — ALBUMIN (HUMAN) 25 G: 0.25 INJECTION, SOLUTION INTRAVENOUS at 17:04

## 2025-07-22 RX ADMIN — SODIUM CHLORIDE: 0.9 INJECTION, SOLUTION INTRAVENOUS at 09:25

## 2025-07-22 RX ADMIN — DICYCLOMINE HYDROCHLORIDE 20 MG: 20 TABLET ORAL at 09:31

## 2025-07-22 RX ADMIN — PANTOPRAZOLE SODIUM 40 MG: 40 TABLET, DELAYED RELEASE ORAL at 10:11

## 2025-07-22 RX ADMIN — FUROSEMIDE 40 MG: 10 INJECTION, SOLUTION INTRAMUSCULAR; INTRAVENOUS at 18:07

## 2025-07-22 RX ADMIN — SODIUM CHLORIDE, PRESERVATIVE FREE 10 ML: 5 INJECTION INTRAVENOUS at 09:14

## 2025-07-22 RX ADMIN — CEPHALEXIN 500 MG: 500 CAPSULE ORAL at 10:05

## 2025-07-22 RX ADMIN — Medication 1000 MCG: at 09:30

## 2025-07-22 RX ADMIN — LACTULOSE 30 G: 10 SOLUTION ORAL at 09:31

## 2025-07-22 RX ADMIN — PANCRELIPASE LIPASE, PANCRELIPASE PROTEASE, PANCRELIPASE AMYLASE 30000 UNITS: 15000; 47000; 63000 CAPSULE, DELAYED RELEASE ORAL at 12:34

## 2025-07-22 RX ADMIN — CALCIUM POLYCARBOPHIL 625 MG: 625 TABLET, FILM COATED ORAL at 09:31

## 2025-07-22 ASSESSMENT — PAIN DESCRIPTION - LOCATION
LOCATION: ABDOMEN
LOCATION: ABDOMEN
LOCATION: CHEST

## 2025-07-22 ASSESSMENT — PAIN DESCRIPTION - ORIENTATION
ORIENTATION: RIGHT;UPPER
ORIENTATION: RIGHT

## 2025-07-22 ASSESSMENT — PAIN SCALES - GENERAL
PAINLEVEL_OUTOF10: 4
PAINLEVEL_OUTOF10: 6
PAINLEVEL_OUTOF10: 3

## 2025-07-22 ASSESSMENT — PAIN DESCRIPTION - DESCRIPTORS
DESCRIPTORS: SHARP
DESCRIPTORS: ACHING;SHARP;SHOOTING
DESCRIPTORS: SHARP;SHOOTING

## 2025-07-22 NOTE — CONSULTS
Palliative Care Chart Review  and Check in Note:     NAME:  Sharath Ceja  Admit Date: 7/21/2025  Hospital Day:  Hospital Day: 2   Current Code status: DNR-CCA    Palliative care is continuing to following Mr. Ceja for symptom management,  and goals of care discussion as needed. Patient's chart reviewed today 7/22/25.        The following are the currently established goals/code status, and Symptom management.     Goals of care: Writer met with the pt and pt's daughter,Priya, at bedside and she requests family meeting when spouse and her brother are present later today. PC phone number provided to Priya.    Code status: DNR-CCA    Discharge plan: Family meeting is planned for today to continue with goals of care conversation. Family met with Sparkle from [facility] last Friday and she was able to provide more information on their services.  Writer met with pt's spouse,Natasha and dtr,Priya, at bedside and family requesting referral to [facility]; referral called to Aishwarya with [facility]. Awaiting meeting time. Family has questions for  related to palliative Pleurex drain placement and will follow up with her in the AM. Pt is appropriate for IPU with [facility].  SANDHYA Davenport and Uyen bedside,RN aware of above plan.      Oriana Larose RN  07/22/25  10:51 AM

## 2025-07-22 NOTE — CONSULTS
[address] [address]                              CONSULTATION      PATIENT NAME: JO IVERSON           : 1950  MED REC NO: [id number]                      ROOM: 315  ACCOUNT NO: [id number]                       ADMIT DATE: 2025  PROVIDER: Kayla Bahena MD      CONSULT DATE: 2025    REASON FOR CONSULTATION:  Hyponatremia.    HISTORY OF PRESENT ILLNESS:  The patient is a 74-year-old  male patient, who presented to [facility] complaining of worsening abdominal distension.  The patient has a history of cholangiocarcinoma, currently on palliative chemotherapy and requiring intermittent paracentesis.  Upon presentation, he was noted to have significant hyponatremia with a serum sodium of 125 mEq/L, which prompted Nephrology consultation.    PAST MEDICAL HISTORY:    1. Chronic lymphocytic leukemia.  2. Crohn disease.  3. Gout.  4. Gastroesophageal reflux disease.  5. Pulmonary emboli.  6. Cholangiocarcinoma.    PAST SURGICAL HISTORY:    1. Cardiac catheterization.  2. Cholecystectomy.  3. ERCP.  4. Pancreatectomy.  5. Tonsillectomy.    ALLERGIES:  THE PATIENT IS ALLERGIC TO CODEINE.    SOCIAL HISTORY:  The patient does not smoke or drink alcohol.    FAMILY HISTORY:  Negative for kidney disease.    REVIEW OF SYSTEMS:  The patient denied any fever, chills, cough, or expectorations.  Otherwise, a 10-point review of systems was relatively unremarkable.    PHYSICAL EXAMINATION:  VITAL SIGNS:  Blood pressure 103/60, heart rate 65, respirations 18, temperature 97.9 Fahrenheit.  The patient is saturating 98% on 2 L nasal cannula.  GENERAL APPEARANCE:  The patient is alert and oriented x3, not in acute distress.  EYES:  Reveal normal conjunctivae.  Reactive pupils.  NECK:  Reveals midline trachea.  Nonpalpable thyroid.  LUNGS:  Clear to anterior auscultation bilaterally.  Nonlabored

## 2025-07-22 NOTE — PROGRESS NOTES
74 year old male admitted to 315 from the ED with hyponatremia. Recent admission for GIB. Hx of metastatic liver cancer. Paracentesis last week. His code status is DNR CCA. Alert and oriented x4. Bilateral biliary drains are in place. Oriented to room and call system. SR on tele. Will continue to monitor.

## 2025-07-22 NOTE — H&P
[facility] Medicine History & Physical    V 5.1    Date of Admission: 7/21/2025    Date of Service:  Pt seen/examined on 7/22/2025 at 0145    [x]Admitted to Inpatient with expected LOS greater than two midnights due to medical therapy.  []Placed in Observation status.    Assessment/Plan:      Hyponatremia  Anasarca: Sodium level low at 125 with chloride level also low at 95.  Prior sodium 4 days ago 136. Likely multifactorial in setting of cholangiocarcinoma. Likely hypoosmolar hyponatremia due to hypervolemia and multifactorial given low albumin as well. Patient has significant anasarca.  -Serum osmolality, urine sodium, urine osmolality ordered  -Nephrology consulted  -Received 60 mg IV Lasix in the ER  -Check sodium every 6 hours  -Given significant hypoalbuminemia, albumin with Lasix ordered to remove extravascular fluid  -TSH level ordered  -Cortisol level ordered    Leg swelling: Patient has bilateral lower extremity swelling which is likely secondary to hypoalbuminemia. Had duplex 2 weeks ago WNL.  -Protein supplementation ordered    Portal vein thrombosis: There is a chronic thrombus in the portal vein that appears to be unchanged per CT read prior. Multiple varices noted with multiple liver masses this is secondary to patient's cholangiocarcinoma.    Lung nodule: CT PE showed ill-defined nodule around the right upper lobe.  Patient does have underlying stage IV hepatocellular carcinoma.  Continue outpatient follow-up with oncology.     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  Benefit from palliative care discussion.     Vitamin D deficiency: Continue home supplementation    Gout: Continue allopurinol    Diabetes mellitus: Sliding scale insulin ordered while NPO. Consider adding basal insulin once diet resumes.    Physical deconditioning: Patient was recently hospitalized for 11 days due to anemia, stercoral colitis, E. coli bacteremia was

## 2025-07-22 NOTE — CARE COORDINATION
Case Management Assessment  Initial Evaluation    Date/Time of Evaluation: 7/22/2025 7:45 AM  Assessment Completed by: Ethel Vickers RN    If patient is discharged prior to next notation, then this note serves as note for discharge by case management.    Patient Name: Sharath Ceja                   YOB: 1950  Diagnosis: Acute hyponatremia [E87.1]  Anasarca [R60.1]  Hyponatremia [E87.1]  Primary malignant neoplasm of liver (HCC) [C22.8]  Other ascites [R18.8]  Pain of upper abdomen [R10.10]                   Date / Time: 7/21/2025  8:29 PM    Patient Admission Status: Inpatient   Readmission Risk (Low < 19, Mod (19-27), High > 27): Readmission Risk Score: 33.1    Current PCP: Real Whitehead MD  PCP verified by CM? Yes    Chart Reviewed: Yes      History Provided by: Patient  Patient Orientation: Alert and Oriented    Patient Cognition: Alert    Hospitalization in the last 30 days (Readmission):  Yes    If yes, Readmission Assessment in  Navigator will be completed.    Advance Directives:      Code Status: DNR-CCA   Patient's Primary Decision Maker is: Named in Scanned ACP Document    Primary Decision Maker: Natasha Ceja R - Spouse - [phone]    Discharge Planning:    Patient lives with: Spouse/Significant Other Type of Home: Skilled Nursing Facility  Primary Care Giver: Other (Comment) (acm)  Patient Support Systems include: Spouse/Significant Other, Children   Current Financial resources: Medicare  Current community resources: Other (Comment) (snf)  Current services prior to admission: Skilled Nursing Facility, Oxygen Therapy (acm)            Current DME: Walker            Type of Home Care services:  None    ADLS  Prior functional level: Assistance with the following:, Cooking, Housework, Shopping, Mobility  Current functional level: Assistance with the following:, Cooking, Housework, Shopping, Mobility    PT AM-PAC:   /24  OT AM-PAC:   /24    Family can provide assistance at DC: Yes  Would

## 2025-07-22 NOTE — ED PROVIDER NOTES
Emergency Department Attending Physician Note  Location: [facility] EMERGENCY DEPARTMENT  7/21/2025       Pt Name: Sharath Ceja  MRN: [id number]  Birthdate 1950    Date of evaluation: 7/21/2025  Provider: EVELIN MADRID DO  PCP: Real Whitehead MD    Note Started: 9:38 PM EDT 7/21/25    CHIEF COMPLAINT  Chief Complaint   Patient presents with    Illness       ROOM: 6    HISTORY OF PRESENT ILLNESS:  History obtained by patient. Limitations to history : None.     Sharath Ceja is a 74 y.o. male with a significant PMHx of metastatic liver cancer--intrahepatic cholangiocarcinoma-- CLL, Crohn's disease, and multiple other comorbidities listed below, here in the emergency department today with worsening abdominal distention, despite paracentesis last week.  Additionally, he has been a little bit more confused today.  He is a little more tired than usual.  Patient says that his legs are significantly swollen as well, which is not normal for him.  He has 2 biliary drains from his abdomen, and says the drainage is a appropriate color.  Denies any chest pain, but says that \"when I get like this I get very short of breath.\"  No falls or injuries.  Patient is in pain all over.    On chart review, patient was recently admitted earlier this month.  Had GI bleed, requiring blood transfusion.  Given vitamin K in the setting of liver disease.  Transfuse as needed.  Additionally, oncology saw him, hold Tarceva.  On Zenpep.  Oxycodone as needed for pain.  Looks like he had E. coli bacteremia as well, urine positive, hepatobiliary, had been on cefepime.  And was discharged home on Augmentin.  Venous duplex is negative for DVT, as he did have bilateral lower extremity edema, although again, patient saying it is not normal for him.    Nursing Notes were all reviewed and agreed with or any disagreements were addressed in the HPI.      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    8 + LYPMH NODES, WHIPPLE    Asthma     Blood circulation, collateral     Carpal tunnel syndrome     CLL (chronic lymphocytic leukemia) (HCC)     DENIES    Crohn's colitis 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    Hypertension     Liver abscess     DRAINED    Liver cancer (HCC)     2025    Pneumonia     pneumonia,asthma    prostate     infection    Seasonal allergies     Wears glasses         SURGICAL HISTORY  Past Surgical History:   Procedure Laterality Date    CARDIAC CATHETERIZATION      1980S, DR ROWELL, NO ISSUES SINCE, HAS APT 8/14/23    CHOLECYSTECTOMY      CT LIVER ABSCESS ASPIRATION      PT STATES DRAINED LIVER ABSCESS    CT NEEDLE BIOPSY LIVER PERCUTANEOUS  01/23/2025    CT NEEDLE BIOPSY LIVER PERCUTANEOUS 1/23/2025 [facility] CT SCAN    CYST REMOVAL      KNEE & CLOSE TO RECTUM    ERCP N/A 01/26/2022    ERCP BIOPSY performed by Tab Pandey MD at [facility] ENDOSCOPY    ERCP  01/26/2022    ERCP SPHINCTER/PAPILLOTOMY performed by Tab Pandey MD at [facility] ENDOSCOPY    ERCP  01/26/2022    ERCP STENT INSERTION performed by Tab Pandey MD at [facility] ENDOSCOPY    ERCP N/A 03/11/2022    ERCP STENT REMOVAL performed by Tab Pandey MD at [facility] ENDOSCOPY    ERCP  03/11/2022    ERCP STENT INSERTION performed by Tab Pandey MD at [facility] ENDOSCOPY    ERCP  03/11/2022    ERCP BIOPSY performed by Tab Pandey MD at [facility] ENDOSCOPY    ERCP N/A 04/13/2022    ERCP STENT INSERTION performed by Tab Pandey MD at [facility] ENDOSCOPY    ERCP  04/13/2022    ERCP ENDOSCOPIC RETROGRADE CHOLANGIOPANCREATOGRAPHY DIRECT VISUALIZATION performed by Tab Pandey MD at [facility] ENDOSCOPY    ERCP N/A

## 2025-07-22 NOTE — ED TRIAGE NOTES
Pt arrives to ED with c.o nausea, gas, fever, and increased swelling in stomach. States got tapped this week and got over 1L removed.

## 2025-07-22 NOTE — CONSULTS
Patient seen and examined, consult note dictated.     Assessment and Plan:     1- Hyponatremia: Multifactorial and likely secondary to volume overload, along with increased free water intake and decreased solutes. Serum sodium trending up nicely with current management.  - Continue IV diuresis.  - Apply daily free water restriction.  - Check urine electrolytes and osmolality.  - Monitor serial labs to avoid overcorrection.    2- Stage IV intrahepatic cholangiocarcinoma: Management per primary team.

## 2025-07-22 NOTE — CONSULTS
Home medication list completed by [facility] via [facility].     Cyndi Sage, KiahD, [facility], 7/22/2025 12:51 AM

## 2025-07-22 NOTE — ED NOTES
Bilateral biliary drains noted.  Right side and upper middle abdomen.  Green drainage noted. Gia hernandez

## 2025-07-22 NOTE — ED NOTES
Per the [facility] staff, pt was having increased work of breathing and oxygen placed @ 2 liters per NC.  Pt family states temperature at the facility was 102 prior to calling squad.  Significant medical hx noted.  Gia hernandez

## 2025-07-22 NOTE — PROGRESS NOTES
Brief Progress Note    Date: 07/22/25    Subjective: Patient was evaluated by nocturnist early this AM. Current plan of care below. I have reviewed vitals, labs and orders and have briefly seen the patient.   Lying in bed, no acute distress.  Says swelling worsened when he got home from hospital.  No CP or SOB.    Objective:  Vitals:    07/22/25 0047 07/22/25 0102 07/22/25 0103 07/22/25 0219   BP:  (!) 108/56  119/68   Pulse: 74 75  69   Resp: 15  14 18   Temp:    98.2 °F (36.8 °C)   TempSrc:    Oral   SpO2: 94%   98%   Weight:       Height:    1.803 m (5' 11\")       Physical Exam:  Gen: No distress. Alert.   Eyes: PERRL. No sclera icterus. No conjunctival injection.   ENT: No discharge. Pharynx clear.   Neck: No JVD.  Trachea midline.  Resp: No accessory muscle use. No crackles. No wheezes. No rhonchi.   +right chest wall port.   CV: Regular rate. Regular rhythm. No murmur.  No rub. 2+ edema bilateral LE.   Capillary Refill: Brisk,< 3 seconds   Peripheral Pulses: +2 palpable, equal bilaterally   GI: Abdomen distended. Non-tender. Normal bowel sounds.  Bilateral biliary drains present.  Skin: Warm and dry. No nodule on exposed extremities. No rash on exposed extremities.   M/S: No cyanosis. No joint deformity. No clubbing.   Neuro: Awake. Grossly nonfocal      Labs:  CBC:   Recent Labs     07/21/25 2042 07/22/25  0602   WBC 7.3 6.1   HGB 7.6* 7.2*   HCT 23.3* 22.0*   MCV 86.0 85.7    154     BMP:   Recent Labs     07/21/25 2042 07/22/25  0602   * 131*   K 4.6 3.4*   CL 95* 95*   CO2 23 27   BUN 6* 5*   CREATININE 0.3* 0.3*     LIVER PROFILE:   Recent Labs     07/21/25 2042 07/22/25  0602   AST 23 22   ALT 18 18   LIPASE 20.0  --    BILITOT 1.6* 1.9*   ALKPHOS 302* 292*     PT/INR:   Recent Labs     07/21/25 2238   PROTIME 17.6*   INR 1.43*     APTT: No results for input(s): \"APTT\" in the last 72 hours.  UA:No results for input(s): \"NITRITE\", \"COLORU\", \"PHUR\", \"LABCAST\", \"WBCUA\", \"RBCUA\", \"MUCUS\",

## 2025-07-22 NOTE — ED NOTES
Pt noted to have some wheezing with movement.  Pt asking for breathing treatment.  RN spoke with Dr. Vazquez who placed orders.

## 2025-07-22 NOTE — PROGRESS NOTES
4 Eyes Skin Assessment     NAME:  Sharath Ceja  YOB: 1950  MEDICAL RECORD NUMBER:  [id number]    The patient is being assessed for  Admission    I agree that at least one RN has performed a thorough Head to Toe Skin Assessment on the patient. ALL assessment sites listed below have been assessed.      Areas assessed by both nurses:    Head, Face, Ears, Shoulders, Back, Chest, Arms, Elbows, Hands, Sacrum. Buttock, Coccyx, Ischium, Legs. Feet and Heels, and Under Medical Devices         Does the Patient have a Wound? Yes wound(s) were present on assessment. LDA wound assessment was Initiated and completed by RN       Mickey Prevention initiated by RN: Yes  Wound Care Orders initiated by RN: Yes    For hospital-acquired stage 1 & 2 and ALL Stage 3,4, Unstageable, DTI, NWPT, and Complex wounds: place order “IP Wound Care/Ostomy Nurse Eval and Treat” by RN under : No    New Ostomies, if present place, Ostomy referral order under : No     Nurse 1 eSignature: Electronically signed by Mariah Toussaint RN on 7/22/25 at 3:34 AM EDT    **SHARE this note so that the co-signing nurse can place an eSignature**    Nurse 2 eSignature: Electronically signed by vIy Thao RN on 7/22/25 at 4:15 AM EDT

## 2025-07-22 NOTE — ED NOTES
Sharath Ceja is a 74 y.o. male admitted for  Principal Problem:    Hyponatremia  Resolved Problems:    * No resolved hospital problems. *  .   Patient SNF Rehab via EMS transportation with   Chief Complaint   Patient presents with    Illness   .  Patient is alert and Person, Place, and Time  Patient's baseline mobility: Baseline Mobility: Independent (very weak recently)  Code Status: Prior   Cardiac Rhythm:  nsr  O2 Flow Rate (L/min): 2 L/min  Is patient on baseline Oxygen: no how many Liters2:   currently on room air.  Did have 2 liters for comfort earlier when pt arrived.   Abnormal Assessment Findings: wheezing with activity. Ascities, BLE edema  wound to coccyx noted    Isolation: None      NIH Score:    C-SSRS: Risk of Suicide: No Risk  Bedside swallow:        Active LDA's:    Patient admitted with a copeland: no     Patient admitted with Central Line:  Port . Reason for Central line: cancer patient  Was central line Inserted from an outside facility: no   Right port accessed here.        Family/Caregiver Present no Any Concerns: no   Restraints no  Sitter no         Vitals:      Vitals:    07/21/25 2101 07/21/25 2111 07/21/25 2301 07/22/25 0001   BP: (!) 119/59  112/64 114/62   Pulse: 72  71 78   Resp: 17  14 14   Temp:  99.7 °F (37.6 °C)     TempSrc:       SpO2: 97%  97% 93%   Weight:       Height:           Last documented pain score (0-10 scale) Pain Level: 7  Pain medication administered Yes- see MAR.    Pertinent or High Risk Medications/Drips: No.  Chemo stopped about 3 weeks ago.      Pending Blood Product Administration: no    Abnormal labs:   Abnormal Labs Reviewed   CBC WITH AUTO DIFFERENTIAL - Abnormal; Notable for the following components:       Result Value    RBC 2.71 (*)     Hemoglobin 7.6 (*)     Hematocrit 23.3 (*)     RDW 18.6 (*)     Lymphocytes Absolute 0.5 (*)     All other components within normal limits   COMPREHENSIVE METABOLIC PANEL W/ REFLEX TO MG FOR LOW K - Abnormal; Notable for

## 2025-07-22 NOTE — PLAN OF CARE
Problem: Safety - Adult  Goal: Free from fall injury  Outcome: Progressing  Flowsheets (Taken 7/22/2025 0609)  Free From Fall Injury:   Instruct family/caregiver on patient safety   Based on caregiver fall risk screen, instruct family/caregiver to ask for assistance with transferring infant if caregiver noted to have fall risk factors     Problem: Chronic Conditions and Co-morbidities  Goal: Patient's chronic conditions and co-morbidity symptoms are monitored and maintained or improved  Outcome: Progressing  Flowsheets (Taken 7/22/2025 0609)  Care Plan - Patient's Chronic Conditions and Co-Morbidity Symptoms are Monitored and Maintained or Improved:   Monitor and assess patient's chronic conditions and comorbid symptoms for stability, deterioration, or improvement   Collaborate with multidisciplinary team to address chronic and comorbid conditions and prevent exacerbation or deterioration   Update acute care plan with appropriate goals if chronic or comorbid symptoms are exacerbated and prevent overall improvement and discharge     Problem: Discharge Planning  Goal: Discharge to home or other facility with appropriate resources  Outcome: Progressing  Flowsheets (Taken 7/22/2025 0609)  Discharge to home or other facility with appropriate resources:   Identify barriers to discharge with patient and caregiver   Arrange for needed discharge resources and transportation as appropriate   Identify discharge learning needs (meds, wound care, etc)     Problem: Pain  Goal: Verbalizes/displays adequate comfort level or baseline comfort level  Outcome: Progressing  Flowsheets (Taken 7/22/2025 0609)  Verbalizes/displays adequate comfort level or baseline comfort level:   Encourage patient to monitor pain and request assistance   Assess pain using appropriate pain scale   Administer analgesics based on type and severity of pain and evaluate response   Implement non-pharmacological measures as appropriate and evaluate response    patient

## 2025-07-22 NOTE — ACP (ADVANCE CARE PLANNING)
Advance Care Planning     General Advance Care Planning (ACP) Conversation    Date of Conversation: 7/22/2025  Conducted with: Patient with Decision Making Capacity  Other persons present: None    Healthcare Decision Maker:   Primary Decision Maker: Natasha Ceja - [facility] - [phone]       Content/Action Overview:  Has ACP document(s) on file - reflects the patient's care preferences  Reviewed DNR/DNI and patient confirms current DNR status - completed forms on file (place new order if needed)        Length of Voluntary ACP Conversation in minutes:  <16 minutes (Non-Billable)    Ethel Vickers RN

## 2025-07-23 PROBLEM — E43 SEVERE PROTEIN-CALORIE MALNUTRITION: Status: ACTIVE | Noted: 2025-07-23

## 2025-07-23 PROBLEM — R60.1 ANASARCA: Status: ACTIVE | Noted: 2025-07-23

## 2025-07-23 PROBLEM — C25.9 MALIGNANT NEOPLASM OF PANCREAS (HCC): Status: ACTIVE | Noted: 2025-07-23

## 2025-07-23 LAB
ANION GAP SERPL CALCULATED.3IONS-SCNC: 9 MMOL/L (ref 3–16)
BILIRUB UR QL STRIP.AUTO: NEGATIVE
BUN SERPL-MCNC: 6 MG/DL (ref 7–20)
CALCIUM SERPL-MCNC: 8.1 MG/DL (ref 8.3–10.6)
CHLORIDE SERPL-SCNC: 96 MMOL/L (ref 99–110)
CLARITY UR: CLEAR
CO2 SERPL-SCNC: 27 MMOL/L (ref 21–32)
COLOR UR: YELLOW
CREAT SERPL-MCNC: 0.3 MG/DL (ref 0.8–1.3)
EST. AVERAGE GLUCOSE BLD GHB EST-MCNC: 122.6 MG/DL
GFR SERPLBLD CREATININE-BSD FMLA CKD-EPI: >90 ML/MIN/{1.73_M2}
GLUCOSE BLD-MCNC: 180 MG/DL (ref 70–99)
GLUCOSE BLD-MCNC: 210 MG/DL (ref 70–99)
GLUCOSE BLD-MCNC: 213 MG/DL (ref 70–99)
GLUCOSE BLD-MCNC: 229 MG/DL (ref 70–99)
GLUCOSE SERPL-MCNC: 147 MG/DL (ref 70–99)
GLUCOSE UR STRIP.AUTO-MCNC: NEGATIVE MG/DL
HBA1C MFR BLD: 5.9 %
HCT VFR BLD AUTO: 22.8 % (ref 40.5–52.5)
HGB BLD-MCNC: 7.5 G/DL (ref 13.5–17.5)
HGB UR QL STRIP.AUTO: ABNORMAL
KETONES UR STRIP.AUTO-MCNC: NEGATIVE MG/DL
LEUKOCYTE ESTERASE UR QL STRIP.AUTO: NEGATIVE
MUCOUS THREADS #/AREA URNS LPF: ABNORMAL /LPF
NITRITE UR QL STRIP.AUTO: NEGATIVE
PERFORMED ON: ABNORMAL
PH UR STRIP.AUTO: 7 [PH] (ref 5–8)
POTASSIUM SERPL-SCNC: 3.9 MMOL/L (ref 3.5–5.1)
PROT UR STRIP.AUTO-MCNC: NEGATIVE MG/DL
RBC #/AREA URNS HPF: ABNORMAL /HPF (ref 0–4)
SODIUM SERPL-SCNC: 132 MMOL/L (ref 136–145)
SP GR UR STRIP.AUTO: 1.01 (ref 1–1.03)
UA COMPLETE W REFLEX CULTURE PNL UR: ABNORMAL
UA DIPSTICK W REFLEX MICRO PNL UR: YES
URN SPEC COLLECT METH UR: ABNORMAL
UROBILINOGEN UR STRIP-ACNC: 0.2 E.U./DL
WBC #/AREA URNS HPF: ABNORMAL /HPF (ref 0–5)

## 2025-07-23 PROCEDURE — 83036 HEMOGLOBIN GLYCOSYLATED A1C: CPT

## 2025-07-23 PROCEDURE — 99233 SBSQ HOSP IP/OBS HIGH 50: CPT | Performed by: INTERNAL MEDICINE

## 2025-07-23 PROCEDURE — 94761 N-INVAS EAR/PLS OXIMETRY MLT: CPT

## 2025-07-23 PROCEDURE — 81001 URINALYSIS AUTO W/SCOPE: CPT

## 2025-07-23 PROCEDURE — 6360000002 HC RX W HCPCS: Performed by: INTERNAL MEDICINE

## 2025-07-23 PROCEDURE — 6370000000 HC RX 637 (ALT 250 FOR IP)

## 2025-07-23 PROCEDURE — P9047 ALBUMIN (HUMAN), 25%, 50ML: HCPCS | Performed by: STUDENT IN AN ORGANIZED HEALTH CARE EDUCATION/TRAINING PROGRAM

## 2025-07-23 PROCEDURE — 2500000003 HC RX 250 WO HCPCS

## 2025-07-23 PROCEDURE — 85014 HEMATOCRIT: CPT

## 2025-07-23 PROCEDURE — 94640 AIRWAY INHALATION TREATMENT: CPT

## 2025-07-23 PROCEDURE — 6360000002 HC RX W HCPCS: Performed by: STUDENT IN AN ORGANIZED HEALTH CARE EDUCATION/TRAINING PROGRAM

## 2025-07-23 PROCEDURE — 6370000000 HC RX 637 (ALT 250 FOR IP): Performed by: INTERNAL MEDICINE

## 2025-07-23 PROCEDURE — 2700000000 HC OXYGEN THERAPY PER DAY

## 2025-07-23 PROCEDURE — 80048 BASIC METABOLIC PNL TOTAL CA: CPT

## 2025-07-23 PROCEDURE — 2060000000 HC ICU INTERMEDIATE R&B

## 2025-07-23 PROCEDURE — 6370000000 HC RX 637 (ALT 250 FOR IP): Performed by: STUDENT IN AN ORGANIZED HEALTH CARE EDUCATION/TRAINING PROGRAM

## 2025-07-23 PROCEDURE — 85018 HEMOGLOBIN: CPT

## 2025-07-23 RX ORDER — OXYCODONE HYDROCHLORIDE 5 MG/1
5 TABLET ORAL EVERY 4 HOURS PRN
Refills: 0 | Status: DISCONTINUED | OUTPATIENT
Start: 2025-07-23 | End: 2025-07-29

## 2025-07-23 RX ADMIN — ALBUMIN (HUMAN) 25 G: 0.25 INJECTION, SOLUTION INTRAVENOUS at 09:04

## 2025-07-23 RX ADMIN — Medication 1000 MCG: at 08:46

## 2025-07-23 RX ADMIN — INSULIN LISPRO 1 UNITS: 100 INJECTION, SOLUTION INTRAVENOUS; SUBCUTANEOUS at 17:03

## 2025-07-23 RX ADMIN — PANCRELIPASE LIPASE, PANCRELIPASE PROTEASE, PANCRELIPASE AMYLASE 30000 UNITS: 15000; 47000; 63000 CAPSULE, DELAYED RELEASE ORAL at 12:08

## 2025-07-23 RX ADMIN — CEPHALEXIN 500 MG: 500 CAPSULE ORAL at 08:42

## 2025-07-23 RX ADMIN — LACTULOSE 30 G: 10 SOLUTION ORAL at 20:42

## 2025-07-23 RX ADMIN — PANCRELIPASE LIPASE, PANCRELIPASE PROTEASE, PANCRELIPASE AMYLASE 40000 UNITS: 20000; 63000; 84000 CAPSULE, DELAYED RELEASE ORAL at 08:47

## 2025-07-23 RX ADMIN — LACTULOSE 30 G: 10 SOLUTION ORAL at 15:30

## 2025-07-23 RX ADMIN — ALLOPURINOL 300 MG: 300 TABLET ORAL at 08:43

## 2025-07-23 RX ADMIN — PANTOPRAZOLE SODIUM 40 MG: 40 TABLET, DELAYED RELEASE ORAL at 17:03

## 2025-07-23 RX ADMIN — PANCRELIPASE LIPASE, PANCRELIPASE PROTEASE, PANCRELIPASE AMYLASE 30000 UNITS: 15000; 47000; 63000 CAPSULE, DELAYED RELEASE ORAL at 17:05

## 2025-07-23 RX ADMIN — OXYCODONE 5 MG: 5 TABLET ORAL at 01:21

## 2025-07-23 RX ADMIN — ALBUTEROL SULFATE 2 PUFF: 90 AEROSOL, METERED RESPIRATORY (INHALATION) at 15:40

## 2025-07-23 RX ADMIN — CALCIUM POLYCARBOPHIL 625 MG: 625 TABLET, FILM COATED ORAL at 08:45

## 2025-07-23 RX ADMIN — ALBUTEROL SULFATE 2 PUFF: 90 AEROSOL, METERED RESPIRATORY (INHALATION) at 08:19

## 2025-07-23 RX ADMIN — ALBUTEROL SULFATE 2 PUFF: 90 AEROSOL, METERED RESPIRATORY (INHALATION) at 12:10

## 2025-07-23 RX ADMIN — INSULIN LISPRO 1 UNITS: 100 INJECTION, SOLUTION INTRAVENOUS; SUBCUTANEOUS at 12:14

## 2025-07-23 RX ADMIN — PANCRELIPASE LIPASE, PANCRELIPASE PROTEASE, PANCRELIPASE AMYLASE 30000 UNITS: 15000; 47000; 63000 CAPSULE, DELAYED RELEASE ORAL at 08:48

## 2025-07-23 RX ADMIN — PANTOPRAZOLE SODIUM 40 MG: 40 TABLET, DELAYED RELEASE ORAL at 08:42

## 2025-07-23 RX ADMIN — SODIUM CHLORIDE, PRESERVATIVE FREE 10 ML: 5 INJECTION INTRAVENOUS at 09:05

## 2025-07-23 RX ADMIN — SODIUM CHLORIDE, PRESERVATIVE FREE 10 ML: 5 INJECTION INTRAVENOUS at 20:42

## 2025-07-23 RX ADMIN — OXYCODONE 5 MG: 5 TABLET ORAL at 20:50

## 2025-07-23 RX ADMIN — OXYCODONE 5 MG: 5 TABLET ORAL at 08:41

## 2025-07-23 RX ADMIN — INSULIN LISPRO 1 UNITS: 100 INJECTION, SOLUTION INTRAVENOUS; SUBCUTANEOUS at 20:42

## 2025-07-23 RX ADMIN — ALBUMIN (HUMAN) 25 G: 0.25 INJECTION, SOLUTION INTRAVENOUS at 17:25

## 2025-07-23 RX ADMIN — POTASSIUM CHLORIDE 20 MEQ: 1500 TABLET, EXTENDED RELEASE ORAL at 08:42

## 2025-07-23 RX ADMIN — DICYCLOMINE HYDROCHLORIDE 20 MG: 20 TABLET ORAL at 08:46

## 2025-07-23 RX ADMIN — ALBUTEROL SULFATE 2 PUFF: 90 AEROSOL, METERED RESPIRATORY (INHALATION) at 20:23

## 2025-07-23 RX ADMIN — CEPHALEXIN 500 MG: 500 CAPSULE ORAL at 15:28

## 2025-07-23 RX ADMIN — ACETAMINOPHEN 650 MG: 325 TABLET ORAL at 20:42

## 2025-07-23 RX ADMIN — PHYTONADIONE 5 MG: 5 TABLET ORAL at 20:50

## 2025-07-23 RX ADMIN — FUROSEMIDE 40 MG: 10 INJECTION, SOLUTION INTRAMUSCULAR; INTRAVENOUS at 10:56

## 2025-07-23 RX ADMIN — PANCRELIPASE LIPASE, PANCRELIPASE PROTEASE, PANCRELIPASE AMYLASE 40000 UNITS: 20000; 63000; 84000 CAPSULE, DELAYED RELEASE ORAL at 12:08

## 2025-07-23 RX ADMIN — FINASTERIDE 5 MG: 5 TABLET, FILM COATED ORAL at 18:21

## 2025-07-23 RX ADMIN — CEPHALEXIN 500 MG: 500 CAPSULE ORAL at 20:42

## 2025-07-23 RX ADMIN — Medication 3 MG: at 20:42

## 2025-07-23 RX ADMIN — FUROSEMIDE 40 MG: 10 INJECTION, SOLUTION INTRAMUSCULAR; INTRAVENOUS at 18:56

## 2025-07-23 RX ADMIN — OXYCODONE 5 MG: 5 TABLET ORAL at 15:28

## 2025-07-23 RX ADMIN — PANCRELIPASE LIPASE, PANCRELIPASE PROTEASE, PANCRELIPASE AMYLASE 40000 UNITS: 20000; 63000; 84000 CAPSULE, DELAYED RELEASE ORAL at 17:04

## 2025-07-23 RX ADMIN — LACTULOSE 30 G: 10 SOLUTION ORAL at 08:51

## 2025-07-23 RX ADMIN — ALBUTEROL SULFATE 2 PUFF: 90 AEROSOL, METERED RESPIRATORY (INHALATION) at 01:53

## 2025-07-23 ASSESSMENT — PAIN DESCRIPTION - ORIENTATION
ORIENTATION: RIGHT;UPPER
ORIENTATION: ANTERIOR;RIGHT

## 2025-07-23 ASSESSMENT — PAIN SCALES - GENERAL
PAINLEVEL_OUTOF10: 6
PAINLEVEL_OUTOF10: 4
PAINLEVEL_OUTOF10: 6
PAINLEVEL_OUTOF10: 6

## 2025-07-23 ASSESSMENT — PAIN DESCRIPTION - DESCRIPTORS
DESCRIPTORS: SHARP;SHOOTING
DESCRIPTORS: ACHING;SHARP
DESCRIPTORS: STABBING

## 2025-07-23 ASSESSMENT — PAIN DESCRIPTION - LOCATION
LOCATION: ABDOMEN
LOCATION: RIB CAGE
LOCATION: ABDOMEN
LOCATION: CHEST

## 2025-07-23 NOTE — PROGRESS NOTES
Patient purewick removed, noted small blood clot in bag but no open areas,  guanakito area cleaned  -  given urinal to void for urine specimen.  Specimen obtained  -  void of 250ml oneyda urine and specimen sent to lab  -  purewick catheter replaced per patient request.

## 2025-07-23 NOTE — PROGRESS NOTES
Comprehensive Nutrition Assessment    Type and Reason for Visit:  Initial, Positive nutrition screen (MST 2)    Nutrition Recommendations/Plan:   Continue Regular 4 CCC diet ; 1000 ml fr  Added Ensure MAX BID NO CHOCOLATE      Malnutrition Assessment:  Malnutrition Status:  Severe malnutrition (07/23/25 1329)    Context:  Chronic Illness     Findings of the 6 clinical characteristics of malnutrition:  Energy Intake:  Mild decrease in energy intake  Weight Loss:  Mild weight loss     Body Fat Loss:  Severe body fat loss Orbital, Buccal region   Muscle Mass Loss:  Severe muscle mass loss Temples (temporalis), Clavicles (pectoralis & deltoids)  Fluid Accumulation:  Mild Ascites   Strength:  Not Performed    Nutrition Assessment:    Pt. severely malnourished AEB notable muscle wasting and fat loss.  At risk for further nutritional compromise r/t decreased PO intakes PTA; dx of stage 4 cancer with ascites; and altered nutrition related labs .  Will continue regular 4 ccc diet with 1000 ml fr and added ensure MAX BID.     Nutrition Related Findings:    pt is A & O x 4; h/o of whipple r/t stage IV cholangiocarcinoma; s/p billary drains; reports inatkes ahve been poor; drinks 2 ensure HP; h/o ascites; low Na; Low h/h Wound Type: None       Current Nutrition Intake & Therapies:    Average Meal Intake: %  Average Supplements Intake: None Ordered  ADULT DIET; Regular; 4 carb choices (60 gm/meal); 1000 ml  ADULT ORAL NUTRITION SUPPLEMENT; Breakfast, Dinner; Low Calorie/High Protein Oral Supplement    Anthropometric Measures:  Height: 180.3 cm (5' 10.98\")  Ideal Body Weight (IBW): 172 lbs (78 kg)    Admission Body Weight: 71.2 kg (157 lb)  Current Body Weight: 71.2 kg (157 lb), 91.3 % IBW.    Current BMI (kg/m2): 21.9  Usual Body Weight: 74.8 kg (165 lb)     % Weight Change (Calculated): -4.8                    BMI Categories: Underweight (BMI less than 22) age over 65    Estimated Daily Nutrient Needs:  Energy  Requirements Based On: Kcal/kg  Weight Used for Energy Requirements: Current  Energy (kcal/day): 1614-3563 based ~ 30-33 kcal/kg c bw  Weight Used for Protein Requirements: Current  Protein (g/day): 72-84 based 1-1.2 gr/kg cbw  Method Used for Fluid Requirements: 1 ml/kcal  Fluid (ml/day): 6392-4623    Nutrition Diagnosis:   Severe malnutrition, in context of chronic illness related to catabolic illness, inadequate protein-energy intake, endocrine dysfunction as evidenced by poor intake prior to admission, BMI, criteria as identified in malnutrition assessment, lab values, GI abnormality    Nutrition Interventions:   Food and/or Nutrient Delivery: Continue Current Diet, Start Oral Nutrition Supplement  Nutrition Education/Counseling: No recommendation at this time  Coordination of Nutrition Care: Continue to monitor while inpatient       Goals:  Goals: PO intake 75% or greater, by next RD assessment  Type of Goal: New goal       Nutrition Monitoring and Evaluation:   Behavioral-Environmental Outcomes: None Identified  Food/Nutrient Intake Outcomes: Food and Nutrient Intake, Supplement Intake  Physical Signs/Symptoms Outcomes: Biochemical Data, Nutrition Focused Physical Findings, Weight, GI Status, Fluid Status or Edema    Discharge Planning:    No discharge needs at this time     Maris Dick RD, LD  Contact: [phone]

## 2025-07-23 NOTE — PLAN OF CARE
Problem: Safety - Adult  Goal: Free from fall injury  Outcome: Progressing     Problem: Chronic Conditions and Co-morbidities  Goal: Patient's chronic conditions and co-morbidity symptoms are monitored and maintained or improved  Outcome: Progressing     Problem: Discharge Planning  Goal: Discharge to home or other facility with appropriate resources  Outcome: Progressing     Problem: Pain  Goal: Verbalizes/displays adequate comfort level or baseline comfort level  Outcome: Progressing     Problem: Skin/Tissue Integrity  Goal: Skin integrity remains intact  Outcome: Progressing

## 2025-07-23 NOTE — PROGRESS NOTES
Progress Note    Date: 07/23/25    Subjective: Mr Ceja seen at bedside. He gets pains on his right side. These are not new. He has been taking oxycodone for these. He doesn't used very much pain medications. His legs are less swollen but still has edema. He does not feel ready for hospice. He feels cold which sometimes happens when he is more anemic.     Objective:  Vitals:    07/23/25 1323 07/23/25 1528 07/23/25 1540 07/23/25 1558   BP:    120/60   Pulse:    83   Resp:  16  18   Temp:    100.2 °F (37.9 °C)   TempSrc:    Oral   SpO2:   99% 93%   Weight:       Height: 1.803 m (5' 10.98\")          Physical Exam:  Gen: No distress. Alert.   Eyes: PERRL. No sclera icterus. No conjunctival injection.   ENT: No discharge. Pharynx clear.   Neck: No JVD.  Trachea midline.  Resp: No accessory muscle use. No crackles. No wheezes. No rhonchi.   +right chest wall port.   CV: Regular rate. Regular rhythm. No murmur.  No rub. 2+ edema bilateral LE.   Capillary Refill: Brisk,< 3 seconds   Peripheral Pulses: +2 palpable, equal bilaterally   GI: Abdomen distended. Non-tender. Normal bowel sounds.  Bilateral biliary drains present.  Skin: Warm and dry. No nodule on exposed extremities. No rash on exposed extremities.   M/S: No cyanosis. No joint deformity. No clubbing.   Neuro: Awake. Grossly nonfocal      Labs:  CBC:   Recent Labs     07/21/25 2042 07/22/25  0602 07/23/25  1720   WBC 7.3 6.1  --    HGB 7.6* 7.2* 7.5*   HCT 23.3* 22.0* 22.8*   MCV 86.0 85.7  --     154  --      BMP:   Recent Labs     07/21/25 2042 07/22/25  0602 07/23/25  0630   * 131* 132*   K 4.6 3.4* 3.9   CL 95* 95* 96*   CO2 23 27 27   BUN 6* 5* 6*   CREATININE 0.3* 0.3* 0.3*     LIVER PROFILE:   Recent Labs     07/21/25 2042 07/22/25  0602   AST 23 22   ALT 18 18   LIPASE 20.0  --    BILITOT 1.6* 1.9*   ALKPHOS 302* 292*     PT/INR:   Recent Labs     07/21/25  2238   PROTIME 17.6*   INR 1.43*     APTT: No results for input(s): \"APTT\" in the  was WNL.  -protein supplements ordered.    Stage IV intrahepatic cholangiocarcinoma s/p biliary drains.  Portal vein thrombosis.  Coagulopathy, on daily vitamin K.  -thrombosis is chronic, unchanged on CT.  -multiple varices noted with multiple liver masses 2/2 patient's cholangiocarcinoma.  -palliative care consulted during previous admission, family has refused hospice and has unrealistic expectations.  -continue home Zenpep and Vitamin K.  -Tarcev is on hold.    Lung nodule.  -CT PE showed ill-defined nodule around the right upper lobe.  -continue outpatient follow up with oncology.    Type II diabetes mellitus.  -A1C 5.8% from 7/4/2025.  -SSI.  -POC Glucose, carb control diet.     Physical deconditioning.   -patient was recently hospitalized for 11 days due to anemia, stercoral colitis, E. coli bacteremia was evaluated by oncology and discharged on Augmentin for 4 additional days which she has completed by now.   -PT/OT recommended SNF last admission.  -continue Keflex.     Acute on chronic normocytic anemia, recurrent.  -hemoglobin 7.2, baseline 7-8.  -monitor with daily CBC.   -required PRBC during previous admission, thought to be 2/2 Tarceva, initially held, but resumed after discussion with his oncologist, although patient reports never restarted.   -SCDs for DVT prophylaxis.    -Hgb 7.5    Vitamin D deficiency.  -on supplementation 3x weekly.    Gout.  -continue allopurinol.      DVT Prophylaxis: SCDs  Diet: ADULT DIET; Regular; 4 carb choices (60 gm/meal); 1000 ml  ADULT ORAL NUTRITION SUPPLEMENT; Breakfast, Dinner; Low Calorie/High Protein Oral Supplement   Code Status: DNR-CCA     Mikaela Birmingham DO   7/23/2025  6:39 PM

## 2025-07-23 NOTE — PROGRESS NOTES
Department of Internal Medicine  Nephrology Progress Note        SUBJECTIVE:    We are following this patient for Hyponatremia.  The patient was seen and examined; he feels well today with no CP, SOB, nausea or vomiting.    ROS: No fever or chills.  Social: No family at bedside.    Physical Exam:    VITALS:  /68   Pulse 63   Temp 98.8 °F (37.1 °C) (Oral)   Resp 16   Ht 1.803 m (5' 11\")   Wt 71.5 kg (157 lb 9.6 oz)   SpO2 98%   BMI 21.98 kg/m²     General appearance: Seems comfortable, no acute distress.  Neck: Trachea midline, thyroid normal.   Lungs:  Non labored breathing, CTA to anterior auscultation.  Heart:  S1S2 normal, rub or gallop. + peripheral edema.  Abdomen: Soft, non-tender, no organomegaly.   Skin: No lesions or rashes, warm to touch.     DATA:    CBC with Differential:    Lab Results   Component Value Date/Time    WBC 6.1 07/22/2025 06:02 AM    RBC 2.57 07/22/2025 06:02 AM    HGB 7.2 07/22/2025 06:02 AM    HCT 22.0 07/22/2025 06:02 AM     07/22/2025 06:02 AM    MCV 85.7 07/22/2025 06:02 AM    MCH 28.1 07/22/2025 06:02 AM    MCHC 32.8 07/22/2025 06:02 AM    RDW 18.4 07/22/2025 06:02 AM    BANDSPCT 3 06/27/2023 05:35 AM    LYMPHOPCT 5.5 07/22/2025 06:02 AM    MONOPCT 5.9 07/22/2025 06:02 AM    MYELOPCT 3 06/27/2023 05:35 AM    EOSPCT 0.5 07/22/2025 06:02 AM    BASOPCT 0.4 07/22/2025 06:02 AM    MONOSABS 0.4 07/22/2025 06:02 AM    LYMPHSABS 0.3 07/22/2025 06:02 AM    EOSABS 0.0 07/22/2025 06:02 AM    BASOSABS 0.0 07/22/2025 06:02 AM    DIFFTYPE Auto 02/16/2011 05:30 AM     BMP:    Lab Results   Component Value Date/Time     07/23/2025 06:30 AM    K 3.9 07/23/2025 06:30 AM    K 3.4 07/22/2025 06:02 AM    CL 96 07/23/2025 06:30 AM    CO2 27 07/23/2025 06:30 AM    BUN 6 07/23/2025 06:30 AM    CREATININE 0.3 07/23/2025 06:30 AM    CALCIUM 8.1 07/23/2025 06:30 AM    GFRAA >60 07/15/2022 02:04 PM    GFRAA >60 02/15/2011 05:17 PM    LABGLOM >90 07/23/2025 06:30 AM    LABGLOM 163

## 2025-07-23 NOTE — CONSULTS
Palliative Care Chart Review  and Check in Note:     NAME:  Sharath Ceja  Admit Date: 7/21/2025  Hospital Day:  Hospital Day: 3   Current Code status: DNR-CCA    Palliative care is continuing to following Mr. Ceja for symptom management,  and goals of care discussion as needed. Patient's chart reviewed today 7/23/25.        The following are the currently established goals/code status, and Symptom management.     Goals of care: [facility] nurse is meeting with pt and family at 9 AM this morning.    Code status: DNR-CCA    Discharge plan: to be decided, possible IPU with [facility].    Per Shannon with [facility] pt now wanting to go home with HTP to follow along and hold off on hospice services for now, not ready to stop blood transfusions at this time. Pt unsure if he would be able to tolerate any more chemotherapy treatments and awaiting Oncology recommendations prior to deciding on comfort measures.      Oriana Larose RN  07/23/25  9:04 AM

## 2025-07-23 NOTE — CARE COORDINATION
Pt and family decline hospice services at this time. They are electing to DC home with [facility]. Referral called. Pt may need home O2 (will follow). Pt will have HTP to follow while at home.     Referral to [facility]:     1208: [facility] accepted

## 2025-07-23 NOTE — PROGRESS NOTES
Patient requesting pain medication for complaints of right rib cage pain.  Medicated w/ oxy IR per patient request  -  will monitor patient for effectiveness.

## 2025-07-23 NOTE — PROGRESS NOTES
End of shift report given to Mariah RN  -  care transferred -  patient resting in bed stating the pain med was finally working.  Call light in patient's reach.

## 2025-07-24 LAB
ANION GAP SERPL CALCULATED.3IONS-SCNC: 10 MMOL/L (ref 3–16)
BUN SERPL-MCNC: 8 MG/DL (ref 7–20)
CALCIUM SERPL-MCNC: 8.5 MG/DL (ref 8.3–10.6)
CHLORIDE SERPL-SCNC: 96 MMOL/L (ref 99–110)
CO2 SERPL-SCNC: 28 MMOL/L (ref 21–32)
CREAT SERPL-MCNC: 0.3 MG/DL (ref 0.8–1.3)
GFR SERPLBLD CREATININE-BSD FMLA CKD-EPI: >90 ML/MIN/{1.73_M2}
GLUCOSE BLD-MCNC: 176 MG/DL (ref 70–99)
GLUCOSE BLD-MCNC: 204 MG/DL (ref 70–99)
GLUCOSE BLD-MCNC: 209 MG/DL (ref 70–99)
GLUCOSE BLD-MCNC: 213 MG/DL (ref 70–99)
GLUCOSE SERPL-MCNC: 149 MG/DL (ref 70–99)
PERFORMED ON: ABNORMAL
POTASSIUM SERPL-SCNC: 3.6 MMOL/L (ref 3.5–5.1)
SODIUM SERPL-SCNC: 134 MMOL/L (ref 136–145)

## 2025-07-24 PROCEDURE — 6370000000 HC RX 637 (ALT 250 FOR IP)

## 2025-07-24 PROCEDURE — 94761 N-INVAS EAR/PLS OXIMETRY MLT: CPT

## 2025-07-24 PROCEDURE — 99232 SBSQ HOSP IP/OBS MODERATE 35: CPT | Performed by: INTERNAL MEDICINE

## 2025-07-24 PROCEDURE — 80048 BASIC METABOLIC PNL TOTAL CA: CPT

## 2025-07-24 PROCEDURE — 94640 AIRWAY INHALATION TREATMENT: CPT

## 2025-07-24 PROCEDURE — 6370000000 HC RX 637 (ALT 250 FOR IP): Performed by: STUDENT IN AN ORGANIZED HEALTH CARE EDUCATION/TRAINING PROGRAM

## 2025-07-24 PROCEDURE — 6370000000 HC RX 637 (ALT 250 FOR IP): Performed by: INTERNAL MEDICINE

## 2025-07-24 PROCEDURE — 2700000000 HC OXYGEN THERAPY PER DAY

## 2025-07-24 PROCEDURE — 2060000000 HC ICU INTERMEDIATE R&B

## 2025-07-24 PROCEDURE — 2500000003 HC RX 250 WO HCPCS

## 2025-07-24 RX ORDER — TORSEMIDE 20 MG/1
20 TABLET ORAL DAILY
Status: DISCONTINUED | OUTPATIENT
Start: 2025-07-24 | End: 2025-08-01 | Stop reason: HOSPADM

## 2025-07-24 RX ORDER — DICYCLOMINE HCL 20 MG
20 TABLET ORAL
Status: DISCONTINUED | OUTPATIENT
Start: 2025-07-25 | End: 2025-08-01 | Stop reason: HOSPADM

## 2025-07-24 RX ADMIN — ACETAMINOPHEN 650 MG: 325 TABLET ORAL at 20:38

## 2025-07-24 RX ADMIN — PANTOPRAZOLE SODIUM 40 MG: 40 TABLET, DELAYED RELEASE ORAL at 05:51

## 2025-07-24 RX ADMIN — PANCRELIPASE LIPASE, PANCRELIPASE PROTEASE, PANCRELIPASE AMYLASE 30000 UNITS: 15000; 47000; 63000 CAPSULE, DELAYED RELEASE ORAL at 08:49

## 2025-07-24 RX ADMIN — INSULIN LISPRO 1 UNITS: 100 INJECTION, SOLUTION INTRAVENOUS; SUBCUTANEOUS at 20:45

## 2025-07-24 RX ADMIN — CEPHALEXIN 500 MG: 500 CAPSULE ORAL at 08:51

## 2025-07-24 RX ADMIN — LACTULOSE 30 G: 10 SOLUTION ORAL at 20:38

## 2025-07-24 RX ADMIN — OXYCODONE 5 MG: 5 TABLET ORAL at 23:43

## 2025-07-24 RX ADMIN — ALBUTEROL SULFATE 2 PUFF: 90 AEROSOL, METERED RESPIRATORY (INHALATION) at 07:09

## 2025-07-24 RX ADMIN — SODIUM CHLORIDE, PRESERVATIVE FREE 10 ML: 5 INJECTION INTRAVENOUS at 08:53

## 2025-07-24 RX ADMIN — PANCRELIPASE LIPASE, PANCRELIPASE PROTEASE, PANCRELIPASE AMYLASE 30000 UNITS: 15000; 47000; 63000 CAPSULE, DELAYED RELEASE ORAL at 12:27

## 2025-07-24 RX ADMIN — CALCIUM POLYCARBOPHIL 625 MG: 625 TABLET, FILM COATED ORAL at 08:52

## 2025-07-24 RX ADMIN — INSULIN LISPRO 1 UNITS: 100 INJECTION, SOLUTION INTRAVENOUS; SUBCUTANEOUS at 12:27

## 2025-07-24 RX ADMIN — TORSEMIDE 20 MG: 20 TABLET ORAL at 14:48

## 2025-07-24 RX ADMIN — ALLOPURINOL 300 MG: 300 TABLET ORAL at 08:51

## 2025-07-24 RX ADMIN — OXYCODONE 5 MG: 5 TABLET ORAL at 14:51

## 2025-07-24 RX ADMIN — ALBUTEROL SULFATE 2 PUFF: 90 AEROSOL, METERED RESPIRATORY (INHALATION) at 14:59

## 2025-07-24 RX ADMIN — INSULIN LISPRO 1 UNITS: 100 INJECTION, SOLUTION INTRAVENOUS; SUBCUTANEOUS at 17:31

## 2025-07-24 RX ADMIN — LACTULOSE 30 G: 10 SOLUTION ORAL at 08:52

## 2025-07-24 RX ADMIN — CEPHALEXIN 500 MG: 500 CAPSULE ORAL at 14:48

## 2025-07-24 RX ADMIN — PANTOPRAZOLE SODIUM 40 MG: 40 TABLET, DELAYED RELEASE ORAL at 14:48

## 2025-07-24 RX ADMIN — SODIUM CHLORIDE, PRESERVATIVE FREE 10 ML: 5 INJECTION INTRAVENOUS at 20:39

## 2025-07-24 RX ADMIN — PANCRELIPASE LIPASE, PANCRELIPASE PROTEASE, PANCRELIPASE AMYLASE 40000 UNITS: 20000; 63000; 84000 CAPSULE, DELAYED RELEASE ORAL at 08:49

## 2025-07-24 RX ADMIN — CEPHALEXIN 500 MG: 500 CAPSULE ORAL at 20:38

## 2025-07-24 RX ADMIN — ALBUTEROL SULFATE 2 PUFF: 90 AEROSOL, METERED RESPIRATORY (INHALATION) at 10:56

## 2025-07-24 RX ADMIN — Medication 1000 MCG: at 08:52

## 2025-07-24 RX ADMIN — FINASTERIDE 5 MG: 5 TABLET, FILM COATED ORAL at 17:31

## 2025-07-24 RX ADMIN — PANCRELIPASE LIPASE, PANCRELIPASE PROTEASE, PANCRELIPASE AMYLASE 30000 UNITS: 15000; 47000; 63000 CAPSULE, DELAYED RELEASE ORAL at 17:31

## 2025-07-24 RX ADMIN — PANCRELIPASE LIPASE, PANCRELIPASE PROTEASE, PANCRELIPASE AMYLASE 40000 UNITS: 20000; 63000; 84000 CAPSULE, DELAYED RELEASE ORAL at 17:31

## 2025-07-24 RX ADMIN — LACTULOSE 30 G: 10 SOLUTION ORAL at 14:48

## 2025-07-24 RX ADMIN — ALBUTEROL SULFATE 2 PUFF: 90 AEROSOL, METERED RESPIRATORY (INHALATION) at 20:12

## 2025-07-24 RX ADMIN — Medication 3 MG: at 20:38

## 2025-07-24 RX ADMIN — PANCRELIPASE LIPASE, PANCRELIPASE PROTEASE, PANCRELIPASE AMYLASE 40000 UNITS: 20000; 63000; 84000 CAPSULE, DELAYED RELEASE ORAL at 12:27

## 2025-07-24 ASSESSMENT — PAIN SCALES - GENERAL
PAINLEVEL_OUTOF10: 7
PAINLEVEL_OUTOF10: 1

## 2025-07-24 ASSESSMENT — PAIN DESCRIPTION - DESCRIPTORS: DESCRIPTORS: SHARP

## 2025-07-24 ASSESSMENT — PAIN DESCRIPTION - LOCATION: LOCATION: ABDOMEN

## 2025-07-24 ASSESSMENT — PAIN DESCRIPTION - ORIENTATION: ORIENTATION: RIGHT

## 2025-07-24 NOTE — FLOWSHEET NOTE
07/24/25 1031   Vital Signs   Temp 98.2 °F (36.8 °C)   Temp Source Oral   Pulse 63   Heart Rate Source Monitor   Respirations 16   /62   MAP (Calculated) 78   BP Location Left upper arm   BP Method Automatic   Patient Position High fowlers   Oxygen Therapy   SpO2 100 %   O2 Device Nasal cannula   O2 Flow Rate (L/min) 2 L/min     Patient remains stable. Call light within reach.

## 2025-07-24 NOTE — PROGRESS NOTES
Department of Internal Medicine  Nephrology Progress Note        SUBJECTIVE:    We are following this patient for Hyponatremia.  The patient was seen and examined; he feels well today with no CP, SOB, nausea or vomiting.    ROS: No fever or chills.  Social: Family at bedside.    Physical Exam:    VITALS:  /62   Pulse 63   Temp 98.2 °F (36.8 °C) (Oral)   Resp 16   Ht 1.803 m (5' 10.98\")   Wt 71.5 kg (157 lb 9.6 oz)   SpO2 99%   BMI 21.99 kg/m²     General appearance: Seems comfortable, no acute distress.  Neck: Trachea midline, thyroid normal.   Lungs:  Non labored breathing, CTA to anterior auscultation.  Heart:  S1S2 normal, rub or gallop. + peripheral edema.  Abdomen: Soft, non-tender, no organomegaly.   Skin: No lesions or rashes, warm to touch.     DATA:    CBC with Differential:    Lab Results   Component Value Date/Time    WBC 6.1 07/22/2025 06:02 AM    RBC 2.57 07/22/2025 06:02 AM    HGB 7.5 07/23/2025 05:20 PM    HCT 22.8 07/23/2025 05:20 PM     07/22/2025 06:02 AM    MCV 85.7 07/22/2025 06:02 AM    MCH 28.1 07/22/2025 06:02 AM    MCHC 32.8 07/22/2025 06:02 AM    RDW 18.4 07/22/2025 06:02 AM    BANDSPCT 3 06/27/2023 05:35 AM    LYMPHOPCT 5.5 07/22/2025 06:02 AM    MONOPCT 5.9 07/22/2025 06:02 AM    MYELOPCT 3 06/27/2023 05:35 AM    EOSPCT 0.5 07/22/2025 06:02 AM    BASOPCT 0.4 07/22/2025 06:02 AM    MONOSABS 0.4 07/22/2025 06:02 AM    LYMPHSABS 0.3 07/22/2025 06:02 AM    EOSABS 0.0 07/22/2025 06:02 AM    BASOSABS 0.0 07/22/2025 06:02 AM    DIFFTYPE Auto 02/16/2011 05:30 AM     BMP:    Lab Results   Component Value Date/Time     07/24/2025 05:40 AM    K 3.6 07/24/2025 05:40 AM    K 3.4 07/22/2025 06:02 AM    CL 96 07/24/2025 05:40 AM    CO2 28 07/24/2025 05:40 AM    BUN 8 07/24/2025 05:40 AM    CREATININE 0.3 07/24/2025 05:40 AM    CALCIUM 8.5 07/24/2025 05:40 AM    GFRAA >60 07/15/2022 02:04 PM    GFRAA >60 02/15/2011 05:17 PM    LABGLOM >90 07/24/2025 05:40 AM    LABGLOM 163

## 2025-07-24 NOTE — PROGRESS NOTES
Patient verbalizing right ribcage pain  -   medicated w/ oxy IR 5mg po per patient request  -  will monitor patient for effectiveness.

## 2025-07-24 NOTE — FLOWSHEET NOTE
07/24/25 0707   Vital Signs   Temp 98.1 °F (36.7 °C)   Temp Source Oral   Pulse 68   Heart Rate Source Monitor   Respirations 16   /60   MAP (Calculated) 77   BP Location Left upper arm   Patient Position Semi fowlers   Oxygen Therapy   SpO2 100 %   O2 Device Nasal cannula   O2 Flow Rate (L/min) 2 L/min     Shift assessment completed. See flow sheet. Medications given. Patient is A&O x4. Vitals are stable. Patient denies further needs. Call light within reach.

## 2025-07-24 NOTE — PLAN OF CARE
Problem: Safety - Adult  Goal: Free from fall injury  7/23/2025 2041 by Farzana Mariscal RN  Outcome: Progressing  7/23/2025 2009 by Uyen Valdez RN  Outcome: Progressing     Problem: Chronic Conditions and Co-morbidities  Goal: Patient's chronic conditions and co-morbidity symptoms are monitored and maintained or improved  7/23/2025 2041 by Farzana Mariscal RN  Outcome: Progressing  7/23/2025 2009 by Uyen Valdez RN  Outcome: Progressing     Problem: Discharge Planning  Goal: Discharge to home or other facility with appropriate resources  7/23/2025 2041 by Farzana Mariscal RN  Outcome: Progressing  7/23/2025 2009 by Uyen Valdez RN  Outcome: Progressing     Problem: Pain  Goal: Verbalizes/displays adequate comfort level or baseline comfort level  7/23/2025 2041 by Farzana Mariscal RN  Outcome: Progressing  7/23/2025 2009 by Uyen Valdez RN  Outcome: Progressing     Problem: Skin/Tissue Integrity  Goal: Skin integrity remains intact  Description: 1.  Monitor for areas of redness and/or skin breakdown  2.  Assess vascular access sites hourly  3.  Every 4-6 hours minimum:  Change oxygen saturation probe site  4.  Every 4-6 hours:  If on nasal continuous positive airway pressure, respiratory therapy assess nares and determine need for appliance change or resting period  7/23/2025 2041 by Farzana Mariscal RN  Outcome: Progressing  7/23/2025 2009 by Uyen Valdez RN  Outcome: Progressing     Problem: Neurosensory - Adult  Goal: Achieves stable or improved neurological status  Outcome: Progressing  Goal: Absence of seizures  Outcome: Progressing  Goal: Achieves maximal functionality and self care  Outcome: Progressing     Problem: Respiratory - Adult  Goal: Achieves optimal ventilation and oxygenation  Outcome: Progressing     Problem: Cardiovascular - Adult  Goal: Maintains optimal cardiac output and hemodynamic stability  Outcome: Progressing  Goal: Absence of cardiac dysrhythmias or at

## 2025-07-24 NOTE — FLOWSHEET NOTE
07/23/25 2001   Vital Signs   Temp (!) 100.8 °F (38.2 °C)   Temp Source Oral   Pulse 84   Heart Rate Source Monitor   Respirations 18   /66   MAP (Calculated) 90   BP Location Left upper arm   BP Method Automatic   Patient Position Semi fowlers   Oxygen Therapy   SpO2 93 %   O2 Device Nasal cannula   O2 Flow Rate (L/min) 2 L/min

## 2025-07-24 NOTE — FLOWSHEET NOTE
07/24/25 1549   Vital Signs   Temp 99.7 °F (37.6 °C)   Temp Source Oral   Pulse 80   Heart Rate Source Monitor   Respirations 16   BP (!) 121/59   MAP (Calculated) 80   BP Location Left upper arm   BP Method Automatic   Patient Position High fowlers   Oxygen Therapy   SpO2 95 %   O2 Device None (Room air)     Vitals remain stable. Patient is A&O x4. Call light within reach.

## 2025-07-24 NOTE — PROGRESS NOTES
Patient verbalization of effective pain relief. Assisted patient in repositioning.   Denies further needs at present.

## 2025-07-24 NOTE — PROGRESS NOTES
Progress Note    Date: 07/24/25    Subjective: Mr Ceja seen at bedside. No pain today. Has been discussing palliative care at home and eventual transition to hospice.     Objective:  Vitals:    07/23/25 2323 07/24/25 0537 07/24/25 0707 07/24/25 0709   BP: 115/64 106/74 110/60    Pulse: 77 62 68    Resp: 16 16 16    Temp: 98.6 °F (37 °C) 97.9 °F (36.6 °C) 98.1 °F (36.7 °C)    TempSrc: Oral Oral Oral    SpO2: 95% 99% 100% 99%   Weight:       Height:           Physical Exam:  Gen: No distress. Alert.   Eyes: PERRL. No sclera icterus. No conjunctival injection.   ENT: No discharge. Pharynx clear.   Neck: No JVD.  Trachea midline.  Resp: No accessory muscle use. No crackles. No wheezes. No rhonchi.   +right chest wall port.   CV: Regular rate. Regular rhythm. No murmur.  No rub. 2+ edema bilateral LE.   Capillary Refill: Brisk,< 3 seconds   Peripheral Pulses: +2 palpable, equal bilaterally   GI: Abdomen distended. Non-tender. Normal bowel sounds.  Bilateral biliary drains present.  Skin: Warm and dry. No nodule on exposed extremities. No rash on exposed extremities.   M/S: No cyanosis. No joint deformity. No clubbing.   Neuro: Awake. Grossly nonfocal      Labs:  CBC:   Recent Labs     07/21/25 2042 07/22/25  0602 07/23/25  1720   WBC 7.3 6.1  --    HGB 7.6* 7.2* 7.5*   HCT 23.3* 22.0* 22.8*   MCV 86.0 85.7  --     154  --      BMP:   Recent Labs     07/22/25  0602 07/23/25  0630 07/24/25  0540   * 132* 134*   K 3.4* 3.9 3.6   CL 95* 96* 96*   CO2 27 27 28   BUN 5* 6* 8   CREATININE 0.3* 0.3* 0.3*     LIVER PROFILE:   Recent Labs     07/21/25 2042 07/22/25  0602   AST 23 22   ALT 18 18   LIPASE 20.0  --    BILITOT 1.6* 1.9*   ALKPHOS 302* 292*     PT/INR:   Recent Labs     07/21/25  2238   PROTIME 17.6*   INR 1.43*     APTT: No results for input(s): \"APTT\" in the last 72 hours.  UA:  Recent Labs     07/23/25  1220   COLORU Yellow   PHUR 7.0   WBCUA 0-2   RBCUA 21-50*   MUCUS Rare*   CLARITYU Clear

## 2025-07-25 ENCOUNTER — APPOINTMENT (OUTPATIENT)
Dept: GENERAL RADIOLOGY | Age: 75
DRG: 435 | End: 2025-07-25
Payer: MEDICARE

## 2025-07-25 LAB
ANION GAP SERPL CALCULATED.3IONS-SCNC: 12 MMOL/L (ref 3–16)
ANISOCYTOSIS BLD QL SMEAR: ABNORMAL
BACTERIA URNS QL MICRO: ABNORMAL /HPF
BASOPHILS # BLD: 0 K/UL (ref 0–0.2)
BASOPHILS NFR BLD: 0 %
BILIRUB UR QL STRIP.AUTO: NEGATIVE
BUN SERPL-MCNC: 12 MG/DL (ref 7–20)
CALCIUM SERPL-MCNC: 8.3 MG/DL (ref 8.3–10.6)
CHLORIDE SERPL-SCNC: 96 MMOL/L (ref 99–110)
CLARITY UR: CLEAR
CO2 SERPL-SCNC: 28 MMOL/L (ref 21–32)
COLOR UR: YELLOW
CREAT SERPL-MCNC: 0.4 MG/DL (ref 0.8–1.3)
DACRYOCYTES BLD QL SMEAR: ABNORMAL
DEPRECATED RDW RBC AUTO: 19.2 % (ref 12.4–15.4)
EOSINOPHIL # BLD: 0 K/UL (ref 0–0.6)
EOSINOPHIL NFR BLD: 1 %
EPI CELLS #/AREA URNS HPF: ABNORMAL /HPF (ref 0–5)
GFR SERPLBLD CREATININE-BSD FMLA CKD-EPI: >90 ML/MIN/{1.73_M2}
GLUCOSE BLD-MCNC: 177 MG/DL (ref 70–99)
GLUCOSE BLD-MCNC: 228 MG/DL (ref 70–99)
GLUCOSE BLD-MCNC: 242 MG/DL (ref 70–99)
GLUCOSE BLD-MCNC: 255 MG/DL (ref 70–99)
GLUCOSE BLD-MCNC: 277 MG/DL (ref 70–99)
GLUCOSE SERPL-MCNC: 164 MG/DL (ref 70–99)
GLUCOSE UR STRIP.AUTO-MCNC: NEGATIVE MG/DL
HCT VFR BLD AUTO: 21.8 % (ref 40.5–52.5)
HCT VFR BLD AUTO: 22.1 % (ref 40.5–52.5)
HGB BLD-MCNC: 7.1 G/DL (ref 13.5–17.5)
HGB BLD-MCNC: 7.1 G/DL (ref 13.5–17.5)
HGB UR QL STRIP.AUTO: ABNORMAL
KETONES UR STRIP.AUTO-MCNC: NEGATIVE MG/DL
LEUKOCYTE ESTERASE UR QL STRIP.AUTO: NEGATIVE
LYMPHOCYTES # BLD: 0.3 K/UL (ref 1–5.1)
LYMPHOCYTES NFR BLD: 7 %
MCH RBC QN AUTO: 27.6 PG (ref 26–34)
MCHC RBC AUTO-ENTMCNC: 32.1 G/DL (ref 31–36)
MCV RBC AUTO: 86.1 FL (ref 80–100)
MONOCYTES # BLD: 0.2 K/UL (ref 0–1.3)
MONOCYTES NFR BLD: 4 %
NEUTROPHILS # BLD: 3.5 K/UL (ref 1.7–7.7)
NEUTROPHILS NFR BLD: 88 %
NITRITE UR QL STRIP.AUTO: NEGATIVE
PATH INTERP BLD-IMP: NO
PERFORMED ON: ABNORMAL
PH UR STRIP.AUTO: 6 [PH] (ref 5–8)
PLATELET # BLD AUTO: 177 K/UL (ref 135–450)
PLATELET BLD QL SMEAR: ADEQUATE
PMV BLD AUTO: 8.2 FL (ref 5–10.5)
POTASSIUM SERPL-SCNC: 3.5 MMOL/L (ref 3.5–5.1)
PROCALCITONIN SERPL IA-MCNC: 0.28 NG/ML (ref 0–0.15)
PROT UR STRIP.AUTO-MCNC: NEGATIVE MG/DL
RBC # BLD AUTO: 2.57 M/UL (ref 4.2–5.9)
RBC #/AREA URNS HPF: ABNORMAL /HPF (ref 0–4)
SLIDE REVIEW: ABNORMAL
SODIUM SERPL-SCNC: 136 MMOL/L (ref 136–145)
SP GR UR STRIP.AUTO: 1.01 (ref 1–1.03)
STOMATOCYTES BLD QL SMEAR: ABNORMAL
TARGETS BLD QL SMEAR: ABNORMAL
UA COMPLETE W REFLEX CULTURE PNL UR: ABNORMAL
UA DIPSTICK W REFLEX MICRO PNL UR: YES
URN SPEC COLLECT METH UR: ABNORMAL
UROBILINOGEN UR STRIP-ACNC: 0.2 E.U./DL
WBC # BLD AUTO: 4 K/UL (ref 4–11)
WBC #/AREA URNS HPF: ABNORMAL /HPF (ref 0–5)

## 2025-07-25 PROCEDURE — 85014 HEMATOCRIT: CPT

## 2025-07-25 PROCEDURE — 85018 HEMOGLOBIN: CPT

## 2025-07-25 PROCEDURE — 2580000003 HC RX 258: Performed by: INTERNAL MEDICINE

## 2025-07-25 PROCEDURE — 2500000003 HC RX 250 WO HCPCS

## 2025-07-25 PROCEDURE — 36415 COLL VENOUS BLD VENIPUNCTURE: CPT

## 2025-07-25 PROCEDURE — 84145 PROCALCITONIN (PCT): CPT

## 2025-07-25 PROCEDURE — 6370000000 HC RX 637 (ALT 250 FOR IP): Performed by: STUDENT IN AN ORGANIZED HEALTH CARE EDUCATION/TRAINING PROGRAM

## 2025-07-25 PROCEDURE — 6370000000 HC RX 637 (ALT 250 FOR IP): Performed by: INTERNAL MEDICINE

## 2025-07-25 PROCEDURE — 6370000000 HC RX 637 (ALT 250 FOR IP)

## 2025-07-25 PROCEDURE — 6360000002 HC RX W HCPCS: Performed by: INTERNAL MEDICINE

## 2025-07-25 PROCEDURE — 71045 X-RAY EXAM CHEST 1 VIEW: CPT

## 2025-07-25 PROCEDURE — 2060000000 HC ICU INTERMEDIATE R&B

## 2025-07-25 PROCEDURE — 81001 URINALYSIS AUTO W/SCOPE: CPT

## 2025-07-25 PROCEDURE — 80048 BASIC METABOLIC PNL TOTAL CA: CPT

## 2025-07-25 PROCEDURE — 94761 N-INVAS EAR/PLS OXIMETRY MLT: CPT

## 2025-07-25 PROCEDURE — 94640 AIRWAY INHALATION TREATMENT: CPT

## 2025-07-25 PROCEDURE — 99232 SBSQ HOSP IP/OBS MODERATE 35: CPT | Performed by: INTERNAL MEDICINE

## 2025-07-25 PROCEDURE — 85025 COMPLETE CBC W/AUTO DIFF WBC: CPT

## 2025-07-25 PROCEDURE — 87086 URINE CULTURE/COLONY COUNT: CPT

## 2025-07-25 PROCEDURE — 87040 BLOOD CULTURE FOR BACTERIA: CPT

## 2025-07-25 RX ORDER — PROMETHAZINE HYDROCHLORIDE 25 MG/1
12.5 TABLET ORAL
Status: COMPLETED | OUTPATIENT
Start: 2025-07-25 | End: 2025-07-25

## 2025-07-25 RX ADMIN — ERGOCALCIFEROL 50000 UNITS: 1.25 CAPSULE ORAL at 08:36

## 2025-07-25 RX ADMIN — PANTOPRAZOLE SODIUM 40 MG: 40 TABLET, DELAYED RELEASE ORAL at 16:37

## 2025-07-25 RX ADMIN — OXYCODONE 5 MG: 5 TABLET ORAL at 21:51

## 2025-07-25 RX ADMIN — CEPHALEXIN 500 MG: 500 CAPSULE ORAL at 16:37

## 2025-07-25 RX ADMIN — PROMETHAZINE HYDROCHLORIDE 12.5 MG: 25 TABLET ORAL at 14:31

## 2025-07-25 RX ADMIN — INSULIN LISPRO 2 UNITS: 100 INJECTION, SOLUTION INTRAVENOUS; SUBCUTANEOUS at 21:45

## 2025-07-25 RX ADMIN — PANCRELIPASE LIPASE, PANCRELIPASE PROTEASE, PANCRELIPASE AMYLASE 30000 UNITS: 15000; 47000; 63000 CAPSULE, DELAYED RELEASE ORAL at 12:13

## 2025-07-25 RX ADMIN — FINASTERIDE 5 MG: 5 TABLET, FILM COATED ORAL at 16:37

## 2025-07-25 RX ADMIN — ALBUTEROL SULFATE 2 PUFF: 90 AEROSOL, METERED RESPIRATORY (INHALATION) at 06:42

## 2025-07-25 RX ADMIN — LACTULOSE 30 G: 10 SOLUTION ORAL at 08:35

## 2025-07-25 RX ADMIN — PIPERACILLIN AND TAZOBACTAM 3375 MG: 3; .375 INJECTION, POWDER, LYOPHILIZED, FOR SOLUTION INTRAVENOUS at 22:06

## 2025-07-25 RX ADMIN — DICYCLOMINE HYDROCHLORIDE 20 MG: 20 TABLET ORAL at 21:47

## 2025-07-25 RX ADMIN — ALLOPURINOL 300 MG: 300 TABLET ORAL at 08:35

## 2025-07-25 RX ADMIN — SODIUM CHLORIDE, PRESERVATIVE FREE 10 ML: 5 INJECTION INTRAVENOUS at 21:47

## 2025-07-25 RX ADMIN — ACETAMINOPHEN 650 MG: 325 TABLET ORAL at 19:34

## 2025-07-25 RX ADMIN — CEPHALEXIN 500 MG: 500 CAPSULE ORAL at 08:35

## 2025-07-25 RX ADMIN — ALBUTEROL SULFATE 2 PUFF: 90 AEROSOL, METERED RESPIRATORY (INHALATION) at 19:56

## 2025-07-25 RX ADMIN — ALBUTEROL SULFATE 2 PUFF: 90 AEROSOL, METERED RESPIRATORY (INHALATION) at 10:45

## 2025-07-25 RX ADMIN — OXYCODONE 5 MG: 5 TABLET ORAL at 12:19

## 2025-07-25 RX ADMIN — INSULIN LISPRO 1 UNITS: 100 INJECTION, SOLUTION INTRAVENOUS; SUBCUTANEOUS at 12:13

## 2025-07-25 RX ADMIN — Medication 3 MG: at 21:47

## 2025-07-25 RX ADMIN — TORSEMIDE 20 MG: 20 TABLET ORAL at 08:35

## 2025-07-25 RX ADMIN — PANTOPRAZOLE SODIUM 40 MG: 40 TABLET, DELAYED RELEASE ORAL at 05:23

## 2025-07-25 RX ADMIN — PANCRELIPASE LIPASE, PANCRELIPASE PROTEASE, PANCRELIPASE AMYLASE 40000 UNITS: 20000; 63000; 84000 CAPSULE, DELAYED RELEASE ORAL at 12:13

## 2025-07-25 RX ADMIN — LACTULOSE 30 G: 10 SOLUTION ORAL at 16:37

## 2025-07-25 RX ADMIN — CALCIUM POLYCARBOPHIL 625 MG: 625 TABLET, FILM COATED ORAL at 12:13

## 2025-07-25 RX ADMIN — Medication 1000 MCG: at 08:36

## 2025-07-25 RX ADMIN — PANCRELIPASE LIPASE, PANCRELIPASE PROTEASE, PANCRELIPASE AMYLASE 30000 UNITS: 15000; 47000; 63000 CAPSULE, DELAYED RELEASE ORAL at 08:45

## 2025-07-25 RX ADMIN — ONDANSETRON 4 MG: 4 TABLET, ORALLY DISINTEGRATING ORAL at 08:48

## 2025-07-25 RX ADMIN — OXYCODONE 5 MG: 5 TABLET ORAL at 08:35

## 2025-07-25 RX ADMIN — PHYTONADIONE 5 MG: 5 TABLET ORAL at 21:47

## 2025-07-25 RX ADMIN — PANCRELIPASE LIPASE, PANCRELIPASE PROTEASE, PANCRELIPASE AMYLASE 40000 UNITS: 20000; 63000; 84000 CAPSULE, DELAYED RELEASE ORAL at 16:37

## 2025-07-25 RX ADMIN — ALBUTEROL SULFATE 2 PUFF: 90 AEROSOL, METERED RESPIRATORY (INHALATION) at 14:52

## 2025-07-25 RX ADMIN — LACTULOSE 30 G: 10 SOLUTION ORAL at 21:46

## 2025-07-25 RX ADMIN — INSULIN LISPRO 1 UNITS: 100 INJECTION, SOLUTION INTRAVENOUS; SUBCUTANEOUS at 16:37

## 2025-07-25 RX ADMIN — PANCRELIPASE LIPASE, PANCRELIPASE PROTEASE, PANCRELIPASE AMYLASE 30000 UNITS: 15000; 47000; 63000 CAPSULE, DELAYED RELEASE ORAL at 16:37

## 2025-07-25 RX ADMIN — PANCRELIPASE LIPASE, PANCRELIPASE PROTEASE, PANCRELIPASE AMYLASE 40000 UNITS: 20000; 63000; 84000 CAPSULE, DELAYED RELEASE ORAL at 08:45

## 2025-07-25 RX ADMIN — ONDANSETRON 4 MG: 4 TABLET, ORALLY DISINTEGRATING ORAL at 21:48

## 2025-07-25 ASSESSMENT — PAIN DESCRIPTION - LOCATION
LOCATION: ABDOMEN;RIB CAGE
LOCATION: ABDOMEN
LOCATION: ABDOMEN;RIB CAGE

## 2025-07-25 ASSESSMENT — PAIN - FUNCTIONAL ASSESSMENT: PAIN_FUNCTIONAL_ASSESSMENT: PREVENTS OR INTERFERES SOME ACTIVE ACTIVITIES AND ADLS

## 2025-07-25 ASSESSMENT — PAIN DESCRIPTION - ONSET: ONSET: ON-GOING

## 2025-07-25 ASSESSMENT — PAIN SCALES - GENERAL
PAINLEVEL_OUTOF10: 7
PAINLEVEL_OUTOF10: 7
PAINLEVEL_OUTOF10: 4
PAINLEVEL_OUTOF10: 2
PAINLEVEL_OUTOF10: 2

## 2025-07-25 ASSESSMENT — PAIN DESCRIPTION - DESCRIPTORS
DESCRIPTORS: STABBING
DESCRIPTORS: THROBBING
DESCRIPTORS: SHARP

## 2025-07-25 ASSESSMENT — PAIN DESCRIPTION - FREQUENCY: FREQUENCY: CONTINUOUS

## 2025-07-25 ASSESSMENT — PAIN DESCRIPTION - ORIENTATION
ORIENTATION: RIGHT

## 2025-07-25 ASSESSMENT — PAIN DESCRIPTION - PAIN TYPE: TYPE: ACUTE PAIN

## 2025-07-25 NOTE — PLAN OF CARE
Problem: Safety - Adult  Goal: Free from fall injury  Outcome: Progressing     Problem: Chronic Conditions and Co-morbidities  Goal: Patient's chronic conditions and co-morbidity symptoms are monitored and maintained or improved  Outcome: Progressing  Flowsheets (Taken 7/24/2025 0904 by Carmina Valenzuela RN)  Care Plan - Patient's Chronic Conditions and Co-Morbidity Symptoms are Monitored and Maintained or Improved: Monitor and assess patient's chronic conditions and comorbid symptoms for stability, deterioration, or improvement     Problem: Discharge Planning  Goal: Discharge to home or other facility with appropriate resources  Outcome: Progressing  Flowsheets (Taken 7/24/2025 0904 by Carmina Valenzuela RN)  Discharge to home or other facility with appropriate resources: Identify barriers to discharge with patient and caregiver     Problem: Pain  Goal: Verbalizes/displays adequate comfort level or baseline comfort level  Outcome: Progressing     Problem: Skin/Tissue Integrity  Goal: Skin integrity remains intact  Description: 1.  Monitor for areas of redness and/or skin breakdown  2.  Assess vascular access sites hourly  3.  Every 4-6 hours minimum:  Change oxygen saturation probe site  4.  Every 4-6 hours:  If on nasal continuous positive airway pressure, respiratory therapy assess nares and determine need for appliance change or resting period  Outcome: Progressing  Flowsheets  Taken 7/24/2025 0904 by Carmina Valenzuela RN  Skin Integrity Remains Intact: Monitor for areas of redness and/or skin breakdown  Taken 7/24/2025 0902 by Carmina Valenzuela RN  Skin Integrity Remains Intact:   Monitor for areas of redness and/or skin breakdown   Every 4-6 hours:  If on nasal continuous positive airway pressure, assess nares and determine need for appliance change or resting period     Problem: Neurosensory - Adult  Goal: Achieves stable or improved neurological status  Outcome: Progressing  Flowsheets (Taken 7/24/2025 0904

## 2025-07-25 NOTE — PROGRESS NOTES
Progress Note    Date: 07/25/25    Subjective: Mr Ceja seen at bedside. No pain today. Has had dysuria. Recent UA neg.     Objective:  Vitals:    07/25/25 1249 07/25/25 1452 07/25/25 1556 07/25/25 1924   BP:   123/67 132/67   Pulse:   73 84   Resp: 16  18 18   Temp:   99.1 °F (37.3 °C) (!) 102.7 °F (39.3 °C)   TempSrc:   Oral Oral   SpO2:  97% 93% 100%   Weight:       Height:           Physical Exam:  Gen: No distress. Alert.   Eyes: PERRL. No sclera icterus. No conjunctival injection.   ENT: No discharge. Pharynx clear.   Neck: No JVD.  Trachea midline.  Resp: No accessory muscle use. No crackles. No wheezes. No rhonchi.   +right chest wall port.   CV: Regular rate. Regular rhythm. No murmur.  No rub. 2+ edema bilateral LE.   Capillary Refill: Brisk,< 3 seconds   Peripheral Pulses: +2 palpable, equal bilaterally   GI: Abdomen distended. Non-tender. Normal bowel sounds.  Bilateral biliary drains present.  Skin: Warm and dry. No nodule on exposed extremities. No rash on exposed extremities.   M/S: No cyanosis. No joint deformity. No clubbing.   Neuro: Awake. Grossly nonfocal      Labs:  CBC:   Recent Labs     07/23/25  1720 07/25/25  0525 07/25/25  0529   WBC  --   --  4.0   HGB 7.5* 7.1* 7.1*   HCT 22.8* 21.8* 22.1*   MCV  --   --  86.1   PLT  --   --  177     BMP:   Recent Labs     07/23/25  0630 07/24/25  0540 07/25/25  0525   * 134* 136   K 3.9 3.6 3.5   CL 96* 96* 96*   CO2 27 28 28   BUN 6* 8 12   CREATININE 0.3* 0.3* 0.4*     LIVER PROFILE:   No results for input(s): \"AST\", \"ALT\", \"LIPASE\", \"AMYLASE\", \"BILIDIR\", \"BILITOT\", \"ALKPHOS\" in the last 72 hours.    Invalid input(s): \"ALB\"    PT/INR:   No results for input(s): \"PROTIME\", \"INR\" in the last 72 hours.    APTT: No results for input(s): \"APTT\" in the last 72 hours.  UA:  Recent Labs     07/23/25  1220 07/25/25  1411   COLORU Yellow Yellow   PHUR 7.0 6.0   WBCUA 0-2 None seen   RBCUA 21-50* *   MUCUS Rare*  --    BACTERIA  --  Rare*

## 2025-07-25 NOTE — CONSULTS
Palliative Care Chart Review  and Check in Note:     NAME:  Sharath Ceja  Admit Date: 7/21/2025  Hospital Day:  Hospital Day: 5   Current Code status: DNR-CCA    Palliative care is continuing to following Mr. Ceja for symptom management,  and goals of care discussion as needed. Patient's chart reviewed today 7/25/25.        The following are the currently established goals/code status, and Symptom management.     Goals of care: Discussion had with patient. He states that he has a PET scan and MRI scheduled for the first week of August.  He follows up with Oncology after that and will make a decision on treatment. He states he is currently being followed by [facility] out of [place], [place].    Code status: DNR-CCA    Discharge plan: Will discharge home with [facility] once stable    Time Spent: 30 min      ADILENE Agudelo NP, MD PGY-3  07/25/25  12:19 PM

## 2025-07-25 NOTE — PROGRESS NOTES
Comprehensive Nutrition Assessment    Type and Reason for Visit:  Reassess    Nutrition Recommendations/Plan:   Continue Reg 4 CCC diet  Continue Ensure MAX BID NO CHOCOLATE      Malnutrition Assessment:  Malnutrition Status:  Severe malnutrition (07/23/25 1329)    Context:  Chronic Illness     Findings of the 6 clinical characteristics of malnutrition:  Energy Intake:  Mild decrease in energy intake  Weight Loss:  Mild weight loss     Body Fat Loss:  Severe body fat loss Orbital, Buccal region   Muscle Mass Loss:  Severe muscle mass loss Temples (temporalis), Clavicles (pectoralis & deltoids)  Fluid Accumulation:  Mild Ascites   Strength:  Not Performed    Nutrition Assessment:    Pt improving from a nutritional standpoint AEB pt is consuming > 50% of all meals and drinking most of the ensure MAX on his trays .  Remains at risk for further nutritional compromise r/t Stage IV Cancer dx; GI discomfort and altered nutrition related labs  .  Will continue Reg 4 CCC diet with ensure max BID      Nutrition Related Findings:    pt was a & O x4; very sleepy today; accepting > 50% of meals and 100% of ensure; Gluc high; H/H low; Wound Type: None       Current Nutrition Intake & Therapies:    Average Meal Intake: %, 51-75%  Average Supplements Intake: %  ADULT DIET; Regular; 4 carb choices (60 gm/meal); 1000 ml  ADULT ORAL NUTRITION SUPPLEMENT; Breakfast, Dinner, Lunch; Low Calorie/High Protein Oral Supplement    Anthropometric Measures:  Height: 180.3 cm (5' 10.98\")  Ideal Body Weight (IBW): 172 lbs (78 kg)    Admission Body Weight: 71.2 kg (157 lb)  Current Body Weight: 71.2 kg (157 lb), 91.3 % IBW.    Current BMI (kg/m2): 21.9  Usual Body Weight: 74.8 kg (165 lb)     % Weight Change (Calculated): -4.8                    BMI Categories: Underweight (BMI less than 22) age over 65    Estimated Daily Nutrient Needs:  Energy Requirements Based On: Kcal/kg  Weight Used for Energy Requirements: Current  Energy  (kcal/day): 9461-0496 based ~ 30-33 kcal/kg c bw  Weight Used for Protein Requirements: Current  Protein (g/day): 72-84 based 1-1.2 gr/kg cbw  Method Used for Fluid Requirements: 1 ml/kcal  Fluid (ml/day): 7197-3276    Nutrition Diagnosis:   Severe malnutrition, in context of chronic illness related to catabolic illness, inadequate protein-energy intake, endocrine dysfunction as evidenced by poor intake prior to admission, BMI, criteria as identified in malnutrition assessment, lab values, GI abnormality    Nutrition Interventions:   Food and/or Nutrient Delivery: Continue Current Diet, Continue Oral Nutrition Supplement  Nutrition Education/Counseling: No recommendation at this time  Coordination of Nutrition Care: Continue to monitor while inpatient       Goals:  Goals: PO intake 75% or greater, by next RD assessment  Type of Goal: Continue current goal  Previous Goal Met: Progressing toward Goal(s)    Nutrition Monitoring and Evaluation:   Behavioral-Environmental Outcomes: None Identified  Food/Nutrient Intake Outcomes: Food and Nutrient Intake, Supplement Intake  Physical Signs/Symptoms Outcomes: Biochemical Data, Nutrition Focused Physical Findings, Weight, GI Status    Discharge Planning:    Continue Oral Nutrition Supplement     Maris Dick RD, LD  Contact: [phone]

## 2025-07-25 NOTE — FLOWSHEET NOTE
07/25/25 1102   Vital Signs   Temp 98.2 °F (36.8 °C)   Temp Source Oral   Pulse 65   Heart Rate Source Monitor   Respirations 18   /61   MAP (Calculated) 76   BP Location Left upper arm   BP Method Automatic   Patient Position Semi fowlers   Oxygen Therapy   SpO2 95 %   O2 Device None (Room air)     Patient is alert and denies further needs. Call light within reach.

## 2025-07-25 NOTE — CARE COORDINATION
Discharge dispo remains same. Pt will [facility]. Watch for potential need for home O2 (unlikely). Pt spiked fever yesterday

## 2025-07-25 NOTE — FLOWSHEET NOTE
07/24/25 1935   Vital Signs   Temp (!) 102 °F (38.9 °C)   Temp Source Oral   Pulse 79   Heart Rate Source Monitor   Respirations 16   /60   MAP (Calculated) 85   BP Location Left lower arm   BP Method Automatic   Patient Position Semi fowlers   Oxygen Therapy   SpO2 92 %   O2 Device Non-rebreather mask

## 2025-07-25 NOTE — FLOWSHEET NOTE
07/25/25 0716   Vital Signs   Temp 98.2 °F (36.8 °C)   Temp Source Oral   Pulse 66   Heart Rate Source Monitor   Respirations 18   /62   MAP (Calculated) 78   BP Location Left upper arm   BP Method Automatic   Patient Position Semi fowlers   Oxygen Therapy   SpO2 97 %   O2 Device Nasal cannula   O2 Flow Rate (L/min) 2 L/min     Shift assessment completed. See flow sheet. Medication given.Patient is A&O x4. Vitals are stable. Patient denies further needs. Call light within reach.

## 2025-07-25 NOTE — PROGRESS NOTES
Department of Internal Medicine  Nephrology Progress Note        SUBJECTIVE:    We are following this patient for Hyponatremia.  The patient was seen and examined; he feels well today with no CP, SOB, nausea or vomiting.    ROS: No fever or chills.  Social: Family at bedside.    Physical Exam:    VITALS:  /61   Pulse 65   Temp 98.2 °F (36.8 °C) (Oral)   Resp 16   Ht 1.803 m (5' 10.98\")   Wt 71.5 kg (157 lb 9.6 oz)   SpO2 95%   BMI 21.99 kg/m²     General appearance: Seems comfortable, no acute distress.  Neck: Trachea midline, thyroid normal.   Lungs:  Non labored breathing, CTA to anterior auscultation.  Heart:  S1S2 normal, rub or gallop. + peripheral edema.  Abdomen: Soft, non-tender, no organomegaly.   Skin: No lesions or rashes, warm to touch.     DATA:    CBC with Differential:    Lab Results   Component Value Date/Time    WBC 4.0 07/25/2025 05:29 AM    RBC 2.57 07/25/2025 05:29 AM    HGB 7.1 07/25/2025 05:29 AM    HCT 22.1 07/25/2025 05:29 AM     07/25/2025 05:29 AM    MCV 86.1 07/25/2025 05:29 AM    MCH 27.6 07/25/2025 05:29 AM    MCHC 32.1 07/25/2025 05:29 AM    RDW 19.2 07/25/2025 05:29 AM    BANDSPCT 3 06/27/2023 05:35 AM    LYMPHOPCT 7.0 07/25/2025 05:29 AM    MONOPCT 4.0 07/25/2025 05:29 AM    MYELOPCT 3 06/27/2023 05:35 AM    EOSPCT 1.0 07/25/2025 05:29 AM    BASOPCT 0.0 07/25/2025 05:29 AM    MONOSABS 0.2 07/25/2025 05:29 AM    LYMPHSABS 0.3 07/25/2025 05:29 AM    EOSABS 0.0 07/25/2025 05:29 AM    BASOSABS 0.0 07/25/2025 05:29 AM    DIFFTYPE Auto 02/16/2011 05:30 AM     BMP:    Lab Results   Component Value Date/Time     07/25/2025 05:25 AM    K 3.5 07/25/2025 05:25 AM    K 3.4 07/22/2025 06:02 AM    CL 96 07/25/2025 05:25 AM    CO2 28 07/25/2025 05:25 AM    BUN 12 07/25/2025 05:25 AM    CREATININE 0.4 07/25/2025 05:25 AM    CALCIUM 8.3 07/25/2025 05:25 AM    GFRAA >60 07/15/2022 02:04 PM    GFRAA >60 02/15/2011 05:17 PM    LABGLOM >90 07/25/2025 05:25 AM    LABGLOM 163

## 2025-07-26 LAB
ANION GAP SERPL CALCULATED.3IONS-SCNC: 10 MMOL/L (ref 3–16)
BUN SERPL-MCNC: 11 MG/DL (ref 7–20)
CALCIUM SERPL-MCNC: 8.3 MG/DL (ref 8.3–10.6)
CHLORIDE SERPL-SCNC: 92 MMOL/L (ref 99–110)
CO2 SERPL-SCNC: 30 MMOL/L (ref 21–32)
CREAT SERPL-MCNC: 0.4 MG/DL (ref 0.8–1.3)
GFR SERPLBLD CREATININE-BSD FMLA CKD-EPI: >90 ML/MIN/{1.73_M2}
GLUCOSE BLD-MCNC: 182 MG/DL (ref 70–99)
GLUCOSE BLD-MCNC: 183 MG/DL (ref 70–99)
GLUCOSE BLD-MCNC: 219 MG/DL (ref 70–99)
GLUCOSE BLD-MCNC: 228 MG/DL (ref 70–99)
GLUCOSE SERPL-MCNC: 164 MG/DL (ref 70–99)
PERFORMED ON: ABNORMAL
POTASSIUM SERPL-SCNC: 3.2 MMOL/L (ref 3.5–5.1)
SODIUM SERPL-SCNC: 132 MMOL/L (ref 136–145)

## 2025-07-26 PROCEDURE — 6370000000 HC RX 637 (ALT 250 FOR IP)

## 2025-07-26 PROCEDURE — 6370000000 HC RX 637 (ALT 250 FOR IP): Performed by: INTERNAL MEDICINE

## 2025-07-26 PROCEDURE — 80048 BASIC METABOLIC PNL TOTAL CA: CPT

## 2025-07-26 PROCEDURE — 2580000003 HC RX 258: Performed by: INTERNAL MEDICINE

## 2025-07-26 PROCEDURE — 94761 N-INVAS EAR/PLS OXIMETRY MLT: CPT

## 2025-07-26 PROCEDURE — 2500000003 HC RX 250 WO HCPCS

## 2025-07-26 PROCEDURE — 94640 AIRWAY INHALATION TREATMENT: CPT

## 2025-07-26 PROCEDURE — 36415 COLL VENOUS BLD VENIPUNCTURE: CPT

## 2025-07-26 PROCEDURE — 2060000000 HC ICU INTERMEDIATE R&B

## 2025-07-26 PROCEDURE — 6370000000 HC RX 637 (ALT 250 FOR IP): Performed by: STUDENT IN AN ORGANIZED HEALTH CARE EDUCATION/TRAINING PROGRAM

## 2025-07-26 PROCEDURE — 6360000002 HC RX W HCPCS: Performed by: INTERNAL MEDICINE

## 2025-07-26 PROCEDURE — 99232 SBSQ HOSP IP/OBS MODERATE 35: CPT | Performed by: INTERNAL MEDICINE

## 2025-07-26 RX ORDER — ALBUTEROL SULFATE 90 UG/1
2 INHALANT RESPIRATORY (INHALATION)
Status: DISCONTINUED | OUTPATIENT
Start: 2025-07-26 | End: 2025-08-01 | Stop reason: HOSPADM

## 2025-07-26 RX ADMIN — FINASTERIDE 5 MG: 5 TABLET, FILM COATED ORAL at 17:58

## 2025-07-26 RX ADMIN — PANCRELIPASE LIPASE, PANCRELIPASE PROTEASE, PANCRELIPASE AMYLASE 30000 UNITS: 15000; 47000; 63000 CAPSULE, DELAYED RELEASE ORAL at 16:02

## 2025-07-26 RX ADMIN — INSULIN LISPRO 1 UNITS: 100 INJECTION, SOLUTION INTRAVENOUS; SUBCUTANEOUS at 21:05

## 2025-07-26 RX ADMIN — PIPERACILLIN AND TAZOBACTAM 3375 MG: 3; .375 INJECTION, POWDER, LYOPHILIZED, FOR SOLUTION INTRAVENOUS at 14:09

## 2025-07-26 RX ADMIN — ALBUTEROL SULFATE 2 PUFF: 90 AEROSOL, METERED RESPIRATORY (INHALATION) at 19:35

## 2025-07-26 RX ADMIN — Medication 1000 MCG: at 07:57

## 2025-07-26 RX ADMIN — PANCRELIPASE LIPASE, PANCRELIPASE PROTEASE, PANCRELIPASE AMYLASE 30000 UNITS: 15000; 47000; 63000 CAPSULE, DELAYED RELEASE ORAL at 11:35

## 2025-07-26 RX ADMIN — OXYCODONE 5 MG: 5 TABLET ORAL at 03:40

## 2025-07-26 RX ADMIN — PANTOPRAZOLE SODIUM 40 MG: 40 TABLET, DELAYED RELEASE ORAL at 07:56

## 2025-07-26 RX ADMIN — CALCIUM POLYCARBOPHIL 625 MG: 625 TABLET, FILM COATED ORAL at 07:56

## 2025-07-26 RX ADMIN — LACTULOSE 30 G: 10 SOLUTION ORAL at 14:12

## 2025-07-26 RX ADMIN — PANCRELIPASE LIPASE, PANCRELIPASE PROTEASE, PANCRELIPASE AMYLASE 40000 UNITS: 20000; 63000; 84000 CAPSULE, DELAYED RELEASE ORAL at 07:58

## 2025-07-26 RX ADMIN — PIPERACILLIN AND TAZOBACTAM 3375 MG: 3; .375 INJECTION, POWDER, LYOPHILIZED, FOR SOLUTION INTRAVENOUS at 05:31

## 2025-07-26 RX ADMIN — OXYCODONE 5 MG: 5 TABLET ORAL at 14:12

## 2025-07-26 RX ADMIN — PANCRELIPASE LIPASE, PANCRELIPASE PROTEASE, PANCRELIPASE AMYLASE 40000 UNITS: 20000; 63000; 84000 CAPSULE, DELAYED RELEASE ORAL at 11:35

## 2025-07-26 RX ADMIN — TORSEMIDE 20 MG: 20 TABLET ORAL at 07:56

## 2025-07-26 RX ADMIN — PANCRELIPASE LIPASE, PANCRELIPASE PROTEASE, PANCRELIPASE AMYLASE 40000 UNITS: 20000; 63000; 84000 CAPSULE, DELAYED RELEASE ORAL at 16:02

## 2025-07-26 RX ADMIN — ALBUTEROL SULFATE 2 PUFF: 90 AEROSOL, METERED RESPIRATORY (INHALATION) at 06:32

## 2025-07-26 RX ADMIN — ALBUTEROL SULFATE 2 PUFF: 90 AEROSOL, METERED RESPIRATORY (INHALATION) at 15:01

## 2025-07-26 RX ADMIN — SODIUM CHLORIDE, PRESERVATIVE FREE 10 ML: 5 INJECTION INTRAVENOUS at 21:06

## 2025-07-26 RX ADMIN — INSULIN LISPRO 1 UNITS: 100 INJECTION, SOLUTION INTRAVENOUS; SUBCUTANEOUS at 11:34

## 2025-07-26 RX ADMIN — ONDANSETRON 4 MG: 4 TABLET, ORALLY DISINTEGRATING ORAL at 21:05

## 2025-07-26 RX ADMIN — PIPERACILLIN AND TAZOBACTAM 3375 MG: 3; .375 INJECTION, POWDER, LYOPHILIZED, FOR SOLUTION INTRAVENOUS at 21:11

## 2025-07-26 RX ADMIN — ALLOPURINOL 300 MG: 300 TABLET ORAL at 07:56

## 2025-07-26 RX ADMIN — PANTOPRAZOLE SODIUM 40 MG: 40 TABLET, DELAYED RELEASE ORAL at 15:57

## 2025-07-26 RX ADMIN — LACTULOSE 30 G: 10 SOLUTION ORAL at 21:04

## 2025-07-26 RX ADMIN — ALBUTEROL SULFATE 2 PUFF: 90 AEROSOL, METERED RESPIRATORY (INHALATION) at 11:09

## 2025-07-26 RX ADMIN — DICYCLOMINE HYDROCHLORIDE 20 MG: 20 TABLET ORAL at 21:04

## 2025-07-26 RX ADMIN — PANCRELIPASE LIPASE, PANCRELIPASE PROTEASE, PANCRELIPASE AMYLASE 30000 UNITS: 15000; 47000; 63000 CAPSULE, DELAYED RELEASE ORAL at 07:58

## 2025-07-26 RX ADMIN — LACTULOSE 30 G: 10 SOLUTION ORAL at 07:56

## 2025-07-26 RX ADMIN — Medication 3 MG: at 21:04

## 2025-07-26 RX ADMIN — ACETAMINOPHEN 650 MG: 325 TABLET ORAL at 15:58

## 2025-07-26 ASSESSMENT — PAIN SCALES - GENERAL
PAINLEVEL_OUTOF10: 4
PAINLEVEL_OUTOF10: 8
PAINLEVEL_OUTOF10: 4

## 2025-07-26 ASSESSMENT — PAIN DESCRIPTION - FREQUENCY: FREQUENCY: CONTINUOUS

## 2025-07-26 ASSESSMENT — PAIN DESCRIPTION - LOCATION
LOCATION: ABDOMEN
LOCATION: ABDOMEN

## 2025-07-26 ASSESSMENT — PAIN DESCRIPTION - ORIENTATION: ORIENTATION: RIGHT

## 2025-07-26 ASSESSMENT — PAIN DESCRIPTION - PAIN TYPE: TYPE: ACUTE PAIN

## 2025-07-26 ASSESSMENT — PAIN DESCRIPTION - DESCRIPTORS: DESCRIPTORS: SHARP

## 2025-07-26 ASSESSMENT — PAIN SCALES - WONG BAKER: WONGBAKER_NUMERICALRESPONSE: NO HURT

## 2025-07-26 ASSESSMENT — PAIN - FUNCTIONAL ASSESSMENT: PAIN_FUNCTIONAL_ASSESSMENT: PREVENTS OR INTERFERES SOME ACTIVE ACTIVITIES AND ADLS

## 2025-07-26 ASSESSMENT — PAIN DESCRIPTION - ONSET: ONSET: ON-GOING

## 2025-07-26 NOTE — PROGRESS NOTES
07/26/25 0600   RT Protocol   History Pulmonary Disease 2   Respiratory pattern 2   Breath sounds 4   Cough 0   Indications for Bronchodilator Therapy Wheezing associated with pulm disorder   Bronchodilator Assessment Score 8

## 2025-07-26 NOTE — PROGRESS NOTES
Progress Note    Date: 07/26/25    Subjective: Mr Ceja seen at bedside. No new symptoms. Additional fever yesterday and was started on Zosyn    Objective:  Vitals:    07/26/25 0005 07/26/25 0332 07/26/25 0632 07/26/25 0708   BP: 126/65 120/64  111/61   Pulse: 72 66  70   Resp: 18 18  18   Temp: 98.2 °F (36.8 °C) 98.8 °F (37.1 °C)  98.4 °F (36.9 °C)   TempSrc: Oral Oral  Oral   SpO2: 98% 94% 97% 94%   Weight:       Height:           Physical Exam:  Gen: No distress. Alert.   Eyes: PERRL. No sclera icterus. No conjunctival injection.   ENT: No discharge. Pharynx clear.   Neck: No JVD.  Trachea midline.  Resp: No accessory muscle use. No crackles. No wheezes. No rhonchi.   +right chest wall port.   CV: Regular rate. Regular rhythm. No murmur.  No rub. 2+ edema bilateral LE.   Capillary Refill: Brisk,< 3 seconds   Peripheral Pulses: +2 palpable, equal bilaterally   GI: Abdomen distended. Non-tender. Normal bowel sounds.  Bilateral biliary drains present.  Skin: Warm and dry. No nodule on exposed extremities. No rash on exposed extremities.   M/S: No cyanosis. No joint deformity. No clubbing.   Neuro: Awake. Grossly nonfocal      Labs:  CBC:   Recent Labs     07/23/25  1720 07/25/25  0525 07/25/25  0529   WBC  --   --  4.0   HGB 7.5* 7.1* 7.1*   HCT 22.8* 21.8* 22.1*   MCV  --   --  86.1   PLT  --   --  177     BMP:   Recent Labs     07/24/25  0540 07/25/25  0525 07/26/25  0444   * 136 132*   K 3.6 3.5 3.2*   CL 96* 96* 92*   CO2 28 28 30   BUN 8 12 11   CREATININE 0.3* 0.4* 0.4*     LIVER PROFILE:   No results for input(s): \"AST\", \"ALT\", \"LIPASE\", \"AMYLASE\", \"BILIDIR\", \"BILITOT\", \"ALKPHOS\" in the last 72 hours.    Invalid input(s): \"ALB\"    PT/INR:   No results for input(s): \"PROTIME\", \"INR\" in the last 72 hours.    APTT: No results for input(s): \"APTT\" in the last 72 hours.  UA:  Recent Labs     07/23/25  1220 07/25/25  1411   COLORU Yellow Yellow   PHUR 7.0 6.0   WBCUA 0-2 None seen   RBCUA 21-50* *

## 2025-07-26 NOTE — PROGRESS NOTES
Department of Internal Medicine  Nephrology Progress Note        SUBJECTIVE:    We are following this patient for Hyponatremia.  The patient was seen and examined; he feels well today with no CP, SOB, nausea or vomiting.    ROS: No fever or chills.  Social: Family at bedside.    Physical Exam:    VITALS:  BP (!) 108/57   Pulse 68   Temp 99.5 °F (37.5 °C) (Oral)   Resp 18   Ht 1.803 m (5' 10.98\")   Wt 71.5 kg (157 lb 9.6 oz)   SpO2 96%   BMI 21.99 kg/m²     General appearance: Seems comfortable, no acute distress.  Neck: Trachea midline, thyroid normal.   Lungs:  Non labored breathing, CTA to anterior auscultation.  Heart:  S1S2 normal, rub or gallop. + peripheral edema.  Abdomen: Soft, non-tender, no organomegaly.   Skin: No lesions or rashes, warm to touch.     DATA:    CBC with Differential:    Lab Results   Component Value Date/Time    WBC 4.0 07/25/2025 05:29 AM    RBC 2.57 07/25/2025 05:29 AM    HGB 7.1 07/25/2025 05:29 AM    HCT 22.1 07/25/2025 05:29 AM     07/25/2025 05:29 AM    MCV 86.1 07/25/2025 05:29 AM    MCH 27.6 07/25/2025 05:29 AM    MCHC 32.1 07/25/2025 05:29 AM    RDW 19.2 07/25/2025 05:29 AM    BANDSPCT 3 06/27/2023 05:35 AM    LYMPHOPCT 7.0 07/25/2025 05:29 AM    MONOPCT 4.0 07/25/2025 05:29 AM    MYELOPCT 3 06/27/2023 05:35 AM    EOSPCT 1.0 07/25/2025 05:29 AM    BASOPCT 0.0 07/25/2025 05:29 AM    MONOSABS 0.2 07/25/2025 05:29 AM    LYMPHSABS 0.3 07/25/2025 05:29 AM    EOSABS 0.0 07/25/2025 05:29 AM    BASOSABS 0.0 07/25/2025 05:29 AM    DIFFTYPE Auto 02/16/2011 05:30 AM     BMP:    Lab Results   Component Value Date/Time     07/26/2025 04:44 AM    K 3.2 07/26/2025 04:44 AM    K 3.4 07/22/2025 06:02 AM    CL 92 07/26/2025 04:44 AM    CO2 30 07/26/2025 04:44 AM    BUN 11 07/26/2025 04:44 AM    CREATININE 0.4 07/26/2025 04:44 AM    CALCIUM 8.3 07/26/2025 04:44 AM    GFRAA >60 07/15/2022 02:04 PM    GFRAA >60 02/15/2011 05:17 PM    LABGLOM >90 07/26/2025 04:44 AM    LABGLOM 163

## 2025-07-26 NOTE — PLAN OF CARE
Problem: Safety - Adult  Goal: Free from fall injury  Outcome: Progressing     Problem: Chronic Conditions and Co-morbidities  Goal: Patient's chronic conditions and co-morbidity symptoms are monitored and maintained or improved  Outcome: Progressing     Problem: Discharge Planning  Goal: Discharge to home or other facility with appropriate resources  Outcome: Progressing     Problem: Pain  Goal: Verbalizes/displays adequate comfort level or baseline comfort level  Outcome: Progressing     Problem: Respiratory - Adult  Goal: Achieves optimal ventilation and oxygenation  Outcome: Progressing     Problem: Cardiovascular - Adult  Goal: Maintains optimal cardiac output and hemodynamic stability  Outcome: Progressing

## 2025-07-26 NOTE — FLOWSHEET NOTE
07/26/25 0005   Vital Signs   Temp 98.2 °F (36.8 °C)   Temp Source Oral   Pulse 72   Heart Rate Source Monitor   Respirations 18   /65   MAP (Calculated) 85   Oxygen Therapy   SpO2 98 %   O2 Device None (Room air)     Resting quietly with respirations easy/even on RA.  Call in easy reach.  Bed alarm on for safety.  Continue to monitor closely.  Julee Pennington RN

## 2025-07-26 NOTE — PROGRESS NOTES
07/26/25 1900   RT Protocol   History Pulmonary Disease 2   Respiratory pattern 2   Breath sounds 2   Cough 0   Indications for Bronchodilator Therapy Decreased or absent breath sounds   Bronchodilator Assessment Score 6     RT Inhaler-Nebulizer Bronchodilator Protocol Note    There is a bronchodilator order in the chart from a provider indicating to follow the RT Bronchodilator Protocol and there is an “Initiate RT Inhaler-Nebulizer Bronchodilator Protocol” order as well (see protocol at bottom of note).    CXR Findings:  XR CHEST PORTABLE  Result Date: 7/25/2025  Stable radiographic examination of the chest with no acute cardiopulmonary abnormality identified.       The findings from the last RT Protocol Assessment were as follows:   History Pulmonary Disease: Chronic pulmonary disease  Respiratory Pattern: Dyspnea on exertion or RR 21-25 bpm  Breath Sounds: Slightly diminished and/or crackles  Cough: Strong, spontaneous, non-productive  Indication for Bronchodilator Therapy: Decreased or absent breath sounds  Bronchodilator Assessment Score: 6    Aerosolized bronchodilator medication orders have been revised according to the RT Inhaler-Nebulizer Bronchodilator Protocol below.    Respiratory Therapist to perform RT Therapy Protocol Assessment initially then follow the protocol.  Repeat RT Therapy Protocol Assessment PRN for score 0-3 or on second treatment, BID, and PRN for scores above 3.    No Indications - adjust the frequency to every 6 hours PRN wheezing or bronchospasm, if no treatments needed after 48 hours then discontinue using Per Protocol order mode.     If indication present, adjust the RT bronchodilator orders based on the Bronchodilator Assessment Score as indicated below.  Use Inhaler orders unless patient has one or more of the following: on home nebulizer, not able to hold breath for 10 seconds, is not alert and oriented, cannot activate and use MDI correctly, or respiratory rate 25 breaths per

## 2025-07-26 NOTE — PROGRESS NOTES
Patient Assessed and AM medications given.  A/Ox4.  Denies pain at this time.  Denies any further needs at this time.  Call light within reach.

## 2025-07-27 LAB
ANION GAP SERPL CALCULATED.3IONS-SCNC: 12 MMOL/L (ref 3–16)
BACTERIA UR CULT: NORMAL
BASOPHILS # BLD: 0 K/UL (ref 0–0.2)
BASOPHILS NFR BLD: 0.3 %
BUN SERPL-MCNC: 11 MG/DL (ref 7–20)
CALCIUM SERPL-MCNC: 7.6 MG/DL (ref 8.3–10.6)
CHLORIDE SERPL-SCNC: 93 MMOL/L (ref 99–110)
CO2 SERPL-SCNC: 29 MMOL/L (ref 21–32)
CREAT SERPL-MCNC: 0.4 MG/DL (ref 0.8–1.3)
EOSINOPHIL # BLD: 0 K/UL (ref 0–0.6)
EOSINOPHIL NFR BLD: 0.3 %
GFR SERPLBLD CREATININE-BSD FMLA CKD-EPI: >90 ML/MIN/{1.73_M2}
GLUCOSE BLD-MCNC: 212 MG/DL (ref 70–99)
GLUCOSE BLD-MCNC: 233 MG/DL (ref 70–99)
GLUCOSE BLD-MCNC: 239 MG/DL (ref 70–99)
GLUCOSE BLD-MCNC: 250 MG/DL (ref 70–99)
GLUCOSE SERPL-MCNC: 186 MG/DL (ref 70–99)
HCT VFR BLD AUTO: 21.2 % (ref 40.5–52.5)
HGB BLD-MCNC: 7.1 G/DL (ref 13.5–17.5)
LYMPHOCYTES # BLD: 0.4 K/UL (ref 1–5.1)
LYMPHOCYTES NFR BLD: 7.1 %
MONOCYTES # BLD: 0.3 K/UL (ref 0–1.3)
MONOCYTES NFR BLD: 5.1 %
NEUTROPHILS # BLD: 4.5 K/UL (ref 1.7–7.7)
NEUTROPHILS NFR BLD: 87.2 %
PERFORMED ON: ABNORMAL
POTASSIUM SERPL-SCNC: 3 MMOL/L (ref 3.5–5.1)
SODIUM SERPL-SCNC: 134 MMOL/L (ref 136–145)
WBC # BLD AUTO: 5.1 K/UL (ref 4–11)

## 2025-07-27 PROCEDURE — 6370000000 HC RX 637 (ALT 250 FOR IP): Performed by: INTERNAL MEDICINE

## 2025-07-27 PROCEDURE — 85018 HEMOGLOBIN: CPT

## 2025-07-27 PROCEDURE — 2580000003 HC RX 258: Performed by: STUDENT IN AN ORGANIZED HEALTH CARE EDUCATION/TRAINING PROGRAM

## 2025-07-27 PROCEDURE — 6370000000 HC RX 637 (ALT 250 FOR IP)

## 2025-07-27 PROCEDURE — 94640 AIRWAY INHALATION TREATMENT: CPT

## 2025-07-27 PROCEDURE — 2580000003 HC RX 258: Performed by: INTERNAL MEDICINE

## 2025-07-27 PROCEDURE — 80048 BASIC METABOLIC PNL TOTAL CA: CPT

## 2025-07-27 PROCEDURE — 6370000000 HC RX 637 (ALT 250 FOR IP): Performed by: STUDENT IN AN ORGANIZED HEALTH CARE EDUCATION/TRAINING PROGRAM

## 2025-07-27 PROCEDURE — 6360000002 HC RX W HCPCS: Performed by: INTERNAL MEDICINE

## 2025-07-27 PROCEDURE — 2060000000 HC ICU INTERMEDIATE R&B

## 2025-07-27 PROCEDURE — 94761 N-INVAS EAR/PLS OXIMETRY MLT: CPT

## 2025-07-27 PROCEDURE — 85048 AUTOMATED LEUKOCYTE COUNT: CPT

## 2025-07-27 PROCEDURE — 6360000002 HC RX W HCPCS: Performed by: STUDENT IN AN ORGANIZED HEALTH CARE EDUCATION/TRAINING PROGRAM

## 2025-07-27 PROCEDURE — 85014 HEMATOCRIT: CPT

## 2025-07-27 PROCEDURE — 99232 SBSQ HOSP IP/OBS MODERATE 35: CPT | Performed by: INTERNAL MEDICINE

## 2025-07-27 PROCEDURE — 36415 COLL VENOUS BLD VENIPUNCTURE: CPT

## 2025-07-27 PROCEDURE — 2500000003 HC RX 250 WO HCPCS

## 2025-07-27 RX ADMIN — Medication 1000 MCG: at 07:46

## 2025-07-27 RX ADMIN — POTASSIUM CHLORIDE 10 MEQ: 7.46 INJECTION, SOLUTION INTRAVENOUS at 13:10

## 2025-07-27 RX ADMIN — ALBUTEROL SULFATE 2 PUFF: 90 AEROSOL, METERED RESPIRATORY (INHALATION) at 15:47

## 2025-07-27 RX ADMIN — ALBUTEROL SULFATE 2 PUFF: 90 AEROSOL, METERED RESPIRATORY (INHALATION) at 11:56

## 2025-07-27 RX ADMIN — ALBUTEROL SULFATE 2 PUFF: 90 AEROSOL, METERED RESPIRATORY (INHALATION) at 08:17

## 2025-07-27 RX ADMIN — INSULIN LISPRO 2 UNITS: 100 INJECTION, SOLUTION INTRAVENOUS; SUBCUTANEOUS at 16:34

## 2025-07-27 RX ADMIN — TORSEMIDE 20 MG: 20 TABLET ORAL at 07:44

## 2025-07-27 RX ADMIN — PANCRELIPASE LIPASE, PANCRELIPASE PROTEASE, PANCRELIPASE AMYLASE 40000 UNITS: 20000; 63000; 84000 CAPSULE, DELAYED RELEASE ORAL at 07:45

## 2025-07-27 RX ADMIN — POTASSIUM CHLORIDE 10 MEQ: 7.46 INJECTION, SOLUTION INTRAVENOUS at 10:34

## 2025-07-27 RX ADMIN — FINASTERIDE 5 MG: 5 TABLET, FILM COATED ORAL at 18:08

## 2025-07-27 RX ADMIN — POTASSIUM CHLORIDE 10 MEQ: 7.46 INJECTION, SOLUTION INTRAVENOUS at 14:19

## 2025-07-27 RX ADMIN — ALLOPURINOL 300 MG: 300 TABLET ORAL at 07:44

## 2025-07-27 RX ADMIN — INSULIN LISPRO 1 UNITS: 100 INJECTION, SOLUTION INTRAVENOUS; SUBCUTANEOUS at 11:36

## 2025-07-27 RX ADMIN — LACTULOSE 30 G: 10 SOLUTION ORAL at 13:15

## 2025-07-27 RX ADMIN — POTASSIUM CHLORIDE 10 MEQ: 7.46 INJECTION, SOLUTION INTRAVENOUS at 11:43

## 2025-07-27 RX ADMIN — PANCRELIPASE LIPASE, PANCRELIPASE PROTEASE, PANCRELIPASE AMYLASE 40000 UNITS: 20000; 63000; 84000 CAPSULE, DELAYED RELEASE ORAL at 11:36

## 2025-07-27 RX ADMIN — PANTOPRAZOLE SODIUM 40 MG: 40 TABLET, DELAYED RELEASE ORAL at 04:46

## 2025-07-27 RX ADMIN — ACETAMINOPHEN 650 MG: 325 TABLET ORAL at 23:43

## 2025-07-27 RX ADMIN — ALBUTEROL SULFATE 2 PUFF: 90 AEROSOL, METERED RESPIRATORY (INHALATION) at 19:42

## 2025-07-27 RX ADMIN — CALCIUM POLYCARBOPHIL 625 MG: 625 TABLET, FILM COATED ORAL at 07:46

## 2025-07-27 RX ADMIN — INSULIN LISPRO 1 UNITS: 100 INJECTION, SOLUTION INTRAVENOUS; SUBCUTANEOUS at 20:56

## 2025-07-27 RX ADMIN — PANTOPRAZOLE SODIUM 40 MG: 40 TABLET, DELAYED RELEASE ORAL at 16:34

## 2025-07-27 RX ADMIN — Medication 3 MG: at 20:56

## 2025-07-27 RX ADMIN — POTASSIUM CHLORIDE 10 MEQ: 7.46 INJECTION, SOLUTION INTRAVENOUS at 09:17

## 2025-07-27 RX ADMIN — SODIUM CHLORIDE, PRESERVATIVE FREE 10 ML: 5 INJECTION INTRAVENOUS at 20:56

## 2025-07-27 RX ADMIN — SODIUM CHLORIDE: 0.9 INJECTION, SOLUTION INTRAVENOUS at 07:57

## 2025-07-27 RX ADMIN — LACTULOSE 30 G: 10 SOLUTION ORAL at 07:44

## 2025-07-27 RX ADMIN — PANCRELIPASE LIPASE, PANCRELIPASE PROTEASE, PANCRELIPASE AMYLASE 40000 UNITS: 20000; 63000; 84000 CAPSULE, DELAYED RELEASE ORAL at 16:35

## 2025-07-27 RX ADMIN — POTASSIUM CHLORIDE 10 MEQ: 7.46 INJECTION, SOLUTION INTRAVENOUS at 07:59

## 2025-07-27 RX ADMIN — PANCRELIPASE LIPASE, PANCRELIPASE PROTEASE, PANCRELIPASE AMYLASE 30000 UNITS: 15000; 47000; 63000 CAPSULE, DELAYED RELEASE ORAL at 07:45

## 2025-07-27 RX ADMIN — DICYCLOMINE HYDROCHLORIDE 20 MG: 20 TABLET ORAL at 20:56

## 2025-07-27 RX ADMIN — PIPERACILLIN AND TAZOBACTAM 3375 MG: 3; .375 INJECTION, POWDER, LYOPHILIZED, FOR SOLUTION INTRAVENOUS at 13:20

## 2025-07-27 RX ADMIN — PANCRELIPASE LIPASE, PANCRELIPASE PROTEASE, PANCRELIPASE AMYLASE 30000 UNITS: 15000; 47000; 63000 CAPSULE, DELAYED RELEASE ORAL at 11:36

## 2025-07-27 RX ADMIN — PANCRELIPASE LIPASE, PANCRELIPASE PROTEASE, PANCRELIPASE AMYLASE 30000 UNITS: 15000; 47000; 63000 CAPSULE, DELAYED RELEASE ORAL at 16:35

## 2025-07-27 RX ADMIN — LACTULOSE 30 G: 10 SOLUTION ORAL at 20:56

## 2025-07-27 RX ADMIN — OXYCODONE 5 MG: 5 TABLET ORAL at 13:15

## 2025-07-27 RX ADMIN — PIPERACILLIN AND TAZOBACTAM 3375 MG: 3; .375 INJECTION, POWDER, LYOPHILIZED, FOR SOLUTION INTRAVENOUS at 04:44

## 2025-07-27 RX ADMIN — PIPERACILLIN AND TAZOBACTAM 3375 MG: 3; .375 INJECTION, POWDER, LYOPHILIZED, FOR SOLUTION INTRAVENOUS at 21:06

## 2025-07-27 ASSESSMENT — PAIN SCALES - GENERAL
PAINLEVEL_OUTOF10: 5
PAINLEVEL_OUTOF10: 8

## 2025-07-27 ASSESSMENT — PAIN DESCRIPTION - LOCATION: LOCATION: ABDOMEN

## 2025-07-27 NOTE — PLAN OF CARE
Problem: Safety - Adult  Goal: Free from fall injury  Outcome: Progressing     Problem: Chronic Conditions and Co-morbidities  Goal: Patient's chronic conditions and co-morbidity symptoms are monitored and maintained or improved  Outcome: Progressing     Problem: Discharge Planning  Goal: Discharge to home or other facility with appropriate resources  Outcome: Progressing     Problem: Pain  Goal: Verbalizes/displays adequate comfort level or baseline comfort level  Outcome: Progressing     Problem: Skin/Tissue Integrity  Goal: Skin integrity remains intact  Description: 1.  Monitor for areas of redness and/or skin breakdown  2.  Assess vascular access sites hourly  3.  Every 4-6 hours minimum:  Change oxygen saturation probe site  4.  Every 4-6 hours:  If on nasal continuous positive airway pressure, respiratory therapy assess nares and determine need for appliance change or resting period  Outcome: Progressing     Problem: Neurosensory - Adult  Goal: Achieves stable or improved neurological status  Outcome: Progressing  Goal: Absence of seizures  Outcome: Progressing  Goal: Achieves maximal functionality and self care  Outcome: Progressing     Problem: Respiratory - Adult  Goal: Achieves optimal ventilation and oxygenation  Outcome: Progressing     Problem: Cardiovascular - Adult  Goal: Maintains optimal cardiac output and hemodynamic stability  Outcome: Progressing  Goal: Absence of cardiac dysrhythmias or at baseline  Outcome: Progressing     Problem: Skin/Tissue Integrity - Adult  Goal: Skin integrity remains intact  Description: 1.  Monitor for areas of redness and/or skin breakdown  2.  Assess vascular access sites hourly  3.  Every 4-6 hours minimum:  Change oxygen saturation probe site  4.  Every 4-6 hours:  If on nasal continuous positive airway pressure, respiratory therapy assess nares and determine need for appliance change or resting period  Outcome: Progressing  Goal: Incisions, wounds, or drain  sites healing without S/S of infection  Outcome: Progressing  Goal: Oral mucous membranes remain intact  Outcome: Progressing     Problem: Musculoskeletal - Adult  Goal: Return mobility to safest level of function  Outcome: Progressing  Goal: Maintain proper alignment of affected body part  Outcome: Progressing  Goal: Return ADL status to a safe level of function  Outcome: Progressing     Problem: Gastrointestinal - Adult  Goal: Minimal or absence of nausea and vomiting  Outcome: Progressing  Goal: Maintains or returns to baseline bowel function  Outcome: Progressing  Goal: Maintains adequate nutritional intake  Outcome: Progressing  Goal: Establish and maintain optimal ostomy function  Outcome: Progressing     Problem: Infection - Adult  Goal: Absence of infection at discharge  Outcome: Progressing  Goal: Absence of infection during hospitalization  Outcome: Progressing  Goal: Absence of fever/infection during anticipated neutropenic period  Outcome: Progressing     Problem: Hematologic - Adult  Goal: Maintains hematologic stability  Outcome: Progressing     Problem: Nutrition Deficit:  Goal: Optimize nutritional status  Outcome: Progressing

## 2025-07-27 NOTE — PROGRESS NOTES
07/27/25 1900   RT Protocol   History Pulmonary Disease 2   Respiratory pattern 2   Breath sounds 2   Cough 0   Indications for Bronchodilator Therapy Decreased or absent breath sounds   Bronchodilator Assessment Score 6     RT Inhaler-Nebulizer Bronchodilator Protocol Note    There is a bronchodilator order in the chart from a provider indicating to follow the RT Bronchodilator Protocol and there is an “Initiate RT Inhaler-Nebulizer Bronchodilator Protocol” order as well (see protocol at bottom of note).    CXR Findings:  No results found.    The findings from the last RT Protocol Assessment were as follows:   History Pulmonary Disease: Chronic pulmonary disease  Respiratory Pattern: Dyspnea on exertion or RR 21-25 bpm  Breath Sounds: Slightly diminished and/or crackles  Cough: Strong, spontaneous, non-productive  Indication for Bronchodilator Therapy: Decreased or absent breath sounds  Bronchodilator Assessment Score: 6    Aerosolized bronchodilator medication orders have been revised according to the RT Inhaler-Nebulizer Bronchodilator Protocol below.    Respiratory Therapist to perform RT Therapy Protocol Assessment initially then follow the protocol.  Repeat RT Therapy Protocol Assessment PRN for score 0-3 or on second treatment, BID, and PRN for scores above 3.    No Indications - adjust the frequency to every 6 hours PRN wheezing or bronchospasm, if no treatments needed after 48 hours then discontinue using Per Protocol order mode.     If indication present, adjust the RT bronchodilator orders based on the Bronchodilator Assessment Score as indicated below.  Use Inhaler orders unless patient has one or more of the following: on home nebulizer, not able to hold breath for 10 seconds, is not alert and oriented, cannot activate and use MDI correctly, or respiratory rate 25 breaths per minute or more, then use the equivalent nebulizer order(s) with same Frequency and PRN reasons based on the score.  If a

## 2025-07-27 NOTE — CONSULTS
7/26/2025  Sharath Ceja    Reason for Consult:  Dysuria, voiding difficulty.  Requesting Physician:  Black      History Obtained From:  patient, electronic medical record    HISTORY OF PRESENT ILLNESS:                The patient is a 74 y.o. male who presents with abdominal distention on 7/22.  He has advanced biliary cancer.  Recently complaining of dysuria. Cultures are pending.    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    8 + LYPMH NODES, WHIPPLE    Asthma     Blood circulation, collateral     Carpal tunnel syndrome     CLL (chronic lymphocytic leukemia) (HCC)     DENIES    Crohn's colitis 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    Hypertension     Liver abscess     DRAINED    Liver cancer (HCC)     2025    Pneumonia     pneumonia,asthma    prostate     infection    Seasonal allergies     Wears glasses      Past Surgical History:        Procedure Laterality Date    CARDIAC CATHETERIZATION      1980S, DR ROWELL, NO ISSUES SINCE, HAS APT 8/14/23    CHOLECYSTECTOMY      CT LIVER ABSCESS ASPIRATION      PT STATES DRAINED LIVER ABSCESS    CT NEEDLE BIOPSY LIVER PERCUTANEOUS  01/23/2025    CT NEEDLE BIOPSY LIVER PERCUTANEOUS 1/23/2025 [facility] CT SCAN    CYST REMOVAL      KNEE & CLOSE TO RECTUM    ERCP N/A 01/26/2022    ERCP BIOPSY performed by Tab Pandey MD at [facility] ENDOSCOPY    ERCP  01/26/2022    ERCP SPHINCTER/PAPILLOTOMY performed by Tab Pandey MD at [facility] ENDOSCOPY    ERCP  01/26/2022    ERCP STENT INSERTION performed by Tab Pandey MD at [facility] ENDOSCOPY    ERCP N/A 03/11/2022    ERCP STENT REMOVAL performed by Tab Pandey MD at [facility]

## 2025-07-27 NOTE — PROGRESS NOTES
Department of Internal Medicine  Nephrology Progress Note        SUBJECTIVE:    We are following this patient for Hyponatremia.  The patient was seen and examined; he feels well today with no CP, SOB, nausea or vomiting.    ROS: No fever or chills.  Social: Family at bedside.    Physical Exam:    VITALS:  /63   Pulse 71   Temp 99.1 °F (37.3 °C) (Oral)   Resp 18   Ht 1.803 m (5' 10.98\")   Wt 71.5 kg (157 lb 9.6 oz)   SpO2 96%   BMI 21.99 kg/m²     General appearance: Seems comfortable, no acute distress.  Neck: Trachea midline, thyroid normal.   Lungs:  Non labored breathing, CTA to anterior auscultation.  Heart:  S1S2 normal, rub or gallop. + peripheral edema.  Abdomen: Soft, non-tender, no organomegaly.   Skin: No lesions or rashes, warm to touch.     DATA:    CBC with Differential:    Lab Results   Component Value Date/Time    WBC 5.1 07/27/2025 04:55 AM    RBC 2.57 07/25/2025 05:29 AM    HGB 7.1 07/27/2025 04:55 AM    HCT 21.2 07/27/2025 04:55 AM     07/25/2025 05:29 AM    MCV 86.1 07/25/2025 05:29 AM    MCH 27.6 07/25/2025 05:29 AM    MCHC 32.1 07/25/2025 05:29 AM    RDW 19.2 07/25/2025 05:29 AM    BANDSPCT 3 06/27/2023 05:35 AM    LYMPHOPCT 7.1 07/27/2025 04:55 AM    MONOPCT 5.1 07/27/2025 04:55 AM    MYELOPCT 3 06/27/2023 05:35 AM    EOSPCT 0.3 07/27/2025 04:55 AM    BASOPCT 0.3 07/27/2025 04:55 AM    MONOSABS 0.3 07/27/2025 04:55 AM    LYMPHSABS 0.4 07/27/2025 04:55 AM    EOSABS 0.0 07/27/2025 04:55 AM    BASOSABS 0.0 07/27/2025 04:55 AM    DIFFTYPE Auto 02/16/2011 05:30 AM     BMP:    Lab Results   Component Value Date/Time     07/27/2025 04:55 AM    K 3.0 07/27/2025 04:55 AM    K 3.4 07/22/2025 06:02 AM    CL 93 07/27/2025 04:55 AM    CO2 29 07/27/2025 04:55 AM    BUN 11 07/27/2025 04:55 AM    CREATININE 0.4 07/27/2025 04:55 AM    CALCIUM 7.6 07/27/2025 04:55 AM    GFRAA >60 07/15/2022 02:04 PM    GFRAA >60 02/15/2011 05:17 PM    LABGLOM >90 07/27/2025 04:55 AM    LABGLOM 163  05/10/2024 12:00 AM    GLUCOSE 186 07/27/2025 04:55 AM    GLUCOSE 80 07/17/2025 01:46 AM       IMPRESSION/RECOMMENDATIONS:      1- Hyponatremia: Multifactorial and likely secondary to volume overload, along with increased free water intake and decreased solutes. Serum sodium is slowly trending up with current management; we will continue oral Torsemide, along with daily fluid restriction, and monitor for now.    2- Hypokalemia: PRN potassium replacement.     3- Stage IV intrahepatic cholangiocarcinoma: Management per primary team.

## 2025-07-27 NOTE — PROGRESS NOTES
07/27/25 0800   RT Protocol   History Pulmonary Disease 2   Respiratory pattern 2   Breath sounds 2   Cough 0   Indications for Bronchodilator Therapy Decreased or absent breath sounds   Bronchodilator Assessment Score 6        Medication:   BRILINTA 60 MG Oral Tab 30 tablet 0 2/27/2025 --   Sig:   TAKE ONE TABLET BY MOUTH ONE TIME DAILY          Date of last refill: 2.27.2025 (#30/0)  [place]      Last office visit: 3.28.2025  Due back to clinic per last office note:  1 year   Date next office visit scheduled:  3.27.2025  Future Appointments   Date Time Provider Department Center   3/27/2025  3:00 PM Jb Lyons MD ENINAPER2 [facility] [place]   4/17/2025  2:00 PM Venessa Barrett LCPC [facility] [place]   5/21/2025 11:40 AM Lokesh Bacon DO [facility]   6/3/2025 10:20 AM Shilpa Sheikh MD [facility]           Last OV note recommendation:       \" ASSESSMENT & PLAN      Destiney was seen today for follow - up.     Diagnoses and all orders for this visit:     Intracranial aneurysm (HCC)        1.  Destiney continues to do very well posttreatment.  The imaging is consistent with progressive occlusion of the aneurysm.     2.  Recommendations/plan: Continue baby aspirin and 60 mg of Brilinta daily.  Repeat MRA of the head without contrast in 1 year.  At that point, if there is further improvement, we would likely stop the Brilinta and continue the baby aspirin.  No follow-ups on file.  \"

## 2025-07-27 NOTE — PROGRESS NOTES
Patient educated regarding fluid restriction.  Patient states \"I have to have water.\"  Notified PCA and staff patient has reached his limit everything in room has been documented.  Call light within reach

## 2025-07-27 NOTE — PROGRESS NOTES
Patient assessed and AM medications given.  Consuming a large amount of liquid.  MD notified. K+ replacement protocol started.  Denies any further needs at this time.  Call light within reach.

## 2025-07-28 ENCOUNTER — APPOINTMENT (OUTPATIENT)
Dept: ULTRASOUND IMAGING | Age: 75
DRG: 435 | End: 2025-07-28
Payer: MEDICARE

## 2025-07-28 PROBLEM — C22.8 PRIMARY MALIGNANT NEOPLASM OF LIVER (HCC): Status: ACTIVE | Noted: 2025-07-28

## 2025-07-28 LAB
ALBUMIN SERPL-MCNC: 2.8 G/DL (ref 3.4–5)
ALP SERPL-CCNC: 345 U/L (ref 40–129)
ALT SERPL-CCNC: 21 U/L (ref 10–40)
ANION GAP SERPL CALCULATED.3IONS-SCNC: 12 MMOL/L (ref 3–16)
AST SERPL-CCNC: 28 U/L (ref 15–37)
BASOPHILS # BLD: 0 K/UL (ref 0–0.2)
BASOPHILS NFR BLD: 0.2 %
BILIRUB DIRECT SERPL-MCNC: 1.2 MG/DL (ref 0–0.3)
BILIRUB INDIRECT SERPL-MCNC: 0.4 MG/DL (ref 0–1)
BILIRUB SERPL-MCNC: 1.6 MG/DL (ref 0–1)
BUN SERPL-MCNC: 12 MG/DL (ref 7–20)
CALCIUM SERPL-MCNC: 8.3 MG/DL (ref 8.3–10.6)
CHLORIDE SERPL-SCNC: 95 MMOL/L (ref 99–110)
CO2 SERPL-SCNC: 27 MMOL/L (ref 21–32)
CREAT SERPL-MCNC: 0.4 MG/DL (ref 0.8–1.3)
DEPRECATED RDW RBC AUTO: 18.8 % (ref 12.4–15.4)
EOSINOPHIL # BLD: 0 K/UL (ref 0–0.6)
EOSINOPHIL NFR BLD: 0.8 %
GFR SERPLBLD CREATININE-BSD FMLA CKD-EPI: >90 ML/MIN/{1.73_M2}
GLUCOSE BLD-MCNC: 204 MG/DL (ref 70–99)
GLUCOSE BLD-MCNC: 208 MG/DL (ref 70–99)
GLUCOSE BLD-MCNC: 310 MG/DL (ref 70–99)
GLUCOSE BLD-MCNC: 340 MG/DL (ref 70–99)
GLUCOSE SERPL-MCNC: 191 MG/DL (ref 70–99)
HCT VFR BLD AUTO: 21.4 % (ref 40.5–52.5)
HGB BLD-MCNC: 7 G/DL (ref 13.5–17.5)
LYMPHOCYTES # BLD: 0.4 K/UL (ref 1–5.1)
LYMPHOCYTES NFR BLD: 8.1 %
MCH RBC QN AUTO: 27.9 PG (ref 26–34)
MCHC RBC AUTO-ENTMCNC: 32.5 G/DL (ref 31–36)
MCV RBC AUTO: 85.8 FL (ref 80–100)
MONOCYTES # BLD: 0.2 K/UL (ref 0–1.3)
MONOCYTES NFR BLD: 5.5 %
NEUTROPHILS # BLD: 3.8 K/UL (ref 1.7–7.7)
NEUTROPHILS NFR BLD: 85.4 %
PERFORMED ON: ABNORMAL
PLATELET # BLD AUTO: 191 K/UL (ref 135–450)
PMV BLD AUTO: 7.5 FL (ref 5–10.5)
POTASSIUM SERPL-SCNC: 3.4 MMOL/L (ref 3.5–5.1)
PROT SERPL-MCNC: 6.2 G/DL (ref 6.4–8.2)
RBC # BLD AUTO: 2.5 M/UL (ref 4.2–5.9)
SODIUM SERPL-SCNC: 134 MMOL/L (ref 136–145)
WBC # BLD AUTO: 4.5 K/UL (ref 4–11)

## 2025-07-28 PROCEDURE — 6370000000 HC RX 637 (ALT 250 FOR IP): Performed by: INTERNAL MEDICINE

## 2025-07-28 PROCEDURE — 6370000000 HC RX 637 (ALT 250 FOR IP): Performed by: STUDENT IN AN ORGANIZED HEALTH CARE EDUCATION/TRAINING PROGRAM

## 2025-07-28 PROCEDURE — 85025 COMPLETE CBC W/AUTO DIFF WBC: CPT

## 2025-07-28 PROCEDURE — 94761 N-INVAS EAR/PLS OXIMETRY MLT: CPT

## 2025-07-28 PROCEDURE — 6360000002 HC RX W HCPCS: Performed by: INTERNAL MEDICINE

## 2025-07-28 PROCEDURE — 36415 COLL VENOUS BLD VENIPUNCTURE: CPT

## 2025-07-28 PROCEDURE — 99233 SBSQ HOSP IP/OBS HIGH 50: CPT | Performed by: INTERNAL MEDICINE

## 2025-07-28 PROCEDURE — 2580000003 HC RX 258: Performed by: INTERNAL MEDICINE

## 2025-07-28 PROCEDURE — 2060000000 HC ICU INTERMEDIATE R&B

## 2025-07-28 PROCEDURE — 49083 ABD PARACENTESIS W/IMAGING: CPT

## 2025-07-28 PROCEDURE — 80076 HEPATIC FUNCTION PANEL: CPT

## 2025-07-28 PROCEDURE — 0W9G3ZZ DRAINAGE OF PERITONEAL CAVITY, PERCUTANEOUS APPROACH: ICD-10-PCS | Performed by: INTERNAL MEDICINE

## 2025-07-28 PROCEDURE — 80048 BASIC METABOLIC PNL TOTAL CA: CPT

## 2025-07-28 PROCEDURE — 6370000000 HC RX 637 (ALT 250 FOR IP)

## 2025-07-28 PROCEDURE — 94640 AIRWAY INHALATION TREATMENT: CPT

## 2025-07-28 RX ORDER — POTASSIUM CHLORIDE 1500 MG/1
40 TABLET, EXTENDED RELEASE ORAL ONCE
Status: COMPLETED | OUTPATIENT
Start: 2025-07-28 | End: 2025-07-28

## 2025-07-28 RX ADMIN — LACTULOSE 30 G: 10 SOLUTION ORAL at 20:02

## 2025-07-28 RX ADMIN — INSULIN LISPRO 3 UNITS: 100 INJECTION, SOLUTION INTRAVENOUS; SUBCUTANEOUS at 16:41

## 2025-07-28 RX ADMIN — PANCRELIPASE LIPASE, PANCRELIPASE PROTEASE, PANCRELIPASE AMYLASE 30000 UNITS: 15000; 47000; 63000 CAPSULE, DELAYED RELEASE ORAL at 10:59

## 2025-07-28 RX ADMIN — PHYTONADIONE 5 MG: 5 TABLET ORAL at 21:08

## 2025-07-28 RX ADMIN — PIPERACILLIN AND TAZOBACTAM 3375 MG: 3; .375 INJECTION, POWDER, LYOPHILIZED, FOR SOLUTION INTRAVENOUS at 23:45

## 2025-07-28 RX ADMIN — POTASSIUM CHLORIDE 40 MEQ: 1500 TABLET, EXTENDED RELEASE ORAL at 11:00

## 2025-07-28 RX ADMIN — LACTULOSE 30 G: 10 SOLUTION ORAL at 15:51

## 2025-07-28 RX ADMIN — Medication 3 MG: at 20:02

## 2025-07-28 RX ADMIN — PIPERACILLIN AND TAZOBACTAM 3375 MG: 3; .375 INJECTION, POWDER, LYOPHILIZED, FOR SOLUTION INTRAVENOUS at 05:23

## 2025-07-28 RX ADMIN — PANCRELIPASE LIPASE, PANCRELIPASE PROTEASE, PANCRELIPASE AMYLASE 40000 UNITS: 20000; 63000; 84000 CAPSULE, DELAYED RELEASE ORAL at 10:59

## 2025-07-28 RX ADMIN — PIPERACILLIN AND TAZOBACTAM 3375 MG: 3; .375 INJECTION, POWDER, LYOPHILIZED, FOR SOLUTION INTRAVENOUS at 16:25

## 2025-07-28 RX ADMIN — OXYCODONE 5 MG: 5 TABLET ORAL at 16:45

## 2025-07-28 RX ADMIN — CALCIUM POLYCARBOPHIL 625 MG: 625 TABLET, FILM COATED ORAL at 11:00

## 2025-07-28 RX ADMIN — FINASTERIDE 5 MG: 5 TABLET, FILM COATED ORAL at 16:41

## 2025-07-28 RX ADMIN — OXYCODONE 5 MG: 5 TABLET ORAL at 23:43

## 2025-07-28 RX ADMIN — PANCRELIPASE LIPASE, PANCRELIPASE PROTEASE, PANCRELIPASE AMYLASE 40000 UNITS: 20000; 63000; 84000 CAPSULE, DELAYED RELEASE ORAL at 16:30

## 2025-07-28 RX ADMIN — LACTULOSE 30 G: 10 SOLUTION ORAL at 10:59

## 2025-07-28 RX ADMIN — Medication 1000 MCG: at 11:24

## 2025-07-28 RX ADMIN — INSULIN LISPRO 1 UNITS: 100 INJECTION, SOLUTION INTRAVENOUS; SUBCUTANEOUS at 11:28

## 2025-07-28 RX ADMIN — PANCRELIPASE LIPASE, PANCRELIPASE PROTEASE, PANCRELIPASE AMYLASE 30000 UNITS: 15000; 47000; 63000 CAPSULE, DELAYED RELEASE ORAL at 16:30

## 2025-07-28 RX ADMIN — ALLOPURINOL 300 MG: 300 TABLET ORAL at 11:00

## 2025-07-28 RX ADMIN — DICYCLOMINE HYDROCHLORIDE 20 MG: 20 TABLET ORAL at 20:02

## 2025-07-28 RX ADMIN — INSULIN LISPRO 3 UNITS: 100 INJECTION, SOLUTION INTRAVENOUS; SUBCUTANEOUS at 20:02

## 2025-07-28 RX ADMIN — TORSEMIDE 20 MG: 20 TABLET ORAL at 10:59

## 2025-07-28 RX ADMIN — ALBUTEROL SULFATE 2 PUFF: 90 AEROSOL, METERED RESPIRATORY (INHALATION) at 07:40

## 2025-07-28 RX ADMIN — ALBUTEROL SULFATE 2 PUFF: 90 AEROSOL, METERED RESPIRATORY (INHALATION) at 11:04

## 2025-07-28 RX ADMIN — ALBUTEROL SULFATE 2 PUFF: 90 AEROSOL, METERED RESPIRATORY (INHALATION) at 19:18

## 2025-07-28 RX ADMIN — PANTOPRAZOLE SODIUM 40 MG: 40 TABLET, DELAYED RELEASE ORAL at 15:54

## 2025-07-28 RX ADMIN — ALBUTEROL SULFATE 2 PUFF: 90 AEROSOL, METERED RESPIRATORY (INHALATION) at 15:20

## 2025-07-28 ASSESSMENT — PAIN SCALES - GENERAL
PAINLEVEL_OUTOF10: 5
PAINLEVEL_OUTOF10: 5

## 2025-07-28 ASSESSMENT — PAIN DESCRIPTION - ORIENTATION
ORIENTATION: RIGHT;LEFT
ORIENTATION: RIGHT
ORIENTATION: RIGHT

## 2025-07-28 ASSESSMENT — PAIN DESCRIPTION - DESCRIPTORS
DESCRIPTORS: DULL;DISCOMFORT
DESCRIPTORS: ACHING;DISCOMFORT
DESCRIPTORS: DULL;DISCOMFORT

## 2025-07-28 ASSESSMENT — PAIN DESCRIPTION - LOCATION
LOCATION: ABDOMEN;BACK
LOCATION: RIB CAGE;SHOULDER
LOCATION: RIB CAGE;SHOULDER

## 2025-07-28 NOTE — PROGRESS NOTES
Urology Progress Note      Subjective: Sharath Ceja is urinating without pain now     Vitals:  /65   Pulse 63   Temp 97.9 °F (36.6 °C) (Oral)   Resp 18   Ht 1.803 m (5' 10.98\")   Wt 71.5 kg (157 lb 9.6 oz)   SpO2 96%   BMI 21.99 kg/m²   Temp  Av °F (37.2 °C)  Min: 97.9 °F (36.6 °C)  Max: 100.2 °F (37.9 °C)    Intake/Output Summary (Last 24 hours) at 2025 0816  Last data filed at 2025 0533  Gross per 24 hour   Intake 6.36 ml   Output 2215 ml   Net -188.64 ml       Exam:   Physical:   Well developed, well nourished in no acute distress  Mood indicates no abnormalities. Pt doesn’t appear depressed  Orientated to time and place    Labs:  WBC:    Lab Results   Component Value Date/Time    WBC 5.1 2025 04:55 AM     Hemoglobin/Hematocrit:    Lab Results   Component Value Date/Time    HGB 7.1 2025 04:55 AM    HCT 21.2 2025 04:55 AM     BMP:    Lab Results   Component Value Date/Time     2025 04:31 AM    K 3.4 2025 04:31 AM    K 3.4 2025 06:02 AM    CL 95 2025 04:31 AM    CO2 27 2025 04:31 AM    BUN 12 2025 04:31 AM    CREATININE 0.4 2025 04:31 AM    CALCIUM 8.3 2025 04:31 AM    GFRAA >60 07/15/2022 02:04 PM    GFRAA >60 02/15/2011 05:17 PM    LABGLOM >90 2025 04:31 AM    LABGLOM 163 05/10/2024 12:00 AM     PT/INR:    Lab Results   Component Value Date/Time    PROTIME 17.6 2025 10:38 PM    INR 1.43 2025 10:38 PM     PTT:    Lab Results   Component Value Date/Time    APTT 33.1 2025 06:53 PM   [APTT    Urinalysis:  rbc    Urine Culture:  ngtd    Blood Culture:  ngtd    Antibiotic Therapy:  zosyn    Imaging:   XR CHEST PORTABLE   Final Result   Stable radiographic examination of the chest with no acute cardiopulmonary   abnormality identified.         CT CHEST PULMONARY EMBOLISM W CONTRAST   Final Result   CT CHEST      1.  No evidence of pulmonary embolus.      2.  Trace pleural

## 2025-07-28 NOTE — FLOWSHEET NOTE
07/27/25 1855   Vital Signs   Temp 99.9 °F (37.7 °C)   Temp Source Oral   Pulse 68   Heart Rate Source Monitor   Respirations 18   BP (!) 115/56   MAP (Calculated) 76   BP Location Left upper arm   BP Method Automatic   Patient Position Semi fowlers   Oxygen Therapy   SpO2 95 %   O2 Device None (Room air)

## 2025-07-28 NOTE — PROCEDURES
PROCEDURE NOTE  Date: 7/28/2025   Name: Sharath Ceja  YOB: 1950    Procedures    Pt arrived for image guided left Paracentesis. Dr. Patterson explained the procedure including the risk and benefits of the procedure. All questions answered. Pt verbalizes understanding of the procedure and states no more questions. Consent confirmed. Vital signs stable. Labs, allergies, medications, and code status reviewed. No contraindications noted. Time out completed prior to procedure.    Vital Signs  Vitals:    07/28/25 1104   BP:    Pulse:    Resp:    Temp:    SpO2: 94%    (vital signs in table format)      Allergies  Codeine (allergies)    Labs  Lab Results   Component Value Date    INR 1.43 (H) 07/21/2025    PROTIME 17.6 (H) 07/21/2025     Lab Results   Component Value Date    CREATININE 0.4 (L) 07/28/2025    BUN 12 07/28/2025     (L) 07/28/2025    K 3.4 (L) 07/28/2025    CL 95 (L) 07/28/2025    CO2 27 07/28/2025     Lab Results   Component Value Date    WBC 5.1 07/27/2025    HGB 7.1 (L) 07/27/2025    HCT 21.2 (L) 07/27/2025    MCV 86.1 07/25/2025     07/25/2025

## 2025-07-28 NOTE — PROGRESS NOTES
Physician Progress Note      PATIENT:               JO IVERSON  CSN #:                  [id number]  :                       1950  ADMIT DATE:       2025 8:29 PM  DISCH DATE:  RESPONDING  PROVIDER #:        Trent Monroy MD          QUERY TEXT:    Please clarify the patient?s nutritional status:    The clinical indicators include:  74 year old male w/ stage IV intrahepatic cholangiocarcinoma     progress note- \"Malnutrition Status:  Severe malnutrition (25   1329). Context: Chronic Illness. Findings of the 6 clinical characteristics of   malnutrition: Energy Intake:  Mild decrease in energy intake. Weight Loss:   Mild weight loss. Body Fat Loss: Severe body fat loss Orbital, Buccal region.   Muscle Mass Loss:  Severe muscle mass loss Temples (temporalis), Clavicles   (pectoralis & deltoids). Fluid Accumulation:  Mild Ascites.  Strength:    Not Performed. Nutrition Assessment: Pt. severely malnourished AEB notable   muscle wasting and fat loss.  At risk for furt    Treatment- weight, I&Os, RD consult, Regular 4 ccc diet w/ 1000 mL fluid   restriction, Ensure Max BID  Options provided:  -- Protein calorie malnutrition severe  -- Other - I will add my own diagnosis  -- Disagree - Not applicable / Not valid  -- Disagree - Clinically unable to determine / Unknown  -- Refer to Clinical Documentation Reviewer    PROVIDER RESPONSE TEXT:    This patient has severe protein calorie malnutrition.    Query created by: Joselyn Mckee on 2025 11:00 AM      Electronically signed by:  Trent Monroy MD 2025 2:42 PM

## 2025-07-28 NOTE — CARE COORDINATION
Reviewed pt at [facility]. DC plan remains same. Pt will DC home with family and [facility]. Pt is having therapeutic paracentesis today. Will continue to follow

## 2025-07-28 NOTE — PROGRESS NOTES
07/28/25 1918   RT Protocol   History Pulmonary Disease 2   Respiratory pattern 2   Breath sounds 2   Cough 0   Indications for Bronchodilator Therapy Decreased or absent breath sounds   Bronchodilator Assessment Score 6     RT Inhaler-Nebulizer Bronchodilator Protocol Note    There is a bronchodilator order in the chart from a provider indicating to follow the RT Bronchodilator Protocol and there is an “Initiate RT Inhaler-Nebulizer Bronchodilator Protocol” order as well (see protocol at bottom of note).    CXR Findings:  No results found.    The findings from the last RT Protocol Assessment were as follows:   History Pulmonary Disease: Chronic pulmonary disease  Respiratory Pattern: Dyspnea on exertion or RR 21-25 bpm  Breath Sounds: Slightly diminished and/or crackles  Cough: Strong, spontaneous, non-productive  Indication for Bronchodilator Therapy: Decreased or absent breath sounds  Bronchodilator Assessment Score: 6    Aerosolized bronchodilator medication orders have been revised according to the RT Inhaler-Nebulizer Bronchodilator Protocol below.    Respiratory Therapist to perform RT Therapy Protocol Assessment initially then follow the protocol.  Repeat RT Therapy Protocol Assessment PRN for score 0-3 or on second treatment, BID, and PRN for scores above 3.    No Indications - adjust the frequency to every 6 hours PRN wheezing or bronchospasm, if no treatments needed after 48 hours then discontinue using Per Protocol order mode.     If indication present, adjust the RT bronchodilator orders based on the Bronchodilator Assessment Score as indicated below.  Use Inhaler orders unless patient has one or more of the following: on home nebulizer, not able to hold breath for 10 seconds, is not alert and oriented, cannot activate and use MDI correctly, or respiratory rate 25 breaths per minute or more, then use the equivalent nebulizer order(s) with same Frequency and PRN reasons based on the score.  If a  patient is on this medication at home then do not decrease Frequency below that used at home.    0-3 - enter or revise RT bronchodilator order(s) to equivalent RT Bronchodilator order with Frequency of every 4 hours PRN for wheezing or increased work of breathing using Per Protocol order mode.        4-6 - enter or revise RT Bronchodilator order(s) to two equivalent RT bronchodilator orders with one order with BID Frequency and one order with Frequency of every 4 hours PRN wheezing or increased work of breathing using Per Protocol order mode.        7-10 - enter or revise RT Bronchodilator order(s) to two equivalent RT bronchodilator orders with one order with TID Frequency and one order with Frequency of every 4 hours PRN wheezing or increased work of breathing using Per Protocol order mode.       11-13 - enter or revise RT Bronchodilator order(s) to one equivalent RT bronchodilator order with QID Frequency and an Albuterol order with Frequency of every 4 hours PRN wheezing or increased work of breathing using Per Protocol order mode.      Greater than 13 - enter or revise RT Bronchodilator order(s) to one equivalent RT bronchodilator order with every 4 hours Frequency and an Albuterol order with Frequency of every 2 hours PRN wheezing or increased work of breathing using Per Protocol order mode.       Electronically signed by Hannah Ch RCP on 7/28/2025 at 7:20 PM

## 2025-07-28 NOTE — PROGRESS NOTES
Case reviewed with Dr. Patterson. Plan to exchange biliary drains tomorrow 7/29/25 alternatively 0800. Pt needs to be NPO at midnight. CBC already ordered for the AM added PT / INR to be collected with AM labs. Primary RN - Lisandro hernández.

## 2025-07-28 NOTE — FLOWSHEET NOTE
07/28/25 1924   Vital Signs   Temp 97.9 °F (36.6 °C)   Temp Source Oral   Pulse 67   Heart Rate Source Monitor   Respirations 18   /61   MAP (Calculated) 79   BP Location Left upper arm   BP Method Automatic   Patient Position Semi fowlers   Oxygen Therapy   SpO2 96 %   O2 Device None (Room air)

## 2025-07-28 NOTE — PLAN OF CARE
Problem: Safety - Adult  Goal: Free from fall injury  7/27/2025 2139 by Farzana Mariscal RN  Outcome: Progressing  7/27/2025 0810 by Juwan Hutchison RN  Outcome: Progressing     Problem: Chronic Conditions and Co-morbidities  Goal: Patient's chronic conditions and co-morbidity symptoms are monitored and maintained or improved  7/27/2025 2139 by Farzana Mariscal RN  Outcome: Progressing  7/27/2025 0810 by Juwan Hutchison RN  Outcome: Progressing     Problem: Discharge Planning  Goal: Discharge to home or other facility with appropriate resources  7/27/2025 2139 by Farzana Mariscal RN  Outcome: Progressing  7/27/2025 0810 by Juwan Hutchison RN  Outcome: Progressing     Problem: Pain  Goal: Verbalizes/displays adequate comfort level or baseline comfort level  7/27/2025 2139 by Farzana Mariscal RN  Outcome: Progressing  7/27/2025 0810 by Juwan Hutchison RN  Outcome: Progressing     Problem: Skin/Tissue Integrity  Goal: Skin integrity remains intact  Description: 1.  Monitor for areas of redness and/or skin breakdown  2.  Assess vascular access sites hourly  3.  Every 4-6 hours minimum:  Change oxygen saturation probe site  4.  Every 4-6 hours:  If on nasal continuous positive airway pressure, respiratory therapy assess nares and determine need for appliance change or resting period  7/27/2025 2139 by Farzana Mariscal RN  Outcome: Progressing  7/27/2025 0810 by Juwan Hutchison RN  Outcome: Progressing     Problem: Neurosensory - Adult  Goal: Achieves stable or improved neurological status  7/27/2025 2139 by Farzana Mariscal RN  Outcome: Progressing  7/27/2025 0810 by Juwan Hutchison RN  Outcome: Progressing  Goal: Absence of seizures  7/27/2025 2139 by Farzana Mariscal RN  Outcome: Progressing  7/27/2025 0810 by Juwan Hutchison RN  Outcome: Progressing  Goal: Achieves maximal functionality and self care  7/27/2025 2139 by Farzana Mariscal RN  Outcome: Progressing  7/27/2025 0810 by Juwan Hutchison RN  Outcome:  function  7/27/2025 2139 by Farzana Mariscal RN  Outcome: Progressing  7/27/2025 0810 by Juwan Hutchison RN  Outcome: Progressing     Problem: Gastrointestinal - Adult  Goal: Minimal or absence of nausea and vomiting  7/27/2025 2139 by Farzana Mariscal RN  Outcome: Progressing  7/27/2025 0810 by Juwan Hutchison RN  Outcome: Progressing  Goal: Maintains or returns to baseline bowel function  7/27/2025 2139 by Farzana Mariscal RN  Outcome: Progressing  7/27/2025 0810 by Juwan Hutchison RN  Outcome: Progressing  Goal: Maintains adequate nutritional intake  7/27/2025 2139 by Farzana Mariscal RN  Outcome: Progressing  7/27/2025 0810 by Juwan Hutchison RN  Outcome: Progressing  Goal: Establish and maintain optimal ostomy function  7/27/2025 2139 by Farzana Mariscal RN  Outcome: Progressing  7/27/2025 0810 by Juwan Hutchison RN  Outcome: Progressing     Problem: Infection - Adult  Goal: Absence of infection at discharge  7/27/2025 2139 by Farzana Mariscal RN  Outcome: Progressing  7/27/2025 0810 by Juwan Hutchison RN  Outcome: Progressing  Goal: Absence of infection during hospitalization  7/27/2025 2139 by Farzana Mariscal RN  Outcome: Progressing  7/27/2025 0810 by Juwan Hutchison RN  Outcome: Progressing  Goal: Absence of fever/infection during anticipated neutropenic period  7/27/2025 2139 by Farzana Mariscal RN  Outcome: Progressing  7/27/2025 0810 by Juwan Hutchison RN  Outcome: Progressing     Problem: Hematologic - Adult  Goal: Maintains hematologic stability  7/27/2025 2139 by Farzana Mariscal RN  Outcome: Progressing  7/27/2025 0810 by Juwan Hutchison RN  Outcome: Progressing     Problem: Nutrition Deficit:  Goal: Optimize nutritional status  7/27/2025 2139 by Farzana Mariscal RN  Outcome: Progressing  7/27/2025 0810 by Juwan Hutchison RN  Outcome: Progressing     Problem: Genitourinary - Adult  Goal: Absence of urinary retention  Outcome: Progressing  Goal: Urinary catheter remains patent  Outcome: Progressing

## 2025-07-28 NOTE — PROGRESS NOTES
Informed by Dr Monroy that one of the patient's biliary drains was retracted and the suture out of the skin.    Drain was evaluated while patient was in US for paracentesis. Drain remains functional despite some retraction.    Given last exchange was performed in March, recommend that routine biliary drain exchange be performed while patient is in house. Discussed with Dr Monroy, and is agreeable to ordering biliary drain exchange.    Patient will need to receive moderate sedation for biliary drain exchange, please have patient NPO at midnight once biliary drain exchange order is placed.     Jesus Manuel Patterson DO  7/28/2025, 3:33 PM  Interventional Radiology  [phone] ([phone])

## 2025-07-28 NOTE — PROCEDURES
PROCEDURE NOTE  Date: 7/28/2025   Name: Sharath Ceja  YOB: 1950    Procedures    Image guided left Paracentesis completed.  1.3 liters of yellow colored withdrawn.  Pt tolerated procedure without any signs or symptoms of distress. Vital signs stable.     DISCHARGED:  ADMITTED: Pt transferred back to room 315. Report called to nurse.     SPECIMEN SENT:  No    Vital Signs  Vitals:    07/28/25 1104   BP:    Pulse:    Resp:    Temp:    SpO2: 94%    (vital signs in table format)

## 2025-07-28 NOTE — PROGRESS NOTES
Progress Note    Date: 07/28/25    Subjective: lying in bed, no acute distress.  Still having abdominal swell, feels has gone down some.    T-max 100.2    Objective:  Vitals:    07/27/25 2330 07/28/25 0530 07/28/25 0727 07/28/25 0740   BP: (!) 122/51 117/69 128/65    Pulse: 75 62 63    Resp: 18 18 18    Temp: 100.2 °F (37.9 °C) 97.9 °F (36.6 °C) 97.9 °F (36.6 °C)    TempSrc: Oral Oral Oral    SpO2: 98% 96% 95% 96%   Weight:       Height:           Physical Exam:  Gen: No distress. Alert.   Eyes: PERRL. No sclera icterus. No conjunctival injection.   ENT: No discharge. Pharynx clear.   Neck: No JVD.  Trachea midline.  Resp: No accessory muscle use. No crackles. No wheezes. No rhonchi.   +right chest wall port.   CV: Regular rate. Regular rhythm. No murmur.  No rub. 2+ edema bilateral LE.   Capillary Refill: Brisk,< 3 seconds   Peripheral Pulses: +2 palpable, equal bilaterally   GI: Abdomen distended. Non-tender. Normal bowel sounds.  Bilateral biliary drains present.  Skin: Warm and dry. No nodule on exposed extremities. No rash on exposed extremities.   M/S: No cyanosis. No joint deformity. No clubbing.   Neuro: Awake. Grossly nonfocal          I Trent Monroy MD have reviewed the chart on Sharath Ceja and personally interviewed and examined patient, reviewed the data (labs and imaging) and after discussion with my PA formulated the plan.  Agree with note with the following edits.    HPI:     I reviewed the patient's Past Medical History, Past Surgical History, Medications, and Allergies.       Elderly male up in bed , chronically ill appearing  Awake, alert and oriented. Appears to be not in any distress  Mucous Membranes:  Pink , anicteric  Neck: No JVD, no carotid bruit, no thyromegaly  Chest:  Clear to auscultation bilaterally, diminished in bases  Cardiovascular:  RRR S1S2 heard, no murmurs or gallops  Abdomen: ++distended, right upper and left lateral hepatic drains in place  draining bile, non  now.   -PT/OT recommended SNF last admission.  -continue Keflex - on hold for zosyn USE currently     Acute on chronic normocytic anemia, recurrent.  Likely with cancer  -hemoglobin 7.2, baseline 7-8.  -monitor with daily CBC.   -required PRBC during previous admission, thought to be 2/2 Tarceva, initially held, but resumed after discussion with his oncologist, although patient reports never restarted.   -SCDs for DVT prophylaxis.    -Monitor cbc every other day , stable now at 7.1      Fevers, persistent   Recent E. Coli bacteremia  CT AP showed  ascites,   CXR - negative  Blood Cx sent - so far no growth  Denies diarrhea or skin wounds  Urine cx neg  Zosyn empirically pending further results  ID consulted for further recs  -T-max 100.2 overnight.     Dysuria  Unclear etiology, UA not indicative of infection, +RBCs, but still with burning, ordered Cx - NGTD.  Urology consulted, may need cysto  -per urology, no need for cysto.     Severe PCM - add protein supplements    DVT Prophylaxis: SCDs  Diet: Diet NPO   Code Status: DNR-CCA     Doretha Holcomb PA-C   7/28/2025  10:20 AM      Agree with above  Updated family   Changes made to note    Trent Monroy MD,7/28/2025 10:59 AM

## 2025-07-28 NOTE — PROGRESS NOTES
Nephrology Progress Note   [facility].Celiro      Sub/interval history  Resting  No new complaints    Last 24 h uop 2.45l    ROS: No chest pain/shortness of breath/fever/nausea/vomiting  PSFH: + visitor    Scheduled Meds:   potassium chloride  40 mEq Oral Once    albuterol sulfate HFA  2 puff Inhalation 4x Daily RT    piperacillin-tazobactam  3,375 mg IntraVENous Q8H    dicyclomine  20 mg Oral QHS    torsemide  20 mg Oral Daily    melatonin  3 mg Oral Nightly    finasteride  5 mg Oral QPM    cyanocobalamin  1,000 mcg Oral Daily    vitamin D  50,000 Units Oral Weekly    polycarbophil  625 mg Oral Daily    lactulose  30 g Oral TID    pantoprazole  40 mg Oral BID AC    allopurinol  300 mg Oral Daily    insulin lispro  0-4 Units SubCUTAneous 4x Daily AC & HS    phytonadione  5 mg Oral Once per day on Monday Wednesday Friday    sodium chloride flush  10 mL IntraVENous BID    lipase-protease-amylase  40,000 Units Oral TID WC    lipase-protease-amylas  30,000 Units Oral TID WC     Continuous Infusions:   sodium chloride Stopped (07/27/25 1419)    dextrose       PRN Meds:.oxyCODONE, sodium chloride, potassium chloride **OR** potassium alternative oral replacement **OR** potassium chloride, magnesium sulfate, ondansetron **OR** ondansetron, polyethylene glycol, acetaminophen **OR** acetaminophen, ipratropium 0.5 mg-albuterol 2.5 mg, albuterol sulfate HFA, carboxymethylcellulose PF, glucose, dextrose bolus **OR** dextrose bolus, glucagon (rDNA), dextrose    Objective/     Vitals:    07/27/25 2330 07/28/25 0530 07/28/25 0727 07/28/25 0740   BP: (!) 122/51 117/69 128/65    Pulse: 75 62 63    Resp: 18 18 18    Temp: 100.2 °F (37.9 °C) 97.9 °F (36.6 °C) 97.9 °F (36.6 °C)    TempSrc: Oral Oral Oral    SpO2: 98% 96% 95% 96%   Weight:       Height:         24HR INTAKE/OUTPUT:    Intake/Output Summary (Last 24 hours) at 7/28/2025 1024  Last data filed at 7/28/2025 0849  Gross per 24 hour   Intake 2026.36 ml   Output 2215 ml   Net

## 2025-07-29 ENCOUNTER — APPOINTMENT (OUTPATIENT)
Dept: INTERVENTIONAL RADIOLOGY/VASCULAR | Age: 75
DRG: 435 | End: 2025-07-29
Payer: MEDICARE

## 2025-07-29 PROBLEM — R50.9 FUO (FEVER OF UNKNOWN ORIGIN): Status: ACTIVE | Noted: 2025-07-29

## 2025-07-29 LAB
ALBUMIN SERPL-MCNC: 2.6 G/DL (ref 3.4–5)
AMMONIA PLAS-SCNC: 36 UMOL/L (ref 16–60)
ANION GAP SERPL CALCULATED.3IONS-SCNC: 11 MMOL/L (ref 3–16)
BACTERIA BLD CULT ORG #2: NORMAL
BACTERIA BLD CULT: NORMAL
BASOPHILS # BLD: 0 K/UL (ref 0–0.2)
BASOPHILS NFR BLD: 0.4 %
BUN SERPL-MCNC: 9 MG/DL (ref 7–20)
CALCIUM SERPL-MCNC: 8.2 MG/DL (ref 8.3–10.6)
CHLORIDE SERPL-SCNC: 96 MMOL/L (ref 99–110)
CO2 SERPL-SCNC: 28 MMOL/L (ref 21–32)
CORTIS F SERPL-MCNC: 1.51 UG/DL
CREAT SERPL-MCNC: 0.4 MG/DL (ref 0.8–1.3)
DEPRECATED RDW RBC AUTO: 18.7 % (ref 12.4–15.4)
EOSINOPHIL # BLD: 0.1 K/UL (ref 0–0.6)
EOSINOPHIL NFR BLD: 1.1 %
GFR SERPLBLD CREATININE-BSD FMLA CKD-EPI: >90 ML/MIN/{1.73_M2}
GLUCOSE BLD-MCNC: 148 MG/DL (ref 70–99)
GLUCOSE BLD-MCNC: 181 MG/DL (ref 70–99)
GLUCOSE BLD-MCNC: 191 MG/DL (ref 70–99)
GLUCOSE BLD-MCNC: 248 MG/DL (ref 70–99)
GLUCOSE SERPL-MCNC: 145 MG/DL (ref 70–99)
HCT VFR BLD AUTO: 22 % (ref 40.5–52.5)
HGB BLD-MCNC: 7.2 G/DL (ref 13.5–17.5)
INR PPP: 1.29 (ref 0.86–1.14)
LYMPHOCYTES # BLD: 0.4 K/UL (ref 1–5.1)
LYMPHOCYTES NFR BLD: 9.3 %
MCH RBC QN AUTO: 27.8 PG (ref 26–34)
MCHC RBC AUTO-ENTMCNC: 32.6 G/DL (ref 31–36)
MCV RBC AUTO: 85.3 FL (ref 80–100)
MONOCYTES # BLD: 0.3 K/UL (ref 0–1.3)
MONOCYTES NFR BLD: 6.5 %
NEUTROPHILS # BLD: 3.7 K/UL (ref 1.7–7.7)
NEUTROPHILS NFR BLD: 82.7 %
PERFORMED ON: ABNORMAL
PHOSPHATE SERPL-MCNC: 2.4 MG/DL (ref 2.5–4.9)
PLATELET # BLD AUTO: 201 K/UL (ref 135–450)
PMV BLD AUTO: 7.9 FL (ref 5–10.5)
POTASSIUM SERPL-SCNC: 3.5 MMOL/L (ref 3.5–5.1)
PROTHROMBIN TIME: 16.3 SEC (ref 12.1–14.9)
RBC # BLD AUTO: 2.58 M/UL (ref 4.2–5.9)
SODIUM SERPL-SCNC: 135 MMOL/L (ref 136–145)
WBC # BLD AUTO: 4.5 K/UL (ref 4–11)

## 2025-07-29 PROCEDURE — 2500000003 HC RX 250 WO HCPCS

## 2025-07-29 PROCEDURE — 6370000000 HC RX 637 (ALT 250 FOR IP): Performed by: STUDENT IN AN ORGANIZED HEALTH CARE EDUCATION/TRAINING PROGRAM

## 2025-07-29 PROCEDURE — 2500000003 HC RX 250 WO HCPCS: Performed by: INTERNAL MEDICINE

## 2025-07-29 PROCEDURE — 6360000002 HC RX W HCPCS: Performed by: STUDENT IN AN ORGANIZED HEALTH CARE EDUCATION/TRAINING PROGRAM

## 2025-07-29 PROCEDURE — 47536 EXCHANGE BILIARY DRG CATH: CPT

## 2025-07-29 PROCEDURE — 6370000000 HC RX 637 (ALT 250 FOR IP)

## 2025-07-29 PROCEDURE — 6370000000 HC RX 637 (ALT 250 FOR IP): Performed by: INTERNAL MEDICINE

## 2025-07-29 PROCEDURE — 94761 N-INVAS EAR/PLS OXIMETRY MLT: CPT

## 2025-07-29 PROCEDURE — 0F2BX0Z CHANGE DRAINAGE DEVICE IN HEPATOBILIARY DUCT, EXTERNAL APPROACH: ICD-10-PCS | Performed by: STUDENT IN AN ORGANIZED HEALTH CARE EDUCATION/TRAINING PROGRAM

## 2025-07-29 PROCEDURE — 36415 COLL VENOUS BLD VENIPUNCTURE: CPT

## 2025-07-29 PROCEDURE — C1769 GUIDE WIRE: HCPCS

## 2025-07-29 PROCEDURE — 99233 SBSQ HOSP IP/OBS HIGH 50: CPT | Performed by: INTERNAL MEDICINE

## 2025-07-29 PROCEDURE — 82140 ASSAY OF AMMONIA: CPT

## 2025-07-29 PROCEDURE — 85610 PROTHROMBIN TIME: CPT

## 2025-07-29 PROCEDURE — 99223 1ST HOSP IP/OBS HIGH 75: CPT | Performed by: INTERNAL MEDICINE

## 2025-07-29 PROCEDURE — 85025 COMPLETE CBC W/AUTO DIFF WBC: CPT

## 2025-07-29 PROCEDURE — 99152 MOD SED SAME PHYS/QHP 5/>YRS: CPT

## 2025-07-29 PROCEDURE — 2060000000 HC ICU INTERMEDIATE R&B

## 2025-07-29 PROCEDURE — 2580000003 HC RX 258: Performed by: INTERNAL MEDICINE

## 2025-07-29 PROCEDURE — 80069 RENAL FUNCTION PANEL: CPT

## 2025-07-29 PROCEDURE — 6360000002 HC RX W HCPCS: Performed by: INTERNAL MEDICINE

## 2025-07-29 PROCEDURE — 87040 BLOOD CULTURE FOR BACTERIA: CPT

## 2025-07-29 PROCEDURE — 94640 AIRWAY INHALATION TREATMENT: CPT

## 2025-07-29 PROCEDURE — 6360000004 HC RX CONTRAST MEDICATION: Performed by: STUDENT IN AN ORGANIZED HEALTH CARE EDUCATION/TRAINING PROGRAM

## 2025-07-29 RX ORDER — MIDAZOLAM HYDROCHLORIDE 1 MG/ML
INJECTION, SOLUTION INTRAMUSCULAR; INTRAVENOUS PRN
Status: COMPLETED | OUTPATIENT
Start: 2025-07-29 | End: 2025-07-29

## 2025-07-29 RX ORDER — FENTANYL CITRATE 50 UG/ML
INJECTION, SOLUTION INTRAMUSCULAR; INTRAVENOUS PRN
Status: COMPLETED | OUTPATIENT
Start: 2025-07-29 | End: 2025-07-29

## 2025-07-29 RX ORDER — OXYCODONE HYDROCHLORIDE 5 MG/1
5 TABLET ORAL EVERY 6 HOURS PRN
Refills: 0 | Status: DISCONTINUED | OUTPATIENT
Start: 2025-07-29 | End: 2025-08-01 | Stop reason: HOSPADM

## 2025-07-29 RX ADMIN — ALBUTEROL SULFATE 2 PUFF: 90 AEROSOL, METERED RESPIRATORY (INHALATION) at 15:17

## 2025-07-29 RX ADMIN — LACTULOSE 30 G: 10 SOLUTION ORAL at 14:14

## 2025-07-29 RX ADMIN — ALLOPURINOL 300 MG: 300 TABLET ORAL at 09:44

## 2025-07-29 RX ADMIN — MIDAZOLAM 0.5 MG: 1 INJECTION INTRAMUSCULAR; INTRAVENOUS at 08:40

## 2025-07-29 RX ADMIN — ONDANSETRON 4 MG: 2 INJECTION, SOLUTION INTRAMUSCULAR; INTRAVENOUS at 01:24

## 2025-07-29 RX ADMIN — PIPERACILLIN AND TAZOBACTAM 3375 MG: 3; .375 INJECTION, POWDER, LYOPHILIZED, FOR SOLUTION INTRAVENOUS at 16:01

## 2025-07-29 RX ADMIN — PANCRELIPASE LIPASE, PANCRELIPASE PROTEASE, PANCRELIPASE AMYLASE 40000 UNITS: 20000; 63000; 84000 CAPSULE, DELAYED RELEASE ORAL at 09:44

## 2025-07-29 RX ADMIN — LACTULOSE 30 G: 10 SOLUTION ORAL at 21:58

## 2025-07-29 RX ADMIN — SODIUM CHLORIDE, PRESERVATIVE FREE 10 ML: 5 INJECTION INTRAVENOUS at 21:59

## 2025-07-29 RX ADMIN — FENTANYL CITRATE 25 MCG: 50 INJECTION INTRAMUSCULAR; INTRAVENOUS at 08:40

## 2025-07-29 RX ADMIN — PANCRELIPASE LIPASE, PANCRELIPASE PROTEASE, PANCRELIPASE AMYLASE 30000 UNITS: 15000; 47000; 63000 CAPSULE, DELAYED RELEASE ORAL at 09:46

## 2025-07-29 RX ADMIN — PANCRELIPASE LIPASE, PANCRELIPASE PROTEASE, PANCRELIPASE AMYLASE 40000 UNITS: 20000; 63000; 84000 CAPSULE, DELAYED RELEASE ORAL at 16:02

## 2025-07-29 RX ADMIN — POTASSIUM CHLORIDE 40 MEQ: 1500 TABLET, EXTENDED RELEASE ORAL at 09:45

## 2025-07-29 RX ADMIN — PANTOPRAZOLE SODIUM 40 MG: 40 TABLET, DELAYED RELEASE ORAL at 15:58

## 2025-07-29 RX ADMIN — LACTULOSE 30 G: 10 SOLUTION ORAL at 09:44

## 2025-07-29 RX ADMIN — TORSEMIDE 20 MG: 20 TABLET ORAL at 09:45

## 2025-07-29 RX ADMIN — ALBUTEROL SULFATE 2 PUFF: 90 AEROSOL, METERED RESPIRATORY (INHALATION) at 17:26

## 2025-07-29 RX ADMIN — IOHEXOL 50 ML: 240 INJECTION, SOLUTION INTRATHECAL; INTRAVASCULAR; INTRAVENOUS; ORAL at 08:46

## 2025-07-29 RX ADMIN — ALBUTEROL SULFATE 2 PUFF: 90 AEROSOL, METERED RESPIRATORY (INHALATION) at 19:59

## 2025-07-29 RX ADMIN — SODIUM CHLORIDE, PRESERVATIVE FREE 10 ML: 5 INJECTION INTRAVENOUS at 09:46

## 2025-07-29 RX ADMIN — FENTANYL CITRATE 25 MCG: 50 INJECTION INTRAMUSCULAR; INTRAVENOUS at 08:34

## 2025-07-29 RX ADMIN — ALBUTEROL SULFATE 2 PUFF: 90 AEROSOL, METERED RESPIRATORY (INHALATION) at 07:00

## 2025-07-29 RX ADMIN — ACETAMINOPHEN 650 MG: 325 TABLET ORAL at 15:58

## 2025-07-29 RX ADMIN — DICYCLOMINE HYDROCHLORIDE 20 MG: 20 TABLET ORAL at 21:58

## 2025-07-29 RX ADMIN — PIPERACILLIN AND TAZOBACTAM 3375 MG: 3; .375 INJECTION, POWDER, LYOPHILIZED, FOR SOLUTION INTRAVENOUS at 09:48

## 2025-07-29 RX ADMIN — INSULIN LISPRO 1 UNITS: 100 INJECTION, SOLUTION INTRAVENOUS; SUBCUTANEOUS at 09:46

## 2025-07-29 RX ADMIN — ALBUTEROL SULFATE 2 PUFF: 90 AEROSOL, METERED RESPIRATORY (INHALATION) at 11:11

## 2025-07-29 RX ADMIN — FINASTERIDE 5 MG: 5 TABLET, FILM COATED ORAL at 16:01

## 2025-07-29 RX ADMIN — CALCIUM POLYCARBOPHIL 625 MG: 625 TABLET, FILM COATED ORAL at 09:43

## 2025-07-29 RX ADMIN — INSULIN LISPRO 1 UNITS: 100 INJECTION, SOLUTION INTRAVENOUS; SUBCUTANEOUS at 21:58

## 2025-07-29 RX ADMIN — MIDAZOLAM 1 MG: 1 INJECTION INTRAMUSCULAR; INTRAVENOUS at 08:34

## 2025-07-29 RX ADMIN — SODIUM PHOSPHATE, MONOBASIC, MONOHYDRATE AND SODIUM PHOSPHATE, DIBASIC, ANHYDROUS 20 MMOL: 142; 276 INJECTION, SOLUTION INTRAVENOUS at 14:15

## 2025-07-29 RX ADMIN — PANCRELIPASE LIPASE, PANCRELIPASE PROTEASE, PANCRELIPASE AMYLASE 30000 UNITS: 15000; 47000; 63000 CAPSULE, DELAYED RELEASE ORAL at 16:02

## 2025-07-29 RX ADMIN — Medication 1000 MCG: at 09:44

## 2025-07-29 ASSESSMENT — PAIN SCALES - GENERAL
PAINLEVEL_OUTOF10: 0

## 2025-07-29 NOTE — PLAN OF CARE
Problem: Safety - Adult  Goal: Free from fall injury  7/28/2025 2001 by Farzana Mariscal RN  Outcome: Progressing  7/28/2025 1212 by Aarti Toussaint RN  Outcome: Progressing     Problem: Chronic Conditions and Co-morbidities  Goal: Patient's chronic conditions and co-morbidity symptoms are monitored and maintained or improved  7/28/2025 2001 by Farzana Mariscal RN  Outcome: Progressing  7/28/2025 1212 by Aarti Toussaint RN  Outcome: Progressing     Problem: Discharge Planning  Goal: Discharge to home or other facility with appropriate resources  7/28/2025 2001 by Farzana Mariscal RN  Outcome: Progressing  7/28/2025 1212 by Aarti Toussaint RN  Outcome: Progressing     Problem: Pain  Goal: Verbalizes/displays adequate comfort level or baseline comfort level  7/28/2025 2001 by Farzana Mariscal RN  Outcome: Progressing  7/28/2025 1212 by Aarti Toussaint RN  Outcome: Progressing     Problem: Skin/Tissue Integrity  Goal: Skin integrity remains intact  Description: 1.  Monitor for areas of redness and/or skin breakdown  2.  Assess vascular access sites hourly  3.  Every 4-6 hours minimum:  Change oxygen saturation probe site  4.  Every 4-6 hours:  If on nasal continuous positive airway pressure, respiratory therapy assess nares and determine need for appliance change or resting period  7/28/2025 2001 by Farzana Mariscal RN  Outcome: Progressing  7/28/2025 1212 by Aarti Toussaint RN  Outcome: Progressing     Problem: Neurosensory - Adult  Goal: Achieves stable or improved neurological status  7/28/2025 2001 by Farzana Mariscal RN  Outcome: Progressing  7/28/2025 1212 by Aarti Toussaint RN  Outcome: Progressing  Goal: Absence of seizures  7/28/2025 2001 by Farzana Mariscal RN  Outcome: Progressing  7/28/2025 1212 by Aarti Toussaint RN  Outcome: Progressing  Goal: Achieves maximal functionality and self care  7/28/2025 2001 by Farzana Mariscal RN  Outcome: Progressing  7/28/2025 1212 by Aarti Toussaint RN  Outcome:

## 2025-07-29 NOTE — OR NURSING
Pt arrived for image guided biliary drain  exchange. Procedure explained including the risk and benefits of the procedure. All questions answered. Pt verbalizes understanding of the procedure and states no more questions. Consent confirmed. Vital signs stable. Labs, allergies, medications, and code status reviewed. No contraindications noted.     Vital Signs  Vitals:    07/29/25 0735   BP: 113/68   Pulse: 68   Resp: 16   Temp: 98.1 °F (36.7 °C)   SpO2: 94%    (vital signs in table format)    Pre Elenita Score  2 - Able to move 4 extremities voluntarily on command  2 - BP+/- 20mmHg of normal  2 - Fully awake  2 - Able to maintain oxygen saturation >92% on room air  2 - Able to breathe deeply and cough freely    Allergies  Codeine (allergies)    Labs  Lab Results   Component Value Date    INR 1.29 (H) 07/29/2025    PROTIME 16.3 (H) 07/29/2025     Lab Results   Component Value Date    CREATININE 0.4 (L) 07/29/2025    BUN 9 07/29/2025     (L) 07/29/2025    K 3.5 07/29/2025    CL 96 (L) 07/29/2025    CO2 28 07/29/2025     Lab Results   Component Value Date    WBC 4.5 07/29/2025    HGB 7.2 (L) 07/29/2025    HCT 22.0 (L) 07/29/2025    MCV 85.3 07/29/2025     07/29/2025

## 2025-07-29 NOTE — FLOWSHEET NOTE
07/29/25 1404   Vital Signs   Temp 100.2 °F (37.9 °C)   Temp Source Axillary   Pulse 92   Heart Rate Source Monitor   Respirations 24   BP (!) 125/57   MAP (Calculated) 80   BP Location Left upper arm   BP Method Automatic   Patient Position Semi fowlers   Pain Assessment   Pain Assessment None - Denies Pain   Pain Level 0   Care Plan - Pain Goals   Verbalizes/displays adequate comfort level or baseline comfort level Encourage patient to monitor pain and request assistance;Assess pain using appropriate pain scale   Opioid-Induced Sedation   POSS Score 1   RASS   Leiva Agitation Sedation Scale (RASS) 0   Oxygen Therapy   SpO2 92 %   O2 Device None (Room air)       Pt w/ restlessness and new confusion, disoriented to time place and situation. Pt noted to have fever. MD notified urgently. Orders to redraw blood cultures and check ammonia. Wife at bed side.     1520: Assisted patient up to BSC to void and have BM. Pt is noted to be very weak. PT/OT ordered. Assisted patient back to bed. Pt appears calm and comfortable. Pt denies further needs. Call light within reach. Bed alarm on. Wife at bed side.

## 2025-07-29 NOTE — OR NURSING
Time out completed prior to procedure.  Pt was place supine on the IR table.  Pt was placed on all cardiac monitoring. Pt placed on 2 L NC for sedation.

## 2025-07-29 NOTE — OR NURSING
Image guided biliary drain (drains) exchanged right and left completed per Dr. Patterson. 12 [place] 40 cm [employer] biliary drainage catheter(s)  LOT # [id number] EXP 3/4/28 (for both drains). Each drain/tube secured with sutures. Drain/tube dressing clean, dry, and intact. Pt tolerated procedure without any signs or symptoms of distress. Vital signs stable. Report called to Dina MORRISON. Pt transported back to [place] [id number] in stable condition via bed by transport.     Medications  Versed: 1.5 mg  Fentanyl: 50 mcg    This RN wasted the following with Saad GUAJARDO RN  0.5 mg Versed  50 mcg Fentanyl    Vital Signs  Vitals:    07/29/25 0849   BP: 120/65   Pulse: 66   Resp: 16   Temp:    SpO2: 98%    (vital signs in table format)    Post Elenita  2 - Able to move 4 extremities voluntarily on command  2 - BP+/- 20mmHg of normal  2 - Fully awake  2 - Able to maintain oxygen saturation >92% on room air  2 - Able to breathe deeply and cough freely

## 2025-07-29 NOTE — PRE SEDATION
performed by Tab Pandey MD at [facility] ENDOSCOPY    ERCP  01/26/2022    ERCP STENT INSERTION performed by Tab Pandey MD at [facility] ENDOSCOPY    ERCP N/A 03/11/2022    ERCP STENT REMOVAL performed by Tab Pandey MD at [facility] ENDOSCOPY    ERCP  03/11/2022    ERCP STENT INSERTION performed by Tab Pandey MD at [facility] ENDOSCOPY    ERCP  03/11/2022    ERCP BIOPSY performed by Tab Pandey MD at [facility] ENDOSCOPY    ERCP N/A 04/13/2022    ERCP STENT INSERTION performed by Tab Pandey MD at [facility] ENDOSCOPY    ERCP  04/13/2022    ERCP ENDOSCOPIC RETROGRADE CHOLANGIOPANCREATOGRAPHY DIRECT VISUALIZATION performed by Tab Pandey MD at [facility] ENDOSCOPY    ERCP N/A 06/13/2022    ERCP BILIARY BRUSHING performed by Tab Pandey MD at [facility] ENDOSCOPY    ERCP  06/13/2022    ERCP STENT INSERTION performed by Tab Pandey MD at [facility] ENDOSCOPY    ERCP N/A 03/28/2023    ERCP ENDOSCOPIC RETROGRADE CHOLANGIOPANCREATOGRAPHY performed by Juan Luis Peñaloza MD at [facility] ENDOSCOPY    ERCP  03/28/2023    ERCP BILIARY BRUSHING performed by Juan Luis Peñaloza MD at [facility] ENDOSCOPY    ERCP  03/28/2023    ERCP STENT INSERTION performed by Juan Luis Peñaloza MD at [facility] ENDOSCOPY    ERCP N/A 05/24/2023    ERCP STENT REMOVAL/EXCHANGE performed by Tab Pandey MD at [facility] ENDOSCOPY    ERCP  05/24/2023    ERCP DILATION BALLOON performed by Tab Pandey MD at [facility] ENDOSCOPY    ERCP  05/24/2023    ERCP BILIARY BRUSHING performed by Tab Pandey MD at [facility] ENDOSCOPY    ERCP  05/24/2023    ERCP STONE REMOVAL performed by Tab Pandey MD at [facility] ENDOSCOPY    ERCP N/A 11/06/2024    ERCP WF W/ANES. (PEDI COLON SCOPE) (12:00) performed by KaTab gonzalez MD at [facility] ENDOSCOPY    ERCP  11/19/2024    ERCP  11/19/2024    ENDOSCOPIC  2343    0.9 % sodium chloride infusion   IntraVENous PRN Steve, Esme, DO   Stopped at 07/27/25 1419    potassium chloride (KLOR-CON M) extended release tablet 40 mEq  40 mEq Oral PRN Steve, Esme, DO   40 mEq at 07/22/25 1648    Or    potassium bicarb-citric acid (EFFER-K) effervescent tablet 40 mEq  40 mEq Oral PRN Steve, Esme, DO        Or    potassium chloride 10 mEq/100 mL IVPB (Peripheral Line)  10 mEq IntraVENous PRN Steve, Esme,  mL/hr at 07/27/25 1430 Rate Verify at 07/27/25 1430    magnesium sulfate 2000 mg in 50 mL IVPB premix  2,000 mg IntraVENous PRN Steve, Esme, DO        ondansetron (ZOFRAN-ODT) disintegrating tablet 4 mg  4 mg Oral Q8H PRN Steve, Esme, DO   4 mg at 07/26/25 2105    Or    ondansetron (ZOFRAN) injection 4 mg  4 mg IntraVENous Q6H PRN Steve, Esme, DO   4 mg at 07/29/25 0124    melatonin tablet 3 mg  3 mg Oral Nightly Steve, Esme, DO   3 mg at 07/28/25 2002    polyethylene glycol (GLYCOLAX) packet 17 g  17 g Oral Daily PRN Steve, Esme, DO        acetaminophen (TYLENOL) tablet 650 mg  650 mg Oral Q6H PRN Steve, Esme, DO   650 mg at 07/27/25 2343    Or    acetaminophen (TYLENOL) suppository 650 mg  650 mg Rectal Q6H PRN Steve, Esme, DO        ipratropium 0.5 mg-albuterol 2.5 mg (DUONEB) nebulizer solution 1 Dose  1 Dose Inhalation Q4H PRN Steve, Esme, DO        finasteride (PROSCAR) tablet 5 mg  5 mg Oral QPM Steve, Esme, DO   5 mg at 07/28/25 1641    cyanocobalamin tablet 1,000 mcg  1,000 mcg Oral Daily Steve, Esme, DO   1,000 mcg at 07/28/25 1124    vitamin D (ERGOCALCIFEROL) capsule 50,000 Units  50,000 Units Oral Weekly Steve, Esme, DO   50,000 Units at 07/25/25 0836    polycarbophil (FIBERCON) tablet 625 mg  625 mg Oral Daily Esme Wilson, DO   625 mg at 07/28/25 1100    lactulose (CHRONULAC) 10 GM/15ML solution 30 g  30 g Oral TID Esme Wilson DO   30 g at 07/28/25 2002    pantoprazole (PROTONIX) tablet 40 mg  40 mg Oral BID AC Nataliia Wilsona, DO   40 mg at 07/28/25 1554    albuterol sulfate HFA

## 2025-07-29 NOTE — DISCHARGE INSTRUCTIONS
Biliary drains exchanged 7/29/25.     I decreased your NPH insulin to 10 units BID since you have only been using sliding scale here, but you have had blood sugars >200. Please follow-up with your primary care doctor for further recs.

## 2025-07-29 NOTE — CONSULTS
Infectious Diseases   Consult Note      Reason for Consult:  fever    Requesting Physician:  Black        Date of Admission: 7/21/2025    Subjective:   CHIEF COMPLAINT:  none given       HPI:    Mao Ceja is a 74yoM with history of stage IV intrahepatic cholangiocarcinoma, s/p Whipple, on Tarceva   Has 2 biliary drains in place since ~11/2025   Crohn's disease   DM, anemia, GERD, gout                He is known to me from recent admission  He was sent to the ED 7/2 for evaluation of acute on chronic anemia.  CT suggested stercoral colitis  Hgb stabilized with transfusion.  GI did not recommend further evaluation.    He had 1 episode of hematochezia   He has had intermittent fever of increasing intensity since 7/6  BC collected 7/9 were positive for E coli   Repeat imaging of the abd was negative for any acute changes.  The E coli bacteremia was presumed to be from colitis  He improved with Zosyn and was discharged with 4d more of Augmentin   DC to SNF    Had therapeutic para about a week after DC   Readmitted 7/21 - cc nausea, gas, fever    S/p routine exchange of biliary drians by IR today   S/p paracentesis 7/28, no cell counts sent     It appears that fever curve is improve  Diagnostics for source of infection remain negative to date     He denies pain   He repeatedly states he wants to go home        Current abx:  Cefepime 2g q12, s 7/9       Past Surgical History:       Diagnosis Date    Adenocarcinoma (HCC) 06/06/2023    8 + LYPMH NODES, WHIPPLE    Asthma     Blood circulation, collateral     Carpal tunnel syndrome     CLL (chronic lymphocytic leukemia) (HCC)     DENIES    Crohn's colitis 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    Hypertension     Liver abscess     DRAINED    Liver cancer (HCC)      loss anemia    Orthostasis    Other ascites    GI bleed    Coagulopathy    Acute GI bleeding    Acute anemia    Biliary drain displacement    Dyspnea    Fever    Stercoral colitis    Severe protein-calorie malnutrition    Febrile illness    E coli bacteremia    Anasarca    Malignant neoplasm of pancreas (HCC)    Primary malignant neoplasm of liver (HCC)       History of stage IV intrahepatic cholangiocarcinoma, s/p Whipple, on Tarceva   2 biliary drains in place since ~11/2024   Crohn's disease   DM, anemia, GERD, gout    Admitted 7/2 with symptomatic anemia   Initial CT on admission suggested colitis, possibly stercoral   There was an isolated incident of hematochezia   Hgb has been stable after transfusion     ID is consulted re fever, present since 7/6  Initial CT on admission suggested colitis, possibly stercoral   E coli bacteremia, +BC 7/9, source presumed to be colitis      Readmitted 7/12 with fever  Source not clear   BC are negative.  Imaging was without acute changes.  He had para, no cell counts, SBP not excluded at this point   Noting wBC is consistently normal and fever persists despite abx, non-infectious source of fever is also possible     No significant abx allergies       Long discussion with patient, wife, RN, Dr. WELLS     Patient's goal is to be home.  He does not intend to pursue additional cancer therapy even if it were offered.  He understands he is dying and is ready to be home and focus on comfort.  One of their primary concerns is management of recurrent ascites if Hospice is elected    -continue Zosyn for now   -continue the discussion around goc.  He is hospice appropriate.  They want to talk w the adult children   -if he elects Hospice, a palliative drain for mgmt of ascites might be reasonable     -if he changes goals and wants continued aggressive care, would consider repeating para for cell counts     ID will follow        Medical Decision Making:  The following items were considered in

## 2025-07-29 NOTE — PLAN OF CARE
Problem: Safety - Adult  Goal: Free from fall injury  Outcome: Progressing     Problem: Chronic Conditions and Co-morbidities  Goal: Patient's chronic conditions and co-morbidity symptoms are monitored and maintained or improved  Outcome: Progressing     Problem: Discharge Planning  Goal: Discharge to home or other facility with appropriate resources  Outcome: Progressing     Problem: Pain  Goal: Verbalizes/displays adequate comfort level or baseline comfort level  Outcome: Progressing  Flowsheets  Taken 7/29/2025 0933  Verbalizes/displays adequate comfort level or baseline comfort level:   Encourage patient to monitor pain and request assistance   Assess pain using appropriate pain scale  Taken 7/29/2025 0735  Verbalizes/displays adequate comfort level or baseline comfort level:   Encourage patient to monitor pain and request assistance   Assess pain using appropriate pain scale     Problem: Skin/Tissue Integrity  Goal: Skin integrity remains intact  Description: 1.  Monitor for areas of redness and/or skin breakdown  2.  Assess vascular access sites hourly  3.  Every 4-6 hours minimum:  Change oxygen saturation probe site  4.  Every 4-6 hours:  If on nasal continuous positive airway pressure, respiratory therapy assess nares and determine need for appliance change or resting period  Outcome: Progressing  Flowsheets (Taken 7/29/2025 1037)  Skin Integrity Remains Intact: Monitor for areas of redness and/or skin breakdown     Problem: Skin/Tissue Integrity - Adult  Goal: Skin integrity remains intact  Description: 1.  Monitor for areas of redness and/or skin breakdown  2.  Assess vascular access sites hourly  3.  Every 4-6 hours minimum:  Change oxygen saturation probe site  4.  Every 4-6 hours:  If on nasal continuous positive airway pressure, respiratory therapy assess nares and determine need for appliance change or resting period  Outcome: Progressing  Flowsheets (Taken 7/29/2025 1037)  Skin Integrity Remains

## 2025-07-29 NOTE — PROGRESS NOTES
Patient returned from IR. Shift assessment completed. VSS. Biliary drains in place, CDI. Patient A/OX4. Pt denies pain. Scheduled meds given, see MAR. OK to resume diet per Dr WELLS. Pt denies further needs at this time. Call light within reach. Bed alarm on.

## 2025-07-29 NOTE — PROGRESS NOTES
Progress Note    Date: 07/29/25    Subjective  MR Richardson seen up in bed , s.p paracentesis with 1.3 L drained yesterday , no fevers  status post  biliary drains exchange today with no issues    Pt seen slightly drowsy today post procedure but arousable and feels better. No abd pain issues  No fevers   Edema resolving    Objective:  Vitals:    07/28/25 2307 07/29/25 0013 07/29/25 0515 07/29/25 0700   BP: 118/62  107/67    Pulse: 70  62    Resp: 18 18 18    Temp: 98.2 °F (36.8 °C)  97.7 °F (36.5 °C)    TempSrc: Oral  Oral    SpO2: 97%  94% 95%   Weight:       Height:           Physical Exam:      Elderly male up in bed , chronically ill appearing  Awake, alert and oriented. Appears to be not in any distress  Mucous Membranes:  Pink , anicteric  Neck: No JVD, no carotid bruit, no thyromegaly  Chest:  Clear to auscultation bilaterally, diminished in bases  Cardiovascular:  RRR S1S2 heard, no murmurs or gallops  Abdomen: less distended, right upper and left lateral hepatic drains in place  draining bile, non tender, no organomegaly, BS present  Extremities: significantly improving 1+ chuy LE edema improving . Distal pulses well felt  Neurological : grossly normal with gen weakness          Labs:  CBC:   Recent Labs     07/27/25  0455 07/28/25  2112 07/29/25  0520   WBC 5.1 4.5 4.5   HGB 7.1* 7.0* 7.2*   HCT 21.2* 21.4* 22.0*   MCV  --  85.8 85.3   PLT  --  191 201     BMP:   Recent Labs     07/27/25  0455 07/28/25  0431 07/29/25  0520   * 134* 135*   K 3.0* 3.4* 3.5   CL 93* 95* 96*   CO2 29 27 28   PHOS  --   --  2.4*   BUN 11 12 9   CREATININE 0.4* 0.4* 0.4*     LIVER PROFILE:   Recent Labs     07/28/25  0531   AST 28   ALT 21   BILIDIR 1.2*   BILITOT 1.6*   ALKPHOS 345*       PT/INR:   Recent Labs     07/29/25  0520   PROTIME 16.3*   INR 1.29*       APTT: No results for input(s): \"APTT\" in the last 72 hours.  UA:  No results for input(s): \"NITRITE\", \"COLORU\", \"PHUR\", \"LABCAST\", \"WBCUA\", \"RBCUA\", \"MUCUS\",  and albumin ordered to help remove extravascular fluid   -IV 20mg Lasix BID, now switched to Torsemide and edema resolving with daily fluid restriction..  -nephrology consulted.  -Na improving, now 135.  -has required intermittent paracentesis, therapeutic paracentesis done with 1.3 L removed on 7/28 See below       Stage IV intrahepatic cholangiocarcinoma s/p biliary drains.  Portal vein thrombosis.  Coagulopathy, on daily vitamin K.  -thrombosis is chronic, unchanged on CT.  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 Now changed to limited code with ongoing discussions   -continue home Zenpep and Vitamin K.  - biliary drains exchanged this adm by IR   -Denissev is on hold for now     Lung nodules.  -CT PE showed ill-defined nodule around the right upper lobe.  Cannot rule out Mets  -continue outpatient follow up with oncology.    Type II diabetes mellitus.  -A1C 5.8% from 7/4/2025.  -SSI.  -POC Glucose, carb control diet.     Ecoli bacteremia   -patient was recently hospitalized for 11 days due to anemia, stercoral colitis, E. coli bacteremia was evaluated by oncology and discharged on Augmentin for 4 additional days which she has completed by now.   -PT/OT recommended SNF last admission.   Was on Keflex - on hold for zosyn USE currently   Repeat cx neg this adm, mild low grade fevers resolved     Acute on chronic normocytic anemia, recurrent.  Likely with cancer  -hemoglobin 7.2, baseline 7-8.  -monitor with daily CBC.   -required PRBC during previous admission, thought to be 2/2 Tarceva, initially held, but resumed after discussion with his oncologist, although patient reports never restarted.   -SCDs for DVT prophylaxis.    -Monitor cbc every other day , stable now at 7.2      Dysuria  Unclear etiology, UA not indicative of infection, +RBCs, but still with

## 2025-07-29 NOTE — BRIEF OP NOTE
Interventional Radiology  Brief Postoperative Note    Patient: Sharath Ceja  YOB: 1950  MRN: [id number]      Pre-operative Diagnosis: Biliary obstruction s/p PTBD x2    Post-operative Diagnosis: Same    Procedure: Biliary drain exchange x2    Anesthesia: Local and Moderate Sedation    Surgeons/Assistants: Jesus Manuel Patterson DO    Estimated Blood Loss: less than 50     Complications: None    Infection Present At Time Of Surgery (PATOS) (choose all levels that have infection present):  No infection present    Specimens: Was Not Obtained    Findings:   Biliary drains were partially clogged.  Successful biliary drain exchange x2, set to bag for internal and external drainage of bile.     Continue daily saline flushes to maintain patency.       Jesus Manuel Patterson DO  7/29/2025, 8:55 AM  Interventional Radiology  [phone] ([phone])

## 2025-07-29 NOTE — PROGRESS NOTES
Nephrology Progress Note   [email or web address]      Sub/interval history  Resting  Biliary drain exchanged on 7/29    Last 24 h uop 750 mL charted    ROS: No chest pain/shortness of breath/fever/nausea/vomiting  PSFH: no visitor    Scheduled Meds:   albuterol sulfate HFA  2 puff Inhalation 4x Daily RT    piperacillin-tazobactam  3,375 mg IntraVENous Q8H    dicyclomine  20 mg Oral QHS    torsemide  20 mg Oral Daily    melatonin  3 mg Oral Nightly    finasteride  5 mg Oral QPM    cyanocobalamin  1,000 mcg Oral Daily    vitamin D  50,000 Units Oral Weekly    polycarbophil  625 mg Oral Daily    lactulose  30 g Oral TID    pantoprazole  40 mg Oral BID AC    allopurinol  300 mg Oral Daily    insulin lispro  0-4 Units SubCUTAneous 4x Daily AC & HS    phytonadione  5 mg Oral Once per day on Monday Wednesday Friday    sodium chloride flush  10 mL IntraVENous BID    lipase-protease-amylase  40,000 Units Oral TID WC    lipase-protease-amylas  30,000 Units Oral TID WC     Continuous Infusions:   sodium chloride Stopped (07/27/25 1419)    dextrose       PRN Meds:.oxyCODONE, sodium chloride, potassium chloride **OR** potassium alternative oral replacement **OR** potassium chloride, magnesium sulfate, ondansetron **OR** ondansetron, polyethylene glycol, acetaminophen **OR** acetaminophen, ipratropium 0.5 mg-albuterol 2.5 mg, albuterol sulfate HFA, carboxymethylcellulose PF, glucose, dextrose bolus **OR** dextrose bolus, glucagon (rDNA), dextrose    Objective/     Vitals:    07/29/25 0933 07/29/25 1111 07/29/25 1139 07/29/25 1141   BP: 108/62  (!) 144/92 (!) 144/92   Pulse: 58  59 59   Resp: 16  17 24   Temp: 98.1 °F (36.7 °C)  97.7 °F (36.5 °C) 97.7 °F (36.5 °C)   TempSrc: Oral  Oral Oral   SpO2: 93% 97% 95% 95%   Weight:       Height:         24HR INTAKE/OUTPUT:    Intake/Output Summary (Last 24 hours) at 7/29/2025 1322  Last data filed at 7/29/2025 0833  Gross per 24 hour   Intake 480 ml   Output 1445 ml   Net -965 ml     Gen:

## 2025-07-30 LAB
ALBUMIN SERPL-MCNC: 2.5 G/DL (ref 3.4–5)
ANION GAP SERPL CALCULATED.3IONS-SCNC: 10 MMOL/L (ref 3–16)
BASOPHILS # BLD: 0 K/UL (ref 0–0.2)
BASOPHILS NFR BLD: 0.4 %
BUN SERPL-MCNC: 9 MG/DL (ref 7–20)
CALCIUM SERPL-MCNC: 7.6 MG/DL (ref 8.3–10.6)
CHLORIDE SERPL-SCNC: 99 MMOL/L (ref 99–110)
CO2 SERPL-SCNC: 27 MMOL/L (ref 21–32)
CREAT SERPL-MCNC: 0.4 MG/DL (ref 0.8–1.3)
DEPRECATED RDW RBC AUTO: 19.3 % (ref 12.4–15.4)
EOSINOPHIL # BLD: 0 K/UL (ref 0–0.6)
EOSINOPHIL NFR BLD: 0.6 %
GFR SERPLBLD CREATININE-BSD FMLA CKD-EPI: >90 ML/MIN/{1.73_M2}
GLUCOSE BLD-MCNC: 192 MG/DL (ref 70–99)
GLUCOSE BLD-MCNC: 251 MG/DL (ref 70–99)
GLUCOSE BLD-MCNC: 266 MG/DL (ref 70–99)
GLUCOSE BLD-MCNC: 328 MG/DL (ref 70–99)
GLUCOSE SERPL-MCNC: 153 MG/DL (ref 70–99)
HCT VFR BLD AUTO: 21.5 % (ref 40.5–52.5)
HGB BLD-MCNC: 7 G/DL (ref 13.5–17.5)
LYMPHOCYTES # BLD: 0.4 K/UL (ref 1–5.1)
LYMPHOCYTES NFR BLD: 6.8 %
MCH RBC QN AUTO: 27.7 PG (ref 26–34)
MCHC RBC AUTO-ENTMCNC: 32.7 G/DL (ref 31–36)
MCV RBC AUTO: 84.7 FL (ref 80–100)
MONOCYTES # BLD: 0.3 K/UL (ref 0–1.3)
MONOCYTES NFR BLD: 5.7 %
NEUTROPHILS # BLD: 4.8 K/UL (ref 1.7–7.7)
NEUTROPHILS NFR BLD: 86.5 %
PERFORMED ON: ABNORMAL
PHOSPHATE SERPL-MCNC: 2.4 MG/DL (ref 2.5–4.9)
PLATELET # BLD AUTO: 174 K/UL (ref 135–450)
PMV BLD AUTO: 7.3 FL (ref 5–10.5)
POTASSIUM SERPL-SCNC: 3.3 MMOL/L (ref 3.5–5.1)
RBC # BLD AUTO: 2.53 M/UL (ref 4.2–5.9)
SODIUM SERPL-SCNC: 136 MMOL/L (ref 136–145)
WBC # BLD AUTO: 5.5 K/UL (ref 4–11)

## 2025-07-30 PROCEDURE — 80069 RENAL FUNCTION PANEL: CPT

## 2025-07-30 PROCEDURE — 6370000000 HC RX 637 (ALT 250 FOR IP): Performed by: INTERNAL MEDICINE

## 2025-07-30 PROCEDURE — 2500000003 HC RX 250 WO HCPCS

## 2025-07-30 PROCEDURE — 2060000000 HC ICU INTERMEDIATE R&B

## 2025-07-30 PROCEDURE — 97165 OT EVAL LOW COMPLEX 30 MIN: CPT

## 2025-07-30 PROCEDURE — 97530 THERAPEUTIC ACTIVITIES: CPT

## 2025-07-30 PROCEDURE — 2580000003 HC RX 258: Performed by: STUDENT IN AN ORGANIZED HEALTH CARE EDUCATION/TRAINING PROGRAM

## 2025-07-30 PROCEDURE — 94640 AIRWAY INHALATION TREATMENT: CPT

## 2025-07-30 PROCEDURE — 6370000000 HC RX 637 (ALT 250 FOR IP): Performed by: STUDENT IN AN ORGANIZED HEALTH CARE EDUCATION/TRAINING PROGRAM

## 2025-07-30 PROCEDURE — 2500000003 HC RX 250 WO HCPCS: Performed by: INTERNAL MEDICINE

## 2025-07-30 PROCEDURE — 2580000003 HC RX 258: Performed by: INTERNAL MEDICINE

## 2025-07-30 PROCEDURE — 99233 SBSQ HOSP IP/OBS HIGH 50: CPT | Performed by: INTERNAL MEDICINE

## 2025-07-30 PROCEDURE — 6370000000 HC RX 637 (ALT 250 FOR IP)

## 2025-07-30 PROCEDURE — 6360000002 HC RX W HCPCS: Performed by: INTERNAL MEDICINE

## 2025-07-30 PROCEDURE — 97162 PT EVAL MOD COMPLEX 30 MIN: CPT

## 2025-07-30 PROCEDURE — 85025 COMPLETE CBC W/AUTO DIFF WBC: CPT

## 2025-07-30 RX ADMIN — INSULIN LISPRO 1 UNITS: 100 INJECTION, SOLUTION INTRAVENOUS; SUBCUTANEOUS at 08:15

## 2025-07-30 RX ADMIN — INSULIN LISPRO 4 UNITS: 100 INJECTION, SOLUTION INTRAVENOUS; SUBCUTANEOUS at 16:20

## 2025-07-30 RX ADMIN — POTASSIUM CHLORIDE 40 MEQ: 1500 TABLET, EXTENDED RELEASE ORAL at 08:18

## 2025-07-30 RX ADMIN — ALBUTEROL SULFATE 2 PUFF: 90 AEROSOL, METERED RESPIRATORY (INHALATION) at 15:15

## 2025-07-30 RX ADMIN — PANCRELIPASE LIPASE, PANCRELIPASE PROTEASE, PANCRELIPASE AMYLASE 40000 UNITS: 20000; 63000; 84000 CAPSULE, DELAYED RELEASE ORAL at 08:10

## 2025-07-30 RX ADMIN — ALBUTEROL SULFATE 2 PUFF: 90 AEROSOL, METERED RESPIRATORY (INHALATION) at 19:26

## 2025-07-30 RX ADMIN — ALLOPURINOL 300 MG: 300 TABLET ORAL at 08:18

## 2025-07-30 RX ADMIN — CALCIUM POLYCARBOPHIL 625 MG: 625 TABLET, FILM COATED ORAL at 08:19

## 2025-07-30 RX ADMIN — OXYCODONE 5 MG: 5 TABLET ORAL at 21:12

## 2025-07-30 RX ADMIN — OXYCODONE 5 MG: 5 TABLET ORAL at 14:04

## 2025-07-30 RX ADMIN — PANCRELIPASE LIPASE, PANCRELIPASE PROTEASE, PANCRELIPASE AMYLASE 30000 UNITS: 15000; 47000; 63000 CAPSULE, DELAYED RELEASE ORAL at 16:44

## 2025-07-30 RX ADMIN — PANCRELIPASE LIPASE, PANCRELIPASE PROTEASE, PANCRELIPASE AMYLASE 30000 UNITS: 15000; 47000; 63000 CAPSULE, DELAYED RELEASE ORAL at 12:11

## 2025-07-30 RX ADMIN — ALBUTEROL SULFATE 2 PUFF: 90 AEROSOL, METERED RESPIRATORY (INHALATION) at 04:11

## 2025-07-30 RX ADMIN — PANTOPRAZOLE SODIUM 40 MG: 40 TABLET, DELAYED RELEASE ORAL at 06:03

## 2025-07-30 RX ADMIN — ACETAMINOPHEN 650 MG: 325 TABLET ORAL at 16:20

## 2025-07-30 RX ADMIN — FINASTERIDE 5 MG: 5 TABLET, FILM COATED ORAL at 16:44

## 2025-07-30 RX ADMIN — PIPERACILLIN AND TAZOBACTAM 3375 MG: 3; .375 INJECTION, POWDER, LYOPHILIZED, FOR SOLUTION INTRAVENOUS at 00:42

## 2025-07-30 RX ADMIN — LACTULOSE 30 G: 10 SOLUTION ORAL at 08:15

## 2025-07-30 RX ADMIN — INSULIN LISPRO 2 UNITS: 100 INJECTION, SOLUTION INTRAVENOUS; SUBCUTANEOUS at 12:12

## 2025-07-30 RX ADMIN — SODIUM CHLORIDE, PRESERVATIVE FREE 10 ML: 5 INJECTION INTRAVENOUS at 08:19

## 2025-07-30 RX ADMIN — PANCRELIPASE LIPASE, PANCRELIPASE PROTEASE, PANCRELIPASE AMYLASE 30000 UNITS: 15000; 47000; 63000 CAPSULE, DELAYED RELEASE ORAL at 08:09

## 2025-07-30 RX ADMIN — ALBUTEROL SULFATE 2 PUFF: 90 AEROSOL, METERED RESPIRATORY (INHALATION) at 10:52

## 2025-07-30 RX ADMIN — INSULIN LISPRO 2 UNITS: 100 INJECTION, SOLUTION INTRAVENOUS; SUBCUTANEOUS at 21:13

## 2025-07-30 RX ADMIN — SODIUM CHLORIDE: 0.9 INJECTION, SOLUTION INTRAVENOUS at 08:21

## 2025-07-30 RX ADMIN — LACTULOSE 30 G: 10 SOLUTION ORAL at 21:12

## 2025-07-30 RX ADMIN — DICYCLOMINE HYDROCHLORIDE 20 MG: 20 TABLET ORAL at 21:12

## 2025-07-30 RX ADMIN — Medication 3 MG: at 21:12

## 2025-07-30 RX ADMIN — ALBUTEROL SULFATE 2 PUFF: 90 AEROSOL, METERED RESPIRATORY (INHALATION) at 08:01

## 2025-07-30 RX ADMIN — PANTOPRAZOLE SODIUM 40 MG: 40 TABLET, DELAYED RELEASE ORAL at 16:20

## 2025-07-30 RX ADMIN — POTASSIUM PHOSPHATE, MONOBASIC POTASSIUM PHOSPHATE, DIBASIC 20 MMOL: 224; 236 INJECTION, SOLUTION, CONCENTRATE INTRAVENOUS at 10:28

## 2025-07-30 RX ADMIN — PIPERACILLIN AND TAZOBACTAM 3375 MG: 3; .375 INJECTION, POWDER, LYOPHILIZED, FOR SOLUTION INTRAVENOUS at 16:25

## 2025-07-30 RX ADMIN — SODIUM CHLORIDE: 0.9 INJECTION, SOLUTION INTRAVENOUS at 16:25

## 2025-07-30 RX ADMIN — PANCRELIPASE LIPASE, PANCRELIPASE PROTEASE, PANCRELIPASE AMYLASE 40000 UNITS: 20000; 63000; 84000 CAPSULE, DELAYED RELEASE ORAL at 12:10

## 2025-07-30 RX ADMIN — Medication 1000 MCG: at 08:19

## 2025-07-30 RX ADMIN — TORSEMIDE 20 MG: 20 TABLET ORAL at 08:18

## 2025-07-30 RX ADMIN — PHYTONADIONE 5 MG: 5 TABLET ORAL at 21:17

## 2025-07-30 RX ADMIN — LACTULOSE 30 G: 10 SOLUTION ORAL at 12:15

## 2025-07-30 RX ADMIN — PIPERACILLIN AND TAZOBACTAM 3375 MG: 3; .375 INJECTION, POWDER, LYOPHILIZED, FOR SOLUTION INTRAVENOUS at 08:22

## 2025-07-30 RX ADMIN — PANCRELIPASE LIPASE, PANCRELIPASE PROTEASE, PANCRELIPASE AMYLASE 40000 UNITS: 20000; 63000; 84000 CAPSULE, DELAYED RELEASE ORAL at 16:44

## 2025-07-30 RX ADMIN — SODIUM CHLORIDE, PRESERVATIVE FREE 10 ML: 5 INJECTION INTRAVENOUS at 21:14

## 2025-07-30 ASSESSMENT — PAIN DESCRIPTION - LOCATION
LOCATION: RIB CAGE;ABDOMEN
LOCATION: ABDOMEN

## 2025-07-30 ASSESSMENT — PAIN DESCRIPTION - ORIENTATION
ORIENTATION: RIGHT
ORIENTATION: RIGHT

## 2025-07-30 ASSESSMENT — PAIN - FUNCTIONAL ASSESSMENT: PAIN_FUNCTIONAL_ASSESSMENT: ACTIVITIES ARE NOT PREVENTED

## 2025-07-30 ASSESSMENT — PAIN DESCRIPTION - DESCRIPTORS
DESCRIPTORS: ACHING;DISCOMFORT
DESCRIPTORS: SHARP

## 2025-07-30 ASSESSMENT — PAIN SCALES - GENERAL
PAINLEVEL_OUTOF10: 4
PAINLEVEL_OUTOF10: 4

## 2025-07-30 NOTE — PROGRESS NOTES
07/30/25 0812   RT Protocol   History Pulmonary Disease 2   Respiratory pattern 2   Breath sounds 4   Cough 0   Indications for Bronchodilator Therapy On home bronchodilators   Bronchodilator Assessment Score 8

## 2025-07-30 NOTE — PLAN OF CARE
Problem: Safety - Adult  Goal: Free from fall injury  7/29/2025 2314 by Farzana Mariscal RN  Outcome: Progressing  7/29/2025 1038 by Dina Guevara RN  Outcome: Progressing     Problem: Chronic Conditions and Co-morbidities  Goal: Patient's chronic conditions and co-morbidity symptoms are monitored and maintained or improved  7/29/2025 2314 by Farzana Mariscal RN  Outcome: Progressing  7/29/2025 1038 by Dina Guevara RN  Outcome: Progressing     Problem: Discharge Planning  Goal: Discharge to home or other facility with appropriate resources  7/29/2025 2314 by Farzana Mariscal RN  Outcome: Progressing  7/29/2025 1038 by Dina Guevara RN  Outcome: Progressing     Problem: Pain  Goal: Verbalizes/displays adequate comfort level or baseline comfort level  7/29/2025 2314 by Farzana Mariscal RN  Outcome: Progressing  Flowsheets  Taken 7/29/2025 1653 by Dina Guevara RN  Verbalizes/displays adequate comfort level or baseline comfort level:   Encourage patient to monitor pain and request assistance   Assess pain using appropriate pain scale  Taken 7/29/2025 1404 by Dina Guevara RN  Verbalizes/displays adequate comfort level or baseline comfort level:   Encourage patient to monitor pain and request assistance   Assess pain using appropriate pain scale  Taken 7/29/2025 1141 by Dina Guevara RN  Verbalizes/displays adequate comfort level or baseline comfort level:   Encourage patient to monitor pain and request assistance   Assess pain using appropriate pain scale  7/29/2025 1038 by Dina Guevara RN  Outcome: Progressing  Flowsheets  Taken 7/29/2025 0933  Verbalizes/displays adequate comfort level or baseline comfort level:   Encourage patient to monitor pain and request assistance   Assess pain using appropriate pain scale  Taken 7/29/2025 0735  Verbalizes/displays adequate comfort level or baseline comfort level:   Encourage patient to monitor pain and request assistance

## 2025-07-30 NOTE — FLOWSHEET NOTE
07/29/25 1953   Vital Signs   Temp 98.4 °F (36.9 °C)   Temp Source Oral   Pulse 84   Heart Rate Source Monitor   Respirations 21   /66   MAP (Calculated) 87   BP Location Left lower arm   BP Method Automatic   Patient Position Semi fowlers   Oxygen Therapy   SpO2 98 %   O2 Device None (Room air)

## 2025-07-30 NOTE — PROGRESS NOTES
Inpatient Occupational Therapy Evaluation & Treatment    Unit: PCU  Date:  7/30/2025  Patient Name:    Sharath Ceja  Admitting diagnosis:  Acute hyponatremia [E87.1]  Anasarca [R60.1]  Hyponatremia [E87.1]  Primary malignant neoplasm of liver (HCC) [C22.8]  Other ascites [R18.8]  Pain of upper abdomen [R10.10]  Admit Date:  7/21/2025  Precautions/Restrictions/WB Status/ Lines/ Wounds/ Oxygen: Fall risk, Bed/chair alarm, Lines (IV, Port right, and biliary drains), Telemetry, and Continuous pulse oximetry     Treatment Time:  11:00-11:25  Treatment Number:  1  Timed Code Treatment Minutes: 15 minutes  Total Treatment Minutes:  25  minutes    Patient Goals for Therapy: \"to go home\"          Discharge Recommendations: Home with   24/7 assist  and Home OT  DME needs for discharge: Needs Met       Therapy recommendations for staff:   Stand by assist for ambulation with use of rolling walker (RW) and gait belt within room    History of Present Illness: 74 y.o. male who presented to [facility] with abdominal distention.  Patient reports having a paracentesis last week but continues to have abdominal distention.  Of note patient has cholangiocarcinoma continues to be on palliative chemotherapy.  Of note his CT imaging only shows mild ascites does have diffuse body anasarca.  His legs continue to remain edematous and were edematous during last hospitalization as well and even had DVT studies done which were negative.  He is reporting overall worsening fatigue even though he states that he has been consuming 2 protein drinks a day.  He is otherwise denying fever, chills, chest pain, nausea or emesis.     Biliary drain exchanged on 7/29     Preadmission Environment:   Pt. Lives                                              with spouse and 24/7 assist available   Home environment:                            mobile home/trailer, doublewide  Steps to enter first floor:                     Ramp at patio door,  BSC:   Not Tested  Functional Mobility:   SBA and With RW and gait belt across room x2    ADLs:  Dressing:      UE:   Not Tested  LE:    Not Tested    Bathing:    UE:  Not Tested  LE:  Not Tested    Eating:   Not Tested    Toileting:  Not Tested    Grooming/hygiene: Not Tested    Ther Ex / Activities Initiated:   Toe curls: x20  Ankle pumps: x20    Positioning Needs:   Pt reclined in chair and alarm set  Call light provided and all needs within reach    Patient/Family Education:   Pt educated on role of inpatient OT, plan of care, importance of continued activity, DC recommendations, functional transfer/mobility safety, transfer techniques, and calling for assist with mobility    CHF Education  N/A    Assessment:  Pt seen for occupational therapy evaluation in the acute care setting this date.  Pt demonstrated decreased Activity tolerance, ADLs, IADLs, Balance , Bathing, Bed mobility, Dressing, ROM, and Safety Awareness. Pt functioning below baseline and will likely benefit from skilled occupational therapy services to maximize safety and independence.     Recommending Home 24 hr assist and with home OT upon discharge as patient functioning below baseline level and would benefit from continued therapy services    Goal(s) :   To be met in 3 Visits:  Bed to toilet/BSC:       Independent  Pt will complete 3/3 CHF goals     N/A    To be met in 5 Visits:  Supine to/from Sit in preparation for ADL task:   Independent  Toileting        Modified Independent  Grooming       Independent  Upper Body Dressing:      Independent  Lower Body Dressing:      Modified Independent  Pt to demonstrate UE therapeutic exs x 15 reps with minimal cues    Rehabilitation Potential: Fair  Strengths for achieving goals include: Pt motivated, PLOF, and Pt cooperative   Barriers to achieving goals include:  Complexity of condition    Plan:  To be seen 3-5 x/ week while in acute care setting for therapeutic exercises/activities, bed mobility

## 2025-07-30 NOTE — PROGRESS NOTES
Progress Note    Date: 07/30/25    Subjective  MR Richardson seen up in bed , s.p paracentesis with 1.3 L drained 7/28 , no fevers  status post  biliary drains exchange yesterday 7/29 with no issues    Lying in bed, no acute distress.  Swelling better in RLE, still fluid on LLE.  Abdomen distended again but no discomfort, asking for peritoneal catheter for outpt drainage   Meeting with hospice today.     Objective:  Vitals:    07/29/25 2351 07/30/25 0350 07/30/25 0759 07/30/25 0815   BP: 125/78 (!) 116/58 (!) 104/57 117/62   Pulse: 60 62  71   Resp: 16 16 17 18   Temp: 97.7 °F (36.5 °C) 97.9 °F (36.6 °C) 98.4 °F (36.9 °C) 97.3 °F (36.3 °C)   TempSrc: Oral Oral Oral Axillary   SpO2: 98% 98% 96% 96%   Weight:       Height:           Physical Exam:      Elderly male up in bed , chronically ill appearing  Awake, alert and oriented. Appears to be not in any distress  Mucous Membranes:  Pink , anicteric  Neck: No JVD, no carotid bruit, no thyromegaly  Chest:  Clear to auscultation bilaterally, diminished in bases  Cardiovascular:  RRR S1S2 heard, no murmurs or gallops  Abdomen: +distended, right upper and left lateral hepatic drains in place  draining bile, non tender, no organomegaly, BS present  Extremities: significantly improving 1+ chuy LE edema improving . Distal pulses well felt  Neurological : grossly normal with gen weakness          Labs:  CBC:   Recent Labs     07/28/25  2112 07/29/25  0520   WBC 4.5 4.5   HGB 7.0* 7.2*   HCT 21.4* 22.0*   MCV 85.8 85.3    201     BMP:   Recent Labs     07/28/25  0431 07/29/25  0520 07/30/25  0600   * 135* 136   K 3.4* 3.5 3.3*   CL 95* 96* 99   CO2 27 28 27   PHOS  --  2.4* 2.4*   BUN 12 9 9   CREATININE 0.4* 0.4* 0.4*     LIVER PROFILE:   Recent Labs     07/28/25  0531   AST 28   ALT 21   BILIDIR 1.2*   BILITOT 1.6*   ALKPHOS 345*       PT/INR:   Recent Labs     07/29/25  0520   PROTIME 16.3*   INR 1.29*       APTT: No results for input(s): \"APTT\" in the last 72

## 2025-07-30 NOTE — FLOWSHEET NOTE
07/30/25 1911   Handoff   Communication Given Shift Handoff   Handoff Given To PRINCE Reyes   Handoff Received From PRINCE Nicholson   Handoff Communication Face to Face   Time Handoff Given 1912   End of Shift Check Performed Yes     EOS report given to PRINCE Reyes. Pt in bed, call light within reach. Pt is stable, care is transferred.

## 2025-07-30 NOTE — PROGRESS NOTES
Nephrology Progress Note   [facility]      Sub/interval history  Resting  Patient reports of getting drain placed on his abdomen for acetic fluid  Biliary drain exchanged on 7/29    Last 24 h uop 750 mL charted    ROS: No chest pain/shortness of breath/fever/nausea/vomiting  PSFH: no visitor    Scheduled Meds:   potassium phosphate IVPB (CENTRAL LINE)  20 mmol IntraVENous Once    albuterol sulfate HFA  2 puff Inhalation 4x Daily RT    piperacillin-tazobactam  3,375 mg IntraVENous Q8H    dicyclomine  20 mg Oral QHS    torsemide  20 mg Oral Daily    melatonin  3 mg Oral Nightly    finasteride  5 mg Oral QPM    cyanocobalamin  1,000 mcg Oral Daily    vitamin D  50,000 Units Oral Weekly    polycarbophil  625 mg Oral Daily    lactulose  30 g Oral TID    pantoprazole  40 mg Oral BID AC    allopurinol  300 mg Oral Daily    insulin lispro  0-4 Units SubCUTAneous 4x Daily AC & HS    phytonadione  5 mg Oral Once per day on Monday Wednesday Friday    sodium chloride flush  10 mL IntraVENous BID    lipase-protease-amylase  40,000 Units Oral TID WC    lipase-protease-amylas  30,000 Units Oral TID WC     Continuous Infusions:   sodium chloride 5 mL/hr at 07/30/25 0821    dextrose       PRN Meds:.oxyCODONE, sodium chloride, potassium chloride **OR** potassium alternative oral replacement **OR** potassium chloride, magnesium sulfate, ondansetron **OR** ondansetron, polyethylene glycol, acetaminophen **OR** acetaminophen, ipratropium 0.5 mg-albuterol 2.5 mg, albuterol sulfate HFA, carboxymethylcellulose PF, glucose, dextrose bolus **OR** dextrose bolus, glucagon (rDNA), dextrose    Objective/     Vitals:    07/30/25 0350 07/30/25 0759 07/30/25 0815 07/30/25 1055   BP: (!) 116/58 (!) 104/57 117/62    Pulse: 62  71 77   Resp: 16 17 18 16   Temp: 97.9 °F (36.6 °C) 98.4 °F (36.9 °C) 97.3 °F (36.3 °C)    TempSrc: Oral Oral Axillary    SpO2: 98% 96% 96% 96%   Weight:       Height:         24HR INTAKE/OUTPUT:    Intake/Output

## 2025-07-30 NOTE — PROGRESS NOTES
Comprehensive Nutrition Assessment    Type and Reason for Visit:  Reassess    Nutrition Recommendations/Plan:   Continue Regular 3 CCC; 1200 ml FR  Continue Ensure MAX      Malnutrition Assessment:  Malnutrition Status:  Severe malnutrition (07/23/25 1329)    Context:  Chronic Illness     Findings of the 6 clinical characteristics of malnutrition:  Energy Intake:  Mild decrease in energy intake  Weight Loss:  Mild weight loss     Body Fat Loss:  Severe body fat loss Orbital, Buccal region   Muscle Mass Loss:  Severe muscle mass loss Temples (temporalis), Clavicles (pectoralis & deltoids)  Fluid Accumulation:  Mild Ascites   Strength:  Not Performed    Nutrition Assessment:    Pt improving from a nutritional standpoint AEB pt is accepting and consuming most of his melas and supplements and weight is stable; .  Remains at risk for further nutritional compromise r/t malignant ascites and the need for frequent paracentesis and altered nutrition labs .  Will continue diet and ONS.      Nutrition Related Findings:    pt was a & O x4;  accepting very well per his rpeort and drinkingthe ensure MAX; K+ Low; h/h low; Wound Type: None       Current Nutrition Intake & Therapies:    Average Meal Intake: 26-50%, 51-75%  Average Supplements Intake: %  ADULT ORAL NUTRITION SUPPLEMENT; Breakfast, Lunch, Dinner; Standard High Calorie/High Protein Oral Supplement  ADULT DIET; Regular; 3 carb choices (45 gm/meal); 1200 ml    Anthropometric Measures:  Height: 180.3 cm (5' 10.98\")  Ideal Body Weight (IBW): 172 lbs (78 kg)    Admission Body Weight: 71.2 kg (157 lb)  Current Body Weight: 71.7 kg (158 lb), 91.9 % IBW. Weight Source: Standing scale  Current BMI (kg/m2): 22  Usual Body Weight: 74.8 kg (165 lb)     % Weight Change (Calculated): -4.8                    BMI Categories: Underweight (BMI less than 22) age over 65    Estimated Daily Nutrient Needs:  Energy Requirements Based On: Kcal/kg  Weight Used for Energy  Requirements: Current  Energy (kcal/day): 3143-9975 based ~ 30-33 kcal/kg c bw  Weight Used for Protein Requirements: Current  Protein (g/day): 72-84 based 1-1.2 gr/kg cbw  Method Used for Fluid Requirements: 1 ml/kcal  Fluid (ml/day): 6698-4681    Nutrition Diagnosis:   Severe malnutrition, in context of chronic illness related to catabolic illness, inadequate protein-energy intake, endocrine dysfunction as evidenced by poor intake prior to admission, BMI, criteria as identified in malnutrition assessment, lab values, GI abnormality    Nutrition Interventions:   Food and/or Nutrient Delivery: Continue Current Diet, Continue Oral Nutrition Supplement  Nutrition Education/Counseling: No recommendation at this time  Coordination of Nutrition Care: Continue to monitor while inpatient       Goals:  Goals: PO intake 75% or greater, by next RD assessment  Type of Goal: Continue current goal  Previous Goal Met: Progressing toward Goal(s)    Nutrition Monitoring and Evaluation:   Behavioral-Environmental Outcomes: None Identified  Food/Nutrient Intake Outcomes: Food and Nutrient Intake, Supplement Intake  Physical Signs/Symptoms Outcomes: Biochemical Data, Nutrition Focused Physical Findings, Weight    Discharge Planning:    No discharge needs at this time     Maris Dick RD, LD  Contact: [phone]

## 2025-07-30 NOTE — ACP (ADVANCE CARE PLANNING)
Palliative Care Chart Review  and Check in Note:     NAME:  Sharath Ceja  Admit Date: 7/21/2025  Hospital Day:  Hospital Day: 10   Current Code status: Limited    Palliative care is continuing to following Mr. Ceja for symptom management,  and goals of care discussion as needed. Patient's chart reviewed today 7/30/25.        The following are the currently established goals/code status, and Symptom management.     Goals of care: Writer met with the pt and pt's spouse at bedside. Pt states his goal is to return home tomorrow and continue with [facility] and [facility]. Spouse is requesting for writer to contact Sonam with [facility] to discuss dc planning with him. Pt and spouse state if insurance does not cover enough services through Palliative care then they will consider signing up with hospice.     Code status: Limited    Discharge plan: to be decided. Need physician follow up in the AM to explain to pt and spouse that palliative care drain planned for tomorrow if home with hospice. Writer LVM for Sonam with [facility] to discuss dc planning.     1421 Writer spoke with Sonam with [facility]iative care [facility] and he is aware to continue having goals of care conversations with pt and family as pt will need to transition to hospice in the near future in order to have sufficient care at home. Per Sonam Palliative care is approved through the pt's insurance for out-pt follow up.      Oriana Larose RN  07/30/25  1:01 PM

## 2025-07-30 NOTE — FLOWSHEET NOTE
07/30/25 0815   Vital Signs   Temp 97.3 °F (36.3 °C)   Temp Source Axillary   Pulse 71   Heart Rate Source Monitor   Respirations 18   /62   MAP (Calculated) 80   BP Location Left upper arm   BP Method Automatic   Patient Position High fowlers     Assessment & vital signs completed. Morning meds given per MAR. Pt requesting valves to be connected to biliary drains for flushing-stopcocks placed and biliary drains flushed. Pt denies any further needs at this time. Call light within reach, pt is stable.

## 2025-07-30 NOTE — PROGRESS NOTES
Inpatient Physical Therapy Evaluation & Treatment    Unit: PCU  Date:  7/30/2025  Patient Name:    Sharath Ceja  Admitting diagnosis:  Acute hyponatremia [E87.1]  Anasarca [R60.1]  Hyponatremia [E87.1]  Primary malignant neoplasm of liver (HCC) [C22.8]  Other ascites [R18.8]  Pain of upper abdomen [R10.10]  Admit Date:  7/21/2025  Precautions/Restrictions/WB Status/ Lines/ Wounds/ Oxygen:  Fall risk, Bed/chair alarm, Lines (IV, Port right, and biliary drains), Telemetry, and Continuous pulse oximetry       Treatment Time:  1525 - 1613  Treatment Number:  1   Timed Code Treatment Minutes: 38 minutes  Total Treatment Minutes:  48  minutes    Patient Stated Goals for Therapy: \" to get better \"          Discharge Recommendations: Home with ongoing 24 hr assistance and Home PT  DME needs for discharge: Needs Met       Therapy recommendation for EMS Transport: can transport by wheelchair    Therapy recommendations for staff:   Stand by assist for ambulation with use of rolling walker (RW) and gait belt within halls    History of Present Illness:   Per admission H&P by Dr. Wilson on 7/22/25:  \"74 y.o. male who presented to [facility] with abdominal distention.  Patient reports having a paracentesis last week but continues to have abdominal distention.  Of note patient has cholangiocarcinoma continues to be on palliative chemotherapy.  Of note his CT imaging only shows mild ascites does have diffuse body anasarca.  His legs continue to remain edematous and were edematous during last hospitalization as well and even had DVT studies done which were negative.  He is reporting overall worsening fatigue even though he states that he has been consuming 2 protein drinks a day.  He is otherwise denying fever, chills, chest pain, nausea or emesis.     PMHx significant for cholangiocarcinoma, Crohn's disease, vitamin D deficiency, diabetes mellitus, gout   \"    Preadmission Environment:   Pt. Lives

## 2025-07-30 NOTE — PROGRESS NOTES
ID    Chart reviewed, case d/w RN   Low grade fever today.  WBC remains wnl   Ongoing discussion around GOC after DC and possible peritoneal drain for palliation of recurrent malignant ascites     He remains on Zosyn though no clear source of infection was identified and fever has persisted albeit of lesser intensity despite Zosyn    Will continue to follow  AMANDA BENNETT MD

## 2025-07-30 NOTE — PLAN OF CARE
Problem: Safety - Adult  Goal: Free from fall injury  7/30/2025 1040 by Lyn Carter RN  Outcome: Progressing  7/29/2025 2314 by Farzana Mariscal RN  Outcome: Progressing     Problem: Chronic Conditions and Co-morbidities  Goal: Patient's chronic conditions and co-morbidity symptoms are monitored and maintained or improved  7/30/2025 1040 by Lyn Carter RN  Outcome: Progressing  7/29/2025 2314 by Farzana Mariscal RN  Outcome: Progressing     Problem: Discharge Planning  Goal: Discharge to home or other facility with appropriate resources  7/30/2025 1040 by Lyn Carter RN  Outcome: Progressing  7/29/2025 2314 by Farzana Mariscal RN  Outcome: Progressing     Problem: Pain  Goal: Verbalizes/displays adequate comfort level or baseline comfort level  7/30/2025 1040 by Lyn Carter RN  Outcome: Progressing  7/29/2025 2314 by Farzana Mariscal RN  Outcome: Progressing  Flowsheets  Taken 7/29/2025 1653 by Dina Guevara RN  Verbalizes/displays adequate comfort level or baseline comfort level:   Encourage patient to monitor pain and request assistance   Assess pain using appropriate pain scale  Taken 7/29/2025 1404 by Dina Guevara RN  Verbalizes/displays adequate comfort level or baseline comfort level:   Encourage patient to monitor pain and request assistance   Assess pain using appropriate pain scale  Taken 7/29/2025 1141 by Dina Guevara RN  Verbalizes/displays adequate comfort level or baseline comfort level:   Encourage patient to monitor pain and request assistance   Assess pain using appropriate pain scale     Problem: Skin/Tissue Integrity  Goal: Skin integrity remains intact  Description: 1.  Monitor for areas of redness and/or skin breakdown  2.  Assess vascular access sites hourly  3.  Every 4-6 hours minimum:  Change oxygen saturation probe site  4.  Every 4-6 hours:  If on nasal continuous positive airway pressure, respiratory therapy assess nares and determine need for  RN  Outcome: Progressing  7/29/2025 2314 by Farzana Mariscal RN  Outcome: Progressing  Goal: Oral mucous membranes remain intact  7/30/2025 1040 by Lyn Carter RN  Outcome: Progressing  7/29/2025 2314 by Farzana Mariscal RN  Outcome: Progressing     Problem: Musculoskeletal - Adult  Goal: Return mobility to safest level of function  7/30/2025 1040 by Lyn Carter RN  Outcome: Progressing  7/29/2025 2314 by Farzana Mariscal RN  Outcome: Progressing  Goal: Maintain proper alignment of affected body part  7/30/2025 1040 by Lyn Carter RN  Outcome: Progressing  7/29/2025 2314 by Farzana Mariscal RN  Outcome: Progressing  Goal: Return ADL status to a safe level of function  7/30/2025 1040 by Lyn Carter RN  Outcome: Progressing  7/29/2025 2314 by Farzana Mariscal RN  Outcome: Progressing     Problem: Gastrointestinal - Adult  Goal: Minimal or absence of nausea and vomiting  7/30/2025 1040 by Lyn Carter RN  Outcome: Progressing  7/29/2025 2314 by Farzana Mariscal RN  Outcome: Progressing  Goal: Maintains or returns to baseline bowel function  7/30/2025 1040 by Lyn Carter RN  Outcome: Progressing  7/29/2025 2314 by Farzana Mariscal RN  Outcome: Progressing  Goal: Maintains adequate nutritional intake  7/30/2025 1040 by Lyn Carter RN  Outcome: Progressing  7/29/2025 2314 by Farzana Mariscal RN  Outcome: Progressing  Goal: Establish and maintain optimal ostomy function  7/30/2025 1040 by Lyn Carter RN  Outcome: Progressing  7/29/2025 2314 by Farzana Mariscal RN  Outcome: Progressing     Problem: Infection - Adult  Goal: Absence of infection at discharge  7/30/2025 1040 by Lyn Carter RN  Outcome: Progressing  7/29/2025 2314 by Farzana Mariscal RN  Outcome: Progressing  Goal: Absence of infection during hospitalization  7/30/2025 1040 by Lyn Carter RN  Outcome: Progressing  7/29/2025 2314 by Farzana Mariscal RN  Outcome: Progressing  Goal: Absence of fever/infection during

## 2025-07-31 ENCOUNTER — APPOINTMENT (OUTPATIENT)
Dept: INTERVENTIONAL RADIOLOGY/VASCULAR | Age: 75
DRG: 435 | End: 2025-07-31
Payer: MEDICARE

## 2025-07-31 LAB
ALBUMIN SERPL-MCNC: 2.7 G/DL (ref 3.4–5)
ANION GAP SERPL CALCULATED.3IONS-SCNC: 10 MMOL/L (ref 3–16)
BUN SERPL-MCNC: 9 MG/DL (ref 7–20)
CALCIUM SERPL-MCNC: 8.3 MG/DL (ref 8.3–10.6)
CHLORIDE SERPL-SCNC: 98 MMOL/L (ref 99–110)
CO2 SERPL-SCNC: 27 MMOL/L (ref 21–32)
CREAT SERPL-MCNC: 0.4 MG/DL (ref 0.8–1.3)
GFR SERPLBLD CREATININE-BSD FMLA CKD-EPI: >90 ML/MIN/{1.73_M2}
GLUCOSE BLD-MCNC: 184 MG/DL (ref 70–99)
GLUCOSE BLD-MCNC: 198 MG/DL (ref 70–99)
GLUCOSE BLD-MCNC: 238 MG/DL (ref 70–99)
GLUCOSE BLD-MCNC: 280 MG/DL (ref 70–99)
GLUCOSE SERPL-MCNC: 163 MG/DL (ref 70–99)
PERFORMED ON: ABNORMAL
PHOSPHATE SERPL-MCNC: 2.6 MG/DL (ref 2.5–4.9)
POTASSIUM SERPL-SCNC: 3.4 MMOL/L (ref 3.5–5.1)
SODIUM SERPL-SCNC: 135 MMOL/L (ref 136–145)

## 2025-07-31 PROCEDURE — 99152 MOD SED SAME PHYS/QHP 5/>YRS: CPT

## 2025-07-31 PROCEDURE — 2060000000 HC ICU INTERMEDIATE R&B

## 2025-07-31 PROCEDURE — 2500000003 HC RX 250 WO HCPCS

## 2025-07-31 PROCEDURE — 6360000002 HC RX W HCPCS: Performed by: INTERNAL MEDICINE

## 2025-07-31 PROCEDURE — 0W9G30Z DRAINAGE OF PERITONEAL CAVITY WITH DRAINAGE DEVICE, PERCUTANEOUS APPROACH: ICD-10-PCS | Performed by: INTERNAL MEDICINE

## 2025-07-31 PROCEDURE — 2580000003 HC RX 258: Performed by: INTERNAL MEDICINE

## 2025-07-31 PROCEDURE — 80069 RENAL FUNCTION PANEL: CPT

## 2025-07-31 PROCEDURE — 0WHG33Z INSERTION OF INFUSION DEVICE INTO PERITONEAL CAVITY, PERCUTANEOUS APPROACH: ICD-10-PCS | Performed by: STUDENT IN AN ORGANIZED HEALTH CARE EDUCATION/TRAINING PROGRAM

## 2025-07-31 PROCEDURE — 6370000000 HC RX 637 (ALT 250 FOR IP): Performed by: INTERNAL MEDICINE

## 2025-07-31 PROCEDURE — 32550 INSERT PLEURAL CATH: CPT

## 2025-07-31 PROCEDURE — 6370000000 HC RX 637 (ALT 250 FOR IP): Performed by: STUDENT IN AN ORGANIZED HEALTH CARE EDUCATION/TRAINING PROGRAM

## 2025-07-31 PROCEDURE — 94640 AIRWAY INHALATION TREATMENT: CPT

## 2025-07-31 PROCEDURE — 94761 N-INVAS EAR/PLS OXIMETRY MLT: CPT

## 2025-07-31 PROCEDURE — 6370000000 HC RX 637 (ALT 250 FOR IP)

## 2025-07-31 PROCEDURE — C1769 GUIDE WIRE: HCPCS

## 2025-07-31 PROCEDURE — 75989 ABSCESS DRAINAGE UNDER X-RAY: CPT

## 2025-07-31 PROCEDURE — 6360000002 HC RX W HCPCS: Performed by: STUDENT IN AN ORGANIZED HEALTH CARE EDUCATION/TRAINING PROGRAM

## 2025-07-31 PROCEDURE — C1729 CATH, DRAINAGE: HCPCS | Performed by: STUDENT IN AN ORGANIZED HEALTH CARE EDUCATION/TRAINING PROGRAM

## 2025-07-31 PROCEDURE — 99153 MOD SED SAME PHYS/QHP EA: CPT

## 2025-07-31 RX ORDER — FENTANYL CITRATE 50 UG/ML
INJECTION, SOLUTION INTRAMUSCULAR; INTRAVENOUS PRN
Status: COMPLETED | OUTPATIENT
Start: 2025-07-31 | End: 2025-07-31

## 2025-07-31 RX ORDER — MIDAZOLAM HYDROCHLORIDE 1 MG/ML
INJECTION, SOLUTION INTRAMUSCULAR; INTRAVENOUS PRN
Status: COMPLETED | OUTPATIENT
Start: 2025-07-31 | End: 2025-07-31

## 2025-07-31 RX ORDER — OXYCODONE HYDROCHLORIDE 5 MG/1
5 TABLET ORAL EVERY 6 HOURS PRN
Qty: 28 TABLET | Refills: 0 | Status: SHIPPED | OUTPATIENT
Start: 2025-07-31 | End: 2025-08-07

## 2025-07-31 RX ORDER — POTASSIUM CHLORIDE 1500 MG/1
40 TABLET, EXTENDED RELEASE ORAL ONCE
Status: COMPLETED | OUTPATIENT
Start: 2025-07-31 | End: 2025-07-31

## 2025-07-31 RX ADMIN — PANCRELIPASE LIPASE, PANCRELIPASE PROTEASE, PANCRELIPASE AMYLASE 40000 UNITS: 20000; 63000; 84000 CAPSULE, DELAYED RELEASE ORAL at 17:07

## 2025-07-31 RX ADMIN — FENTANYL CITRATE 25 MCG: 50 INJECTION INTRAMUSCULAR; INTRAVENOUS at 12:16

## 2025-07-31 RX ADMIN — FENTANYL CITRATE 50 MCG: 50 INJECTION INTRAMUSCULAR; INTRAVENOUS at 12:41

## 2025-07-31 RX ADMIN — ACETAMINOPHEN 650 MG: 325 TABLET ORAL at 19:47

## 2025-07-31 RX ADMIN — POTASSIUM CHLORIDE 40 MEQ: 1500 TABLET, EXTENDED RELEASE ORAL at 13:34

## 2025-07-31 RX ADMIN — INSULIN LISPRO 2 UNITS: 100 INJECTION, SOLUTION INTRAVENOUS; SUBCUTANEOUS at 21:11

## 2025-07-31 RX ADMIN — SODIUM CHLORIDE, PRESERVATIVE FREE 10 ML: 5 INJECTION INTRAVENOUS at 21:12

## 2025-07-31 RX ADMIN — PIPERACILLIN AND TAZOBACTAM 3375 MG: 3; .375 INJECTION, POWDER, LYOPHILIZED, FOR SOLUTION INTRAVENOUS at 10:19

## 2025-07-31 RX ADMIN — LACTULOSE 30 G: 10 SOLUTION ORAL at 21:12

## 2025-07-31 RX ADMIN — PANTOPRAZOLE SODIUM 40 MG: 40 TABLET, DELAYED RELEASE ORAL at 05:19

## 2025-07-31 RX ADMIN — LACTULOSE 30 G: 10 SOLUTION ORAL at 13:40

## 2025-07-31 RX ADMIN — PANCRELIPASE LIPASE, PANCRELIPASE PROTEASE, PANCRELIPASE AMYLASE 40000 UNITS: 20000; 63000; 84000 CAPSULE, DELAYED RELEASE ORAL at 13:25

## 2025-07-31 RX ADMIN — ALBUTEROL SULFATE 2 PUFF: 90 AEROSOL, METERED RESPIRATORY (INHALATION) at 08:44

## 2025-07-31 RX ADMIN — ALBUTEROL SULFATE 2 PUFF: 90 AEROSOL, METERED RESPIRATORY (INHALATION) at 19:47

## 2025-07-31 RX ADMIN — MIDAZOLAM 0.5 MG: 1 INJECTION INTRAMUSCULAR; INTRAVENOUS at 12:19

## 2025-07-31 RX ADMIN — MIDAZOLAM 1 MG: 1 INJECTION INTRAMUSCULAR; INTRAVENOUS at 12:16

## 2025-07-31 RX ADMIN — ALLOPURINOL 300 MG: 300 TABLET ORAL at 13:28

## 2025-07-31 RX ADMIN — PANCRELIPASE LIPASE, PANCRELIPASE PROTEASE, PANCRELIPASE AMYLASE 30000 UNITS: 15000; 47000; 63000 CAPSULE, DELAYED RELEASE ORAL at 17:07

## 2025-07-31 RX ADMIN — PANTOPRAZOLE SODIUM 40 MG: 40 TABLET, DELAYED RELEASE ORAL at 16:05

## 2025-07-31 RX ADMIN — Medication 1000 MCG: at 13:29

## 2025-07-31 RX ADMIN — FENTANYL CITRATE 25 MCG: 50 INJECTION INTRAMUSCULAR; INTRAVENOUS at 12:19

## 2025-07-31 RX ADMIN — PIPERACILLIN AND TAZOBACTAM 3375 MG: 3; .375 INJECTION, POWDER, LYOPHILIZED, FOR SOLUTION INTRAVENOUS at 16:09

## 2025-07-31 RX ADMIN — PIPERACILLIN AND TAZOBACTAM 3375 MG: 3; .375 INJECTION, POWDER, LYOPHILIZED, FOR SOLUTION INTRAVENOUS at 00:25

## 2025-07-31 RX ADMIN — FENTANYL CITRATE 50 MCG: 50 INJECTION INTRAMUSCULAR; INTRAVENOUS at 12:24

## 2025-07-31 RX ADMIN — MIDAZOLAM 0.5 MG: 1 INJECTION INTRAMUSCULAR; INTRAVENOUS at 12:24

## 2025-07-31 RX ADMIN — SODIUM CHLORIDE, PRESERVATIVE FREE 10 ML: 5 INJECTION INTRAVENOUS at 10:21

## 2025-07-31 RX ADMIN — ALBUTEROL SULFATE 2 PUFF: 90 AEROSOL, METERED RESPIRATORY (INHALATION) at 11:37

## 2025-07-31 RX ADMIN — TORSEMIDE 20 MG: 20 TABLET ORAL at 13:28

## 2025-07-31 RX ADMIN — DICYCLOMINE HYDROCHLORIDE 20 MG: 20 TABLET ORAL at 21:12

## 2025-07-31 RX ADMIN — OXYCODONE 5 MG: 5 TABLET ORAL at 22:14

## 2025-07-31 RX ADMIN — FINASTERIDE 5 MG: 5 TABLET, FILM COATED ORAL at 18:05

## 2025-07-31 RX ADMIN — PANCRELIPASE LIPASE, PANCRELIPASE PROTEASE, PANCRELIPASE AMYLASE 30000 UNITS: 15000; 47000; 63000 CAPSULE, DELAYED RELEASE ORAL at 13:26

## 2025-07-31 RX ADMIN — CALCIUM POLYCARBOPHIL 625 MG: 625 TABLET, FILM COATED ORAL at 13:27

## 2025-07-31 RX ADMIN — Medication 3 MG: at 21:11

## 2025-07-31 RX ADMIN — ALBUTEROL SULFATE 2 PUFF: 90 AEROSOL, METERED RESPIRATORY (INHALATION) at 15:09

## 2025-07-31 RX ADMIN — OXYCODONE 5 MG: 5 TABLET ORAL at 16:04

## 2025-07-31 RX ADMIN — INSULIN LISPRO 1 UNITS: 100 INJECTION, SOLUTION INTRAVENOUS; SUBCUTANEOUS at 17:08

## 2025-07-31 ASSESSMENT — PAIN SCALES - GENERAL
PAINLEVEL_OUTOF10: 3
PAINLEVEL_OUTOF10: 4
PAINLEVEL_OUTOF10: 4

## 2025-07-31 ASSESSMENT — PAIN DESCRIPTION - LOCATION
LOCATION: ABDOMEN
LOCATION: ABDOMEN

## 2025-07-31 ASSESSMENT — PAIN DESCRIPTION - DESCRIPTORS
DESCRIPTORS: ACHING;DISCOMFORT
DESCRIPTORS: ACHING;DISCOMFORT

## 2025-07-31 ASSESSMENT — PAIN DESCRIPTION - ORIENTATION
ORIENTATION: RIGHT
ORIENTATION: RIGHT

## 2025-07-31 ASSESSMENT — PAIN DESCRIPTION - PAIN TYPE: TYPE: ACUTE PAIN

## 2025-07-31 ASSESSMENT — PAIN DESCRIPTION - FREQUENCY: FREQUENCY: CONTINUOUS

## 2025-07-31 ASSESSMENT — PAIN DESCRIPTION - ONSET: ONSET: ON-GOING

## 2025-07-31 NOTE — PRE SEDATION
Sedation Pre-Procedure Note    Patient Name: Sharath Ceja  YOB: 1950  Medical Record Number: [id number]  Date:  7/31/25  Time: 12:03 PM    Indication:  Recurrent ascites, tunneled peritoneal catheter placement requested for palliative care.     Consent: I have discussed with the patient and/or the patient representative the indication, alternatives, and the possible risks and/or complications of the planned procedure and the anesthesia methods. The patient and/or patient representative appear to understand and agree to proceed.    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    8 + LYPMH NODES, WHIPPLE    Asthma     Blood circulation, collateral     Carpal tunnel syndrome     CLL (chronic lymphocytic leukemia) (HCC)     DENIES    Crohn's colitis 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    Hypertension     Liver abscess     DRAINED    Liver cancer (HCC)     2025    Lung nodule     Pneumonia     pneumonia,asthma    prostate     infection    Seasonal allergies     Wears glasses        Past Surgical History:  Past Surgical History:   Procedure Laterality Date    CARDIAC CATHETERIZATION      1980S, DR ROWELL, NO ISSUES SINCE, HAS APT 8/14/23    CHOLECYSTECTOMY      CT LIVER ABSCESS ASPIRATION      PT STATES DRAINED LIVER ABSCESS    CT NEEDLE BIOPSY LIVER PERCUTANEOUS  01/23/2025    CT NEEDLE BIOPSY LIVER PERCUTANEOUS 1/23/2025 [facility] CT SCAN    CYST REMOVAL      KNEE & CLOSE TO RECTUM    ERCP N/A 01/26/2022    [facility]    ERCP  01/26/2022    ERCP SPHINCTER/PAPILLOTOMY performed by Tab

## 2025-07-31 NOTE — PROGRESS NOTES
Progress Note    Date: 07/31/25    Subjective  MR Richardson seen up in bed , s.p paracentesis with 1.3 L drained 7/28 , no fevers  status post  biliary drains exchange yesterday 7/29 with no issues    Doing ok. Peritoneal drain placed  Hopeful to go home soon with palliative care but not hospice at this point     Objective:  Vitals:    07/31/25 1315 07/31/25 1511 07/31/25 1604 07/31/25 1634   BP: 107/77 120/65     Pulse: 67 70     Resp: 17 18 18 16   Temp: 97.9 °F (36.6 °C) 98.4 °F (36.9 °C)     TempSrc: Oral Oral     SpO2: 94% 93%     Weight:       Height:           Physical Exam:  Elderly male up in bed , chronically ill appearing  Awake, alert and oriented. Appears to be not in any distress  Mucous Membranes:  Pink , anicteric  Neck: No JVD, no carotid bruit, no thyromegaly  Chest:  Clear to auscultation bilaterally, diminished in bases  Cardiovascular:  RRR S1S2 heard, no murmurs or gallops  Abdomen: +distended, right upper and left lateral hepatic drains in place  draining bile, non tender, no organomegaly, BS present  Extremities: significantly improving 1+ chuy LE edema improving . Distal pulses well felt  Neurological : grossly normal with gen weakness    Labs:  CBC:   Recent Labs     07/28/25  2112 07/29/25  0520 07/30/25 2128   WBC 4.5 4.5 5.5   HGB 7.0* 7.2* 7.0*   HCT 21.4* 22.0* 21.5*   MCV 85.8 85.3 84.7    201 174     BMP:   Recent Labs     07/29/25  0520 07/30/25  0600 07/31/25  0521   * 136 135*   K 3.5 3.3* 3.4*   CL 96* 99 98*   CO2 28 27 27   PHOS 2.4* 2.4* 2.6   BUN 9 9 9   CREATININE 0.4* 0.4* 0.4*     LIVER PROFILE:   No results for input(s): \"AST\", \"ALT\", \"LIPASE\", \"AMYLASE\", \"BILIDIR\", \"BILITOT\", \"ALKPHOS\" in the last 72 hours.    Invalid input(s): \"ALB\"      PT/INR:   Recent Labs     07/29/25  0520   PROTIME 16.3*   INR 1.29*       APTT: No results for input(s): \"APTT\" in the last 72 hours.  UA:  No results for input(s): \"NITRITE\", \"COLORU\", \"PHUR\", \"LABCAST\", \"WBCUA\",

## 2025-07-31 NOTE — OR NURSING
Image guided PleurX catheter insertion left completed. Dr. Patterson placed 15.5 [place]ish  PleurX catheter LOT # [id number] EXP 44/30/2026 in the left. . Drain/tube dressing clean, dry, and intact. Pt tolerated procedure without any signs or symptoms of distress. Vital signs stable. Report called to Lyn MORRISON. Pt transported back to U 0315 in stable condition via bed by transport.     Medications  Versed: 2 mg  Fentanyl: 100 mcg    Vital Signs  Vitals:    07/31/25 1224   BP: 128/69   Pulse: 72   Resp: 16   Temp:    SpO2: 99%    (vital signs in table format)    Post Elenita  2 - Able to move 4 extremities voluntarily on command  2 - BP+/- 20mmHg of normal  2 - Fully awake  2 - Able to maintain oxygen saturation >92% on room air  2 - Able to breathe deeply and cough freely

## 2025-07-31 NOTE — BRIEF OP NOTE
Interventional Radiology  Brief Postoperative Note    Patient: Sharath Ceja  YOB: 1950  MRN: [id number]      Pre-operative Diagnosis: Recurrent ascites    Post-operative Diagnosis: Same    Procedure: Tunneled peritoneal catheter placement    Anesthesia: Local and Moderate Sedation    Surgeons/Assistants: Jesus Manuel Patterson DO    Estimated Blood Loss: less than 50     Complications: None    Infection Present At Time Of Surgery (PATOS) (choose all levels that have infection present):  No infection present    Specimens: Was Not Obtained    Findings:   Small volume ascites.  Successful tunneled peritoneal catheter placement via the left abdominal wall.  Ascites was drained after placement.       Jesus Manuel Patterson DO  7/31/2025, 12:51 PM  Interventional Radiology  [phone])

## 2025-07-31 NOTE — PLAN OF CARE
Problem: Safety - Adult  Goal: Free from fall injury  7/30/2025 2125 by Amy García RN  Outcome: Progressing  7/30/2025 1040 by Lyn Carter RN  Outcome: Progressing     Problem: Chronic Conditions and Co-morbidities  Goal: Patient's chronic conditions and co-morbidity symptoms are monitored and maintained or improved  7/30/2025 2125 by Amy García RN  Outcome: Progressing  7/30/2025 1040 by Lyn Carter RN  Outcome: Progressing     Problem: Discharge Planning  Goal: Discharge to home or other facility with appropriate resources  7/30/2025 2125 by Amy García RN  Outcome: Progressing  7/30/2025 1040 by Lyn Carter RN  Outcome: Progressing     Problem: Pain  Goal: Verbalizes/displays adequate comfort level or baseline comfort level  7/30/2025 2125 by Amy Garíca RN  Outcome: Progressing  7/30/2025 1040 by Lyn Carter RN  Outcome: Progressing     Problem: Skin/Tissue Integrity  Goal: Skin integrity remains intact  Description: 1.  Monitor for areas of redness and/or skin breakdown  2.  Assess vascular access sites hourly  3.  Every 4-6 hours minimum:  Change oxygen saturation probe site  4.  Every 4-6 hours:  If on nasal continuous positive airway pressure, respiratory therapy assess nares and determine need for appliance change or resting period  7/30/2025 2125 by Amy García RN  Outcome: Progressing  7/30/2025 1040 by Lyn Carter RN  Outcome: Progressing     Problem: Neurosensory - Adult  Goal: Achieves stable or improved neurological status  7/30/2025 2125 by Amy García RN  Outcome: Progressing  7/30/2025 1040 by Lyn Carter RN  Outcome: Progressing  Goal: Absence of seizures  7/30/2025 2125 by Amy García RN  Outcome: Progressing  7/30/2025 1040 by Lyn Carter RN  Outcome: Progressing  Goal: Achieves maximal functionality and self care  7/30/2025 2125 by Amy García RN  Outcome: Progressing  7/30/2025 1040 by Lyn Carter RN  Outcome:  function  7/30/2025 2125 by Amy García RN  Outcome: Progressing  7/30/2025 1040 by Lyn Carter RN  Outcome: Progressing     Problem: Gastrointestinal - Adult  Goal: Minimal or absence of nausea and vomiting  7/30/2025 2125 by Amy García RN  Outcome: Progressing  7/30/2025 1040 by Lyn Carter RN  Outcome: Progressing  Goal: Maintains or returns to baseline bowel function  7/30/2025 2125 by Amy García RN  Outcome: Progressing  7/30/2025 1040 by Lyn Carter RN  Outcome: Progressing  Goal: Maintains adequate nutritional intake  7/30/2025 2125 by Amy García RN  Outcome: Progressing  7/30/2025 1040 by Lyn Carter RN  Outcome: Progressing  Goal: Establish and maintain optimal ostomy function  7/30/2025 2125 by Amy García RN  Outcome: Progressing  7/30/2025 1040 by Lyn Carter RN  Outcome: Progressing     Problem: Infection - Adult  Goal: Absence of infection at discharge  7/30/2025 2125 by Amy García RN  Outcome: Progressing  7/30/2025 1040 by Lyn Carter RN  Outcome: Progressing  Goal: Absence of infection during hospitalization  7/30/2025 2125 by Amy García RN  Outcome: Progressing  7/30/2025 1040 by Lyn Carter RN  Outcome: Progressing  Goal: Absence of fever/infection during anticipated neutropenic period  7/30/2025 2125 by Amy García RN  Outcome: Progressing  7/30/2025 1040 by Lyn Carter RN  Outcome: Progressing     Problem: Hematologic - Adult  Goal: Maintains hematologic stability  7/30/2025 2125 by Amy García RN  Outcome: Progressing  7/30/2025 1040 by Lyn Carter RN  Outcome: Progressing     Problem: Nutrition Deficit:  Goal: Optimize nutritional status  7/30/2025 2125 by Amy García RN  Outcome: Progressing  7/30/2025 1840 by Maris iDck RD, LD  Outcome: Progressing  Flowsheets (Taken 7/30/2025 1832)  Nutrient intake appropriate for improving, restoring, or maintaining nutritional needs:   Assess nutritional

## 2025-07-31 NOTE — PLAN OF CARE
Problem: Safety - Adult  Goal: Free from fall injury  7/31/2025 1115 by Lyn Carter RN  Outcome: Progressing  7/30/2025 2125 by Amy García RN  Outcome: Progressing     Problem: Chronic Conditions and Co-morbidities  Goal: Patient's chronic conditions and co-morbidity symptoms are monitored and maintained or improved  7/31/2025 1115 by Lyn Caretr RN  Outcome: Progressing  7/30/2025 2125 by Amy García RN  Outcome: Progressing     Problem: Discharge Planning  Goal: Discharge to home or other facility with appropriate resources  7/31/2025 1115 by Lyn Carter RN  Outcome: Progressing  7/30/2025 2125 by Amy García RN  Outcome: Progressing     Problem: Pain  Goal: Verbalizes/displays adequate comfort level or baseline comfort level  7/31/2025 1115 by Lyn Carter RN  Outcome: Progressing  7/30/2025 2125 by Amy García RN  Outcome: Progressing     Problem: Skin/Tissue Integrity  Goal: Skin integrity remains intact  Description: 1.  Monitor for areas of redness and/or skin breakdown  2.  Assess vascular access sites hourly  3.  Every 4-6 hours minimum:  Change oxygen saturation probe site  4.  Every 4-6 hours:  If on nasal continuous positive airway pressure, respiratory therapy assess nares and determine need for appliance change or resting period  7/31/2025 1115 by Lyn Carter RN  Outcome: Progressing  7/30/2025 2125 by Amy García RN  Outcome: Progressing     Problem: Neurosensory - Adult  Goal: Achieves stable or improved neurological status  7/31/2025 1115 by Lyn Carter RN  Outcome: Progressing  7/30/2025 2125 by Amy García RN  Outcome: Progressing  Goal: Absence of seizures  7/31/2025 1115 by Lyn Catrer RN  Outcome: Progressing  7/30/2025 2125 by Amy García RN  Outcome: Progressing  Goal: Achieves maximal functionality and self care  7/31/2025 1115 by Lyn Carter RN  Outcome: Progressing  7/30/2025 2125 by Amy García RN  Outcome:

## 2025-07-31 NOTE — OR NURSING
Pt arrived for image guided PleurX catheter insertion left . Procedure explained including the risk and benefits of the procedure. All questions answered. Pt verbalizes understanding of the procedure and states no more questions. Consent confirmed. Vital signs stable. Labs, allergies, medications, and code status reviewed. No contraindications noted.     Vital Signs  Vitals:    07/31/25 1137   BP:    Pulse:    Resp:    Temp:    SpO2: 96%    (vital signs in table format)    Pre Elenita Score  2 - Able to move 4 extremities voluntarily on command  2 - BP+/- 20mmHg of normal  2 - Fully awake  2 - Able to maintain oxygen saturation >92% on room air  2 - Able to breathe deeply and cough freely    Allergies  Codeine (allergies)    Labs  Lab Results   Component Value Date    INR 1.29 (H) 07/29/2025    PROTIME 16.3 (H) 07/29/2025     Lab Results   Component Value Date    CREATININE 0.4 (L) 07/31/2025    BUN 9 07/31/2025     (L) 07/31/2025    K 3.4 (L) 07/31/2025    CL 98 (L) 07/31/2025    CO2 27 07/31/2025     Lab Results   Component Value Date    WBC 5.5 07/30/2025    HGB 7.0 (L) 07/30/2025    HCT 21.5 (L) 07/30/2025    MCV 84.7 07/30/2025     07/30/2025

## 2025-07-31 NOTE — FLOWSHEET NOTE
07/31/25 1015   Vital Signs   Temp 97.9 °F (36.6 °C)   Temp Source Oral   Pulse 72   Heart Rate Source Monitor   Respirations 16   /67   MAP (Calculated) 86   BP Location Left upper arm   BP Method Automatic   Patient Position Semi fowlers     Assessment & vital signs completed. Morning meds held due to pt NPO status, Zosyn started. Consent signed and placed in chart. Pt denies any further needs at this time. Call light within reach, pt is stable.

## 2025-07-31 NOTE — DISCHARGE INSTR - COC
{ GOLD Impairments/Disabilities:286006361}    Nutrition Therapy:  Current Nutrition Therapy:   { GOLD Diet List:479308023}    Routes of Feeding: {[facility] DME Other Feedings:820823902}  Liquids: {Slp liquid thickness:18666}  Daily Fluid Restriction: {CHP DME Yes amt example:656349980}  Last Modified Barium Swallow with Video (Video Swallowing Test): {Done Not Done Date:}    Treatments at the Time of Hospital Discharge:   Respiratory Treatments: ***  Oxygen Therapy:  {Therapy; copd oxygen:60596}  Ventilator:    { CC Vent List:552329181}    Rehab Therapies: {THERAPEUTIC INTERVENTION:5404906424}  Weight Bearing Status/Restrictions: {[facility] Weight Bearin}  Other Medical Equipment (for information only, NOT a DME order):  {EQUIPMENT:660081589}  Other Treatments: ***    Patient's personal belongings (please select all that are sent with patient):  {[facility] DME Belongings:006097159}    RN SIGNATURE:  {Esignature:931075338}    CASE MANAGEMENT/SOCIAL WORK SECTION    Inpatient Status Date:     Readmission Risk Assessment Score:  [facility] RISK OF UNPLANNED READMISSION 2.0             33.7 Total Score        Discharging to Facility/ Agency   Name: [facility]  Address:  Phone: [phone]  Fax:        / signature: Electronically signed by Ethel Vickers RN on 25 at 1:18 PM EDT    PHYSICIAN SECTION    Prognosis: {Prognosis:9795885530}    Condition at Discharge: { Patient Condition:664964018}    Rehab Potential (if transferring to Rehab): {Prognosis:9824454738}    Recommended Labs or Other Treatments After Discharge: ***    Physician Certification: I certify the above information and transfer of Sharath Ceja  is necessary for the continuing treatment of the diagnosis listed and that he requires {Admit to Appropriate Level of Care:23640} for {GREATER/LESS:097392648} 30 days.     Update Admission H&P: {P DME Changes in HandP:764903498}    PHYSICIAN SIGNATURE:

## 2025-07-31 NOTE — PROGRESS NOTES
Nephrology Progress Note   [facility]      Sub/interval history  Resting  Patient reports of getting drain placed on his abdomen for ascitic fluid  Biliary drain exchanged on 7/29    Last 24 h uop 450 mL charted    ROS: No chest pain/shortness of breath/fever/nausea/vomiting  PSFH: no visitor    Scheduled Meds:   potassium chloride  40 mEq Oral Once    albuterol sulfate HFA  2 puff Inhalation 4x Daily RT    piperacillin-tazobactam  3,375 mg IntraVENous Q8H    dicyclomine  20 mg Oral QHS    torsemide  20 mg Oral Daily    melatonin  3 mg Oral Nightly    finasteride  5 mg Oral QPM    cyanocobalamin  1,000 mcg Oral Daily    vitamin D  50,000 Units Oral Weekly    polycarbophil  625 mg Oral Daily    lactulose  30 g Oral TID    pantoprazole  40 mg Oral BID AC    allopurinol  300 mg Oral Daily    insulin lispro  0-4 Units SubCUTAneous 4x Daily AC & HS    phytonadione  5 mg Oral Once per day on Monday Wednesday Friday    sodium chloride flush  10 mL IntraVENous BID    lipase-protease-amylase  40,000 Units Oral TID WC    lipase-protease-amylas  30,000 Units Oral TID      Continuous Infusions:   sodium chloride 5 mL/hr at 07/30/25 1625    dextrose       PRN Meds:.oxyCODONE, sodium chloride, potassium chloride **OR** potassium alternative oral replacement **OR** potassium chloride, magnesium sulfate, ondansetron **OR** ondansetron, polyethylene glycol, acetaminophen **OR** acetaminophen, ipratropium 0.5 mg-albuterol 2.5 mg, albuterol sulfate HFA, carboxymethylcellulose PF, glucose, dextrose bolus **OR** dextrose bolus, glucagon (rDNA), dextrose    Objective/     Vitals:    07/31/25 0418 07/31/25 0844 07/31/25 1015 07/31/25 1137   BP: 134/65  123/67    Pulse: 66 68 72    Resp: 18 18 16    Temp: 98.8 °F (37.1 °C)  97.9 °F (36.6 °C)    TempSrc: Oral  Oral    SpO2: 94% 95% 98% 96%   Weight:       Height:         24HR INTAKE/OUTPUT:    Intake/Output Summary (Last 24 hours) at 7/31/2025 1145  Last data filed at 7/31/2025

## 2025-07-31 NOTE — FLOWSHEET NOTE
07/31/25 1928   Handoff   Communication Given Shift Handoff   Handoff Given To PRINCE Amador   Handoff Received From PRINCE Nicholson   Handoff Communication Face to Face   Time Handoff Given 1928   End of Shift Check Performed Yes     EOS report given to PRINCE Amador. Pt in bed, call light within reach. Pt is stable, care is transferred.

## 2025-07-31 NOTE — FLOWSHEET NOTE
07/30/25 1923   Vital Signs   Temp 98.8 °F (37.1 °C)   Temp Source Oral   Pulse 99   Heart Rate Source Monitor   Respirations 16   BP (!) 101/49   MAP (Calculated) 66   BP Location Left lower arm   BP Method Automatic   Pain Assessment   Pain Assessment None - Denies Pain   Oxygen Therapy   SpO2 98 %   O2 Device None (Room air)   Rhythm Interpretation   Cardiac Rhythm Sinus rose

## 2025-08-01 VITALS
OXYGEN SATURATION: 97 % | BODY MASS INDEX: 22.06 KG/M2 | TEMPERATURE: 97.7 F | WEIGHT: 157.6 LBS | SYSTOLIC BLOOD PRESSURE: 109 MMHG | RESPIRATION RATE: 18 BRPM | HEART RATE: 67 BPM | HEIGHT: 71 IN | DIASTOLIC BLOOD PRESSURE: 63 MMHG

## 2025-08-01 LAB
BASOPHILS # BLD: 0 K/UL (ref 0–0.2)
BASOPHILS NFR BLD: 0.3 %
DEPRECATED RDW RBC AUTO: 19.7 % (ref 12.4–15.4)
EOSINOPHIL # BLD: 0 K/UL (ref 0–0.6)
EOSINOPHIL NFR BLD: 0.6 %
GLUCOSE BLD-MCNC: 171 MG/DL (ref 70–99)
GLUCOSE BLD-MCNC: 207 MG/DL (ref 70–99)
HCT VFR BLD AUTO: 22.3 % (ref 40.5–52.5)
HGB BLD-MCNC: 7.3 G/DL (ref 13.5–17.5)
LYMPHOCYTES # BLD: 0.4 K/UL (ref 1–5.1)
LYMPHOCYTES NFR BLD: 7.8 %
MCH RBC QN AUTO: 27.7 PG (ref 26–34)
MCHC RBC AUTO-ENTMCNC: 32.7 G/DL (ref 31–36)
MCV RBC AUTO: 84.7 FL (ref 80–100)
MONOCYTES # BLD: 0.3 K/UL (ref 0–1.3)
MONOCYTES NFR BLD: 5.8 %
NEUTROPHILS # BLD: 4.7 K/UL (ref 1.7–7.7)
NEUTROPHILS NFR BLD: 85.5 %
PERFORMED ON: ABNORMAL
PERFORMED ON: ABNORMAL
PLATELET # BLD AUTO: 180 K/UL (ref 135–450)
PMV BLD AUTO: 8 FL (ref 5–10.5)
RBC # BLD AUTO: 2.64 M/UL (ref 4.2–5.9)
WBC # BLD AUTO: 5.5 K/UL (ref 4–11)

## 2025-08-01 PROCEDURE — 6370000000 HC RX 637 (ALT 250 FOR IP): Performed by: INTERNAL MEDICINE

## 2025-08-01 PROCEDURE — 6370000000 HC RX 637 (ALT 250 FOR IP)

## 2025-08-01 PROCEDURE — 2580000003 HC RX 258: Performed by: INTERNAL MEDICINE

## 2025-08-01 PROCEDURE — 85025 COMPLETE CBC W/AUTO DIFF WBC: CPT

## 2025-08-01 PROCEDURE — 6370000000 HC RX 637 (ALT 250 FOR IP): Performed by: STUDENT IN AN ORGANIZED HEALTH CARE EDUCATION/TRAINING PROGRAM

## 2025-08-01 PROCEDURE — 94640 AIRWAY INHALATION TREATMENT: CPT

## 2025-08-01 PROCEDURE — 6360000002 HC RX W HCPCS: Performed by: INTERNAL MEDICINE

## 2025-08-01 PROCEDURE — 6360000002 HC RX W HCPCS: Performed by: STUDENT IN AN ORGANIZED HEALTH CARE EDUCATION/TRAINING PROGRAM

## 2025-08-01 PROCEDURE — 99239 HOSP IP/OBS DSCHRG MGMT >30: CPT | Performed by: INTERNAL MEDICINE

## 2025-08-01 PROCEDURE — 94761 N-INVAS EAR/PLS OXIMETRY MLT: CPT

## 2025-08-01 RX ORDER — POTASSIUM CHLORIDE 1500 MG/1
20 TABLET, EXTENDED RELEASE ORAL 2 TIMES DAILY
Qty: 60 TABLET | Refills: 1 | Status: SHIPPED | OUTPATIENT
Start: 2025-08-01

## 2025-08-01 RX ORDER — TORSEMIDE 20 MG/1
20 TABLET ORAL DAILY
Qty: 30 TABLET | Refills: 1 | Status: SHIPPED | OUTPATIENT
Start: 2025-08-02

## 2025-08-01 RX ORDER — FERROUS SULFATE 325(65) MG
325 TABLET ORAL EVERY OTHER DAY
Qty: 60 TABLET | Refills: 1 | Status: SHIPPED
Start: 2025-08-01

## 2025-08-01 RX ADMIN — ALBUTEROL SULFATE 2 PUFF: 90 AEROSOL, METERED RESPIRATORY (INHALATION) at 07:29

## 2025-08-01 RX ADMIN — Medication 1000 MCG: at 08:29

## 2025-08-01 RX ADMIN — LACTULOSE 30 G: 10 SOLUTION ORAL at 08:30

## 2025-08-01 RX ADMIN — ALLOPURINOL 300 MG: 300 TABLET ORAL at 08:30

## 2025-08-01 RX ADMIN — OXYCODONE 5 MG: 5 TABLET ORAL at 11:46

## 2025-08-01 RX ADMIN — ERGOCALCIFEROL 50000 UNITS: 1.25 CAPSULE ORAL at 08:29

## 2025-08-01 RX ADMIN — TORSEMIDE 20 MG: 20 TABLET ORAL at 08:30

## 2025-08-01 RX ADMIN — PIPERACILLIN AND TAZOBACTAM 3375 MG: 3; .375 INJECTION, POWDER, LYOPHILIZED, FOR SOLUTION INTRAVENOUS at 08:37

## 2025-08-01 RX ADMIN — ALBUTEROL SULFATE 2 PUFF: 90 AEROSOL, METERED RESPIRATORY (INHALATION) at 11:03

## 2025-08-01 RX ADMIN — PIPERACILLIN AND TAZOBACTAM 3375 MG: 3; .375 INJECTION, POWDER, LYOPHILIZED, FOR SOLUTION INTRAVENOUS at 00:13

## 2025-08-01 RX ADMIN — ONDANSETRON 4 MG: 2 INJECTION, SOLUTION INTRAMUSCULAR; INTRAVENOUS at 11:31

## 2025-08-01 RX ADMIN — PANCRELIPASE LIPASE, PANCRELIPASE PROTEASE, PANCRELIPASE AMYLASE 30000 UNITS: 15000; 47000; 63000 CAPSULE, DELAYED RELEASE ORAL at 08:29

## 2025-08-01 RX ADMIN — PANTOPRAZOLE SODIUM 40 MG: 40 TABLET, DELAYED RELEASE ORAL at 05:41

## 2025-08-01 RX ADMIN — CALCIUM POLYCARBOPHIL 625 MG: 625 TABLET, FILM COATED ORAL at 08:29

## 2025-08-01 RX ADMIN — PANCRELIPASE LIPASE, PANCRELIPASE PROTEASE, PANCRELIPASE AMYLASE 40000 UNITS: 20000; 63000; 84000 CAPSULE, DELAYED RELEASE ORAL at 08:30

## 2025-08-01 ASSESSMENT — PAIN DESCRIPTION - ORIENTATION: ORIENTATION: RIGHT;LEFT

## 2025-08-01 ASSESSMENT — PAIN SCALES - GENERAL: PAINLEVEL_OUTOF10: 7

## 2025-08-01 ASSESSMENT — PAIN DESCRIPTION - DESCRIPTORS: DESCRIPTORS: ACHING;DISCOMFORT

## 2025-08-01 ASSESSMENT — PAIN DESCRIPTION - LOCATION: LOCATION: ABDOMEN

## 2025-08-01 NOTE — CARE COORDINATION
Pt to DC home with family. Pt has pleurx drain place. Pt will DC [facility] with [facility] (aware of pt's DC). Family to provide transport home. No additional DC or DME needs at this time.     IMM 7/31    O2 97% RA

## 2025-08-01 NOTE — PROGRESS NOTES
PM assessment completed. Alert and oriented x4. No signs or symptoms of distress noted. Patient tolerated PM medications well. Insulin 2 units given as per sliding scales. Respirations easy and even. Bed in lowest position, bed alarm in place and functioning properly, bed rails x2 up,  Call light within reach. With bilateral biliary tubes draining greenish output. Flushed 10ml both, output recorded in flowsheet. Pleurx placed today,covered with dry dressing.

## 2025-08-01 NOTE — PROGRESS NOTES
08/01/25 0729   Treatment   Treatment Type MDI   $Treatment Type $Inhaled Therapy/Meds   Medications Albuterol   Pre-Tx Pulse 67   Pre-Tx Resps 16   Breath Sounds Pre-Tx JOSSIE Diminished   Breath Sounds Pre-Tx LLL Diminished   Breath Sounds Pre-Tx RUL Diminished   Breath Sounds Pre-Tx RML Diminished   Breath Sounds Pre-Tx RLL Diminished   Breath Sounds Post-Tx JOSSIE Diminished   Breath Sounds Post-Tx LLL Diminished   Breath Sounds Post-Tx RUL Diminished   Breath Sounds Post-Tx RML Diminished   Breath Sounds Post-Tx RLL Diminished   Post-Tx Pulse 65   Post-Tx Resps 18   Delivery Source MDI with spacer   Position Otis's   Treatment Tolerance Well   Is patient on O2? N   Oxygen Therapy/Pulse Ox   O2 Therapy Room air   Pulse 64   Respirations 16   SpO2 97 %

## 2025-08-01 NOTE — PROGRESS NOTES
Here is video from Pluer X drain.  Please give to patient to watch.      [email or web address]

## 2025-08-01 NOTE — PROGRESS NOTES
Infectious Disease Follow up Notes    CC :  fever, GN bacteremia      Antibiotics:  Zosyn 3.375 q8, s 7/25     Admit Date:   7/21/2025  Hospital Day: 12    Subjective:   Fever to 101.8 around 7pm last night, AF today   On RA   He endorses nausea.  No abd pain or other new focal complaints     Objective:     Patient Vitals for the past 8 hrs:   BP Temp Temp src Pulse Resp SpO2   08/01/25 0729 -- -- -- 64 16 97 %   08/01/25 0710 116/68 98.6 °F (37 °C) Oral 65 16 97 %   08/01/25 0542 -- 98.6 °F (37 °C) Oral -- -- --   08/01/25 0352 110/62 97.7 °F (36.5 °C) Oral 64 18 95 %       EXAM:  General:  alert, conversant, no acute distress  Chronically ill appearing     HEENT:  NCAT, PERRL, sclera anicteric   MMM, no thrush     NECK:  Supple       LUNGS:   non-labored breathing   Upper lobes clear chuy     CV:   RRR without murmur     ABD: Mildly tender without R/G  BS present   2 biliary drains and pleurX drain in place   EXT: No focal rash          LINE:    Port accessed, site ok        Scheduled Meds:   albuterol sulfate HFA  2 puff Inhalation 4x Daily RT    piperacillin-tazobactam  3,375 mg IntraVENous Q8H    dicyclomine  20 mg Oral QHS    torsemide  20 mg Oral Daily    melatonin  3 mg Oral Nightly    finasteride  5 mg Oral QPM    cyanocobalamin  1,000 mcg Oral Daily    vitamin D  50,000 Units Oral Weekly    polycarbophil  625 mg Oral Daily    lactulose  30 g Oral TID    pantoprazole  40 mg Oral BID AC    allopurinol  300 mg Oral Daily    insulin lispro  0-4 Units SubCUTAneous 4x Daily AC & HS    phytonadione  5 mg Oral Once per day on Monday Wednesday Friday    sodium chloride flush  10 mL IntraVENous BID    lipase-protease-amylase  40,000 Units Oral TID WC    lipase-protease-amylas  30,000 Units Oral TID WC       Continuous Infusions:   sodium chloride 5 mL/hr at 07/30/25 1625    dextrose            Data Review:    Lab Results   Component Value

## 2025-08-01 NOTE — PLAN OF CARE
Problem: Safety - Adult  Goal: Free from fall injury  Outcome: Progressing     Problem: Chronic Conditions and Co-morbidities  Goal: Patient's chronic conditions and co-morbidity symptoms are monitored and maintained or improved  Outcome: Progressing  Flowsheets (Taken 7/31/2025 2100)  Care Plan - Patient's Chronic Conditions and Co-Morbidity Symptoms are Monitored and Maintained or Improved: Monitor and assess patient's chronic conditions and comorbid symptoms for stability, deterioration, or improvement     Problem: Discharge Planning  Goal: Discharge to home or other facility with appropriate resources  Outcome: Progressing  Flowsheets (Taken 7/31/2025 2100)  Discharge to home or other facility with appropriate resources: Identify barriers to discharge with patient and caregiver     Problem: Pain  Goal: Verbalizes/displays adequate comfort level or baseline comfort level  Outcome: Progressing  Flowsheets  Taken 8/1/2025 0013  Verbalizes/displays adequate comfort level or baseline comfort level: Encourage patient to monitor pain and request assistance  Taken 7/31/2025 2200  Verbalizes/displays adequate comfort level or baseline comfort level: Encourage patient to monitor pain and request assistance     Problem: Skin/Tissue Integrity  Goal: Skin integrity remains intact  Description: 1.  Monitor for areas of redness and/or skin breakdown  2.  Assess vascular access sites hourly  3.  Every 4-6 hours minimum:  Change oxygen saturation probe site  4.  Every 4-6 hours:  If on nasal continuous positive airway pressure, respiratory therapy assess nares and determine need for appliance change or resting period  Outcome: Progressing  Flowsheets (Taken 7/31/2025 2100)  Skin Integrity Remains Intact: Monitor for areas of redness and/or skin breakdown     Problem: Neurosensory - Adult  Goal: Achieves stable or improved neurological status  Outcome: Progressing  Flowsheets (Taken 7/31/2025 2100)  Achieves stable or improved

## 2025-08-01 NOTE — DISCHARGE SUMMARY
Hospital Medicine Discharge Summary    Patient: Sharath Ceja     Gender: male  : 1950   Age: 74 y.o.  MRN: [id number]    Admitting Physician: Esme Wilson DO  Discharge Physician: Mikaela Birmingham DO    Code Status: Limited     Admit Date: 2025   Discharge Date:  2025     Discharge Diagnoses:    Active Hospital Problems    Diagnosis Date Noted    FUO (fever of unknown origin) [R50.9] 2025    Primary malignant neoplasm of liver (HCC) [C22.8] 2025    Severe protein-calorie malnutrition [E43] 2025    Anasarca [R60.1] 2025    Malignant neoplasm of pancreas (HCC) [C25.9] 2025    Acute hyponatremia [E87.1] 2022     Condition at Discharge: Stable    Hospital Course:     Hyponatremia.  Anasarca/ LE edema  Hypoalbuminemia  -Na 125, prior from 4 days ago was 136.  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  Coagulopathy, on daily vitamin K.  -thrombosis is chronic, unchanged on CT.  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 Now changed to limited code with ongoing discussions   -continue home Zenpep and Vitamin K  - biliary drains exchanged  by EBONY   -Kris is on hold for now   -palliative care, [facility]     Lung nodules  -CT PE showed ill-defined nodule

## 2025-08-01 NOTE — PROGRESS NOTES
Nephrology Progress Note   [email or web address]      Sub/interval history  Resting    Last 24 h uop 800 mL charted    ROS: No chest pain/shortness of breath/fever/nausea/vomiting  PSFH: no visitor    Scheduled Meds:   albuterol sulfate HFA  2 puff Inhalation 4x Daily RT    piperacillin-tazobactam  3,375 mg IntraVENous Q8H    dicyclomine  20 mg Oral QHS    torsemide  20 mg Oral Daily    melatonin  3 mg Oral Nightly    finasteride  5 mg Oral QPM    cyanocobalamin  1,000 mcg Oral Daily    vitamin D  50,000 Units Oral Weekly    polycarbophil  625 mg Oral Daily    lactulose  30 g Oral TID    pantoprazole  40 mg Oral BID AC    allopurinol  300 mg Oral Daily    insulin lispro  0-4 Units SubCUTAneous 4x Daily AC & HS    phytonadione  5 mg Oral Once per day on Monday Wednesday Friday    sodium chloride flush  10 mL IntraVENous BID    lipase-protease-amylase  40,000 Units Oral TID WC    lipase-protease-amylas  30,000 Units Oral TID WC     Continuous Infusions:   sodium chloride 5 mL/hr at 07/30/25 1625    dextrose       PRN Meds:.oxyCODONE, sodium chloride, potassium chloride **OR** potassium alternative oral replacement **OR** potassium chloride, magnesium sulfate, ondansetron **OR** ondansetron, polyethylene glycol, acetaminophen **OR** acetaminophen, ipratropium 0.5 mg-albuterol 2.5 mg, albuterol sulfate HFA, carboxymethylcellulose PF, glucose, dextrose bolus **OR** dextrose bolus, glucagon (rDNA), dextrose    Objective/     Vitals:    08/01/25 0710 08/01/25 0729 08/01/25 1106 08/01/25 1110   BP: 116/68   109/63   Pulse: 65 64 67 67   Resp: 16 16 16 18   Temp: 98.6 °F (37 °C)   97.7 °F (36.5 °C)   TempSrc: Oral   Axillary   SpO2: 97% 97% 97% 97%   Weight:       Height:         24HR INTAKE/OUTPUT:    Intake/Output Summary (Last 24 hours) at 8/1/2025 1255  Last data filed at 8/1/2025 0911  Gross per 24 hour   Intake 1320 ml   Output 1310 ml   Net 10 ml     Gen: alert, awake  Neck: No JVD  Skin: Unremarkable  Cardiovascular:

## 2025-08-01 NOTE — PROGRESS NOTES
08/01/25 1106   Treatment   Treatment Type MDI   $Treatment Type $Inhaled Therapy/Meds   Medications Albuterol   Pre-Tx Pulse 67   Pre-Tx Resps 16   Breath Sounds Pre-Tx JOSSIE Diminished   Breath Sounds Pre-Tx LLL Diminished   Breath Sounds Pre-Tx RUL Diminished   Breath Sounds Pre-Tx RML Diminished   Breath Sounds Pre-Tx RLL Diminished   Breath Sounds Post-Tx JOSSIE Diminished   Breath Sounds Post-Tx LLL Diminished   Breath Sounds Post-Tx RUL Diminished   Breath Sounds Post-Tx RML Diminished   Breath Sounds Post-Tx RLL Diminished   Delivery Source MDI with spacer   Position Otis's   Treatment Tolerance Well   Is patient on O2? N   Oxygen Therapy/Pulse Ox   O2 Therapy Room air   Pulse 67   Respirations 16   SpO2 97 %

## 2025-08-01 NOTE — PROGRESS NOTES
08/01/25 0700   RT Protocol   History Pulmonary Disease 2   Respiratory pattern 2   Breath sounds 2   Cough 0   Indications for Bronchodilator Therapy Decreased or absent breath sounds   Bronchodilator Assessment Score 6

## 2025-08-01 NOTE — PROGRESS NOTES
Transferred care to PRINCE Broussard. Face to face bedside report given, Pt in bed with eyes closed.  No signs of distress noted.  Call light within reach.

## 2025-08-02 LAB
BACTERIA BLD CULT ORG #2: NORMAL
BACTERIA BLD CULT: NORMAL

## 2025-08-04 ENCOUNTER — TELEPHONE (OUTPATIENT)
Dept: FAMILY MEDICINE CLINIC | Age: 75
End: 2025-08-04

## 2025-08-05 ENCOUNTER — TELEPHONE (OUTPATIENT)
Dept: FAMILY MEDICINE CLINIC | Age: 75
End: 2025-08-05

## 2025-08-05 ENCOUNTER — POST-OP TELEPHONE (OUTPATIENT)
Dept: INTERVENTIONAL RADIOLOGY/VASCULAR | Age: 75
End: 2025-08-05

## 2025-08-05 ENCOUNTER — TELEPHONE (OUTPATIENT)
Dept: INTERVENTIONAL RADIOLOGY/VASCULAR | Age: 75
End: 2025-08-05

## 2025-08-05 ENCOUNTER — HOSPITAL ENCOUNTER (EMERGENCY)
Age: 75
Discharge: HOME | End: 2025-08-05
Attending: STUDENT IN AN ORGANIZED HEALTH CARE EDUCATION/TRAINING PROGRAM
Payer: MEDICARE

## 2025-08-05 ENCOUNTER — APPOINTMENT (OUTPATIENT)
Dept: CT IMAGING | Age: 75
End: 2025-08-05
Payer: MEDICARE

## 2025-08-05 VITALS
RESPIRATION RATE: 16 BRPM | TEMPERATURE: 99.1 F | DIASTOLIC BLOOD PRESSURE: 70 MMHG | HEART RATE: 82 BPM | OXYGEN SATURATION: 96 % | SYSTOLIC BLOOD PRESSURE: 105 MMHG

## 2025-08-05 DIAGNOSIS — L03.311 CELLULITIS OF LEFT ABDOMINAL WALL: Primary | ICD-10-CM

## 2025-08-05 LAB
ALBUMIN SERPL-MCNC: 2.9 G/DL (ref 3.4–5)
ALBUMIN/GLOB SERPL: 0.6 {RATIO} (ref 1.1–2.2)
ALP SERPL-CCNC: 349 U/L (ref 40–129)
ALT SERPL-CCNC: 27 U/L (ref 10–40)
ANION GAP SERPL CALCULATED.3IONS-SCNC: 11 MMOL/L (ref 3–16)
AST SERPL-CCNC: 37 U/L (ref 15–37)
BASOPHILS # BLD: 0 K/UL (ref 0–0.2)
BASOPHILS NFR BLD: 0.2 %
BILIRUB SERPL-MCNC: 2.7 MG/DL (ref 0–1)
BUN SERPL-MCNC: 12 MG/DL (ref 7–20)
CALCIUM SERPL-MCNC: 8.2 MG/DL (ref 8.3–10.6)
CHLORIDE SERPL-SCNC: 93 MMOL/L (ref 99–110)
CO2 SERPL-SCNC: 28 MMOL/L (ref 21–32)
CREAT SERPL-MCNC: 0.6 MG/DL (ref 0.8–1.3)
DEPRECATED RDW RBC AUTO: 20.8 % (ref 12.4–15.4)
EOSINOPHIL # BLD: 0 K/UL (ref 0–0.6)
EOSINOPHIL NFR BLD: 0.5 %
GFR SERPLBLD CREATININE-BSD FMLA CKD-EPI: >90 ML/MIN/{1.73_M2}
GLUCOSE SERPL-MCNC: 154 MG/DL (ref 70–99)
HCT VFR BLD AUTO: 29.2 % (ref 40.5–52.5)
HGB BLD-MCNC: 9.5 G/DL (ref 13.5–17.5)
LACTATE BLDV-SCNC: 1.4 MMOL/L (ref 0.4–1.9)
LYMPHOCYTES # BLD: 0.4 K/UL (ref 1–5.1)
LYMPHOCYTES NFR BLD: 4.6 %
MCH RBC QN AUTO: 27.9 PG (ref 26–34)
MCHC RBC AUTO-ENTMCNC: 32.4 G/DL (ref 31–36)
MCV RBC AUTO: 86.1 FL (ref 80–100)
MONOCYTES # BLD: 0.4 K/UL (ref 0–1.3)
MONOCYTES NFR BLD: 5.1 %
NEUTROPHILS # BLD: 7.7 K/UL (ref 1.7–7.7)
NEUTROPHILS NFR BLD: 89.6 %
PLATELET # BLD AUTO: 232 K/UL (ref 135–450)
PMV BLD AUTO: 8 FL (ref 5–10.5)
POTASSIUM SERPL-SCNC: 4 MMOL/L (ref 3.5–5.1)
PROT SERPL-MCNC: 7.6 G/DL (ref 6.4–8.2)
RBC # BLD AUTO: 3.39 M/UL (ref 4.2–5.9)
SODIUM SERPL-SCNC: 132 MMOL/L (ref 136–145)
WBC # BLD AUTO: 8.6 K/UL (ref 4–11)

## 2025-08-05 PROCEDURE — 2580000003 HC RX 258: Performed by: STUDENT IN AN ORGANIZED HEALTH CARE EDUCATION/TRAINING PROGRAM

## 2025-08-05 PROCEDURE — 6360000002 HC RX W HCPCS: Performed by: STUDENT IN AN ORGANIZED HEALTH CARE EDUCATION/TRAINING PROGRAM

## 2025-08-05 PROCEDURE — 6360000004 HC RX CONTRAST MEDICATION: Performed by: STUDENT IN AN ORGANIZED HEALTH CARE EDUCATION/TRAINING PROGRAM

## 2025-08-05 PROCEDURE — 96374 THER/PROPH/DIAG INJ IV PUSH: CPT

## 2025-08-05 PROCEDURE — 99285 EMERGENCY DEPT VISIT HI MDM: CPT

## 2025-08-05 PROCEDURE — 80053 COMPREHEN METABOLIC PANEL: CPT

## 2025-08-05 PROCEDURE — 85025 COMPLETE CBC W/AUTO DIFF WBC: CPT

## 2025-08-05 PROCEDURE — 74177 CT ABD & PELVIS W/CONTRAST: CPT

## 2025-08-05 PROCEDURE — 83605 ASSAY OF LACTIC ACID: CPT

## 2025-08-05 PROCEDURE — 6370000000 HC RX 637 (ALT 250 FOR IP): Performed by: STUDENT IN AN ORGANIZED HEALTH CARE EDUCATION/TRAINING PROGRAM

## 2025-08-05 RX ORDER — IOPAMIDOL 755 MG/ML
75 INJECTION, SOLUTION INTRAVASCULAR
Status: COMPLETED | OUTPATIENT
Start: 2025-08-05 | End: 2025-08-05

## 2025-08-05 RX ORDER — CEPHALEXIN 500 MG/1
500 CAPSULE ORAL 3 TIMES DAILY
Qty: 21 CAPSULE | Refills: 0 | Status: SHIPPED | OUTPATIENT
Start: 2025-08-05 | End: 2025-08-12

## 2025-08-05 RX ORDER — SULFAMETHOXAZOLE AND TRIMETHOPRIM 800; 160 MG/1; MG/1
1 TABLET ORAL 2 TIMES DAILY
Qty: 14 TABLET | Refills: 0 | Status: SHIPPED | OUTPATIENT
Start: 2025-08-05 | End: 2025-08-12

## 2025-08-05 RX ORDER — OXYCODONE HYDROCHLORIDE 5 MG/1
5 TABLET ORAL ONCE
Refills: 0 | Status: COMPLETED | OUTPATIENT
Start: 2025-08-05 | End: 2025-08-05

## 2025-08-05 RX ADMIN — OXYCODONE 5 MG: 5 TABLET ORAL at 20:21

## 2025-08-05 RX ADMIN — IOPAMIDOL 75 ML: 755 INJECTION, SOLUTION INTRAVENOUS at 19:45

## 2025-08-05 RX ADMIN — SODIUM CHLORIDE 1000 MG: 9 INJECTION, SOLUTION INTRAVENOUS at 20:21

## 2025-08-05 ASSESSMENT — PAIN DESCRIPTION - ORIENTATION: ORIENTATION: RIGHT

## 2025-08-05 ASSESSMENT — PAIN DESCRIPTION - LOCATION: LOCATION: ABDOMEN

## 2025-08-05 ASSESSMENT — PAIN SCALES - GENERAL
PAINLEVEL_OUTOF10: 4
PAINLEVEL_OUTOF10: 4

## 2025-08-05 ASSESSMENT — PAIN - FUNCTIONAL ASSESSMENT: PAIN_FUNCTIONAL_ASSESSMENT: NONE - DENIES PAIN

## 2025-08-13 ENCOUNTER — HOSPITAL ENCOUNTER (EMERGENCY)
Age: 75
Discharge: HOME | End: 2025-08-13
Attending: EMERGENCY MEDICINE
Payer: MEDICARE

## 2025-08-13 VITALS
BODY MASS INDEX: 22.61 KG/M2 | TEMPERATURE: 98.2 F | DIASTOLIC BLOOD PRESSURE: 64 MMHG | HEART RATE: 65 BPM | OXYGEN SATURATION: 99 % | HEIGHT: 70 IN | RESPIRATION RATE: 15 BRPM | SYSTOLIC BLOOD PRESSURE: 111 MMHG

## 2025-08-13 DIAGNOSIS — S31.109A OPEN WOUND OF ABDOMINAL WALL, INITIAL ENCOUNTER: Primary | ICD-10-CM

## 2025-08-13 PROCEDURE — 99283 EMERGENCY DEPT VISIT LOW MDM: CPT

## 2025-08-13 RX ORDER — CEPHALEXIN 500 MG/1
500 CAPSULE ORAL 3 TIMES DAILY
Qty: 15 CAPSULE | Refills: 0 | Status: SHIPPED | OUTPATIENT
Start: 2025-08-13 | End: 2025-08-18

## 2025-08-13 ASSESSMENT — PAIN - FUNCTIONAL ASSESSMENT: PAIN_FUNCTIONAL_ASSESSMENT: 0-10

## 2025-08-17 ENCOUNTER — HOSPITAL ENCOUNTER (INPATIENT)
Age: 75
LOS: 2 days | Discharge: HOME HEALTH | DRG: 871 | End: 2025-08-20
Attending: EMERGENCY MEDICINE | Admitting: HOSPITALIST
Payer: MEDICARE

## 2025-08-17 ENCOUNTER — APPOINTMENT (OUTPATIENT)
Dept: GENERAL RADIOLOGY | Age: 75
DRG: 871 | End: 2025-08-17
Payer: MEDICARE

## 2025-08-17 ENCOUNTER — APPOINTMENT (OUTPATIENT)
Dept: CT IMAGING | Age: 75
DRG: 871 | End: 2025-08-17
Payer: MEDICARE

## 2025-08-17 DIAGNOSIS — E87.20 LACTIC ACIDOSIS: ICD-10-CM

## 2025-08-17 DIAGNOSIS — R65.10 SIRS (SYSTEMIC INFLAMMATORY RESPONSE SYNDROME) (HCC): ICD-10-CM

## 2025-08-17 DIAGNOSIS — I82.4Y2 ACUTE DEEP VEIN THROMBOSIS (DVT) OF PROXIMAL VEIN OF LEFT LOWER EXTREMITY (HCC): ICD-10-CM

## 2025-08-17 DIAGNOSIS — I82.412 DEEP VEIN THROMBOSIS (DVT) OF FEMORAL VEIN OF LEFT LOWER EXTREMITY, UNSPECIFIED CHRONICITY (HCC): ICD-10-CM

## 2025-08-17 DIAGNOSIS — R50.9 FEVER, UNSPECIFIED FEVER CAUSE: ICD-10-CM

## 2025-08-17 DIAGNOSIS — R53.83 OTHER FATIGUE: Primary | ICD-10-CM

## 2025-08-17 LAB
ABO/RH: NORMAL
ALBUMIN SERPL-MCNC: 2.8 G/DL (ref 3.4–5)
ALBUMIN/GLOB SERPL: 0.7 {RATIO} (ref 1.1–2.2)
ALP SERPL-CCNC: 342 U/L (ref 40–129)
ALT SERPL-CCNC: 47 U/L (ref 10–40)
ANION GAP SERPL CALCULATED.3IONS-SCNC: 14 MMOL/L (ref 3–16)
ANTIBODY SCREEN: NORMAL
APTT BLD: 34.7 SEC (ref 22.8–35.8)
AST SERPL-CCNC: 63 U/L (ref 15–37)
BASE EXCESS BLDV CALC-SCNC: -2.1 MMOL/L (ref -3–3)
BASOPHILS # BLD: 0 K/UL (ref 0–0.2)
BASOPHILS NFR BLD: 0 %
BILIRUB SERPL-MCNC: 2.7 MG/DL (ref 0–1)
BUN SERPL-MCNC: 11 MG/DL (ref 7–20)
CALCIUM SERPL-MCNC: 8 MG/DL (ref 8.3–10.6)
CHLORIDE SERPL-SCNC: 99 MMOL/L (ref 99–110)
CO2 BLDV-SCNC: 21 MMOL/L
CO2 SERPL-SCNC: 19 MMOL/L (ref 21–32)
COHGB MFR BLDV: 1.2 % (ref 0–1.5)
CREAT SERPL-MCNC: 0.5 MG/DL (ref 0.8–1.3)
DEPRECATED RDW RBC AUTO: 21.8 % (ref 12.4–15.4)
EKG ATRIAL RATE: 96 BPM
EKG DIAGNOSIS: NORMAL
EKG P AXIS: 47 DEGREES
EKG P-R INTERVAL: 158 MS
EKG Q-T INTERVAL: 366 MS
EKG QRS DURATION: 82 MS
EKG QTC CALCULATION (BAZETT): 462 MS
EKG R AXIS: 42 DEGREES
EKG T AXIS: 54 DEGREES
EKG VENTRICULAR RATE: 96 BPM
EOSINOPHIL # BLD: 0 K/UL (ref 0–0.6)
EOSINOPHIL NFR BLD: 0.1 %
FLUAV RNA RESP QL NAA+PROBE: NOT DETECTED
FLUBV RNA RESP QL NAA+PROBE: NOT DETECTED
GFR SERPLBLD CREATININE-BSD FMLA CKD-EPI: >90 ML/MIN/{1.73_M2}
GLUCOSE SERPL-MCNC: 132 MG/DL (ref 70–99)
HCO3 BLDV-SCNC: 20.1 MMOL/L (ref 23–29)
HCT VFR BLD AUTO: 26.8 % (ref 40.5–52.5)
HGB BLD-MCNC: 8.8 G/DL (ref 13.5–17.5)
INR PPP: 1.49 (ref 0.86–1.14)
LACTATE BLDV-SCNC: 3.9 MMOL/L (ref 0.4–1.9)
LACTATE BLDV-SCNC: 4.2 MMOL/L (ref 0.4–1.9)
LIPASE SERPL-CCNC: 72 U/L (ref 13–60)
LYMPHOCYTES # BLD: 0 K/UL (ref 1–5.1)
LYMPHOCYTES NFR BLD: 0.8 %
MCH RBC QN AUTO: 28.5 PG (ref 26–34)
MCHC RBC AUTO-ENTMCNC: 32.7 G/DL (ref 31–36)
MCV RBC AUTO: 87.1 FL (ref 80–100)
METHGB MFR BLDV: 0.3 %
MONOCYTES # BLD: 0 K/UL (ref 0–1.3)
MONOCYTES NFR BLD: 0.3 %
NEUTROPHILS # BLD: 5.4 K/UL (ref 1.7–7.7)
NEUTROPHILS NFR BLD: 98.8 %
NT-PROBNP SERPL-MCNC: 913 PG/ML (ref 0–449)
O2 CT VFR BLDV CALC: 13 VOL %
O2 THERAPY: ABNORMAL
PCO2 BLDV: 26.3 MMHG (ref 40–50)
PH BLDV: 7.5 [PH] (ref 7.35–7.45)
PLATELET # BLD AUTO: 107 K/UL (ref 135–450)
PMV BLD AUTO: 6.9 FL (ref 5–10.5)
PO2 BLDV: 96 MMHG (ref 25–40)
POTASSIUM SERPL-SCNC: 3.8 MMOL/L (ref 3.5–5.1)
PROCALCITONIN SERPL IA-MCNC: 21.3 NG/ML (ref 0–0.15)
PROT SERPL-MCNC: 7 G/DL (ref 6.4–8.2)
PROTHROMBIN TIME: 18.1 SEC (ref 12.1–14.9)
RBC # BLD AUTO: 3.08 M/UL (ref 4.2–5.9)
SAO2 % BLDV: 98 %
SARS-COV-2 RNA RESP QL NAA+PROBE: NOT DETECTED
SODIUM SERPL-SCNC: 132 MMOL/L (ref 136–145)
TROPONIN, HIGH SENSITIVITY: 23 NG/L (ref 0–22)
TROPONIN, HIGH SENSITIVITY: 27 NG/L (ref 0–22)
WBC # BLD AUTO: 5.5 K/UL (ref 4–11)

## 2025-08-17 PROCEDURE — 82803 BLOOD GASES ANY COMBINATION: CPT

## 2025-08-17 PROCEDURE — 96367 TX/PROPH/DG ADDL SEQ IV INF: CPT

## 2025-08-17 PROCEDURE — 86901 BLOOD TYPING SEROLOGIC RH(D): CPT

## 2025-08-17 PROCEDURE — 85610 PROTHROMBIN TIME: CPT

## 2025-08-17 PROCEDURE — 84484 ASSAY OF TROPONIN QUANT: CPT

## 2025-08-17 PROCEDURE — 87150 DNA/RNA AMPLIFIED PROBE: CPT

## 2025-08-17 PROCEDURE — 83880 ASSAY OF NATRIURETIC PEPTIDE: CPT

## 2025-08-17 PROCEDURE — 6360000004 HC RX CONTRAST MEDICATION: Performed by: EMERGENCY MEDICINE

## 2025-08-17 PROCEDURE — 86900 BLOOD TYPING SEROLOGIC ABO: CPT

## 2025-08-17 PROCEDURE — 36415 COLL VENOUS BLD VENIPUNCTURE: CPT

## 2025-08-17 PROCEDURE — 85025 COMPLETE CBC W/AUTO DIFF WBC: CPT

## 2025-08-17 PROCEDURE — 80053 COMPREHEN METABOLIC PANEL: CPT

## 2025-08-17 PROCEDURE — 86850 RBC ANTIBODY SCREEN: CPT

## 2025-08-17 PROCEDURE — 85730 THROMBOPLASTIN TIME PARTIAL: CPT

## 2025-08-17 PROCEDURE — 70450 CT HEAD/BRAIN W/O DYE: CPT

## 2025-08-17 PROCEDURE — 83605 ASSAY OF LACTIC ACID: CPT

## 2025-08-17 PROCEDURE — 87040 BLOOD CULTURE FOR BACTERIA: CPT

## 2025-08-17 PROCEDURE — 93005 ELECTROCARDIOGRAM TRACING: CPT | Performed by: EMERGENCY MEDICINE

## 2025-08-17 PROCEDURE — 93010 ELECTROCARDIOGRAM REPORT: CPT | Performed by: INTERNAL MEDICINE

## 2025-08-17 PROCEDURE — 99285 EMERGENCY DEPT VISIT HI MDM: CPT

## 2025-08-17 PROCEDURE — 71045 X-RAY EXAM CHEST 1 VIEW: CPT

## 2025-08-17 PROCEDURE — 6360000002 HC RX W HCPCS: Performed by: EMERGENCY MEDICINE

## 2025-08-17 PROCEDURE — 2580000003 HC RX 258: Performed by: EMERGENCY MEDICINE

## 2025-08-17 PROCEDURE — 74177 CT ABD & PELVIS W/CONTRAST: CPT

## 2025-08-17 PROCEDURE — 87636 SARSCOV2 & INF A&B AMP PRB: CPT

## 2025-08-17 PROCEDURE — 83690 ASSAY OF LIPASE: CPT

## 2025-08-17 PROCEDURE — 84145 PROCALCITONIN (PCT): CPT

## 2025-08-17 PROCEDURE — 96375 TX/PRO/DX INJ NEW DRUG ADDON: CPT

## 2025-08-17 PROCEDURE — 96365 THER/PROPH/DIAG IV INF INIT: CPT

## 2025-08-17 RX ORDER — IOPAMIDOL 755 MG/ML
75 INJECTION, SOLUTION INTRAVASCULAR
Status: COMPLETED | OUTPATIENT
Start: 2025-08-17 | End: 2025-08-17

## 2025-08-17 RX ORDER — METOCLOPRAMIDE HYDROCHLORIDE 5 MG/ML
10 INJECTION INTRAMUSCULAR; INTRAVENOUS ONCE
Status: COMPLETED | OUTPATIENT
Start: 2025-08-17 | End: 2025-08-17

## 2025-08-17 RX ORDER — 0.9 % SODIUM CHLORIDE 0.9 %
500 INTRAVENOUS SOLUTION INTRAVENOUS ONCE
Status: COMPLETED | OUTPATIENT
Start: 2025-08-17 | End: 2025-08-17

## 2025-08-17 RX ORDER — ONDANSETRON 2 MG/ML
4 INJECTION INTRAMUSCULAR; INTRAVENOUS ONCE
Status: COMPLETED | OUTPATIENT
Start: 2025-08-17 | End: 2025-08-17

## 2025-08-17 RX ORDER — MORPHINE SULFATE 4 MG/ML
4 INJECTION, SOLUTION INTRAMUSCULAR; INTRAVENOUS
Refills: 0 | Status: COMPLETED | OUTPATIENT
Start: 2025-08-17 | End: 2025-08-17

## 2025-08-17 RX ORDER — 0.9 % SODIUM CHLORIDE 0.9 %
1500 INTRAVENOUS SOLUTION INTRAVENOUS ONCE
Status: COMPLETED | OUTPATIENT
Start: 2025-08-18 | End: 2025-08-18

## 2025-08-17 RX ADMIN — IOPAMIDOL 75 ML: 755 INJECTION, SOLUTION INTRAVENOUS at 21:36

## 2025-08-17 RX ADMIN — PIPERACILLIN AND TAZOBACTAM 4500 MG: 4; .5 INJECTION, POWDER, LYOPHILIZED, FOR SOLUTION INTRAVENOUS at 22:04

## 2025-08-17 RX ADMIN — Medication 1500 MG: at 22:36

## 2025-08-17 RX ADMIN — MORPHINE SULFATE 4 MG: 4 INJECTION, SOLUTION INTRAMUSCULAR; INTRAVENOUS at 22:11

## 2025-08-17 RX ADMIN — METOCLOPRAMIDE 10 MG: 5 INJECTION, SOLUTION INTRAMUSCULAR; INTRAVENOUS at 23:50

## 2025-08-17 RX ADMIN — SODIUM CHLORIDE 500 ML: 0.9 INJECTION, SOLUTION INTRAVENOUS at 20:58

## 2025-08-17 RX ADMIN — ONDANSETRON 4 MG: 2 INJECTION, SOLUTION INTRAMUSCULAR; INTRAVENOUS at 22:10

## 2025-08-17 ASSESSMENT — PAIN SCALES - GENERAL
PAINLEVEL_OUTOF10: 5
PAINLEVEL_OUTOF10: 3
PAINLEVEL_OUTOF10: 4

## 2025-08-17 ASSESSMENT — PAIN - FUNCTIONAL ASSESSMENT: PAIN_FUNCTIONAL_ASSESSMENT: 0-10

## 2025-08-17 ASSESSMENT — PAIN DESCRIPTION - PAIN TYPE: TYPE: CHRONIC PAIN;ACUTE PAIN

## 2025-08-17 ASSESSMENT — PAIN DESCRIPTION - LOCATION: LOCATION: ABDOMEN

## 2025-08-18 ENCOUNTER — HOSPITAL ENCOUNTER (INPATIENT)
Age: 75
Discharge: HOME | DRG: 871 | End: 2025-08-20
Payer: MEDICARE

## 2025-08-18 ENCOUNTER — APPOINTMENT (OUTPATIENT)
Dept: CT IMAGING | Age: 75
DRG: 871 | End: 2025-08-18
Payer: MEDICARE

## 2025-08-18 PROBLEM — R50.9 FEVER OF UNKNOWN ORIGIN: Status: ACTIVE | Noted: 2025-08-18

## 2025-08-18 PROBLEM — E87.20 LACTIC ACIDOSIS: Status: ACTIVE | Noted: 2025-08-18

## 2025-08-18 LAB
ALBUMIN SERPL-MCNC: 2.6 G/DL (ref 3.4–5)
ALBUMIN/GLOB SERPL: 0.8 {RATIO} (ref 1.1–2.2)
ALP SERPL-CCNC: 292 U/L (ref 40–129)
ALT SERPL-CCNC: 206 U/L (ref 10–40)
ANION GAP SERPL CALCULATED.3IONS-SCNC: 11 MMOL/L (ref 3–16)
ANTI-XA UNFRAC HEPARIN: <0.1 IU/ML (ref 0.3–0.7)
APTT BLD: 35.2 SEC (ref 22.8–35.8)
AST SERPL-CCNC: 427 U/L (ref 15–37)
BASOPHILS # BLD: 0 K/UL (ref 0–0.2)
BASOPHILS NFR BLD: 0.2 %
BILIRUB SERPL-MCNC: 2.8 MG/DL (ref 0–1)
BUN SERPL-MCNC: 9 MG/DL (ref 7–20)
CALCIUM SERPL-MCNC: 8.1 MG/DL (ref 8.3–10.6)
CHLORIDE SERPL-SCNC: 100 MMOL/L (ref 99–110)
CO2 SERPL-SCNC: 18 MMOL/L (ref 21–32)
CREAT SERPL-MCNC: 0.6 MG/DL (ref 0.8–1.3)
DEPRECATED RDW RBC AUTO: 22.6 % (ref 12.4–15.4)
ECHO BSA: 1.78 M2
EOSINOPHIL # BLD: 0 K/UL (ref 0–0.6)
EOSINOPHIL NFR BLD: 0 %
GFR SERPLBLD CREATININE-BSD FMLA CKD-EPI: >90 ML/MIN/{1.73_M2}
GLUCOSE BLD-MCNC: 134 MG/DL (ref 70–99)
GLUCOSE BLD-MCNC: 181 MG/DL (ref 70–99)
GLUCOSE BLD-MCNC: 184 MG/DL (ref 70–99)
GLUCOSE BLD-MCNC: 250 MG/DL (ref 70–99)
GLUCOSE SERPL-MCNC: 170 MG/DL (ref 70–99)
HCT VFR BLD AUTO: 25.9 % (ref 40.5–52.5)
HGB BLD-MCNC: 8.2 G/DL (ref 13.5–17.5)
INR PPP: 1.82 (ref 0.86–1.14)
LACTATE BLDV-SCNC: 2.4 MMOL/L (ref 0.4–2)
LACTATE BLDV-SCNC: 3.5 MMOL/L (ref 0.4–2)
LACTATE BLDV-SCNC: 4.7 MMOL/L (ref 0.4–2)
LYMPHOCYTES # BLD: 0.3 K/UL (ref 1–5.1)
LYMPHOCYTES NFR BLD: 1.7 %
MCH RBC QN AUTO: 28.2 PG (ref 26–34)
MCHC RBC AUTO-ENTMCNC: 31.6 G/DL (ref 31–36)
MCV RBC AUTO: 89.4 FL (ref 80–100)
MONOCYTES # BLD: 0.6 K/UL (ref 0–1.3)
MONOCYTES NFR BLD: 3.5 %
NEUTROPHILS # BLD: 16.3 K/UL (ref 1.7–7.7)
NEUTROPHILS NFR BLD: 94.6 %
PERFORMED ON: ABNORMAL
PLATELET # BLD AUTO: 103 K/UL (ref 135–450)
PMV BLD AUTO: 7.4 FL (ref 5–10.5)
POTASSIUM SERPL-SCNC: 4.4 MMOL/L (ref 3.5–5.1)
PROT SERPL-MCNC: 5.9 G/DL (ref 6.4–8.2)
PROTHROMBIN TIME: 21.1 SEC (ref 12.1–14.9)
RBC # BLD AUTO: 2.89 M/UL (ref 4.2–5.9)
SODIUM SERPL-SCNC: 129 MMOL/L (ref 136–145)
WBC # BLD AUTO: 17.3 K/UL (ref 4–11)

## 2025-08-18 PROCEDURE — 2580000003 HC RX 258: Performed by: HOSPITALIST

## 2025-08-18 PROCEDURE — 85520 HEPARIN ASSAY: CPT

## 2025-08-18 PROCEDURE — 6370000000 HC RX 637 (ALT 250 FOR IP): Performed by: INTERNAL MEDICINE

## 2025-08-18 PROCEDURE — 94640 AIRWAY INHALATION TREATMENT: CPT

## 2025-08-18 PROCEDURE — 6370000000 HC RX 637 (ALT 250 FOR IP): Performed by: STUDENT IN AN ORGANIZED HEALTH CARE EDUCATION/TRAINING PROGRAM

## 2025-08-18 PROCEDURE — 2580000003 HC RX 258: Performed by: EMERGENCY MEDICINE

## 2025-08-18 PROCEDURE — 36415 COLL VENOUS BLD VENIPUNCTURE: CPT

## 2025-08-18 PROCEDURE — 85610 PROTHROMBIN TIME: CPT

## 2025-08-18 PROCEDURE — 6370000000 HC RX 637 (ALT 250 FOR IP)

## 2025-08-18 PROCEDURE — 6370000000 HC RX 637 (ALT 250 FOR IP): Performed by: HOSPITALIST

## 2025-08-18 PROCEDURE — 93970 EXTREMITY STUDY: CPT

## 2025-08-18 PROCEDURE — 2060000000 HC ICU INTERMEDIATE R&B

## 2025-08-18 PROCEDURE — 71260 CT THORAX DX C+: CPT

## 2025-08-18 PROCEDURE — 80053 COMPREHEN METABOLIC PANEL: CPT

## 2025-08-18 PROCEDURE — 93970 EXTREMITY STUDY: CPT | Performed by: INTERNAL MEDICINE

## 2025-08-18 PROCEDURE — 85730 THROMBOPLASTIN TIME PARTIAL: CPT

## 2025-08-18 PROCEDURE — 2500000003 HC RX 250 WO HCPCS: Performed by: HOSPITALIST

## 2025-08-18 PROCEDURE — 94761 N-INVAS EAR/PLS OXIMETRY MLT: CPT

## 2025-08-18 PROCEDURE — 6360000004 HC RX CONTRAST MEDICATION

## 2025-08-18 PROCEDURE — 83605 ASSAY OF LACTIC ACID: CPT

## 2025-08-18 PROCEDURE — 85025 COMPLETE CBC W/AUTO DIFF WBC: CPT

## 2025-08-18 PROCEDURE — 6360000002 HC RX W HCPCS

## 2025-08-18 PROCEDURE — 6360000002 HC RX W HCPCS: Performed by: HOSPITALIST

## 2025-08-18 RX ORDER — HEPARIN SODIUM 1000 [USP'U]/ML
40 INJECTION, SOLUTION INTRAVENOUS; SUBCUTANEOUS PRN
Status: DISCONTINUED | OUTPATIENT
Start: 2025-08-18 | End: 2025-08-20

## 2025-08-18 RX ORDER — HEPARIN SODIUM 1000 [USP'U]/ML
80 INJECTION, SOLUTION INTRAVENOUS; SUBCUTANEOUS PRN
Status: DISCONTINUED | OUTPATIENT
Start: 2025-08-18 | End: 2025-08-18

## 2025-08-18 RX ORDER — CARBOXYMETHYLCELLULOSE SODIUM 10 MG/ML
2 GEL OPHTHALMIC EVERY 12 HOURS PRN
Status: DISCONTINUED | OUTPATIENT
Start: 2025-08-18 | End: 2025-08-20 | Stop reason: HOSPADM

## 2025-08-18 RX ORDER — MAGNESIUM SULFATE IN WATER 40 MG/ML
2000 INJECTION, SOLUTION INTRAVENOUS PRN
Status: DISCONTINUED | OUTPATIENT
Start: 2025-08-18 | End: 2025-08-20 | Stop reason: HOSPADM

## 2025-08-18 RX ORDER — METRONIDAZOLE 500 MG/100ML
500 INJECTION, SOLUTION INTRAVENOUS EVERY 8 HOURS
Status: DISCONTINUED | OUTPATIENT
Start: 2025-08-18 | End: 2025-08-20 | Stop reason: HOSPADM

## 2025-08-18 RX ORDER — ENOXAPARIN SODIUM 100 MG/ML
40 INJECTION SUBCUTANEOUS DAILY
Status: DISCONTINUED | OUTPATIENT
Start: 2025-08-18 | End: 2025-08-18

## 2025-08-18 RX ORDER — INSULIN LISPRO 100 [IU]/ML
0-4 INJECTION, SOLUTION INTRAVENOUS; SUBCUTANEOUS
Status: DISCONTINUED | OUTPATIENT
Start: 2025-08-18 | End: 2025-08-20 | Stop reason: HOSPADM

## 2025-08-18 RX ORDER — DICYCLOMINE HCL 20 MG
20 TABLET ORAL DAILY
Status: DISCONTINUED | OUTPATIENT
Start: 2025-08-18 | End: 2025-08-20 | Stop reason: HOSPADM

## 2025-08-18 RX ORDER — SODIUM CHLORIDE 0.9 % (FLUSH) 0.9 %
5-40 SYRINGE (ML) INJECTION PRN
Status: DISCONTINUED | OUTPATIENT
Start: 2025-08-18 | End: 2025-08-20 | Stop reason: HOSPADM

## 2025-08-18 RX ORDER — FERROUS SULFATE 325(65) MG
325 TABLET ORAL EVERY OTHER DAY
Status: DISCONTINUED | OUTPATIENT
Start: 2025-08-19 | End: 2025-08-20 | Stop reason: HOSPADM

## 2025-08-18 RX ORDER — ALBUTEROL SULFATE 90 UG/1
2 INHALANT RESPIRATORY (INHALATION) EVERY 6 HOURS PRN
Status: DISCONTINUED | OUTPATIENT
Start: 2025-08-18 | End: 2025-08-20 | Stop reason: HOSPADM

## 2025-08-18 RX ORDER — HEPARIN SODIUM 1000 [USP'U]/ML
80 INJECTION, SOLUTION INTRAVENOUS; SUBCUTANEOUS ONCE
Status: DISCONTINUED | OUTPATIENT
Start: 2025-08-18 | End: 2025-08-18

## 2025-08-18 RX ORDER — SODIUM CHLORIDE 9 MG/ML
INJECTION, SOLUTION INTRAVENOUS CONTINUOUS
Status: DISCONTINUED | OUTPATIENT
Start: 2025-08-18 | End: 2025-08-19

## 2025-08-18 RX ORDER — ONDANSETRON 4 MG/1
4 TABLET, ORALLY DISINTEGRATING ORAL EVERY 8 HOURS PRN
Status: DISCONTINUED | OUTPATIENT
Start: 2025-08-18 | End: 2025-08-20 | Stop reason: HOSPADM

## 2025-08-18 RX ORDER — ACETAMINOPHEN 325 MG/1
650 TABLET ORAL EVERY 6 HOURS PRN
Status: DISCONTINUED | OUTPATIENT
Start: 2025-08-18 | End: 2025-08-20 | Stop reason: HOSPADM

## 2025-08-18 RX ORDER — POTASSIUM CHLORIDE 7.45 MG/ML
10 INJECTION INTRAVENOUS PRN
Status: DISCONTINUED | OUTPATIENT
Start: 2025-08-18 | End: 2025-08-20 | Stop reason: HOSPADM

## 2025-08-18 RX ORDER — HEPARIN SODIUM 1000 [USP'U]/ML
80 INJECTION, SOLUTION INTRAVENOUS; SUBCUTANEOUS ONCE
Status: COMPLETED | OUTPATIENT
Start: 2025-08-18 | End: 2025-08-18

## 2025-08-18 RX ORDER — POTASSIUM CHLORIDE 1500 MG/1
40 TABLET, EXTENDED RELEASE ORAL PRN
Status: DISCONTINUED | OUTPATIENT
Start: 2025-08-18 | End: 2025-08-20 | Stop reason: HOSPADM

## 2025-08-18 RX ORDER — IOPAMIDOL 755 MG/ML
75 INJECTION, SOLUTION INTRAVASCULAR
Status: COMPLETED | OUTPATIENT
Start: 2025-08-18 | End: 2025-08-18

## 2025-08-18 RX ORDER — DEXTROSE MONOHYDRATE 100 MG/ML
INJECTION, SOLUTION INTRAVENOUS CONTINUOUS PRN
Status: DISCONTINUED | OUTPATIENT
Start: 2025-08-18 | End: 2025-08-20 | Stop reason: HOSPADM

## 2025-08-18 RX ORDER — ASCORBIC ACID 125 MG
500 TABLET,CHEWABLE ORAL
Status: DISCONTINUED | OUTPATIENT
Start: 2025-08-18 | End: 2025-08-18 | Stop reason: CLARIF

## 2025-08-18 RX ORDER — ALBUTEROL SULFATE 90 UG/1
2 INHALANT RESPIRATORY (INHALATION) 2 TIMES DAILY
Status: DISCONTINUED | OUTPATIENT
Start: 2025-08-18 | End: 2025-08-20 | Stop reason: HOSPADM

## 2025-08-18 RX ORDER — PANTOPRAZOLE SODIUM 40 MG/1
40 TABLET, DELAYED RELEASE ORAL 2 TIMES DAILY
Status: DISCONTINUED | OUTPATIENT
Start: 2025-08-18 | End: 2025-08-20 | Stop reason: HOSPADM

## 2025-08-18 RX ORDER — ACETAMINOPHEN 650 MG/1
650 SUPPOSITORY RECTAL EVERY 6 HOURS PRN
Status: DISCONTINUED | OUTPATIENT
Start: 2025-08-18 | End: 2025-08-20 | Stop reason: HOSPADM

## 2025-08-18 RX ORDER — MULTIVITAMIN WITH IRON
1000 TABLET ORAL DAILY
Status: DISCONTINUED | OUTPATIENT
Start: 2025-08-18 | End: 2025-08-20 | Stop reason: HOSPADM

## 2025-08-18 RX ORDER — POLYETHYLENE GLYCOL 3350 17 G/17G
17 POWDER, FOR SOLUTION ORAL DAILY PRN
Status: DISCONTINUED | OUTPATIENT
Start: 2025-08-18 | End: 2025-08-20 | Stop reason: HOSPADM

## 2025-08-18 RX ORDER — HEPARIN SODIUM 10000 [USP'U]/100ML
30 INJECTION, SOLUTION INTRAVENOUS CONTINUOUS
Status: DISCONTINUED | OUTPATIENT
Start: 2025-08-18 | End: 2025-08-20

## 2025-08-18 RX ORDER — SODIUM CHLORIDE 0.9 % (FLUSH) 0.9 %
5-40 SYRINGE (ML) INJECTION EVERY 12 HOURS SCHEDULED
Status: DISCONTINUED | OUTPATIENT
Start: 2025-08-18 | End: 2025-08-20 | Stop reason: HOSPADM

## 2025-08-18 RX ORDER — FINASTERIDE 5 MG/1
5 TABLET, FILM COATED ORAL EVERY EVENING
Status: DISCONTINUED | OUTPATIENT
Start: 2025-08-18 | End: 2025-08-20 | Stop reason: HOSPADM

## 2025-08-18 RX ORDER — ALLOPURINOL 300 MG/1
300 TABLET ORAL DAILY
Status: DISCONTINUED | OUTPATIENT
Start: 2025-08-18 | End: 2025-08-20 | Stop reason: HOSPADM

## 2025-08-18 RX ORDER — IPRATROPIUM BROMIDE AND ALBUTEROL SULFATE 2.5; .5 MG/3ML; MG/3ML
1 SOLUTION RESPIRATORY (INHALATION) ONCE
Status: COMPLETED | OUTPATIENT
Start: 2025-08-18 | End: 2025-08-18

## 2025-08-18 RX ORDER — ONDANSETRON 2 MG/ML
4 INJECTION INTRAMUSCULAR; INTRAVENOUS EVERY 6 HOURS PRN
Status: DISCONTINUED | OUTPATIENT
Start: 2025-08-18 | End: 2025-08-20 | Stop reason: HOSPADM

## 2025-08-18 RX ORDER — HEPARIN SODIUM 1000 [USP'U]/ML
80 INJECTION, SOLUTION INTRAVENOUS; SUBCUTANEOUS PRN
Status: DISCONTINUED | OUTPATIENT
Start: 2025-08-18 | End: 2025-08-20

## 2025-08-18 RX ORDER — HEPARIN SODIUM 1000 [USP'U]/ML
40 INJECTION, SOLUTION INTRAVENOUS; SUBCUTANEOUS PRN
Status: DISCONTINUED | OUTPATIENT
Start: 2025-08-18 | End: 2025-08-18

## 2025-08-18 RX ORDER — HEPARIN SODIUM 10000 [USP'U]/100ML
5-30 INJECTION, SOLUTION INTRAVENOUS CONTINUOUS
Status: DISCONTINUED | OUTPATIENT
Start: 2025-08-18 | End: 2025-08-18

## 2025-08-18 RX ORDER — GLUCAGON 1 MG/ML
1 KIT INJECTION PRN
Status: DISCONTINUED | OUTPATIENT
Start: 2025-08-18 | End: 2025-08-20 | Stop reason: HOSPADM

## 2025-08-18 RX ORDER — SODIUM CHLORIDE 9 MG/ML
INJECTION, SOLUTION INTRAVENOUS PRN
Status: DISCONTINUED | OUTPATIENT
Start: 2025-08-18 | End: 2025-08-20 | Stop reason: HOSPADM

## 2025-08-18 RX ORDER — IPRATROPIUM BROMIDE AND ALBUTEROL SULFATE 2.5; .5 MG/3ML; MG/3ML
1 SOLUTION RESPIRATORY (INHALATION) EVERY 4 HOURS PRN
Status: DISCONTINUED | OUTPATIENT
Start: 2025-08-18 | End: 2025-08-18

## 2025-08-18 RX ORDER — TORSEMIDE 20 MG/1
20 TABLET ORAL DAILY
Status: DISCONTINUED | OUTPATIENT
Start: 2025-08-18 | End: 2025-08-20 | Stop reason: HOSPADM

## 2025-08-18 RX ADMIN — IOPAMIDOL 75 ML: 755 INJECTION, SOLUTION INTRAVENOUS at 11:44

## 2025-08-18 RX ADMIN — PANCRELIPASE LIPASE, PANCRELIPASE PROTEASE, PANCRELIPASE AMYLASE 20000 UNITS: 20000; 63000; 84000 CAPSULE, DELAYED RELEASE ORAL at 09:32

## 2025-08-18 RX ADMIN — VANCOMYCIN HYDROCHLORIDE 1250 MG: 10 INJECTION, POWDER, LYOPHILIZED, FOR SOLUTION INTRAVENOUS at 09:21

## 2025-08-18 RX ADMIN — Medication 1000 MCG: at 09:23

## 2025-08-18 RX ADMIN — SODIUM CHLORIDE: 0.9 INJECTION, SOLUTION INTRAVENOUS at 09:16

## 2025-08-18 RX ADMIN — INSULIN LISPRO 1 UNITS: 100 INJECTION, SOLUTION INTRAVENOUS; SUBCUTANEOUS at 16:29

## 2025-08-18 RX ADMIN — METRONIDAZOLE 500 MG: 500 INJECTION, SOLUTION INTRAVENOUS at 09:17

## 2025-08-18 RX ADMIN — INSULIN LISPRO 2 UNITS: 100 INJECTION, SOLUTION INTRAVENOUS; SUBCUTANEOUS at 20:47

## 2025-08-18 RX ADMIN — DICYCLOMINE HYDROCHLORIDE 20 MG: 20 TABLET ORAL at 20:53

## 2025-08-18 RX ADMIN — PANTOPRAZOLE SODIUM 40 MG: 40 TABLET, DELAYED RELEASE ORAL at 20:53

## 2025-08-18 RX ADMIN — IPRATROPIUM BROMIDE AND ALBUTEROL SULFATE 1 DOSE: 2.5; .5 SOLUTION RESPIRATORY (INHALATION) at 01:32

## 2025-08-18 RX ADMIN — SODIUM CHLORIDE, PRESERVATIVE FREE 10 ML: 5 INJECTION INTRAVENOUS at 20:42

## 2025-08-18 RX ADMIN — CEFEPIME 2000 MG: 2 INJECTION, POWDER, FOR SOLUTION INTRAVENOUS at 20:47

## 2025-08-18 RX ADMIN — PANCRELIPASE LIPASE, PANCRELIPASE PROTEASE, PANCRELIPASE AMYLASE 15000 UNITS: 15000; 47000; 63000 CAPSULE, DELAYED RELEASE ORAL at 16:29

## 2025-08-18 RX ADMIN — VANCOMYCIN HYDROCHLORIDE 1250 MG: 10 INJECTION, POWDER, LYOPHILIZED, FOR SOLUTION INTRAVENOUS at 20:51

## 2025-08-18 RX ADMIN — PANCRELIPASE LIPASE, PANCRELIPASE PROTEASE, PANCRELIPASE AMYLASE 15000 UNITS: 15000; 47000; 63000 CAPSULE, DELAYED RELEASE ORAL at 09:32

## 2025-08-18 RX ADMIN — PANTOPRAZOLE SODIUM 40 MG: 40 TABLET, DELAYED RELEASE ORAL at 09:23

## 2025-08-18 RX ADMIN — METRONIDAZOLE 500 MG: 500 INJECTION, SOLUTION INTRAVENOUS at 16:06

## 2025-08-18 RX ADMIN — SODIUM CHLORIDE 1500 ML: 0.9 INJECTION, SOLUTION INTRAVENOUS at 00:14

## 2025-08-18 RX ADMIN — HEPARIN SODIUM AND DEXTROSE 18 UNITS/KG/HR: 10000; 5 INJECTION INTRAVENOUS at 16:21

## 2025-08-18 RX ADMIN — ALLOPURINOL 300 MG: 300 TABLET ORAL at 09:23

## 2025-08-18 RX ADMIN — PANCRELIPASE LIPASE, PANCRELIPASE PROTEASE, PANCRELIPASE AMYLASE 20000 UNITS: 20000; 63000; 84000 CAPSULE, DELAYED RELEASE ORAL at 16:29

## 2025-08-18 RX ADMIN — ALBUTEROL SULFATE 2 PUFF: 90 AEROSOL, METERED RESPIRATORY (INHALATION) at 08:07

## 2025-08-18 RX ADMIN — METRONIDAZOLE 500 MG: 500 INJECTION, SOLUTION INTRAVENOUS at 23:18

## 2025-08-18 RX ADMIN — CEFEPIME 2000 MG: 2 INJECTION, POWDER, FOR SOLUTION INTRAVENOUS at 12:11

## 2025-08-18 RX ADMIN — ACETAMINOPHEN 650 MG: 325 TABLET ORAL at 19:28

## 2025-08-18 RX ADMIN — ALBUTEROL SULFATE 2 PUFF: 90 AEROSOL, METERED RESPIRATORY (INHALATION) at 20:26

## 2025-08-18 RX ADMIN — INSULIN LISPRO 1 UNITS: 100 INJECTION, SOLUTION INTRAVENOUS; SUBCUTANEOUS at 12:12

## 2025-08-18 RX ADMIN — HEPARIN SODIUM 5200 UNITS: 1000 INJECTION, SOLUTION INTRAVENOUS; SUBCUTANEOUS at 16:18

## 2025-08-18 RX ADMIN — FINASTERIDE 5 MG: 5 TABLET, FILM COATED ORAL at 16:29

## 2025-08-18 ASSESSMENT — PAIN SCALES - GENERAL
PAINLEVEL_OUTOF10: 3
PAINLEVEL_OUTOF10: 0
PAINLEVEL_OUTOF10: 3
PAINLEVEL_OUTOF10: 0
PAINLEVEL_OUTOF10: 0

## 2025-08-18 ASSESSMENT — PAIN DESCRIPTION - FREQUENCY: FREQUENCY: INTERMITTENT

## 2025-08-18 ASSESSMENT — PAIN DESCRIPTION - LOCATION: LOCATION: LEG;SHOULDER

## 2025-08-18 ASSESSMENT — PAIN DESCRIPTION - ONSET: ONSET: GRADUAL

## 2025-08-18 ASSESSMENT — PAIN - FUNCTIONAL ASSESSMENT: PAIN_FUNCTIONAL_ASSESSMENT: ACTIVITIES ARE NOT PREVENTED

## 2025-08-18 ASSESSMENT — PAIN DESCRIPTION - DESCRIPTORS: DESCRIPTORS: ACHING

## 2025-08-18 ASSESSMENT — PAIN DESCRIPTION - ORIENTATION: ORIENTATION: RIGHT

## 2025-08-18 ASSESSMENT — PAIN DESCRIPTION - PAIN TYPE: TYPE: CHRONIC PAIN

## 2025-08-19 PROBLEM — R19.7 DIARRHEA: Status: RESOLVED | Noted: 2023-06-14 | Resolved: 2025-08-19

## 2025-08-19 PROBLEM — R53.83 OTHER FATIGUE: Status: ACTIVE | Noted: 2023-06-28

## 2025-08-19 PROBLEM — R50.9 FEVER: Status: RESOLVED | Noted: 2025-07-09 | Resolved: 2025-08-19

## 2025-08-19 PROBLEM — K81.9 CHOLECYSTITIS: Status: RESOLVED | Noted: 2022-03-24 | Resolved: 2025-08-19

## 2025-08-19 PROBLEM — R74.01 ELEVATION OF LEVELS OF LIVER TRANSAMINASE LEVELS: Status: RESOLVED | Noted: 2022-01-24 | Resolved: 2025-08-19

## 2025-08-19 PROBLEM — K75.89 CHOLESTATIC HEPATITIS: Status: RESOLVED | Noted: 2023-03-26 | Resolved: 2025-08-19

## 2025-08-19 PROBLEM — M79.662 BILATERAL CALF PAIN: Status: RESOLVED | Noted: 2024-11-04 | Resolved: 2025-08-19

## 2025-08-19 PROBLEM — R50.9 FEVER OF UNKNOWN ORIGIN: Status: ACTIVE | Noted: 2025-07-29

## 2025-08-19 PROBLEM — D64.9 ACUTE ANEMIA: Status: ACTIVE | Noted: 2025-03-25

## 2025-08-19 PROBLEM — R50.9 FEBRILE ILLNESS: Status: RESOLVED | Noted: 2025-07-10 | Resolved: 2025-08-19

## 2025-08-19 PROBLEM — D64.9 ACUTE ANEMIA: Status: ACTIVE | Noted: 2024-12-30

## 2025-08-19 PROBLEM — R82.998 DARK URINE: Status: RESOLVED | Noted: 2024-11-04 | Resolved: 2025-08-19

## 2025-08-19 PROBLEM — R74.01 TRANSAMINITIS: Status: RESOLVED | Noted: 2022-01-24 | Resolved: 2025-08-19

## 2025-08-19 PROBLEM — R10.84 GENERALIZED ABDOMINAL PAIN: Status: RESOLVED | Noted: 2025-01-10 | Resolved: 2025-08-19

## 2025-08-19 PROBLEM — M79.661 BILATERAL CALF PAIN: Status: RESOLVED | Noted: 2024-11-04 | Resolved: 2025-08-19

## 2025-08-19 PROBLEM — K92.2 UPPER GASTROINTESTINAL HEMORRHAGE: Status: RESOLVED | Noted: 2023-06-14 | Resolved: 2025-08-19

## 2025-08-19 LAB
ANION GAP SERPL CALCULATED.3IONS-SCNC: 7 MMOL/L (ref 3–16)
ANISOCYTOSIS BLD QL SMEAR: ABNORMAL
ANTI-XA UNFRAC HEPARIN: 0.15 IU/ML (ref 0.3–0.7)
ANTI-XA UNFRAC HEPARIN: 0.19 IU/ML (ref 0.3–0.7)
ANTI-XA UNFRAC HEPARIN: 0.22 IU/ML (ref 0.3–0.7)
ANTI-XA UNFRAC HEPARIN: <0.1 IU/ML (ref 0.3–0.7)
BASOPHILS # BLD: 0 K/UL (ref 0–0.2)
BASOPHILS NFR BLD: 0.1 %
BUN SERPL-MCNC: 7 MG/DL (ref 7–20)
CALCIUM SERPL-MCNC: 7.3 MG/DL (ref 8.3–10.6)
CHLORIDE SERPL-SCNC: 104 MMOL/L (ref 99–110)
CO2 SERPL-SCNC: 21 MMOL/L (ref 21–32)
CREAT SERPL-MCNC: 0.3 MG/DL (ref 0.8–1.3)
DEPRECATED RDW RBC AUTO: 22.8 % (ref 12.4–15.4)
EOSINOPHIL # BLD: 0 K/UL (ref 0–0.6)
EOSINOPHIL NFR BLD: 0.4 %
GFR SERPLBLD CREATININE-BSD FMLA CKD-EPI: >90 ML/MIN/{1.73_M2}
GLUCOSE BLD-MCNC: 158 MG/DL (ref 70–99)
GLUCOSE BLD-MCNC: 193 MG/DL (ref 70–99)
GLUCOSE BLD-MCNC: 216 MG/DL (ref 70–99)
GLUCOSE BLD-MCNC: 228 MG/DL (ref 70–99)
GLUCOSE SERPL-MCNC: 140 MG/DL (ref 70–99)
HCT VFR BLD AUTO: 21.1 % (ref 40.5–52.5)
HCT VFR BLD AUTO: 22.4 % (ref 40.5–52.5)
HGB BLD-MCNC: 7 G/DL (ref 13.5–17.5)
HGB BLD-MCNC: 7.3 G/DL (ref 13.5–17.5)
HYPOCHROMIA BLD QL SMEAR: ABNORMAL
LYMPHOCYTES # BLD: 0.2 K/UL (ref 1–5.1)
LYMPHOCYTES NFR BLD: 3.1 %
MCH RBC QN AUTO: 28.6 PG (ref 26–34)
MCHC RBC AUTO-ENTMCNC: 32.5 G/DL (ref 31–36)
MCV RBC AUTO: 88 FL (ref 80–100)
MONOCYTES # BLD: 0.3 K/UL (ref 0–1.3)
MONOCYTES NFR BLD: 3.8 %
NEUTROPHILS # BLD: 6.9 K/UL (ref 1.7–7.7)
NEUTROPHILS NFR BLD: 92.6 %
PERFORMED ON: ABNORMAL
PLATELET # BLD AUTO: 79 K/UL (ref 135–450)
PLATELET BLD QL SMEAR: ABNORMAL
PMV BLD AUTO: 7.4 FL (ref 5–10.5)
POIKILOCYTOSIS BLD QL SMEAR: ABNORMAL
POLYCHROMASIA BLD QL SMEAR: ABNORMAL
POTASSIUM SERPL-SCNC: 3.5 MMOL/L (ref 3.5–5.1)
RBC # BLD AUTO: 2.54 M/UL (ref 4.2–5.9)
SLIDE REVIEW: ABNORMAL
SODIUM SERPL-SCNC: 132 MMOL/L (ref 136–145)
VANCOMYCIN SERPL-MCNC: 6.4 UG/ML
WBC # BLD AUTO: 7.5 K/UL (ref 4–11)

## 2025-08-19 PROCEDURE — 2500000003 HC RX 250 WO HCPCS: Performed by: HOSPITALIST

## 2025-08-19 PROCEDURE — 6360000002 HC RX W HCPCS: Performed by: INTERNAL MEDICINE

## 2025-08-19 PROCEDURE — 2060000000 HC ICU INTERMEDIATE R&B

## 2025-08-19 PROCEDURE — 85025 COMPLETE CBC W/AUTO DIFF WBC: CPT

## 2025-08-19 PROCEDURE — 36592 COLLECT BLOOD FROM PICC: CPT

## 2025-08-19 PROCEDURE — 6360000002 HC RX W HCPCS

## 2025-08-19 PROCEDURE — 85014 HEMATOCRIT: CPT

## 2025-08-19 PROCEDURE — 6370000000 HC RX 637 (ALT 250 FOR IP)

## 2025-08-19 PROCEDURE — 2580000003 HC RX 258: Performed by: HOSPITALIST

## 2025-08-19 PROCEDURE — 6370000000 HC RX 637 (ALT 250 FOR IP): Performed by: HOSPITALIST

## 2025-08-19 PROCEDURE — 85018 HEMOGLOBIN: CPT

## 2025-08-19 PROCEDURE — 80048 BASIC METABOLIC PNL TOTAL CA: CPT

## 2025-08-19 PROCEDURE — 85520 HEPARIN ASSAY: CPT

## 2025-08-19 PROCEDURE — 6370000000 HC RX 637 (ALT 250 FOR IP): Performed by: STUDENT IN AN ORGANIZED HEALTH CARE EDUCATION/TRAINING PROGRAM

## 2025-08-19 PROCEDURE — 6370000000 HC RX 637 (ALT 250 FOR IP): Performed by: INTERNAL MEDICINE

## 2025-08-19 PROCEDURE — 94761 N-INVAS EAR/PLS OXIMETRY MLT: CPT

## 2025-08-19 PROCEDURE — 80202 ASSAY OF VANCOMYCIN: CPT

## 2025-08-19 PROCEDURE — 99232 SBSQ HOSP IP/OBS MODERATE 35: CPT | Performed by: INTERNAL MEDICINE

## 2025-08-19 PROCEDURE — 94640 AIRWAY INHALATION TREATMENT: CPT

## 2025-08-19 PROCEDURE — 6360000002 HC RX W HCPCS: Performed by: HOSPITALIST

## 2025-08-19 PROCEDURE — 36415 COLL VENOUS BLD VENIPUNCTURE: CPT

## 2025-08-19 RX ORDER — HEPARIN SODIUM 1000 [USP'U]/ML
40 INJECTION, SOLUTION INTRAVENOUS; SUBCUTANEOUS ONCE
Status: COMPLETED | OUTPATIENT
Start: 2025-08-19 | End: 2025-08-19

## 2025-08-19 RX ORDER — OXYCODONE HYDROCHLORIDE 5 MG/1
5 TABLET ORAL EVERY 4 HOURS PRN
Refills: 0 | Status: DISCONTINUED | OUTPATIENT
Start: 2025-08-19 | End: 2025-08-20 | Stop reason: HOSPADM

## 2025-08-19 RX ORDER — ALBUTEROL SULFATE 90 UG/1
2 INHALANT RESPIRATORY (INHALATION) 2 TIMES DAILY PRN
Status: DISCONTINUED | OUTPATIENT
Start: 2025-08-19 | End: 2025-08-20 | Stop reason: HOSPADM

## 2025-08-19 RX ORDER — HEPARIN SODIUM 1000 [USP'U]/ML
80 INJECTION, SOLUTION INTRAVENOUS; SUBCUTANEOUS ONCE
Status: COMPLETED | OUTPATIENT
Start: 2025-08-19 | End: 2025-08-19

## 2025-08-19 RX ADMIN — DICYCLOMINE HYDROCHLORIDE 20 MG: 20 TABLET ORAL at 20:39

## 2025-08-19 RX ADMIN — INSULIN LISPRO 1 UNITS: 100 INJECTION, SOLUTION INTRAVENOUS; SUBCUTANEOUS at 12:31

## 2025-08-19 RX ADMIN — HEPARIN SODIUM 2600 UNITS: 1000 INJECTION, SOLUTION INTRAVENOUS; SUBCUTANEOUS at 10:20

## 2025-08-19 RX ADMIN — FINASTERIDE 5 MG: 5 TABLET, FILM COATED ORAL at 16:43

## 2025-08-19 RX ADMIN — PANCRELIPASE LIPASE, PANCRELIPASE PROTEASE, PANCRELIPASE AMYLASE 15000 UNITS: 15000; 47000; 63000 CAPSULE, DELAYED RELEASE ORAL at 09:24

## 2025-08-19 RX ADMIN — POTASSIUM BICARBONATE 40 MEQ: 782 TABLET, EFFERVESCENT ORAL at 09:44

## 2025-08-19 RX ADMIN — PANTOPRAZOLE SODIUM 40 MG: 40 TABLET, DELAYED RELEASE ORAL at 20:39

## 2025-08-19 RX ADMIN — INSULIN LISPRO 1 UNITS: 100 INJECTION, SOLUTION INTRAVENOUS; SUBCUTANEOUS at 17:18

## 2025-08-19 RX ADMIN — ALLOPURINOL 300 MG: 300 TABLET ORAL at 09:22

## 2025-08-19 RX ADMIN — SODIUM CHLORIDE, PRESERVATIVE FREE 10 ML: 5 INJECTION INTRAVENOUS at 20:30

## 2025-08-19 RX ADMIN — PANCRELIPASE LIPASE, PANCRELIPASE PROTEASE, PANCRELIPASE AMYLASE 20000 UNITS: 20000; 63000; 84000 CAPSULE, DELAYED RELEASE ORAL at 16:43

## 2025-08-19 RX ADMIN — PANTOPRAZOLE SODIUM 40 MG: 40 TABLET, DELAYED RELEASE ORAL at 09:22

## 2025-08-19 RX ADMIN — OXYCODONE HYDROCHLORIDE 5 MG: 5 TABLET ORAL at 20:45

## 2025-08-19 RX ADMIN — Medication 1000 MCG: at 09:22

## 2025-08-19 RX ADMIN — INSULIN LISPRO 1 UNITS: 100 INJECTION, SOLUTION INTRAVENOUS; SUBCUTANEOUS at 20:39

## 2025-08-19 RX ADMIN — PANCRELIPASE LIPASE, PANCRELIPASE PROTEASE, PANCRELIPASE AMYLASE 15000 UNITS: 15000; 47000; 63000 CAPSULE, DELAYED RELEASE ORAL at 12:21

## 2025-08-19 RX ADMIN — ALBUTEROL SULFATE 2 PUFF: 90 AEROSOL, METERED RESPIRATORY (INHALATION) at 09:58

## 2025-08-19 RX ADMIN — OXYCODONE HYDROCHLORIDE 5 MG: 5 TABLET ORAL at 10:22

## 2025-08-19 RX ADMIN — SODIUM CHLORIDE: 0.9 INJECTION, SOLUTION INTRAVENOUS at 01:56

## 2025-08-19 RX ADMIN — METRONIDAZOLE 500 MG: 500 INJECTION, SOLUTION INTRAVENOUS at 16:50

## 2025-08-19 RX ADMIN — PANCRELIPASE LIPASE, PANCRELIPASE PROTEASE, PANCRELIPASE AMYLASE 15000 UNITS: 15000; 47000; 63000 CAPSULE, DELAYED RELEASE ORAL at 16:43

## 2025-08-19 RX ADMIN — CALCIUM POLYCARBOPHIL 625 MG: 625 TABLET, FILM COATED ORAL at 09:24

## 2025-08-19 RX ADMIN — SODIUM CHLORIDE, PRESERVATIVE FREE 10 ML: 5 INJECTION INTRAVENOUS at 09:25

## 2025-08-19 RX ADMIN — HEPARIN SODIUM AND DEXTROSE 22 UNITS/KG/HR: 10000; 5 INJECTION INTRAVENOUS at 09:21

## 2025-08-19 RX ADMIN — PANCRELIPASE LIPASE, PANCRELIPASE PROTEASE, PANCRELIPASE AMYLASE 20000 UNITS: 20000; 63000; 84000 CAPSULE, DELAYED RELEASE ORAL at 09:24

## 2025-08-19 RX ADMIN — CEFEPIME 2000 MG: 2 INJECTION, POWDER, FOR SOLUTION INTRAVENOUS at 20:37

## 2025-08-19 RX ADMIN — Medication 3 MG: at 22:04

## 2025-08-19 RX ADMIN — ALBUTEROL SULFATE 2 PUFF: 90 AEROSOL, METERED RESPIRATORY (INHALATION) at 20:01

## 2025-08-19 RX ADMIN — ALBUTEROL SULFATE 2 PUFF: 90 AEROSOL, METERED RESPIRATORY (INHALATION) at 07:41

## 2025-08-19 RX ADMIN — CEFEPIME 2000 MG: 2 INJECTION, POWDER, FOR SOLUTION INTRAVENOUS at 12:28

## 2025-08-19 RX ADMIN — ONDANSETRON 4 MG: 4 TABLET, ORALLY DISINTEGRATING ORAL at 10:22

## 2025-08-19 RX ADMIN — FERROUS SULFATE TAB 325 MG (65 MG ELEMENTAL FE) 325 MG: 325 (65 FE) TAB at 09:21

## 2025-08-19 RX ADMIN — METRONIDAZOLE 500 MG: 500 INJECTION, SOLUTION INTRAVENOUS at 09:19

## 2025-08-19 RX ADMIN — PANCRELIPASE LIPASE, PANCRELIPASE PROTEASE, PANCRELIPASE AMYLASE 20000 UNITS: 20000; 63000; 84000 CAPSULE, DELAYED RELEASE ORAL at 12:21

## 2025-08-19 RX ADMIN — CEFEPIME 2000 MG: 2 INJECTION, POWDER, FOR SOLUTION INTRAVENOUS at 03:57

## 2025-08-19 RX ADMIN — HEPARIN SODIUM 5200 UNITS: 1000 INJECTION, SOLUTION INTRAVENOUS; SUBCUTANEOUS at 03:51

## 2025-08-19 RX ADMIN — HEPARIN SODIUM 2600 UNITS: 1000 INJECTION INTRAVENOUS; SUBCUTANEOUS at 17:11

## 2025-08-19 ASSESSMENT — PAIN DESCRIPTION - LOCATION: LOCATION: ABDOMEN

## 2025-08-19 ASSESSMENT — PAIN - FUNCTIONAL ASSESSMENT
PAIN_FUNCTIONAL_ASSESSMENT: 0-10
PAIN_FUNCTIONAL_ASSESSMENT: PREVENTS OR INTERFERES SOME ACTIVE ACTIVITIES AND ADLS

## 2025-08-19 ASSESSMENT — PAIN SCALES - GENERAL
PAINLEVEL_OUTOF10: 0
PAINLEVEL_OUTOF10: 7
PAINLEVEL_OUTOF10: 0
PAINLEVEL_OUTOF10: 4
PAINLEVEL_OUTOF10: 0
PAINLEVEL_OUTOF10: 4

## 2025-08-19 ASSESSMENT — PAIN DESCRIPTION - DESCRIPTORS: DESCRIPTORS: OTHER (COMMENT);ACHING

## 2025-08-19 ASSESSMENT — PAIN DESCRIPTION - ORIENTATION: ORIENTATION: RIGHT;LEFT

## 2025-08-20 VITALS
HEART RATE: 74 BPM | BODY MASS INDEX: 20.33 KG/M2 | HEIGHT: 70 IN | RESPIRATION RATE: 16 BRPM | SYSTOLIC BLOOD PRESSURE: 106 MMHG | WEIGHT: 141.98 LBS | DIASTOLIC BLOOD PRESSURE: 64 MMHG | OXYGEN SATURATION: 96 % | TEMPERATURE: 98.1 F

## 2025-08-20 LAB
ANION GAP SERPL CALCULATED.3IONS-SCNC: 6 MMOL/L (ref 3–16)
ANTI-XA UNFRAC HEPARIN: 0.27 IU/ML (ref 0.3–0.7)
BUN SERPL-MCNC: 6 MG/DL (ref 7–20)
CALCIUM SERPL-MCNC: 7.1 MG/DL (ref 8.3–10.6)
CHLORIDE SERPL-SCNC: 102 MMOL/L (ref 99–110)
CO2 SERPL-SCNC: 23 MMOL/L (ref 21–32)
CREAT SERPL-MCNC: <0.2 MG/DL (ref 0.8–1.3)
DEPRECATED RDW RBC AUTO: 22.8 % (ref 12.4–15.4)
GFR SERPLBLD CREATININE-BSD FMLA CKD-EPI: >90 ML/MIN/{1.73_M2}
GLUCOSE BLD-MCNC: 138 MG/DL (ref 70–99)
GLUCOSE SERPL-MCNC: 127 MG/DL (ref 70–99)
HCT VFR BLD AUTO: 21.6 % (ref 40.5–52.5)
HGB BLD-MCNC: 7.1 G/DL (ref 13.5–17.5)
MCH RBC QN AUTO: 28.6 PG (ref 26–34)
MCHC RBC AUTO-ENTMCNC: 32.7 G/DL (ref 31–36)
MCV RBC AUTO: 87.4 FL (ref 80–100)
PERFORMED ON: ABNORMAL
PLATELET # BLD AUTO: 83 K/UL (ref 135–450)
PMV BLD AUTO: 7.5 FL (ref 5–10.5)
POTASSIUM SERPL-SCNC: 3.9 MMOL/L (ref 3.5–5.1)
RBC # BLD AUTO: 2.48 M/UL (ref 4.2–5.9)
SODIUM SERPL-SCNC: 131 MMOL/L (ref 136–145)
WBC # BLD AUTO: 6 K/UL (ref 4–11)

## 2025-08-20 PROCEDURE — 6370000000 HC RX 637 (ALT 250 FOR IP): Performed by: INTERNAL MEDICINE

## 2025-08-20 PROCEDURE — 97530 THERAPEUTIC ACTIVITIES: CPT

## 2025-08-20 PROCEDURE — 94640 AIRWAY INHALATION TREATMENT: CPT

## 2025-08-20 PROCEDURE — 97162 PT EVAL MOD COMPLEX 30 MIN: CPT

## 2025-08-20 PROCEDURE — 85027 COMPLETE CBC AUTOMATED: CPT

## 2025-08-20 PROCEDURE — 6360000002 HC RX W HCPCS

## 2025-08-20 PROCEDURE — 6360000002 HC RX W HCPCS: Performed by: HOSPITALIST

## 2025-08-20 PROCEDURE — 6370000000 HC RX 637 (ALT 250 FOR IP)

## 2025-08-20 PROCEDURE — 80048 BASIC METABOLIC PNL TOTAL CA: CPT

## 2025-08-20 PROCEDURE — 85520 HEPARIN ASSAY: CPT

## 2025-08-20 PROCEDURE — 94761 N-INVAS EAR/PLS OXIMETRY MLT: CPT

## 2025-08-20 PROCEDURE — 97116 GAIT TRAINING THERAPY: CPT

## 2025-08-20 PROCEDURE — 2580000003 HC RX 258: Performed by: HOSPITALIST

## 2025-08-20 RX ORDER — HEPARIN SODIUM 1000 [USP'U]/ML
40 INJECTION, SOLUTION INTRAVENOUS; SUBCUTANEOUS ONCE
Status: COMPLETED | OUTPATIENT
Start: 2025-08-20 | End: 2025-08-20

## 2025-08-20 RX ADMIN — OXYCODONE HYDROCHLORIDE 5 MG: 5 TABLET ORAL at 00:52

## 2025-08-20 RX ADMIN — PANCRELIPASE LIPASE, PANCRELIPASE PROTEASE, PANCRELIPASE AMYLASE 20000 UNITS: 20000; 63000; 84000 CAPSULE, DELAYED RELEASE ORAL at 09:04

## 2025-08-20 RX ADMIN — CALCIUM POLYCARBOPHIL 625 MG: 625 TABLET, FILM COATED ORAL at 09:03

## 2025-08-20 RX ADMIN — APIXABAN 10 MG: 5 TABLET, FILM COATED ORAL at 09:03

## 2025-08-20 RX ADMIN — PANCRELIPASE LIPASE, PANCRELIPASE PROTEASE, PANCRELIPASE AMYLASE 15000 UNITS: 15000; 47000; 63000 CAPSULE, DELAYED RELEASE ORAL at 09:04

## 2025-08-20 RX ADMIN — PANCRELIPASE LIPASE, PANCRELIPASE PROTEASE, PANCRELIPASE AMYLASE 20000 UNITS: 20000; 63000; 84000 CAPSULE, DELAYED RELEASE ORAL at 11:17

## 2025-08-20 RX ADMIN — METRONIDAZOLE 500 MG: 500 INJECTION, SOLUTION INTRAVENOUS at 00:37

## 2025-08-20 RX ADMIN — PANTOPRAZOLE SODIUM 40 MG: 40 TABLET, DELAYED RELEASE ORAL at 09:03

## 2025-08-20 RX ADMIN — HEPARIN SODIUM 2600 UNITS: 1000 INJECTION, SOLUTION INTRAVENOUS; SUBCUTANEOUS at 07:08

## 2025-08-20 RX ADMIN — PANCRELIPASE LIPASE, PANCRELIPASE PROTEASE, PANCRELIPASE AMYLASE 15000 UNITS: 15000; 47000; 63000 CAPSULE, DELAYED RELEASE ORAL at 11:17

## 2025-08-20 RX ADMIN — OXYCODONE HYDROCHLORIDE 5 MG: 5 TABLET ORAL at 12:39

## 2025-08-20 RX ADMIN — CEFEPIME 2000 MG: 2 INJECTION, POWDER, FOR SOLUTION INTRAVENOUS at 04:27

## 2025-08-20 RX ADMIN — ALLOPURINOL 300 MG: 300 TABLET ORAL at 09:03

## 2025-08-20 RX ADMIN — ALBUTEROL SULFATE 2 PUFF: 90 AEROSOL, METERED RESPIRATORY (INHALATION) at 07:27

## 2025-08-20 RX ADMIN — METRONIDAZOLE 500 MG: 500 INJECTION, SOLUTION INTRAVENOUS at 09:11

## 2025-08-20 RX ADMIN — HEPARIN SODIUM AND DEXTROSE 28 UNITS/KG/HR: 10000; 5 INJECTION INTRAVENOUS at 01:48

## 2025-08-20 RX ADMIN — SODIUM CHLORIDE: 0.9 INJECTION, SOLUTION INTRAVENOUS at 09:09

## 2025-08-20 RX ADMIN — Medication 1000 MCG: at 09:03

## 2025-08-20 RX ADMIN — HEPARIN SODIUM 2600 UNITS: 1000 INJECTION, SOLUTION INTRAVENOUS; SUBCUTANEOUS at 00:22

## 2025-08-20 ASSESSMENT — PAIN DESCRIPTION - ORIENTATION
ORIENTATION: MID
ORIENTATION: RIGHT;LEFT

## 2025-08-20 ASSESSMENT — PAIN DESCRIPTION - DESCRIPTORS
DESCRIPTORS: ACHING
DESCRIPTORS: ACHING;NAGGING

## 2025-08-20 ASSESSMENT — PAIN - FUNCTIONAL ASSESSMENT
PAIN_FUNCTIONAL_ASSESSMENT: 0-10
PAIN_FUNCTIONAL_ASSESSMENT: 0-10
PAIN_FUNCTIONAL_ASSESSMENT: PREVENTS OR INTERFERES SOME ACTIVE ACTIVITIES AND ADLS
PAIN_FUNCTIONAL_ASSESSMENT: 0-10

## 2025-08-20 ASSESSMENT — PAIN SCALES - GENERAL
PAINLEVEL_OUTOF10: 0
PAINLEVEL_OUTOF10: 0
PAINLEVEL_OUTOF10: 7
PAINLEVEL_OUTOF10: 7
PAINLEVEL_OUTOF10: 4

## 2025-08-20 ASSESSMENT — PAIN DESCRIPTION - PAIN TYPE: TYPE: CHRONIC PAIN

## 2025-08-20 ASSESSMENT — PAIN DESCRIPTION - LOCATION
LOCATION: GENERALIZED
LOCATION: ABDOMEN;BACK;LEG

## 2025-08-20 ASSESSMENT — PAIN DESCRIPTION - FREQUENCY: FREQUENCY: INTERMITTENT

## 2025-08-20 ASSESSMENT — PAIN DESCRIPTION - ONSET: ONSET: ON-GOING

## 2025-08-21 LAB
BACTERIA BLD CULT: ABNORMAL
ORGANISM: ABNORMAL
REPORT: NORMAL

## 2025-08-24 LAB
BACTERIA BLD CULT ORG #2: ABNORMAL
BACTERIA BLD CULT ORG #2: ABNORMAL
ORGANISM: ABNORMAL

## 2025-08-25 ENCOUNTER — TELEPHONE (OUTPATIENT)
Dept: FAMILY MEDICINE CLINIC | Age: 75
End: 2025-08-25

## 2025-08-25 DIAGNOSIS — E11.9 TYPE 2 DIABETES MELLITUS WITHOUT COMPLICATION, WITH LONG-TERM CURRENT USE OF INSULIN (HCC): ICD-10-CM

## 2025-08-25 DIAGNOSIS — K83.1 BILIARY OBSTRUCTION (HCC): ICD-10-CM

## 2025-08-25 DIAGNOSIS — C22.1 CHOLANGIOCARCINOMA (HCC): Primary | ICD-10-CM

## 2025-08-25 DIAGNOSIS — Z79.4 TYPE 2 DIABETES MELLITUS WITHOUT COMPLICATION, WITH LONG-TERM CURRENT USE OF INSULIN (HCC): ICD-10-CM

## 2025-08-25 RX ORDER — ONDANSETRON 8 MG/1
8 TABLET, ORALLY DISINTEGRATING ORAL EVERY 8 HOURS PRN
Qty: 270 TABLET | Refills: 3 | Status: SHIPPED | OUTPATIENT
Start: 2025-08-25 | End: 2026-08-20

## 2025-08-27 DIAGNOSIS — C22.1 CHOLANGIOCARCINOMA (HCC): ICD-10-CM

## 2025-08-27 DIAGNOSIS — R10.10 PAIN OF UPPER ABDOMEN: ICD-10-CM

## 2025-08-27 DIAGNOSIS — C22.8 PRIMARY MALIGNANT NEOPLASM OF LIVER (HCC): ICD-10-CM

## 2025-08-27 RX ORDER — OXYCODONE HYDROCHLORIDE 5 MG/1
5 TABLET ORAL EVERY 6 HOURS PRN
Qty: 28 TABLET | Refills: 0 | Status: SHIPPED | OUTPATIENT
Start: 2025-08-27 | End: 2025-09-03

## 2025-08-28 RX ORDER — ALBUTEROL SULFATE 90 UG/1
INHALANT RESPIRATORY (INHALATION)
Qty: 9 G | Refills: 2 | Status: SHIPPED | OUTPATIENT
Start: 2025-08-28

## 2025-09-03 ENCOUNTER — TELEPHONE (OUTPATIENT)
Dept: FAMILY MEDICINE CLINIC | Age: 75
End: 2025-09-03

## 2025-09-05 NOTE — PROGRESS NOTES
Physician Progress Note      PATIENT:               JO IVERSON  CSN #:                  [id number]  :                       1950  ADMIT DATE:       2025 5:14 PM  DISCH DATE:        2025 6:31 PM  RESPONDING  PROVIDER #:        Christal Johnson MD          QUERY TEXT:    Dear Dr. Johnson,    Anemia is documented in the medical record. Please specify the possible   underlying cause:    The clinical indicators include:  75 yo m presents with low hemoglobin on outpatient labs 6.6, per patient rust   colored stool last 2-3 days  -ABD pain, Adenocarcinoma, CLL Crohn's colitis  -H/H 6.3/19.5  -noted documentation per Med pn dated  of, 'Stage IV intrahepatic   cholangiocarcinoma s/p biliary drains  Recurrent anemia/possible GI bleeds'  -noted documentation per Discharge summary dated  of, 'Possible GI bleed   in the setting of stage IV intrahepatic cholangiocarcinoma. Acute on chronic   anemia, recurrent'.    -GI consult, Hemoc consult,  Trend H&H, monitor for s/s of bleeding, monitor   and correct coagulopathy, PPI IV, check stool tests, monitor biliary drain   output, transfusion RBCs x2, Palliative care consult      Thank you, in advance,  Sole Sweet RN BSN CRCR  [email or web address]   [email or web address]  Options provided:  -- GI Bleed due to or associated with Stage IV intrahepatic cholangiocarcioma.  -- Stage IV intrahepatic cholangiocarcinoma related to acute on chronic blood   loss anemia  -- Other - I will add my own diagnosis  -- Disagree - Not applicable / Not valid  -- Disagree - Clinically unable to determine / Unknown  -- Refer to Clinical Documentation Reviewer    PROVIDER RESPONSE TEXT:    The patients GI Bleed due to or associated with Stage IV intrahepatic   cholangiocarcinoma.    Query created by: Sole Sweet on 9/3/2025 12:32 PM      Electronically signed by:  Christal Johnson MD 2025 10:28 AM

## (undated) PROCEDURE — 0FBG0ZZ EXCISION OF PANCREAS, OPEN APPROACH: ICD-10-PCS

## (undated) PROCEDURE — 0DT90ZZ RESECTION OF DUODENUM, OPEN APPROACH: ICD-10-PCS

## (undated) DEVICE — PUMP SUC IRR TBNG L10FT W/ HNDPC ASSEMB STRYKEFLOW 2

## (undated) DEVICE — TROCAR: Brand: KII FIOS FIRST ENTRY

## (undated) DEVICE — CONMED SCOPE SAVER BITE BLOCK, 20X27 MM: Brand: SCOPE SAVER

## (undated) DEVICE — ELECTRODE ECG MONITR FOAM TEAR DROP ADLT RED

## (undated) DEVICE — LIQUIBAND RAPID ADHESIVE 36/CS 0.8ML: Brand: MEDLINE

## (undated) DEVICE — THE DISPOSABLE INFINITY ERCP SAMPLING DEVICE IS USED TO RETRIEVE CYTOLOGICAL CELL SAMPLES IN THE GASTROINTESTINAL TRACT.: Brand: INFINITY

## (undated) DEVICE — SEALANT TISS 10 CC FIBRIN VISTASEAL

## (undated) DEVICE — SUTURE BOOT: Brand: DEROYAL

## (undated) DEVICE — SUTURE ETHBND 2-0 L30IN NONABSORBABLE GRN L22MM SH-1 1/2 MX763

## (undated) DEVICE — ACCESS AND DELIVERY CATHETER: Brand: SPYSCOPE™ DS II

## (undated) DEVICE — BRUSH CLN L230CM SHTH DIA1.7MM TAPR BRIST DIA5-7MM CHN

## (undated) DEVICE — CANNULATING SPHINCTEROTOME: Brand: TRUETOME JAG 44

## (undated) DEVICE — SUTURE VCRL SZ 4-0 L27IN ABSRB UD L17MM RB-1 1/2 CIR J214H

## (undated) DEVICE — ENDO CARRY-ON PROCEDURE KIT INCLUDES SUCTION TUBING, LUBRICANT, GAUZE, BIOHAZARD STICKER, TRANSPORT PAD AND INTERCEPT BEDSIDE KIT.: Brand: ENDO CARRY-ON PROCEDURE KIT

## (undated) DEVICE — SUTURE PDS II SZ 5-0 L30IN ABSRB VLT RB-2 L13MM 1/2 CIR Z148H

## (undated) DEVICE — PROVE COVER: Brand: UNBRANDED

## (undated) DEVICE — TROCAR: Brand: KII OPTICAL ACCESS SYSTEM

## (undated) DEVICE — 1LYRTR 16FR10ML100%SIL UMS SNP: Brand: MEDLINE INDUSTRIES, INC.

## (undated) DEVICE — CANNULATING SPHINCTEROTOME: Brand: JAGTOME™ REVOLUTION RX

## (undated) DEVICE — SNARE POLYP L L240CM DIA2.4MM LOOP W27MM OVL M STIFF SHT

## (undated) DEVICE — ELECTRODE PT RET AD L9FT HI MOIST COND ADH HYDRGEL CORDED

## (undated) DEVICE — DRAIN SURG 19FR 100% SIL RADPQ RND CHN FULL FLUT

## (undated) DEVICE — SPHINCTEROTOME: Brand: JAGTOME RX 39

## (undated) DEVICE — RESERVOIR,SUCTION,100CC,SILICONE: Brand: MEDLINE

## (undated) DEVICE — GLOVE SURG SZ 7 L12IN FNGR THK79MIL GRN LTX FREE

## (undated) DEVICE — SOLUTION ANTIFOG VIS SYS CLEARIFY LAPSCP

## (undated) DEVICE — STANDARD HYPODERMIC NEEDLE,POLYPROPYLENE HUB: Brand: MONOJECT

## (undated) DEVICE — CANNULA SEAL

## (undated) DEVICE — GOWN,SIRUS,POLYRNF,BRTHSLV,LG,30/CS: Brand: MEDLINE

## (undated) DEVICE — Device: Brand: DISPOSABLE ELECTROSURGICAL SNARE

## (undated) DEVICE — COVER,TABLE,44X90,STERILE: Brand: MEDLINE

## (undated) DEVICE — MEDIA CONTRAST OPTIRAY 320 50ML VIAL

## (undated) DEVICE — GLOVE SURG SZ 7 L12IN FNGR THK75MIL WHT LTX POLYMER BEAD

## (undated) DEVICE — ARM DRAPE

## (undated) DEVICE — ESOPHAGEAL/PYLORIC/COLONIC/BILIARY WIREGUIDED BALLOON DILATATION CATHETER: Brand: CRE™ PRO

## (undated) DEVICE — SINGLE-USE BIOPSY FORCEPS: Brand: RADIAL JAW 4

## (undated) DEVICE — SINGLE USE DISTAL COVER MAJ-2315: Brand: SINGLE USE DISTAL COVER

## (undated) DEVICE — SWAB CULT DBL W/O CHAR RAYON TIP AMIES GEL CLMN FOR COLL

## (undated) DEVICE — SUTURE PERMA-HAND SZ 2-0 L30IN NONABSORBABLE BLK L26MM SH K833H

## (undated) DEVICE — TAPE UMB 1/8X24 IN BRAIDED POLYESTER STRL

## (undated) DEVICE — INTENDED USED TO PROTECT, TAG AND HELP LOCATED SUTURES DURING SURGERY: Brand: STERION®SUTURE AID BOOTIES

## (undated) DEVICE — ENDOSCOPIC KIT 2 12 FT OP4 DE2 GWN SYR

## (undated) DEVICE — CAP BX OLYMPUS AUTOCAP RX

## (undated) DEVICE — PORT INSERTION: Brand: MEDLINE INDUSTRIES, INC.

## (undated) DEVICE — TOWEL,OR,DSP,ST,BLUE,DLX,8/PK,10PK/CS: Brand: MEDLINE

## (undated) DEVICE — SINGLE-USE BIOPSY FORCEPS: Brand: SPYBITE MAX

## (undated) DEVICE — RELOAD STPL 3.5MM L60MM 0DEG UNIV TISS PUR TI 6 ROW LIN

## (undated) DEVICE — 3M™ IOBAN™ 2 ANTIMICROBIAL INCISE DRAPE 6648EZ: Brand: IOBAN™ 2

## (undated) DEVICE — PREVENA PLUS INCISION MANAGEMENT SYSTEM: Brand: PREVENA PLUS™

## (undated) DEVICE — SUTURE PERMAHAND SZ 3-0 L30IN NONABSORBABLE BLK SH L26MM K832H

## (undated) DEVICE — TOWEL,STOP FLAG GOLD N-W: Brand: MEDLINE

## (undated) DEVICE — CANNULATING SPHINCTEROTOME: Brand: TRUETOME™ REVOLUTION 39

## (undated) DEVICE — SCANLAN® SUTURE BOOT™ INSTRUMENT JAW COVERS - ORIGINAL YELLOW, STANDARD PKG (5 PAIR/CARTRIDGE, 1 CARTRIDGE/PKG): Brand: SCANLAN® SUTURE BOOT™ INSTRUMENT JAW COVERS

## (undated) DEVICE — SYRINGE MED 10ML LUERLOCK TIP W/O SFTY DISP

## (undated) DEVICE — RETRIEVAL BALLOON CATHETER: Brand: EXTRACTOR™ PRO XL

## (undated) DEVICE — BRUSH CYTO L200CM SHTH 75FR NIT DRV WIRE SFT STIFF BRIST

## (undated) DEVICE — ROBOTIC: Brand: MEDLINE INDUSTRIES, INC.

## (undated) DEVICE — SOLUTION INJ LR VISIV 1000ML BG

## (undated) DEVICE — SUTURE PDS II SZ 0 L60IN ABSRB VLT L48MM CTX 1/2 CIR Z990G

## (undated) DEVICE — SUTURE MCRYL + SZ 4-0 L27IN ABSRB UD L19MM PS-2 3/8 CIR MCP426H

## (undated) DEVICE — STAPLER INT H4.4X1.5MM DIA75MM 0DEG TI UNIV 6 ROW CART

## (undated) DEVICE — DEVICE SEAL L23CM NANO COAT MARYLAND JAW OPN DIV LIGASURE

## (undated) DEVICE — LAPAROSCOPIC SCISSORS: Brand: EPIX LAPAROSCOPIC SCISSORS

## (undated) DEVICE — DUODENOSCOPE RIGID CTRL STRL EXALT D DISP M00542423

## (undated) DEVICE — STAPLER INT L75MM H1.5X1.8X2MM STD TI 6 ROW LIN CUT

## (undated) DEVICE — TIP-UP FENESTRATED GRASPER: Brand: ENDOWRIST

## (undated) DEVICE — SINGLE USE 3-LUMEN EXTRACTION BALLOON V: Brand: SINGLE USE 3-LUMEN EXTRACTION BALLOON V

## (undated) DEVICE — NEEDLE,22GX1.5",REG,BEVEL: Brand: MEDLINE

## (undated) DEVICE — SYRINGE LL 10CC

## (undated) DEVICE — APPLIER CLP L L13IN TI MULT RNG HNDL 20 CLP STR LIGACLP

## (undated) DEVICE — BOWL 32OZ-LF: Brand: MEDLINE INDUSTRIES, INC.

## (undated) DEVICE — TIP COVER ACCESSORY

## (undated) DEVICE — BLADELESS OBTURATOR: Brand: WECK VISTA

## (undated) DEVICE — APPLICATOR MEDICATED 26 CC SOLUTION HI LT ORNG CHLORAPREP

## (undated) DEVICE — SYSTEM SMK EVAC LAP TBNG FILTER HSNG BENT STYL PNK SEE CLR

## (undated) DEVICE — SUTURE VCRL + SZ 3-0 L18IN ABSRB UD SH 1/2 CIR TAPERCUT NDL VCP864D

## (undated) DEVICE — SYNCHROSEAL: Brand: DA VINCI ENERGY

## (undated) DEVICE — SYRINGE IRRIG 60ML SFT PLIABLE BLB EZ TO GRP 1 HND USE W/

## (undated) DEVICE — GUIDEWIRE ENDOSCP L4500MM DIA0035IN STR TIP EXCELLENT

## (undated) DEVICE — SUTURE PDS II SZ 4-0 L27IN ABSRB VLT L17MM RB-1 1/2 CIR Z304H